# Patient Record
Sex: MALE | Race: WHITE | NOT HISPANIC OR LATINO | Employment: OTHER | URBAN - NONMETROPOLITAN AREA
[De-identification: names, ages, dates, MRNs, and addresses within clinical notes are randomized per-mention and may not be internally consistent; named-entity substitution may affect disease eponyms.]

---

## 2017-12-20 PROBLEM — C18.1 MUCINOUS ADENOCARCINOMA OF APPENDIX: Status: ACTIVE | Noted: 2017-12-20

## 2017-12-22 ENCOUNTER — CONSULT (OUTPATIENT)
Dept: ONCOLOGY | Facility: CLINIC | Age: 62
End: 2017-12-22

## 2017-12-22 VITALS
SYSTOLIC BLOOD PRESSURE: 110 MMHG | RESPIRATION RATE: 16 BRPM | DIASTOLIC BLOOD PRESSURE: 84 MMHG | BODY MASS INDEX: 20.15 KG/M2 | WEIGHT: 152 LBS | HEIGHT: 73 IN | TEMPERATURE: 99.8 F | HEART RATE: 105 BPM

## 2017-12-22 DIAGNOSIS — C18.1 MUCINOUS ADENOCARCINOMA OF APPENDIX (HCC): Primary | ICD-10-CM

## 2017-12-22 PROCEDURE — 99245 OFF/OP CONSLTJ NEW/EST HI 55: CPT | Performed by: INTERNAL MEDICINE

## 2017-12-22 RX ORDER — METOPROLOL SUCCINATE 50 MG/1
50 TABLET, EXTENDED RELEASE ORAL DAILY
Refills: 0 | COMMUNITY
Start: 2017-11-14 | End: 2021-08-31 | Stop reason: SDUPTHER

## 2017-12-22 RX ORDER — LISINOPRIL AND HYDROCHLOROTHIAZIDE 20; 12.5 MG/1; MG/1
1 TABLET ORAL DAILY
Refills: 0 | COMMUNITY
Start: 2017-11-14 | End: 2021-08-31 | Stop reason: SDUPTHER

## 2017-12-22 NOTE — PROGRESS NOTES
CHIEF COMPLAINT: Mucinous adenocarcinoma the appendix    REASON FOR REFERRAL: Appendiceal adenocarcinoma      RECORDS OBTAINED  Records of the patients history including those obtained from  Vishal turner were reviewed and summarized in detail.       Mucinous adenocarcinoma of appendix    12/5/2017 Initial Diagnosis     Mucinous adenocarcinoma of appendix         12/5/2017 Surgery     Surgery       Procedure:  Appendectomy and right hemicolectomy      Completeness of resection:  No evidence of residual tumor     Pathology revealed invasive moderately differentiated mucinous adenocarcinoma.  Right hemicolectomy: Benign colon and small intestine no evidence of carcinoma.  16 benign lymph nodes, 0/16, negative for dysplasia or malignancy.  Tumor size approximately 6 cm.  Grade 2, moderately differentiated.  Tumor invades through the muscularis profile into the base of appendix but does not extend to the serosal surface.  All margins uninvolved, no lymphovascular invasion identified.  Pathological stage pT3 pN0.             12/8/2017 Imaging     CT of the chest abdomen and pelvis negative.  Baseline CBC WBC 7100, hemoglobin 11.3, hematocrit 34.8%, platelet count 195,000.            HISTORY OF PRESENT ILLNESS:  The patient is a 62 y.o.  male, referred for Mucinous adenocarcinoma the appendix found at the time of appendectomy in the face of appendicitis.  Pathologic stage IIa with T3 extension into the base of the appendix through the muscularis propria but not into the serosal surface.  16 nodes negative.  CT chest abdomen and pelvis showed no evidence of metastasis.    REVIEW OF SYSTEMS:  A 14 point review of systems was performed and is negative except as noted above.    Past Medical History:   Diagnosis Date   • Hypertension      History reviewed. No pertinent surgical history.    No current outpatient prescriptions on file prior to visit.     No current facility-administered medications on file prior to visit.   "      No Known Allergies    Social History     Social History   • Marital status: Unknown     Spouse name: N/A   • Number of children: N/A   • Years of education: N/A     Social History Main Topics   • Smoking status: Former Smoker   • Smokeless tobacco: None   • Alcohol use None   • Drug use: None   • Sexual activity: Not Asked     Other Topics Concern   • None     Social History Narrative   • None       Family History   Problem Relation Age of Onset   • Lung cancer Mother    • Heart disease Father        PHYSICAL EXAM:    /84  Pulse 105  Temp 99.8 °F (37.7 °C)  Resp 16  Ht 185.4 cm (73\")  Wt 68.9 kg (152 lb)  BMI 20.05 kg/m2    ECOG: (0) Fully active, able to carry on all predisease performance without restriction  General: well appearing male in no acute distress  HEENT: sclera anicteric, oropharynx clear  Lymphatics: no cervical, supraclavicular, inguinal, or axillary adenopathy  Cardiovascular: regular rate and rhythm, no murmurs  Neck: Supple; No thyromegaly  Lungs: clear to auscultation bilaterally. No respiratory distress.   Abdomen: soft, nontender, nondistended.  No palpable organomegaly  Extremities: no cyanosis, clubbing, edema, or cords  Skin: no rashes, lesions, bruising, or petechiae  Neuro: Alert and oriented x 4; Moving all extremities.  Psych: No anxiety or depression    No results found for any previous visit.    Assessment/Plan     1. 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  We reviewed the case in tumor board at [facility] with Dr. Davidson, Dr. Dover, pathology list Dr. Dawson, and radiologist Dr. King.   CBC is normal .  We will get a baseline CMP and CEA.  He has no high risk pictures.  This is not poorly differentiated.  It was cleanly remove it despite the appendicitis.  He had plenty of nodes " removed.  It was not obstructing.  There was no lymphatic or vascular invasion.    Surveillance per NCCN guidelines   - H&P and CEA every 3-6 months for 2 years and then every 6 months for 5 years  -CT chest abdomen pelvis every 6-12 months for 5 years    As he has not had a baseline colonoscopy because of the weight is presented, he will see Dr. Davidson and February or March for his baseline colonoscopy.  Assuming that is negative, I will have him see my nurse practitioner for survivorship visit in 3 months with CEA and CMP today and CBC, CMP, and CEA prior to return in 3 months.  We'll get his next CTs 6 months from now.  We'll follow with Dr. Davidson for routine endoscopy with colonoscopy in a year.  At that point he has no adenoma, then repeated in 3 years and if that's negative then repeat in 5 years per NCCN guidelines.  If he does have an advanced adenoma and he needs colonoscopy repeated yearly      Cy Delcid MD    12/22/2017

## 2018-03-23 LAB
ALBUMIN SERPL-MCNC: 4.4 G/DL (ref 3.6–4.8)
ALBUMIN/GLOB SERPL: 1.8 {RATIO} (ref 1.2–2.2)
ALP SERPL-CCNC: 99 IU/L (ref 39–117)
ALT SERPL-CCNC: 30 IU/L (ref 0–44)
AST SERPL-CCNC: 26 IU/L (ref 0–40)
BASOPHILS # BLD AUTO: 0 X10E3/UL (ref 0–0.2)
BASOPHILS NFR BLD AUTO: 0 %
BILIRUB SERPL-MCNC: 0.9 MG/DL (ref 0–1.2)
BUN SERPL-MCNC: 10 MG/DL (ref 8–27)
BUN/CREAT SERPL: 12 (ref 10–24)
CALCIUM SERPL-MCNC: 9.2 MG/DL (ref 8.6–10.2)
CEA SERPL-MCNC: 0.9 NG/ML (ref 0–4.7)
CHLORIDE SERPL-SCNC: 103 MMOL/L (ref 96–106)
CO2 SERPL-SCNC: 24 MMOL/L (ref 18–29)
CREAT SERPL-MCNC: 0.85 MG/DL (ref 0.76–1.27)
EOSINOPHIL # BLD AUTO: 0.2 X10E3/UL (ref 0–0.4)
EOSINOPHIL NFR BLD AUTO: 2 %
ERYTHROCYTE [DISTWIDTH] IN BLOOD BY AUTOMATED COUNT: 13.8 % (ref 12.3–15.4)
GFR SERPLBLD CREATININE-BSD FMLA CKD-EPI: 109 ML/MIN/1.73
GFR SERPLBLD CREATININE-BSD FMLA CKD-EPI: 95 ML/MIN/1.73
GLOBULIN SER CALC-MCNC: 2.4 G/DL (ref 1.5–4.5)
GLUCOSE SERPL-MCNC: 92 MG/DL (ref 65–99)
HCT VFR BLD AUTO: 43.9 % (ref 37.5–51)
HGB BLD-MCNC: 14.6 G/DL (ref 13–17.7)
IMM GRANULOCYTES # BLD: 0 X10E3/UL (ref 0–0.1)
IMM GRANULOCYTES NFR BLD: 0 %
LYMPHOCYTES # BLD AUTO: 2.3 X10E3/UL (ref 0.7–3.1)
LYMPHOCYTES NFR BLD AUTO: 34 %
MCH RBC QN AUTO: 28.3 PG (ref 26.6–33)
MCHC RBC AUTO-ENTMCNC: 33.3 G/DL (ref 31.5–35.7)
MCV RBC AUTO: 85 FL (ref 79–97)
MONOCYTES # BLD AUTO: 0.6 X10E3/UL (ref 0.1–0.9)
MONOCYTES NFR BLD AUTO: 8 %
NEUTROPHILS # BLD AUTO: 3.7 X10E3/UL (ref 1.4–7)
NEUTROPHILS NFR BLD AUTO: 56 %
PLATELET # BLD AUTO: 197 X10E3/UL (ref 150–379)
POTASSIUM SERPL-SCNC: 3.9 MMOL/L (ref 3.5–5.2)
PROT SERPL-MCNC: 6.8 G/DL (ref 6–8.5)
RBC # BLD AUTO: 5.15 X10E6/UL (ref 4.14–5.8)
SODIUM SERPL-SCNC: 143 MMOL/L (ref 134–144)
WBC # BLD AUTO: 6.8 X10E3/UL (ref 3.4–10.8)

## 2018-03-29 ENCOUNTER — OFFICE VISIT (OUTPATIENT)
Dept: ONCOLOGY | Facility: CLINIC | Age: 63
End: 2018-03-29

## 2018-03-29 VITALS
TEMPERATURE: 99.2 F | SYSTOLIC BLOOD PRESSURE: 128 MMHG | DIASTOLIC BLOOD PRESSURE: 84 MMHG | HEIGHT: 73 IN | HEART RATE: 78 BPM | WEIGHT: 152 LBS | BODY MASS INDEX: 20.15 KG/M2 | RESPIRATION RATE: 14 BRPM

## 2018-03-29 DIAGNOSIS — C18.1 MUCINOUS ADENOCARCINOMA OF APPENDIX (HCC): Primary | ICD-10-CM

## 2018-03-29 DIAGNOSIS — K59.1 FUNCTIONAL DIARRHEA: ICD-10-CM

## 2018-03-29 PROCEDURE — 99214 OFFICE O/P EST MOD 30 MIN: CPT | Performed by: NURSE PRACTITIONER

## 2018-03-29 NOTE — PROGRESS NOTES
CHIEF COMPLAINT: Follow-up for history of appendiceal carcinoma    Problem List:     Mucinous adenocarcinoma of appendix    12/5/2017 Initial Diagnosis     Mucinous adenocarcinoma of appendix         12/5/2017 Surgery     Surgery       Procedure:  Appendectomy and right hemicolectomy      Completeness of resection:  No evidence of residual tumor     Pathology revealed invasive moderately differentiated mucinous adenocarcinoma.  Right hemicolectomy: Benign colon and small intestine no evidence of carcinoma.  16 benign lymph nodes, 0/16, negative for dysplasia or malignancy.  Tumor size approximately 6 cm.  Grade 2, moderately differentiated.  Tumor invades through the muscularis profile into the base of appendix but does not extend to the serosal surface.  All margins uninvolved, no lymphovascular invasion identified.  Pathological stage pT3 pN0.             12/8/2017 Imaging     CT of the chest abdomen and pelvis negative.  Baseline CBC WBC 7100, hemoglobin 11.3, hematocrit 34.8%, platelet count 195,000.            HISTORY OF PRESENT ILLNESS:  The patient is a 62 y.o. male, here for follow up on management of Mucinous adenocarcinoma of the appendix.  Mr. Carmencita gan has been doing well since we saw him last with no new complaints.  States that he had colonoscopy with Dr. turner last week and that everything was clear.  Reports that he was told his next colonoscopy would be in 3 years.  Used to have diarrhea intermittently since his surgery but does not seem to be getting much better.  He has played around with taking Imodium and dietary changes but is not found a good regimen yet.       Past Medical History:   Diagnosis Date   • Hypertension      No past surgical history on file.    No Known Allergies    Family History and Social History reviewed and changed as necessary      REVIEW OF SYSTEM:   Review of Systems   Constitutional: Negative for appetite change, chills, diaphoresis, fatigue, fever and unexpected  "weight change.   HENT:   Negative for mouth sores, sore throat and trouble swallowing.    Eyes: Negative for icterus.   Respiratory: Negative for cough, hemoptysis and shortness of breath.    Cardiovascular: Negative for chest pain, leg swelling and palpitations.   Gastrointestinal: Negative for abdominal distention, abdominal pain, blood in stool, constipation, diarrhea, nausea and vomiting.   Endocrine: Negative for hot flashes.   Genitourinary: Negative for bladder incontinence, difficulty urinating, dysuria, frequency and hematuria.    Musculoskeletal: Negative for gait problem, neck pain and neck stiffness.   Skin: Negative for rash.   Neurological: Negative for dizziness, gait problem, headaches, light-headedness and numbness.   Hematological: Negative for adenopathy. Does not bruise/bleed easily.   Psychiatric/Behavioral: Negative for depression. The patient is not nervous/anxious.    All other systems reviewed and are negative.       PHYSICAL EXAM    Vitals:    03/29/18 1120   BP: 128/84   Pulse: 78   Resp: 14   Temp: 99.2 °F (37.3 °C)   Weight: 68.9 kg (152 lb)   Height: 185.4 cm (73\")     Constitutional: Thin but well-developed gentleman. No distress.   ECOG: (0) Fully active, able to carry on all predisease performance without restriction  HENT:   Head: Normocephalic.   Mouth/Throat: Oropharynx is clear and moist.   Eyes: Conjunctivae are normal. Pupils are equal, round, and reactive to light. No scleral icterus.   Neck: Neck supple. No JVD present. No thyromegaly present.   Cardiovascular: Normal rate, regular rhythm and normal heart sounds.    Pulmonary/Chest: Breath sounds normal. No respiratory distress.   Abdominal: Soft. Exhibits no distension.   Musculoskeletal:Exhibits no edema, tenderness or deformity.   Neurological: Alert and oriented to person, place, and time. Exhibits normal muscle tone.   Skin: No ecchymosis, no petechiae and no rash noted. Not diaphoretic. No cyanosis. Nails show no " clubbing.   Psychiatric: Normal mood and affect.   Vitals reviewed.  Laboratory data reviewed.    Recent Results (from the past 336 hour(s))   CBC & Differential    Collection Time: 03/22/18  9:09 AM   Result Value Ref Range    WBC 6.8 3.4 - 10.8 x10E3/uL    RBC 5.15 4.14 - 5.80 x10E6/uL    Hemoglobin 14.6 13.0 - 17.7 g/dL    Hematocrit 43.9 37.5 - 51.0 %    MCV 85 79 - 97 fL    MCH 28.3 26.6 - 33.0 pg    MCHC 33.3 31.5 - 35.7 g/dL    RDW 13.8 12.3 - 15.4 %    Platelets 197 150 - 379 x10E3/uL    Neutrophil Rel % 56 Not Estab. %    Lymphocyte Rel % 34 Not Estab. %    Monocyte Rel % 8 Not Estab. %    Eosinophil Rel % 2 Not Estab. %    Basophil Rel % 0 Not Estab. %    Neutrophils Absolute 3.7 1.4 - 7.0 x10E3/uL    Lymphocytes Absolute 2.3 0.7 - 3.1 x10E3/uL    Monocytes Absolute 0.6 0.1 - 0.9 x10E3/uL    Eosinophils Absolute 0.2 0.0 - 0.4 x10E3/uL    Basophils Absolute 0.0 0.0 - 0.2 x10E3/uL    Immature Granulocyte Rel % 0 Not Estab. %    Immature Grans Absolute 0.0 0.0 - 0.1 x10E3/uL   Comprehensive Metabolic Panel    Collection Time: 03/22/18  9:09 AM   Result Value Ref Range    Glucose 92 65 - 99 mg/dL    BUN 10 8 - 27 mg/dL    Creatinine 0.85 0.76 - 1.27 mg/dL    eGFR Non African Am 95 >59 mL/min/1.73    eGFR African Am 109 >59 mL/min/1.73    BUN/Creatinine Ratio 12 10 - 24    Sodium 143 134 - 144 mmol/L    Potassium 3.9 3.5 - 5.2 mmol/L    Chloride 103 96 - 106 mmol/L    Total CO2 24 18 - 29 mmol/L    Calcium 9.2 8.6 - 10.2 mg/dL    Total Protein 6.8 6.0 - 8.5 g/dL    Albumin 4.4 3.6 - 4.8 g/dL    Globulin 2.4 1.5 - 4.5 g/dL    A/G Ratio 1.8 1.2 - 2.2    Total Bilirubin 0.9 0.0 - 1.2 mg/dL    Alkaline Phosphatase 99 39 - 117 IU/L    AST (SGOT) 26 0 - 40 IU/L    ALT (SGPT) 30 0 - 44 IU/L   CEA    Collection Time: 03/22/18  9:09 AM   Result Value Ref Range    CEA 0.9 0.0 - 4.7 ng/mL         ASSESSMENT & PLAN:    1.  Stage IIA T3 N0 appendiceal mucinous adenocarcinoma  2.  Diarrhea    Discussion: The patient's labs  are all normal, he has no anemia, CEA is normal at 0.9, he has no new worrisome symptoms.  He states he had a colonoscopy last week with Dr. turner that was all clear although I do not have those results.  He states his next colonoscopy will be due in 3 years.  He has chronic diarrhea since his surgery in December.  Currently he is only taking Imodium once every other day and I recommended that he try taking a dose at least daily to try to get it under better control.  I will also reach out to our dietitian to see if she can offer him further information.  We will see him back in 3 months for follow-up with repeat serum testing and CT the chest abdomen and pelvis prior to return.  I did review the patient's survivorship care plan with him at today's appointment and provided him with a copy.    Surveillance per NCCN guidelines   - H&P and CEA every 3-6 months for 2 years and then every 6 months for 5 years  -CT chest abdomen pelvis every 6-12 months for 5 years      Tanya Alvarado, APRN    03/29/2018

## 2018-04-05 ENCOUNTER — TELEPHONE (OUTPATIENT)
Dept: NUTRITION | Facility: HOSPITAL | Age: 63
End: 2018-04-05

## 2018-04-05 NOTE — PROGRESS NOTES
[facility] Nutrition    Diagnosis:  Stage [place] appendiceal adenocarcinoma; 0/16 lymph nodes  Treatment:  Appendectomy & right hemicolectomy; surveillance    Nutritional Issues: 4 month history of diarrhea following surgery    Phone consultation with patient regarding nutritional issue of diarrhea.  Patient states that following surgery he started a soft bland diet and then advanced; he soon learned that there were certain foods such as Italian sausages, fried foods and most vegetables that he could not tolerate.  He has found himself eating a diet that he does not consider healthy (pastas, rice, pop tarts, cookies, etc) for the past 3 months.  He states that he started out having 10-12 loose stools per day and that improved with time, but when he did the prep for a colonoscopy, the frequency of the bowel movements increased.    Reviewed tips for management of diarrhea and mailed patient education material to the patient.  Reviewed small, frequent meals and snacks vs large meals; foods to avoid; importance of hydration; soluble vs insoluble fiber and focusing more on an increased intake of soluble fibers; use of metamucil for diarrhea management; use of antidiarrheals.     Patient verbalized excellent comprehension; all questions addressed.  Patient encouraged to contact RD for additional help as needed; patient very appreciative.

## 2018-06-26 ENCOUNTER — OFFICE VISIT (OUTPATIENT)
Dept: ONCOLOGY | Facility: CLINIC | Age: 63
End: 2018-06-26

## 2018-06-26 VITALS
SYSTOLIC BLOOD PRESSURE: 124 MMHG | HEIGHT: 73 IN | BODY MASS INDEX: 20.67 KG/M2 | HEART RATE: 64 BPM | TEMPERATURE: 98.4 F | RESPIRATION RATE: 16 BRPM | WEIGHT: 156 LBS | DIASTOLIC BLOOD PRESSURE: 85 MMHG

## 2018-06-26 DIAGNOSIS — C18.1 MUCINOUS ADENOCARCINOMA OF APPENDIX (HCC): Primary | ICD-10-CM

## 2018-06-26 PROCEDURE — 99213 OFFICE O/P EST LOW 20 MIN: CPT | Performed by: INTERNAL MEDICINE

## 2018-06-26 NOTE — PROGRESS NOTES
CHIEF COMPLAINT: fu colon ca    Problem List:     Mucinous adenocarcinoma of appendix    12/5/2017 Initial Diagnosis     Mucinous adenocarcinoma of appendix found at the time of appendectomy 12/2017 in the face of appendicitis.  Pathologic stage IIa with T3 extension into the base of the appendix through the muscularis propria but not into the serosal surface.  16 nodes negative.  Colonoscopy December 2017 negative postop         12/5/2017 Surgery     Surgery       Procedure:  Appendectomy and right hemicolectomy      Completeness of resection:  No evidence of residual tumor     Pathology revealed invasive moderately differentiated mucinous adenocarcinoma.  Right hemicolectomy: Benign colon and small intestine no evidence of carcinoma.  16 benign lymph nodes, 0/16, negative for dysplasia or malignancy.  Tumor size approximately 6 cm.  Grade 2, moderately differentiated.  Tumor invades through the muscularis profile into the base of appendix but does not extend to the serosal surface.  All margins uninvolved, no lymphovascular invasion identified.  Pathological stage pT3 pN0.             12/8/2017 Imaging     CT of the chest abdomen and pelvis negative.  Baseline CBC WBC 7100, hemoglobin 11.3, hematocrit 34.8%, platelet count 195,000.         6/11/2018 Imaging     CT chest, abdomen and pelvis with no evidence metastatic disease.  CEA 1.1            HISTORY OF PRESENT ILLNESS:  The patient is a 62 y.o. male, here for follow up on management of colon ca. Has had diarrhea since surgery.  Better with carbs.  Has consulted with Kristal Deng.      Past Medical History:   Diagnosis Date   • Hypertension      No past surgical history on file.    No Known Allergies    Family History and Social History reviewed and changed as necessary      REVIEW OF SYSTEM:   Review of Systems   Constitutional: Negative for appetite change, chills, diaphoresis, fatigue, fever and unexpected weight change.   HENT:   Negative for mouth sores,  "sore throat and trouble swallowing.    Eyes: Negative for icterus.   Respiratory: Negative for cough, hemoptysis and shortness of breath.    Cardiovascular: Negative for chest pain, leg swelling and palpitations.   Gastrointestinal: Negative for abdominal distention, abdominal pain, blood in stool, constipation, diarrhea, nausea and vomiting.   Endocrine: Negative for hot flashes.   Genitourinary: Negative for bladder incontinence, difficulty urinating, dysuria, frequency and hematuria.    Musculoskeletal: Negative for gait problem, neck pain and neck stiffness.   Skin: Negative for rash.   Neurological: Negative for dizziness, gait problem, headaches, light-headedness and numbness.   Hematological: Negative for adenopathy. Does not bruise/bleed easily.   Psychiatric/Behavioral: Negative for depression. The patient is not nervous/anxious.    All other systems reviewed and are negative.       PHYSICAL EXAM    Vitals:    06/26/18 0724   BP: 124/85   Pulse: 64   Resp: 16   Temp: 98.4 °F (36.9 °C)   Weight: 70.8 kg (156 lb)   Height: 185.4 cm (73\")     Constitutional: Appears well-developed and well-nourished. No distress.   ECOG: (0) Fully active, able to carry on all predisease performance without restriction  HENT:   Head: Normocephalic.   Mouth/Throat: Oropharynx is clear and moist.   Eyes: Conjunctivae are normal. Pupils are equal, round, and reactive to light. No scleral icterus.   Neck: Neck supple. No JVD present. No thyromegaly present.   Cardiovascular: Normal rate, regular rhythm and normal heart sounds.    Pulmonary/Chest: Breath sounds normal. No respiratory distress.   Abdominal: Soft. Exhibits no distension and no mass. There is no hepatosplenomegaly. There is no tenderness. There is no rebound and no guarding.   Musculoskeletal:Exhibits no edema, tenderness or deformity.   Neurological: Alert and oriented to person, place, and time. Exhibits normal muscle tone.   Skin: No ecchymosis, no petechiae and no " rash noted. Not diaphoretic. No cyanosis. Nails show no clubbing.   Psychiatric: Normal mood and affect.   Vitals reviewed.              ASSESSMENT & PLAN:    1.  Mucinous carcinoma of the appendix  2. Chronic postoperative diarrhea    Discussion: CEA is 1.1 with a bilirubin of 1.6 alkaline phosphatase of 105 with normal CBC and CT chest abdomen and pelvis are negative both on my review of images as well as written report.  Per NCCN guidelines we will continue to check his CEA, CBC, CMP, and CT chest abdomen pelvis every 6 months until December 2022.  His next colonoscopy per Dr. Awad will be December 2020.  He will continue to work with Kristal Deng regarding dietary management in light of his chronic diarrhea postoperatively.      Cy Delcid MD    06/26/2018

## 2018-09-19 ENCOUNTER — OFFICE VISIT (OUTPATIENT)
Dept: ONCOLOGY | Facility: CLINIC | Age: 63
End: 2018-09-19

## 2018-09-19 VITALS
BODY MASS INDEX: 20.28 KG/M2 | HEART RATE: 80 BPM | HEIGHT: 73 IN | DIASTOLIC BLOOD PRESSURE: 85 MMHG | RESPIRATION RATE: 16 BRPM | WEIGHT: 153 LBS | SYSTOLIC BLOOD PRESSURE: 132 MMHG | TEMPERATURE: 98.9 F

## 2018-09-19 DIAGNOSIS — C18.1 MUCINOUS ADENOCARCINOMA OF APPENDIX (HCC): Primary | ICD-10-CM

## 2018-09-19 DIAGNOSIS — R10.11 RIGHT UPPER QUADRANT ABDOMINAL PAIN: ICD-10-CM

## 2018-09-19 PROCEDURE — 99213 OFFICE O/P EST LOW 20 MIN: CPT | Performed by: NURSE PRACTITIONER

## 2018-09-19 NOTE — PROGRESS NOTES
CHIEF COMPLAINT: Right upper quadrant abdominal pain    Problem List:     Mucinous adenocarcinoma of appendix (CMS/HCC)    12/5/2017 Initial Diagnosis     Mucinous adenocarcinoma of appendix found at the time of appendectomy 12/2017 in the face of appendicitis.  Pathologic stage IIa with T3 extension into the base of the appendix through the muscularis propria but not into the serosal surface.  16 nodes negative.  Colonoscopy December 2017 negative postop         12/5/2017 Surgery     Surgery       Procedure:  Appendectomy and right hemicolectomy      Completeness of resection:  No evidence of residual tumor     Pathology revealed invasive moderately differentiated mucinous adenocarcinoma.  Right hemicolectomy: Benign colon and small intestine no evidence of carcinoma.  16 benign lymph nodes, 0/16, negative for dysplasia or malignancy.  Tumor size approximately 6 cm.  Grade 2, moderately differentiated.  Tumor invades through the muscularis profile into the base of appendix but does not extend to the serosal surface.  All margins uninvolved, no lymphovascular invasion identified.  Pathological stage pT3 pN0.             12/8/2017 Imaging     CT of the chest abdomen and pelvis negative.  Baseline CBC WBC 7100, hemoglobin 11.3, hematocrit 34.8%, platelet count 195,000.         6/11/2018 Imaging     CT chest, abdomen and pelvis with no evidence metastatic disease.  CEA 1.1            HISTORY OF PRESENT ILLNESS:  The patient is a 63 y.o. male, here for follow up on management of right upper quadrant abdominal pain.  Patient states that he is had right upper quadrant abdominal discomfort for the past 2 weeks that is been assistant.  He states that is more of a generalized ache, muscle discomfort, is there at all times.  He is not associated with any activity such as eating or bowel movement.  No change in his bowel or bladder habits, bowel movements are loose but this is his baseline since surgery.  No fevers or chills.   "The right upper quadrant discomfort is concerning to him as he states this is how he felt well and they originally found his disease.      Past Medical History:   Diagnosis Date   • Hypertension      No past surgical history on file.    No Known Allergies    Family History and Social History reviewed and changed as necessary      Review of Systems   Constitutional: Negative for activity change, appetite change, fever and unexpected weight change.   HENT: Negative for mouth sores.    Eyes: Negative.    Respiratory: Negative for cough and shortness of breath.    Cardiovascular: Negative for chest pain and leg swelling.   Gastrointestinal: Positive for abdominal pain. Negative for blood in stool, constipation, diarrhea, nausea and vomiting.   Endocrine: Negative.    Genitourinary: Negative for difficulty urinating.   Musculoskeletal: Negative.    Skin: Negative.    Allergic/Immunologic: Negative.    Neurological: Negative.    Hematological: Negative.    Psychiatric/Behavioral: Negative.           PHYSICAL EXAM    Vitals:    09/19/18 1039   BP: 132/85   Pulse: 80   Resp: 16   Temp: 98.9 °F (37.2 °C)   Weight: 69.4 kg (153 lb)   Height: 185.4 cm (73\")     Constitutional: Appears well-developed and well-nourished. No distress.   ECOG: (0) Fully active, able to carry on all predisease performance without restriction  HENT:   Head: Normocephalic.   Mouth/Throat: Oropharynx is clear and moist.   Eyes: Conjunctivae are normal. Pupils are equal, round, and reactive to light. No scleral icterus.   Neck: Neck supple. No JVD present. No thyromegaly present.   Cardiovascular: Normal rate, regular rhythm and normal heart sounds.    Pulmonary/Chest: Breath sounds normal. No respiratory distress.   Abdominal: Soft. Exhibits no distension and no mass. There is no hepatosplenomegaly. There is no tenderness. There is no rebound and no guarding.   Musculoskeletal:Exhibits no edema, tenderness or deformity.   Neurological: Alert and " oriented to person, place, and time. Exhibits normal muscle tone.   Skin: No ecchymosis, no petechiae and no rash noted. Not diaphoretic. No cyanosis. Nails show no clubbing.   Psychiatric: Normal mood and affect.   Vitals reviewed.      No radiology results for the last 7 days          ASSESSMENT & PLAN:    1.  Right upper quadrant abdominal pain  2.  History of mucinous adenocarcinoma of the appendix, stage T3, stage IIa.    Discussion: I will get a CT of the abdomen and pelvis with contrast along with a CBC, CMP and a CEA for further evaluation in light of his right upper quadrant pain.  The pain is persistent and has been ongoing for the last 2 weeks.  We will see him back in 2 weeks for follow-up to go over the results.  He is up-to-date on colonoscopy having had his last colonoscopy in March with Dr. Davidson, he states this was normal.      Tanya Alvarado, KING    09/19/2018

## 2018-09-20 LAB
ALBUMIN SERPL-MCNC: 4.9 G/DL (ref 3.6–4.8)
ALBUMIN/GLOB SERPL: 2 {RATIO} (ref 1.2–2.2)
ALP SERPL-CCNC: 108 IU/L (ref 39–117)
ALT SERPL-CCNC: 24 IU/L (ref 0–44)
AST SERPL-CCNC: 25 IU/L (ref 0–40)
BASOPHILS # BLD AUTO: 0 X10E3/UL (ref 0–0.2)
BASOPHILS NFR BLD AUTO: 0 %
BILIRUB SERPL-MCNC: 1.6 MG/DL (ref 0–1.2)
BUN SERPL-MCNC: 18 MG/DL (ref 8–27)
BUN/CREAT SERPL: 19 (ref 10–24)
CALCIUM SERPL-MCNC: 9.5 MG/DL (ref 8.6–10.2)
CEA SERPL-MCNC: 1 NG/ML (ref 0–4.7)
CHLORIDE SERPL-SCNC: 103 MMOL/L (ref 96–106)
CO2 SERPL-SCNC: 21 MMOL/L (ref 20–29)
CREAT SERPL-MCNC: 0.96 MG/DL (ref 0.76–1.27)
EOSINOPHIL # BLD AUTO: 0.1 X10E3/UL (ref 0–0.4)
EOSINOPHIL NFR BLD AUTO: 1 %
ERYTHROCYTE [DISTWIDTH] IN BLOOD BY AUTOMATED COUNT: 14.2 % (ref 12.3–15.4)
GLOBULIN SER CALC-MCNC: 2.5 G/DL (ref 1.5–4.5)
GLUCOSE SERPL-MCNC: 79 MG/DL (ref 65–99)
HCT VFR BLD AUTO: 46.5 % (ref 37.5–51)
HGB BLD-MCNC: 15.4 G/DL (ref 13–17.7)
IMM GRANULOCYTES # BLD: 0 X10E3/UL (ref 0–0.1)
IMM GRANULOCYTES NFR BLD: 0 %
LYMPHOCYTES # BLD AUTO: 1.9 X10E3/UL (ref 0.7–3.1)
LYMPHOCYTES NFR BLD AUTO: 21 %
MCH RBC QN AUTO: 29.4 PG (ref 26.6–33)
MCHC RBC AUTO-ENTMCNC: 33.1 G/DL (ref 31.5–35.7)
MCV RBC AUTO: 89 FL (ref 79–97)
MONOCYTES # BLD AUTO: 0.4 X10E3/UL (ref 0.1–0.9)
MONOCYTES NFR BLD AUTO: 4 %
NEUTROPHILS # BLD AUTO: 6.6 X10E3/UL (ref 1.4–7)
NEUTROPHILS NFR BLD AUTO: 74 %
PLATELET # BLD AUTO: 241 X10E3/UL (ref 150–379)
POTASSIUM SERPL-SCNC: 4.4 MMOL/L (ref 3.5–5.2)
PROT SERPL-MCNC: 7.4 G/DL (ref 6–8.5)
RBC # BLD AUTO: 5.23 X10E6/UL (ref 4.14–5.8)
SODIUM SERPL-SCNC: 144 MMOL/L (ref 134–144)
WBC # BLD AUTO: 9 X10E3/UL (ref 3.4–10.8)

## 2018-09-24 ENCOUNTER — RESULTS ENCOUNTER (OUTPATIENT)
Dept: ONCOLOGY | Facility: CLINIC | Age: 63
End: 2018-09-24

## 2018-09-24 DIAGNOSIS — C18.1 MUCINOUS ADENOCARCINOMA OF APPENDIX (HCC): ICD-10-CM

## 2018-09-24 DIAGNOSIS — R10.11 RIGHT UPPER QUADRANT ABDOMINAL PAIN: ICD-10-CM

## 2018-10-02 ENCOUNTER — OFFICE VISIT (OUTPATIENT)
Dept: ONCOLOGY | Facility: CLINIC | Age: 63
End: 2018-10-02

## 2018-10-02 ENCOUNTER — RESULTS ENCOUNTER (OUTPATIENT)
Dept: ONCOLOGY | Facility: CLINIC | Age: 63
End: 2018-10-02

## 2018-10-02 VITALS
HEIGHT: 73 IN | SYSTOLIC BLOOD PRESSURE: 148 MMHG | TEMPERATURE: 99.1 F | BODY MASS INDEX: 20.28 KG/M2 | HEART RATE: 100 BPM | DIASTOLIC BLOOD PRESSURE: 90 MMHG | WEIGHT: 153 LBS | RESPIRATION RATE: 16 BRPM

## 2018-10-02 DIAGNOSIS — C18.1 MUCINOUS ADENOCARCINOMA OF APPENDIX (HCC): ICD-10-CM

## 2018-10-02 DIAGNOSIS — C18.1 MUCINOUS ADENOCARCINOMA OF APPENDIX (HCC): Primary | ICD-10-CM

## 2018-10-02 PROCEDURE — 99213 OFFICE O/P EST LOW 20 MIN: CPT | Performed by: INTERNAL MEDICINE

## 2018-10-02 NOTE — PROGRESS NOTES
CHIEF COMPLAINT: Right upper quadrant sensation    Problem List:     Mucinous adenocarcinoma of appendix (CMS/HCC)    12/5/2017 Initial Diagnosis     Mucinous adenocarcinoma of appendix found at the time of appendectomy 12/2017 in the face of appendicitis.  Pathologic stage IIa with T3 extension into the base of the appendix through the muscularis propria but not into the serosal surface.  16 nodes negative.  Colonoscopy December 2017 negative postop         12/5/2017 Surgery     Surgery       Procedure:  Appendectomy and right hemicolectomy      Completeness of resection:  No evidence of residual tumor     Pathology revealed invasive moderately differentiated mucinous adenocarcinoma.  Right hemicolectomy: Benign colon and small intestine no evidence of carcinoma.  16 benign lymph nodes, 0/16, negative for dysplasia or malignancy.  Tumor size approximately 6 cm.  Grade 2, moderately differentiated.  Tumor invades through the muscularis profile into the base of appendix but does not extend to the serosal surface.  All margins uninvolved, no lymphovascular invasion identified.  Pathological stage pT3 pN0.             12/8/2017 Imaging     CT of the chest abdomen and pelvis negative.  Baseline CBC WBC 7100, hemoglobin 11.3, hematocrit 34.8%, platelet count 195,000.         3/20/2018 Procedure     Colonoscopy with Dr. Davidson was normal, recommendation for repeat surveillance colonoscopy in 3 years.         6/11/2018 Imaging     CT chest, abdomen and pelvis with no evidence metastatic disease.  CEA 1.1         9/24/2018 Imaging     CT abdomen pelvis showed no acute changes and nothing to suggest recurrence            HISTORY OF PRESENT ILLNESS:  The patient is a 63 y.o. male, here for follow up on management of stage IIa mucinous appendiceal carcinoma.  Having slight right upper quadrant odds sensation.  He says it's not really pain per se.  I reviewed the CT report from [facility].  No significant  "abnormalities      Past Medical History:   Diagnosis Date   • Hypertension      No past surgical history on file.    No Known Allergies    Family History and Social History reviewed and changed as necessary      REVIEW OF SYSTEM:   Review of Systems   Constitutional: Negative for appetite change, chills, diaphoresis, fatigue, fever and unexpected weight change.   HENT:   Negative for mouth sores, sore throat and trouble swallowing.    Eyes: Negative for icterus.   Respiratory: Negative for cough, hemoptysis and shortness of breath.    Cardiovascular: Negative for chest pain, leg swelling and palpitations.   Gastrointestinal: Negative for abdominal distention, abdominal pain, blood in stool, constipation, diarrhea, nausea and vomiting.   Endocrine: Negative for hot flashes.   Genitourinary: Negative for bladder incontinence, difficulty urinating, dysuria, frequency and hematuria.    Musculoskeletal: Negative for gait problem, neck pain and neck stiffness.   Skin: Negative for rash.   Neurological: Negative for dizziness, gait problem, headaches, light-headedness and numbness.   Hematological: Negative for adenopathy. Does not bruise/bleed easily.   Psychiatric/Behavioral: Negative for depression. The patient is not nervous/anxious.    All other systems reviewed and are negative.       PHYSICAL EXAM    Vitals:    10/02/18 0722   BP: 148/90   Pulse: 100   Resp: 16   Temp: 99.1 °F (37.3 °C)   Weight: 69.4 kg (153 lb)   Height: 185.4 cm (73\")     Constitutional: Appears well-developed and well-nourished. No distress.   ECOG: (0) Fully active, able to carry on all predisease performance without restriction  HENT:   Head: Normocephalic.   Mouth/Throat: Oropharynx is clear and moist.   Eyes: Conjunctivae are normal. Pupils are equal, round, and reactive to light. No scleral icterus.   Neck: Neck supple. No JVD present. No thyromegaly present.   Cardiovascular: Normal rate, regular rhythm and normal heart sounds.  "   Pulmonary/Chest: Breath sounds normal. No respiratory distress.   Abdominal: Soft. Exhibits no distension and no mass. There is no hepatosplenomegaly. There is no tenderness. There is no rebound and no guarding.   Musculoskeletal:Exhibits no edema, tenderness or deformity.   Neurological: Alert and oriented to person, place, and time. Exhibits normal muscle tone.   Skin: No ecchymosis, no petechiae and no rash noted. Not diaphoretic. No cyanosis. Nails show no clubbing.   Psychiatric: Normal mood and affect.   Vitals reviewed.      No radiology results for the last 7 days          ASSESSMENT & PLAN:    1.  Stage IIa appendiceal carcinoma  2. Right upper quadrant odds sensation    Discussion: we'll get a CBC, CMP, and CEA now and call him those results.  We will see him back in 3 months with repeat CBC, CMP, and CEA as well as CT chest along with abdomen and pelvis and we will get back to our every 6 month follow-up per NCCN guidelines assuming he is feeling well and the scans are unremarkable.  If his right upper quadrant pain worsens, then I would get him back to Dr. turner for evaluation which might include laparoscopy as he could have peritoneal involvement that is subclinical given his history as above.      Cy Delcid MD    10/02/2018

## 2018-10-03 LAB
ALBUMIN SERPL-MCNC: 4.5 G/DL (ref 3.6–4.8)
ALBUMIN/GLOB SERPL: 1.7 {RATIO} (ref 1.2–2.2)
ALP SERPL-CCNC: 107 IU/L (ref 39–117)
ALT SERPL-CCNC: 22 IU/L (ref 0–44)
AST SERPL-CCNC: 24 IU/L (ref 0–40)
BASOPHILS # BLD AUTO: 0 X10E3/UL (ref 0–0.2)
BASOPHILS NFR BLD AUTO: 0 %
BILIRUB SERPL-MCNC: 1.5 MG/DL (ref 0–1.2)
BUN SERPL-MCNC: 11 MG/DL (ref 8–27)
BUN/CREAT SERPL: 12 (ref 10–24)
CALCIUM SERPL-MCNC: 9.1 MG/DL (ref 8.6–10.2)
CEA SERPL-MCNC: 0.9 NG/ML (ref 0–4.7)
CHLORIDE SERPL-SCNC: 103 MMOL/L (ref 96–106)
CO2 SERPL-SCNC: 22 MMOL/L (ref 20–29)
CREAT SERPL-MCNC: 0.93 MG/DL (ref 0.76–1.27)
EOSINOPHIL # BLD AUTO: 0.1 X10E3/UL (ref 0–0.4)
EOSINOPHIL NFR BLD AUTO: 2 %
ERYTHROCYTE [DISTWIDTH] IN BLOOD BY AUTOMATED COUNT: 14.1 % (ref 12.3–15.4)
GLOBULIN SER CALC-MCNC: 2.6 G/DL (ref 1.5–4.5)
GLUCOSE SERPL-MCNC: 76 MG/DL (ref 65–99)
HCT VFR BLD AUTO: 44.2 % (ref 37.5–51)
HGB BLD-MCNC: 14.9 G/DL (ref 13–17.7)
IMM GRANULOCYTES # BLD: 0 X10E3/UL (ref 0–0.1)
IMM GRANULOCYTES NFR BLD: 0 %
LYMPHOCYTES # BLD AUTO: 1.5 X10E3/UL (ref 0.7–3.1)
LYMPHOCYTES NFR BLD AUTO: 22 %
MCH RBC QN AUTO: 29.9 PG (ref 26.6–33)
MCHC RBC AUTO-ENTMCNC: 33.7 G/DL (ref 31.5–35.7)
MCV RBC AUTO: 89 FL (ref 79–97)
MONOCYTES # BLD AUTO: 0.5 X10E3/UL (ref 0.1–0.9)
MONOCYTES NFR BLD AUTO: 7 %
NEUTROPHILS # BLD AUTO: 4.7 X10E3/UL (ref 1.4–7)
NEUTROPHILS NFR BLD AUTO: 69 %
PLATELET # BLD AUTO: 218 X10E3/UL (ref 150–379)
POTASSIUM SERPL-SCNC: 3.5 MMOL/L (ref 3.5–5.2)
PROT SERPL-MCNC: 7.1 G/DL (ref 6–8.5)
RBC # BLD AUTO: 4.98 X10E6/UL (ref 4.14–5.8)
SODIUM SERPL-SCNC: 144 MMOL/L (ref 134–144)
WBC # BLD AUTO: 6.9 X10E3/UL (ref 3.4–10.8)

## 2018-10-05 ENCOUNTER — TELEPHONE (OUTPATIENT)
Dept: ONCOLOGY | Facility: CLINIC | Age: 63
End: 2018-10-05

## 2018-10-05 NOTE — TELEPHONE ENCOUNTER
----- Message from KING Botello sent at 10/4/2018  3:40 PM EDT -----  Regarding: labs  Please let Mr. Tse know his CBC, CMP and CEA were all normal.  Thank you.

## 2019-01-09 ENCOUNTER — OFFICE VISIT (OUTPATIENT)
Dept: ONCOLOGY | Facility: CLINIC | Age: 64
End: 2019-01-09

## 2019-01-09 VITALS
BODY MASS INDEX: 20.81 KG/M2 | SYSTOLIC BLOOD PRESSURE: 136 MMHG | HEIGHT: 73 IN | WEIGHT: 157 LBS | HEART RATE: 78 BPM | RESPIRATION RATE: 18 BRPM | TEMPERATURE: 98.4 F | DIASTOLIC BLOOD PRESSURE: 83 MMHG

## 2019-01-09 DIAGNOSIS — C18.1 MUCINOUS ADENOCARCINOMA OF APPENDIX (HCC): Primary | ICD-10-CM

## 2019-01-09 PROCEDURE — 99213 OFFICE O/P EST LOW 20 MIN: CPT | Performed by: NURSE PRACTITIONER

## 2019-01-09 NOTE — PROGRESS NOTES
CHIEF COMPLAINT: Follow up mucinous adenocarcinoma of the appendix    Problem List:     Mucinous adenocarcinoma of appendix (CMS/HCC)    12/5/2017 Initial Diagnosis     Mucinous adenocarcinoma of appendix found at the time of appendectomy 12/2017 in the face of appendicitis.  Pathologic stage IIa with T3 extension into the base of the appendix through the muscularis propria but not into the serosal surface.  16 nodes negative.  Colonoscopy December 2017 negative postop         12/5/2017 Surgery     Surgery       Procedure:  Appendectomy and right hemicolectomy      Completeness of resection:  No evidence of residual tumor     Pathology revealed invasive moderately differentiated mucinous adenocarcinoma.  Right hemicolectomy: Benign colon and small intestine no evidence of carcinoma.  16 benign lymph nodes, 0/16, negative for dysplasia or malignancy.  Tumor size approximately 6 cm.  Grade 2, moderately differentiated.  Tumor invades through the muscularis profile into the base of appendix but does not extend to the serosal surface.  All margins uninvolved, no lymphovascular invasion identified.  Pathological stage pT3 pN0.             12/8/2017 Imaging     CT of the chest abdomen and pelvis negative.  Baseline CBC WBC 7100, hemoglobin 11.3, hematocrit 34.8%, platelet count 195,000.         3/20/2018 Procedure     Colonoscopy with Dr. Davidson was normal, recommendation for repeat surveillance colonoscopy in 3 years.         6/11/2018 Imaging     CT chest, abdomen and pelvis with no evidence metastatic disease.  CEA 1.1         9/24/2018 Imaging     CT abdomen pelvis showed no acute changes and nothing to suggest recurrence         12/28/2018 Imaging     CT chest, abdomen and pelvis negative. Normal CBC, CMP and CEA 0.7.            HISTORY OF PRESENT ILLNESS:  The patient is a 63 y.o. male, here for follow up on management of stage IIa mucinous appendiceal carcinoma.  The patient has been feeling well since we saw him  "last with no new complaints.  He states he still has slight right upper quadrant odd sensation but it is not described as a pain, is unchanged over time, not worsening, just something that he notices.  No change in his appetite, continues to have loose stool postoperatively affected by diet.  His weight is stable.  Takes Imodium occasionally with good results, is wondering if he could take an Imodium daily prophylactically.    Past Medical History:   Diagnosis Date   • Hypertension      History reviewed. No pertinent surgical history.    No Known Allergies    Family History and Social History reviewed and changed as necessary      REVIEW OF SYSTEM:   Review of Systems   Constitutional: Negative for appetite change, chills, diaphoresis, fatigue, fever and unexpected weight change.   HENT:   Negative for mouth sores, sore throat and trouble swallowing.    Eyes: Negative for icterus.   Respiratory: Negative for cough, hemoptysis and shortness of breath.    Cardiovascular: Negative for chest pain, leg swelling and palpitations.   Gastrointestinal: Negative for abdominal distention, abdominal pain, blood in stool, constipation, nausea and vomiting.  Positive for loose stool since surgery.  Endocrine: Negative for hot flashes.   Genitourinary: Negative for bladder incontinence, difficulty urinating, dysuria, frequency and hematuria.    Musculoskeletal: Negative for gait problem, neck pain and neck stiffness.   Skin: Negative for rash.   Neurological: Negative for dizziness, gait problem, headaches, light-headedness and numbness.   Hematological: Negative for adenopathy. Does not bruise/bleed easily.   Psychiatric/Behavioral: Negative for depression. The patient is not nervous/anxious.    All other systems reviewed and are negative.       PHYSICAL EXAM    Vitals:    01/09/19 0852   BP: 136/83   Pulse: 78   Resp: 18   Temp: 98.4 °F (36.9 °C)   Weight: 71.2 kg (157 lb)   Height: 185.4 cm (73\")     Constitutional: Appears " "well-developed and well-nourished. No distress.   ECOG: (0) Fully active, able to carry on all predisease performance without restriction  HENT:   Head: Normocephalic.   Mouth/Throat: Oropharynx is clear and moist.   Eyes: Conjunctivae are normal. Pupils are equal, round, and reactive. No scleral icterus.   Neck: Neck supple. No JVD present.    Cardiovascular: Normal rate, regular rhythm and normal heart sounds.    Pulmonary/Chest: Breath sounds normal. No respiratory distress.   Abdominal: Soft. Exhibits no distension and no mass. There is no hepatosplenomegaly. There is no tenderness.   Musculoskeletal:Exhibits no edema, tenderness or deformity.   Neurological: Alert and oriented to person, place, and time. Exhibits normal muscle tone.   Skin: No ecchymosis, no petechiae and no rash noted. Not diaphoretic. No cyanosis.   Psychiatric: Normal mood and affect.   Vitals reviewed.  Labs reviewed.      ASSESSMENT & PLAN:    1.  Stage IIa appendiceal carcinoma  2. Loose stool since colon surgery  3.   Right upper quadrant \"odd sensation\"    Discussion: The patient is doing well, current CT chest abdomen and pelvis show no evidence of disease, he has a normal CBC, CMP and CEA all outlined above in the oncology history.  He has no new or worrisome symptoms.  Continues to have an odd sensation in the right upper quadrant, this has not changed over time, it is not described as a pain, it is just something that he notices.  We discussed that this could be scar tissue.  In the absence of any radiographic findings and given its stability over time would do nothing further, if his right upper quadrant pain worsens, then I would get him back to Dr. turner for evaluation which might include laparoscopy as he could have peritoneal involvement that is subclinical given his history as above.  He is up-to-date on colonoscopy having last one done in March 2018.  He continues to have loose stool and quick transit time since his " surgery, we discussed that he could take Imodium one tablet daily prophylactically to see if this will help.  We will see him back in 6 months with repeat CBC, CMP, and CEA as well as CT chest along with abdomen and pelvis.  We will continue surveillance every 6 month follow-up per NCCN guidelines until 12/2022.      I spent 15 minutes with the patient. I spent > 50% percent of this time counseling and discussing prognosis, diagnostic testing, evaluation, current status and management.    Tanya Alvarado, APRN    01/09/2019

## 2019-07-11 ENCOUNTER — OFFICE VISIT (OUTPATIENT)
Dept: ONCOLOGY | Facility: CLINIC | Age: 64
End: 2019-07-11

## 2019-07-11 VITALS
DIASTOLIC BLOOD PRESSURE: 78 MMHG | RESPIRATION RATE: 16 BRPM | TEMPERATURE: 98.7 F | BODY MASS INDEX: 21.6 KG/M2 | HEIGHT: 73 IN | HEART RATE: 67 BPM | SYSTOLIC BLOOD PRESSURE: 141 MMHG | WEIGHT: 163 LBS

## 2019-07-11 DIAGNOSIS — C18.1 MALIGNANT NEOPLASM OF APPENDIX VERMIFORMIS (HCC): ICD-10-CM

## 2019-07-11 DIAGNOSIS — C18.1 MUCINOUS ADENOCARCINOMA OF APPENDIX (HCC): Primary | ICD-10-CM

## 2019-07-11 PROCEDURE — 99213 OFFICE O/P EST LOW 20 MIN: CPT | Performed by: NURSE PRACTITIONER

## 2019-07-11 NOTE — PROGRESS NOTES
CHIEF COMPLAINT: Follow up mucinous adenocarcinoma of the appendix    Problem List:     Mucinous adenocarcinoma of appendix (CMS/HCC)    12/5/2017 Initial Diagnosis     Mucinous adenocarcinoma of appendix found at the time of appendectomy 12/2017 in the face of appendicitis.  Pathologic stage IIa with T3 extension into the base of the appendix through the muscularis propria but not into the serosal surface.  16 nodes negative.  Colonoscopy December 2017 negative postop         12/5/2017 Surgery     Surgery       Procedure:  Appendectomy and right hemicolectomy      Completeness of resection:  No evidence of residual tumor     Pathology revealed invasive moderately differentiated mucinous adenocarcinoma.  Right hemicolectomy: Benign colon and small intestine no evidence of carcinoma.  16 benign lymph nodes, 0/16, negative for dysplasia or malignancy.  Tumor size approximately 6 cm.  Grade 2, moderately differentiated.  Tumor invades through the muscularis profile into the base of appendix but does not extend to the serosal surface.  All margins uninvolved, no lymphovascular invasion identified.  Pathological stage pT3 pN0.             12/8/2017 Imaging     CT of the chest abdomen and pelvis negative.  Baseline CBC WBC 7100, hemoglobin 11.3, hematocrit 34.8%, platelet count 195,000.         3/20/2018 Procedure     Colonoscopy with Dr. Davidson was normal, recommendation for repeat surveillance colonoscopy in 3 years.         6/11/2018 Imaging     CT chest, abdomen and pelvis with no evidence metastatic disease.  CEA 1.1         9/24/2018 Imaging     CT abdomen pelvis showed no acute changes and nothing to suggest recurrence         12/28/2018 Imaging     CT chest, abdomen and pelvis negative. Normal CBC, CMP and CEA 0.7.         6/28/2019 Imaging     CT chest, abdomen and pelvis: No interval change from prior studies.  No recurrent or metastatic disease detected in the chest, abdomen or pelvis.  No acute process.  CEA  0.9            HISTORY OF PRESENT ILLNESS:  The patient is a 64 y.o. male, here for follow up on management of stage IIa mucinous appendiceal carcinoma.  The patient has been feeling well since we saw him last with no new complaints.  He states he still has slight right upper quadrant odd sensation but it is not described as a pain, is unchanged over time, not worsening, just something that he notices and states that this actually predates his surgery.  No change in his appetite, continues to have loose stool postoperatively affected by diet.  His weight is stable.  Takes Imodium occasionally.    Past Medical History:   Diagnosis Date   • Hypertension      No past surgical history on file.    No Known Allergies    Family History and Social History reviewed and changed as necessary      REVIEW OF SYSTEM:   Review of Systems   Constitutional: Negative for appetite change, chills, diaphoresis, fatigue, fever and unexpected weight change.   HENT:   Negative for mouth sores, sore throat and trouble swallowing.    Eyes: Negative for icterus.   Respiratory: Negative for cough, hemoptysis and shortness of breath.    Cardiovascular: Negative for chest pain, leg swelling and palpitations.   Gastrointestinal: Negative for abdominal distention, abdominal pain, blood in stool, constipation, nausea and vomiting.  Positive for loose stool since surgery.  Endocrine: Negative for hot flashes.   Genitourinary: Negative for bladder incontinence, difficulty urinating, dysuria, frequency and hematuria.    Musculoskeletal: Negative for gait problem, neck pain and neck stiffness.   Skin: Negative for rash. Small healing abrasion on forehead from recent fall.  Neurological: Negative for dizziness, gait problem, headaches, light-headedness and numbness.   Hematological: Negative for adenopathy. Does not bruise/bleed easily.   Psychiatric/Behavioral: Negative for depression. The patient is not nervous/anxious.    All other systems reviewed  "and are negative.       PHYSICAL EXAM    Vitals:    07/11/19 0819   BP: 141/78   Pulse: 67   Resp: 16   Temp: 98.7 °F (37.1 °C)   Weight: 73.9 kg (163 lb)   Height: 185.4 cm (73\")     Constitutional: Appears well-developed and well-nourished. No distress.   ECOG: (0) Fully active, able to carry on all predisease performance without restriction  HENT:   Head: Normocephalic.   Mouth/Throat: Oropharynx is clear and moist.   Eyes: Conjunctivae are normal. Pupils are equal, round, and reactive. No scleral icterus.   Neck: Neck supple. No JVD present.    Cardiovascular: Normal rate, regular rhythm and normal heart sounds.    Pulmonary/Chest: Breath sounds normal. No respiratory distress.   Abdominal: Soft. Exhibits no distension and no mass. There is no hepatosplenomegaly. There is no tenderness.   Musculoskeletal:Exhibits no edema, tenderness or deformity.   Neurological: Alert and oriented to person, place, and time. Exhibits normal muscle tone.   Skin: No ecchymosis, no petechiae and no rash noted. Not diaphoretic. No cyanosis.   Psychiatric: Normal mood and affect.   Vitals reviewed.  Labs reviewed.      ASSESSMENT & PLAN:    1.  Stage IIa appendiceal carcinoma  2. Loose stool since colon surgery  3.   Right upper quadrant \"odd sensation\"    Discussion: The patient is doing well, current CT chest abdomen and pelvis show no evidence of disease, he has a normal CBC, CMP and CEA of 0.9, all outlined above in the oncology history.  He has no new or worrisome symptoms.  Continues to have an odd sensation in the right upper quadrant, this has not changed over time, it is not described as a pain, it is just something that he notices and actually predates surgery.  He is up-to-date on colonoscopy having last one done in March 2018 with 3-year recommended follow-up.  He continues to have loose stool and quick transit time since his surgery, controlled with Imodium as needed.  We will see him back in 6 months with repeat CBC, " CMP, and CEA as well as CT chest along with abdomen and pelvis.  If CT scans are negative in December I will then go to annual scanning with continuation of serum testing every 6 months per NCCN guidelines out to 5 years.    I spent 15 minutes with the patient. I spent > 50% percent of this time counseling and discussing prognosis, diagnostic testing, evaluation, current status and management.    Tanya Alvarado, APRN    07/11/2019

## 2019-12-27 ENCOUNTER — TELEPHONE (OUTPATIENT)
Dept: ONCOLOGY | Facility: CLINIC | Age: 64
End: 2019-12-27

## 2019-12-27 NOTE — TELEPHONE ENCOUNTER
We received a call from [facility] regarding pt. He is scheduled to have a CT on Monday in which prior authorization is required, yet they do not have prior auth on file.    They can be contacted at

## 2020-01-08 ENCOUNTER — OFFICE VISIT (OUTPATIENT)
Dept: ONCOLOGY | Facility: CLINIC | Age: 65
End: 2020-01-08

## 2020-01-08 VITALS
BODY MASS INDEX: 21.87 KG/M2 | HEART RATE: 126 BPM | HEIGHT: 73 IN | SYSTOLIC BLOOD PRESSURE: 150 MMHG | WEIGHT: 165 LBS | DIASTOLIC BLOOD PRESSURE: 92 MMHG | RESPIRATION RATE: 18 BRPM | TEMPERATURE: 99.6 F

## 2020-01-08 DIAGNOSIS — C18.1 MUCINOUS ADENOCARCINOMA OF APPENDIX (HCC): Primary | ICD-10-CM

## 2020-01-08 DIAGNOSIS — R93.5 ABNORMAL CT OF THE ABDOMEN: ICD-10-CM

## 2020-01-08 PROCEDURE — 99214 OFFICE O/P EST MOD 30 MIN: CPT | Performed by: NURSE PRACTITIONER

## 2020-01-08 RX ORDER — TRAMADOL HYDROCHLORIDE 50 MG/1
TABLET ORAL
COMMUNITY
Start: 2019-10-11 | End: 2022-03-08

## 2020-01-08 NOTE — PROGRESS NOTES
CHIEF COMPLAINT: 1.  Intermittent low pelvic discomfort  2.  Follow up mucinous adenocarcinoma of the appendix    Problem List:     Mucinous adenocarcinoma of appendix (CMS/HCC)    12/5/2017 Initial Diagnosis     Mucinous adenocarcinoma of appendix found at the time of appendectomy 12/2017 in the face of appendicitis.  Pathologic stage IIa with T3 extension into the base of the appendix through the muscularis propria but not into the serosal surface.  16 nodes negative.  Colonoscopy December 2017 negative postop      12/5/2017 Surgery     Surgery       Procedure:  Appendectomy and right hemicolectomy      Completeness of resection:  No evidence of residual tumor     Pathology revealed invasive moderately differentiated mucinous adenocarcinoma.  Right hemicolectomy: Benign colon and small intestine no evidence of carcinoma.  16 benign lymph nodes, 0/16, negative for dysplasia or malignancy.  Tumor size approximately 6 cm.  Grade 2, moderately differentiated.  Tumor invades through the muscularis profile into the base of appendix but does not extend to the serosal surface.  All margins uninvolved, no lymphovascular invasion identified.  Pathological stage pT3 pN0.          12/8/2017 Imaging     CT of the chest abdomen and pelvis negative.  Baseline CBC WBC 7100, hemoglobin 11.3, hematocrit 34.8%, platelet count 195,000.      3/20/2018 Procedure     Colonoscopy with Dr. Davidson was normal, recommendation for repeat surveillance colonoscopy in 3 years.      6/11/2018 Imaging     CT chest, abdomen and pelvis with no evidence metastatic disease.  CEA 1.1      9/24/2018 Imaging     CT abdomen pelvis showed no acute changes and nothing to suggest recurrence      12/28/2018 Imaging     CT chest, abdomen and pelvis negative. Normal CBC, CMP and CEA 0.7.      6/28/2019 Imaging     CT chest, abdomen and pelvis: No interval change from prior studies.  No recurrent or metastatic disease detected in the chest, abdomen or pelvis.   No acute process.  CEA 0.9      12/30/2019 Imaging     CT chest, abdomen and pelvis: No disease in the chest.  There are subtle soft tissue densities (1 surrounding surgical clips in the right side of the pelvis and the other a soft tissue nodule intimately associated with the anterior wall of the bladder) which are considered suspicious for recurrent disease.  CMP with normal creatinine 0.9, total bilirubin 1.9, AST 34, ALT 24, alkaline phosphatase 93.  CBC with WBC 6900, hemoglobin 15.9, platelet count 232,000.  CEA 1.1.         HISTORY OF PRESENT ILLNESS:  The patient is a 64 y.o. male, here for follow up on management of stage IIa mucinous appendiceal carcinoma.  The patient overall has been feeling well since we saw him last.  He has noted over the last month or so some subtle discomfort in his lower pelvis, he states sometimes when he urinates he has a pulling sensation in a slight ache in his low pelvis, he also has intermittent right groin ache.  No fevers or chills.  No change in his appetite, continues to have loose stool postoperatively affected by diet.  His weight is stable.  Takes Imodium occasionally.    Past Medical History:   Diagnosis Date   • Hypertension      History reviewed. No pertinent surgical history.    No Known Allergies    Family History and Social History reviewed and changed as necessary      REVIEW OF SYSTEM:   Review of Systems   Constitutional: Negative for appetite change, chills, diaphoresis, fatigue, fever and unexpected weight change.   HENT:   Negative for mouth sores, sore throat and trouble swallowing.    Eyes: Negative for icterus.   Respiratory: Negative for cough, hemoptysis and shortness of breath.    Cardiovascular: Negative for chest pain, leg swelling and palpitations.   Gastrointestinal: Negative for abdominal distention, abdominal pain, blood in stool, constipation, nausea and vomiting.  Positive for chronic loose stool since surgery.  Endocrine: Negative for hot  "flashes.   Genitourinary: Negative for bladder incontinence, difficulty urinating, dysuria, frequency and hematuria.    Musculoskeletal: Negative for gait problem, neck pain and neck stiffness. Positive for intermittent low pelvic and right groin discomfort.  Skin: Negative for rash. Small healing abrasion on forehead from recent fall.  Neurological: Negative for dizziness, gait problem, headaches, light-headedness and numbness.   Hematological: Negative for adenopathy. Does not bruise/bleed easily.   Psychiatric/Behavioral: Negative for depression. The patient is not nervous/anxious.    All other systems reviewed and are negative.       PHYSICAL EXAM    Vitals:    01/08/20 0823   BP: 150/92   Pulse: (!) 126   Resp: 18   Temp: 99.6 °F (37.6 °C)   Weight: 74.8 kg (165 lb)   Height: 185.4 cm (73\")     Constitutional: Pleasant, healthy-appearing thin gentleman. No distress.   ECOG: (0) Fully active, able to carry on all predisease performance without restriction  HENT:   Head: Normocephalic.   Mouth/Throat: Oropharynx is clear and moist.   Eyes: Conjunctivae are normal. Pupils are equal, round, and reactive. No scleral icterus.   Neck: Neck supple. No JVD present.    Cardiovascular: Normal rate, regular rhythm and normal heart sounds.    Pulmonary/Chest: Breath sounds normal. No respiratory distress.  Nodes: No cervical, supraclavicular, axillary or inguinal nodes palpable on exam.   Abdominal: Soft. Exhibits no distension and no mass. There is no hepatosplenomegaly. There is no tenderness.   Musculoskeletal:Exhibits no edema, tenderness or deformity.   Neurological: Alert and oriented to person, place, and time. Exhibits normal muscle tone.   Skin: No ecchymosis, no petechiae and no rash noted. Not diaphoretic. No cyanosis.   Psychiatric: Normal mood and affect.   Vitals reviewed.  Labs reviewed.  CT chest abdomen and pelvis report from 12/30/2019 discussed last week with Dr. Cy Delcid and also reviewed at time of " visit with the patient.      ASSESSMENT & PLAN:    1. Stage IIa appendiceal carcinoma  2. Current CT abdomen and pelvis showing possible local recurrence  3. Loose stool since colon surgery  3.   Intermittent low pelvis and right groin discomfort    Discussion: Current CT of the abdomen and pelvis shows possible local recurrence with subtle soft tissue densities, one surrounding surgical clips in the right side of the pelvis and the other a soft tissue nodule intimately associated with the anterior wall of the bladder, these are suspicious for recurrent disease.  I discussed these findings earlier with Dr. Cy Delicd and reviewed with the patient today at his visit.  We will get him back to Dr. davidson for further evaluation and biopsy.  CBC, CMP unremarkable and CEA normal at 1.1, all outlined above in the oncology history.  He continues to have loose stool and quick transit time since his surgery, controlled with Imodium as needed.      I will have him return in about 3 weeks for follow-up to see Dr. Delcid to go over results after he sees Dr. Davidson for colonoscopy and/or biopsy.    I spent a total of 25 minutes in direct patient care, greater than 15  minutes (greater than 50%) were spent in coordination of care, and counseling the patient regarding  (diagnosis) . Answered any questions patient had regarding medications and plan of care.     Tanya Alvarado, APRN    01/08/2020

## 2020-01-28 ENCOUNTER — OFFICE VISIT (OUTPATIENT)
Dept: ONCOLOGY | Facility: CLINIC | Age: 65
End: 2020-01-28

## 2020-01-28 VITALS
HEIGHT: 73 IN | HEART RATE: 77 BPM | WEIGHT: 162 LBS | DIASTOLIC BLOOD PRESSURE: 89 MMHG | TEMPERATURE: 98.2 F | SYSTOLIC BLOOD PRESSURE: 146 MMHG | BODY MASS INDEX: 21.47 KG/M2 | RESPIRATION RATE: 18 BRPM

## 2020-01-28 DIAGNOSIS — C18.1 MUCINOUS ADENOCARCINOMA OF APPENDIX (HCC): Primary | ICD-10-CM

## 2020-01-28 PROCEDURE — 99214 OFFICE O/P EST MOD 30 MIN: CPT | Performed by: INTERNAL MEDICINE

## 2020-01-28 NOTE — PROGRESS NOTES
CHIEF COMPLAINT: Mucinous adenocarcinoma of the appendix likely locally recurrent    Problem List:     Mucinous adenocarcinoma of appendix (CMS/HCC)    12/5/2017 Initial Diagnosis     Mucinous adenocarcinoma of appendix found at the time of appendectomy 12/2017 in the face of appendicitis.  Pathologic stage IIa with T3 extension into the base of the appendix through the muscularis propria but not into the serosal surface.  16 nodes negative.  Colonoscopy December 2017 negative postop      12/5/2017 Surgery     Surgery       Procedure:  Appendectomy and right hemicolectomy      Completeness of resection:  No evidence of residual tumor     Pathology revealed invasive moderately differentiated mucinous adenocarcinoma.  Right hemicolectomy: Benign colon and small intestine no evidence of carcinoma.  16 benign lymph nodes, 0/16, negative for dysplasia or malignancy.  Tumor size approximately 6 cm.  Grade 2, moderately differentiated.  Tumor invades through the muscularis profile into the base of appendix but does not extend to the serosal surface.  All margins uninvolved, no lymphovascular invasion identified.  Pathological stage pT3 pN0.          12/8/2017 Imaging     CT of the chest abdomen and pelvis negative.  Baseline CBC WBC 7100, hemoglobin 11.3, hematocrit 34.8%, platelet count 195,000.      3/20/2018 Procedure     Colonoscopy with Dr. Davidson was normal, recommendation for repeat surveillance colonoscopy in 3 years.      6/11/2018 Imaging     CT chest, abdomen and pelvis with no evidence metastatic disease.  CEA 1.1      9/24/2018 Imaging     CT abdomen pelvis showed no acute changes and nothing to suggest recurrence      12/28/2018 Imaging     CT chest, abdomen and pelvis negative. Normal CBC, CMP and CEA 0.7.      6/28/2019 Imaging     CT chest, abdomen and pelvis: No interval change from prior studies.  No recurrent or metastatic disease detected in the chest, abdomen or pelvis.  No acute process.  CEA 0.9       12/30/2019 Imaging     CT chest, abdomen and pelvis: No disease in the chest.  There are subtle soft tissue densities (1 surrounding surgical clips in the right side of the pelvis and the other a soft tissue nodule intimately associated with the anterior wall of the bladder) which are considered suspicious for recurrent disease.  CMP with normal creatinine 0.9, total bilirubin 1.9, AST 34, ALT 24, alkaline phosphatase 93.  CBC with WBC 6900, hemoglobin 15.9, platelet count 232,000.  CEA 1.1.      1/21/2020 Procedure     Normal colonoscopy with Dr. Davidson.  He has made referral to Dr. Portillo Peoples at .         HISTORY OF PRESENT ILLNESS:  The patient is a 64 y.o. male, here for follow up on management of likely local recurrent mucinous adenocarcinoma of the appendix possibly with extension into the bladder.  Fairly asymptomatic for this.      Past Medical History:   Diagnosis Date   • Hypertension      No past surgical history on file.    No Known Allergies    Family History and Social History reviewed and changed as necessary      REVIEW OF SYSTEM:   Review of Systems   Constitutional: Negative for appetite change, chills, diaphoresis, fatigue, fever and unexpected weight change.   HENT:   Negative for mouth sores, sore throat and trouble swallowing.    Eyes: Negative for icterus.   Respiratory: Negative for cough, hemoptysis and shortness of breath.    Cardiovascular: Negative for chest pain, leg swelling and palpitations.   Gastrointestinal: Negative for abdominal distention, abdominal pain, blood in stool, constipation, diarrhea, nausea and vomiting.   Endocrine: Negative for hot flashes.   Genitourinary: Negative for bladder incontinence, difficulty urinating, dysuria, frequency and hematuria.    Musculoskeletal: Negative for gait problem, neck pain and neck stiffness.   Skin: Negative for rash.   Neurological: Negative for dizziness, gait problem, headaches, light-headedness and numbness.   Hematological:  "Negative for adenopathy. Does not bruise/bleed easily.   Psychiatric/Behavioral: Negative for depression. The patient is not nervous/anxious.    All other systems reviewed and are negative.       PHYSICAL EXAM    Vitals:    01/28/20 0723   BP: 146/89   Pulse: 77   Resp: 18   Temp: 98.2 °F (36.8 °C)   Weight: 73.5 kg (162 lb)   Height: 185.4 cm (73\")     Constitutional: Appears well-developed and well-nourished. No distress.   ECOG: (0) Fully Active - Able to Carry On All Pre-disease Performance Without Restriction  HENT:   Head: Normocephalic.   Mouth/Throat: Oropharynx is clear and moist.   Eyes: Conjunctivae are normal. Pupils are equal, round, and reactive to light. No scleral icterus.   Neck: Neck supple. No JVD present. No thyromegaly present.   Cardiovascular: Normal rate, regular rhythm and normal heart sounds.    Pulmonary/Chest: Breath sounds normal. No respiratory distress.   Abdominal: Soft. Exhibits no distension and no mass. There is no hepatosplenomegaly. There is no tenderness. There is no rebound and no guarding.   Musculoskeletal:Exhibits no edema, tenderness or deformity.   Neurological: Alert and oriented to person, place, and time. Exhibits normal muscle tone.   Skin: No ecchymosis, no petechiae and no rash noted. Not diaphoretic. No cyanosis. Nails show no clubbing.   Psychiatric: Normal mood and affect.   Vitals reviewed.      Lab Results   Component Value Date    HGB 14.9 10/02/2018    HCT 44.2 10/02/2018    MCV 89 10/02/2018     10/02/2018    WBC 6.9 10/02/2018    NEUTROABS 4.7 10/02/2018    LYMPHSABS 1.5 10/02/2018    MONOSABS 0.5 10/02/2018    EOSABS 0.1 10/02/2018    BASOSABS 0.0 10/02/2018       Lab Results   Component Value Date    BUN 11 10/02/2018    CREATININE 0.93 10/02/2018     10/02/2018    K 3.5 10/02/2018     10/02/2018    CO2 22 10/02/2018    CALCIUM 9.1 10/02/2018    ALBUMIN 4.5 10/02/2018    BILITOT 1.5 (H) 10/02/2018    ALKPHOS 107 10/02/2018    AST 24 " 10/02/2018    ALT 22 10/02/2018                   ASSESSMENT & PLAN:  1. 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 Davidson was unrevealing.  He saw Dr. Peoples yesterday at .  Biopsy is due Friday of this week.  These low-grade mucinous adenocarcinoma of the appendix are poorly responsive to chemotherapy and there is conflicting data on their benefit.  I have left a message with Dr. Peoples as to my desire for consideration of HIPEC as appropriate.  We will see if a PET scan gives us any additional measurements to follow.  The data on the benefit of Avastin, FOLFOX, FOLFIRI, etc. in this setting is actually debatable and there is data that with these well differentiated mucinous adenocarcinoma was not consistently beneficial in some studies was detrimental while in others had some modest overall survival advantage.  I will see him back after his procedure this coming Friday and see what is found and he is aware of these conundrum's.  Discussed with patient 25 minutes greater than 50% spent counseling regarding this plan.  I suspect we will give him 3 months of Avastin FOLFOX imaging along the way and then go in for debulking and HI PEC and then perhaps follow with further chemotherapy postoperatively but we shall see what the overall consensus is after he sees Dr. Peoples for the biopsy the end of the week.      Cy Delcid MD    01/28/2020

## 2020-02-11 ENCOUNTER — OFFICE VISIT (OUTPATIENT)
Dept: ONCOLOGY | Facility: CLINIC | Age: 65
End: 2020-02-11

## 2020-02-11 VITALS
DIASTOLIC BLOOD PRESSURE: 88 MMHG | HEART RATE: 114 BPM | WEIGHT: 160 LBS | TEMPERATURE: 98.8 F | HEIGHT: 73 IN | RESPIRATION RATE: 18 BRPM | BODY MASS INDEX: 21.2 KG/M2 | SYSTOLIC BLOOD PRESSURE: 153 MMHG

## 2020-02-11 DIAGNOSIS — C18.1 MUCINOUS ADENOCARCINOMA OF APPENDIX (HCC): Primary | ICD-10-CM

## 2020-02-11 DIAGNOSIS — C18.1 MUCINOUS ADENOCARCINOMA OF APPENDIX (HCC): ICD-10-CM

## 2020-02-11 PROCEDURE — 99214 OFFICE O/P EST MOD 30 MIN: CPT | Performed by: INTERNAL MEDICINE

## 2020-02-11 RX ORDER — FLUOROURACIL 50 MG/ML
400 INJECTION, SOLUTION INTRAVENOUS ONCE
Status: CANCELLED | OUTPATIENT
Start: 2020-02-25

## 2020-02-11 RX ORDER — PALONOSETRON 0.05 MG/ML
0.25 INJECTION, SOLUTION INTRAVENOUS ONCE
Status: CANCELLED | OUTPATIENT
Start: 2020-02-25

## 2020-02-11 RX ORDER — ATROPINE SULFATE 1 MG/ML
0.25 INJECTION, SOLUTION INTRAMUSCULAR; INTRAVENOUS; SUBCUTANEOUS
Status: CANCELLED | OUTPATIENT
Start: 2020-02-25

## 2020-02-11 RX ORDER — ONDANSETRON HYDROCHLORIDE 8 MG/1
8 TABLET, FILM COATED ORAL 3 TIMES DAILY PRN
Qty: 30 TABLET | Refills: 5 | Status: SHIPPED | OUTPATIENT
Start: 2020-02-11 | End: 2022-03-08

## 2020-02-11 RX ORDER — SODIUM CHLORIDE 9 MG/ML
250 INJECTION, SOLUTION INTRAVENOUS ONCE
Status: CANCELLED | OUTPATIENT
Start: 2020-02-25

## 2020-02-11 RX ORDER — LIDOCAINE AND PRILOCAINE 25; 25 MG/G; MG/G
CREAM TOPICAL AS NEEDED
Qty: 30 G | Refills: 3 | Status: SHIPPED | OUTPATIENT
Start: 2020-02-11 | End: 2022-03-08

## 2020-02-11 NOTE — PROGRESS NOTES
CHIEF COMPLAINT: Mucinous adenocarcinoma of the appendix locally recurrent per Dr. Peoples's verbal report to me today    Problem List:     Mucinous adenocarcinoma of appendix (CMS/HCC)    12/5/2017 Initial Diagnosis     Mucinous adenocarcinoma of appendix found at the time of appendectomy 12/2017 in the face of appendicitis.  Pathologic stage IIa with T3 extension into the base of the appendix through the muscularis propria but not into the serosal surface.  16 nodes negative.  Colonoscopy December 2017 negative postop      12/5/2017 Surgery     Surgery       Procedure:  Appendectomy and right hemicolectomy      Completeness of resection:  No evidence of residual tumor     Pathology revealed invasive moderately differentiated mucinous adenocarcinoma.  Right hemicolectomy: Benign colon and small intestine no evidence of carcinoma.  16 benign lymph nodes, 0/16, negative for dysplasia or malignancy.  Tumor size approximately 6 cm.  Grade 2, moderately differentiated.  Tumor invades through the muscularis profile into the base of appendix but does not extend to the serosal surface.  All margins uninvolved, no lymphovascular invasion identified.  Pathological stage pT3 pN0.          12/8/2017 Imaging     CT of the chest abdomen and pelvis negative.  Baseline CBC WBC 7100, hemoglobin 11.3, hematocrit 34.8%, platelet count 195,000.      3/20/2018 Procedure     Colonoscopy with Dr. Davidson was normal, recommendation for repeat surveillance colonoscopy in 3 years.      6/11/2018 Imaging     CT chest, abdomen and pelvis with no evidence metastatic disease.  CEA 1.1      9/24/2018 Imaging     CT abdomen pelvis showed no acute changes and nothing to suggest recurrence      12/28/2018 Imaging     CT chest, abdomen and pelvis negative. Normal CBC, CMP and CEA 0.7.      6/28/2019 Imaging     CT chest, abdomen and pelvis: No interval change from prior studies.  No recurrent or metastatic disease detected in the chest, abdomen or  pelvis.  No acute process.  CEA 0.9      12/30/2019 Imaging     CT chest, abdomen and pelvis: No disease in the chest.  There are subtle soft tissue densities (1 surrounding surgical clips in the right side of the pelvis and the other a soft tissue nodule intimately associated with the anterior wall of the bladder) which are considered suspicious for recurrent disease.  CMP with normal creatinine 0.9, total bilirubin 1.9, AST 34, ALT 24, alkaline phosphatase 93.  CBC with WBC 6900, hemoglobin 15.9, platelet count 232,000.  CEA 1.1.      1/21/2020 Procedure     Normal colonoscopy with Dr. Davidson.  He has made referral to Dr. Portillo Peoples at .      2/7/2020 Biopsy     Laparoscopic biopsy with Dr. Peoples reportedly showed the cancer against the bladder.         HISTORY OF PRESENT ILLNESS:  The patient is a 64 y.o. male, here for follow up on management of mucinous adenocarcinoma abutting the bladder per Dr. Peoples biopsy 2/7/2020 verbal report to me from him.  I do not have the pathology at this junction nor the op note but he spoke with me by phone today.      Past Medical History:   Diagnosis Date   • Hypertension      No past surgical history on file.    No Known Allergies    Family History and Social History reviewed and changed as necessary      REVIEW OF SYSTEM:   Review of Systems   Constitutional: Negative for appetite change, chills, diaphoresis, fatigue, fever and unexpected weight change.   HENT:   Negative for mouth sores, sore throat and trouble swallowing.    Eyes: Negative for icterus.   Respiratory: Negative for cough, hemoptysis and shortness of breath.    Cardiovascular: Negative for chest pain, leg swelling and palpitations.   Gastrointestinal: Negative for abdominal distention, abdominal pain, blood in stool, constipation, diarrhea, nausea and vomiting.   Endocrine: Negative for hot flashes.   Genitourinary: Negative for bladder incontinence, difficulty urinating, dysuria, frequency and hematuria.   "  Musculoskeletal: Negative for gait problem, neck pain and neck stiffness.   Skin: Negative for rash.   Neurological: Negative for dizziness, gait problem, headaches, light-headedness and numbness.   Hematological: Negative for adenopathy. Does not bruise/bleed easily.   Psychiatric/Behavioral: Negative for depression. The patient is not nervous/anxious.    All other systems reviewed and are negative.       PHYSICAL EXAM    Vitals:    02/11/20 0727   BP: 153/88   Pulse: 114   Resp: 18   Temp: 98.8 °F (37.1 °C)   Weight: 72.6 kg (160 lb)   Height: 185.4 cm (73\")     Constitutional: Appears well-developed and well-nourished. No distress.   ECOG: (1) Restricted in Physically Strenuous Activity, Ambulatory & Able to Do Work of Light Nature  HENT:   Head: Normocephalic.   Mouth/Throat: Oropharynx is clear and moist.   Eyes: Conjunctivae are normal. Pupils are equal, round, and reactive to light. No scleral icterus.   Neck: Neck supple. No JVD present. No thyromegaly present.   Cardiovascular: Normal rate, regular rhythm and normal heart sounds.    Pulmonary/Chest: Breath sounds normal. No respiratory distress.   Abdominal: Soft. Exhibits no distension and no mass. There is no hepatosplenomegaly. There is no tenderness. There is no rebound and no guarding.   Musculoskeletal:Exhibits no edema, tenderness or deformity.   Neurological: Alert and oriented to person, place, and time. Exhibits normal muscle tone.   Skin: No ecchymosis, no petechiae and no rash noted. Not diaphoretic. No cyanosis. Nails show no clubbing.   Psychiatric: Normal mood and affect.   Vitals reviewed.      Lab Results   Component Value Date    HGB 14.9 10/02/2018    HCT 44.2 10/02/2018    MCV 89 10/02/2018     10/02/2018    WBC 6.9 10/02/2018    NEUTROABS 4.7 10/02/2018    LYMPHSABS 1.5 10/02/2018    MONOSABS 0.5 10/02/2018    EOSABS 0.1 10/02/2018    BASOSABS 0.0 10/02/2018       Lab Results   Component Value Date    BUN 11 10/02/2018    " CREATININE 0.93 10/02/2018     10/02/2018    K 3.5 10/02/2018     10/02/2018    CO2 22 10/02/2018    CALCIUM 9.1 10/02/2018    ALBUMIN 4.5 10/02/2018    BILITOT 1.5 (H) 10/02/2018    ALKPHOS 107 10/02/2018    AST 24 10/02/2018    ALT 22 10/02/2018                   ASSESSMENT & PLAN:    1. 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 Peoples today, had right lower quadrant involvement abutting the bladder.  Pathology not available yet to me but he confirms this is malignancy and wishes us to give 3 months of chemotherapy without Biologics and then repeat scans and consider resection/HI PEC at that junction if appropriate and it has not spread.  He does have loose stools between 3-5 times a day.  Nonetheless I would still prefer to try Folfiri as we may be doing this for quite some time and I think he will tolerate the Irinotecan over the long haul better than the oxaliplatin based regimens as long as he moderates the diarrhea with Imodium which she has not done regularly but it does work when he takes it.  We went over the side effects in detail but he will have a chemo preparation visit and we will shoot for starting treatment in about 2 weeks and that will get plenty of time for healing.  Port has been placed by Dr. Peoples.  PET scan was denied and hence we will follow his serial scans.  Discussed with patient for 30 minutes face-to-face greater than 50% spent counseling regarding this plan and the side effects of treatment as well as with Dr. Peoples.      Cy Delcid MD    02/11/2020

## 2020-02-19 ENCOUNTER — OFFICE VISIT (OUTPATIENT)
Dept: ONCOLOGY | Facility: CLINIC | Age: 65
End: 2020-02-19

## 2020-02-19 ENCOUNTER — INFUSION (OUTPATIENT)
Dept: ONCOLOGY | Facility: HOSPITAL | Age: 65
End: 2020-02-19

## 2020-02-19 VITALS
HEIGHT: 73 IN | RESPIRATION RATE: 18 BRPM | WEIGHT: 163 LBS | TEMPERATURE: 99.5 F | DIASTOLIC BLOOD PRESSURE: 100 MMHG | BODY MASS INDEX: 21.6 KG/M2 | HEART RATE: 86 BPM | SYSTOLIC BLOOD PRESSURE: 135 MMHG

## 2020-02-19 DIAGNOSIS — Z45.2 ENCOUNTER FOR CARE RELATED TO VASCULAR ACCESS PORT: ICD-10-CM

## 2020-02-19 DIAGNOSIS — C18.1 MUCINOUS ADENOCARCINOMA OF APPENDIX (HCC): Primary | ICD-10-CM

## 2020-02-19 PROCEDURE — 36591 DRAW BLOOD OFF VENOUS DEVICE: CPT

## 2020-02-19 PROCEDURE — 25010000003 HEPARIN LOCK FLUCH PER 10 UNITS: Performed by: INTERNAL MEDICINE

## 2020-02-19 PROCEDURE — 99215 OFFICE O/P EST HI 40 MIN: CPT | Performed by: NURSE PRACTITIONER

## 2020-02-19 RX ORDER — HEPARIN SODIUM (PORCINE) LOCK FLUSH IV SOLN 100 UNIT/ML 100 UNIT/ML
500 SOLUTION INTRAVENOUS AS NEEDED
Status: DISCONTINUED | OUTPATIENT
Start: 2020-02-19 | End: 2020-02-19 | Stop reason: HOSPADM

## 2020-02-19 RX ORDER — HEPARIN SODIUM (PORCINE) LOCK FLUSH IV SOLN 100 UNIT/ML 100 UNIT/ML
500 SOLUTION INTRAVENOUS AS NEEDED
Status: CANCELLED | OUTPATIENT
Start: 2020-02-19

## 2020-02-19 RX ADMIN — HEPARIN SODIUM (PORCINE) LOCK FLUSH IV SOLN 100 UNIT/ML 500 UNITS: 100 SOLUTION at 10:50

## 2020-02-19 NOTE — PROGRESS NOTES
CHEMOTHERAPY PREPARATION    Esteban Tse  [id number]  1955    Chief Complaint: Chemotherapy preparation    History of present illness:  Esteban Tse is a 64 y.o. year old male who is here today for chemotherapy preparation and needs assessment. The patient has been diagnosed with recurrent mucinous adenocarcinoma of the appendix and is scheduled to begin treatment with FOLFIRI (5-FU, leucovorin and irinotecan).     Oncology History:    Oncology/Hematology History    1.  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  Pathologic stage IIa with T3 extension into the base of the appendix through the muscularis propria but not into the serosal surface.  16 nodes negative.  Colonoscopy December 2017 negative postop    -12/30/2019 medical oncology office visit: The patient had been on surveillance since surgery December 2017.  CT scans had been negative up until 12/30/2019 CT chest abdomen and pelvis that showed subtle soft tissue density surrounding surgical clips in the right side of the pelvis along with soft tissue nodule at the anterior wall of the bladder concerning for recurrent disease.  Patient sent back to Dr. Davidson for colonoscopy.    -2/7/2020 patient was referred to Dr. Peoples at the [facility], he underwent diagnostic laparoscopic and peritoneal biopsies that confirmed recurrent disease with biopsy of bladder dome nodule showing adenocarcinoma with mucinous features.  Port was placed at this time also.        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  Pathologic stage IIa with T3 extension into the base of the appendix through the muscularis propria but not into the serosal surface.  16 nodes negative.  Colonoscopy December 2017 negative postop      12/5/2017 Surgery     Surgery        Procedure:  Appendectomy and right hemicolectomy      Completeness of resection:  No evidence of residual tumor     Pathology revealed invasive moderately differentiated mucinous adenocarcinoma.  Right hemicolectomy: Benign colon and small intestine no evidence of carcinoma.  16 benign lymph nodes, 0/16, negative for dysplasia or malignancy.  Tumor size approximately 6 cm.  Grade 2, moderately differentiated.  Tumor invades through the muscularis profile into the base of appendix but does not extend to the serosal surface.  All margins uninvolved, no lymphovascular invasion identified.  Pathological stage pT3 pN0.          12/8/2017 Imaging     CT of the chest abdomen and pelvis negative.  Baseline CBC WBC 7100, hemoglobin 11.3, hematocrit 34.8%, platelet count 195,000.      3/20/2018 Procedure     Colonoscopy with Dr. Davidson was normal, recommendation for repeat surveillance colonoscopy in 3 years.      6/11/2018 Imaging     CT chest, abdomen and pelvis with no evidence metastatic disease.  CEA 1.1      9/24/2018 Imaging     CT abdomen pelvis showed no acute changes and nothing to suggest recurrence      12/28/2018 Imaging     CT chest, abdomen and pelvis negative. Normal CBC, CMP and CEA 0.7.      6/28/2019 Imaging     CT chest, abdomen and pelvis: No interval change from prior studies.  No recurrent or metastatic disease detected in the chest, abdomen or pelvis.  No acute process.  CEA 0.9      12/30/2019 Imaging     CT chest, abdomen and pelvis: No disease in the chest.  There was noted a subtle soft tissue density, one surrounding surgical clips in the right side of the pelvis and the other a soft tissue nodule intimately associated with the anterior wall of the bladder, which are considered suspicious for recurrent disease.  CEA 1.1.  CMP with normal creatinine 0.9, total bilirubin 1.9, AST 34, ALT 24, alkaline phosphatase 93.  CBC with WBC 6900, hemoglobin 15.9, platelet count 232,000.        1/21/2020  Procedure     Normal colonoscopy with Dr. Davidson.  He has made referral to Dr. Portillo Peoples at .      2/7/2020 Progression     12/30/2019 CT chest, abdomen and pelvis: No disease in the chest.  There are subtle soft tissue densities (1 surrounding surgical clips in the right side of the pelvis and the other a soft tissue nodule intimately associated with the anterior wall of the bladder) which are considered suspicious for recurrent disease.  CMP with normal creatinine 0.9, total bilirubin 1.9, AST 34, ALT 24, alkaline phosphatase 93.  CBC with WBC 6900, hemoglobin 15.9, platelet count 232,000.  CEA 1.1.  2/7/2020 diagnostic laparoscopy with peritoneal biopsies performed at the [facility] by Dr. Peoples showed no sign of diffuse peritoneal carcinomatosis or liver metastasis.  There was a firm nodule in the superior dome of the bladder, adherent to omentum, as well as right pelvic sidewall nodule adjacent to surgical clips.  Biopsy of bladder dome nodule showed adenocarcinoma with mucinous features.      2/19/2020 -  Chemotherapy     OP CENTRAL VENOUS ACCESS DEVICE ACCESS, CARE, AND MAINTENANCE (CVAD)      2/25/2020 -  Chemotherapy     OP COLORECTAL FOLFIRI Irinotecan / Leucovorin / Fluorouracil         Past Medical History:   Diagnosis Date   • Hypertension        No past surgical history on file.    MEDICATIONS: The current medication list was reviewed and reconciled.     Allergies:  is allergic to levofloxacin; penicillins; and tetracycline.    Family History   Problem Relation Age of Onset   • Lung cancer Mother    • Heart disease Father          Review of Systems   Constitutional: Negative for fatigue, fever and unexpected weight change.   HENT: Negative for congestion, hearing loss, sore throat and trouble swallowing.    Eyes: Negative for visual disturbance.   Respiratory: Negative for cough, shortness of breath and wheezing.    Cardiovascular: Negative for chest pain and leg swelling.  "  Gastrointestinal: Positive for diarrhea. Negative for abdominal distention, abdominal pain, constipation, nausea and vomiting.   Endocrine: Negative.    Genitourinary: Negative.    Musculoskeletal: Negative for arthralgias, back pain and gait problem.   Skin: Negative.    Allergic/Immunologic: Negative.    Neurological: Negative for dizziness, weakness, numbness and headaches.   Hematological: Negative for adenopathy. Does not bruise/bleed easily.   Psychiatric/Behavioral: Negative.    All other systems reviewed and are negative.      Physical Exam  Vital Signs: /100   Pulse 86   Temp 99.5 °F (37.5 °C)   Resp 18   Ht 185.4 cm (73\")   Wt 73.9 kg (163 lb)   BMI 21.51 kg/m²    General Appearance:  alert, cooperative, no apparent distress and appears stated age, thin.   Neurologic/Psychiatric: A&O x 3, gait steady, appropriate affect   HEENT:  Normocephalic, without obvious abnormality, mucous membranes moist   Lungs:   Clear to auscultation bilaterally; respirations regular, even, and unlabored bilaterally   Heart:  Regular rate and rhythm, no murmurs appreciated   Extremities: Normal, atraumatic; no clubbing, cyanosis, or edema    Skin: No rashes, lesions, or abnormal coloration noted     ECOG Performance Status: (0) Fully Active - Able to Carry On All Pre-disease Performance Without Restriction          NEEDS ASSESSMENTS    Genetics  The patient's new diagnosis and family history have been reviewed for genetic counseling needs. A genetic referral is not recommended.     Psychosocial  The patient has completed a PHQ-9 Depression Screening and the Distress Thermometer (DT) today.   PHQ-9 results show 5-9 (Mild Depression). The patient scored their distress today as 4 on a scale of 0-10 with 0 being no distress and 10 being extreme distress.   Problems marked by the patient as being an issue for them within the last week include emotional problems and physical problems.   Results were reviewed along with " "psychosocial resources offered by our cancer center. Our oncology social worker will be flagged for a DT score of 4 or above, and a same day call will be made for a score of 9 or 10. A mental health referral is not recommended at this time. The patient is not accepting of a referral to [facility].   Copies of patient's questionnaires will be scanned into EMR for details and further reference.    Barriers to care  A barriers form was also completed by the patient today. We discussed services offered by our facility to help him have adequate access to care. The patient was given the name and card for our Oncology Social Worker, Lidia Meehan. Based upon barriers assessment today, the patient will not require a follow-up call from the  to further discuss needs.   A copy of the barriers form will also be scanned into EMR for details and further reference.     VAD Assessment  The patient and I discussed planned intervenous chemotherapy as well as other IV treatments that are often needed throughout the course of treatment. These may include, but are not limited to blood transfusions, antibiotics, and IV hydration. The vasculature does not appear to be adequate for multiple peripheral IVs throughout their treatment course. Discussed risks and benefits of VADs. The patient would like to pursue Port-A-Cath insertion prior to initiation of treatment.  Port is now in place in the right chest wall, incision is well-healed.    Advance Care Planning   Advance Care Planning Discussion:  Advanced Care Planning  The patient and I discussed advanced care planning, \"Conversations that Matter\".   This service was offered, free of charge, for development of advance directives with a certified ACP facilitator.  The patient does have an up-to-date advanced directive. This document is not on file with our office. The patient is not interested in an appointment with one of our facilitators to create or update their " advanced directives.              Palliative Care  The patient and I discussed palliative care services. Palliative care is not the same as Hospice care. This is specialized medical care for people living with serious illness with the goal of improving quality of life for the patient and their family. [facility] has partnered with [facility] to offer our patients outpatient palliative care early along with their treatment to assist in coordination of care, symptom management, pain management, and medical decision making.  Oncology criteria for palliative care referral is not met at this time. The patient is not interested in a palliative care consultation.     Additional Referral needs  none      CHEMOTHERAPY EDUCATION    Booklets Given: Chemotherapy and You [x]  Eating Hints [x]    Sexuality/Fertility Books []      Chemotherapy/Biotherapy Education Sheets: (list all that apply)  nausea management, acid reflux management, diarrhea management, Cancer resourse contacts information, skin and mouth care and vaccination information                                                                                                                                                                 Chemotherapy Regimen:   Treatment Plans     Name Type Plan dates Plan Provider         Active    OP COLORECTAL FOLFIRI Irinotecan / Leucovorin / Fluorouracil ONCOLOGY TREATMENT  2/24/2020 - Present Cy Delcid MD                    TOPICS EDUCATION PROVIDED COMMENTS   ANEMIA:  role of RBC, cause, s/s, ways to manage, role of transfusion [x]    THROMBOCYTOPENIA:  role of platelet, cause, s/s, ways to prevent bleeding, things to avoid, when to seek help [x]    NEUTROPENIA:  role of WBC, cause, infection precautions, s/s of infection, when to call MD [x]    NUTRITION & APPETITE CHANGES:  importance of maintaining healthy diet & weight, ways to manage to improve intake, dietary consult, exercise regimen [x]    DIARRHEA:   causes, s/s of dehydration, ways to manage, dietary changes, when to call MD [x]    CONSTIPATION:  causes, ways to manage, dietary changes, when to call MD [x]    NAUSEA & VOMITING:  cause, use of antiemetics, dietary changes, when to call MD [x]    MOUTH SORES:  causes, oral care, ways to manage [x]    ALOPECIA:  cause, ways to manage, resources [x]    INFERTILITY & SEXUALITY:  causes, fertility preservation options, sexuality changes, ways to manage, importance of birth control []    NERVOUS SYSTEM CHANGES:  causes, s/s, neuropathies, cognitive changes, ways to manage [x]    PAIN:  causes, ways to manage [x] ????   SKIN & NAIL CHANGES:  cause, s/s, ways to manage [x]    ORGAN TOXICITIES:  cause, s/s, need for diagnostic tests, labs, when to notify MD [x]    HOME CARE:  use of spill kits, storing of PO chemo, how to manage bodily fluids [x]    MISCELLANEOUS:  drug interactions, administration, vesicant, et [x]        Assessment and Plan:    Diagnoses and all orders for this visit:    Mucinous adenocarcinoma of appendix (CMS/HCC)        This was a 50 minute face-to-face visit with 40 minutes spent in  counseling and coordination of care as documented above.   The patient and I have reviewed their new cancer diagnosis and scheduled treatment plan. Needs assessment was completed including genetics, psychosocial needs, barriers to care, VAD evaluation, advanced care planning, and palliative care services. Referrals have been ordered as appropriate based upon our evaluation and patient desires.     Chemotherapy teaching was also completed today as documented above. Adequate time was given to answer all questions to his satisfaction. Patient and family are aware of their care team members and contact information if they have questions or problems throughout the treatment course. Needs assessments and education has been completed. The patient is adequately prepared to begin treatment as scheduled.       Tanya Alvarado,  APRN  02/19/2020

## 2020-02-20 LAB
ALBUMIN SERPL-MCNC: 4.3 G/DL (ref 3.5–5.2)
ALBUMIN/GLOB SERPL: 1.9 G/DL
ALP SERPL-CCNC: 88 U/L (ref 39–117)
ALT SERPL-CCNC: 20 U/L (ref 1–41)
AST SERPL-CCNC: 19 U/L (ref 1–40)
BASOPHILS # BLD AUTO: 0.04 10*3/MM3 (ref 0–0.2)
BASOPHILS NFR BLD AUTO: 0.6 % (ref 0–1.5)
BILIRUB SERPL-MCNC: 0.9 MG/DL (ref 0.2–1.2)
BUN SERPL-MCNC: 13 MG/DL (ref 8–23)
BUN/CREAT SERPL: 17.1 (ref 7–25)
CALCIUM SERPL-MCNC: 8.9 MG/DL (ref 8.6–10.5)
CHLORIDE SERPL-SCNC: 102 MMOL/L (ref 98–107)
CO2 SERPL-SCNC: 24.7 MMOL/L (ref 22–29)
CREAT SERPL-MCNC: 0.76 MG/DL (ref 0.76–1.27)
EOSINOPHIL # BLD AUTO: 0.08 10*3/MM3 (ref 0–0.4)
EOSINOPHIL NFR BLD AUTO: 1.1 % (ref 0.3–6.2)
ERYTHROCYTE [DISTWIDTH] IN BLOOD BY AUTOMATED COUNT: 12.8 % (ref 12.3–15.4)
GLOBULIN SER CALC-MCNC: 2.3 GM/DL
GLUCOSE SERPL-MCNC: 77 MG/DL (ref 65–99)
HCT VFR BLD AUTO: 42 % (ref 37.5–51)
HGB BLD-MCNC: 14 G/DL (ref 13–17.7)
IMM GRANULOCYTES # BLD AUTO: 0.02 10*3/MM3 (ref 0–0.05)
IMM GRANULOCYTES NFR BLD AUTO: 0.3 % (ref 0–0.5)
LYMPHOCYTES # BLD AUTO: 1.37 10*3/MM3 (ref 0.7–3.1)
LYMPHOCYTES NFR BLD AUTO: 19.2 % (ref 19.6–45.3)
MCH RBC QN AUTO: 29.4 PG (ref 26.6–33)
MCHC RBC AUTO-ENTMCNC: 33.3 G/DL (ref 31.5–35.7)
MCV RBC AUTO: 88.2 FL (ref 79–97)
MONOCYTES # BLD AUTO: 0.55 10*3/MM3 (ref 0.1–0.9)
MONOCYTES NFR BLD AUTO: 7.7 % (ref 5–12)
NEUTROPHILS # BLD AUTO: 5.07 10*3/MM3 (ref 1.7–7)
NEUTROPHILS NFR BLD AUTO: 71.1 % (ref 42.7–76)
NRBC BLD AUTO-RTO: 0 /100 WBC (ref 0–0.2)
PLATELET # BLD AUTO: 238 10*3/MM3 (ref 140–450)
POTASSIUM SERPL-SCNC: 4 MMOL/L (ref 3.5–5.2)
PROT SERPL-MCNC: 6.6 G/DL (ref 6–8.5)
RBC # BLD AUTO: 4.76 10*6/MM3 (ref 4.14–5.8)
SODIUM SERPL-SCNC: 140 MMOL/L (ref 136–145)
WBC # BLD AUTO: 7.13 10*3/MM3 (ref 3.4–10.8)

## 2020-02-25 ENCOUNTER — INFUSION (OUTPATIENT)
Dept: ONCOLOGY | Facility: HOSPITAL | Age: 65
End: 2020-02-25

## 2020-02-25 DIAGNOSIS — C18.1 MUCINOUS ADENOCARCINOMA OF APPENDIX (HCC): Primary | ICD-10-CM

## 2020-02-25 PROCEDURE — 96375 TX/PRO/DX INJ NEW DRUG ADDON: CPT

## 2020-02-25 PROCEDURE — 96413 CHEMO IV INFUSION 1 HR: CPT

## 2020-02-25 PROCEDURE — 25010000002 FLUOROURACIL PER 500 MG: Performed by: INTERNAL MEDICINE

## 2020-02-25 PROCEDURE — 96415 CHEMO IV INFUSION ADDL HR: CPT

## 2020-02-25 PROCEDURE — 25010000002 DEXAMETHASONE PER 1 MG: Performed by: INTERNAL MEDICINE

## 2020-02-25 PROCEDURE — 25010000002 LEUCOVORIN 200 MG RECONSTITUTED SOLUTION 200 MG VIAL: Performed by: INTERNAL MEDICINE

## 2020-02-25 PROCEDURE — 25010000002 ATROPINE PER 0.01 MG: Performed by: INTERNAL MEDICINE

## 2020-02-25 PROCEDURE — 96409 CHEMO IV PUSH SNGL DRUG: CPT

## 2020-02-25 PROCEDURE — 25010000002 IRINOTECAN PER 20 MG: Performed by: INTERNAL MEDICINE

## 2020-02-25 PROCEDURE — 96416 CHEMO PROLONG INFUSE W/PUMP: CPT

## 2020-02-25 PROCEDURE — 96411 CHEMO IV PUSH ADDL DRUG: CPT

## 2020-02-25 PROCEDURE — 25010000002 LEUCOVORIN CALCIUM PER 50 MG: Performed by: INTERNAL MEDICINE

## 2020-02-25 PROCEDURE — 25010000002 PALONOSETRON 0.25 MG/5ML SOLUTION PREFILLED SYRINGE: Performed by: INTERNAL MEDICINE

## 2020-02-25 PROCEDURE — 25010000002 LEUCOVORIN 500 MG RECONSTITUTED SOLUTION 1 EACH VIAL: Performed by: INTERNAL MEDICINE

## 2020-02-25 PROCEDURE — 96368 THER/DIAG CONCURRENT INF: CPT

## 2020-02-25 RX ORDER — SODIUM CHLORIDE 9 MG/ML
250 INJECTION, SOLUTION INTRAVENOUS ONCE
Status: COMPLETED | OUTPATIENT
Start: 2020-02-25 | End: 2020-02-25

## 2020-02-25 RX ORDER — PALONOSETRON 0.05 MG/ML
0.25 INJECTION, SOLUTION INTRAVENOUS ONCE
Status: COMPLETED | OUTPATIENT
Start: 2020-02-25 | End: 2020-02-25

## 2020-02-25 RX ORDER — ATROPINE SULFATE 1 MG/ML
0.25 INJECTION, SOLUTION INTRAMUSCULAR; INTRAVENOUS; SUBCUTANEOUS
Status: DISCONTINUED | OUTPATIENT
Start: 2020-02-25 | End: 2020-02-25 | Stop reason: HOSPADM

## 2020-02-25 RX ORDER — FLUOROURACIL 50 MG/ML
400 INJECTION, SOLUTION INTRAVENOUS ONCE
Status: COMPLETED | OUTPATIENT
Start: 2020-02-25 | End: 2020-02-25

## 2020-02-25 RX ADMIN — ATROPINE SULFATE 0.25 MG: 1 INJECTION, SOLUTION INTRAMUSCULAR; INTRAVENOUS; SUBCUTANEOUS at 10:57

## 2020-02-25 RX ADMIN — DEXAMETHASONE SODIUM PHOSPHATE 12 MG: 4 INJECTION, SOLUTION INTRAMUSCULAR; INTRAVENOUS at 10:00

## 2020-02-25 RX ADMIN — FLUOROURACIL 4500 MG: 50 INJECTION, SOLUTION INTRAVENOUS at 13:05

## 2020-02-25 RX ADMIN — PALONOSETRON 0.25 MG: 0.25 INJECTION, SOLUTION INTRAVENOUS at 09:56

## 2020-02-25 RX ADMIN — SODIUM CHLORIDE 250 ML: 9 INJECTION, SOLUTION INTRAVENOUS at 09:56

## 2020-02-25 RX ADMIN — FLUOROURACIL 780 MG: 50 INJECTION, SOLUTION INTRAVENOUS at 12:59

## 2020-02-25 RX ADMIN — DEXTROSE MONOHYDRATE 340 MG: 50 INJECTION, SOLUTION INTRAVENOUS at 11:00

## 2020-02-25 RX ADMIN — LEUCOVORIN CALCIUM 780 MG: 500 INJECTION, POWDER, LYOPHILIZED, FOR SOLUTION INTRAMUSCULAR; INTRAVENOUS at 10:58

## 2020-02-27 ENCOUNTER — INFUSION (OUTPATIENT)
Dept: ONCOLOGY | Facility: HOSPITAL | Age: 65
End: 2020-02-27

## 2020-02-27 VITALS
HEART RATE: 85 BPM | SYSTOLIC BLOOD PRESSURE: 145 MMHG | RESPIRATION RATE: 16 BRPM | TEMPERATURE: 98.4 F | WEIGHT: 162 LBS | BODY MASS INDEX: 21.37 KG/M2 | DIASTOLIC BLOOD PRESSURE: 95 MMHG

## 2020-02-27 DIAGNOSIS — Z45.2 ENCOUNTER FOR CARE RELATED TO VASCULAR ACCESS PORT: ICD-10-CM

## 2020-02-27 DIAGNOSIS — C18.1 MUCINOUS ADENOCARCINOMA OF APPENDIX (HCC): Primary | ICD-10-CM

## 2020-02-27 PROCEDURE — 25010000003 HEPARIN LOCK FLUCH PER 10 UNITS: Performed by: INTERNAL MEDICINE

## 2020-02-27 RX ORDER — HEPARIN SODIUM (PORCINE) LOCK FLUSH IV SOLN 100 UNIT/ML 100 UNIT/ML
500 SOLUTION INTRAVENOUS AS NEEDED
Status: DISCONTINUED | OUTPATIENT
Start: 2020-02-27 | End: 2020-02-27 | Stop reason: HOSPADM

## 2020-02-27 RX ORDER — HEPARIN SODIUM (PORCINE) LOCK FLUSH IV SOLN 100 UNIT/ML 100 UNIT/ML
500 SOLUTION INTRAVENOUS AS NEEDED
Status: CANCELLED | OUTPATIENT
Start: 2020-02-27

## 2020-02-27 RX ADMIN — HEPARIN SODIUM (PORCINE) LOCK FLUSH IV SOLN 100 UNIT/ML 500 UNITS: 100 SOLUTION at 11:10

## 2020-03-05 ENCOUNTER — LAB (OUTPATIENT)
Dept: ONCOLOGY | Facility: HOSPITAL | Age: 65
End: 2020-03-05

## 2020-03-05 VITALS
WEIGHT: 159 LBS | DIASTOLIC BLOOD PRESSURE: 96 MMHG | TEMPERATURE: 98 F | HEART RATE: 85 BPM | BODY MASS INDEX: 20.98 KG/M2 | RESPIRATION RATE: 20 BRPM | SYSTOLIC BLOOD PRESSURE: 147 MMHG

## 2020-03-05 DIAGNOSIS — Z45.2 ENCOUNTER FOR CARE RELATED TO VASCULAR ACCESS PORT: Primary | ICD-10-CM

## 2020-03-05 DIAGNOSIS — C18.1 MUCINOUS ADENOCARCINOMA OF APPENDIX (HCC): ICD-10-CM

## 2020-03-05 PROCEDURE — 36591 DRAW BLOOD OFF VENOUS DEVICE: CPT

## 2020-03-05 PROCEDURE — 25010000003 HEPARIN LOCK FLUCH PER 10 UNITS: Performed by: INTERNAL MEDICINE

## 2020-03-05 RX ORDER — HEPARIN SODIUM (PORCINE) LOCK FLUSH IV SOLN 100 UNIT/ML 100 UNIT/ML
500 SOLUTION INTRAVENOUS AS NEEDED
Status: CANCELLED | OUTPATIENT
Start: 2020-03-05

## 2020-03-05 RX ORDER — PROCHLORPERAZINE MALEATE 10 MG
10 TABLET ORAL EVERY 6 HOURS PRN
Qty: 60 TABLET | Refills: 2 | Status: SHIPPED | OUTPATIENT
Start: 2020-03-05 | End: 2020-04-03

## 2020-03-05 RX ORDER — HEPARIN SODIUM (PORCINE) LOCK FLUSH IV SOLN 100 UNIT/ML 100 UNIT/ML
500 SOLUTION INTRAVENOUS AS NEEDED
Status: DISCONTINUED | OUTPATIENT
Start: 2020-03-05 | End: 2020-03-05 | Stop reason: HOSPADM

## 2020-03-05 RX ADMIN — HEPARIN 500 UNITS: 100 SYRINGE at 08:12

## 2020-03-06 LAB
ALBUMIN SERPL-MCNC: 4.4 G/DL (ref 3.5–5.2)
ALBUMIN/GLOB SERPL: 2.2 G/DL
ALP SERPL-CCNC: 83 U/L (ref 39–117)
ALT SERPL-CCNC: 17 U/L (ref 1–41)
AST SERPL-CCNC: 17 U/L (ref 1–40)
BASOPHILS # BLD AUTO: 0.02 10*3/MM3 (ref 0–0.2)
BASOPHILS NFR BLD AUTO: 0.5 % (ref 0–1.5)
BILIRUB SERPL-MCNC: 0.8 MG/DL (ref 0.2–1.2)
BUN SERPL-MCNC: 13 MG/DL (ref 8–23)
BUN/CREAT SERPL: 14.8 (ref 7–25)
CALCIUM SERPL-MCNC: 8.6 MG/DL (ref 8.6–10.5)
CHLORIDE SERPL-SCNC: 102 MMOL/L (ref 98–107)
CO2 SERPL-SCNC: 23.1 MMOL/L (ref 22–29)
CREAT SERPL-MCNC: 0.88 MG/DL (ref 0.76–1.27)
EOSINOPHIL # BLD AUTO: 0.08 10*3/MM3 (ref 0–0.4)
EOSINOPHIL NFR BLD AUTO: 2.1 % (ref 0.3–6.2)
ERYTHROCYTE [DISTWIDTH] IN BLOOD BY AUTOMATED COUNT: 12.7 % (ref 12.3–15.4)
GLOBULIN SER CALC-MCNC: 2 GM/DL
GLUCOSE SERPL-MCNC: 102 MG/DL (ref 65–99)
HCT VFR BLD AUTO: 39.1 % (ref 37.5–51)
HGB BLD-MCNC: 13.1 G/DL (ref 13–17.7)
IMM GRANULOCYTES # BLD AUTO: 0.01 10*3/MM3 (ref 0–0.05)
IMM GRANULOCYTES NFR BLD AUTO: 0.3 % (ref 0–0.5)
LYMPHOCYTES # BLD AUTO: 1.34 10*3/MM3 (ref 0.7–3.1)
LYMPHOCYTES NFR BLD AUTO: 35.8 % (ref 19.6–45.3)
MCH RBC QN AUTO: 29.2 PG (ref 26.6–33)
MCHC RBC AUTO-ENTMCNC: 33.5 G/DL (ref 31.5–35.7)
MCV RBC AUTO: 87.1 FL (ref 79–97)
MONOCYTES # BLD AUTO: 0.21 10*3/MM3 (ref 0.1–0.9)
MONOCYTES NFR BLD AUTO: 5.6 % (ref 5–12)
NEUTROPHILS # BLD AUTO: 2.08 10*3/MM3 (ref 1.7–7)
NEUTROPHILS NFR BLD AUTO: 55.7 % (ref 42.7–76)
NRBC BLD AUTO-RTO: 0.3 /100 WBC (ref 0–0.2)
PLATELET # BLD AUTO: 193 10*3/MM3 (ref 140–450)
POTASSIUM SERPL-SCNC: 3.4 MMOL/L (ref 3.5–5.2)
PROT SERPL-MCNC: 6.4 G/DL (ref 6–8.5)
RBC # BLD AUTO: 4.49 10*6/MM3 (ref 4.14–5.8)
SODIUM SERPL-SCNC: 140 MMOL/L (ref 136–145)
WBC # BLD AUTO: 3.74 10*3/MM3 (ref 3.4–10.8)

## 2020-03-10 ENCOUNTER — OFFICE VISIT (OUTPATIENT)
Dept: ONCOLOGY | Facility: CLINIC | Age: 65
End: 2020-03-10

## 2020-03-10 ENCOUNTER — INFUSION (OUTPATIENT)
Dept: ONCOLOGY | Facility: HOSPITAL | Age: 65
End: 2020-03-10

## 2020-03-10 VITALS
WEIGHT: 160 LBS | RESPIRATION RATE: 18 BRPM | SYSTOLIC BLOOD PRESSURE: 158 MMHG | DIASTOLIC BLOOD PRESSURE: 91 MMHG | TEMPERATURE: 98.5 F | BODY MASS INDEX: 21.2 KG/M2 | HEART RATE: 107 BPM | HEIGHT: 73 IN

## 2020-03-10 DIAGNOSIS — C18.1 MUCINOUS ADENOCARCINOMA OF APPENDIX (HCC): Primary | ICD-10-CM

## 2020-03-10 PROCEDURE — 96413 CHEMO IV INFUSION 1 HR: CPT

## 2020-03-10 PROCEDURE — 25010000002 FLUOROURACIL PER 500 MG: Performed by: INTERNAL MEDICINE

## 2020-03-10 PROCEDURE — 96416 CHEMO PROLONG INFUSE W/PUMP: CPT

## 2020-03-10 PROCEDURE — 25010000002 DEXAMETHASONE PER 1 MG: Performed by: INTERNAL MEDICINE

## 2020-03-10 PROCEDURE — 25010000002 PALONOSETRON 0.25 MG/5ML SOLUTION PREFILLED SYRINGE: Performed by: INTERNAL MEDICINE

## 2020-03-10 PROCEDURE — 99215 OFFICE O/P EST HI 40 MIN: CPT | Performed by: INTERNAL MEDICINE

## 2020-03-10 PROCEDURE — 25010000002 LEUCOVORIN CALCIUM PER 50 MG: Performed by: INTERNAL MEDICINE

## 2020-03-10 PROCEDURE — 25010000002 IRINOTECAN PER 20 MG: Performed by: INTERNAL MEDICINE

## 2020-03-10 PROCEDURE — 25010000002 LEUCOVORIN 500 MG RECONSTITUTED SOLUTION 1 EACH VIAL: Performed by: INTERNAL MEDICINE

## 2020-03-10 PROCEDURE — 25010000002 ATROPINE PER 0.01 MG: Performed by: INTERNAL MEDICINE

## 2020-03-10 PROCEDURE — 96375 TX/PRO/DX INJ NEW DRUG ADDON: CPT

## 2020-03-10 PROCEDURE — 96415 CHEMO IV INFUSION ADDL HR: CPT

## 2020-03-10 PROCEDURE — 96411 CHEMO IV PUSH ADDL DRUG: CPT

## 2020-03-10 PROCEDURE — 96368 THER/DIAG CONCURRENT INF: CPT

## 2020-03-10 RX ORDER — ATROPINE SULFATE 1 MG/ML
0.25 INJECTION, SOLUTION INTRAMUSCULAR; INTRAVENOUS; SUBCUTANEOUS
Status: DISCONTINUED | OUTPATIENT
Start: 2020-03-10 | End: 2020-03-10 | Stop reason: HOSPADM

## 2020-03-10 RX ORDER — SODIUM CHLORIDE 9 MG/ML
250 INJECTION, SOLUTION INTRAVENOUS ONCE
Status: COMPLETED | OUTPATIENT
Start: 2020-03-10 | End: 2020-03-10

## 2020-03-10 RX ORDER — PALONOSETRON 0.05 MG/ML
0.25 INJECTION, SOLUTION INTRAVENOUS ONCE
Status: CANCELLED | OUTPATIENT
Start: 2020-03-10

## 2020-03-10 RX ORDER — SODIUM CHLORIDE 9 MG/ML
250 INJECTION, SOLUTION INTRAVENOUS ONCE
Status: CANCELLED | OUTPATIENT
Start: 2020-03-10

## 2020-03-10 RX ORDER — PALONOSETRON 0.05 MG/ML
0.25 INJECTION, SOLUTION INTRAVENOUS ONCE
Status: COMPLETED | OUTPATIENT
Start: 2020-03-10 | End: 2020-03-10

## 2020-03-10 RX ORDER — FLUOROURACIL 50 MG/ML
400 INJECTION, SOLUTION INTRAVENOUS ONCE
Status: CANCELLED | OUTPATIENT
Start: 2020-03-10

## 2020-03-10 RX ORDER — FLUOROURACIL 50 MG/ML
400 INJECTION, SOLUTION INTRAVENOUS ONCE
Status: COMPLETED | OUTPATIENT
Start: 2020-03-10 | End: 2020-03-10

## 2020-03-10 RX ORDER — ATROPINE SULFATE 1 MG/ML
0.25 INJECTION, SOLUTION INTRAMUSCULAR; INTRAVENOUS; SUBCUTANEOUS
Status: CANCELLED | OUTPATIENT
Start: 2020-03-10

## 2020-03-10 RX ADMIN — DEXAMETHASONE SODIUM PHOSPHATE 12 MG: 4 INJECTION, SOLUTION INTRAMUSCULAR; INTRAVENOUS at 08:19

## 2020-03-10 RX ADMIN — SODIUM CHLORIDE 250 ML: 9 INJECTION, SOLUTION INTRAVENOUS at 08:15

## 2020-03-10 RX ADMIN — IRINOTECAN HYDROCHLORIDE 340 MG: 20 INJECTION, SOLUTION INTRAVENOUS at 09:12

## 2020-03-10 RX ADMIN — ATROPINE SULFATE 0.25 MG: 1 INJECTION, SOLUTION INTRAMUSCULAR; INTRAVENOUS; SUBCUTANEOUS at 09:17

## 2020-03-10 RX ADMIN — LEUCOVORIN CALCIUM 780 MG: 500 INJECTION, POWDER, LYOPHILIZED, FOR SOLUTION INTRAMUSCULAR; INTRAVENOUS at 09:12

## 2020-03-10 RX ADMIN — PALONOSETRON 0.25 MG: 0.25 INJECTION, SOLUTION INTRAVENOUS at 08:16

## 2020-03-10 RX ADMIN — FLUOROURACIL 4500 MG: 50 INJECTION, SOLUTION INTRAVENOUS at 11:02

## 2020-03-10 RX ADMIN — FLUOROURACIL 780 MG: 50 INJECTION, SOLUTION INTRAVENOUS at 10:58

## 2020-03-10 NOTE — PROGRESS NOTES
CHIEF COMPLAINT: Nausea without emesis after course #1 Folfiri    Problem List:  Oncology/Hematology History    1.  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  Pathologic stage IIa with T3 extension into the base of the appendix through the muscularis propria but not into the serosal surface.  16 nodes negative.  Colonoscopy December 2017 negative postop    -12/30/2019 medical oncology office visit: The patient had been on surveillance since surgery December 2017.  CT scans had been negative up until 12/30/2019 CT chest abdomen and pelvis that showed subtle soft tissue density surrounding surgical clips in the right side of the pelvis along with soft tissue nodule at the anterior wall of the bladder concerning for recurrent disease.  Patient sent back to Dr. Davidson for colonoscopy.    -2/7/2020 patient was referred to Dr. Peoples at the [facility], he underwent diagnostic laparoscopic and peritoneal biopsies that confirmed recurrent disease with biopsy of bladder dome nodule showing adenocarcinoma with mucinous features.  Port was placed at this time also.        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  Pathologic stage IIa with T3 extension into the base of the appendix through the muscularis propria but not into the serosal surface.  16 nodes negative.  Colonoscopy December 2017 negative postop      12/5/2017 Surgery     Surgery       Procedure:  Appendectomy and right hemicolectomy      Completeness of resection:  No evidence of residual tumor     Pathology revealed invasive moderately differentiated mucinous adenocarcinoma.  Right hemicolectomy: Benign colon and small intestine no evidence of carcinoma.  16 benign lymph nodes, 0/16, negative for dysplasia or malignancy.  Tumor size approximately  6 cm.  Grade 2, moderately differentiated.  Tumor invades through the muscularis profile into the base of appendix but does not extend to the serosal surface.  All margins uninvolved, no lymphovascular invasion identified.  Pathological stage pT3 pN0.          12/8/2017 Imaging     CT of the chest abdomen and pelvis negative.  Baseline CBC WBC 7100, hemoglobin 11.3, hematocrit 34.8%, platelet count 195,000.      3/20/2018 Procedure     Colonoscopy with Dr. Davidson was normal, recommendation for repeat surveillance colonoscopy in 3 years.      6/11/2018 Imaging     CT chest, abdomen and pelvis with no evidence metastatic disease.  CEA 1.1      9/24/2018 Imaging     CT abdomen pelvis showed no acute changes and nothing to suggest recurrence      12/28/2018 Imaging     CT chest, abdomen and pelvis negative. Normal CBC, CMP and CEA 0.7.      6/28/2019 Imaging     CT chest, abdomen and pelvis: No interval change from prior studies.  No recurrent or metastatic disease detected in the chest, abdomen or pelvis.  No acute process.  CEA 0.9      12/30/2019 Imaging     CT chest, abdomen and pelvis: No disease in the chest.  There was noted a subtle soft tissue density, one surrounding surgical clips in the right side of the pelvis and the other a soft tissue nodule intimately associated with the anterior wall of the bladder, which are considered suspicious for recurrent disease.  CEA 1.1.  CMP with normal creatinine 0.9, total bilirubin 1.9, AST 34, ALT 24, alkaline phosphatase 93.  CBC with WBC 6900, hemoglobin 15.9, platelet count 232,000.        1/21/2020 Procedure     Normal colonoscopy with Dr. Davidson.  He has made referral to Dr. Portillo Peoples at .      2/7/2020 Progression     12/30/2019 CT chest, abdomen and pelvis: No disease in the chest.  There are subtle soft tissue densities (1 surrounding surgical clips in the right side of the pelvis and the other a soft tissue nodule intimately associated with the anterior wall  "of the bladder) which are considered suspicious for recurrent disease.  CMP with normal creatinine 0.9, total bilirubin 1.9, AST 34, ALT 24, alkaline phosphatase 93.  CBC with WBC 6900, hemoglobin 15.9, platelet count 232,000.  CEA 1.1.  2/7/2020 diagnostic laparoscopy with peritoneal biopsies performed at the [facility] by Dr. Peoples showed no sign of diffuse peritoneal carcinomatosis or liver metastasis.  There was a firm nodule in the superior dome of the bladder, adherent to omentum, as well as right pelvic sidewall nodule adjacent to surgical clips.  Biopsy of bladder dome nodule showed adenocarcinoma with mucinous features.      2/19/2020 -  Chemotherapy     OP CENTRAL VENOUS ACCESS DEVICE ACCESS, CARE, AND MAINTENANCE (CVAD)      2/25/2020 -  Chemotherapy     OP COLORECTAL FOLFIRI Irinotecan / Leucovorin / Fluorouracil         HISTORY OF PRESENT ILLNESS:  The patient is a 64 y.o. 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  Had nausea without emesis and significant fatigue after course #1 Folfiri.  Able to maintain his strength overall however.  He \"switched\" to Compazine from the Zofran which he felt was not helping and the Compazine caused some sedation.      Past Medical History:   Diagnosis Date   • Hypertension      No past surgical history on file.    Allergies   Allergen Reactions   • Levofloxacin Unknown - Low Severity   • Penicillins Unknown - Low Severity   • Tetracycline Unknown - Low Severity       Family History and Social History reviewed and changed as necessary      REVIEW OF SYSTEM:   Review of Systems   Constitutional: Negative for appetite change, chills, diaphoresis, fatigue, fever and unexpected weight change.   HENT:   Negative for mouth sores, sore throat and trouble swallowing.    Eyes: Negative for icterus.   Respiratory: Negative for cough, hemoptysis and shortness of breath.    Cardiovascular: Negative for chest pain, leg swelling and " "palpitations.   Gastrointestinal: Negative for abdominal distention, abdominal pain, blood in stool, constipation, diarrhea, nausea and vomiting.   Endocrine: Negative for hot flashes.   Genitourinary: Negative for bladder incontinence, difficulty urinating, dysuria, frequency and hematuria.    Musculoskeletal: Negative for gait problem, neck pain and neck stiffness.   Skin: Negative for rash.   Neurological: Negative for dizziness, gait problem, headaches, light-headedness and numbness.   Hematological: Negative for adenopathy. Does not bruise/bleed easily.   Psychiatric/Behavioral: Negative for depression. The patient is not nervous/anxious.    All other systems reviewed and are negative.       PHYSICAL EXAM    Vitals:    03/10/20 0732   BP: 158/91   Pulse: 107   Resp: 18   Temp: 98.5 °F (36.9 °C)   Weight: 72.6 kg (160 lb)   Height: 185.4 cm (73\")     There were no vitals filed for this visit.     Constitutional: Appears well-developed and well-nourished. No distress.   ECOG: (1) Restricted in Physically Strenuous Activity, Ambulatory & Able to Do Work of Light Nature  HENT:   Head: Normocephalic.   Mouth/Throat: Oropharynx is clear and moist.   Eyes: Conjunctivae are normal. Pupils are equal, round, and reactive to light. No scleral icterus.   Neck: Neck supple. No JVD present. No thyromegaly present.   Cardiovascular: Normal rate, regular rhythm and normal heart sounds.    Pulmonary/Chest: Breath sounds normal. No respiratory distress.   Abdominal: Soft. Exhibits no distension and no mass. There is no hepatosplenomegaly. There is no tenderness. There is no rebound and no guarding.   Musculoskeletal:Exhibits no edema, tenderness or deformity.   Neurological: Alert and oriented to person, place, and time. Exhibits normal muscle tone.   Skin: No ecchymosis, no petechiae and no rash noted. Not diaphoretic. No cyanosis. Nails show no clubbing.   Psychiatric: Normal mood and affect.   Vitals reviewed.      Lab " Results   Component Value Date    HGB 13.1 03/05/2020    HCT 39.1 03/05/2020    MCV 87.1 03/05/2020     03/05/2020    WBC 3.74 03/05/2020    NEUTROABS 2.08 03/05/2020    LYMPHSABS 1.34 03/05/2020    MONOSABS 0.21 03/05/2020    EOSABS 0.08 03/05/2020    BASOSABS 0.02 03/05/2020       Lab Results   Component Value Date    BUN 13 03/05/2020    CREATININE 0.88 03/05/2020     03/05/2020    K 3.4 (L) 03/05/2020     03/05/2020    CO2 23.1 03/05/2020    CALCIUM 8.6 03/05/2020    ALBUMIN 4.40 03/05/2020    BILITOT 0.8 03/05/2020    ALKPHOS 83 03/05/2020    AST 17 03/05/2020    ALT 17 03/05/2020                   ASSESSMENT & PLAN:  1. Recurrent mucinous adenocarcinoma of the appendix  2. Chemo induced nausea  3. Chemo induced drowsiness  4. Chemo-induced fatigue      Discussion: 3/5/2020 labs were essentially normal.  Due for course #2 Folfiri.  He will take his Compazine alternating with the Zofran so that he is getting some antiemetic every 4 hours for the first week post chemo to see if we can stay ahead of the nausea.  He knows to avoid Risky settings while on Compazine which made him a little drowsy.Omental pathology 2/4/2020 showed metastatic adenocarcinoma with mucinous features.  We will give Folfiri till the end of May without Biologics and then repeat scans and consider resection/HI PEC at that junction if appropriate if it has not spread.  He does have loose stools between 3-5 times a day but that was predating his chemotherapy and he actually says there is somewhat less frequency of stooling since start of chemo.  Prior PET was denied so we will follow with serial conventional scanning.  Discussed with patient face-to-face 15 minutes greater than 50% spent counseling regarding this plan as outlined above.    Cy Delcid MD    03/10/2020

## 2020-03-12 ENCOUNTER — INFUSION (OUTPATIENT)
Dept: ONCOLOGY | Facility: HOSPITAL | Age: 65
End: 2020-03-12

## 2020-03-12 VITALS
HEART RATE: 90 BPM | BODY MASS INDEX: 20.85 KG/M2 | TEMPERATURE: 98.1 F | WEIGHT: 158 LBS | SYSTOLIC BLOOD PRESSURE: 131 MMHG | DIASTOLIC BLOOD PRESSURE: 89 MMHG | RESPIRATION RATE: 16 BRPM

## 2020-03-12 DIAGNOSIS — Z45.2 ENCOUNTER FOR CARE RELATED TO VASCULAR ACCESS PORT: Primary | ICD-10-CM

## 2020-03-12 DIAGNOSIS — C18.1 MUCINOUS ADENOCARCINOMA OF APPENDIX (HCC): ICD-10-CM

## 2020-03-12 PROCEDURE — 25010000003 HEPARIN LOCK FLUCH PER 10 UNITS: Performed by: INTERNAL MEDICINE

## 2020-03-12 RX ORDER — HEPARIN SODIUM (PORCINE) LOCK FLUSH IV SOLN 100 UNIT/ML 100 UNIT/ML
500 SOLUTION INTRAVENOUS AS NEEDED
Status: CANCELLED | OUTPATIENT
Start: 2020-03-12

## 2020-03-12 RX ORDER — HEPARIN SODIUM (PORCINE) LOCK FLUSH IV SOLN 100 UNIT/ML 100 UNIT/ML
500 SOLUTION INTRAVENOUS AS NEEDED
Status: DISCONTINUED | OUTPATIENT
Start: 2020-03-12 | End: 2020-03-12 | Stop reason: HOSPADM

## 2020-03-12 RX ADMIN — HEPARIN 500 UNITS: 100 SYRINGE at 09:15

## 2020-03-19 ENCOUNTER — INFUSION (OUTPATIENT)
Dept: ONCOLOGY | Facility: HOSPITAL | Age: 65
End: 2020-03-19

## 2020-03-19 VITALS — RESPIRATION RATE: 20 BRPM | WEIGHT: 156.4 LBS | TEMPERATURE: 98.1 F | BODY MASS INDEX: 20.63 KG/M2

## 2020-03-19 DIAGNOSIS — C18.1 MUCINOUS ADENOCARCINOMA OF APPENDIX (HCC): Primary | ICD-10-CM

## 2020-03-19 DIAGNOSIS — Z45.2 ENCOUNTER FOR CARE RELATED TO VASCULAR ACCESS PORT: ICD-10-CM

## 2020-03-19 PROCEDURE — 25010000003 HEPARIN LOCK FLUCH PER 10 UNITS: Performed by: INTERNAL MEDICINE

## 2020-03-19 PROCEDURE — 36591 DRAW BLOOD OFF VENOUS DEVICE: CPT

## 2020-03-19 RX ORDER — HEPARIN SODIUM (PORCINE) LOCK FLUSH IV SOLN 100 UNIT/ML 100 UNIT/ML
500 SOLUTION INTRAVENOUS AS NEEDED
Status: DISCONTINUED | OUTPATIENT
Start: 2020-03-19 | End: 2020-03-19 | Stop reason: HOSPADM

## 2020-03-19 RX ORDER — HEPARIN SODIUM (PORCINE) LOCK FLUSH IV SOLN 100 UNIT/ML 100 UNIT/ML
500 SOLUTION INTRAVENOUS AS NEEDED
Status: CANCELLED | OUTPATIENT
Start: 2020-03-19

## 2020-03-19 RX ADMIN — HEPARIN 500 UNITS: 100 SYRINGE at 08:13

## 2020-03-20 LAB
ALBUMIN SERPL-MCNC: 4.7 G/DL (ref 3.8–4.8)
ALBUMIN/GLOB SERPL: 2.4 {RATIO} (ref 1.2–2.2)
ALP SERPL-CCNC: 92 IU/L (ref 39–117)
ALT SERPL-CCNC: 29 IU/L (ref 0–44)
AST SERPL-CCNC: 20 IU/L (ref 0–40)
BASOPHILS # BLD AUTO: 0 X10E3/UL (ref 0–0.2)
BASOPHILS NFR BLD AUTO: 1 %
BILIRUB SERPL-MCNC: 0.8 MG/DL (ref 0–1.2)
BUN SERPL-MCNC: 14 MG/DL (ref 8–27)
BUN/CREAT SERPL: 14 (ref 10–24)
CALCIUM SERPL-MCNC: 9.1 MG/DL (ref 8.6–10.2)
CHLORIDE SERPL-SCNC: 101 MMOL/L (ref 96–106)
CO2 SERPL-SCNC: 22 MMOL/L (ref 20–29)
CREAT SERPL-MCNC: 0.99 MG/DL (ref 0.76–1.27)
EOSINOPHIL # BLD AUTO: 0.1 X10E3/UL (ref 0–0.4)
EOSINOPHIL NFR BLD AUTO: 2 %
ERYTHROCYTE [DISTWIDTH] IN BLOOD BY AUTOMATED COUNT: 13.1 % (ref 11.6–15.4)
GLOBULIN SER CALC-MCNC: 2 G/DL (ref 1.5–4.5)
GLUCOSE SERPL-MCNC: 97 MG/DL (ref 65–99)
HCT VFR BLD AUTO: 39.8 % (ref 37.5–51)
HGB BLD-MCNC: 13.3 G/DL (ref 13–17.7)
IMM GRANULOCYTES # BLD AUTO: 0 X10E3/UL (ref 0–0.1)
IMM GRANULOCYTES NFR BLD AUTO: 0 %
LYMPHOCYTES # BLD AUTO: 1.7 X10E3/UL (ref 0.7–3.1)
LYMPHOCYTES NFR BLD AUTO: 47 %
MCH RBC QN AUTO: 29.2 PG (ref 26.6–33)
MCHC RBC AUTO-ENTMCNC: 33.4 G/DL (ref 31.5–35.7)
MCV RBC AUTO: 87 FL (ref 79–97)
MONOCYTES # BLD AUTO: 0.4 X10E3/UL (ref 0.1–0.9)
MONOCYTES NFR BLD AUTO: 11 %
NEUTROPHILS # BLD AUTO: 1.4 X10E3/UL (ref 1.4–7)
NEUTROPHILS NFR BLD AUTO: 39 %
PLATELET # BLD AUTO: 268 X10E3/UL (ref 150–450)
POTASSIUM SERPL-SCNC: 3.4 MMOL/L (ref 3.5–5.2)
PROT SERPL-MCNC: 6.7 G/DL (ref 6–8.5)
RBC # BLD AUTO: 4.56 X10E6/UL (ref 4.14–5.8)
SODIUM SERPL-SCNC: 142 MMOL/L (ref 134–144)
WBC # BLD AUTO: 3.6 X10E3/UL (ref 3.4–10.8)

## 2020-03-23 ENCOUNTER — TELEPHONE (OUTPATIENT)
Dept: ONCOLOGY | Facility: CLINIC | Age: 65
End: 2020-03-23

## 2020-03-23 NOTE — TELEPHONE ENCOUNTER
Pre-visit screening: patient voiced no fever, chills or cough and has not been anywhere or around anyone positive for COVID-19.   Also, notified patient of no visitors. Patient voiced verbal understanding.

## 2020-03-24 ENCOUNTER — OFFICE VISIT (OUTPATIENT)
Dept: ONCOLOGY | Facility: CLINIC | Age: 65
End: 2020-03-24

## 2020-03-24 ENCOUNTER — INFUSION (OUTPATIENT)
Dept: ONCOLOGY | Facility: HOSPITAL | Age: 65
End: 2020-03-24

## 2020-03-24 VITALS
RESPIRATION RATE: 18 BRPM | WEIGHT: 161 LBS | SYSTOLIC BLOOD PRESSURE: 149 MMHG | HEIGHT: 73 IN | HEART RATE: 81 BPM | TEMPERATURE: 98.3 F | BODY MASS INDEX: 21.34 KG/M2 | DIASTOLIC BLOOD PRESSURE: 87 MMHG

## 2020-03-24 DIAGNOSIS — C18.1 MUCINOUS ADENOCARCINOMA OF APPENDIX (HCC): Primary | ICD-10-CM

## 2020-03-24 DIAGNOSIS — C18.1 MUCINOUS ADENOCARCINOMA OF APPENDIX (HCC): ICD-10-CM

## 2020-03-24 PROCEDURE — 25010000002 PALONOSETRON PER 25 MCG: Performed by: INTERNAL MEDICINE

## 2020-03-24 PROCEDURE — 25010000002 IRINOTECAN PER 20 MG: Performed by: INTERNAL MEDICINE

## 2020-03-24 PROCEDURE — 25010000002 LEUCOVORIN CALCIUM PER 50 MG: Performed by: INTERNAL MEDICINE

## 2020-03-24 PROCEDURE — 99214 OFFICE O/P EST MOD 30 MIN: CPT | Performed by: INTERNAL MEDICINE

## 2020-03-24 PROCEDURE — 96368 THER/DIAG CONCURRENT INF: CPT

## 2020-03-24 PROCEDURE — 96416 CHEMO PROLONG INFUSE W/PUMP: CPT

## 2020-03-24 PROCEDURE — 25010000002 FLUOROURACIL PER 500 MG: Performed by: INTERNAL MEDICINE

## 2020-03-24 PROCEDURE — 25010000002 ATROPINE PER 0.01 MG: Performed by: INTERNAL MEDICINE

## 2020-03-24 PROCEDURE — 25010000002 DEXAMETHASONE PER 1 MG: Performed by: INTERNAL MEDICINE

## 2020-03-24 PROCEDURE — 96375 TX/PRO/DX INJ NEW DRUG ADDON: CPT

## 2020-03-24 PROCEDURE — 25010000002 LEUCOVORIN 500 MG RECONSTITUTED SOLUTION 1 EACH VIAL: Performed by: INTERNAL MEDICINE

## 2020-03-24 PROCEDURE — 96415 CHEMO IV INFUSION ADDL HR: CPT

## 2020-03-24 PROCEDURE — 96411 CHEMO IV PUSH ADDL DRUG: CPT

## 2020-03-24 PROCEDURE — 96413 CHEMO IV INFUSION 1 HR: CPT

## 2020-03-24 RX ORDER — ATROPINE SULFATE 1 MG/ML
0.25 INJECTION, SOLUTION INTRAMUSCULAR; INTRAVENOUS; SUBCUTANEOUS
Status: CANCELLED | OUTPATIENT
Start: 2020-04-07

## 2020-03-24 RX ORDER — FLUOROURACIL 50 MG/ML
400 INJECTION, SOLUTION INTRAVENOUS ONCE
Status: COMPLETED | OUTPATIENT
Start: 2020-03-24 | End: 2020-03-24

## 2020-03-24 RX ORDER — PALONOSETRON 0.05 MG/ML
0.25 INJECTION, SOLUTION INTRAVENOUS ONCE
Status: CANCELLED | OUTPATIENT
Start: 2020-03-24

## 2020-03-24 RX ORDER — FLUOROURACIL 50 MG/ML
400 INJECTION, SOLUTION INTRAVENOUS ONCE
Status: CANCELLED | OUTPATIENT
Start: 2020-04-07

## 2020-03-24 RX ORDER — ATROPINE SULFATE 1 MG/ML
0.25 INJECTION, SOLUTION INTRAMUSCULAR; INTRAVENOUS; SUBCUTANEOUS
Status: DISCONTINUED | OUTPATIENT
Start: 2020-03-24 | End: 2020-03-24 | Stop reason: HOSPADM

## 2020-03-24 RX ORDER — FLUOROURACIL 50 MG/ML
400 INJECTION, SOLUTION INTRAVENOUS ONCE
Status: CANCELLED | OUTPATIENT
Start: 2020-03-24

## 2020-03-24 RX ORDER — SODIUM CHLORIDE 9 MG/ML
250 INJECTION, SOLUTION INTRAVENOUS ONCE
Status: CANCELLED | OUTPATIENT
Start: 2020-03-24

## 2020-03-24 RX ORDER — ATROPINE SULFATE 1 MG/ML
0.25 INJECTION, SOLUTION INTRAMUSCULAR; INTRAVENOUS; SUBCUTANEOUS
Status: CANCELLED | OUTPATIENT
Start: 2020-03-24

## 2020-03-24 RX ORDER — HEPARIN SODIUM (PORCINE) LOCK FLUSH IV SOLN 100 UNIT/ML 100 UNIT/ML
500 SOLUTION INTRAVENOUS AS NEEDED
Status: CANCELLED | OUTPATIENT
Start: 2020-03-24

## 2020-03-24 RX ORDER — PALONOSETRON 0.05 MG/ML
0.25 INJECTION, SOLUTION INTRAVENOUS ONCE
Status: CANCELLED | OUTPATIENT
Start: 2020-04-07

## 2020-03-24 RX ORDER — PALONOSETRON 0.05 MG/ML
0.25 INJECTION, SOLUTION INTRAVENOUS ONCE
Status: COMPLETED | OUTPATIENT
Start: 2020-03-24 | End: 2020-03-24

## 2020-03-24 RX ORDER — IBUPROFEN 200 MG
400 TABLET ORAL ONCE
Status: COMPLETED | OUTPATIENT
Start: 2020-03-24 | End: 2020-03-24

## 2020-03-24 RX ORDER — SODIUM CHLORIDE 9 MG/ML
250 INJECTION, SOLUTION INTRAVENOUS ONCE
Status: COMPLETED | OUTPATIENT
Start: 2020-03-24 | End: 2020-03-24

## 2020-03-24 RX ORDER — SODIUM CHLORIDE 9 MG/ML
250 INJECTION, SOLUTION INTRAVENOUS ONCE
Status: CANCELLED | OUTPATIENT
Start: 2020-04-07

## 2020-03-24 RX ADMIN — SODIUM CHLORIDE 250 ML: 9 INJECTION, SOLUTION INTRAVENOUS at 08:16

## 2020-03-24 RX ADMIN — FLUOROURACIL 4500 MG: 50 INJECTION, SOLUTION INTRAVENOUS at 10:41

## 2020-03-24 RX ADMIN — FLUOROURACIL 780 MG: 50 INJECTION, SOLUTION INTRAVENOUS at 10:33

## 2020-03-24 RX ADMIN — Medication 400 MG: at 08:17

## 2020-03-24 RX ADMIN — IRINOTECAN HYDROCHLORIDE 340 MG: 20 INJECTION, SOLUTION INTRAVENOUS at 08:54

## 2020-03-24 RX ADMIN — DEXAMETHASONE SODIUM PHOSPHATE 12 MG: 4 INJECTION INTRA-ARTICULAR; INTRALESIONAL; INTRAMUSCULAR; INTRAVENOUS; SOFT TISSUE at 08:21

## 2020-03-24 RX ADMIN — ATROPINE SULFATE 0.25 MG: 1 INJECTION, SOLUTION INTRAMUSCULAR; INTRAVENOUS; SUBCUTANEOUS at 08:50

## 2020-03-24 RX ADMIN — LEUCOVORIN CALCIUM 780 MG: 500 INJECTION, POWDER, LYOPHILIZED, FOR SOLUTION INTRAMUSCULAR; INTRAVENOUS at 08:54

## 2020-03-24 RX ADMIN — PALONOSETRON HYDROCHLORIDE 0.25 MG: 0.25 INJECTION INTRAVENOUS at 08:20

## 2020-03-24 NOTE — PROGRESS NOTES
CHIEF COMPLAINT: Recurrent mucinous adenocarcinoma of the appendix    Problem List:  Oncology/Hematology History    1.  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  Pathologic stage IIa with T3 extension into the base of the appendix through the muscularis propria but not into the serosal surface.  16 nodes negative.  Colonoscopy December 2017 negative postop    -12/30/2019 medical oncology office visit: The patient had been on surveillance since surgery December 2017.  CT scans had been negative up until 12/30/2019 CT chest abdomen and pelvis that showed subtle soft tissue density surrounding surgical clips in the right side of the pelvis along with soft tissue nodule at the anterior wall of the bladder concerning for recurrent disease.  Patient sent back to Dr. Davidson for colonoscopy.    -2/7/2020 patient was referred to Dr. Peoples at the [facility], he underwent diagnostic laparoscopic and peritoneal biopsies that confirmed recurrent disease with biopsy of bladder dome nodule showing adenocarcinoma with mucinous features.  Port was placed at this time also.        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  Pathologic stage IIa with T3 extension into the base of the appendix through the muscularis propria but not into the serosal surface.  16 nodes negative.  Colonoscopy December 2017 negative postop      12/5/2017 Surgery     Surgery       Procedure:  Appendectomy and right hemicolectomy      Completeness of resection:  No evidence of residual tumor     Pathology revealed invasive moderately differentiated mucinous adenocarcinoma.  Right hemicolectomy: Benign colon and small intestine no evidence of carcinoma.  16 benign lymph nodes, 0/16, negative for dysplasia or malignancy.  Tumor size  approximately 6 cm.  Grade 2, moderately differentiated.  Tumor invades through the muscularis profile into the base of appendix but does not extend to the serosal surface.  All margins uninvolved, no lymphovascular invasion identified.  Pathological stage pT3 pN0.          12/8/2017 Imaging     CT of the chest abdomen and pelvis negative.  Baseline CBC WBC 7100, hemoglobin 11.3, hematocrit 34.8%, platelet count 195,000.      3/20/2018 Procedure     Colonoscopy with Dr. Davidson was normal, recommendation for repeat surveillance colonoscopy in 3 years.      6/11/2018 Imaging     CT chest, abdomen and pelvis with no evidence metastatic disease.  CEA 1.1      9/24/2018 Imaging     CT abdomen pelvis showed no acute changes and nothing to suggest recurrence      12/28/2018 Imaging     CT chest, abdomen and pelvis negative. Normal CBC, CMP and CEA 0.7.      6/28/2019 Imaging     CT chest, abdomen and pelvis: No interval change from prior studies.  No recurrent or metastatic disease detected in the chest, abdomen or pelvis.  No acute process.  CEA 0.9      12/30/2019 Imaging     CT chest, abdomen and pelvis: No disease in the chest.  There was noted a subtle soft tissue density, one surrounding surgical clips in the right side of the pelvis and the other a soft tissue nodule intimately associated with the anterior wall of the bladder, which are considered suspicious for recurrent disease.  CEA 1.1.  CMP with normal creatinine 0.9, total bilirubin 1.9, AST 34, ALT 24, alkaline phosphatase 93.  CBC with WBC 6900, hemoglobin 15.9, platelet count 232,000.        1/21/2020 Procedure     Normal colonoscopy with Dr. Davidson.  He has made referral to Dr. Portillo Peoples at .      2/7/2020 Progression     12/30/2019 CT chest, abdomen and pelvis: No disease in the chest.  There are subtle soft tissue densities (1 surrounding surgical clips in the right side of the pelvis and the other a soft tissue nodule intimately associated with the  anterior wall of the bladder) which are considered suspicious for recurrent disease.  CMP with normal creatinine 0.9, total bilirubin 1.9, AST 34, ALT 24, alkaline phosphatase 93.  CBC with WBC 6900, hemoglobin 15.9, platelet count 232,000.  CEA 1.1.  2/7/2020 diagnostic laparoscopy with peritoneal biopsies performed at the [facility] by Dr. Peoples showed no sign of diffuse peritoneal carcinomatosis or liver metastasis.  There was a firm nodule in the superior dome of the bladder, adherent to omentum, as well as right pelvic sidewall nodule adjacent to surgical clips.  Biopsy of bladder dome nodule showed adenocarcinoma with mucinous features.      2/19/2020 -  Chemotherapy     OP CENTRAL VENOUS ACCESS DEVICE ACCESS, CARE, AND MAINTENANCE (CVAD)      2/25/2020 -  Chemotherapy     OP COLORECTAL FOLFIRI Irinotecan / Leucovorin / Fluorouracil         HISTORY OF PRESENT ILLNESS:  The patient is a 64 y.o. male, here for follow up on management of recurrent mucinous adenocarcinoma of the appendix.  Having 3-4 loose stools per day but that is stable and predated his treatment.  Had nausea last time but it only lasted 12 hours and had not taken his antiemetics on a scheduled basis but waited for the nausea.  Is planning on taking it in advance of the nausea this time      Past Medical History:   Diagnosis Date   • Hypertension      No past surgical history on file.    Allergies   Allergen Reactions   • Levofloxacin Unknown - Low Severity   • Penicillins Unknown - Low Severity   • Tetracycline Unknown - Low Severity       Family History and Social History reviewed and changed as necessary      REVIEW OF SYSTEM:   Review of Systems   Constitutional: Negative for appetite change, chills, diaphoresis, fatigue, fever and unexpected weight change.   HENT:   Negative for mouth sores, sore throat and trouble swallowing.    Eyes: Negative for icterus.   Respiratory: Negative for cough, hemoptysis and shortness of breath.   "  Cardiovascular: Negative for chest pain, leg swelling and palpitations.   Gastrointestinal: Negative for abdominal distention, abdominal pain, blood in stool, constipation, diarrhea, nausea and vomiting.   Endocrine: Negative for hot flashes.   Genitourinary: Negative for bladder incontinence, difficulty urinating, dysuria, frequency and hematuria.    Musculoskeletal: Negative for gait problem, neck pain and neck stiffness.   Skin: Negative for rash.   Neurological: Negative for dizziness, gait problem, headaches, light-headedness and numbness.   Hematological: Negative for adenopathy. Does not bruise/bleed easily.   Psychiatric/Behavioral: Negative for depression. The patient is not nervous/anxious.    All other systems reviewed and are negative.       PHYSICAL EXAM    Vitals:    03/24/20 0732   BP: 149/87   Pulse: 81   Resp: 18   Temp: 98.3 °F (36.8 °C)   Weight: 73 kg (161 lb)   Height: 185.4 cm (73\")     Vitals:    03/24/20 0732   PainSc: 0-No pain        Constitutional: Appears well-developed and well-nourished. No distress.   ECOG: (0) Fully Active - Able to Carry On All Pre-disease Performance Without Restriction  HENT:   Head: Normocephalic.   Mouth/Throat: Oropharynx is clear and moist.   Eyes: Conjunctivae are normal. Pupils are equal, round, and reactive to light. No scleral icterus.   Neck: Neck supple. No JVD present. No thyromegaly present.   Cardiovascular: Normal rate, regular rhythm and normal heart sounds.    Pulmonary/Chest: Breath sounds normal. No respiratory distress.   Abdominal: Soft. Exhibits no distension and no mass. There is no hepatosplenomegaly. There is no tenderness. There is no rebound and no guarding.   Musculoskeletal:Exhibits no edema, tenderness or deformity.   Neurological: Alert and oriented to person, place, and time. Exhibits normal muscle tone.   Skin: No ecchymosis, no petechiae and no rash noted. Not diaphoretic. No cyanosis. Nails show no clubbing.   Psychiatric: Normal " mood and affect.   Vitals reviewed.      Lab Results   Component Value Date    HGB 13.3 03/19/2020    HCT 39.8 03/19/2020    MCV 87 03/19/2020     03/19/2020    WBC 3.6 03/19/2020    NEUTROABS 1.4 03/19/2020    LYMPHSABS 1.7 03/19/2020    MONOSABS 0.4 03/19/2020    EOSABS 0.1 03/19/2020    BASOSABS 0.0 03/19/2020       Lab Results   Component Value Date    BUN 14 03/19/2020    CREATININE 0.99 03/19/2020     03/19/2020    K 3.4 (L) 03/19/2020     03/19/2020    CO2 22 03/19/2020    CALCIUM 9.1 03/19/2020    ALBUMIN 4.7 03/19/2020    BILITOT 0.8 03/19/2020    ALKPHOS 92 03/19/2020    AST 20 03/19/2020    ALT 29 03/19/2020                   ASSESSMENT & PLAN:    1. Recurrent mucinous adenocarcinoma of the appendix  2. Diarrhea predating chemotherapy  3. Nausea manageable and brief with his last course of therapy    Discussion: Counts last week were adequate.  Continue Folfiri without Biologics.Some fatigue but manageable.  Nausea under control and was brief with his last course of therapy.  We will get course #3 Folfiri.  Takes Compazine alternating with Zofran with good effect.  Will practice social distancing.  2/4/2020 pathology showed omental metastatic adenocarcinoma with mucinous features.  We will give FOLFIRI till the end of May without Biologics and then repeat scans and consider resection/HI PEC at that junction if this has not spread further distantly.  Diarrhea modest and stable.  Prior PET denied so we will have to do conventional serial imaging.  Thus far no pandemic side effects.  Discussed with patient 25 minutes face-to-face greater than 50% counseling regarding the plan as outlined above.  I have signed his orders for his treatment in 2 weeks and we will see him back in 4 weeks if he is tolerating.      Cy Delcid MD    03/24/2020

## 2020-03-26 ENCOUNTER — TELEPHONE (OUTPATIENT)
Dept: NUTRITION | Facility: HOSPITAL | Age: 65
End: 2020-03-26

## 2020-03-26 ENCOUNTER — INFUSION (OUTPATIENT)
Dept: ONCOLOGY | Facility: HOSPITAL | Age: 65
End: 2020-03-26

## 2020-03-26 DIAGNOSIS — Z45.2 ENCOUNTER FOR CARE RELATED TO VASCULAR ACCESS PORT: Primary | ICD-10-CM

## 2020-03-26 DIAGNOSIS — C18.1 MUCINOUS ADENOCARCINOMA OF APPENDIX (HCC): ICD-10-CM

## 2020-03-26 PROCEDURE — 25010000003 HEPARIN LOCK FLUCH PER 10 UNITS: Performed by: INTERNAL MEDICINE

## 2020-03-26 RX ORDER — HEPARIN SODIUM (PORCINE) LOCK FLUSH IV SOLN 100 UNIT/ML 100 UNIT/ML
500 SOLUTION INTRAVENOUS AS NEEDED
Status: DISCONTINUED | OUTPATIENT
Start: 2020-03-26 | End: 2020-03-26 | Stop reason: HOSPADM

## 2020-03-26 RX ORDER — HEPARIN SODIUM (PORCINE) LOCK FLUSH IV SOLN 100 UNIT/ML 100 UNIT/ML
500 SOLUTION INTRAVENOUS AS NEEDED
Status: CANCELLED | OUTPATIENT
Start: 2020-03-26

## 2020-03-26 RX ADMIN — HEPARIN SODIUM (PORCINE) LOCK FLUSH IV SOLN 100 UNIT/ML 500 UNITS: 100 SOLUTION at 08:50

## 2020-03-26 NOTE — PROGRESS NOTES
Oncology Nutrition Screening    Patient Name:  Esteban Tse  YOB: 1955  MRN: [id number]  Date:  03/26/20  Physician:  Dr. Delcid    Type of Cancer Treatment:   Chemotherapy:  FOLFIRI - every 14 days     Patient Active Problem List   Diagnosis   • Mucinous adenocarcinoma of appendix (CMS/HCC)   • Encounter for care related to vascular access port       Current Outpatient Medications   Medication Sig Dispense Refill   • lidocaine-prilocaine (EMLA) 2.5-2.5 % cream Apply  topically to the appropriate area as directed As Needed (45-60 minutes prior to port access.  Cover with saran/plastic wrap.). 30 g 3   • lisinopril-hydrochlorothiazide (PRINZIDE,ZESTORETIC) 20-12.5 MG per tablet   0   • metoprolol succinate XL (TOPROL-XL) 50 MG 24 hr tablet   0   • ondansetron (ZOFRAN) 8 MG tablet Take 1 tablet by mouth 3 (Three) Times a Day As Needed for Nausea or Vomiting. 30 tablet 5   • prochlorperazine (COMPAZINE) 10 MG tablet Take 1 tablet by mouth Every 6 (Six) Hours As Needed for Nausea or Vomiting. 60 tablet 2   • traMADol (ULTRAM) 50 MG tablet TK 1 T PO TID PRN P       No current facility-administered medications for this visit.      Facility-Administered Medications Ordered in Other Visits   Medication Dose Route Frequency Provider Last Rate Last Dose   • heparin injection 500 Units  500 Units Intravenous PRN Cy Delcid MD   500 Units at 03/26/20 0850       Glycemic Risk:   n/a    Weight:   Height: 73 inches  Weight: 161 lbs.  Usual Body Weight: ~163 lbs.   BMI: 21.2  Normal  Weight has been stable    Oral Food Intake:  Regular Diet - No Restrictions    Hydration Status:   How many 8 ounce glass of water of fluid do you drink per day?  Patient reports drinking a variety of beverages throughout the day.    Enteral Feeding:   n/a    Nutrition Symptoms:   Altered Apetite  Nausea  Diarrhea - chronic    Activity:   Normal with no limitations     reports that he has quit smoking. He does not have any smokeless  tobacco history on file.    Evaluation of Nutritional Risk:   Patient has been identified at mild nutritional risk due to diagnosis, treatment plan, nutrition impact symptoms, and patient education.   Spoke with patient via phone call for nutritional assessment / education.  Patient reports overall tolerating treatment pretty well thus far.  He states his appetite will be decreased for 3-4 days but then returns to normal.  He reports he is managing his nausea and diarrhea well at this time.  Note patient has been previously counseled on diarrhea management by my colleague, Kristal Deng RD.    Discussed the importance of good nutrition during his treatment course focusing on adequate calorie, protein, nutrient and fluid intake.  Advised him to be consuming smaller more frequent meals/snacks throughout the day to aid with nausea and diarrhea management.  Emphasized the importance of protein and its role in the diet; reviewed high protein foods; and recommended he have a protein source at each meal/snack.  Also emphasized the importance of hydration; reviewed good hydrating fluid options; and recommended he drink at least 64 ounces daily.  Discussed making homemade smoothies to incorporate a wider variety of fruits and vegetables in his diet.      Answered his questions and he voiced understanding of information discussed.  Will mail written diet materials, with RD's contact information, to reinforce information discussed.  Encouraged to call with questions.  Will monitor as needed during his treatment course.    Electronically signed by:  Aimee Martínez RD  10:53

## 2020-04-02 ENCOUNTER — INFUSION (OUTPATIENT)
Dept: ONCOLOGY | Facility: HOSPITAL | Age: 65
End: 2020-04-02

## 2020-04-02 VITALS
DIASTOLIC BLOOD PRESSURE: 89 MMHG | SYSTOLIC BLOOD PRESSURE: 133 MMHG | RESPIRATION RATE: 17 BRPM | HEART RATE: 75 BPM | WEIGHT: 160.2 LBS | TEMPERATURE: 98.1 F | BODY MASS INDEX: 21.14 KG/M2

## 2020-04-02 DIAGNOSIS — C18.1 MUCINOUS ADENOCARCINOMA OF APPENDIX (HCC): Primary | ICD-10-CM

## 2020-04-02 DIAGNOSIS — Z45.2 ENCOUNTER FOR CARE RELATED TO VASCULAR ACCESS PORT: ICD-10-CM

## 2020-04-02 PROCEDURE — 36591 DRAW BLOOD OFF VENOUS DEVICE: CPT

## 2020-04-02 PROCEDURE — 25010000003 HEPARIN LOCK FLUCH PER 10 UNITS: Performed by: INTERNAL MEDICINE

## 2020-04-02 RX ORDER — HEPARIN SODIUM (PORCINE) LOCK FLUSH IV SOLN 100 UNIT/ML 100 UNIT/ML
500 SOLUTION INTRAVENOUS AS NEEDED
Status: DISCONTINUED | OUTPATIENT
Start: 2020-04-02 | End: 2020-04-02 | Stop reason: HOSPADM

## 2020-04-02 RX ORDER — HEPARIN SODIUM (PORCINE) LOCK FLUSH IV SOLN 100 UNIT/ML 100 UNIT/ML
500 SOLUTION INTRAVENOUS AS NEEDED
Status: CANCELLED | OUTPATIENT
Start: 2020-04-02

## 2020-04-02 RX ADMIN — HEPARIN 500 UNITS: 100 SYRINGE at 08:32

## 2020-04-03 LAB
ALBUMIN SERPL-MCNC: 4.3 G/DL (ref 3.5–5.2)
ALBUMIN/GLOB SERPL: 2.2 G/DL
ALP SERPL-CCNC: 89 U/L (ref 39–117)
ALT SERPL-CCNC: 17 U/L (ref 1–41)
AST SERPL-CCNC: 13 U/L (ref 1–40)
BASOPHILS # BLD AUTO: 0.02 10*3/MM3 (ref 0–0.2)
BASOPHILS NFR BLD AUTO: 0.5 % (ref 0–1.5)
BILIRUB SERPL-MCNC: 0.7 MG/DL (ref 0.2–1.2)
BUN SERPL-MCNC: 11 MG/DL (ref 8–23)
BUN/CREAT SERPL: 13.3 (ref 7–25)
CALCIUM SERPL-MCNC: 8.8 MG/DL (ref 8.6–10.5)
CHLORIDE SERPL-SCNC: 104 MMOL/L (ref 98–107)
CO2 SERPL-SCNC: 25.5 MMOL/L (ref 22–29)
CREAT SERPL-MCNC: 0.83 MG/DL (ref 0.76–1.27)
EOSINOPHIL # BLD AUTO: 0.09 10*3/MM3 (ref 0–0.4)
EOSINOPHIL NFR BLD AUTO: 2.4 % (ref 0.3–6.2)
ERYTHROCYTE [DISTWIDTH] IN BLOOD BY AUTOMATED COUNT: 13.5 % (ref 12.3–15.4)
GLOBULIN SER CALC-MCNC: 2 GM/DL
GLUCOSE SERPL-MCNC: 83 MG/DL (ref 65–99)
HCT VFR BLD AUTO: 37.7 % (ref 37.5–51)
HGB BLD-MCNC: 12.9 G/DL (ref 13–17.7)
IMM GRANULOCYTES # BLD AUTO: 0.01 10*3/MM3 (ref 0–0.05)
IMM GRANULOCYTES NFR BLD AUTO: 0.3 % (ref 0–0.5)
LYMPHOCYTES # BLD AUTO: 1.65 10*3/MM3 (ref 0.7–3.1)
LYMPHOCYTES NFR BLD AUTO: 44.2 % (ref 19.6–45.3)
MCH RBC QN AUTO: 30.4 PG (ref 26.6–33)
MCHC RBC AUTO-ENTMCNC: 34.2 G/DL (ref 31.5–35.7)
MCV RBC AUTO: 88.7 FL (ref 79–97)
MONOCYTES # BLD AUTO: 0.29 10*3/MM3 (ref 0.1–0.9)
MONOCYTES NFR BLD AUTO: 7.8 % (ref 5–12)
NEUTROPHILS # BLD AUTO: 1.67 10*3/MM3 (ref 1.7–7)
NEUTROPHILS NFR BLD AUTO: 44.8 % (ref 42.7–76)
NRBC BLD AUTO-RTO: 0 /100 WBC (ref 0–0.2)
PLATELET # BLD AUTO: 258 10*3/MM3 (ref 140–450)
POTASSIUM SERPL-SCNC: 3.6 MMOL/L (ref 3.5–5.2)
PROT SERPL-MCNC: 6.3 G/DL (ref 6–8.5)
RBC # BLD AUTO: 4.25 10*6/MM3 (ref 4.14–5.8)
SODIUM SERPL-SCNC: 141 MMOL/L (ref 136–145)
WBC # BLD AUTO: 3.73 10*3/MM3 (ref 3.4–10.8)

## 2020-04-03 RX ORDER — PROCHLORPERAZINE MALEATE 10 MG
TABLET ORAL
Qty: 60 TABLET | Refills: 2 | Status: SHIPPED | OUTPATIENT
Start: 2020-04-03 | End: 2022-03-08

## 2020-04-07 ENCOUNTER — INFUSION (OUTPATIENT)
Dept: ONCOLOGY | Facility: HOSPITAL | Age: 65
End: 2020-04-07

## 2020-04-07 VITALS
SYSTOLIC BLOOD PRESSURE: 114 MMHG | HEART RATE: 83 BPM | WEIGHT: 157.4 LBS | DIASTOLIC BLOOD PRESSURE: 76 MMHG | TEMPERATURE: 98.2 F | BODY MASS INDEX: 20.77 KG/M2 | RESPIRATION RATE: 16 BRPM

## 2020-04-07 DIAGNOSIS — C18.1 MUCINOUS ADENOCARCINOMA OF APPENDIX (HCC): Primary | ICD-10-CM

## 2020-04-07 PROCEDURE — 96416 CHEMO PROLONG INFUSE W/PUMP: CPT

## 2020-04-07 PROCEDURE — 96375 TX/PRO/DX INJ NEW DRUG ADDON: CPT

## 2020-04-07 PROCEDURE — 25010000002 DEXAMETHASONE PER 1 MG: Performed by: INTERNAL MEDICINE

## 2020-04-07 PROCEDURE — 25010000002 IRINOTECAN PER 20 MG: Performed by: INTERNAL MEDICINE

## 2020-04-07 PROCEDURE — 25010000002 ATROPINE PER 0.01 MG: Performed by: INTERNAL MEDICINE

## 2020-04-07 PROCEDURE — 25010000002 LEUCOVORIN CALCIUM PER 50 MG: Performed by: INTERNAL MEDICINE

## 2020-04-07 PROCEDURE — 96411 CHEMO IV PUSH ADDL DRUG: CPT

## 2020-04-07 PROCEDURE — 96413 CHEMO IV INFUSION 1 HR: CPT

## 2020-04-07 PROCEDURE — 25010000002 FLUOROURACIL PER 500 MG: Performed by: INTERNAL MEDICINE

## 2020-04-07 PROCEDURE — 25010000002 PALONOSETRON PER 25 MCG: Performed by: INTERNAL MEDICINE

## 2020-04-07 PROCEDURE — 96368 THER/DIAG CONCURRENT INF: CPT

## 2020-04-07 PROCEDURE — 96415 CHEMO IV INFUSION ADDL HR: CPT

## 2020-04-07 PROCEDURE — 25010000002 LEUCOVORIN 500 MG RECONSTITUTED SOLUTION 1 EACH VIAL: Performed by: INTERNAL MEDICINE

## 2020-04-07 RX ORDER — FLUOROURACIL 50 MG/ML
400 INJECTION, SOLUTION INTRAVENOUS ONCE
Status: COMPLETED | OUTPATIENT
Start: 2020-04-07 | End: 2020-04-07

## 2020-04-07 RX ORDER — ATROPINE SULFATE 1 MG/ML
0.25 INJECTION, SOLUTION INTRAMUSCULAR; INTRAVENOUS; SUBCUTANEOUS
Status: DISCONTINUED | OUTPATIENT
Start: 2020-04-07 | End: 2020-04-07 | Stop reason: HOSPADM

## 2020-04-07 RX ORDER — PALONOSETRON 0.05 MG/ML
0.25 INJECTION, SOLUTION INTRAVENOUS ONCE
Status: COMPLETED | OUTPATIENT
Start: 2020-04-07 | End: 2020-04-07

## 2020-04-07 RX ORDER — SODIUM CHLORIDE 9 MG/ML
250 INJECTION, SOLUTION INTRAVENOUS ONCE
Status: COMPLETED | OUTPATIENT
Start: 2020-04-07 | End: 2020-04-07

## 2020-04-07 RX ADMIN — LEUCOVORIN CALCIUM 780 MG: 500 INJECTION, POWDER, LYOPHILIZED, FOR SOLUTION INTRAMUSCULAR; INTRAVENOUS at 10:18

## 2020-04-07 RX ADMIN — SODIUM CHLORIDE 250 ML: 9 INJECTION, SOLUTION INTRAVENOUS at 09:25

## 2020-04-07 RX ADMIN — ATROPINE SULFATE 0.25 MG: 1 INJECTION, SOLUTION INTRAMUSCULAR; INTRAVENOUS; SUBCUTANEOUS at 10:06

## 2020-04-07 RX ADMIN — FLUOROURACIL 780 MG: 50 INJECTION, SOLUTION INTRAVENOUS at 12:25

## 2020-04-07 RX ADMIN — IRINOTECAN HYDROCHLORIDE 340 MG: 20 INJECTION, SOLUTION INTRAVENOUS at 10:18

## 2020-04-07 RX ADMIN — DEXAMETHASONE SODIUM PHOSPHATE 12 MG: 4 INJECTION, SOLUTION INTRAMUSCULAR; INTRAVENOUS at 09:29

## 2020-04-07 RX ADMIN — FLUOROURACIL 4500 MG: 50 INJECTION, SOLUTION INTRAVENOUS at 12:29

## 2020-04-07 RX ADMIN — PALONOSETRON HYDROCHLORIDE 0.25 MG: 0.25 INJECTION, SOLUTION INTRAVENOUS at 09:26

## 2020-04-09 ENCOUNTER — INFUSION (OUTPATIENT)
Dept: ONCOLOGY | Facility: HOSPITAL | Age: 65
End: 2020-04-09

## 2020-04-09 VITALS
BODY MASS INDEX: 20.77 KG/M2 | HEART RATE: 69 BPM | SYSTOLIC BLOOD PRESSURE: 131 MMHG | TEMPERATURE: 98.5 F | DIASTOLIC BLOOD PRESSURE: 84 MMHG | RESPIRATION RATE: 16 BRPM | WEIGHT: 157.4 LBS

## 2020-04-09 DIAGNOSIS — C18.1 MUCINOUS ADENOCARCINOMA OF APPENDIX (HCC): Primary | ICD-10-CM

## 2020-04-09 DIAGNOSIS — Z45.2 ENCOUNTER FOR CARE RELATED TO VASCULAR ACCESS PORT: ICD-10-CM

## 2020-04-09 PROCEDURE — 25010000003 HEPARIN LOCK FLUCH PER 10 UNITS: Performed by: INTERNAL MEDICINE

## 2020-04-09 PROCEDURE — G0463 HOSPITAL OUTPT CLINIC VISIT: HCPCS

## 2020-04-09 RX ORDER — HEPARIN SODIUM (PORCINE) LOCK FLUSH IV SOLN 100 UNIT/ML 100 UNIT/ML
500 SOLUTION INTRAVENOUS AS NEEDED
Status: DISCONTINUED | OUTPATIENT
Start: 2020-04-09 | End: 2020-04-09 | Stop reason: HOSPADM

## 2020-04-09 RX ORDER — HEPARIN SODIUM (PORCINE) LOCK FLUSH IV SOLN 100 UNIT/ML 100 UNIT/ML
500 SOLUTION INTRAVENOUS AS NEEDED
Status: CANCELLED | OUTPATIENT
Start: 2020-04-09

## 2020-04-09 RX ADMIN — HEPARIN SODIUM (PORCINE) LOCK FLUSH IV SOLN 100 UNIT/ML 500 UNITS: 100 SOLUTION at 10:35

## 2020-04-16 ENCOUNTER — INFUSION (OUTPATIENT)
Dept: ONCOLOGY | Facility: HOSPITAL | Age: 65
End: 2020-04-16

## 2020-04-16 VITALS
HEART RATE: 80 BPM | RESPIRATION RATE: 16 BRPM | DIASTOLIC BLOOD PRESSURE: 96 MMHG | WEIGHT: 156 LBS | BODY MASS INDEX: 20.58 KG/M2 | TEMPERATURE: 98.2 F | SYSTOLIC BLOOD PRESSURE: 146 MMHG

## 2020-04-16 DIAGNOSIS — C18.1 MUCINOUS ADENOCARCINOMA OF APPENDIX (HCC): Primary | ICD-10-CM

## 2020-04-16 DIAGNOSIS — Z45.2 ENCOUNTER FOR CARE RELATED TO VASCULAR ACCESS PORT: ICD-10-CM

## 2020-04-16 PROCEDURE — 36591 DRAW BLOOD OFF VENOUS DEVICE: CPT

## 2020-04-16 PROCEDURE — 25010000003 HEPARIN LOCK FLUCH PER 10 UNITS: Performed by: INTERNAL MEDICINE

## 2020-04-16 RX ORDER — HEPARIN SODIUM (PORCINE) LOCK FLUSH IV SOLN 100 UNIT/ML 100 UNIT/ML
500 SOLUTION INTRAVENOUS AS NEEDED
Status: DISCONTINUED | OUTPATIENT
Start: 2020-04-16 | End: 2020-04-16 | Stop reason: HOSPADM

## 2020-04-16 RX ORDER — HEPARIN SODIUM (PORCINE) LOCK FLUSH IV SOLN 100 UNIT/ML 100 UNIT/ML
500 SOLUTION INTRAVENOUS AS NEEDED
Status: CANCELLED | OUTPATIENT
Start: 2020-04-16

## 2020-04-16 RX ADMIN — HEPARIN SODIUM (PORCINE) LOCK FLUSH IV SOLN 100 UNIT/ML 500 UNITS: 100 SOLUTION at 08:30

## 2020-04-17 LAB
ALBUMIN SERPL-MCNC: 4.2 G/DL (ref 3.5–5.2)
ALBUMIN/GLOB SERPL: 1.9 G/DL
ALP SERPL-CCNC: 85 U/L (ref 39–117)
ALT SERPL-CCNC: 21 U/L (ref 1–41)
AST SERPL-CCNC: 16 U/L (ref 1–40)
BASOPHILS # BLD AUTO: 0.03 10*3/MM3 (ref 0–0.2)
BASOPHILS NFR BLD AUTO: 1.1 % (ref 0–1.5)
BILIRUB SERPL-MCNC: 0.6 MG/DL (ref 0.2–1.2)
BUN SERPL-MCNC: 12 MG/DL (ref 8–23)
BUN/CREAT SERPL: 13.2 (ref 7–25)
CALCIUM SERPL-MCNC: 8.8 MG/DL (ref 8.6–10.5)
CHLORIDE SERPL-SCNC: 101 MMOL/L (ref 98–107)
CO2 SERPL-SCNC: 25.9 MMOL/L (ref 22–29)
CREAT SERPL-MCNC: 0.91 MG/DL (ref 0.76–1.27)
EOSINOPHIL # BLD AUTO: 0.12 10*3/MM3 (ref 0–0.4)
EOSINOPHIL NFR BLD AUTO: 4.3 % (ref 0.3–6.2)
ERYTHROCYTE [DISTWIDTH] IN BLOOD BY AUTOMATED COUNT: 13.5 % (ref 12.3–15.4)
GLOBULIN SER CALC-MCNC: 2.2 GM/DL
GLUCOSE SERPL-MCNC: 85 MG/DL (ref 65–99)
HCT VFR BLD AUTO: 37.2 % (ref 37.5–51)
HGB BLD-MCNC: 12.2 G/DL (ref 13–17.7)
IMM GRANULOCYTES # BLD AUTO: 0.01 10*3/MM3 (ref 0–0.05)
IMM GRANULOCYTES NFR BLD AUTO: 0.4 % (ref 0–0.5)
LYMPHOCYTES # BLD AUTO: 1.41 10*3/MM3 (ref 0.7–3.1)
LYMPHOCYTES NFR BLD AUTO: 50 % (ref 19.6–45.3)
MCH RBC QN AUTO: 28.8 PG (ref 26.6–33)
MCHC RBC AUTO-ENTMCNC: 32.8 G/DL (ref 31.5–35.7)
MCV RBC AUTO: 87.7 FL (ref 79–97)
MONOCYTES # BLD AUTO: 0.21 10*3/MM3 (ref 0.1–0.9)
MONOCYTES NFR BLD AUTO: 7.4 % (ref 5–12)
NEUTROPHILS # BLD AUTO: 1.04 10*3/MM3 (ref 1.7–7)
NEUTROPHILS NFR BLD AUTO: 36.8 % (ref 42.7–76)
NRBC BLD AUTO-RTO: 0 /100 WBC (ref 0–0.2)
PLATELET # BLD AUTO: 216 10*3/MM3 (ref 140–450)
POTASSIUM SERPL-SCNC: 3.5 MMOL/L (ref 3.5–5.2)
PROT SERPL-MCNC: 6.4 G/DL (ref 6–8.5)
RBC # BLD AUTO: 4.24 10*6/MM3 (ref 4.14–5.8)
SODIUM SERPL-SCNC: 138 MMOL/L (ref 136–145)
WBC # BLD AUTO: 2.82 10*3/MM3 (ref 3.4–10.8)

## 2020-04-20 DIAGNOSIS — C18.1 MUCINOUS ADENOCARCINOMA OF APPENDIX (HCC): ICD-10-CM

## 2020-04-20 RX ORDER — SODIUM CHLORIDE 9 MG/ML
250 INJECTION, SOLUTION INTRAVENOUS ONCE
Status: CANCELLED | OUTPATIENT
Start: 2020-04-21

## 2020-04-20 RX ORDER — FLUOROURACIL 50 MG/ML
400 INJECTION, SOLUTION INTRAVENOUS ONCE
Status: CANCELLED | OUTPATIENT
Start: 2020-04-21

## 2020-04-20 RX ORDER — PALONOSETRON 0.05 MG/ML
0.25 INJECTION, SOLUTION INTRAVENOUS ONCE
Status: CANCELLED | OUTPATIENT
Start: 2020-04-21

## 2020-04-20 RX ORDER — FLUOROURACIL 50 MG/ML
400 INJECTION, SOLUTION INTRAVENOUS ONCE
Status: DISCONTINUED | OUTPATIENT
Start: 2020-04-21 | End: 2020-04-21

## 2020-04-20 RX ORDER — PALONOSETRON 0.05 MG/ML
0.25 INJECTION, SOLUTION INTRAVENOUS ONCE
Status: COMPLETED | OUTPATIENT
Start: 2020-04-21 | End: 2020-04-21

## 2020-04-20 RX ORDER — ATROPINE SULFATE 1 MG/ML
0.25 INJECTION, SOLUTION INTRAMUSCULAR; INTRAVENOUS; SUBCUTANEOUS
Status: DISCONTINUED | OUTPATIENT
Start: 2020-04-21 | End: 2020-04-21 | Stop reason: HOSPADM

## 2020-04-20 RX ORDER — SODIUM CHLORIDE 9 MG/ML
250 INJECTION, SOLUTION INTRAVENOUS ONCE
Status: COMPLETED | OUTPATIENT
Start: 2020-04-21 | End: 2020-04-21

## 2020-04-20 RX ORDER — ATROPINE SULFATE 1 MG/ML
0.25 INJECTION, SOLUTION INTRAMUSCULAR; INTRAVENOUS; SUBCUTANEOUS
Status: CANCELLED | OUTPATIENT
Start: 2020-04-21

## 2020-04-21 ENCOUNTER — INFUSION (OUTPATIENT)
Dept: ONCOLOGY | Facility: HOSPITAL | Age: 65
End: 2020-04-21

## 2020-04-21 ENCOUNTER — OFFICE VISIT (OUTPATIENT)
Dept: ONCOLOGY | Facility: CLINIC | Age: 65
End: 2020-04-21

## 2020-04-21 VITALS
WEIGHT: 158.2 LBS | SYSTOLIC BLOOD PRESSURE: 137 MMHG | HEART RATE: 81 BPM | DIASTOLIC BLOOD PRESSURE: 89 MMHG | RESPIRATION RATE: 16 BRPM | BODY MASS INDEX: 20.97 KG/M2 | HEIGHT: 73 IN | TEMPERATURE: 97.4 F

## 2020-04-21 DIAGNOSIS — C18.1 MUCINOUS ADENOCARCINOMA OF APPENDIX (HCC): Primary | ICD-10-CM

## 2020-04-21 DIAGNOSIS — C18.1 MUCINOUS ADENOCARCINOMA OF APPENDIX (HCC): ICD-10-CM

## 2020-04-21 PROCEDURE — 25010000002 LEUCOVORIN CALCIUM PER 50 MG: Performed by: NURSE PRACTITIONER

## 2020-04-21 PROCEDURE — 25010000002 ATROPINE PER 0.01 MG: Performed by: NURSE PRACTITIONER

## 2020-04-21 PROCEDURE — 25010000002 PALONOSETRON 0.25 MG/5ML SOLUTION PREFILLED SYRINGE: Performed by: NURSE PRACTITIONER

## 2020-04-21 PROCEDURE — 99214 OFFICE O/P EST MOD 30 MIN: CPT | Performed by: NURSE PRACTITIONER

## 2020-04-21 PROCEDURE — 25010000002 DEXAMETHASONE SODIUM PHOSPHATE 100 MG/10ML SOLUTION 10 ML VIAL: Performed by: NURSE PRACTITIONER

## 2020-04-21 PROCEDURE — 96375 TX/PRO/DX INJ NEW DRUG ADDON: CPT

## 2020-04-21 PROCEDURE — 25010000002 IRINOTECAN PER 20 MG: Performed by: NURSE PRACTITIONER

## 2020-04-21 PROCEDURE — 96416 CHEMO PROLONG INFUSE W/PUMP: CPT

## 2020-04-21 PROCEDURE — 96411 CHEMO IV PUSH ADDL DRUG: CPT

## 2020-04-21 PROCEDURE — 96413 CHEMO IV INFUSION 1 HR: CPT

## 2020-04-21 PROCEDURE — 96368 THER/DIAG CONCURRENT INF: CPT

## 2020-04-21 PROCEDURE — 25010000002 FLUOROURACIL PER 500 MG: Performed by: NURSE PRACTITIONER

## 2020-04-21 RX ORDER — SODIUM CHLORIDE 9 MG/ML
250 INJECTION, SOLUTION INTRAVENOUS ONCE
Status: CANCELLED | OUTPATIENT
Start: 2020-05-19

## 2020-04-21 RX ORDER — ATROPINE SULFATE 1 MG/ML
0.25 INJECTION, SOLUTION INTRAMUSCULAR; INTRAVENOUS; SUBCUTANEOUS
Status: CANCELLED | OUTPATIENT
Start: 2020-05-19

## 2020-04-21 RX ORDER — ATROPINE SULFATE 1 MG/ML
0.25 INJECTION, SOLUTION INTRAMUSCULAR; INTRAVENOUS; SUBCUTANEOUS
Status: CANCELLED | OUTPATIENT
Start: 2020-05-05

## 2020-04-21 RX ORDER — FLUOROURACIL 50 MG/ML
360 INJECTION, SOLUTION INTRAVENOUS ONCE
Status: COMPLETED | OUTPATIENT
Start: 2020-04-21 | End: 2020-04-21

## 2020-04-21 RX ORDER — FLUOROURACIL 50 MG/ML
400 INJECTION, SOLUTION INTRAVENOUS ONCE
Status: CANCELLED | OUTPATIENT
Start: 2020-05-19

## 2020-04-21 RX ORDER — FLUOROURACIL 50 MG/ML
360 INJECTION, SOLUTION INTRAVENOUS ONCE
Status: CANCELLED | OUTPATIENT
Start: 2020-04-21

## 2020-04-21 RX ORDER — PALONOSETRON 0.05 MG/ML
0.25 INJECTION, SOLUTION INTRAVENOUS ONCE
Status: CANCELLED | OUTPATIENT
Start: 2020-05-19

## 2020-04-21 RX ORDER — FLUOROURACIL 50 MG/ML
360 INJECTION, SOLUTION INTRAVENOUS ONCE
Status: CANCELLED | OUTPATIENT
Start: 2020-05-05

## 2020-04-21 RX ORDER — PALONOSETRON 0.05 MG/ML
0.25 INJECTION, SOLUTION INTRAVENOUS ONCE
Status: CANCELLED | OUTPATIENT
Start: 2020-05-05

## 2020-04-21 RX ORDER — FLUOROURACIL 50 MG/ML
400 INJECTION, SOLUTION INTRAVENOUS ONCE
Status: CANCELLED | OUTPATIENT
Start: 2020-05-05

## 2020-04-21 RX ORDER — SODIUM CHLORIDE 9 MG/ML
250 INJECTION, SOLUTION INTRAVENOUS ONCE
Status: CANCELLED | OUTPATIENT
Start: 2020-05-05

## 2020-04-21 RX ADMIN — LEUCOVORIN CALCIUM 710 MG: 50 INJECTION, POWDER, LYOPHILIZED, FOR SOLUTION INTRAMUSCULAR; INTRAVENOUS at 10:07

## 2020-04-21 RX ADMIN — SODIUM CHLORIDE 250 ML: 9 INJECTION, SOLUTION INTRAVENOUS at 09:40

## 2020-04-21 RX ADMIN — PALONOSETRON 0.25 MG: 0.25 INJECTION, SOLUTION INTRAVENOUS at 09:40

## 2020-04-21 RX ADMIN — IRINOTECAN HYDROCHLORIDE 300 MG: 20 INJECTION INTRAVENOUS at 10:07

## 2020-04-21 RX ADMIN — FLUOROURACIL 710 MG: 50 INJECTION, SOLUTION INTRAVENOUS at 12:09

## 2020-04-21 RX ADMIN — FLUOROURACIL 4000 MG: 50 INJECTION, SOLUTION INTRAVENOUS at 12:15

## 2020-04-21 RX ADMIN — DEXAMETHASONE SODIUM PHOSPHATE 12 MG: 10 INJECTION, SOLUTION INTRAMUSCULAR; INTRAVENOUS at 09:44

## 2020-04-21 RX ADMIN — ATROPINE SULFATE 0.25 MG: 1 INJECTION, SOLUTION INTRAMUSCULAR; INTRAVENOUS; SUBCUTANEOUS at 10:04

## 2020-04-21 NOTE — PROGRESS NOTES
CHIEF COMPLAINT:  1.  Fatigue  2.  Recurrent mucinous adenocarcinoma of the appendix    Problem List:  Oncology/Hematology History    1.  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  Pathologic stage IIa with T3 extension into the base of the appendix through the muscularis propria but not into the serosal surface.  16 nodes negative.  Colonoscopy December 2017 negative postop    -12/30/2019 medical oncology office visit: The patient had been on surveillance since surgery December 2017.  CT scans had been negative up until 12/30/2019 CT chest abdomen and pelvis that showed subtle soft tissue density surrounding surgical clips in the right side of the pelvis along with soft tissue nodule at the anterior wall of the bladder concerning for recurrent disease.  Patient sent back to Dr. Davidson for colonoscopy.    -2/7/2020 patient was referred to Dr. Peoples at the [facility], he underwent diagnostic laparoscopic and peritoneal biopsies that confirmed recurrent disease with biopsy of bladder dome nodule showing adenocarcinoma with mucinous features.  Port was placed at this time also.    -2/25/2020 began FOLFIRI    -4/21/2020 patient having increased fatigue, FOLFIRI dose reduced by 10%.        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  Pathologic stage IIa with T3 extension into the base of the appendix through the muscularis propria but not into the serosal surface.  16 nodes negative.  Colonoscopy December 2017 negative postop      12/5/2017 Surgery     Surgery       Procedure:  Appendectomy and right hemicolectomy      Completeness of resection:  No evidence of residual tumor     Pathology revealed invasive moderately differentiated mucinous adenocarcinoma.  Right hemicolectomy: Benign colon and small  intestine no evidence of carcinoma.  16 benign lymph nodes, 0/16, negative for dysplasia or malignancy.  Tumor size approximately 6 cm.  Grade 2, moderately differentiated.  Tumor invades through the muscularis profile into the base of appendix but does not extend to the serosal surface.  All margins uninvolved, no lymphovascular invasion identified.  Pathological stage pT3 pN0.          12/8/2017 Imaging     CT of the chest abdomen and pelvis negative.  Baseline CBC WBC 7100, hemoglobin 11.3, hematocrit 34.8%, platelet count 195,000.      3/20/2018 Procedure     Colonoscopy with Dr. Davidson was normal, recommendation for repeat surveillance colonoscopy in 3 years.      6/11/2018 Imaging     CT chest, abdomen and pelvis with no evidence metastatic disease.  CEA 1.1      9/24/2018 Imaging     CT abdomen pelvis showed no acute changes and nothing to suggest recurrence      12/28/2018 Imaging     CT chest, abdomen and pelvis negative. Normal CBC, CMP and CEA 0.7.      6/28/2019 Imaging     CT chest, abdomen and pelvis: No interval change from prior studies.  No recurrent or metastatic disease detected in the chest, abdomen or pelvis.  No acute process.  CEA 0.9      12/30/2019 Imaging     CT chest, abdomen and pelvis: No disease in the chest.  There was noted a subtle soft tissue density, one surrounding surgical clips in the right side of the pelvis and the other a soft tissue nodule intimately associated with the anterior wall of the bladder, which are considered suspicious for recurrent disease.  CEA 1.1.  CMP with normal creatinine 0.9, total bilirubin 1.9, AST 34, ALT 24, alkaline phosphatase 93.  CBC with WBC 6900, hemoglobin 15.9, platelet count 232,000.        1/21/2020 Procedure     Normal colonoscopy with Dr. Davidson.  He has made referral to Dr. Portillo Peoples at .      2/7/2020 Progression     12/30/2019 CT chest, abdomen and pelvis: No disease in the chest.  There are subtle soft tissue densities (1  "surrounding surgical clips in the right side of the pelvis and the other a soft tissue nodule intimately associated with the anterior wall of the bladder) which are considered suspicious for recurrent disease.  CMP with normal creatinine 0.9, total bilirubin 1.9, AST 34, ALT 24, alkaline phosphatase 93.  CBC with WBC 6900, hemoglobin 15.9, platelet count 232,000.  CEA 1.1.  2/7/2020 diagnostic laparoscopy with peritoneal biopsies performed at the [facility] by Dr. Peoples showed no sign of diffuse peritoneal carcinomatosis or liver metastasis.  There was a firm nodule in the superior dome of the bladder, adherent to omentum, as well as right pelvic sidewall nodule adjacent to surgical clips.  Biopsy of bladder dome nodule showed adenocarcinoma with mucinous features.      2/25/2020 -  Chemotherapy     OP COLORECTAL FOLFIRI Irinotecan / Leucovorin / Fluorouracil         HISTORY OF PRESENT ILLNESS:  The patient is a 64 y.o. male, here for follow up on management of recurrent mucinous adenocarcinoma of the appendix.  Reports that after this last cycle of FOLFIRI he has not bounced back.  He reports on previous cycles he would feel down for up to a week but then during his off week would feel back more to himself.  This last time however he states he never felt better, he has fatigue, and just reports overall \"I just do not feel good\".  Having maybe a little more diarrhea but not excessive and feels it is manageable.  Denies any fevers or chills.  No mouth sores.  No skin issues.  Eating adequately and maintaining his weight.  Has been staying home other than for medical appointments, sheltering in place practicing good social distancing in light of current COVID 19 pandemic.  He has had no unusual shortness of breath or cough, no respiratory concerns, no loss of taste or smell.    Past Medical History:   Diagnosis Date   • Hypertension      History reviewed. No pertinent surgical history.    Allergies " "  Allergen Reactions   • Levofloxacin Unknown - Low Severity   • Penicillins Unknown - Low Severity   • Tetracycline Unknown - Low Severity       Family History and Social History reviewed and changed as necessary      REVIEW OF SYSTEM:   Review of Systems   Constitutional: Negative for appetite change, chills, diaphoresis, fever and unexpected weight change.  Positive for fatigue.  HENT:   Negative for mouth sores, sore throat and trouble swallowing.    Eyes: Negative for icterus.   Respiratory: Negative for cough, hemoptysis and shortness of breath.    Cardiovascular: Negative for chest pain, leg swelling and palpitations.   Gastrointestinal: Negative for abdominal distention, abdominal pain, blood in stool, constipation, nausea and vomiting.  Positive for diarrhea.  Endocrine: Negative for hot flashes.   Genitourinary: Negative for bladder incontinence, difficulty urinating, dysuria, frequency and hematuria.    Musculoskeletal: Negative for gait problem, neck pain and neck stiffness.   Skin: Negative for rash.   Neurological: Negative for dizziness, gait problem, headaches, light-headedness and numbness.   Hematological: Negative for adenopathy. Does not bruise/bleed easily.   Psychiatric/Behavioral: Negative for depression. The patient is not nervous/anxious.    All other systems reviewed and are negative.       PHYSICAL EXAM    Vitals:    20 0855   BP: 137/89   Pulse: 81   Resp: 16   Temp: 97.4 °F (36.3 °C)   TempSrc: Oral   Weight: 71.8 kg (158 lb 3.2 oz)   Height: 185.4 cm (73\")     Vitals:    20 0855   PainSc: 0-No pain        Constitutional: Tall, thin framed gentleman, appears well-developed and well-nourished. No distress.   ECO  HENT:   Head: Normocephalic.   Mouth/Throat: Oropharynx is clear and moist.   Eyes: Conjunctivae are normal. Pupils are equal, round, and reactive to light. No scleral icterus.   Neck: Neck supple. No JVD present. No thyromegaly present.   Cardiovascular: Normal " rate, regular rhythm and normal heart sounds.    Pulmonary/Chest: Breath sounds normal. No respiratory distress.   Abdominal: Soft. Exhibits no distension. There is no tenderness.   Musculoskeletal:Exhibits no edema, tenderness or deformity.   Neurological: Alert and oriented to person, place, and time. Exhibits normal muscle tone.   Skin: No ecchymosis, no petechiae and no rash noted. Not diaphoretic. No cyanosis. Nails show no clubbing.   Psychiatric: Normal mood and affect.   Vitals reviewed.  Labs reviewed.    Lab Results   Component Value Date    HGB 12.2 (L) 04/16/2020    HCT 37.2 (L) 04/16/2020    MCV 87.7 04/16/2020     04/16/2020    WBC 2.82 (L) 04/16/2020    NEUTROABS 1.04 (L) 04/16/2020    LYMPHSABS 1.41 04/16/2020    MONOSABS 0.21 04/16/2020    EOSABS 0.12 04/16/2020    BASOSABS 0.03 04/16/2020       Lab Results   Component Value Date    BUN 12 04/16/2020    CREATININE 0.91 04/16/2020     04/16/2020    K 3.5 04/16/2020     04/16/2020    CO2 25.9 04/16/2020    CALCIUM 8.8 04/16/2020    ALBUMIN 4.20 04/16/2020    BILITOT 0.6 04/16/2020    ALKPHOS 85 04/16/2020    AST 16 04/16/2020    ALT 21 04/16/2020               ASSESSMENT & PLAN:    1. Recurrent mucinous adenocarcinoma of the appendix  2. Fatigue  3. Diarrhea predating chemotherapy, slightly increased back on chemotherapy    Discussion: Counts last week were adequate.  He is having more fatigue stating he does not feel he bounced back after this last cycle as he did on previous.  No specific complaint, is able to do normal activities of daily living but just feels more whooped.  We discussed today delaying 1 week or continuing with 10% dose reduction, he would like to continue on with dose reduction without delay.  I have adjusted today's treatment dosing, I will see him back prior to his next cycle to see if this was better tolerated.  Nausea still under control.  We will get course #5 FOLFIRI today at 10% reduction, plan to  continue until the end of May without Biologics and then repeat scans and consider resection/HI PEC at that junction if this has not spread further distantly.  Diarrhea mildly increased but manageable.  Prior PET denied so we will have to do conventional serial imaging.  Thus far no pandemic side effects, he is practicing good social distancing sheltering in place at home.      I spent a total of 25 minutes in direct patient care, greater than 16  minutes face to face were spent in coordination of care, and counseling the patient regarding current symptoms, management of fatigue, treatment plan with dose reduction versus delay in future plan of care.  Answered any questions patient had regarding medications and plan of care.     Tanya Alvarado, APRN    04/21/2020

## 2020-04-23 ENCOUNTER — INFUSION (OUTPATIENT)
Dept: ONCOLOGY | Facility: HOSPITAL | Age: 65
End: 2020-04-23

## 2020-04-23 VITALS
SYSTOLIC BLOOD PRESSURE: 125 MMHG | HEART RATE: 72 BPM | TEMPERATURE: 98.3 F | BODY MASS INDEX: 20.63 KG/M2 | DIASTOLIC BLOOD PRESSURE: 90 MMHG | RESPIRATION RATE: 16 BRPM | WEIGHT: 156.4 LBS

## 2020-04-23 DIAGNOSIS — Z45.2 ENCOUNTER FOR CARE RELATED TO VASCULAR ACCESS PORT: Primary | ICD-10-CM

## 2020-04-23 DIAGNOSIS — C18.1 MUCINOUS ADENOCARCINOMA OF APPENDIX (HCC): ICD-10-CM

## 2020-04-23 PROCEDURE — 25010000003 HEPARIN LOCK FLUCH PER 10 UNITS: Performed by: INTERNAL MEDICINE

## 2020-04-23 PROCEDURE — 96523 IRRIG DRUG DELIVERY DEVICE: CPT

## 2020-04-23 RX ORDER — HEPARIN SODIUM (PORCINE) LOCK FLUSH IV SOLN 100 UNIT/ML 100 UNIT/ML
500 SOLUTION INTRAVENOUS AS NEEDED
Status: DISCONTINUED | OUTPATIENT
Start: 2020-04-23 | End: 2020-04-23 | Stop reason: HOSPADM

## 2020-04-23 RX ORDER — HEPARIN SODIUM (PORCINE) LOCK FLUSH IV SOLN 100 UNIT/ML 100 UNIT/ML
500 SOLUTION INTRAVENOUS AS NEEDED
Status: CANCELLED | OUTPATIENT
Start: 2020-04-23

## 2020-04-23 RX ADMIN — HEPARIN SODIUM (PORCINE) LOCK FLUSH IV SOLN 100 UNIT/ML 500 UNITS: 100 SOLUTION at 10:15

## 2020-04-30 ENCOUNTER — INFUSION (OUTPATIENT)
Dept: ONCOLOGY | Facility: HOSPITAL | Age: 65
End: 2020-04-30

## 2020-04-30 ENCOUNTER — OFFICE VISIT (OUTPATIENT)
Dept: ONCOLOGY | Facility: CLINIC | Age: 65
End: 2020-04-30

## 2020-04-30 VITALS
WEIGHT: 153.6 LBS | DIASTOLIC BLOOD PRESSURE: 90 MMHG | SYSTOLIC BLOOD PRESSURE: 139 MMHG | OXYGEN SATURATION: 99 % | HEIGHT: 73 IN | TEMPERATURE: 98.7 F | RESPIRATION RATE: 16 BRPM | HEART RATE: 91 BPM | BODY MASS INDEX: 20.36 KG/M2

## 2020-04-30 DIAGNOSIS — T45.1X5A CHEMOTHERAPY INDUCED DIARRHEA: ICD-10-CM

## 2020-04-30 DIAGNOSIS — C18.1 MUCINOUS ADENOCARCINOMA OF APPENDIX (HCC): Primary | ICD-10-CM

## 2020-04-30 DIAGNOSIS — F51.01 PRIMARY INSOMNIA: ICD-10-CM

## 2020-04-30 DIAGNOSIS — Z45.2 ENCOUNTER FOR CARE RELATED TO VASCULAR ACCESS PORT: ICD-10-CM

## 2020-04-30 DIAGNOSIS — K52.1 CHEMOTHERAPY INDUCED DIARRHEA: ICD-10-CM

## 2020-04-30 PROCEDURE — 36591 DRAW BLOOD OFF VENOUS DEVICE: CPT

## 2020-04-30 PROCEDURE — 99214 OFFICE O/P EST MOD 30 MIN: CPT | Performed by: NURSE PRACTITIONER

## 2020-04-30 PROCEDURE — 25010000003 HEPARIN LOCK FLUSH PER 10 UNITS: Performed by: INTERNAL MEDICINE

## 2020-04-30 RX ORDER — HEPARIN SODIUM (PORCINE) LOCK FLUSH IV SOLN 100 UNIT/ML 100 UNIT/ML
500 SOLUTION INTRAVENOUS AS NEEDED
Status: DISCONTINUED | OUTPATIENT
Start: 2020-04-30 | End: 2020-04-30 | Stop reason: HOSPADM

## 2020-04-30 RX ORDER — HEPARIN SODIUM (PORCINE) LOCK FLUSH IV SOLN 100 UNIT/ML 100 UNIT/ML
500 SOLUTION INTRAVENOUS AS NEEDED
Status: CANCELLED | OUTPATIENT
Start: 2020-04-30

## 2020-04-30 RX ORDER — DIPHENOXYLATE HYDROCHLORIDE AND ATROPINE SULFATE 2.5; .025 MG/1; MG/1
1 TABLET ORAL EVERY 6 HOURS PRN
Qty: 30 TABLET | Refills: 5 | Status: SHIPPED | OUTPATIENT
Start: 2020-04-30 | End: 2022-03-24

## 2020-04-30 RX ORDER — TEMAZEPAM 15 MG/1
15 CAPSULE ORAL NIGHTLY PRN
Qty: 30 CAPSULE | Refills: 0 | OUTPATIENT
Start: 2020-04-30 | End: 2020-04-30 | Stop reason: SDUPTHER

## 2020-04-30 RX ORDER — FLUOROURACIL 50 MG/ML
320 INJECTION, SOLUTION INTRAVENOUS ONCE
Status: CANCELLED | OUTPATIENT
Start: 2020-05-19

## 2020-04-30 RX ORDER — FLUOROURACIL 50 MG/ML
320 INJECTION, SOLUTION INTRAVENOUS ONCE
Status: CANCELLED | OUTPATIENT
Start: 2020-05-05

## 2020-04-30 RX ORDER — TEMAZEPAM 15 MG/1
15 CAPSULE ORAL NIGHTLY PRN
Qty: 30 CAPSULE | Refills: 0 | Status: SHIPPED | OUTPATIENT
Start: 2020-04-30 | End: 2022-03-08

## 2020-04-30 RX ADMIN — HEPARIN SODIUM (PORCINE) LOCK FLUSH IV SOLN 100 UNIT/ML 500 UNITS: 100 SOLUTION at 09:50

## 2020-04-30 NOTE — PROGRESS NOTES
CHIEF COMPLAINT:  1.  Fatigue  2.  Diarrhea  3.  Recurrent mucinous adenocarcinoma of the appendix    Problem List:  Oncology/Hematology History    1.  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  Pathologic stage IIa with T3 extension into the base of the appendix through the muscularis propria but not into the serosal surface.  16 nodes negative.  Colonoscopy December 2017 negative postop    -12/30/2019 medical oncology office visit: The patient had been on surveillance since surgery December 2017.  CT scans had been negative up until 12/30/2019 CT chest abdomen and pelvis that showed subtle soft tissue density surrounding surgical clips in the right side of the pelvis along with soft tissue nodule at the anterior wall of the bladder concerning for recurrent disease.  Patient sent back to Dr. Davidson for colonoscopy.    -2/7/2020 patient was referred to Dr. Peoples at the [facility], he underwent diagnostic laparoscopic and peritoneal biopsies that confirmed recurrent disease with biopsy of bladder dome nodule showing adenocarcinoma with mucinous features.  Port was placed at this time also.    -2/25/2020 began FOLFIRI    -4/21/2020 patient having increased fatigue, FOLFIRI dose reduced by 10%.        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  Pathologic stage IIa with T3 extension into the base of the appendix through the muscularis propria but not into the serosal surface.  16 nodes negative.  Colonoscopy December 2017 negative postop      12/5/2017 Surgery     Surgery       Procedure:  Appendectomy and right hemicolectomy      Completeness of resection:  No evidence of residual tumor     Pathology revealed invasive moderately differentiated mucinous adenocarcinoma.  Right hemicolectomy: Benign  colon and small intestine no evidence of carcinoma.  16 benign lymph nodes, 0/16, negative for dysplasia or malignancy.  Tumor size approximately 6 cm.  Grade 2, moderately differentiated.  Tumor invades through the muscularis profile into the base of appendix but does not extend to the serosal surface.  All margins uninvolved, no lymphovascular invasion identified.  Pathological stage pT3 pN0.          12/8/2017 Imaging     CT of the chest abdomen and pelvis negative.  Baseline CBC WBC 7100, hemoglobin 11.3, hematocrit 34.8%, platelet count 195,000.      3/20/2018 Procedure     Colonoscopy with Dr. Davidson was normal, recommendation for repeat surveillance colonoscopy in 3 years.      6/11/2018 Imaging     CT chest, abdomen and pelvis with no evidence metastatic disease.  CEA 1.1      9/24/2018 Imaging     CT abdomen pelvis showed no acute changes and nothing to suggest recurrence      12/28/2018 Imaging     CT chest, abdomen and pelvis negative. Normal CBC, CMP and CEA 0.7.      6/28/2019 Imaging     CT chest, abdomen and pelvis: No interval change from prior studies.  No recurrent or metastatic disease detected in the chest, abdomen or pelvis.  No acute process.  CEA 0.9      12/30/2019 Imaging     CT chest, abdomen and pelvis: No disease in the chest.  There was noted a subtle soft tissue density, one surrounding surgical clips in the right side of the pelvis and the other a soft tissue nodule intimately associated with the anterior wall of the bladder, which are considered suspicious for recurrent disease.  CEA 1.1.  CMP with normal creatinine 0.9, total bilirubin 1.9, AST 34, ALT 24, alkaline phosphatase 93.  CBC with WBC 6900, hemoglobin 15.9, platelet count 232,000.        1/21/2020 Procedure     Normal colonoscopy with Dr. Davidson.  He has made referral to Dr. Portillo Peoples at .      2/7/2020 Progression     12/30/2019 CT chest, abdomen and pelvis: No disease in the chest.  There are subtle soft tissue  "densities (1 surrounding surgical clips in the right side of the pelvis and the other a soft tissue nodule intimately associated with the anterior wall of the bladder) which are considered suspicious for recurrent disease.  CMP with normal creatinine 0.9, total bilirubin 1.9, AST 34, ALT 24, alkaline phosphatase 93.  CBC with WBC 6900, hemoglobin 15.9, platelet count 232,000.  CEA 1.1.  2/7/2020 diagnostic laparoscopy with peritoneal biopsies performed at the [facility] by Dr. Peoples showed no sign of diffuse peritoneal carcinomatosis or liver metastasis.  There was a firm nodule in the superior dome of the bladder, adherent to omentum, as well as right pelvic sidewall nodule adjacent to surgical clips.  Biopsy of bladder dome nodule showed adenocarcinoma with mucinous features.      2/25/2020 -  Chemotherapy     OP COLORECTAL FOLFIRI Irinotecan / Leucovorin / Fluorouracil         HISTORY OF PRESENT ILLNESS:  The patient is a 64 y.o. male, here for follow up on management of recurrent mucinous adenocarcinoma of the appendix.  Reports that after this last cycle of FOLFIRI he still felt poorly even with dose reduction of 10%.  He states that he felt okay the first week after his treatment up until earlier this week, on Monday and Tuesday he feels like he hit rock bottom.  He states he had no energy whatsoever and the only way he can put it is \"I just felt as bad as I have ever felt\".  Having more diarrhea takes Imodium but not every day as he states he feels if he takes it 1 day he \"pays for it the next\".  Has not tried Lomotil.  Denies any fevers or chills.  No mouth sores.  No skin issues.  Has lost a few pounds, reports his appetite is decreased.  Has been staying home other than for medical appointments, sheltering in place practicing good social distancing in light of current COVID 19 pandemic.  He has had no unusual shortness of breath or cough, no respiratory concerns, no loss of taste or smell.  " "Reports that he has had insomnia.    Past Medical History:   Diagnosis Date   • Hypertension      History reviewed. No pertinent surgical history.    Allergies   Allergen Reactions   • Levofloxacin Unknown - Low Severity   • Penicillins Unknown - Low Severity   • Tetracycline Unknown - Low Severity       Family History and Social History reviewed and changed as necessary      REVIEW OF SYSTEM:   Review of Systems   Constitutional: Negative for appetite change, chills, diaphoresis, fever and unexpected weight change.  Positive for fatigue.  Positive for insomnia.  HENT:   Negative for mouth sores, sore throat and trouble swallowing.    Eyes: Negative for icterus.   Respiratory: Negative for cough, hemoptysis and shortness of breath.    Cardiovascular: Negative for chest pain, leg swelling and palpitations.   Gastrointestinal: Negative for abdominal distention, abdominal pain, blood in stool, constipation, nausea and vomiting.  Positive for diarrhea.  Endocrine: Negative for hot flashes.   Genitourinary: Negative for bladder incontinence, difficulty urinating, dysuria, frequency and hematuria.    Musculoskeletal: Negative for gait problem, neck pain and neck stiffness.   Skin: Negative for rash.   Neurological: Negative for dizziness, gait problem, headaches, light-headedness and numbness.   Hematological: Negative for adenopathy. Does not bruise/bleed easily.   Psychiatric/Behavioral: Negative for depression. The patient is not nervous/anxious.    All other systems reviewed and are negative.       PHYSICAL EXAM    Vitals:    20   BP: 139/90   Pulse: 91   Resp: 16   Temp: 98.7 °F (37.1 °C)   TempSrc: Temporal   SpO2: 99%   Weight: 69.7 kg (153 lb 9.6 oz)   Height: 185.4 cm (73\")     Vitals:    20   PainSc: 0-No pain        Constitutional: Tall, thin framed gentleman, appears well-developed and well-nourished. No distress.   ECO  HENT:   Head: Normocephalic.   Mouth/Throat: Oropharynx is clear " and moist.   Eyes: Conjunctivae are normal. Pupils are equal, round, and reactive to light. No scleral icterus.   Neck: Neck supple. No JVD present. No thyromegaly present.   Cardiovascular: Normal rate, regular rhythm and normal heart sounds.    Pulmonary/Chest: Breath sounds normal. No respiratory distress.   Abdominal: Soft. Exhibits no distension. There is no tenderness.   Musculoskeletal:Exhibits no edema, tenderness or deformity.   Neurological: Alert and oriented to person, place, and time. Exhibits normal muscle tone.   Skin: No ecchymosis, no petechiae and no rash noted. Not diaphoretic. No cyanosis. Nails show no clubbing.   Psychiatric: Normal mood and affect.   Vitals reviewed.  Labs reviewed.    Lab Results   Component Value Date    HGB 12.2 (L) 04/16/2020    HCT 37.2 (L) 04/16/2020    MCV 87.7 04/16/2020     04/16/2020    WBC 2.82 (L) 04/16/2020    NEUTROABS 1.04 (L) 04/16/2020    LYMPHSABS 1.41 04/16/2020    MONOSABS 0.21 04/16/2020    EOSABS 0.12 04/16/2020    BASOSABS 0.03 04/16/2020       Lab Results   Component Value Date    BUN 12 04/16/2020    CREATININE 0.91 04/16/2020     04/16/2020    K 3.5 04/16/2020     04/16/2020    CO2 25.9 04/16/2020    CALCIUM 8.8 04/16/2020    ALBUMIN 4.20 04/16/2020    BILITOT 0.6 04/16/2020    ALKPHOS 85 04/16/2020    AST 16 04/16/2020    ALT 21 04/16/2020               ASSESSMENT & PLAN:    1. Recurrent mucinous adenocarcinoma of the appendix  2. Fatigue  3. Diarrhea predating chemotherapy, increased back on chemotherapy  4. Insomnia    Discussion: He continues to have more fatigue stating in trouble bouncing back after each cycle.  I reduced his treatment last week by 10%, I will further reduce now to 20% reduction.  For his diarrhea we will try Lomotil instead of Imodium.  Prescription was sent electronically, I also sent a prescription for Restoril 15 mg at night for insomnia.  I will see him back prior to his next cycle to see if this was  better tolerated.  If he tolerates then I will give him 1 more treatment in 2 weeks, if he does not tolerate then we will go ahead and get scans.    Plan is to consider resection/HI PEC if this has not spread further distantly.  Prior PET denied so we will have to do conventional serial imaging.  Thus far no pandemic side effects, he is practicing good social distancing sheltering in place at home.  Plan of care today was also discussed with Dr. Cy Delcid via telephone.    I spent a total of 26 minutes in direct patient care, greater than 17  minutes face to face were spent in coordination of care, and counseling the patient regarding current symptoms, management of fatigue, treatment plan with dose reduction versus delay and future plan of care, management of symptoms with diarrhea and insomnia and prescription management for same.  Answered any questions patient had regarding medications and plan of care.     Tanya Alvarado, APRN    04/30/2020

## 2020-05-01 LAB
ALBUMIN SERPL-MCNC: 4.3 G/DL (ref 3.5–5.2)
ALBUMIN/GLOB SERPL: 1.7 G/DL
ALP SERPL-CCNC: 88 U/L (ref 39–117)
ALT SERPL-CCNC: 20 U/L (ref 1–41)
AST SERPL-CCNC: 19 U/L (ref 1–40)
BASOPHILS # BLD AUTO: 0.03 10*3/MM3 (ref 0–0.2)
BASOPHILS NFR BLD AUTO: 0.8 % (ref 0–1.5)
BILIRUB SERPL-MCNC: 0.7 MG/DL (ref 0.2–1.2)
BUN SERPL-MCNC: 11 MG/DL (ref 8–23)
BUN/CREAT SERPL: 13.4 (ref 7–25)
CALCIUM SERPL-MCNC: 9 MG/DL (ref 8.6–10.5)
CHLORIDE SERPL-SCNC: 100 MMOL/L (ref 98–107)
CO2 SERPL-SCNC: 26.3 MMOL/L (ref 22–29)
CREAT SERPL-MCNC: 0.82 MG/DL (ref 0.76–1.27)
EOSINOPHIL # BLD AUTO: 0.06 10*3/MM3 (ref 0–0.4)
EOSINOPHIL NFR BLD AUTO: 1.5 % (ref 0.3–6.2)
ERYTHROCYTE [DISTWIDTH] IN BLOOD BY AUTOMATED COUNT: 14.2 % (ref 12.3–15.4)
GLOBULIN SER CALC-MCNC: 2.5 GM/DL
GLUCOSE SERPL-MCNC: 86 MG/DL (ref 65–99)
HCT VFR BLD AUTO: 36.4 % (ref 37.5–51)
HGB BLD-MCNC: 12.1 G/DL (ref 13–17.7)
IMM GRANULOCYTES # BLD AUTO: 0.01 10*3/MM3 (ref 0–0.05)
IMM GRANULOCYTES NFR BLD AUTO: 0.3 % (ref 0–0.5)
LYMPHOCYTES # BLD AUTO: 1.54 10*3/MM3 (ref 0.7–3.1)
LYMPHOCYTES NFR BLD AUTO: 38.5 % (ref 19.6–45.3)
MCH RBC QN AUTO: 28.9 PG (ref 26.6–33)
MCHC RBC AUTO-ENTMCNC: 33.2 G/DL (ref 31.5–35.7)
MCV RBC AUTO: 87.1 FL (ref 79–97)
MONOCYTES # BLD AUTO: 0.39 10*3/MM3 (ref 0.1–0.9)
MONOCYTES NFR BLD AUTO: 9.8 % (ref 5–12)
NEUTROPHILS # BLD AUTO: 1.97 10*3/MM3 (ref 1.7–7)
NEUTROPHILS NFR BLD AUTO: 49.1 % (ref 42.7–76)
NRBC BLD AUTO-RTO: 0 /100 WBC (ref 0–0.2)
PLATELET # BLD AUTO: 245 10*3/MM3 (ref 140–450)
POTASSIUM SERPL-SCNC: 3.5 MMOL/L (ref 3.5–5.2)
PROT SERPL-MCNC: 6.8 G/DL (ref 6–8.5)
RBC # BLD AUTO: 4.18 10*6/MM3 (ref 4.14–5.8)
SODIUM SERPL-SCNC: 140 MMOL/L (ref 136–145)
WBC # BLD AUTO: 4 10*3/MM3 (ref 3.4–10.8)

## 2020-05-04 RX ORDER — FLUOROURACIL 50 MG/ML
320 INJECTION, SOLUTION INTRAVENOUS ONCE
Status: COMPLETED | OUTPATIENT
Start: 2020-05-05 | End: 2020-05-05

## 2020-05-04 RX ORDER — SODIUM CHLORIDE 9 MG/ML
250 INJECTION, SOLUTION INTRAVENOUS ONCE
Status: COMPLETED | OUTPATIENT
Start: 2020-05-05 | End: 2020-05-05

## 2020-05-04 RX ORDER — ATROPINE SULFATE 1 MG/ML
0.25 INJECTION, SOLUTION INTRAMUSCULAR; INTRAVENOUS; SUBCUTANEOUS
Status: DISCONTINUED | OUTPATIENT
Start: 2020-05-05 | End: 2020-05-05 | Stop reason: HOSPADM

## 2020-05-04 RX ORDER — PALONOSETRON 0.05 MG/ML
0.25 INJECTION, SOLUTION INTRAVENOUS ONCE
Status: COMPLETED | OUTPATIENT
Start: 2020-05-05 | End: 2020-05-05

## 2020-05-05 ENCOUNTER — INFUSION (OUTPATIENT)
Dept: ONCOLOGY | Facility: HOSPITAL | Age: 65
End: 2020-05-05

## 2020-05-05 VITALS
TEMPERATURE: 98.2 F | RESPIRATION RATE: 16 BRPM | BODY MASS INDEX: 20.69 KG/M2 | DIASTOLIC BLOOD PRESSURE: 87 MMHG | HEART RATE: 75 BPM | SYSTOLIC BLOOD PRESSURE: 138 MMHG | WEIGHT: 156.8 LBS

## 2020-05-05 DIAGNOSIS — C18.1 MUCINOUS ADENOCARCINOMA OF APPENDIX (HCC): Primary | ICD-10-CM

## 2020-05-05 PROCEDURE — 25010000002 PALONOSETRON PER 25 MCG: Performed by: NURSE PRACTITIONER

## 2020-05-05 PROCEDURE — 96368 THER/DIAG CONCURRENT INF: CPT

## 2020-05-05 PROCEDURE — 25010000002 LEUCOVORIN CALCIUM PER 50 MG: Performed by: NURSE PRACTITIONER

## 2020-05-05 PROCEDURE — 25010000002 ATROPINE PER 0.01 MG: Performed by: NURSE PRACTITIONER

## 2020-05-05 PROCEDURE — 25010000002 IRINOTECAN PER 20 MG: Performed by: NURSE PRACTITIONER

## 2020-05-05 PROCEDURE — 96413 CHEMO IV INFUSION 1 HR: CPT

## 2020-05-05 PROCEDURE — 96416 CHEMO PROLONG INFUSE W/PUMP: CPT

## 2020-05-05 PROCEDURE — 96411 CHEMO IV PUSH ADDL DRUG: CPT

## 2020-05-05 PROCEDURE — 25010000002 FLUOROURACIL PER 500 MG: Performed by: NURSE PRACTITIONER

## 2020-05-05 PROCEDURE — 25010000002 LEUCOVORIN 200 MG RECONSTITUTED SOLUTION 200 MG VIAL: Performed by: NURSE PRACTITIONER

## 2020-05-05 PROCEDURE — 96415 CHEMO IV INFUSION ADDL HR: CPT

## 2020-05-05 PROCEDURE — 25010000002 DEXAMETHASONE SODIUM PHOSPHATE 100 MG/10ML SOLUTION 10 ML VIAL: Performed by: NURSE PRACTITIONER

## 2020-05-05 PROCEDURE — 96375 TX/PRO/DX INJ NEW DRUG ADDON: CPT

## 2020-05-05 RX ADMIN — FLUOROURACIL 3500 MG: 50 INJECTION, SOLUTION INTRAVENOUS at 11:40

## 2020-05-05 RX ADMIN — SODIUM CHLORIDE 250 ML: 9 INJECTION, SOLUTION INTRAVENOUS at 09:14

## 2020-05-05 RX ADMIN — IRINOTECAN HYDROCHLORIDE 260 MG: 20 INJECTION, SOLUTION INTRAVENOUS at 09:42

## 2020-05-05 RX ADMIN — PALONOSETRON HYDROCHLORIDE 0.25 MG: 0.25 INJECTION INTRAVENOUS at 09:14

## 2020-05-05 RX ADMIN — DEXAMETHASONE SODIUM PHOSPHATE 12 MG: 10 INJECTION, SOLUTION INTRAMUSCULAR; INTRAVENOUS at 09:19

## 2020-05-05 RX ADMIN — ATROPINE SULFATE 0.25 MG: 1 INJECTION, SOLUTION INTRAMUSCULAR; INTRAVENOUS; SUBCUTANEOUS at 09:37

## 2020-05-05 RX ADMIN — LEUCOVORIN CALCIUM 610 MG: 100 INJECTION, POWDER, LYOPHILIZED, FOR SOLUTION INTRAMUSCULAR; INTRAVENOUS at 09:41

## 2020-05-05 RX ADMIN — FLUOROURACIL 610 MG: 50 INJECTION, SOLUTION INTRAVENOUS at 11:33

## 2020-05-07 ENCOUNTER — INFUSION (OUTPATIENT)
Dept: ONCOLOGY | Facility: HOSPITAL | Age: 65
End: 2020-05-07

## 2020-05-07 VITALS
DIASTOLIC BLOOD PRESSURE: 87 MMHG | BODY MASS INDEX: 20.56 KG/M2 | SYSTOLIC BLOOD PRESSURE: 139 MMHG | WEIGHT: 155.8 LBS | TEMPERATURE: 98.5 F | RESPIRATION RATE: 17 BRPM | HEART RATE: 79 BPM

## 2020-05-07 DIAGNOSIS — Z45.2 ENCOUNTER FOR CARE RELATED TO VASCULAR ACCESS PORT: Primary | ICD-10-CM

## 2020-05-07 DIAGNOSIS — C18.1 MUCINOUS ADENOCARCINOMA OF APPENDIX (HCC): ICD-10-CM

## 2020-05-07 PROCEDURE — 25010000003 HEPARIN LOCK FLUSH PER 10 UNITS: Performed by: INTERNAL MEDICINE

## 2020-05-07 PROCEDURE — 96523 IRRIG DRUG DELIVERY DEVICE: CPT

## 2020-05-07 RX ORDER — HEPARIN SODIUM (PORCINE) LOCK FLUSH IV SOLN 100 UNIT/ML 100 UNIT/ML
500 SOLUTION INTRAVENOUS AS NEEDED
Status: DISCONTINUED | OUTPATIENT
Start: 2020-05-07 | End: 2020-05-07 | Stop reason: HOSPADM

## 2020-05-07 RX ORDER — HEPARIN SODIUM (PORCINE) LOCK FLUSH IV SOLN 100 UNIT/ML 100 UNIT/ML
500 SOLUTION INTRAVENOUS AS NEEDED
Status: CANCELLED | OUTPATIENT
Start: 2020-05-07

## 2020-05-07 RX ADMIN — HEPARIN 500 UNITS: 100 SYRINGE at 09:30

## 2020-05-14 ENCOUNTER — INFUSION (OUTPATIENT)
Dept: ONCOLOGY | Facility: HOSPITAL | Age: 65
End: 2020-05-14

## 2020-05-14 ENCOUNTER — OFFICE VISIT (OUTPATIENT)
Dept: ONCOLOGY | Facility: CLINIC | Age: 65
End: 2020-05-14

## 2020-05-14 VITALS
SYSTOLIC BLOOD PRESSURE: 122 MMHG | BODY MASS INDEX: 20.44 KG/M2 | RESPIRATION RATE: 16 BRPM | DIASTOLIC BLOOD PRESSURE: 88 MMHG | TEMPERATURE: 99.2 F | HEART RATE: 79 BPM | HEIGHT: 73 IN | OXYGEN SATURATION: 98 % | WEIGHT: 154.2 LBS

## 2020-05-14 DIAGNOSIS — C18.1 MUCINOUS ADENOCARCINOMA OF APPENDIX (HCC): Primary | ICD-10-CM

## 2020-05-14 DIAGNOSIS — C48.2 PERITONEAL CARCINOMA (HCC): ICD-10-CM

## 2020-05-14 DIAGNOSIS — Z45.2 ENCOUNTER FOR CARE RELATED TO VASCULAR ACCESS PORT: ICD-10-CM

## 2020-05-14 PROCEDURE — 25010000003 HEPARIN LOCK FLUSH PER 10 UNITS: Performed by: INTERNAL MEDICINE

## 2020-05-14 PROCEDURE — 36591 DRAW BLOOD OFF VENOUS DEVICE: CPT

## 2020-05-14 PROCEDURE — 99213 OFFICE O/P EST LOW 20 MIN: CPT | Performed by: NURSE PRACTITIONER

## 2020-05-14 RX ORDER — FLUOROURACIL 50 MG/ML
320 INJECTION, SOLUTION INTRAVENOUS ONCE
OUTPATIENT
Start: 2020-06-09

## 2020-05-14 RX ORDER — PALONOSETRON 0.05 MG/ML
0.25 INJECTION, SOLUTION INTRAVENOUS ONCE
OUTPATIENT
Start: 2020-06-09

## 2020-05-14 RX ORDER — ATROPINE SULFATE 1 MG/ML
0.25 INJECTION, SOLUTION INTRAMUSCULAR; INTRAVENOUS; SUBCUTANEOUS
OUTPATIENT
Start: 2020-06-09

## 2020-05-14 RX ORDER — HEPARIN SODIUM (PORCINE) LOCK FLUSH IV SOLN 100 UNIT/ML 100 UNIT/ML
500 SOLUTION INTRAVENOUS AS NEEDED
Status: DISCONTINUED | OUTPATIENT
Start: 2020-05-14 | End: 2020-05-14 | Stop reason: HOSPADM

## 2020-05-14 RX ORDER — HEPARIN SODIUM (PORCINE) LOCK FLUSH IV SOLN 100 UNIT/ML 100 UNIT/ML
500 SOLUTION INTRAVENOUS AS NEEDED
Status: CANCELLED | OUTPATIENT
Start: 2020-05-14

## 2020-05-14 RX ORDER — SODIUM CHLORIDE 9 MG/ML
250 INJECTION, SOLUTION INTRAVENOUS ONCE
OUTPATIENT
Start: 2020-06-09

## 2020-05-14 RX ADMIN — HEPARIN 500 UNITS: 100 SYRINGE at 11:04

## 2020-05-14 NOTE — PROGRESS NOTES
CHIEF COMPLAINT:  1.  Fatigue  2.  Diarrhea  3.  Recurrent mucinous adenocarcinoma of the appendix    Problem List:  Oncology/Hematology History    1.  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  Pathologic stage IIa with T3 extension into the base of the appendix through the muscularis propria but not into the serosal surface.  16 nodes negative.  Colonoscopy December 2017 negative postop    -12/30/2019 medical oncology office visit: The patient had been on surveillance since surgery December 2017.  CT scans had been negative up until 12/30/2019 CT chest abdomen and pelvis that showed subtle soft tissue density surrounding surgical clips in the right side of the pelvis along with soft tissue nodule at the anterior wall of the bladder concerning for recurrent disease.  Patient sent back to Dr. Davidson for colonoscopy.    -2/7/2020 patient was referred to Dr. Peoples at the [facility], he underwent diagnostic laparoscopic and peritoneal biopsies that confirmed recurrent disease with biopsy of bladder dome nodule showing adenocarcinoma with mucinous features.  Port was placed at this time also.    -2/25/2020 began FOLFIRI    -4/21/2020 patient having increased fatigue, FOLFIRI dose reduced by 10%.        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  Pathologic stage IIa with T3 extension into the base of the appendix through the muscularis propria but not into the serosal surface.  16 nodes negative.  Colonoscopy December 2017 negative postop      12/5/2017 Surgery     Surgery       Procedure:  Appendectomy and right hemicolectomy      Completeness of resection:  No evidence of residual tumor     Pathology revealed invasive moderately differentiated mucinous adenocarcinoma.  Right hemicolectomy: Benign  colon and small intestine no evidence of carcinoma.  16 benign lymph nodes, 0/16, negative for dysplasia or malignancy.  Tumor size approximately 6 cm.  Grade 2, moderately differentiated.  Tumor invades through the muscularis profile into the base of appendix but does not extend to the serosal surface.  All margins uninvolved, no lymphovascular invasion identified.  Pathological stage pT3 pN0.          12/8/2017 Imaging     CT of the chest abdomen and pelvis negative.  Baseline CBC WBC 7100, hemoglobin 11.3, hematocrit 34.8%, platelet count 195,000.      3/20/2018 Procedure     Colonoscopy with Dr. Davidson was normal, recommendation for repeat surveillance colonoscopy in 3 years.      6/11/2018 Imaging     CT chest, abdomen and pelvis with no evidence metastatic disease.  CEA 1.1      9/24/2018 Imaging     CT abdomen pelvis showed no acute changes and nothing to suggest recurrence      12/28/2018 Imaging     CT chest, abdomen and pelvis negative. Normal CBC, CMP and CEA 0.7.      6/28/2019 Imaging     CT chest, abdomen and pelvis: No interval change from prior studies.  No recurrent or metastatic disease detected in the chest, abdomen or pelvis.  No acute process.  CEA 0.9      12/30/2019 Imaging     CT chest, abdomen and pelvis: No disease in the chest.  There was noted a subtle soft tissue density, one surrounding surgical clips in the right side of the pelvis and the other a soft tissue nodule intimately associated with the anterior wall of the bladder, which are considered suspicious for recurrent disease.  CEA 1.1.  CMP with normal creatinine 0.9, total bilirubin 1.9, AST 34, ALT 24, alkaline phosphatase 93.  CBC with WBC 6900, hemoglobin 15.9, platelet count 232,000.        1/21/2020 Procedure     Normal colonoscopy with Dr. Davidson.  He has made referral to Dr. Portillo Peoples at .      2/7/2020 Progression     12/30/2019 CT chest, abdomen and pelvis: No disease in the chest.  There are subtle soft tissue  densities (1 surrounding surgical clips in the right side of the pelvis and the other a soft tissue nodule intimately associated with the anterior wall of the bladder) which are considered suspicious for recurrent disease.  CMP with normal creatinine 0.9, total bilirubin 1.9, AST 34, ALT 24, alkaline phosphatase 93.  CBC with WBC 6900, hemoglobin 15.9, platelet count 232,000.  CEA 1.1.  2/7/2020 diagnostic laparoscopy with peritoneal biopsies performed at the [facility] by Dr. Peoples showed no sign of diffuse peritoneal carcinomatosis or liver metastasis.  There was a firm nodule in the superior dome of the bladder, adherent to omentum, as well as right pelvic sidewall nodule adjacent to surgical clips.  Biopsy of bladder dome nodule showed adenocarcinoma with mucinous features.      2/25/2020 -  Chemotherapy     OP COLORECTAL FOLFIRI Irinotecan / Leucovorin / Fluorouracil         HISTORY OF PRESENT ILLNESS:  The patient is a 64 y.o. male, here for follow up on management of recurrent mucinous adenocarcinoma of the appendix.  With dose reduction of FOLFIRI he still felt poorly for several days but feels overall he tolerated fairly well, certainly no worse.  Took Lomotil this time for diarrhea and states that it does work however he fears taking it every day as he is afraid it will cause him constipation.  Denies any fevers or chills.  No mouth sores.  No skin issues.  Has tried to increase his caloric intake, weight overall stable with just a 2 pound weight loss.  Has been staying home other than for medical appointments, sheltering in place practicing good social distancing in light of current COVID 19 pandemic.  He has had no unusual shortness of breath or cough, no respiratory concerns, no loss of taste or smell.  Previously had reported insomnia, I had sent in a prescription for Restoril, he states he is taking it on a few occasions and it helped to some degree, does not take it every night.  Had no  "adverse reaction.    Past Medical History:   Diagnosis Date   • Hypertension      History reviewed. No pertinent surgical history.    Allergies   Allergen Reactions   • Levofloxacin Unknown - Low Severity   • Penicillins Unknown - Low Severity   • Tetracycline Unknown - Low Severity       Family History and Social History reviewed and changed as necessary      REVIEW OF SYSTEM:   Review of Systems   Constitutional: Negative for appetite change, chills, diaphoresis, fever and unexpected weight change.  Positive for fatigue.    HENT:   Negative for mouth sores, sore throat and trouble swallowing.    Eyes: Negative for icterus.   Respiratory: Negative for cough, hemoptysis and shortness of breath.    Cardiovascular: Negative for chest pain, leg swelling and palpitations.   Gastrointestinal: Negative for abdominal distention, abdominal pain, blood in stool, constipation, nausea and vomiting.  Positive for diarrhea.  Endocrine: Negative for hot flashes.   Genitourinary: Negative for bladder incontinence, difficulty urinating, dysuria, frequency and hematuria.    Musculoskeletal: Negative for gait problem, neck pain and neck stiffness.   Skin: Negative for rash.   Neurological: Negative for dizziness, gait problem, headaches, light-headedness and numbness.   Hematological: Negative for adenopathy. Does not bruise/bleed easily.   Psychiatric/Behavioral: Negative for depression. The patient is not nervous/anxious.    All other systems reviewed and are negative.       PHYSICAL EXAM    Vitals:    20 1029   BP: 122/88   Pulse: 79   Resp: 16   Temp: 99.2 °F (37.3 °C)   TempSrc: Temporal   SpO2: 98%   Weight: 69.9 kg (154 lb 3.2 oz)   Height: 185.4 cm (73\")     Vitals:    20 1029   PainSc: 0-No pain        Constitutional: Tall, thin framed gentleman, appears well-developed and well-nourished. No distress.   ECO  HENT:   Head: Normocephalic.   Mouth/Throat: Oropharynx is clear and moist.   Eyes: Conjunctivae are " normal. Pupils are equal, round, and reactive to light. No scleral icterus.   Neck: Neck supple. No JVD present. No thyromegaly present.   Cardiovascular: Normal rate, regular rhythm and normal heart sounds.    Pulmonary/Chest: Breath sounds normal. No respiratory distress.   Abdominal: Soft. Exhibits no distension. There is no tenderness.   Musculoskeletal:Exhibits no edema, tenderness or deformity.   Neurological: Alert and oriented to person, place, and time. Exhibits normal muscle tone.   Skin: No ecchymosis, no petechiae and no rash noted. Not diaphoretic. No cyanosis. Nails show no clubbing.   Psychiatric: Normal mood and affect.   Vitals reviewed.  Labs reviewed.    Lab Results   Component Value Date    HGB 12.1 (L) 04/30/2020    HCT 36.4 (L) 04/30/2020    MCV 87.1 04/30/2020     04/30/2020    WBC 4.00 04/30/2020    NEUTROABS 1.97 04/30/2020    LYMPHSABS 1.54 04/30/2020    MONOSABS 0.39 04/30/2020    EOSABS 0.06 04/30/2020    BASOSABS 0.03 04/30/2020       Lab Results   Component Value Date    BUN 11 04/30/2020    CREATININE 0.82 04/30/2020     04/30/2020    K 3.5 04/30/2020     04/30/2020    CO2 26.3 04/30/2020    CALCIUM 9.0 04/30/2020    ALBUMIN 4.30 04/30/2020    BILITOT 0.7 04/30/2020    ALKPHOS 88 04/30/2020    AST 19 04/30/2020    ALT 20 04/30/2020         ASSESSMENT & PLAN:    1. Recurrent mucinous adenocarcinoma of the appendix  2. Fatigue  3. Diarrhea predating chemotherapy, increased back on chemotherapy  4. Insomnia    Discussion: With the 20% dose reduction and FOLFIRI he seems to be tolerating fairly well, still has fatigue and diarrhea but not worsening.  He did try Lomotil for diarrhea and states it does help however he is not committed to taking it daily as he is afraid then it will constipate him.  He states his diarrhea is manageable.  We will continue today with treatment unchanged at the 20% dose reduction.  Will repeat CT chest, abdomen and pelvis prior to return for  restaging.  We will see him back in 2 weeks for follow-up to go over the scan results.  I will not schedule him for treatment that day as he wants to discuss the results of the scan and plan of care with Dr. Delcid and in light of all the fatigue he is experiencing I think a week off will do him good if we are to continue with therapy.  Plan is to consider resection/HI PEC if this has not spread further distantly.    The Restoril helped with his insomnia, he had no adverse effects and only takes it occasionally.    I spent a total of 15 minutes in direct patient care, greater than 8  minutes face to face, time was spent in coordination of care, and counseling the patient regarding current symptom management, evaluation of response to therapy for insomnia, chemotherapy treatment effects and plan of care going forward with upcoming scans.  Answered any questions patient had regarding medications and plan of care.     Tanya Alvarado, APRN    05/14/2020

## 2020-05-15 LAB
ALBUMIN SERPL-MCNC: 4.2 G/DL (ref 3.5–5.2)
ALBUMIN/GLOB SERPL: 1.8 G/DL
ALP SERPL-CCNC: 81 U/L (ref 39–117)
ALT SERPL-CCNC: 20 U/L (ref 1–41)
AST SERPL-CCNC: 16 U/L (ref 1–40)
BASOPHILS # BLD AUTO: 0.02 10*3/MM3 (ref 0–0.2)
BASOPHILS NFR BLD AUTO: 0.5 % (ref 0–1.5)
BILIRUB SERPL-MCNC: 0.8 MG/DL (ref 0.2–1.2)
BUN SERPL-MCNC: 13 MG/DL (ref 8–23)
BUN/CREAT SERPL: 15.9 (ref 7–25)
CALCIUM SERPL-MCNC: 9.2 MG/DL (ref 8.6–10.5)
CHLORIDE SERPL-SCNC: 102 MMOL/L (ref 98–107)
CO2 SERPL-SCNC: 25.1 MMOL/L (ref 22–29)
CREAT SERPL-MCNC: 0.82 MG/DL (ref 0.76–1.27)
EOSINOPHIL # BLD AUTO: 0.08 10*3/MM3 (ref 0–0.4)
EOSINOPHIL NFR BLD AUTO: 1.9 % (ref 0.3–6.2)
ERYTHROCYTE [DISTWIDTH] IN BLOOD BY AUTOMATED COUNT: 14.6 % (ref 12.3–15.4)
GLOBULIN SER CALC-MCNC: 2.3 GM/DL
GLUCOSE SERPL-MCNC: 84 MG/DL (ref 65–99)
HCT VFR BLD AUTO: 34.7 % (ref 37.5–51)
HGB BLD-MCNC: 11.5 G/DL (ref 13–17.7)
IMM GRANULOCYTES # BLD AUTO: 0.01 10*3/MM3 (ref 0–0.05)
IMM GRANULOCYTES NFR BLD AUTO: 0.2 % (ref 0–0.5)
LYMPHOCYTES # BLD AUTO: 1.43 10*3/MM3 (ref 0.7–3.1)
LYMPHOCYTES NFR BLD AUTO: 33.3 % (ref 19.6–45.3)
MCH RBC QN AUTO: 29.6 PG (ref 26.6–33)
MCHC RBC AUTO-ENTMCNC: 33.1 G/DL (ref 31.5–35.7)
MCV RBC AUTO: 89.4 FL (ref 79–97)
MONOCYTES # BLD AUTO: 0.4 10*3/MM3 (ref 0.1–0.9)
MONOCYTES NFR BLD AUTO: 9.3 % (ref 5–12)
NEUTROPHILS # BLD AUTO: 2.36 10*3/MM3 (ref 1.7–7)
NEUTROPHILS NFR BLD AUTO: 54.8 % (ref 42.7–76)
NRBC BLD AUTO-RTO: 0 /100 WBC (ref 0–0.2)
PLATELET # BLD AUTO: 249 10*3/MM3 (ref 140–450)
POTASSIUM SERPL-SCNC: 3.7 MMOL/L (ref 3.5–5.2)
PROT SERPL-MCNC: 6.5 G/DL (ref 6–8.5)
RBC # BLD AUTO: 3.88 10*6/MM3 (ref 4.14–5.8)
SODIUM SERPL-SCNC: 140 MMOL/L (ref 136–145)
WBC # BLD AUTO: 4.3 10*3/MM3 (ref 3.4–10.8)

## 2020-05-18 RX ORDER — ATROPINE SULFATE 1 MG/ML
0.25 INJECTION, SOLUTION INTRAMUSCULAR; INTRAVENOUS; SUBCUTANEOUS
Status: DISCONTINUED | OUTPATIENT
Start: 2020-05-19 | End: 2020-05-19 | Stop reason: HOSPADM

## 2020-05-18 RX ORDER — FLUOROURACIL 50 MG/ML
320 INJECTION, SOLUTION INTRAVENOUS ONCE
Status: COMPLETED | OUTPATIENT
Start: 2020-05-19 | End: 2020-05-19

## 2020-05-18 RX ORDER — PALONOSETRON 0.05 MG/ML
0.25 INJECTION, SOLUTION INTRAVENOUS ONCE
Status: COMPLETED | OUTPATIENT
Start: 2020-05-19 | End: 2020-05-19

## 2020-05-18 RX ORDER — SODIUM CHLORIDE 9 MG/ML
250 INJECTION, SOLUTION INTRAVENOUS ONCE
Status: COMPLETED | OUTPATIENT
Start: 2020-05-19 | End: 2020-05-19

## 2020-05-19 ENCOUNTER — INFUSION (OUTPATIENT)
Dept: ONCOLOGY | Facility: HOSPITAL | Age: 65
End: 2020-05-19

## 2020-05-19 VITALS
WEIGHT: 155 LBS | RESPIRATION RATE: 18 BRPM | DIASTOLIC BLOOD PRESSURE: 89 MMHG | BODY MASS INDEX: 20.45 KG/M2 | SYSTOLIC BLOOD PRESSURE: 139 MMHG | HEART RATE: 76 BPM | TEMPERATURE: 98 F

## 2020-05-19 DIAGNOSIS — C18.1 MUCINOUS ADENOCARCINOMA OF APPENDIX (HCC): Primary | ICD-10-CM

## 2020-05-19 PROCEDURE — 25010000002 ATROPINE PER 0.01 MG: Performed by: NURSE PRACTITIONER

## 2020-05-19 PROCEDURE — 96368 THER/DIAG CONCURRENT INF: CPT

## 2020-05-19 PROCEDURE — 25010000002 PALONOSETRON PER 25 MCG: Performed by: NURSE PRACTITIONER

## 2020-05-19 PROCEDURE — 25010000002 IRINOTECAN PER 20 MG: Performed by: NURSE PRACTITIONER

## 2020-05-19 PROCEDURE — 25010000002 FLUOROURACIL PER 500 MG: Performed by: NURSE PRACTITIONER

## 2020-05-19 PROCEDURE — 25010000002 DEXAMETHASONE SODIUM PHOSPHATE 100 MG/10ML SOLUTION 10 ML VIAL: Performed by: NURSE PRACTITIONER

## 2020-05-19 PROCEDURE — 25010000002 LEUCOVORIN CALCIUM PER 50 MG: Performed by: NURSE PRACTITIONER

## 2020-05-19 PROCEDURE — 96413 CHEMO IV INFUSION 1 HR: CPT

## 2020-05-19 PROCEDURE — 96375 TX/PRO/DX INJ NEW DRUG ADDON: CPT

## 2020-05-19 PROCEDURE — 96415 CHEMO IV INFUSION ADDL HR: CPT

## 2020-05-19 PROCEDURE — 96411 CHEMO IV PUSH ADDL DRUG: CPT

## 2020-05-19 PROCEDURE — 96416 CHEMO PROLONG INFUSE W/PUMP: CPT

## 2020-05-19 RX ADMIN — ATROPINE SULFATE 0.25 MG: 1 INJECTION, SOLUTION INTRAMUSCULAR; INTRAVENOUS; SUBCUTANEOUS at 09:41

## 2020-05-19 RX ADMIN — FLUOROURACIL 3500 MG: 50 INJECTION, SOLUTION INTRAVENOUS at 11:43

## 2020-05-19 RX ADMIN — PALONOSETRON HYDROCHLORIDE 0.25 MG: 0.25 INJECTION INTRAVENOUS at 09:17

## 2020-05-19 RX ADMIN — DEXAMETHASONE SODIUM PHOSPHATE 12 MG: 10 INJECTION, SOLUTION INTRAMUSCULAR; INTRAVENOUS at 09:25

## 2020-05-19 RX ADMIN — FLUOROURACIL 610 MG: 50 INJECTION, SOLUTION INTRAVENOUS at 11:39

## 2020-05-19 RX ADMIN — IRINOTECAN HYDROCHLORIDE 260 MG: 20 INJECTION, SOLUTION INTRAVENOUS at 09:47

## 2020-05-19 RX ADMIN — LEUCOVORIN CALCIUM 610 MG: 350 INJECTION, POWDER, LYOPHILIZED, FOR SOLUTION INTRAMUSCULAR; INTRAVENOUS at 09:47

## 2020-05-19 RX ADMIN — SODIUM CHLORIDE 250 ML: 9 INJECTION, SOLUTION INTRAVENOUS at 09:17

## 2020-05-21 ENCOUNTER — INFUSION (OUTPATIENT)
Dept: ONCOLOGY | Facility: HOSPITAL | Age: 65
End: 2020-05-21

## 2020-05-21 VITALS
BODY MASS INDEX: 20.29 KG/M2 | RESPIRATION RATE: 18 BRPM | WEIGHT: 153.8 LBS | DIASTOLIC BLOOD PRESSURE: 88 MMHG | SYSTOLIC BLOOD PRESSURE: 140 MMHG | HEART RATE: 75 BPM | TEMPERATURE: 97.3 F

## 2020-05-21 DIAGNOSIS — Z45.2 ENCOUNTER FOR CARE RELATED TO VASCULAR ACCESS PORT: Primary | ICD-10-CM

## 2020-05-21 DIAGNOSIS — C18.1 MUCINOUS ADENOCARCINOMA OF APPENDIX (HCC): ICD-10-CM

## 2020-05-21 PROCEDURE — 25010000003 HEPARIN LOCK FLUSH PER 10 UNITS: Performed by: INTERNAL MEDICINE

## 2020-05-21 RX ORDER — HEPARIN SODIUM (PORCINE) LOCK FLUSH IV SOLN 100 UNIT/ML 100 UNIT/ML
500 SOLUTION INTRAVENOUS AS NEEDED
Status: CANCELLED | OUTPATIENT
Start: 2020-05-21

## 2020-05-21 RX ORDER — HEPARIN SODIUM (PORCINE) LOCK FLUSH IV SOLN 100 UNIT/ML 100 UNIT/ML
500 SOLUTION INTRAVENOUS AS NEEDED
Status: DISCONTINUED | OUTPATIENT
Start: 2020-05-21 | End: 2020-05-21 | Stop reason: HOSPADM

## 2020-05-21 RX ADMIN — HEPARIN 500 UNITS: 100 SYRINGE at 09:46

## 2020-06-02 ENCOUNTER — OFFICE VISIT (OUTPATIENT)
Dept: ONCOLOGY | Facility: CLINIC | Age: 65
End: 2020-06-02

## 2020-06-02 ENCOUNTER — INFUSION (OUTPATIENT)
Dept: ONCOLOGY | Facility: HOSPITAL | Age: 65
End: 2020-06-02

## 2020-06-02 VITALS
DIASTOLIC BLOOD PRESSURE: 89 MMHG | WEIGHT: 154 LBS | RESPIRATION RATE: 18 BRPM | SYSTOLIC BLOOD PRESSURE: 135 MMHG | BODY MASS INDEX: 20.41 KG/M2 | TEMPERATURE: 98.9 F | HEIGHT: 73 IN | HEART RATE: 80 BPM

## 2020-06-02 DIAGNOSIS — C18.1 MUCINOUS ADENOCARCINOMA OF APPENDIX (HCC): ICD-10-CM

## 2020-06-02 DIAGNOSIS — Z45.2 ENCOUNTER FOR CARE RELATED TO VASCULAR ACCESS PORT: ICD-10-CM

## 2020-06-02 DIAGNOSIS — C48.2 PERITONEAL CARCINOMA (HCC): Primary | ICD-10-CM

## 2020-06-02 DIAGNOSIS — C18.1 MUCINOUS ADENOCARCINOMA OF APPENDIX (HCC): Primary | ICD-10-CM

## 2020-06-02 PROCEDURE — 25010000003 HEPARIN LOCK FLUSH PER 10 UNITS: Performed by: INTERNAL MEDICINE

## 2020-06-02 PROCEDURE — 36591 DRAW BLOOD OFF VENOUS DEVICE: CPT

## 2020-06-02 PROCEDURE — 99214 OFFICE O/P EST MOD 30 MIN: CPT | Performed by: INTERNAL MEDICINE

## 2020-06-02 RX ORDER — HEPARIN SODIUM (PORCINE) LOCK FLUSH IV SOLN 100 UNIT/ML 100 UNIT/ML
500 SOLUTION INTRAVENOUS AS NEEDED
Status: DISCONTINUED | OUTPATIENT
Start: 2020-06-02 | End: 2020-06-02 | Stop reason: HOSPADM

## 2020-06-02 RX ORDER — HEPARIN SODIUM (PORCINE) LOCK FLUSH IV SOLN 100 UNIT/ML 100 UNIT/ML
500 SOLUTION INTRAVENOUS AS NEEDED
Status: CANCELLED | OUTPATIENT
Start: 2020-06-02

## 2020-06-02 RX ADMIN — HEPARIN SODIUM (PORCINE) LOCK FLUSH IV SOLN 100 UNIT/ML 500 UNITS: 100 SOLUTION at 11:55

## 2020-06-02 NOTE — PROGRESS NOTES
CHIEF COMPLAINT: Recurrent mucinous adenocarcinoma of the appendix    Problem List:  Oncology/Hematology History    1.  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  Pathologic stage IIa with T3 extension into the base of the appendix through the muscularis propria but not into the serosal surface.  16 nodes negative.  Colonoscopy December 2017 negative postop    -12/30/2019 medical oncology office visit: The patient had been on surveillance since surgery December 2017.  CT scans had been negative up until 12/30/2019 CT chest abdomen and pelvis that showed subtle soft tissue density surrounding surgical clips in the right side of the pelvis along with soft tissue nodule at the anterior wall of the bladder concerning for recurrent disease.  Patient sent back to Dr. Davidson for colonoscopy.    -2/7/2020 patient was referred to Dr. Peoples at the [facility], he underwent diagnostic laparoscopic and peritoneal biopsies that confirmed recurrent disease with biopsy of bladder dome nodule showing adenocarcinoma with mucinous features.  Port was placed at this time also.    -2/25/2020 began FOLFIRI    -4/21/2020 patient having increased fatigue, FOLFIRI dose reduced by 10%.        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  Pathologic stage IIa with T3 extension into the base of the appendix through the muscularis propria but not into the serosal surface.  16 nodes negative.  Colonoscopy December 2017 negative postop      12/5/2017 Surgery     Surgery       Procedure:  Appendectomy and right hemicolectomy      Completeness of resection:  No evidence of residual tumor     Pathology revealed invasive moderately differentiated mucinous adenocarcinoma.  Right hemicolectomy: Benign colon and small intestine no  evidence of carcinoma.  16 benign lymph nodes, 0/16, negative for dysplasia or malignancy.  Tumor size approximately 6 cm.  Grade 2, moderately differentiated.  Tumor invades through the muscularis profile into the base of appendix but does not extend to the serosal surface.  All margins uninvolved, no lymphovascular invasion identified.  Pathological stage pT3 pN0.          12/8/2017 Imaging     CT of the chest abdomen and pelvis negative.  Baseline CBC WBC 7100, hemoglobin 11.3, hematocrit 34.8%, platelet count 195,000.      3/20/2018 Procedure     Colonoscopy with Dr. Davidson was normal, recommendation for repeat surveillance colonoscopy in 3 years.      6/11/2018 Imaging     CT chest, abdomen and pelvis with no evidence metastatic disease.  CEA 1.1      9/24/2018 Imaging     CT abdomen pelvis showed no acute changes and nothing to suggest recurrence      12/28/2018 Imaging     CT chest, abdomen and pelvis negative. Normal CBC, CMP and CEA 0.7.      6/28/2019 Imaging     CT chest, abdomen and pelvis: No interval change from prior studies.  No recurrent or metastatic disease detected in the chest, abdomen or pelvis.  No acute process.  CEA 0.9      12/30/2019 Imaging     CT chest, abdomen and pelvis: No disease in the chest.  There was noted a subtle soft tissue density, one surrounding surgical clips in the right side of the pelvis and the other a soft tissue nodule intimately associated with the anterior wall of the bladder, which are considered suspicious for recurrent disease.  CEA 1.1.  CMP with normal creatinine 0.9, total bilirubin 1.9, AST 34, ALT 24, alkaline phosphatase 93.  CBC with WBC 6900, hemoglobin 15.9, platelet count 232,000.        1/21/2020 Procedure     Normal colonoscopy with Dr. Davidson.  He has made referral to Dr. Portillo Peoples at .      2/7/2020 Progression     12/30/2019 CT chest, abdomen and pelvis: No disease in the chest.  There are subtle soft tissue densities (1 surrounding surgical  clips in the right side of the pelvis and the other a soft tissue nodule intimately associated with the anterior wall of the bladder) which are considered suspicious for recurrent disease.  CMP with normal creatinine 0.9, total bilirubin 1.9, AST 34, ALT 24, alkaline phosphatase 93.  CBC with WBC 6900, hemoglobin 15.9, platelet count 232,000.  CEA 1.1.  2/7/2020 diagnostic laparoscopy with peritoneal biopsies performed at the [facility] by Dr. Peoples showed no sign of diffuse peritoneal carcinomatosis or liver metastasis.  There was a firm nodule in the superior dome of the bladder, adherent to omentum, as well as right pelvic sidewall nodule adjacent to surgical clips.  Biopsy of bladder dome nodule showed adenocarcinoma with mucinous features.      2/25/2020 -  Chemotherapy     OP COLORECTAL FOLFIRI Irinotecan / Leucovorin / Fluorouracil      5/29/2020 Imaging     CT chest abdomen pelvis shows slight improvement with decreased nodule anterolateral margin of urinary bladder with stable nodularity of the right lower quadrant adjacent to surgical clips and no progressive disease.  Slight hyperemia of the colonic mucosa         HISTORY OF PRESENT ILLNESS:  The patient is a 64 y.o. male, here for follow up on management of recurrent mucinous adenocarcinoma of appendix.  With 3 months of therapy with FOLFIRI he has had a modest response and no distant progression on my review of imaging of CT chest abdomen pelvis as well as reports thereof      Past Medical History:   Diagnosis Date   • Hypertension      History reviewed. No pertinent surgical history.    Allergies   Allergen Reactions   • Levofloxacin Unknown - Low Severity   • Penicillins Unknown - Low Severity   • Tetracycline Unknown - Low Severity       Family History and Social History reviewed and changed as necessary      REVIEW OF SYSTEM:   Review of Systems   Constitutional: Negative for appetite change, chills, diaphoresis, fatigue, fever and  "unexpected weight change.   HENT:   Negative for mouth sores, sore throat and trouble swallowing.    Eyes: Negative for icterus.   Respiratory: Negative for cough, hemoptysis and shortness of breath.    Cardiovascular: Negative for chest pain, leg swelling and palpitations.   Gastrointestinal: Negative for abdominal distention, abdominal pain, blood in stool, constipation, diarrhea, nausea and vomiting.   Endocrine: Negative for hot flashes.   Genitourinary: Negative for bladder incontinence, difficulty urinating, dysuria, frequency and hematuria.    Musculoskeletal: Negative for gait problem, neck pain and neck stiffness.   Skin: Negative for rash.   Neurological: Negative for dizziness, gait problem, headaches, light-headedness and numbness.   Hematological: Negative for adenopathy. Does not bruise/bleed easily.   Psychiatric/Behavioral: Negative for depression. The patient is not nervous/anxious.    All other systems reviewed and are negative.       PHYSICAL EXAM    Vitals:    06/02/20 1051   BP: 135/89   Pulse: 80   Resp: 18   Temp: 98.9 °F (37.2 °C)   Weight: 69.9 kg (154 lb)   Height: 185.4 cm (73\")     Vitals:    06/02/20 1051   PainSc: 0-No pain        Constitutional: Appears well-developed and well-nourished. No distress.   ECOG: (1) Restricted in Physically Strenuous Activity, Ambulatory & Able to Do Work of Light Nature  HENT:   Head: Normocephalic.   Mouth/Throat: Oropharynx is clear and moist.   Eyes: Conjunctivae are normal. Pupils are equal, round, and reactive to light. No scleral icterus.   Neck: Neck supple. No JVD present. No thyromegaly present.   Cardiovascular: Normal rate, regular rhythm and normal heart sounds.    Pulmonary/Chest: Breath sounds normal. No respiratory distress.   Abdominal: Soft. Exhibits no distension and no mass. There is no hepatosplenomegaly. There is no tenderness. There is no rebound and no guarding.   Musculoskeletal:Exhibits no edema, tenderness or deformity. "   Neurological: Alert and oriented to person, place, and time. Exhibits normal muscle tone.   Skin: No ecchymosis, no petechiae and no rash noted. Not diaphoretic. No cyanosis. Nails show no clubbing.   Psychiatric: Normal mood and affect.   Vitals reviewed.      Lab Results   Component Value Date    HGB 11.5 (L) 05/14/2020    HCT 34.7 (L) 05/14/2020    MCV 89.4 05/14/2020     05/14/2020    WBC 4.30 05/14/2020    NEUTROABS 2.36 05/14/2020    LYMPHSABS 1.43 05/14/2020    MONOSABS 0.40 05/14/2020    EOSABS 0.08 05/14/2020    BASOSABS 0.02 05/14/2020       Lab Results   Component Value Date    BUN 13 05/14/2020    CREATININE 0.82 05/14/2020     05/14/2020    K 3.7 05/14/2020     05/14/2020    CO2 25.1 05/14/2020    CALCIUM 9.2 05/14/2020    ALBUMIN 4.20 05/14/2020    BILITOT 0.8 05/14/2020    ALKPHOS 81 05/14/2020    AST 16 05/14/2020    ALT 20 05/14/2020                   ASSESSMENT & PLAN:    1. Recurrent mucinous adenocarcinoma of the appendix  2. Diarrhea predating chemotherapy  3. Nausea manageable and brief with his last course of therapy     Discussion: Chemo is wearing on him.  The imaging I have seen and reviewed shows some modest response and no distant progression.  At this junction we will press on with surgery/HIPEC with Dr. Peoples with whom I have spoken today.  Discussed with patient face-to-face regarding this plan 25 minutes today face-to-face greater than 50% spent counseling regarding this plan as outlined above      Cy Delcid MD    06/02/2020

## 2020-06-03 DIAGNOSIS — C48.2 PERITONEAL CARCINOMA (HCC): ICD-10-CM

## 2020-06-03 DIAGNOSIS — C18.1 MUCINOUS ADENOCARCINOMA OF APPENDIX (HCC): ICD-10-CM

## 2020-06-03 LAB
ALBUMIN SERPL-MCNC: 4.4 G/DL (ref 3.5–5.2)
ALBUMIN/GLOB SERPL: 2.2 G/DL
ALP SERPL-CCNC: 77 U/L (ref 39–117)
ALT SERPL-CCNC: 14 U/L (ref 1–41)
AST SERPL-CCNC: 15 U/L (ref 1–40)
BASOPHILS # BLD AUTO: 0.02 10*3/MM3 (ref 0–0.2)
BASOPHILS NFR BLD AUTO: 0.6 % (ref 0–1.5)
BILIRUB SERPL-MCNC: 1 MG/DL (ref 0.2–1.2)
BUN SERPL-MCNC: 10 MG/DL (ref 8–23)
BUN/CREAT SERPL: 13.7 (ref 7–25)
CALCIUM SERPL-MCNC: 8.9 MG/DL (ref 8.6–10.5)
CHLORIDE SERPL-SCNC: 106 MMOL/L (ref 98–107)
CO2 SERPL-SCNC: 23.6 MMOL/L (ref 22–29)
CREAT SERPL-MCNC: 0.73 MG/DL (ref 0.76–1.27)
EOSINOPHIL # BLD AUTO: 0.04 10*3/MM3 (ref 0–0.4)
EOSINOPHIL NFR BLD AUTO: 1.3 % (ref 0.3–6.2)
ERYTHROCYTE [DISTWIDTH] IN BLOOD BY AUTOMATED COUNT: 14.6 % (ref 12.3–15.4)
GLOBULIN SER CALC-MCNC: 2 GM/DL
GLUCOSE SERPL-MCNC: 99 MG/DL (ref 65–99)
HCT VFR BLD AUTO: 36.2 % (ref 37.5–51)
HGB BLD-MCNC: 12 G/DL (ref 13–17.7)
IMM GRANULOCYTES # BLD AUTO: 0.01 10*3/MM3 (ref 0–0.05)
IMM GRANULOCYTES NFR BLD AUTO: 0.3 % (ref 0–0.5)
LYMPHOCYTES # BLD AUTO: 0.83 10*3/MM3 (ref 0.7–3.1)
LYMPHOCYTES NFR BLD AUTO: 26.4 % (ref 19.6–45.3)
MCH RBC QN AUTO: 29.4 PG (ref 26.6–33)
MCHC RBC AUTO-ENTMCNC: 33.1 G/DL (ref 31.5–35.7)
MCV RBC AUTO: 88.7 FL (ref 79–97)
MONOCYTES # BLD AUTO: 0.27 10*3/MM3 (ref 0.1–0.9)
MONOCYTES NFR BLD AUTO: 8.6 % (ref 5–12)
NEUTROPHILS # BLD AUTO: 1.97 10*3/MM3 (ref 1.7–7)
NEUTROPHILS NFR BLD AUTO: 62.8 % (ref 42.7–76)
NRBC BLD AUTO-RTO: 0.3 /100 WBC (ref 0–0.2)
PLATELET # BLD AUTO: 185 10*3/MM3 (ref 140–450)
POTASSIUM SERPL-SCNC: 3.7 MMOL/L (ref 3.5–5.2)
PROT SERPL-MCNC: 6.4 G/DL (ref 6–8.5)
RBC # BLD AUTO: 4.08 10*6/MM3 (ref 4.14–5.8)
SODIUM SERPL-SCNC: 139 MMOL/L (ref 136–145)
WBC # BLD AUTO: 3.14 10*3/MM3 (ref 3.4–10.8)

## 2020-07-10 ENCOUNTER — TELEPHONE (OUTPATIENT)
Dept: ONCOLOGY | Facility: CLINIC | Age: 65
End: 2020-07-10

## 2020-07-10 NOTE — TELEPHONE ENCOUNTER
Patient needs to reschedule 7/14 appointment.    Any day the following week would be fine.  He does need the [place] location.    [phone]

## 2020-07-13 ENCOUNTER — TELEPHONE (OUTPATIENT)
Dept: ONCOLOGY | Facility: CLINIC | Age: 65
End: 2020-07-13

## 2020-07-13 NOTE — TELEPHONE ENCOUNTER
PT WOULD LIKE TO RESCHEDULE HIS 7/14 PORT FLUSH AND DR LOMELI APPT HE CANCELLED.  DO YOU HAVE ANYTHING AVAILABLE THE WEEK OF 7/20. PREFERS MORNINGS BUT AVAILABLE ANYTIME.    PLEASE GIVE PT A CALL -[phone]

## 2020-07-28 ENCOUNTER — INFUSION (OUTPATIENT)
Dept: ONCOLOGY | Facility: HOSPITAL | Age: 65
End: 2020-07-28

## 2020-07-28 ENCOUNTER — OFFICE VISIT (OUTPATIENT)
Dept: ONCOLOGY | Facility: CLINIC | Age: 65
End: 2020-07-28

## 2020-07-28 VITALS
HEART RATE: 104 BPM | RESPIRATION RATE: 18 BRPM | WEIGHT: 141 LBS | BODY MASS INDEX: 18.69 KG/M2 | DIASTOLIC BLOOD PRESSURE: 77 MMHG | SYSTOLIC BLOOD PRESSURE: 107 MMHG | TEMPERATURE: 99.3 F | HEIGHT: 73 IN

## 2020-07-28 DIAGNOSIS — C18.1 MUCINOUS ADENOCARCINOMA OF APPENDIX (HCC): Primary | ICD-10-CM

## 2020-07-28 DIAGNOSIS — Z45.2 ENCOUNTER FOR CARE RELATED TO VASCULAR ACCESS PORT: ICD-10-CM

## 2020-07-28 PROCEDURE — 25010000003 HEPARIN LOCK FLUSH PER 10 UNITS: Performed by: INTERNAL MEDICINE

## 2020-07-28 PROCEDURE — 36591 DRAW BLOOD OFF VENOUS DEVICE: CPT

## 2020-07-28 PROCEDURE — 99214 OFFICE O/P EST MOD 30 MIN: CPT | Performed by: INTERNAL MEDICINE

## 2020-07-28 RX ORDER — HEPARIN SODIUM (PORCINE) LOCK FLUSH IV SOLN 100 UNIT/ML 100 UNIT/ML
500 SOLUTION INTRAVENOUS AS NEEDED
Status: DISCONTINUED | OUTPATIENT
Start: 2020-07-28 | End: 2020-07-28 | Stop reason: HOSPADM

## 2020-07-28 RX ORDER — HEPARIN SODIUM (PORCINE) LOCK FLUSH IV SOLN 100 UNIT/ML 100 UNIT/ML
500 SOLUTION INTRAVENOUS AS NEEDED
Status: CANCELLED | OUTPATIENT
Start: 2020-07-28

## 2020-07-28 RX ADMIN — HEPARIN 500 UNITS: 100 SYRINGE at 08:10

## 2020-07-28 NOTE — PROGRESS NOTES
CHIEF COMPLAINT: Follow-up metastatic mucinous adenocarcinoma of the appendix status post HI PEC following debulking surgery as outlined in oncology history 6/18/2020    Problem List:  Oncology/Hematology History    1.  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  Pathologic stage IIa with T3 extension into the base of the appendix through the muscularis propria but not into the serosal surface.  16 nodes negative.  Colonoscopy December 2017 negative postop    -12/30/2019 medical oncology office visit: The patient had been on surveillance since surgery December 2017.  CT scans had been negative up until 12/30/2019 CT chest abdomen and pelvis that showed subtle soft tissue density surrounding surgical clips in the right side of the pelvis along with soft tissue nodule at the anterior wall of the bladder concerning for recurrent disease.  Patient sent back to Dr. Davidson for colonoscopy.    -2/7/2020 patient was referred to Dr. Peoples at the [facility], he underwent diagnostic laparoscopic and peritoneal biopsies that confirmed recurrent disease with biopsy of bladder dome nodule showing adenocarcinoma with mucinous features.  Port was placed at this time also.    -2/25/2020 began FOLFIRI    -4/21/2020 patient having increased fatigue, FOLFIRI dose reduced by 10%.    -5/19/2020 cycle 7 FOLFIRI    -6/18/2020 Dr. Peoples performed exploratory laparotomy with excision of right lobe liver lesion and left lobe liver lesion, omentectomy, resection of pelvic peritoneal nodules, ileocolectomy with creation of ileal colostomy, hyperthermic intraperitoneal chemotherapy with mitomycin-C at 42 °C for deep tissue penetration for 90 minutes.  Dr. Perdue performed partial cystectomy with right ureteral stent placement        Mucinous adenocarcinoma of appendix (CMS/HCC)    12/5/2017 Initial Diagnosis     Mucinous adenocarcinoma of appendix found at the time of  appendectomy 12/2017 in the face of appendicitis.  Pathologic stage IIa with T3 extension into the base of the appendix through the muscularis propria but not into the serosal surface.  16 nodes negative.  Colonoscopy December 2017 negative postop      12/5/2017 Surgery     Surgery       Procedure:  Appendectomy and right hemicolectomy      Completeness of resection:  No evidence of residual tumor     Pathology revealed invasive moderately differentiated mucinous adenocarcinoma.  Right hemicolectomy: Benign colon and small intestine no evidence of carcinoma.  16 benign lymph nodes, 0/16, negative for dysplasia or malignancy.  Tumor size approximately 6 cm.  Grade 2, moderately differentiated.  Tumor invades through the muscularis profile into the base of appendix but does not extend to the serosal surface.  All margins uninvolved, no lymphovascular invasion identified.  Pathological stage pT3 pN0.          12/8/2017 Imaging     CT of the chest abdomen and pelvis negative.  Baseline CBC WBC 7100, hemoglobin 11.3, hematocrit 34.8%, platelet count 195,000.      3/20/2018 Procedure     Colonoscopy with Dr. Davidson was normal, recommendation for repeat surveillance colonoscopy in 3 years.      6/11/2018 Imaging     CT chest, abdomen and pelvis with no evidence metastatic disease.  CEA 1.1      9/24/2018 Imaging     CT abdomen pelvis showed no acute changes and nothing to suggest recurrence      12/28/2018 Imaging     CT chest, abdomen and pelvis negative. Normal CBC, CMP and CEA 0.7.      6/28/2019 Imaging     CT chest, abdomen and pelvis: No interval change from prior studies.  No recurrent or metastatic disease detected in the chest, abdomen or pelvis.  No acute process.  CEA 0.9      12/30/2019 Imaging     CT chest, abdomen and pelvis: No disease in the chest.  There was noted a subtle soft tissue density, one surrounding surgical clips in the right side of the pelvis and the other a soft tissue nodule intimately  associated with the anterior wall of the bladder, which are considered suspicious for recurrent disease.  CEA 1.1.  CMP with normal creatinine 0.9, total bilirubin 1.9, AST 34, ALT 24, alkaline phosphatase 93.  CBC with WBC 6900, hemoglobin 15.9, platelet count 232,000.        1/21/2020 Procedure     Normal colonoscopy with Dr. Davidson.  He has made referral to Dr. Portillo Peoples at .      2/7/2020 Progression     12/30/2019 CT chest, abdomen and pelvis: No disease in the chest.  There are subtle soft tissue densities (1 surrounding surgical clips in the right side of the pelvis and the other a soft tissue nodule intimately associated with the anterior wall of the bladder) which are considered suspicious for recurrent disease.  CMP with normal creatinine 0.9, total bilirubin 1.9, AST 34, ALT 24, alkaline phosphatase 93.  CBC with WBC 6900, hemoglobin 15.9, platelet count 232,000.  CEA 1.1.  2/7/2020 diagnostic laparoscopy with peritoneal biopsies performed at the [facility] by Dr. Peoples showed no sign of diffuse peritoneal carcinomatosis or liver metastasis.  There was a firm nodule in the superior dome of the bladder, adherent to omentum, as well as right pelvic sidewall nodule adjacent to surgical clips.  Biopsy of bladder dome nodule showed adenocarcinoma with mucinous features.      2/25/2020 -  Chemotherapy     OP COLORECTAL FOLFIRI Irinotecan / Leucovorin / Fluorouracil      5/29/2020 Imaging     CT chest abdomen pelvis shows slight improvement with decreased nodule anterolateral margin of urinary bladder with stable nodularity of the right lower quadrant adjacent to surgical clips and no progressive disease.  Slight hyperemia of the colonic mucosa         HISTORY OF PRESENT ILLNESS:  The patient is a 65 y.o. male, here for follow up on management of metastatic mucinous adenocarcinoma of the appendix status post HI PEC following debulking surgery as outlined in oncology history 6/18/2020.  Overall  "recovering reasonably well from his surgery 6 weeks ago.      Past Medical History:   Diagnosis Date   • Hypertension      No past surgical history on file.    Allergies   Allergen Reactions   • Levofloxacin Unknown - Low Severity   • Penicillins Unknown - Low Severity   • Tetracycline Unknown - Low Severity       Family History and Social History reviewed and changed as necessary      REVIEW OF SYSTEM:   Review of Systems   Constitutional: Negative for appetite change, chills, diaphoresis, fatigue, fever and unexpected weight change.   HENT:   Negative for mouth sores, sore throat and trouble swallowing.    Eyes: Negative for icterus.   Respiratory: Negative for cough, hemoptysis and shortness of breath.    Cardiovascular: Negative for chest pain, leg swelling and palpitations.   Gastrointestinal: Negative for abdominal distention, abdominal pain, blood in stool, constipation, diarrhea, nausea and vomiting.   Endocrine: Negative for hot flashes.   Genitourinary: Negative for bladder incontinence, difficulty urinating, dysuria, frequency and hematuria.    Musculoskeletal: Negative for gait problem, neck pain and neck stiffness.   Skin: Negative for rash.   Neurological: Negative for dizziness, gait problem, headaches, light-headedness and numbness.   Hematological: Negative for adenopathy. Does not bruise/bleed easily.   Psychiatric/Behavioral: Negative for depression. The patient is not nervous/anxious.    All other systems reviewed and are negative.       PHYSICAL EXAM    Vitals:    07/28/20 0733   BP: 107/77   Pulse: 104   Resp: 18   Temp: 99.3 °F (37.4 °C)   Weight: 64 kg (141 lb)   Height: 185.4 cm (73\")     Vitals:    07/28/20 0733   PainSc: 0-No pain        Constitutional: Appears well-developed and well-nourished. No distress.   ECOG: (1) Restricted in Physically Strenuous Activity, Ambulatory & Able to Do Work of Light Nature  HENT:   Head: Normocephalic.   Mouth/Throat: Oropharynx is clear and moist. "   Eyes: Conjunctivae are normal. Pupils are equal, round, and reactive to light. No scleral icterus.   Neck: Neck supple. No JVD present. No thyromegaly present.   Cardiovascular: Normal rate, regular rhythm and normal heart sounds.    Pulmonary/Chest: Breath sounds normal. No respiratory distress.   Abdominal: Soft. Exhibits no distension and no mass. There is no hepatosplenomegaly. There is no tenderness. There is no rebound and no guarding.   Musculoskeletal:Exhibits no edema, tenderness or deformity.   Neurological: Alert and oriented to person, place, and time. Exhibits normal muscle tone.   Skin: No ecchymosis, no petechiae and no rash noted. Not diaphoretic. No cyanosis. Nails show no clubbing.   Psychiatric: Normal mood and affect.   Vitals reviewed.      Lab Results   Component Value Date    HGB 12.0 (L) 06/02/2020    HCT 36.2 (L) 06/02/2020    MCV 88.7 06/02/2020     06/02/2020    WBC 3.14 (L) 06/02/2020    NEUTROABS 1.97 06/02/2020    LYMPHSABS 0.83 06/02/2020    MONOSABS 0.27 06/02/2020    EOSABS 0.04 06/02/2020    BASOSABS 0.02 06/02/2020       Lab Results   Component Value Date    BUN 10 06/02/2020    CREATININE 0.73 (L) 06/02/2020     06/02/2020    K 3.7 06/02/2020     06/02/2020    CO2 23.6 06/02/2020    CALCIUM 8.9 06/02/2020    ALBUMIN 4.40 06/02/2020    BILITOT 1.0 06/02/2020    ALKPHOS 77 06/02/2020    AST 15 06/02/2020    ALT 14 06/02/2020                   ASSESSMENT & PLAN:    1. 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  Liver nodules were not involved but bladder, nodes, and peritoneal nodule was involved.  Received HI PEC with mitomycin-C 6/18/2020  2. Diarrhea predating chemotherapy or surgery    Discussion: There are no firm guidelines on how to follow-up at the [employer] of colorectal surgeons practice guidelines suggests baseline imaging 2 months  post cytoreductive surgery and then at least annually for 5 or more years for low-grade peritoneal disease.  He has lost a lot of weight and his capacity for food has diminished so he has to eat frequent small meals though he is improving in that regard.  Does have a lot of acid reflux and saw Dr. Peoples yesterday who started him on a proton pump inhibitor.  We will do [facility] visit in a few weeks to go over the results of CTs.  Discussed with patient face-to-face 25 minutes greater than 50% spent counseling regarding this plan as outlined above.      Cy Delcid MD    07/28/2020

## 2020-07-29 LAB
ALBUMIN SERPL-MCNC: 3.7 G/DL (ref 3.5–5.2)
ALBUMIN/GLOB SERPL: 1.5 G/DL
ALP SERPL-CCNC: 83 U/L (ref 39–117)
ALT SERPL-CCNC: 8 U/L (ref 1–41)
AST SERPL-CCNC: 15 U/L (ref 1–40)
BASOPHILS # BLD AUTO: 0.02 10*3/MM3 (ref 0–0.2)
BASOPHILS NFR BLD AUTO: 0.4 % (ref 0–1.5)
BILIRUB SERPL-MCNC: 0.5 MG/DL (ref 0–1.2)
BUN SERPL-MCNC: 9 MG/DL (ref 8–23)
BUN/CREAT SERPL: 12 (ref 7–25)
CALCIUM SERPL-MCNC: 8.7 MG/DL (ref 8.6–10.5)
CHLORIDE SERPL-SCNC: 101 MMOL/L (ref 98–107)
CO2 SERPL-SCNC: 25.8 MMOL/L (ref 22–29)
CREAT SERPL-MCNC: 0.75 MG/DL (ref 0.76–1.27)
EOSINOPHIL # BLD AUTO: 0.11 10*3/MM3 (ref 0–0.4)
EOSINOPHIL NFR BLD AUTO: 2.1 % (ref 0.3–6.2)
ERYTHROCYTE [DISTWIDTH] IN BLOOD BY AUTOMATED COUNT: 13.4 % (ref 12.3–15.4)
GLOBULIN SER CALC-MCNC: 2.4 GM/DL
GLUCOSE SERPL-MCNC: 85 MG/DL (ref 65–99)
HCT VFR BLD AUTO: 35.9 % (ref 37.5–51)
HGB BLD-MCNC: 11.1 G/DL (ref 13–17.7)
IMM GRANULOCYTES # BLD AUTO: 0.01 10*3/MM3 (ref 0–0.05)
IMM GRANULOCYTES NFR BLD AUTO: 0.2 % (ref 0–0.5)
LYMPHOCYTES # BLD AUTO: 1.22 10*3/MM3 (ref 0.7–3.1)
LYMPHOCYTES NFR BLD AUTO: 23.6 % (ref 19.6–45.3)
MCH RBC QN AUTO: 27.8 PG (ref 26.6–33)
MCHC RBC AUTO-ENTMCNC: 30.9 G/DL (ref 31.5–35.7)
MCV RBC AUTO: 90 FL (ref 79–97)
MONOCYTES # BLD AUTO: 0.57 10*3/MM3 (ref 0.1–0.9)
MONOCYTES NFR BLD AUTO: 11 % (ref 5–12)
NEUTROPHILS # BLD AUTO: 3.24 10*3/MM3 (ref 1.7–7)
NEUTROPHILS NFR BLD AUTO: 62.7 % (ref 42.7–76)
NRBC BLD AUTO-RTO: 0 /100 WBC (ref 0–0.2)
PLATELET # BLD AUTO: 269 10*3/MM3 (ref 140–450)
POTASSIUM SERPL-SCNC: 3.5 MMOL/L (ref 3.5–5.2)
PROT SERPL-MCNC: 6.1 G/DL (ref 6–8.5)
RBC # BLD AUTO: 3.99 10*6/MM3 (ref 4.14–5.8)
SODIUM SERPL-SCNC: 138 MMOL/L (ref 136–145)
WBC # BLD AUTO: 5.17 10*3/MM3 (ref 3.4–10.8)

## 2020-08-04 ENCOUNTER — TELEPHONE (OUTPATIENT)
Dept: ONCOLOGY | Facility: CLINIC | Age: 65
End: 2020-08-04

## 2020-08-04 NOTE — TELEPHONE ENCOUNTER
Caller: Nasreen with [facility]     Relationship to patient: Provider    Best call back number: [phone]    Concerns or Questions if Applicable:  The pt has a CT scan scheduled at this facility tomorrow but they haven't received an authorization.    Nasreen asked if we can call to set this up, the number above is her direct line so we can leave her a msg.

## 2020-08-18 ENCOUNTER — OFFICE VISIT (OUTPATIENT)
Dept: ONCOLOGY | Facility: CLINIC | Age: 65
End: 2020-08-18

## 2020-08-18 VITALS
BODY MASS INDEX: 18.82 KG/M2 | WEIGHT: 142 LBS | HEIGHT: 73 IN | DIASTOLIC BLOOD PRESSURE: 75 MMHG | SYSTOLIC BLOOD PRESSURE: 115 MMHG

## 2020-08-18 DIAGNOSIS — C18.1 MUCINOUS ADENOCARCINOMA OF APPENDIX (HCC): Primary | ICD-10-CM

## 2020-08-18 PROCEDURE — 99214 OFFICE O/P EST MOD 30 MIN: CPT | Performed by: INTERNAL MEDICINE

## 2020-08-18 NOTE — PROGRESS NOTES
Telehealth follow-up visit: This was an audio and video enabled telemedicine encounter. Verbal consent given. Done Covid avoidance per CDC guidelines. D/W pt 15 min face to face by Doximity.    CHIEF COMPLAINT: Follow-up mucinous adenocarcinoma of the appendix    Problem List:  Oncology/Hematology History    1.  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  Pathologic stage IIa with T3 extension into the base of the appendix through the muscularis propria but not into the serosal surface.  16 nodes negative.  Colonoscopy December 2017 negative postop    -12/30/2019 medical oncology office visit: The patient had been on surveillance since surgery December 2017.  CT scans had been negative up until 12/30/2019 CT chest abdomen and pelvis that showed subtle soft tissue density surrounding surgical clips in the right side of the pelvis along with soft tissue nodule at the anterior wall of the bladder concerning for recurrent disease.  Patient sent back to Dr. Davidson for colonoscopy.    -2/7/2020 patient was referred to Dr. Peoples at the [facility], he underwent diagnostic laparoscopic and peritoneal biopsies that confirmed recurrent disease with biopsy of bladder dome nodule showing adenocarcinoma with mucinous features.  Port was placed at this time also.    -2/25/2020 began FOLFIRI    -4/21/2020 patient having increased fatigue, FOLFIRI dose reduced by 10%.    -5/19/2020 cycle 7 FOLFIRI    -6/18/2020 Dr. Peoples performed exploratory laparotomy with excision of right lobe liver lesion and left lobe liver lesion, omentectomy, resection of pelvic peritoneal nodules, ileocolectomy with creation of ileal colostomy, hyperthermic intraperitoneal chemotherapy with mitomycin-C at 42 °C for deep tissue penetration for 90 minutes.  Dr. Perdue performed partial cystectomy with right ureteral stent placement    -8/5/2020 CT abdomen pelvis with contrast shows small  soft tissue nodule anterior aspect of bladder stable.  New small amount of ascites as well as a new small left pleural effusion.  Creatinine 0.75 with hemoglobin 11.1 otherwise unremarkable CBC and CMP.        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  Pathologic stage IIa with T3 extension into the base of the appendix through the muscularis propria but not into the serosal surface.  16 nodes negative.  Colonoscopy December 2017 negative postop      12/5/2017 Surgery     Surgery       Procedure:  Appendectomy and right hemicolectomy      Completeness of resection:  No evidence of residual tumor     Pathology revealed invasive moderately differentiated mucinous adenocarcinoma.  Right hemicolectomy: Benign colon and small intestine no evidence of carcinoma.  16 benign lymph nodes, 0/16, negative for dysplasia or malignancy.  Tumor size approximately 6 cm.  Grade 2, moderately differentiated.  Tumor invades through the muscularis profile into the base of appendix but does not extend to the serosal surface.  All margins uninvolved, no lymphovascular invasion identified.  Pathological stage pT3 pN0.          12/8/2017 Imaging     CT of the chest abdomen and pelvis negative.  Baseline CBC WBC 7100, hemoglobin 11.3, hematocrit 34.8%, platelet count 195,000.      3/20/2018 Procedure     Colonoscopy with Dr. Davidson was normal, recommendation for repeat surveillance colonoscopy in 3 years.      6/11/2018 Imaging     CT chest, abdomen and pelvis with no evidence metastatic disease.  CEA 1.1      9/24/2018 Imaging     CT abdomen pelvis showed no acute changes and nothing to suggest recurrence      12/28/2018 Imaging     CT chest, abdomen and pelvis negative. Normal CBC, CMP and CEA 0.7.      6/28/2019 Imaging     CT chest, abdomen and pelvis: No interval change from prior studies.   No recurrent or metastatic disease detected in the chest, abdomen or pelvis.  No acute process.  CEA 0.9      12/30/2019 Imaging     CT chest, abdomen and pelvis: No disease in the chest.  There was noted a subtle soft tissue density, one surrounding surgical clips in the right side of the pelvis and the other a soft tissue nodule intimately associated with the anterior wall of the bladder, which are considered suspicious for recurrent disease.  CEA 1.1.  CMP with normal creatinine 0.9, total bilirubin 1.9, AST 34, ALT 24, alkaline phosphatase 93.  CBC with WBC 6900, hemoglobin 15.9, platelet count 232,000.        1/21/2020 Procedure     Normal colonoscopy with Dr. Davidson.  He has made referral to Dr. Portillo Peoples at .      2/7/2020 Progression     12/30/2019 CT chest, abdomen and pelvis: No disease in the chest.  There are subtle soft tissue densities (1 surrounding surgical clips in the right side of the pelvis and the other a soft tissue nodule intimately associated with the anterior wall of the bladder) which are considered suspicious for recurrent disease.  CMP with normal creatinine 0.9, total bilirubin 1.9, AST 34, ALT 24, alkaline phosphatase 93.  CBC with WBC 6900, hemoglobin 15.9, platelet count 232,000.  CEA 1.1.  2/7/2020 diagnostic laparoscopy with peritoneal biopsies performed at the [facility] by Dr. Peoples showed no sign of diffuse peritoneal carcinomatosis or liver metastasis.  There was a firm nodule in the superior dome of the bladder, adherent to omentum, as well as right pelvic sidewall nodule adjacent to surgical clips.  Biopsy of bladder dome nodule showed adenocarcinoma with mucinous features.      2/25/2020 -  Chemotherapy     OP COLORECTAL FOLFIRI Irinotecan / Leucovorin / Fluorouracil      5/29/2020 Imaging     CT chest abdomen pelvis shows slight improvement with decreased nodule anterolateral margin of urinary bladder with stable nodularity of the right lower quadrant adjacent  to surgical clips and no progressive disease.  Slight hyperemia of the colonic mucosa      6/18/2020 Surgery     Surgery       -6/18/2020 Dr. Peoples performed exploratory laparotomy with excision of right lobe liver lesion and left lobe liver lesion, omentectomy, resection of pelvic peritoneal nodules, ileocolectomy with creation of ileal colostomy, hyperthermic intraperitoneal chemotherapy with mitomycin-C at 42 °C for deep tissue penetration for 90 minutes.  Dr. Perdue performed partial cystectomy with right ureteral stent placement      8/5/2020 Imaging      CT abdomen pelvis with contrast shows small soft tissue nodule anterior aspect of bladder stable.  New small amount of ascites as well as a new small left pleural effusion.  Creatinine 0.75 with hemoglobin 11.1 otherwise unremarkable CBC and CMP         HISTORY OF PRESENT ILLNESS:  The patient is a 65 y.o. male, here for follow up on management of mucinous adenocarcinoma of the appendix status post debulking and HI PEC following neoadjuvant Folfiri as outlined above.  I reviewed the images and reports thereof of the above CT.  Does have early satiety present since his surgery with decreased capacity but no nausea or vomiting but is slowly gaining weight.      Past Medical History:   Diagnosis Date   • Hypertension      No past surgical history on file.    Allergies   Allergen Reactions   • Levofloxacin Unknown - Low Severity   • Penicillins Unknown - Low Severity   • Tetracycline Unknown - Low Severity       Family History and Social History reviewed and changed as necessary      REVIEW OF SYSTEM:   Review of Systems   Constitutional: Negative for appetite change, chills, diaphoresis, fatigue, fever and unexpected weight change.   HENT:   Negative for mouth sores, sore throat and trouble swallowing.    Eyes: Negative for icterus.   Respiratory: Negative for cough, hemoptysis and shortness of breath.    Cardiovascular: Negative for chest pain, leg swelling and  "palpitations.   Gastrointestinal: Negative for abdominal distention, abdominal pain, blood in stool, constipation, diarrhea, nausea and vomiting.   Endocrine: Negative for hot flashes.   Genitourinary: Negative for bladder incontinence, difficulty urinating, dysuria, frequency and hematuria.    Musculoskeletal: Negative for gait problem, neck pain and neck stiffness.   Skin: Negative for rash.   Neurological: Negative for dizziness, gait problem, headaches, light-headedness and numbness.   Hematological: Negative for adenopathy. Does not bruise/bleed easily.   Psychiatric/Behavioral: Negative for depression. The patient is not nervous/anxious.    All other systems reviewed and are negative.       PHYSICAL EXAM    Vitals:    08/18/20 0740   BP: 115/75   Weight: 64.4 kg (142 lb)   Height: 185.4 cm (73\")     Vitals:    08/18/20 0740   PainSc: 0-No pain        Constitutional: Appears well-developed and still thin. No distress.   ECOG: (1) Restricted in Physically Strenuous Activity, Ambulatory & Able to Do Work of Light Nature  HENT:   Head: Normocephalic.   Mouth/Throat: Oropharynx is clear and moist.   Eyes: Conjunctivae are normal.  No scleral icterus.   Neck:  . No visible thyromegaly present.   Cardiovascular: No JVD visible  Pulmonary/Chest: No visible respiratory distress.   Abdominal: Soft. Exhibits no visible distension.    Musculoskeletal:Exhibits no edema, tenderness or deformity.   Neurological: Alert and oriented to person, place, and time.  Moving all extremities  Skin: No ecchymosis, no petechiae and no rash noted. Not diaphoretic. No cyanosis. Nails show no clubbing.   Psychiatric: Normal mood and affect.   Vitals reviewed.      Lab Results   Component Value Date    HGB 11.1 (L) 07/28/2020    HCT 35.9 (L) 07/28/2020    MCV 90.0 07/28/2020     07/28/2020    WBC 5.17 07/28/2020    NEUTROABS 3.24 07/28/2020    LYMPHSABS 1.22 07/28/2020    MONOSABS 0.57 07/28/2020    EOSABS 0.11 07/28/2020    BASOSABS " 0.02 07/28/2020       Lab Results   Component Value Date    BUN 9 07/28/2020    CREATININE 0.75 (L) 07/28/2020     07/28/2020    K 3.5 07/28/2020     07/28/2020    CO2 25.8 07/28/2020    CALCIUM 8.7 07/28/2020    ALBUMIN 3.70 07/28/2020    BILITOT 0.5 07/28/2020    ALKPHOS 83 07/28/2020    AST 15 07/28/2020    ALT 8 07/28/2020                   ASSESSMENT & PLAN:    1. 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  Liver nodules were not involved but bladder, nodes, and peritoneal nodule was involved.  Received HI PEC with mitomycin-C 6/18/2020  2. Diarrhea predating chemotherapy or surgery  3. Early satiety post surgery/HI PEC.    Discussion: I reviewed his CT abdomen pelvis as above.  No significant findings.  Small degree of ascites and small pleural effusion not surprising following HI PEC with mitomycin 6/18/2020.  Small bladder wall thickening not surprising after partial cystectomy.  Slowly gaining strength and slowly eating better albeit his capacity has been diminished since surgery.  Weight starting to rise.There are no firm guidelines on how to follow-up at the [employer] of colorectal surgeons practice guidelines suggests baseline imaging 2 months post cytoreductive surgery and then at least annually for 5 or more years for low-grade peritoneal disease.  We will check a CT abdomen pelvis in 6 months and if that is stable then go to annual.  He sees Dr. Peoples in 6 days and can have his port out at that junction I will do a Doximity visit with him at that junction.      Cy Delcid MD

## 2021-02-23 ENCOUNTER — OFFICE VISIT (OUTPATIENT)
Dept: ONCOLOGY | Facility: CLINIC | Age: 66
End: 2021-02-23

## 2021-02-23 VITALS — WEIGHT: 142 LBS | HEIGHT: 73 IN | BODY MASS INDEX: 18.82 KG/M2

## 2021-02-23 DIAGNOSIS — C18.1 MUCINOUS ADENOCARCINOMA OF APPENDIX (HCC): Primary | ICD-10-CM

## 2021-02-23 PROCEDURE — 99443 PR PHYS/QHP TELEPHONE EVALUATION 21-30 MIN: CPT | Performed by: INTERNAL MEDICINE

## 2021-02-23 NOTE — PROGRESS NOTES
CHIEF COMPLAINT: ***    Problem List:  Oncology/Hematology History Overview Note   1.  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  Pathologic stage IIa with T3 extension into the base of the appendix through the muscularis propria but not into the serosal surface.  16 nodes negative.  Colonoscopy December 2017 negative postop    -12/30/2019 medical oncology office visit: The patient had been on surveillance since surgery December 2017.  CT scans had been negative up until 12/30/2019 CT chest abdomen and pelvis that showed subtle soft tissue density surrounding surgical clips in the right side of the pelvis along with soft tissue nodule at the anterior wall of the bladder concerning for recurrent disease.  Patient sent back to Dr. Davidson for colonoscopy.    -2/7/2020 patient was referred to Dr. Peoples at the [facility], he underwent diagnostic laparoscopic and peritoneal biopsies that confirmed recurrent disease with biopsy of bladder dome nodule showing adenocarcinoma with mucinous features.  Port was placed at this time also.    -2/25/2020 began FOLFIRI    -4/21/2020 patient having increased fatigue, FOLFIRI dose reduced by 10%.    -5/19/2020 cycle 7 FOLFIRI    -6/18/2020 Dr. Peoples performed exploratory laparotomy with excision of right lobe liver lesion and left lobe liver lesion, omentectomy, resection of pelvic peritoneal nodules, ileocolectomy with creation of ileal colostomy, hyperthermic intraperitoneal chemotherapy with mitomycin-C at 42 °C for deep tissue penetration for 90 minutes.  Dr. Perdue performed partial cystectomy with right ureteral stent placement    -8/5/2020 CT abdomen pelvis with contrast shows small soft tissue nodule anterior aspect of bladder stable.  New small amount of ascites as well as a new small left pleural effusion.  Creatinine 0.75 with hemoglobin 11.1 otherwise unremarkable CBC and CMP.     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  Pathologic stage IIa with T3 extension into the base of the appendix through the muscularis propria but not into the serosal surface.  16 nodes negative.  Colonoscopy December 2017 negative postop     12/5/2017 Surgery    Surgery       Procedure:  Appendectomy and right hemicolectomy      Completeness of resection:  No evidence of residual tumor     Pathology revealed invasive moderately differentiated mucinous adenocarcinoma.  Right hemicolectomy: Benign colon and small intestine no evidence of carcinoma.  16 benign lymph nodes, 0/16, negative for dysplasia or malignancy.  Tumor size approximately 6 cm.  Grade 2, moderately differentiated.  Tumor invades through the muscularis profile into the base of appendix but does not extend to the serosal surface.  All margins uninvolved, no lymphovascular invasion identified.  Pathological stage pT3 pN0.         12/8/2017 Imaging    CT of the chest abdomen and pelvis negative.  Baseline CBC WBC 7100, hemoglobin 11.3, hematocrit 34.8%, platelet count 195,000.     3/20/2018 Procedure    Colonoscopy with Dr. Davidson was normal, recommendation for repeat surveillance colonoscopy in 3 years.     6/11/2018 Imaging    CT chest, abdomen and pelvis with no evidence metastatic disease.  CEA 1.1     9/24/2018 Imaging    CT abdomen pelvis showed no acute changes and nothing to suggest recurrence     12/28/2018 Imaging    CT chest, abdomen and pelvis negative. Normal CBC, CMP and CEA 0.7.     6/28/2019 Imaging    CT chest, abdomen and pelvis: No interval change from prior studies.  No recurrent or metastatic disease detected in the chest, abdomen or pelvis.  No acute process.  CEA 0.9     12/30/2019 Imaging    CT chest, abdomen and pelvis: No disease in the chest.  There was noted a subtle soft tissue density,  one surrounding surgical clips in the right side of the pelvis and the other a soft tissue nodule intimately associated with the anterior wall of the bladder, which are considered suspicious for recurrent disease.  CEA 1.1.  CMP with normal creatinine 0.9, total bilirubin 1.9, AST 34, ALT 24, alkaline phosphatase 93.  CBC with WBC 6900, hemoglobin 15.9, platelet count 232,000.       1/21/2020 Procedure    Normal colonoscopy with Dr. Davidson.  He has made referral to Dr. Portillo Peoples at .     2/7/2020 Progression    12/30/2019 CT chest, abdomen and pelvis: No disease in the chest.  There are subtle soft tissue densities (1 surrounding surgical clips in the right side of the pelvis and the other a soft tissue nodule intimately associated with the anterior wall of the bladder) which are considered suspicious for recurrent disease.  CMP with normal creatinine 0.9, total bilirubin 1.9, AST 34, ALT 24, alkaline phosphatase 93.  CBC with WBC 6900, hemoglobin 15.9, platelet count 232,000.  CEA 1.1.  2/7/2020 diagnostic laparoscopy with peritoneal biopsies performed at the [facility] by Dr. Peoples showed no sign of diffuse peritoneal carcinomatosis or liver metastasis.  There was a firm nodule in the superior dome of the bladder, adherent to omentum, as well as right pelvic sidewall nodule adjacent to surgical clips.  Biopsy of bladder dome nodule showed adenocarcinoma with mucinous features.     2/25/2020 -  Chemotherapy    OP COLORECTAL FOLFIRI Irinotecan / Leucovorin / Fluorouracil     5/29/2020 Imaging    CT chest abdomen pelvis shows slight improvement with decreased nodule anterolateral margin of urinary bladder with stable nodularity of the right lower quadrant adjacent to surgical clips and no progressive disease.  Slight hyperemia of the colonic mucosa     6/18/2020 Surgery    Surgery       -6/18/2020 Dr. Peoples performed exploratory laparotomy with excision of right lobe liver lesion and left lobe liver  "lesion, omentectomy, resection of pelvic peritoneal nodules, ileocolectomy with creation of ileal colostomy, hyperthermic intraperitoneal chemotherapy with mitomycin-C at 42 °C for deep tissue penetration for 90 minutes.  Dr. Perdue performed partial cystectomy with right ureteral stent placement     8/5/2020 Imaging     CT abdomen pelvis with contrast shows small soft tissue nodule anterior aspect of bladder stable.  New small amount of ascites as well as a new small left pleural effusion.  Creatinine 0.75 with hemoglobin 11.1 otherwise unremarkable CBC and CMP         HISTORY OF PRESENT ILLNESS:  The patient is a 65 y.o. male, here for follow up on management of ***    Past Medical History:   Diagnosis Date   • Hypertension      No past surgical history on file.    Allergies   Allergen Reactions   • Levofloxacin Unknown - Low Severity   • Penicillins Unknown - Low Severity   • Tetracycline Unknown - Low Severity       Family History and Social History reviewed and changed as necessary    REVIEW OF SYSTEM:   ***    PHYSICAL EXAM:  ***    Vitals:    02/23/21 0716   Weight: 64.4 kg (142 lb)   Height: 185.4 cm (73\")     Vitals:    02/23/21 0716   PainSc: 0-No pain          ECOG score: 1     Karnofsky score: 90     Vitals reviewed.    ECOG: {findings; ecog performance status:11988}    Lab Results   Component Value Date    HGB 11.1 (L) 07/28/2020    HCT 35.9 (L) 07/28/2020    MCV 90.0 07/28/2020     07/28/2020    WBC 5.17 07/28/2020    NEUTROABS 3.24 07/28/2020    LYMPHSABS 1.22 07/28/2020    MONOSABS 0.57 07/28/2020    EOSABS 0.11 07/28/2020    BASOSABS 0.02 07/28/2020       Lab Results   Component Value Date    BUN 9 07/28/2020    CREATININE 0.75 (L) 07/28/2020     07/28/2020    K 3.5 07/28/2020     07/28/2020    CO2 25.8 07/28/2020    CALCIUM 8.7 07/28/2020    ALBUMIN 3.70 07/28/2020    BILITOT 0.5 07/28/2020    ALKPHOS 83 07/28/2020    AST 15 07/28/2020    ALT 8 07/28/2020             ASSESSMENT & " PLAN:    Cy Delcid MD    02/23/2021

## 2021-02-23 NOTE — PROGRESS NOTES
Telehealth follow-up visit  This was an audio and video enabled telemedicine encounter.  Verbal consent given.  Could not get through Zoom or Doximity so we converted to audio.  Done for COVID-19 risk reduction.  CHIEF COMPLAINT: Dysuria late in urination    Problem List:  Oncology/Hematology History Overview Note   1.  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  Pathologic stage IIa with T3 extension into the base of the appendix through the muscularis propria but not into the serosal surface.  16 nodes negative.  Colonoscopy December 2017 negative postop    -12/30/2019 medical oncology office visit: The patient had been on surveillance since surgery December 2017.  CT scans had been negative up until 12/30/2019 CT chest abdomen and pelvis that showed subtle soft tissue density surrounding surgical clips in the right side of the pelvis along with soft tissue nodule at the anterior wall of the bladder concerning for recurrent disease.  Patient sent back to Dr. Davidson for colonoscopy.    -2/7/2020 patient was referred to Dr. Peoples at the [facility], he underwent diagnostic laparoscopic and peritoneal biopsies that confirmed recurrent disease with biopsy of bladder dome nodule showing adenocarcinoma with mucinous features.  Port was placed at this time also.    -2/25/2020 began FOLFIRI    -4/21/2020 patient having increased fatigue, FOLFIRI dose reduced by 10%.    -5/19/2020 cycle 7 FOLFIRI    -6/18/2020 Dr. Peoples performed exploratory laparotomy with excision of right lobe liver lesion and left lobe liver lesion, omentectomy, resection of pelvic peritoneal nodules, ileocolectomy with creation of ileal colostomy, hyperthermic intraperitoneal chemotherapy with mitomycin-C at 42 °C for deep tissue penetration for 90 minutes.  Dr. Perdue performed partial cystectomy with right ureteral stent placement    -8/5/2020 CT abdomen pelvis with contrast  shows small soft tissue nodule anterior aspect of bladder stable.  New small amount of ascites as well as a new small left pleural effusion.  Creatinine 0.75 with hemoglobin 11.1 otherwise unremarkable CBC and CMP.    -2/15/2020 CT abdomen pelvis with contrast compared to 8/5/2020 shows enlarging soft tissue mass 4.2 cm over the bladder fundus and there is a calcification along the base of the urinary bladder.  Small stable multiple hypoattenuating indeterminate liver lesions.  Similar degree of ascites.       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  Pathologic stage IIa with T3 extension into the base of the appendix through the muscularis propria but not into the serosal surface.  16 nodes negative.  Colonoscopy December 2017 negative postop     12/5/2017 Surgery    Surgery       Procedure:  Appendectomy and right hemicolectomy      Completeness of resection:  No evidence of residual tumor     Pathology revealed invasive moderately differentiated mucinous adenocarcinoma.  Right hemicolectomy: Benign colon and small intestine no evidence of carcinoma.  16 benign lymph nodes, 0/16, negative for dysplasia or malignancy.  Tumor size approximately 6 cm.  Grade 2, moderately differentiated.  Tumor invades through the muscularis profile into the base of appendix but does not extend to the serosal surface.  All margins uninvolved, no lymphovascular invasion identified.  Pathological stage pT3 pN0.         12/8/2017 Imaging    CT of the chest abdomen and pelvis negative.  Baseline CBC WBC 7100, hemoglobin 11.3, hematocrit 34.8%, platelet count 195,000.     3/20/2018 Procedure    Colonoscopy with Dr. Davidson was normal, recommendation for repeat surveillance colonoscopy in 3 years.     6/11/2018 Imaging    CT chest, abdomen and pelvis with no evidence metastatic disease.  CEA  1.1     9/24/2018 Imaging    CT abdomen pelvis showed no acute changes and nothing to suggest recurrence     12/28/2018 Imaging    CT chest, abdomen and pelvis negative. Normal CBC, CMP and CEA 0.7.     6/28/2019 Imaging    CT chest, abdomen and pelvis: No interval change from prior studies.  No recurrent or metastatic disease detected in the chest, abdomen or pelvis.  No acute process.  CEA 0.9     12/30/2019 Imaging    CT chest, abdomen and pelvis: No disease in the chest.  There was noted a subtle soft tissue density, one surrounding surgical clips in the right side of the pelvis and the other a soft tissue nodule intimately associated with the anterior wall of the bladder, which are considered suspicious for recurrent disease.  CEA 1.1.  CMP with normal creatinine 0.9, total bilirubin 1.9, AST 34, ALT 24, alkaline phosphatase 93.  CBC with WBC 6900, hemoglobin 15.9, platelet count 232,000.       1/21/2020 Procedure    Normal colonoscopy with Dr. Davidson.  He has made referral to Dr. Portillo Peoples at .     2/7/2020 Progression    12/30/2019 CT chest, abdomen and pelvis: No disease in the chest.  There are subtle soft tissue densities (1 surrounding surgical clips in the right side of the pelvis and the other a soft tissue nodule intimately associated with the anterior wall of the bladder) which are considered suspicious for recurrent disease.  CMP with normal creatinine 0.9, total bilirubin 1.9, AST 34, ALT 24, alkaline phosphatase 93.  CBC with WBC 6900, hemoglobin 15.9, platelet count 232,000.  CEA 1.1.  2/7/2020 diagnostic laparoscopy with peritoneal biopsies performed at the [facility] by Dr. Peoples showed no sign of diffuse peritoneal carcinomatosis or liver metastasis.  There was a firm nodule in the superior dome of the bladder, adherent to omentum, as well as right pelvic sidewall nodule adjacent to surgical clips.  Biopsy of bladder dome nodule showed adenocarcinoma with mucinous features.      2/25/2020 -  Chemotherapy    OP COLORECTAL FOLFIRI Irinotecan / Leucovorin / Fluorouracil     5/29/2020 Imaging    CT chest abdomen pelvis shows slight improvement with decreased nodule anterolateral margin of urinary bladder with stable nodularity of the right lower quadrant adjacent to surgical clips and no progressive disease.  Slight hyperemia of the colonic mucosa     6/18/2020 Surgery    Surgery       -6/18/2020 Dr. Peoples performed exploratory laparotomy with excision of right lobe liver lesion and left lobe liver lesion, omentectomy, resection of pelvic peritoneal nodules, ileocolectomy with creation of ileal colostomy, hyperthermic intraperitoneal chemotherapy with mitomycin-C at 42 °C for deep tissue penetration for 90 minutes.  Dr. Perdue performed partial cystectomy with right ureteral stent placement     8/5/2020 Imaging     CT abdomen pelvis with contrast shows small soft tissue nodule anterior aspect of bladder stable.  New small amount of ascites as well as a new small left pleural effusion.  Creatinine 0.75 with hemoglobin 11.1 otherwise unremarkable CBC and CMP     2/15/2021 Imaging    CT abdomen pelvis with contrast compared to 8/5/2020 shows enlarging soft tissue mass 4.2 cm over the bladder fundus and there is a calcification along the base of the urinary bladder.  Small stable multiple hypoattenuating indeterminate liver lesions.  Similar degree of ascites.         HISTORY OF PRESENT ILLNESS:  The patient is a 65 y.o. 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  Now with new bladder dome abnormalities as outlined above 2/15/2021 CT abdomen pelvis.  CBC and CMP unremarkable.  We will also get a CT of his chest for completion staging.  Other than for late latent phase urination dysuria, no new complaints.  No  "nia hematuria.    Past Medical History:   Diagnosis Date   • Hypertension      No past surgical history on file.    Allergies   Allergen Reactions   • Levofloxacin Unknown - Low Severity   • Penicillins Unknown - Low Severity   • Tetracycline Unknown - Low Severity       Family History and Social History reviewed and changed as necessary    REVIEW OF SYSTEM:   No hematuria.  No fevers chills weight loss    PHYSICAL EXAM:  Phone visit    Vitals:    02/23/21 0716   Weight: 64.4 kg (142 lb)   Height: 185.4 cm (73\")     Vitals:    02/23/21 0716   PainSc: 0-No pain            Lab Results   Component Value Date    HGB 11.1 (L) 07/28/2020    HCT 35.9 (L) 07/28/2020    MCV 90.0 07/28/2020     07/28/2020    WBC 5.17 07/28/2020    NEUTROABS 3.24 07/28/2020    LYMPHSABS 1.22 07/28/2020    MONOSABS 0.57 07/28/2020    EOSABS 0.11 07/28/2020    BASOSABS 0.02 07/28/2020       Lab Results   Component Value Date    BUN 9 07/28/2020    CREATININE 0.75 (L) 07/28/2020     07/28/2020    K 3.5 07/28/2020     07/28/2020    CO2 25.8 07/28/2020    CALCIUM 8.7 07/28/2020    ALBUMIN 3.70 07/28/2020    BILITOT 0.5 07/28/2020    ALKPHOS 83 07/28/2020    AST 15 07/28/2020    ALT 8 07/28/2020             ASSESSMENT & PLAN:  1. 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  Now with new bladder dome abnormalities as outlined above 2/15/2021 CT abdomen pelvis.  CBC and CMP unremarkable.  We will also get a CT of his chest for completion staging.  Other than for late latent phase urination dysuria, no new complaints.  No nia hematuria.  I am sending him back to Dr. Portillo Peoples and Dr. Ike Perdue with whom I have communicated today.  Total time of care today reviewing his images, reviewing his records, reviewing the plan as outlined with outside physicians above took 45 minutes of " patient care time today.  I will see him back in a few weeks to see what Jorge Peoples and Iron find.    Cy Delcid MD    02/23/2021

## 2021-03-04 ENCOUNTER — TELEPHONE (OUTPATIENT)
Dept: ONCOLOGY | Facility: CLINIC | Age: 66
End: 2021-03-04

## 2021-03-23 ENCOUNTER — OFFICE VISIT (OUTPATIENT)
Dept: ONCOLOGY | Facility: CLINIC | Age: 66
End: 2021-03-23

## 2021-03-23 VITALS
RESPIRATION RATE: 18 BRPM | HEIGHT: 73 IN | WEIGHT: 145 LBS | DIASTOLIC BLOOD PRESSURE: 89 MMHG | BODY MASS INDEX: 19.22 KG/M2 | SYSTOLIC BLOOD PRESSURE: 146 MMHG | TEMPERATURE: 99.2 F | HEART RATE: 82 BPM

## 2021-03-23 DIAGNOSIS — C48.2 PERITONEAL CARCINOMA (HCC): Primary | ICD-10-CM

## 2021-03-23 DIAGNOSIS — C18.1 MUCINOUS ADENOCARCINOMA OF APPENDIX (HCC): ICD-10-CM

## 2021-03-23 PROCEDURE — 99214 OFFICE O/P EST MOD 30 MIN: CPT | Performed by: INTERNAL MEDICINE

## 2021-03-23 NOTE — PROGRESS NOTES
CHIEF COMPLAINT: Recurrent mucinous adenocarcinoma of the appendix    Problem List:  Oncology/Hematology History Overview Note   1.  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  Pathologic stage IIa with T3 extension into the base of the appendix through the muscularis propria but not into the serosal surface.  16 nodes negative.  Colonoscopy December 2017 negative postop    -12/30/2019 medical oncology office visit: The patient had been on surveillance since surgery December 2017.  CT scans had been negative up until 12/30/2019 CT chest abdomen and pelvis that showed subtle soft tissue density surrounding surgical clips in the right side of the pelvis along with soft tissue nodule at the anterior wall of the bladder concerning for recurrent disease.  Patient sent back to Dr. Davidson for colonoscopy.    -2/7/2020 patient was referred to Dr. Peoples at the [facility], he underwent diagnostic laparoscopic and peritoneal biopsies that confirmed recurrent disease with biopsy of bladder dome nodule showing adenocarcinoma with mucinous features.  Port was placed at this time also.    -2/25/2020 began FOLFIRI    -4/21/2020 patient having increased fatigue, FOLFIRI dose reduced by 10%.    -5/19/2020 cycle 7 FOLFIRI    -6/18/2020 Dr. Peoples performed exploratory laparotomy with excision of right lobe liver lesion and left lobe liver lesion, omentectomy, resection of pelvic peritoneal nodules, ileocolectomy with creation of ileal colostomy, hyperthermic intraperitoneal chemotherapy with mitomycin-C at 42 °C for deep tissue penetration for 90 minutes.  Dr. Perdue performed partial cystectomy with right ureteral stent placement    -8/5/2020 CT abdomen pelvis with contrast shows small soft tissue nodule anterior aspect of bladder stable.  New small amount of ascites as well as a new small left pleural effusion.  Creatinine 0.75 with hemoglobin 11.1 otherwise  unremarkable CBC and CMP.    -2/15/2020 CT abdomen pelvis with contrast compared to 8/5/2020 shows enlarging soft tissue mass 4.2 cm over the bladder fundus and there is a calcification along the base of the urinary bladder.  Small stable multiple hypoattenuating indeterminate liver lesions.  Similar degree of ascites.    -3/8/2021 follow-up with Dr. Portillo Peoples we reviewed his CTs suggested team effort resection with Dr. Perdue    -3/16/2021 follow-up with Dr. Perdue.  He plans for open partial cystectomy, possible ureteral implants, cystoscopy and stent placement in concert with Dr. Peoples.       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  Pathologic stage IIa with T3 extension into the base of the appendix through the muscularis propria but not into the serosal surface.  16 nodes negative.  Colonoscopy December 2017 negative postop     12/5/2017 Surgery    Surgery       Procedure:  Appendectomy and right hemicolectomy      Completeness of resection:  No evidence of residual tumor     Pathology revealed invasive moderately differentiated mucinous adenocarcinoma.  Right hemicolectomy: Benign colon and small intestine no evidence of carcinoma.  16 benign lymph nodes, 0/16, negative for dysplasia or malignancy.  Tumor size approximately 6 cm.  Grade 2, moderately differentiated.  Tumor invades through the muscularis profile into the base of appendix but does not extend to the serosal surface.  All margins uninvolved, no lymphovascular invasion identified.  Pathological stage pT3 pN0.         12/8/2017 Imaging    CT of the chest abdomen and pelvis negative.  Baseline CBC WBC 7100, hemoglobin 11.3, hematocrit 34.8%, platelet count 195,000.     3/20/2018 Procedure    Colonoscopy with Dr. Davidson was normal, recommendation for repeat surveillance colonoscopy in 3 years.     6/11/2018  Imaging    CT chest, abdomen and pelvis with no evidence metastatic disease.  CEA 1.1     9/24/2018 Imaging    CT abdomen pelvis showed no acute changes and nothing to suggest recurrence     12/28/2018 Imaging    CT chest, abdomen and pelvis negative. Normal CBC, CMP and CEA 0.7.     6/28/2019 Imaging    CT chest, abdomen and pelvis: No interval change from prior studies.  No recurrent or metastatic disease detected in the chest, abdomen or pelvis.  No acute process.  CEA 0.9     12/30/2019 Imaging    CT chest, abdomen and pelvis: No disease in the chest.  There was noted a subtle soft tissue density, one surrounding surgical clips in the right side of the pelvis and the other a soft tissue nodule intimately associated with the anterior wall of the bladder, which are considered suspicious for recurrent disease.  CEA 1.1.  CMP with normal creatinine 0.9, total bilirubin 1.9, AST 34, ALT 24, alkaline phosphatase 93.  CBC with WBC 6900, hemoglobin 15.9, platelet count 232,000.       1/21/2020 Procedure    Normal colonoscopy with Dr. Davidson.  He has made referral to Dr. Portillo Peoples at .     2/7/2020 Progression    12/30/2019 CT chest, abdomen and pelvis: No disease in the chest.  There are subtle soft tissue densities (1 surrounding surgical clips in the right side of the pelvis and the other a soft tissue nodule intimately associated with the anterior wall of the bladder) which are considered suspicious for recurrent disease.  CMP with normal creatinine 0.9, total bilirubin 1.9, AST 34, ALT 24, alkaline phosphatase 93.  CBC with WBC 6900, hemoglobin 15.9, platelet count 232,000.  CEA 1.1.  2/7/2020 diagnostic laparoscopy with peritoneal biopsies performed at the [facility] by Dr. Peoples showed no sign of diffuse peritoneal carcinomatosis or liver metastasis.  There was a firm nodule in the superior dome of the bladder, adherent to omentum, as well as right pelvic sidewall nodule adjacent to surgical clips.   Biopsy of bladder dome nodule showed adenocarcinoma with mucinous features.     2/25/2020 -  Chemotherapy    OP COLORECTAL FOLFIRI Irinotecan / Leucovorin / Fluorouracil     5/29/2020 Imaging    CT chest abdomen pelvis shows slight improvement with decreased nodule anterolateral margin of urinary bladder with stable nodularity of the right lower quadrant adjacent to surgical clips and no progressive disease.  Slight hyperemia of the colonic mucosa     6/18/2020 Surgery    Surgery       -6/18/2020 Dr. Peoples performed exploratory laparotomy with excision of right lobe liver lesion and left lobe liver lesion, omentectomy, resection of pelvic peritoneal nodules, ileocolectomy with creation of ileal colostomy, hyperthermic intraperitoneal chemotherapy with mitomycin-C at 42 °C for deep tissue penetration for 90 minutes.  Dr. Perdue performed partial cystectomy with right ureteral stent placement     8/5/2020 Imaging     CT abdomen pelvis with contrast shows small soft tissue nodule anterior aspect of bladder stable.  New small amount of ascites as well as a new small left pleural effusion.  Creatinine 0.75 with hemoglobin 11.1 otherwise unremarkable CBC and CMP     2/15/2021 Imaging    CT abdomen pelvis with contrast compared to 8/5/2020 shows enlarging soft tissue mass 4.2 cm over the bladder fundus and there is a calcification along the base of the urinary bladder.  Small stable multiple hypoattenuating indeterminate liver lesions.  Similar degree of ascites.         HISTORY OF PRESENT ILLNESS:  The patient is a 65 y.o. male, here for follow up on management of recurrent mucinous adenocarcinoma of the appendix    Past Medical History:   Diagnosis Date   • Hypertension      No past surgical history on file.    Allergies   Allergen Reactions   • Levofloxacin Unknown - Low Severity   • Penicillins Unknown - Low Severity   • Tetracycline Unknown - Low Severity       Family History and Social History reviewed and changed as  "necessary    REVIEW OF SYSTEM:   Feeling reasonably fit    PHYSICAL EXAM:  No jaundice or icterus  No abdominal protuberance    Vitals:    03/23/21 1304   BP: 146/89   Pulse: 82   Resp: 18   Temp: 99.2 °F (37.3 °C)   Weight: 65.8 kg (145 lb)   Height: 185.4 cm (73\")     Vitals:    03/23/21 1304   PainSc: 0-No pain                      Vitals reviewed.    ECOG: (0) Fully Active - Able to Carry On All Pre-disease Performance Without Restriction    Lab Results   Component Value Date    HGB 11.1 (L) 07/28/2020    HCT 35.9 (L) 07/28/2020    MCV 90.0 07/28/2020     07/28/2020    WBC 5.17 07/28/2020    NEUTROABS 3.24 07/28/2020    LYMPHSABS 1.22 07/28/2020    MONOSABS 0.57 07/28/2020    EOSABS 0.11 07/28/2020    BASOSABS 0.02 07/28/2020       Lab Results   Component Value Date    BUN 9 07/28/2020    CREATININE 0.75 (L) 07/28/2020     07/28/2020    K 3.5 07/28/2020     07/28/2020    CO2 25.8 07/28/2020    CALCIUM 8.7 07/28/2020    ALBUMIN 3.70 07/28/2020    BILITOT 0.5 07/28/2020    ALKPHOS 83 07/28/2020    AST 15 07/28/2020    ALT 8 07/28/2020             ASSESSMENT & PLAN:  1.Recurrent low-grade mucinous adenocarcinoma of appendix post appendectomy followed by FOLFIRI followed by resection of residual disease along with partial lateral resection mitomycin Hypaque. Now with new bladder  Involvement. Has seen Dr. Peoples and Dr. Perdue who plan resection April 22. I will see him back in May once he has recovered from. I also reviewed his report and images from his 2/23/2021 CT chest that showed no malignancy.  His ascending aorta aneurysm. Total time of care today including reviewing outside records from his urologist and surgical oncologist and CAT scans and images thereof and plan as outlined above was 30 minutes total patient care time  Cy Delcid MD    03/23/2021      "

## 2021-05-18 ENCOUNTER — OFFICE VISIT (OUTPATIENT)
Dept: ONCOLOGY | Facility: CLINIC | Age: 66
End: 2021-05-18

## 2021-05-18 VITALS
HEIGHT: 73 IN | RESPIRATION RATE: 18 BRPM | SYSTOLIC BLOOD PRESSURE: 138 MMHG | DIASTOLIC BLOOD PRESSURE: 91 MMHG | BODY MASS INDEX: 18.16 KG/M2 | WEIGHT: 137 LBS | HEART RATE: 91 BPM | TEMPERATURE: 98.6 F

## 2021-05-18 DIAGNOSIS — C18.1 MUCINOUS ADENOCARCINOMA OF APPENDIX (HCC): Primary | ICD-10-CM

## 2021-05-18 PROCEDURE — 99215 OFFICE O/P EST HI 40 MIN: CPT | Performed by: INTERNAL MEDICINE

## 2021-05-18 NOTE — PROGRESS NOTES
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  Pathologic stage IIa with T3 extension into the base of the appendix through the muscularis propria but not into the serosal surface.  16 nodes negative.  Colonoscopy December 2017 negative postop    -12/30/2019 medical oncology office visit: The patient had been on surveillance since surgery December 2017.  CT scans had been negative up until 12/30/2019 CT chest abdomen and pelvis that showed subtle soft tissue density surrounding surgical clips in the right side of the pelvis along with soft tissue nodule at the anterior wall of the bladder concerning for recurrent disease.  Patient sent back to Dr. Davidson for colonoscopy.    -2/7/2020 patient was referred to Dr. Peoples at the [facility], he underwent diagnostic laparoscopic and peritoneal biopsies that confirmed recurrent disease with biopsy of bladder dome nodule showing adenocarcinoma with mucinous features.  Port was placed at this time also.    -2/25/2020 began FOLFIRI    -4/21/2020 patient having increased fatigue, FOLFIRI dose reduced by 10%.    -5/19/2020 cycle 7 FOLFIRI    -6/18/2020 Dr. Peoples performed exploratory laparotomy with excision of right lobe liver lesion and left lobe liver lesion, omentectomy, resection of pelvic peritoneal nodules, ileocolectomy with creation of ileal colostomy, hyperthermic intraperitoneal chemotherapy with mitomycin-C at 42 °C for deep tissue penetration for 90 minutes.  Dr. Perdue performed partial cystectomy with right ureteral stent placement    -8/5/2020 CT abdomen pelvis with contrast shows small soft tissue nodule anterior aspect of bladder stable.  New small amount of ascites as well as a new small left pleural effusion.  Creatinine  "0.75 with hemoglobin 11.1 otherwise unremarkable CBC and CMP.    -2/15/2020 CT abdomen pelvis with contrast compared to 8/5/2020 shows enlarging soft tissue mass 4.2 cm over the bladder fundus and there is a calcification along the base of the urinary bladder.  Small stable multiple hypoattenuating indeterminate liver lesions.  Similar degree of ascites.    -2/23/2021 CT chest shows no evidence of metastasis with ascending aorta 42 mm.    -3/8/2021 follow-up with Dr. Portillo Peoples we reviewed his CTs suggested team effort resection with Dr. Perdue    -3/16/2021 follow-up with Dr. Perdue.  He plans for open partial cystectomy, possible ureteral implants, cystoscopy and stent placement in concert with Dr. Peoples.    -4/22/2021 cystoscopy (after prior office cystoscopy showed large fungating mass) with bilateral ureteral catheters, exploratory laparotomy, lysis of adhesions, right pelvic sidewall excisional biopsy, open partial cystectomy Dr. Ike Perdue. Peritoneal fluid showed predominant inflammatory reactive mesothelial cells with no malignancy. 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 Carcinoma was less than 1 mm from the lateral resection margins.    -5/18/2021 [facility] medical oncology follow-up visit: I reviewed his hospital notes, operative notes, and pathology report as above and went over this with the patient.  The general consensus from retrospective as well as a few prospective data over the last couple of decades with this low-grade malignancy does not show any profound benefit from \"adjuvant\" chemotherapy in this current setting.  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  There is no standard follow-up but general guidelines suggest following up as typical colon cancer.  I will repeat his CT chest abdomen pelvis now to reestablish his baseline postoperative imaging and have him see my nurse practitioner back in a couple of weeks to " make sure there is no nia evidence of measurable residual disease.  Assuming this just shows postoperative changes, I would simply repeat scans again in 3 months or sooner as symptoms dictate.  If he has no evidence of persistent or metastatic disease on current imaging, then when he sees my nurse practitioner back she will also review what they say about his ascending aorta which was 42 mm in February and sent him to Dr. Evangelist Anthony for an opinion as to whether any intervention is needed relative to the aorta.  Obviously if his cancer is out of control on the upcoming scans then the aorta is a moot point.     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  Pathologic stage IIa with T3 extension into the base of the appendix through the muscularis propria but not into the serosal surface.  16 nodes negative.  Colonoscopy December 2017 negative postop     12/5/2017 Surgery    Surgery       Procedure:  Appendectomy and right hemicolectomy      Completeness of resection:  No evidence of residual tumor     Pathology revealed invasive moderately differentiated mucinous adenocarcinoma.  Right hemicolectomy: Benign colon and small intestine no evidence of carcinoma.  16 benign lymph nodes, 0/16, negative for dysplasia or malignancy.  Tumor size approximately 6 cm.  Grade 2, moderately differentiated.  Tumor invades through the muscularis profile into the base of appendix but does not extend to the serosal surface.  All margins uninvolved, no lymphovascular invasion identified.  Pathological stage pT3 pN0.         12/8/2017 Imaging    CT of the chest abdomen and pelvis negative.  Baseline CBC WBC 7100, hemoglobin 11.3, hematocrit 34.8%, platelet count 195,000.     3/20/2018 Procedure    Colonoscopy with Dr. Davidson was normal, recommendation for repeat surveillance colonoscopy in 3  years.     6/11/2018 Imaging    CT chest, abdomen and pelvis with no evidence metastatic disease.  CEA 1.1     9/24/2018 Imaging    CT abdomen pelvis showed no acute changes and nothing to suggest recurrence     12/28/2018 Imaging    CT chest, abdomen and pelvis negative. Normal CBC, CMP and CEA 0.7.     6/28/2019 Imaging    CT chest, abdomen and pelvis: No interval change from prior studies.  No recurrent or metastatic disease detected in the chest, abdomen or pelvis.  No acute process.  CEA 0.9     12/30/2019 Imaging    CT chest, abdomen and pelvis: No disease in the chest.  There was noted a subtle soft tissue density, one surrounding surgical clips in the right side of the pelvis and the other a soft tissue nodule intimately associated with the anterior wall of the bladder, which are considered suspicious for recurrent disease.  CEA 1.1.  CMP with normal creatinine 0.9, total bilirubin 1.9, AST 34, ALT 24, alkaline phosphatase 93.  CBC with WBC 6900, hemoglobin 15.9, platelet count 232,000.       1/21/2020 Procedure    Normal colonoscopy with Dr. Davidson.  He has made referral to Dr. Portillo Peoples at .     2/7/2020 Progression    12/30/2019 CT chest, abdomen and pelvis: No disease in the chest.  There are subtle soft tissue densities (1 surrounding surgical clips in the right side of the pelvis and the other a soft tissue nodule intimately associated with the anterior wall of the bladder) which are considered suspicious for recurrent disease.  CMP with normal creatinine 0.9, total bilirubin 1.9, AST 34, ALT 24, alkaline phosphatase 93.  CBC with WBC 6900, hemoglobin 15.9, platelet count 232,000.  CEA 1.1.  2/7/2020 diagnostic laparoscopy with peritoneal biopsies performed at the [facility] by Dr. Peoples showed no sign of diffuse peritoneal carcinomatosis or liver metastasis.  There was a firm nodule in the superior dome of the bladder, adherent to omentum, as well as right pelvic sidewall nodule adjacent  to surgical clips.  Biopsy of bladder dome nodule showed adenocarcinoma with mucinous features.     2/25/2020 -  Chemotherapy    OP COLORECTAL FOLFIRI Irinotecan / Leucovorin / Fluorouracil     5/29/2020 Imaging    CT chest abdomen pelvis shows slight improvement with decreased nodule anterolateral margin of urinary bladder with stable nodularity of the right lower quadrant adjacent to surgical clips and no progressive disease.  Slight hyperemia of the colonic mucosa     6/18/2020 Surgery    Surgery       -6/18/2020 Dr. Peoples performed exploratory laparotomy with excision of right lobe liver lesion and left lobe liver lesion, omentectomy, resection of pelvic peritoneal nodules, ileocolectomy with creation of ileal colostomy, hyperthermic intraperitoneal chemotherapy with mitomycin-C at 42 °C for deep tissue penetration for 90 minutes.  Dr. Perdue performed partial cystectomy with right ureteral stent placement     8/5/2020 Imaging     CT abdomen pelvis with contrast shows small soft tissue nodule anterior aspect of bladder stable.  New small amount of ascites as well as a new small left pleural effusion.  Creatinine 0.75 with hemoglobin 11.1 otherwise unremarkable CBC and CMP     2/15/2021 Imaging    CT abdomen pelvis with contrast compared to 8/5/2020 shows enlarging soft tissue mass 4.2 cm over the bladder fundus and there is a calcification along the base of the urinary bladder.  Small stable multiple hypoattenuating indeterminate liver lesions.  Similar degree of ascites.     2/23/2021 Imaging    -2/23/2021 CT chest shows no evidence of metastasis with ascending aorta 42 mm.     4/22/2021 Surgery    -4/22/2021 cystoscopy (after prior office cystoscopy showed large fungating mass) with bilateral ureteral catheters, exploratory laparotomy, lysis of adhesions, right pelvic sidewall excisional biopsy, open partial cystectomy Dr. Ike Perdue. Peritoneal fluid showed predominant inflammatory reactive mesothelial cells  "with no malignancy. 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 Carcinoma was less than 1 mm from the lateral resection margins.         HISTORY OF PRESENT ILLNESS:  The patient is a 65 y.o. male, here for follow up on management of recurrent mucinous adenocarcinoma of the appendix status post serial partial cystectomy as outlined above    Past Medical History:   Diagnosis Date   • Hypertension      No past surgical history on file.    Allergies   Allergen Reactions   • Levofloxacin Unknown - Low Severity   • Penicillins Unknown - Low Severity   • Tetracycline Unknown - Low Severity       Family History and Social History reviewed and changed as necessary    REVIEW OF SYSTEM:   Postoperative wound healing coming along nicely    PHYSICAL EXAM:  Wound is well-healing    Vitals:    05/18/21 1324   BP: 138/91   Pulse: 91   Resp: 18   Temp: 98.6 °F (37 °C)   Weight: 62.1 kg (137 lb)   Height: 185.4 cm (73\")     Vitals:    05/18/21 1324   PainSc:   4   PainLoc: Abdomen  Comment: incisional pain          ECOG score: 1     Karnofsky score: 90     Vitals reviewed.    ECOG: (1) Restricted in Physically Strenuous Activity, Ambulatory & Able to Do Work of Light Nature    Lab Results   Component Value Date    HGB 11.1 (L) 07/28/2020    HCT 35.9 (L) 07/28/2020    MCV 90.0 07/28/2020     07/28/2020    WBC 5.17 07/28/2020    NEUTROABS 3.24 07/28/2020    LYMPHSABS 1.22 07/28/2020    MONOSABS 0.57 07/28/2020    EOSABS 0.11 07/28/2020    BASOSABS 0.02 07/28/2020       Lab Results   Component Value Date    BUN 9 07/28/2020    CREATININE 0.75 (L) 07/28/2020     07/28/2020    K 3.5 07/28/2020     07/28/2020    CO2 25.8 07/28/2020    CALCIUM 8.7 07/28/2020    ALBUMIN 3.70 07/28/2020    BILITOT 0.5 07/28/2020    ALKPHOS 83 07/28/2020    AST 15 07/28/2020    ALT 8 07/28/2020             ASSESSMENT & PLAN:  1.  Mucinous adenocarcinoma " of appendix found at the time of appendectomy 12/2017 in the face of appendicitis.  Pathologic stage IIa with T3 extension into the base of the appendix through the muscularis propria but not into the serosal surface.  16 nodes negative.  Colonoscopy December 2017 negative postop    -12/30/2019 medical oncology office visit: The patient had been on surveillance since surgery December 2017.  CT scans had been negative up until 12/30/2019 CT chest abdomen and pelvis that showed subtle soft tissue density surrounding surgical clips in the right side of the pelvis along with soft tissue nodule at the anterior wall of the bladder concerning for recurrent disease.  Patient sent back to Dr. Davidson for colonoscopy.    -2/7/2020 patient was referred to Dr. Peoples at the [facility], he underwent diagnostic laparoscopic and peritoneal biopsies that confirmed recurrent disease with biopsy of bladder dome nodule showing adenocarcinoma with mucinous features.  Port was placed at this time also.    -2/25/2020 began FOLFIRI    -4/21/2020 patient having increased fatigue, FOLFIRI dose reduced by 10%.    -5/19/2020 cycle 7 FOLFIRI    -6/18/2020 Dr. Peoples performed exploratory laparotomy with excision of right lobe liver lesion and left lobe liver lesion, omentectomy, resection of pelvic peritoneal nodules, ileocolectomy with creation of ileal colostomy, hyperthermic intraperitoneal chemotherapy with mitomycin-C at 42 °C for deep tissue penetration for 90 minutes.  Dr. Perdue performed partial cystectomy with right ureteral stent placement    -8/5/2020 CT abdomen pelvis with contrast shows small soft tissue nodule anterior aspect of bladder stable.  New small amount of ascites as well as a new small left pleural effusion.  Creatinine 0.75 with hemoglobin 11.1 otherwise unremarkable CBC and CMP.    -2/15/2020 CT abdomen pelvis with contrast compared to 8/5/2020 shows enlarging soft tissue mass 4.2 cm over the bladder fundus and  "there is a calcification along the base of the urinary bladder.  Small stable multiple hypoattenuating indeterminate liver lesions.  Similar degree of ascites.    -2/23/2021 CT chest shows no evidence of metastasis with ascending aorta 42 mm.    -3/8/2021 follow-up with Dr. Portillo Peoples we reviewed his CTs suggested team effort resection with Dr. Perdue    -3/16/2021 follow-up with Dr. Perdue.  He plans for open partial cystectomy, possible ureteral implants, cystoscopy and stent placement in concert with Dr. Peoples.    -4/22/2021 cystoscopy (after prior office cystoscopy showed large fungating mass) with bilateral ureteral catheters, exploratory laparotomy, lysis of adhesions, right pelvic sidewall excisional biopsy, open partial cystectomy Dr. Ike Perdue. Peritoneal fluid showed predominant inflammatory reactive mesothelial cells with no malignancy. 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 Carcinoma was less than 1 mm from the lateral resection margins.    -5/18/2021 [facility] medical oncology follow-up visit: I reviewed his hospital notes, operative notes, and pathology report as above and went over this with the patient.  The general consensus from retrospective as well as a few prospective data over the last couple of decades with this low-grade malignancy does not show any profound benefit from \"adjuvant\" chemotherapy in this current setting.  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  There is no standard follow-up but general guidelines suggest following up as typical colon cancer.  I will repeat his CT chest abdomen pelvis now to reestablish his baseline postoperative imaging and have him see my nurse practitioner back in a couple of weeks to make sure there is no nia evidence of measurable residual disease.  Assuming this just shows postoperative changes, I would simply repeat scans again in 3 months or sooner as symptoms dictate.  If " he has no evidence of persistent or metastatic disease on current imaging, then when he sees my nurse practitioner back she will also review what they say about his ascending aorta which was 42 mm in February and sent him to Dr. Evangelist Anthony for an opinion as to whether any intervention is needed relative to the aorta.  Obviously if his cancer is out of control on the upcoming scans then the aorta is a moot point.      Total time of care today inclusive of time spent prior to his visit reviewing interval data since last I saw him including his operative notes and follow-up notes from Dr. Lawler and during his visit translating this information to the patient and after the visit ordering the follow-up as outlined above took a total of 45 minutes of patient care time throughout the day today.  Cy Delcid MD    05/18/2021

## 2021-06-10 ENCOUNTER — OFFICE VISIT (OUTPATIENT)
Dept: ONCOLOGY | Facility: CLINIC | Age: 66
End: 2021-06-10

## 2021-06-10 VITALS
WEIGHT: 136 LBS | RESPIRATION RATE: 18 BRPM | DIASTOLIC BLOOD PRESSURE: 88 MMHG | HEIGHT: 73 IN | TEMPERATURE: 99.7 F | OXYGEN SATURATION: 99 % | HEART RATE: 102 BPM | BODY MASS INDEX: 18.02 KG/M2 | SYSTOLIC BLOOD PRESSURE: 178 MMHG

## 2021-06-10 DIAGNOSIS — C48.2 PERITONEAL CARCINOMA (HCC): Primary | ICD-10-CM

## 2021-06-10 DIAGNOSIS — C18.1 MUCINOUS ADENOCARCINOMA OF APPENDIX (HCC): ICD-10-CM

## 2021-06-10 DIAGNOSIS — I71.21 ASCENDING AORTIC ANEURYSM (HCC): ICD-10-CM

## 2021-06-10 PROCEDURE — 99213 OFFICE O/P EST LOW 20 MIN: CPT | Performed by: NURSE PRACTITIONER

## 2021-06-10 NOTE — PROGRESS NOTES
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  Pathologic stage IIa with T3 extension into the base of the appendix through the muscularis propria but not into the serosal surface.  16 nodes negative.  Colonoscopy December 2017 negative postop    -12/30/2019 medical oncology office visit: The patient had been on surveillance since surgery December 2017.  CT scans had been negative up until 12/30/2019 CT chest abdomen and pelvis that showed subtle soft tissue density surrounding surgical clips in the right side of the pelvis along with soft tissue nodule at the anterior wall of the bladder concerning for recurrent disease.  Patient sent back to Dr. Davidson for colonoscopy.    -2/7/2020 patient was referred to Dr. Peoples at the [facility], he underwent diagnostic laparoscopic and peritoneal biopsies that confirmed recurrent disease with biopsy of bladder dome nodule showing adenocarcinoma with mucinous features.  Port was placed at this time also.    -2/25/2020 began FOLFIRI    -4/21/2020 patient having increased fatigue, FOLFIRI dose reduced by 10%.    -5/19/2020 cycle 7 FOLFIRI    -6/18/2020 Dr. Peoples performed exploratory laparotomy with excision of right lobe liver lesion and left lobe liver lesion, omentectomy, resection of pelvic peritoneal nodules, ileocolectomy with creation of ileal colostomy, hyperthermic intraperitoneal chemotherapy with mitomycin-C at 42 °C for deep tissue penetration for 90 minutes.  Dr. Perdue performed partial cystectomy with right ureteral stent placement    -8/5/2020 CT abdomen pelvis with contrast shows small soft tissue nodule anterior aspect of bladder stable.  New small amount of ascites as well as a new small left pleural effusion.  Creatinine  "0.75 with hemoglobin 11.1 otherwise unremarkable CBC and CMP.    -2/15/2020 CT abdomen pelvis with contrast compared to 8/5/2020 shows enlarging soft tissue mass 4.2 cm over the bladder fundus and there is a calcification along the base of the urinary bladder.  Small stable multiple hypoattenuating indeterminate liver lesions.  Similar degree of ascites.    -2/23/2021 CT chest shows no evidence of metastasis with ascending aorta 42 mm.    -3/8/2021 follow-up with Dr. Portillo Peoples we reviewed his CTs suggested team effort resection with Dr. Perdue    -3/16/2021 follow-up with Dr. Perdue.  He plans for open partial cystectomy, possible ureteral implants, cystoscopy and stent placement in concert with Dr. Peoples.    -4/22/2021 cystoscopy (after prior office cystoscopy showed large fungating mass) with bilateral ureteral catheters, exploratory laparotomy, lysis of adhesions, right pelvic sidewall excisional biopsy, open partial cystectomy Dr. Ike Perdue. Peritoneal fluid showed predominant inflammatory reactive mesothelial cells with no malignancy. 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 Carcinoma was less than 1 mm from the lateral resection margins.    -5/18/2021 [facility] medical oncology follow-up visit: I reviewed his hospital notes, operative notes, and pathology report as above and went over this with the patient.  The general consensus from retrospective as well as a few prospective data over the last couple of decades with this low-grade malignancy does not show any profound benefit from \"adjuvant\" chemotherapy in this current setting.  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  There is no standard follow-up but general guidelines suggest following up as typical colon cancer.  I will repeat his CT chest abdomen pelvis now to reestablish his baseline postoperative imaging and have him see my nurse practitioner back in a couple of weeks to " make sure there is no nia evidence of measurable residual disease.  Assuming this just shows postoperative changes, I would simply repeat scans again in 3 months or sooner as symptoms dictate.  If he has no evidence of persistent or metastatic disease on current imaging, then when he sees my nurse practitioner back she will also review what they say about his ascending aorta which was 42 mm in February and sent him to Dr. Evangelist Anthony for an opinion as to whether any intervention is needed relative to the aorta.  Obviously if his cancer is out of control on the upcoming scans then the aorta is a moot point.    -6/10/2021 [facility] oncology clinic follow-up: CT scans show no evidence of disease recurrence in the chest, abdomen or pelvis.  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  Referral made to Dr. Anthony, cardiothoracic surgeon for management and evaluation of a sending aortic aneurysm.  Plan to repeat CT scans in 3 months.     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  Pathologic stage IIa with T3 extension into the base of the appendix through the muscularis propria but not into the serosal surface.  16 nodes negative.  Colonoscopy December 2017 negative postop     12/5/2017 Surgery    Surgery       Procedure:  Appendectomy and right hemicolectomy      Completeness of resection:  No evidence of residual tumor     Pathology revealed invasive moderately differentiated mucinous adenocarcinoma.  Right hemicolectomy: Benign colon and small intestine no evidence of carcinoma.  16 benign lymph nodes, 0/16, negative for dysplasia or malignancy.  Tumor size approximately 6 cm.  Grade 2, moderately differentiated.  Tumor invades through the muscularis profile into the base of appendix but does not extend to the serosal surface.  All margins uninvolved, no lymphovascular  invasion identified.  Pathological stage pT3 pN0.         12/8/2017 Imaging    CT of the chest abdomen and pelvis negative.  Baseline CBC WBC 7100, hemoglobin 11.3, hematocrit 34.8%, platelet count 195,000.     3/20/2018 Procedure    Colonoscopy with Dr. Davidson was normal, recommendation for repeat surveillance colonoscopy in 3 years.     6/11/2018 Imaging    CT chest, abdomen and pelvis with no evidence metastatic disease.  CEA 1.1     9/24/2018 Imaging    CT abdomen pelvis showed no acute changes and nothing to suggest recurrence     12/28/2018 Imaging    CT chest, abdomen and pelvis negative. Normal CBC, CMP and CEA 0.7.     6/28/2019 Imaging    CT chest, abdomen and pelvis: No interval change from prior studies.  No recurrent or metastatic disease detected in the chest, abdomen or pelvis.  No acute process.  CEA 0.9     12/30/2019 Imaging    CT chest, abdomen and pelvis: No disease in the chest.  There was noted a subtle soft tissue density, one surrounding surgical clips in the right side of the pelvis and the other a soft tissue nodule intimately associated with the anterior wall of the bladder, which are considered suspicious for recurrent disease.  CEA 1.1.  CMP with normal creatinine 0.9, total bilirubin 1.9, AST 34, ALT 24, alkaline phosphatase 93.  CBC with WBC 6900, hemoglobin 15.9, platelet count 232,000.       1/21/2020 Procedure    Normal colonoscopy with Dr. Davidson.  He has made referral to Dr. Portillo Peoples at .     2/7/2020 Progression    12/30/2019 CT chest, abdomen and pelvis: No disease in the chest.  There are subtle soft tissue densities (1 surrounding surgical clips in the right side of the pelvis and the other a soft tissue nodule intimately associated with the anterior wall of the bladder) which are considered suspicious for recurrent disease.  CMP with normal creatinine 0.9, total bilirubin 1.9, AST 34, ALT 24, alkaline phosphatase 93.  CBC with WBC 6900, hemoglobin 15.9, platelet count  232,000.  CEA 1.1.  2/7/2020 diagnostic laparoscopy with peritoneal biopsies performed at the [facility] by Dr. Peoples showed no sign of diffuse peritoneal carcinomatosis or liver metastasis.  There was a firm nodule in the superior dome of the bladder, adherent to omentum, as well as right pelvic sidewall nodule adjacent to surgical clips.  Biopsy of bladder dome nodule showed adenocarcinoma with mucinous features.     2/25/2020 -  Chemotherapy    OP COLORECTAL FOLFIRI Irinotecan / Leucovorin / Fluorouracil     5/29/2020 Imaging    CT chest abdomen pelvis shows slight improvement with decreased nodule anterolateral margin of urinary bladder with stable nodularity of the right lower quadrant adjacent to surgical clips and no progressive disease.  Slight hyperemia of the colonic mucosa     6/18/2020 Surgery    Surgery       -6/18/2020 Dr. Peoples performed exploratory laparotomy with excision of right lobe liver lesion and left lobe liver lesion, omentectomy, resection of pelvic peritoneal nodules, ileocolectomy with creation of ileal colostomy, hyperthermic intraperitoneal chemotherapy with mitomycin-C at 42 °C for deep tissue penetration for 90 minutes.  Dr. Perdue performed partial cystectomy with right ureteral stent placement     8/5/2020 Imaging     CT abdomen pelvis with contrast shows small soft tissue nodule anterior aspect of bladder stable.  New small amount of ascites as well as a new small left pleural effusion.  Creatinine 0.75 with hemoglobin 11.1 otherwise unremarkable CBC and CMP     2/15/2021 Imaging    CT abdomen pelvis with contrast compared to 8/5/2020 shows enlarging soft tissue mass 4.2 cm over the bladder fundus and there is a calcification along the base of the urinary bladder.  Small stable multiple hypoattenuating indeterminate liver lesions.  Similar degree of ascites.     2/23/2021 Imaging    -2/23/2021 CT chest shows no evidence of metastasis with ascending aorta 42 mm.     4/22/2021  "Surgery    -4/22/2021 cystoscopy (after prior office cystoscopy showed large fungating mass) with bilateral ureteral catheters, exploratory laparotomy, lysis of adhesions, right pelvic sidewall excisional biopsy, open partial cystectomy Dr. Ike Perdue. Peritoneal fluid showed predominant inflammatory reactive mesothelial cells with no malignancy. 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 Carcinoma was less than 1 mm from the lateral resection margins.     6/7/2021 Imaging    CT chest, abdomen and pelvis: No evidence of metastatic disease within the chest abdomen or pelvis.  Interval resection of enhancing bladder wall mass a small amount of residual enhancing soft tissue, possibly granulation tissue.  Interval resolution of abdominal and pelvic ascites.         HISTORY OF PRESENT ILLNESS:  The patient is a 65 y.o. male, here for follow up on management of recurrent mucinous adenocarcinoma of the appendix status post serial partial cystectomy as outlined above.  Overall feeling well.  Continues to regain strength since his surgeries but admits it is a slow process.  States that he is able to do the things he wants to do however.  No new concerns.  Here today to go over the results of his CT scans.    Past Medical History:   Diagnosis Date   • Hypertension      History reviewed. No pertinent surgical history.    Allergies   Allergen Reactions   • Levofloxacin Unknown - Low Severity   • Penicillins Unknown - Low Severity   • Tetracycline Unknown - Low Severity       Family History and Social History reviewed and changed as necessary    REVIEW OF SYSTEM:   Negative for new concerns    PHYSICAL EXAM:  General: Pleasant, thin gentleman in no distress.    Vitals:    06/10/21 1259   BP: 178/88   Pulse: 102   Resp: 18   Temp: 99.7 °F (37.6 °C)   SpO2: 99%   Weight: 61.7 kg (136 lb)   Height: 185.4 cm (73\")     Vitals:    06/10/21 " 1259   PainSc: 0-No pain          ECOG score: 1     Karnofsky score: 90     Vitals reviewed.  CT scan reports from 6/7/2021 reviewed at time of visit along with previous note from Dr. Delcid.    ECOG: (1) Restricted in Physically Strenuous Activity, Ambulatory & Able to Do Work of Light Nature        ASSESSMENT & PLAN:  1.  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  Pathologic stage IIa with T3 extension into the base of the appendix through the muscularis propria but not into the serosal surface.  16 nodes negative.  Colonoscopy December 2017 negative postop    -12/30/2019 medical oncology office visit: The patient had been on surveillance since surgery December 2017.  CT scans had been negative up until 12/30/2019 CT chest abdomen and pelvis that showed subtle soft tissue density surrounding surgical clips in the right side of the pelvis along with soft tissue nodule at the anterior wall of the bladder concerning for recurrent disease.  Patient sent back to Dr. Davidson for colonoscopy.    -2/7/2020 patient was referred to Dr. Peoples at the [facility], he underwent diagnostic laparoscopic and peritoneal biopsies that confirmed recurrent disease with biopsy of bladder dome nodule showing adenocarcinoma with mucinous features.  Port was placed at this time also.    -2/25/2020 began FOLFIRI    -4/21/2020 patient having increased fatigue, FOLFIRI dose reduced by 10%.    -5/19/2020 cycle 7 FOLFIRI    -6/18/2020 Dr. Peoples performed exploratory laparotomy with excision of right lobe liver lesion and left lobe liver lesion, omentectomy, resection of pelvic peritoneal nodules, ileocolectomy with creation of ileal colostomy, hyperthermic intraperitoneal chemotherapy with mitomycin-C at 42 °C for deep tissue penetration for 90 minutes.  Dr. Perdue performed partial cystectomy with right ureteral stent placement    -8/5/2020 CT abdomen pelvis with contrast  "shows small soft tissue nodule anterior aspect of bladder stable.  New small amount of ascites as well as a new small left pleural effusion.  Creatinine 0.75 with hemoglobin 11.1 otherwise unremarkable CBC and CMP.    -2/15/2020 CT abdomen pelvis with contrast compared to 8/5/2020 shows enlarging soft tissue mass 4.2 cm over the bladder fundus and there is a calcification along the base of the urinary bladder.  Small stable multiple hypoattenuating indeterminate liver lesions.  Similar degree of ascites.    -2/23/2021 CT chest shows no evidence of metastasis with ascending aorta 42 mm.    -3/8/2021 follow-up with Dr. Portillo Peoples we reviewed his CTs suggested team effort resection with Dr. Perdue    -3/16/2021 follow-up with Dr. Perdue.  He plans for open partial cystectomy, possible ureteral implants, cystoscopy and stent placement in concert with Dr. Peoples.    -4/22/2021 cystoscopy (after prior office cystoscopy showed large fungating mass) with bilateral ureteral catheters, exploratory laparotomy, lysis of adhesions, right pelvic sidewall excisional biopsy, open partial cystectomy Dr. Ike Perdue. Peritoneal fluid showed predominant inflammatory reactive mesothelial cells with no malignancy. 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 Carcinoma was less than 1 mm from the lateral resection margins.    -5/18/2021 [facility] medical oncology follow-up visit: I reviewed his hospital notes, operative notes, and pathology report as above and went over this with the patient.  The general consensus from retrospective as well as a few prospective data over the last couple of decades with this low-grade malignancy does not show any profound benefit from \"adjuvant\" chemotherapy in this current setting.  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  There is no standard follow-up but general guidelines suggest following up as typical colon cancer.  I will repeat " his CT chest abdomen pelvis now to reestablish his baseline postoperative imaging and have him see my nurse practitioner back in a couple of weeks to make sure there is no nia evidence of measurable residual disease.  Assuming this just shows postoperative changes, I would simply repeat scans again in 3 months or sooner as symptoms dictate.  If he has no evidence of persistent or metastatic disease on current imaging, then when he sees my nurse practitioner back she will also review what they say about his ascending aorta which was 42 mm in February and sent him to Dr. Evangelist Anthony for an opinion as to whether any intervention is needed relative to the aorta.  Obviously if his cancer is out of control on the upcoming scans then the aorta is a moot point.    -6/10/2021 [facility] oncology clinic follow-up: CT scans show no evidence of disease recurrence in the chest, abdomen or pelvis.  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  Referral made to Dr. Anthony, cardiothoracic surgeon for management and evaluation of a sending aortic aneurysm.  Plan to repeat CT scans in 3 months and I have ordered those today.  They did not do his CBC or CMP is Dr. Delcid ordered with the scans, we will get those today and again prior to return.    I spent 20 minutes caring for Esteban on this date of service. This time includes time spent by me in the following activities: preparing for the visit, reviewing tests, obtaining and/or reviewing a separately obtained history, counseling and educating the patient/family/caregiver, ordering medications, tests, or procedures, referring and communicating with other health care professionals and documenting information in the medical record.     Tanya Alvarado, KING    06/10/2021

## 2021-06-11 LAB
ALBUMIN SERPL-MCNC: 4.6 G/DL (ref 3.5–5.2)
ALBUMIN/GLOB SERPL: 2.2 G/DL
ALP SERPL-CCNC: 107 U/L (ref 39–117)
ALT SERPL-CCNC: 14 U/L (ref 1–41)
AST SERPL-CCNC: 18 U/L (ref 1–40)
BASOPHILS # BLD AUTO: 0.04 10*3/MM3 (ref 0–0.2)
BASOPHILS NFR BLD AUTO: 0.5 % (ref 0–1.5)
BILIRUB SERPL-MCNC: 0.5 MG/DL (ref 0–1.2)
BUN SERPL-MCNC: 13 MG/DL (ref 8–23)
BUN/CREAT SERPL: 17.1 (ref 7–25)
CALCIUM SERPL-MCNC: 9.3 MG/DL (ref 8.6–10.5)
CHLORIDE SERPL-SCNC: 105 MMOL/L (ref 98–107)
CO2 SERPL-SCNC: 27.1 MMOL/L (ref 22–29)
CREAT SERPL-MCNC: 0.76 MG/DL (ref 0.76–1.27)
EOSINOPHIL # BLD AUTO: 0.12 10*3/MM3 (ref 0–0.4)
EOSINOPHIL NFR BLD AUTO: 1.6 % (ref 0.3–6.2)
ERYTHROCYTE [DISTWIDTH] IN BLOOD BY AUTOMATED COUNT: 13.4 % (ref 12.3–15.4)
GLOBULIN SER CALC-MCNC: 2.1 GM/DL
GLUCOSE SERPL-MCNC: 101 MG/DL (ref 65–99)
HCT VFR BLD AUTO: 39.9 % (ref 37.5–51)
HGB BLD-MCNC: 13.1 G/DL (ref 13–17.7)
IMM GRANULOCYTES # BLD AUTO: 0.03 10*3/MM3 (ref 0–0.05)
IMM GRANULOCYTES NFR BLD AUTO: 0.4 % (ref 0–0.5)
LYMPHOCYTES # BLD AUTO: 1.22 10*3/MM3 (ref 0.7–3.1)
LYMPHOCYTES NFR BLD AUTO: 16.2 % (ref 19.6–45.3)
MCH RBC QN AUTO: 28.7 PG (ref 26.6–33)
MCHC RBC AUTO-ENTMCNC: 32.8 G/DL (ref 31.5–35.7)
MCV RBC AUTO: 87.3 FL (ref 79–97)
MONOCYTES # BLD AUTO: 0.38 10*3/MM3 (ref 0.1–0.9)
MONOCYTES NFR BLD AUTO: 5.1 % (ref 5–12)
NEUTROPHILS # BLD AUTO: 5.73 10*3/MM3 (ref 1.7–7)
NEUTROPHILS NFR BLD AUTO: 76.2 % (ref 42.7–76)
NRBC BLD AUTO-RTO: 0 /100 WBC (ref 0–0.2)
PLATELET # BLD AUTO: 230 10*3/MM3 (ref 140–450)
POTASSIUM SERPL-SCNC: 4 MMOL/L (ref 3.5–5.2)
PROT SERPL-MCNC: 6.7 G/DL (ref 6–8.5)
RBC # BLD AUTO: 4.57 10*6/MM3 (ref 4.14–5.8)
SODIUM SERPL-SCNC: 145 MMOL/L (ref 136–145)
WBC # BLD AUTO: 7.52 10*3/MM3 (ref 3.4–10.8)

## 2021-06-21 DIAGNOSIS — Z00.6 EXAMINATION FOR NORMAL COMPARISON FOR CLINICAL RESEARCH: Primary | ICD-10-CM

## 2021-08-02 ENCOUNTER — OFFICE VISIT (OUTPATIENT)
Dept: CARDIAC SURGERY | Facility: CLINIC | Age: 66
End: 2021-08-02

## 2021-08-02 VITALS
OXYGEN SATURATION: 100 % | HEART RATE: 103 BPM | WEIGHT: 136 LBS | TEMPERATURE: 97.8 F | BODY MASS INDEX: 18.02 KG/M2 | HEIGHT: 73 IN | SYSTOLIC BLOOD PRESSURE: 150 MMHG | DIASTOLIC BLOOD PRESSURE: 100 MMHG

## 2021-08-02 DIAGNOSIS — I71.20 THORACIC AORTIC ANEURYSM WITHOUT RUPTURE (HCC): Primary | ICD-10-CM

## 2021-08-02 PROCEDURE — 99204 OFFICE O/P NEW MOD 45 MIN: CPT | Performed by: THORACIC SURGERY (CARDIOTHORACIC VASCULAR SURGERY)

## 2021-08-02 RX ORDER — GABAPENTIN 300 MG/1
300 CAPSULE ORAL 2 TIMES DAILY PRN
COMMUNITY
Start: 2021-07-16 | End: 2022-03-25 | Stop reason: DRUGHIGH

## 2021-08-25 RX ORDER — METOPROLOL SUCCINATE 50 MG/1
TABLET, EXTENDED RELEASE ORAL
Qty: 90 TABLET | OUTPATIENT
Start: 2021-08-25

## 2021-08-31 ENCOUNTER — OFFICE VISIT (OUTPATIENT)
Dept: CARDIOLOGY | Facility: CLINIC | Age: 66
End: 2021-08-31

## 2021-08-31 VITALS
OXYGEN SATURATION: 99 % | TEMPERATURE: 97.1 F | DIASTOLIC BLOOD PRESSURE: 96 MMHG | BODY MASS INDEX: 18.16 KG/M2 | WEIGHT: 137 LBS | HEIGHT: 73 IN | RESPIRATION RATE: 15 BRPM | HEART RATE: 83 BPM | SYSTOLIC BLOOD PRESSURE: 150 MMHG

## 2021-08-31 DIAGNOSIS — I10 ESSENTIAL HYPERTENSION: ICD-10-CM

## 2021-08-31 DIAGNOSIS — I71.20 THORACIC AORTIC ANEURYSM WITHOUT RUPTURE (HCC): Primary | ICD-10-CM

## 2021-08-31 PROCEDURE — 93000 ELECTROCARDIOGRAM COMPLETE: CPT | Performed by: INTERNAL MEDICINE

## 2021-08-31 PROCEDURE — 99213 OFFICE O/P EST LOW 20 MIN: CPT | Performed by: INTERNAL MEDICINE

## 2021-08-31 RX ORDER — LISINOPRIL AND HYDROCHLOROTHIAZIDE 20; 12.5 MG/1; MG/1
1 TABLET ORAL DAILY
Qty: 90 TABLET | Refills: 3 | Status: SHIPPED | OUTPATIENT
Start: 2021-08-31 | End: 2022-09-12 | Stop reason: SDUPTHER

## 2021-08-31 RX ORDER — METOPROLOL SUCCINATE 50 MG/1
50 TABLET, EXTENDED RELEASE ORAL DAILY
Qty: 90 TABLET | Refills: 3 | Status: SHIPPED | OUTPATIENT
Start: 2021-08-31 | End: 2022-08-08

## 2021-08-31 NOTE — PROGRESS NOTES
[facility]  [facility] C[facility]IOLOGY  [address] [place] [address]  Dept: [phone]  Dept Fax: [phone]    Esteban Tse  1955    Follow Up Office Visit Note    History of Present Illness:  Esteban Tse is a 66 y.o. male who presents to the clinic for Follow-up. Hypertension- He unfortunately has been dx with appendix cancer, and has had multiples surgeries. , has lost 40 pounds, his Bp however here is 140.80 on the same meds Toprol xl 50 mg and also Lisinopril 20.12.5 daily  The following portions of the patient's history were reviewed and updated as appropriate: allergies, current medications, past family history, past medical history, past social history, past surgical history and problem list.    Medications:  diphenoxylate-atropine  gabapentin  lidocaine-prilocaine  lisinopril-hydrochlorothiazide  metoprolol succinate XL  ondansetron  prochlorperazine  temazepam  traMADol    Subjective  Allergies   Allergen Reactions   • Levofloxacin Unknown - Low Severity   • Penicillins Unknown - Low Severity   • Tetracycline Unknown - Low Severity        Past Medical History:   Diagnosis Date   • Ascending aortic aneurysm (CMS/HCC)    • Benign hypertension    • Colon cancer (CMS/HCC)    • Hypertension    • Intermittent palpitations    • Mucinous adenocarcinoma (CMS/HCC)    • Pounding heartbeat    • PVC's (premature ventricular contractions)        Past Surgical History:   Procedure Laterality Date   • APPENDECTOMY  2017    With Partial Colon    • BLADDER SURGERY      with partial colon   • GALLBLADDER SURGERY         Family History   Problem Relation Age of Onset   • Lung cancer Mother    • Stroke Mother    • Heart disease Father    • Hypertension Father         Social History     Socioeconomic History   • Marital status: Single     Spouse name: Not on file   • Number of children: 0   • Years of education: Not on file   • Highest education level: Not on file   Tobacco Use   •  "Smoking status: Never Smoker   • Smokeless tobacco: Never Used   Substance and Sexual Activity   • Alcohol use: Yes     Comment: 1 dring weekly   • Drug use: Never   • Sexual activity: Defer       Review of Systems   Constitutional: Negative.    HENT: Negative.    Respiratory: Negative.    Cardiovascular: Negative.    Endocrine: Negative.    Genitourinary: Negative.    Musculoskeletal: Negative.    Skin: Negative.    Allergic/Immunologic: Negative.    Neurological: Negative.    Hematological: Negative.    Psychiatric/Behavioral: Negative.    All other systems reviewed and are negative.      Cardiovascular Procedures    ECHO/MUGA:   STRESS TESTS:   CARDIAC CATH:   DEVICES:   HOLTER:   CT/MRI:   VASCULAR:   CARDIOTHORACIC:     Objective  Vitals:    08/31/21 1013   BP: 150/96   BP Location: Left arm   Patient Position: Sitting   Cuff Size: Adult   Pulse: 83   Resp: 15   Temp: 97.1 °F (36.2 °C)   TempSrc: Infrared   SpO2: 99%   Weight: 62.1 kg (137 lb)   Height: 185.4 cm (73\")   PainSc: 0-No pain     Body mass index is 18.07 kg/m².     Physical Exam  Constitutional:       Appearance: Healthy appearance. Not in distress.   Neck:      Vascular: No JVR. JVD normal.   Pulmonary:      Effort: Pulmonary effort is normal.      Breath sounds: Normal breath sounds. No wheezing. No rhonchi. No rales.   Chest:      Chest wall: Not tender to palpatation.   Cardiovascular:      PMI at left midclavicular line. Normal rate. Regular rhythm. Normal S1. Normal S2.      Murmurs: There is no murmur.      No gallop. No click. No rub.   Pulses:     Intact distal pulses.   Edema:     Peripheral edema absent.   Abdominal:      General: Bowel sounds are normal.      Palpations: Abdomen is soft.      Tenderness: There is no abdominal tenderness.   Musculoskeletal: Normal range of motion.         General: No tenderness. Skin:     General: Skin is warm and dry.   Neurological:      General: No focal deficit present.      Mental Status: Alert and " oriented to person, place and time.          Diagnostic Data    ECG 12 Lead    Date/Time: 8/31/2021 12:56 PM  Performed by: Alli Dial MD  Authorized by: Alli Dial MD   Comparison: compared with previous ECG   Rate: normal  BPM: 83  QRS axis: normal  Other findings: left atrial abnormality    Clinical impression: normal ECG            Assessment and Plan  Diagnoses and all orders for this visit:    Thoracic aortic aneurysm without rupture (CMS/HCC)- during the evaluation of his cancer a CTA of the chest a 4,0 ascending aneurysm.,     Essential hypertension-Bp is 140.90, will keep same meds toprol xl 50 mg and also Lisinopril 20.12.5     Other orders  -     metoprolol succinate XL (TOPROL-XL) 50 MG 24 hr tablet; Take 1 tablet by mouth Daily.  -     lisinopril-hydrochlorothiazide (PRINZIDE,ZESTORETIC) 20-12.5 MG per tablet; Take 1 tablet by mouth Daily.         No follow-ups on file.    Alli Dial MD  08/31/2021

## 2021-09-14 ENCOUNTER — OFFICE VISIT (OUTPATIENT)
Dept: ONCOLOGY | Facility: CLINIC | Age: 66
End: 2021-09-14

## 2021-09-14 VITALS
BODY MASS INDEX: 18.02 KG/M2 | RESPIRATION RATE: 18 BRPM | HEIGHT: 73 IN | TEMPERATURE: 98.6 F | SYSTOLIC BLOOD PRESSURE: 142 MMHG | HEART RATE: 100 BPM | WEIGHT: 136 LBS | DIASTOLIC BLOOD PRESSURE: 85 MMHG

## 2021-09-14 DIAGNOSIS — C18.1 MUCINOUS ADENOCARCINOMA OF APPENDIX (HCC): ICD-10-CM

## 2021-09-14 DIAGNOSIS — C18.1 MUCINOUS ADENOCARCINOMA OF APPENDIX (HCC): Primary | ICD-10-CM

## 2021-09-14 PROCEDURE — 99214 OFFICE O/P EST MOD 30 MIN: CPT | Performed by: INTERNAL MEDICINE

## 2021-09-14 NOTE — PROGRESS NOTES
CHIEF COMPLAINT: Follow-up mucinous appendiceal carcinoma    Problem List:  Oncology/Hematology History Overview Note   1.  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  Pathologic stage IIa with T3 extension into the base of the appendix through the muscularis propria but not into the serosal surface.  16 nodes negative.  Colonoscopy December 2017 negative postop    -12/30/2019 medical oncology office visit: The patient had been on surveillance since surgery December 2017.  CT scans had been negative up until 12/30/2019 CT chest abdomen and pelvis that showed subtle soft tissue density surrounding surgical clips in the right side of the pelvis along with soft tissue nodule at the anterior wall of the bladder concerning for recurrent disease.  Patient sent back to Dr. Davidson for colonoscopy.    -2/7/2020 patient was referred to Dr. Peoples at the [facility], he underwent diagnostic laparoscopic and peritoneal biopsies that confirmed recurrent disease with biopsy of bladder dome nodule showing adenocarcinoma with mucinous features.  Port was placed at this time also.    -2/25/2020 began FOLFIRI    -4/21/2020 patient having increased fatigue, FOLFIRI dose reduced by 10%.    -5/19/2020 cycle 7 FOLFIRI    -6/18/2020 Dr. Peoples performed exploratory laparotomy with excision of right lobe liver lesion and left lobe liver lesion, omentectomy, resection of pelvic peritoneal nodules, ileocolectomy with creation of ileal colostomy, hyperthermic intraperitoneal chemotherapy with mitomycin-C at 42 °C for deep tissue penetration for 90 minutes.  Dr. Perdue performed partial cystectomy with right ureteral stent placement    -8/5/2020 CT abdomen pelvis with contrast shows small soft tissue nodule anterior aspect of bladder stable.  New small amount of ascites as well as a new small left pleural effusion.  Creatinine 0.75 with hemoglobin 11.1 otherwise unremarkable  "CBC and CMP.    -2/15/2020 CT abdomen pelvis with contrast compared to 8/5/2020 shows enlarging soft tissue mass 4.2 cm over the bladder fundus and there is a calcification along the base of the urinary bladder.  Small stable multiple hypoattenuating indeterminate liver lesions.  Similar degree of ascites.    -2/23/2021 CT chest shows no evidence of metastasis with ascending aorta 42 mm.    -3/8/2021 follow-up with Dr. Portillo Peoples we reviewed his CTs suggested team effort resection with Dr. Perdue    -3/16/2021 follow-up with Dr. Perdue.  He plans for open partial cystectomy, possible ureteral implants, cystoscopy and stent placement in concert with Dr. Peoples.    -4/22/2021 cystoscopy (after prior office cystoscopy showed large fungating mass) with bilateral ureteral catheters, exploratory laparotomy, lysis of adhesions, right pelvic sidewall excisional biopsy, open partial cystectomy Dr. Ike Perdue. Peritoneal fluid showed predominant inflammatory reactive mesothelial cells with no malignancy. 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 Carcinoma was less than 1 mm from the lateral resection margins.    -5/18/2021 [facility] medical oncology follow-up visit: I reviewed his hospital notes, operative notes, and pathology report as above and went over this with the patient.  The general consensus from retrospective as well as a few prospective data over the last couple of decades with this low-grade malignancy does not show any profound benefit from \"adjuvant\" chemotherapy in this current setting.  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  There is no standard follow-up but general guidelines suggest following up as typical colon cancer.  I will repeat his CT chest abdomen pelvis now to reestablish his baseline postoperative imaging and have him see my nurse practitioner back in a couple of weeks to make sure there is no nia evidence of measurable " residual disease.  Assuming this just shows postoperative changes, I would simply repeat scans again in 3 months or sooner as symptoms dictate.  If he has no evidence of persistent or metastatic disease on current imaging, then when he sees my nurse practitioner back she will also review what they say about his ascending aorta which was 42 mm in February and sent him to Dr. Evangelist Anthony for an opinion as to whether any intervention is needed relative to the aorta.  Obviously if his cancer is out of control on the upcoming scans then the aorta is a moot point.    -6/10/2021 [facility], [place] oncology clinic follow-up: CT scans show no evidence of disease recurrence in the chest, abdomen or pelvis.  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  Referral made to Dr. Anthony, cardiothoracic surgeon for management and evaluation of a sending aortic aneurysm.  Plan to repeat CT scans in 3 months.    -9/14/2021 [facility] [place] medical oncology follow-up visit: I reviewed images and reports of 9/10/2021 CT chest abdomen pelvis with contrast which shows under distended urinary bladder with mild asymmetric wall thickening dome and left lateral wall with a small 9 mm polypoid lesion in the bladder.  There is stable 2.3 cm right external iliac and a few other subcentimeter scattered nodes in the root of the small bowel mesentery and retroperitoneum.  Postsurgical right hemicolectomy changes.  Stable low-attenuation lesions in the liver unchanged.  I will have him collect his discs from [facility] to take Dr. Perude to decide whether to proceed with cystoscopy or just continue watchful waiting.  There is no major changes and I suspect we are looking at postoperative changes and we shall see him for video visit in a few weeks to see what urology at  decides.     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  Pathologic stage IIa with T3 extension into the base of the appendix through the muscularis propria but not into the serosal surface.  16 nodes negative.  Colonoscopy December 2017 negative postop     12/5/2017 Surgery    Surgery       Procedure:  Appendectomy and right hemicolectomy      Completeness of resection:  No evidence of residual tumor     Pathology revealed invasive moderately differentiated mucinous adenocarcinoma.  Right hemicolectomy: Benign colon and small intestine no evidence of carcinoma.  16 benign lymph nodes, 0/16, negative for dysplasia or malignancy.  Tumor size approximately 6 cm.  Grade 2, moderately differentiated.  Tumor invades through the muscularis profile into the base of appendix but does not extend to the serosal surface.  All margins uninvolved, no lymphovascular invasion identified.  Pathological stage pT3 pN0.         12/8/2017 Imaging    CT of the chest abdomen and pelvis negative.  Baseline CBC WBC 7100, hemoglobin 11.3, hematocrit 34.8%, platelet count 195,000.     3/20/2018 Procedure    Colonoscopy with Dr. Davidson was normal, recommendation for repeat surveillance colonoscopy in 3 years.     6/11/2018 Imaging    CT chest, abdomen and pelvis with no evidence metastatic disease.  CEA 1.1     9/24/2018 Imaging    CT abdomen pelvis showed no acute changes and nothing to suggest recurrence     12/28/2018 Imaging    CT chest, abdomen and pelvis negative. Normal CBC, CMP and CEA 0.7.     6/28/2019 Imaging    CT chest, abdomen and pelvis: No interval change from prior studies.  No recurrent or metastatic disease detected in the chest, abdomen or pelvis.  No acute process.  CEA 0.9     12/30/2019 Imaging    CT chest, abdomen and pelvis: No disease in the chest.  There was noted a subtle soft tissue density, one surrounding surgical clips in the right side of the pelvis and the other a soft tissue nodule intimately associated with the anterior wall of the bladder, which  are considered suspicious for recurrent disease.  CEA 1.1.  CMP with normal creatinine 0.9, total bilirubin 1.9, AST 34, ALT 24, alkaline phosphatase 93.  CBC with WBC 6900, hemoglobin 15.9, platelet count 232,000.       1/21/2020 Procedure    Normal colonoscopy with Dr. Davidson.  He has made referral to Dr. Portillo Peoples at .     2/7/2020 Progression    12/30/2019 CT chest, abdomen and pelvis: No disease in the chest.  There are subtle soft tissue densities (1 surrounding surgical clips in the right side of the pelvis and the other a soft tissue nodule intimately associated with the anterior wall of the bladder) which are considered suspicious for recurrent disease.  CMP with normal creatinine 0.9, total bilirubin 1.9, AST 34, ALT 24, alkaline phosphatase 93.  CBC with WBC 6900, hemoglobin 15.9, platelet count 232,000.  CEA 1.1.  2/7/2020 diagnostic laparoscopy with peritoneal biopsies performed at the [facility] by Dr. Peoples showed no sign of diffuse peritoneal carcinomatosis or liver metastasis.  There was a firm nodule in the superior dome of the bladder, adherent to omentum, as well as right pelvic sidewall nodule adjacent to surgical clips.  Biopsy of bladder dome nodule showed adenocarcinoma with mucinous features.     2/25/2020 -  Chemotherapy    OP COLORECTAL FOLFIRI Irinotecan / Leucovorin / Fluorouracil     5/29/2020 Imaging    CT chest abdomen pelvis shows slight improvement with decreased nodule anterolateral margin of urinary bladder with stable nodularity of the right lower quadrant adjacent to surgical clips and no progressive disease.  Slight hyperemia of the colonic mucosa     6/18/2020 Surgery    Surgery       -6/18/2020 Dr. Peoples performed exploratory laparotomy with excision of right lobe liver lesion and left lobe liver lesion, omentectomy, resection of pelvic peritoneal nodules, ileocolectomy with creation of ileal colostomy, hyperthermic intraperitoneal chemotherapy with mitomycin-C at  42 °C for deep tissue penetration for 90 minutes.  Dr. Perdue performed partial cystectomy with right ureteral stent placement     8/5/2020 Imaging     CT abdomen pelvis with contrast shows small soft tissue nodule anterior aspect of bladder stable.  New small amount of ascites as well as a new small left pleural effusion.  Creatinine 0.75 with hemoglobin 11.1 otherwise unremarkable CBC and CMP     2/15/2021 Imaging    CT abdomen pelvis with contrast compared to 8/5/2020 shows enlarging soft tissue mass 4.2 cm over the bladder fundus and there is a calcification along the base of the urinary bladder.  Small stable multiple hypoattenuating indeterminate liver lesions.  Similar degree of ascites.     2/23/2021 Imaging    -2/23/2021 CT chest shows no evidence of metastasis with ascending aorta 42 mm.     4/22/2021 Surgery    -4/22/2021 cystoscopy (after prior office cystoscopy showed large fungating mass) with bilateral ureteral catheters, exploratory laparotomy, lysis of adhesions, right pelvic sidewall excisional biopsy, open partial cystectomy Dr. Ike Perdue. Peritoneal fluid showed predominant inflammatory reactive mesothelial cells with no malignancy. 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 Carcinoma was less than 1 mm from the lateral resection margins.     6/7/2021 Imaging    CT chest, abdomen and pelvis: No evidence of metastatic disease within the chest abdomen or pelvis.  Interval resection of enhancing bladder wall mass a small amount of residual enhancing soft tissue, possibly granulation tissue.  Interval resolution of abdominal and pelvic ascites.  Stable dilation of the ascending aorta mid segment measuring up to 41 mm.     9/10/2021 Imaging    CT chest abdomen pelvis with contrast shows under distended urinary bladder with mild asymmetric wall thickening dome and left lateral wall with a small 9 mm polypoid  "lesion in the bladder.  There is stable 2.3 cm right external iliac and a few other subcentimeter scattered nodes in the root of the small bowel mesentery and retroperitoneum.  Postsurgical right hemicolectomy changes.  Stable low-attenuation lesions in the liver unchanged.         HISTORY OF PRESENT ILLNESS:  The patient is a 66 y.o. male, here for follow up on management of mucinous appendiceal carcinoma    Past Medical History:   Diagnosis Date   • Ascending aortic aneurysm (CMS/HCC)    • Benign hypertension    • Colon cancer (CMS/HCC)    • Hypertension    • Intermittent palpitations    • Mucinous adenocarcinoma (CMS/HCC)    • Pounding heartbeat    • PVC's (premature ventricular contractions)      Past Surgical History:   Procedure Laterality Date   • APPENDECTOMY  2017    With Partial Colon    • BLADDER SURGERY      with partial colon   • GALLBLADDER SURGERY         Allergies   Allergen Reactions   • Levofloxacin Unknown - Low Severity   • Penicillins Unknown - Low Severity   • Tetracycline Unknown - Low Severity       Family History and Social History reviewed and changed as necessary    REVIEW OF SYSTEM:   No new somatic complaints    PHYSICAL EXAM:  No palpable adenopathy.  Abdomen scaphoid and benign without any masses    Vitals:    09/14/21 1404   BP: 142/85   Pulse: 100   Resp: 18   Temp: 98.6 °F (37 °C)   Weight: 61.7 kg (136 lb)   Height: 185.4 cm (73\")     Vitals:    09/14/21 1404   PainSc: 0-No pain          ECOG score: 1     Karnofsky score: 90     Vitals reviewed.        Lab Results   Component Value Date    HGB 13.1 06/10/2021    HCT 39.9 06/10/2021    MCV 87.3 06/10/2021     06/10/2021    WBC 7.52 06/10/2021    NEUTROABS 5.73 06/10/2021    LYMPHSABS 1.22 06/10/2021    MONOSABS 0.38 06/10/2021    EOSABS 0.12 06/10/2021    BASOSABS 0.04 06/10/2021       Lab Results   Component Value Date    BUN 13 06/10/2021    CREATININE 0.76 06/10/2021     06/10/2021    K 4.0 06/10/2021     " 06/10/2021    CO2 27.1 06/10/2021    CALCIUM 9.3 06/10/2021    ALBUMIN 4.60 06/10/2021    BILITOT 0.5 06/10/2021    ALKPHOS 107 06/10/2021    AST 18 06/10/2021    ALT 14 06/10/2021             ASSESSMENT & PLAN:  1.  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  Pathologic stage IIa with T3 extension into the base of the appendix through the muscularis propria but not into the serosal surface.  16 nodes negative.  Colonoscopy December 2017 negative postop    -12/30/2019 medical oncology office visit: The patient had been on surveillance since surgery December 2017.  CT scans had been negative up until 12/30/2019 CT chest abdomen and pelvis that showed subtle soft tissue density surrounding surgical clips in the right side of the pelvis along with soft tissue nodule at the anterior wall of the bladder concerning for recurrent disease.  Patient sent back to Dr. Davidson for colonoscopy.    -2/7/2020 patient was referred to Dr. Peoples at the [facility], he underwent diagnostic laparoscopic and peritoneal biopsies that confirmed recurrent disease with biopsy of bladder dome nodule showing adenocarcinoma with mucinous features.  Port was placed at this time also.    -2/25/2020 began FOLFIRI    -4/21/2020 patient having increased fatigue, FOLFIRI dose reduced by 10%.    -5/19/2020 cycle 7 FOLFIRI    -6/18/2020 Dr. Peoples performed exploratory laparotomy with excision of right lobe liver lesion and left lobe liver lesion, omentectomy, resection of pelvic peritoneal nodules, ileocolectomy with creation of ileal colostomy, hyperthermic intraperitoneal chemotherapy with mitomycin-C at 42 °C for deep tissue penetration for 90 minutes.  Dr. Perdue performed partial cystectomy with right ureteral stent placement    -8/5/2020 CT abdomen pelvis with contrast shows small soft tissue nodule anterior aspect of bladder stable.  New small amount of ascites as well as a  "new small left pleural effusion.  Creatinine 0.75 with hemoglobin 11.1 otherwise unremarkable CBC and CMP.    -2/15/2020 CT abdomen pelvis with contrast compared to 8/5/2020 shows enlarging soft tissue mass 4.2 cm over the bladder fundus and there is a calcification along the base of the urinary bladder.  Small stable multiple hypoattenuating indeterminate liver lesions.  Similar degree of ascites.    -2/23/2021 CT chest shows no evidence of metastasis with ascending aorta 42 mm.    -3/8/2021 follow-up with Dr. Portillo Peoples we reviewed his CTs suggested team effort resection with Dr. Perdue    -3/16/2021 follow-up with Dr. Perdue.  He plans for open partial cystectomy, possible ureteral implants, cystoscopy and stent placement in concert with Dr. Peoples.    -4/22/2021 cystoscopy (after prior office cystoscopy showed large fungating mass) with bilateral ureteral catheters, exploratory laparotomy, lysis of adhesions, right pelvic sidewall excisional biopsy, open partial cystectomy Dr. Ike Perdue. Peritoneal fluid showed predominant inflammatory reactive mesothelial cells with no malignancy. 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 Carcinoma was less than 1 mm from the lateral resection margins.    -5/18/2021 [facility] medical oncology follow-up visit: I reviewed his hospital notes, operative notes, and pathology report as above and went over this with the patient.  The general consensus from retrospective as well as a few prospective data over the last couple of decades with this low-grade malignancy does not show any profound benefit from \"adjuvant\" chemotherapy in this current setting.  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  There is no standard follow-up but general guidelines suggest following up as typical colon cancer.  I will repeat his CT chest abdomen pelvis now to reestablish his baseline postoperative imaging and have him see my nurse " practitioner back in a couple of weeks to make sure there is no nia evidence of measurable residual disease.  Assuming this just shows postoperative changes, I would simply repeat scans again in 3 months or sooner as symptoms dictate.  If he has no evidence of persistent or metastatic disease on current imaging, then when he sees my nurse practitioner back she will also review what they say about his ascending aorta which was 42 mm in February and sent him to Dr. Evangelist Anthony for an opinion as to whether any intervention is needed relative to the aorta.  Obviously if his cancer is out of control on the upcoming scans then the aorta is a moot point.    -6/10/2021 [facility] oncology clinic follow-up: CT scans show no evidence of disease recurrence in the chest, abdomen or pelvis.  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  Referral made to Dr. Anthony, cardiothoracic surgeon for management and evaluation of a sending aortic aneurysm.  Plan to repeat CT scans in 3 months.    -9/14/2021 [facility] medical oncology follow-up visit: I reviewed images and reports of 9/10/2021 CT chest abdomen pelvis with contrast which shows under distended urinary bladder with mild asymmetric wall thickening dome and left lateral wall with a small 9 mm polypoid lesion in the bladder.  There is stable 2.3 cm right external iliac and a few other subcentimeter scattered nodes in the root of the small bowel mesentery and retroperitoneum.  Postsurgical right hemicolectomy changes.  Stable low-attenuation lesions in the liver unchanged.  I will have him collect his discs from [facility] to take Dr. Perdue to decide whether to proceed with cystoscopy or just continue watchful waiting.  There is no major changes and I suspect we are looking at postoperative changes and we shall see him for video visit in a few weeks to see what urology at  decides.  We will get a CBC and CMP today as well.    Total time of care today inclusive of time  spent today prior to his arrival reviewing notes from Dr. Anthony with reference to the aneurysm which he is going to long-term with reference to Dr. Dial who is managing his hypertension and with regards to the PET images and report that I have reviewed myself and during visit translating all this to the patient and after visit putting forth this plan as outlined above took 30 minutes of total patient care time throughout the day today.  Cy Delcid MD    09/14/2021

## 2021-09-15 LAB
ALBUMIN SERPL-MCNC: 4.8 G/DL (ref 3.5–5.2)
ALBUMIN/GLOB SERPL: 2.1 G/DL
ALP SERPL-CCNC: 107 U/L (ref 39–117)
ALT SERPL-CCNC: 15 U/L (ref 1–41)
AST SERPL-CCNC: 18 U/L (ref 1–40)
BASOPHILS # BLD AUTO: 0.04 10*3/MM3 (ref 0–0.2)
BASOPHILS NFR BLD AUTO: 0.5 % (ref 0–1.5)
BILIRUB SERPL-MCNC: 0.8 MG/DL (ref 0–1.2)
BUN SERPL-MCNC: 18 MG/DL (ref 8–23)
BUN/CREAT SERPL: 18.8 (ref 7–25)
CALCIUM SERPL-MCNC: 9.8 MG/DL (ref 8.6–10.5)
CHLORIDE SERPL-SCNC: 101 MMOL/L (ref 98–107)
CO2 SERPL-SCNC: 24 MMOL/L (ref 22–29)
CREAT SERPL-MCNC: 0.96 MG/DL (ref 0.76–1.27)
EOSINOPHIL # BLD AUTO: 0.11 10*3/MM3 (ref 0–0.4)
EOSINOPHIL NFR BLD AUTO: 1.3 % (ref 0.3–6.2)
ERYTHROCYTE [DISTWIDTH] IN BLOOD BY AUTOMATED COUNT: 13.7 % (ref 12.3–15.4)
GLOBULIN SER CALC-MCNC: 2.3 GM/DL
GLUCOSE SERPL-MCNC: 110 MG/DL (ref 65–99)
HCT VFR BLD AUTO: 42 % (ref 37.5–51)
HGB BLD-MCNC: 14.3 G/DL (ref 13–17.7)
IMM GRANULOCYTES # BLD AUTO: 0.03 10*3/MM3 (ref 0–0.05)
IMM GRANULOCYTES NFR BLD AUTO: 0.4 % (ref 0–0.5)
LYMPHOCYTES # BLD AUTO: 1.27 10*3/MM3 (ref 0.7–3.1)
LYMPHOCYTES NFR BLD AUTO: 15.2 % (ref 19.6–45.3)
MCH RBC QN AUTO: 29.1 PG (ref 26.6–33)
MCHC RBC AUTO-ENTMCNC: 34 G/DL (ref 31.5–35.7)
MCV RBC AUTO: 85.5 FL (ref 79–97)
MONOCYTES # BLD AUTO: 0.55 10*3/MM3 (ref 0.1–0.9)
MONOCYTES NFR BLD AUTO: 6.6 % (ref 5–12)
NEUTROPHILS # BLD AUTO: 6.37 10*3/MM3 (ref 1.7–7)
NEUTROPHILS NFR BLD AUTO: 76 % (ref 42.7–76)
NRBC BLD AUTO-RTO: 0 /100 WBC (ref 0–0.2)
PLATELET # BLD AUTO: 274 10*3/MM3 (ref 140–450)
POTASSIUM SERPL-SCNC: 4.4 MMOL/L (ref 3.5–5.2)
PROT SERPL-MCNC: 7.1 G/DL (ref 6–8.5)
RBC # BLD AUTO: 4.91 10*6/MM3 (ref 4.14–5.8)
SODIUM SERPL-SCNC: 139 MMOL/L (ref 136–145)
WBC # BLD AUTO: 8.37 10*3/MM3 (ref 3.4–10.8)

## 2021-10-14 ENCOUNTER — TELEMEDICINE (OUTPATIENT)
Dept: ONCOLOGY | Facility: CLINIC | Age: 66
End: 2021-10-14

## 2021-10-14 DIAGNOSIS — C18.1 MUCINOUS ADENOCARCINOMA OF APPENDIX (HCC): Primary | ICD-10-CM

## 2021-10-14 PROCEDURE — 99214 OFFICE O/P EST MOD 30 MIN: CPT | Performed by: INTERNAL MEDICINE

## 2021-10-14 NOTE — PROGRESS NOTES
Telehealth follow-up visit  This was an audio and video enabled telemedicine encounter.  Done for COVID-19 risk reduction.  Verbal consent given.  CHIEF COMPLAINT: No new somatic complaints    Problem List:  Oncology/Hematology History Overview Note   1.  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  Pathologic stage IIa with T3 extension into the base of the appendix through the muscularis propria but not into the serosal surface.  16 nodes negative.  Colonoscopy December 2017 negative postop    -12/30/2019 medical oncology office visit: The patient had been on surveillance since surgery December 2017.  CT scans had been negative up until 12/30/2019 CT chest abdomen and pelvis that showed subtle soft tissue density surrounding surgical clips in the right side of the pelvis along with soft tissue nodule at the anterior wall of the bladder concerning for recurrent disease.  Patient sent back to Dr. Davidson for colonoscopy.    -2/7/2020 patient was referred to Dr. Peoples at the [facility], he underwent diagnostic laparoscopic and peritoneal biopsies that confirmed recurrent disease with biopsy of bladder dome nodule showing adenocarcinoma with mucinous features.  Port was placed at this time also.    -2/25/2020 began FOLFIRI    -4/21/2020 patient having increased fatigue, FOLFIRI dose reduced by 10%.    -5/19/2020 cycle 7 FOLFIRI    -6/18/2020 Dr. Peoples performed exploratory laparotomy with excision of right lobe liver lesion and left lobe liver lesion, omentectomy, resection of pelvic peritoneal nodules, ileocolectomy with creation of ileal colostomy, hyperthermic intraperitoneal chemotherapy with mitomycin-C at 42 °C for deep tissue penetration for 90 minutes.  Dr. Perdue performed partial cystectomy with right ureteral stent placement    -8/5/2020 CT abdomen pelvis with contrast shows small soft tissue nodule anterior aspect of bladder stable.  New  "small amount of ascites as well as a new small left pleural effusion.  Creatinine 0.75 with hemoglobin 11.1 otherwise unremarkable CBC and CMP.    -2/15/2020 CT abdomen pelvis with contrast compared to 8/5/2020 shows enlarging soft tissue mass 4.2 cm over the bladder fundus and there is a calcification along the base of the urinary bladder.  Small stable multiple hypoattenuating indeterminate liver lesions.  Similar degree of ascites.    -2/23/2021 CT chest shows no evidence of metastasis with ascending aorta 42 mm.    -3/8/2021 follow-up with Dr. Portillo Peoples we reviewed his CTs suggested team effort resection with Dr. Perdue    -3/16/2021 follow-up with Dr. Perdue.  He plans for open partial cystectomy, possible ureteral implants, cystoscopy and stent placement in concert with Dr. Peoples.    -4/22/2021 cystoscopy (after prior office cystoscopy showed large fungating mass) with bilateral ureteral catheters, exploratory laparotomy, lysis of adhesions, right pelvic sidewall excisional biopsy, open partial cystectomy Dr. Ike Perdue. Peritoneal fluid showed predominant inflammatory reactive mesothelial cells with no malignancy. 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 Carcinoma was less than 1 mm from the lateral resection margins.    -5/18/2021 [facility] medical oncology follow-up visit: I reviewed his hospital notes, operative notes, and pathology report as above and went over this with the patient.  The general consensus from retrospective as well as a few prospective data over the last couple of decades with this low-grade malignancy does not show any profound benefit from \"adjuvant\" chemotherapy in this current setting.  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  There is no standard follow-up but general guidelines suggest following up as typical colon cancer.  I will repeat his CT chest abdomen pelvis now to reestablish his baseline " postoperative imaging and have him see my nurse practitioner back in a couple of weeks to make sure there is no nia evidence of measurable residual disease.  Assuming this just shows postoperative changes, I would simply repeat scans again in 3 months or sooner as symptoms dictate.  If he has no evidence of persistent or metastatic disease on current imaging, then when he sees my nurse practitioner back she will also review what they say about his ascending aorta which was 42 mm in February and sent him to Dr. Evangelist Anthony for an opinion as to whether any intervention is needed relative to the aorta.  Obviously if his cancer is out of control on the upcoming scans then the aorta is a moot point.    -6/10/2021 [facility], [place], operated by [facility] oncology clinic follow-up: CT scans show no evidence of disease recurrence in the chest, abdomen or pelvis.  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  Referral made to Dr. Anthony, cardiothoracic surgeon for management and evaluation of a sending aortic aneurysm.  Plan to repeat CT scans in 3 months.    -9/14/2021 [facility], [place], operated by [facility] medical oncology follow-up visit: I reviewed images and reports of 9/10/2021 CT chest abdomen pelvis with contrast which shows under distended urinary bladder with mild asymmetric wall thickening dome and left lateral wall with a small 9 mm polypoid lesion in the bladder.  There is stable 2.3 cm right external iliac and a few other subcentimeter scattered nodes in the root of the small bowel mesentery and retroperitoneum.  Postsurgical right hemicolectomy changes.  Stable low-attenuation lesions in the liver unchanged.  I will have him collect his discs from [facility] to take Dr. Perdue to decide whether to proceed with cystoscopy or just continue watchful waiting.  There is no major changes and I suspect we are looking at postoperative changes and we shall see him for video visit in a few weeks to see what urology at  decides.    -10/11/2021 cystoscopy Dr. Perdue showed  marked acute and chronic inflammation but no evidence of malignancy.  Muscularis propria present.    -10/14/2021 [facility], [place] medical oncology virtual visit: I reviewed reports of pathology from cystoscopy 10/11/2021 and went over this with patient.  He is feeling fairly fit.  I will repeat his CT chest abdomen pelvis prior to return in 3 months and if in the interim, when he follows up with Dr. Perdue in the next couple of weeks post cystoscopy, plans are made for further cystoscopy before I see him back and any malignancy is found, I would ask the patient and Dr. Perdue to touch base as I would not know of such without that contact as the information comes into our chart without notification now that epic is being used by [employer].  Assuming no further biopsies in the interim, I will simply repeat his scans in 3 months to follow what I suspected and is now confirmed to be inflammatory changes.  From the aneurysm standpoint, he has follow-up scanning March 2022 arranged by Dr. Anthony and he will follow him up after that.         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  Pathologic stage IIa with T3 extension into the base of the appendix through the muscularis propria but not into the serosal surface.  16 nodes negative.  Colonoscopy December 2017 negative postop     12/5/2017 Surgery    Surgery       Procedure:  Appendectomy and right hemicolectomy      Completeness of resection:  No evidence of residual tumor     Pathology revealed invasive moderately differentiated mucinous adenocarcinoma.  Right hemicolectomy: Benign colon and small intestine no evidence of carcinoma.  16 benign lymph nodes, 0/16, negative for dysplasia or malignancy.  Tumor size approximately 6 cm.  Grade 2, moderately differentiated.  Tumor invades through the muscularis profile into the base of appendix but does  not extend to the serosal surface.  All margins uninvolved, no lymphovascular invasion identified.  Pathological stage pT3 pN0.         12/8/2017 Imaging    CT of the chest abdomen and pelvis negative.  Baseline CBC WBC 7100, hemoglobin 11.3, hematocrit 34.8%, platelet count 195,000.     3/20/2018 Procedure    Colonoscopy with Dr. Davidson was normal, recommendation for repeat surveillance colonoscopy in 3 years.     6/11/2018 Imaging    CT chest, abdomen and pelvis with no evidence metastatic disease.  CEA 1.1     9/24/2018 Imaging    CT abdomen pelvis showed no acute changes and nothing to suggest recurrence     12/28/2018 Imaging    CT chest, abdomen and pelvis negative. Normal CBC, CMP and CEA 0.7.     6/28/2019 Imaging    CT chest, abdomen and pelvis: No interval change from prior studies.  No recurrent or metastatic disease detected in the chest, abdomen or pelvis.  No acute process.  CEA 0.9     12/30/2019 Imaging    CT chest, abdomen and pelvis: No disease in the chest.  There was noted a subtle soft tissue density, one surrounding surgical clips in the right side of the pelvis and the other a soft tissue nodule intimately associated with the anterior wall of the bladder, which are considered suspicious for recurrent disease.  CEA 1.1.  CMP with normal creatinine 0.9, total bilirubin 1.9, AST 34, ALT 24, alkaline phosphatase 93.  CBC with WBC 6900, hemoglobin 15.9, platelet count 232,000.       1/21/2020 Procedure    Normal colonoscopy with Dr. Davidson.  He has made referral to Dr. Portillo Peoples at .     2/7/2020 Progression    12/30/2019 CT chest, abdomen and pelvis: No disease in the chest.  There are subtle soft tissue densities (1 surrounding surgical clips in the right side of the pelvis and the other a soft tissue nodule intimately associated with the anterior wall of the bladder) which are considered suspicious for recurrent disease.  CMP with normal creatinine 0.9, total bilirubin 1.9, AST 34, ALT 24,  alkaline phosphatase 93.  CBC with WBC 6900, hemoglobin 15.9, platelet count 232,000.  CEA 1.1.  2/7/2020 diagnostic laparoscopy with peritoneal biopsies performed at the [facility] by Dr. Peoples showed no sign of diffuse peritoneal carcinomatosis or liver metastasis.  There was a firm nodule in the superior dome of the bladder, adherent to omentum, as well as right pelvic sidewall nodule adjacent to surgical clips.  Biopsy of bladder dome nodule showed adenocarcinoma with mucinous features.     2/25/2020 -  Chemotherapy    OP COLORECTAL FOLFIRI Irinotecan / Leucovorin / Fluorouracil     5/29/2020 Imaging    CT chest abdomen pelvis shows slight improvement with decreased nodule anterolateral margin of urinary bladder with stable nodularity of the right lower quadrant adjacent to surgical clips and no progressive disease.  Slight hyperemia of the colonic mucosa     6/18/2020 Surgery    Surgery       -6/18/2020 Dr. Peoples performed exploratory laparotomy with excision of right lobe liver lesion and left lobe liver lesion, omentectomy, resection of pelvic peritoneal nodules, ileocolectomy with creation of ileal colostomy, hyperthermic intraperitoneal chemotherapy with mitomycin-C at 42 °C for deep tissue penetration for 90 minutes.  Dr. Perdue performed partial cystectomy with right ureteral stent placement     8/5/2020 Imaging     CT abdomen pelvis with contrast shows small soft tissue nodule anterior aspect of bladder stable.  New small amount of ascites as well as a new small left pleural effusion.  Creatinine 0.75 with hemoglobin 11.1 otherwise unremarkable CBC and CMP     2/15/2021 Imaging    CT abdomen pelvis with contrast compared to 8/5/2020 shows enlarging soft tissue mass 4.2 cm over the bladder fundus and there is a calcification along the base of the urinary bladder.  Small stable multiple hypoattenuating indeterminate liver lesions.  Similar degree of ascites.     2/23/2021 Imaging    -2/23/2021 CT  chest shows no evidence of metastasis with ascending aorta 42 mm.     4/22/2021 Surgery    -4/22/2021 cystoscopy (after prior office cystoscopy showed large fungating mass) with bilateral ureteral catheters, exploratory laparotomy, lysis of adhesions, right pelvic sidewall excisional biopsy, open partial cystectomy Dr. Ike Perdue. Peritoneal fluid showed predominant inflammatory reactive mesothelial cells with no malignancy. 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 Carcinoma was less than 1 mm from the lateral resection margins.     6/7/2021 Imaging    CT chest, abdomen and pelvis: No evidence of metastatic disease within the chest abdomen or pelvis.  Interval resection of enhancing bladder wall mass a small amount of residual enhancing soft tissue, possibly granulation tissue.  Interval resolution of abdominal and pelvic ascites.  Stable dilation of the ascending aorta mid segment measuring up to 41 mm.     9/10/2021 Imaging    CT chest abdomen pelvis with contrast shows under distended urinary bladder with mild asymmetric wall thickening dome and left lateral wall with a small 9 mm polypoid lesion in the bladder.  There is stable 2.3 cm right external iliac and a few other subcentimeter scattered nodes in the root of the small bowel mesentery and retroperitoneum.  Postsurgical right hemicolectomy changes.  Stable low-attenuation lesions in the liver unchanged.         HISTORY OF PRESENT ILLNESS:  The patient is a 66 y.o. male, here for follow up on management of mucinous adenocarcinoma multiply recurrent status post cystoscopy    Past Medical History:   Diagnosis Date   • Ascending aortic aneurysm (CMS/HCC)    • Benign hypertension    • Colon cancer (CMS/HCC)    • Hypertension    • Intermittent palpitations    • Mucinous adenocarcinoma (CMS/HCC)    • Pounding heartbeat    • PVC's (premature ventricular contractions)       Past Surgical History:   Procedure Laterality Date   • APPENDECTOMY  2017    With Partial Colon    • BLADDER SURGERY      with partial colon   • GALLBLADDER SURGERY         Allergies   Allergen Reactions   • Levofloxacin Unknown - Low Severity   • Penicillins Unknown - Low Severity   • Tetracycline Unknown - Low Severity       Family History and Social History reviewed and changed as necessary    REVIEW OF SYSTEM:   No new somatic complaints    PHYSICAL EXAM:  No visible respiratory distress or wheezes or abdominal distention    There were no vitals filed for this visit.  There were no vitals filed for this visit.           ECOG: (0) Fully Active - Able to Carry On All Pre-disease Performance Without Restriction    Lab Results   Component Value Date    HGB 14.3 09/14/2021    HCT 42.0 09/14/2021    MCV 85.5 09/14/2021     09/14/2021    WBC 8.37 09/14/2021    NEUTROABS 6.37 09/14/2021    LYMPHSABS 1.27 09/14/2021    MONOSABS 0.55 09/14/2021    EOSABS 0.11 09/14/2021    BASOSABS 0.04 09/14/2021       Lab Results   Component Value Date    BUN 18 09/14/2021    CREATININE 0.96 09/14/2021     09/14/2021    K 4.4 09/14/2021     09/14/2021    CO2 24.0 09/14/2021    CALCIUM 9.8 09/14/2021    ALBUMIN 4.80 09/14/2021    BILITOT 0.8 09/14/2021    ALKPHOS 107 09/14/2021    AST 18 09/14/2021    ALT 15 09/14/2021             ASSESSMENT & PLAN:  1.  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  Pathologic stage IIa with T3 extension into the base of the appendix through the muscularis propria but not into the serosal surface.  16 nodes negative.  Colonoscopy December 2017 negative postop    -12/30/2019 medical oncology office visit: The patient had been on surveillance since surgery December 2017.  CT scans had been negative up until 12/30/2019 CT chest abdomen and pelvis that showed subtle soft tissue density surrounding surgical clips in the right  side of the pelvis along with soft tissue nodule at the anterior wall of the bladder concerning for recurrent disease.  Patient sent back to Dr. Davidson for colonoscopy.    -2/7/2020 patient was referred to Dr. Peoples at the [facility], he underwent diagnostic laparoscopic and peritoneal biopsies that confirmed recurrent disease with biopsy of bladder dome nodule showing adenocarcinoma with mucinous features.  Port was placed at this time also.    -2/25/2020 began FOLFIRI    -4/21/2020 patient having increased fatigue, FOLFIRI dose reduced by 10%.    -5/19/2020 cycle 7 FOLFIRI    -6/18/2020 Dr. Peoples performed exploratory laparotomy with excision of right lobe liver lesion and left lobe liver lesion, omentectomy, resection of pelvic peritoneal nodules, ileocolectomy with creation of ileal colostomy, hyperthermic intraperitoneal chemotherapy with mitomycin-C at 42 °C for deep tissue penetration for 90 minutes.  Dr. Perdue performed partial cystectomy with right ureteral stent placement    -8/5/2020 CT abdomen pelvis with contrast shows small soft tissue nodule anterior aspect of bladder stable.  New small amount of ascites as well as a new small left pleural effusion.  Creatinine 0.75 with hemoglobin 11.1 otherwise unremarkable CBC and CMP.    -2/15/2020 CT abdomen pelvis with contrast compared to 8/5/2020 shows enlarging soft tissue mass 4.2 cm over the bladder fundus and there is a calcification along the base of the urinary bladder.  Small stable multiple hypoattenuating indeterminate liver lesions.  Similar degree of ascites.    -2/23/2021 CT chest shows no evidence of metastasis with ascending aorta 42 mm.    -3/8/2021 follow-up with Dr. Portillo Peoples we reviewed his CTs suggested team effort resection with Dr. Perdue    -3/16/2021 follow-up with Dr. Perdue.  He plans for open partial cystectomy, possible ureteral implants, cystoscopy and stent placement in concert with Dr. Peoples.    -4/22/2021 cystoscopy (after  "prior office cystoscopy showed large fungating mass) with bilateral ureteral catheters, exploratory laparotomy, lysis of adhesions, right pelvic sidewall excisional biopsy, open partial cystectomy Dr. Ike Perdue. Peritoneal fluid showed predominant inflammatory reactive mesothelial cells with no malignancy. 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 Carcinoma was less than 1 mm from the lateral resection margins.    -5/18/2021 [facility] medical oncology follow-up visit: I reviewed his hospital notes, operative notes, and pathology report as above and went over this with the patient.  The general consensus from retrospective as well as a few prospective data over the last couple of decades with this low-grade malignancy does not show any profound benefit from \"adjuvant\" chemotherapy in this current setting.  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  There is no standard follow-up but general guidelines suggest following up as typical colon cancer.  I will repeat his CT chest abdomen pelvis now to reestablish his baseline postoperative imaging and have him see my nurse practitioner back in a couple of weeks to make sure there is no nia evidence of measurable residual disease.  Assuming this just shows postoperative changes, I would simply repeat scans again in 3 months or sooner as symptoms dictate.  If he has no evidence of persistent or metastatic disease on current imaging, then when he sees my nurse practitioner back she will also review what they say about his ascending aorta which was 42 mm in February and sent him to Dr. Evangelist Anthony for an opinion as to whether any intervention is needed relative to the aorta.  Obviously if his cancer is out of control on the upcoming scans then the aorta is a moot point.    -6/10/2021 [facility] oncology clinic follow-up: CT scans show no evidence of disease recurrence in the chest, abdomen or pelvis.  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  Referral made to Dr. Anthony, cardiothoracic surgeon for management and evaluation of a sending aortic aneurysm.  Plan to repeat CT scans in 3 months.    -9/14/2021 [facility] medical oncology follow-up visit: I reviewed images and reports of 9/10/2021 CT chest abdomen pelvis with contrast which shows under distended urinary bladder with mild asymmetric wall thickening dome and left lateral wall with a small 9 mm polypoid lesion in the bladder.  There is stable 2.3 cm right external iliac and a few other subcentimeter scattered nodes in the root of the small bowel mesentery and retroperitoneum.  Postsurgical right hemicolectomy changes.  Stable low-attenuation lesions in the liver unchanged.  I will have him collect his discs from [facility] to take Dr. Perdue to decide whether to proceed with cystoscopy or just continue watchful waiting.  There is no major changes and I suspect we are looking at postoperative changes and we shall see him for video visit in a few weeks to see what urology at  decides.    -10/11/2021 cystoscopy Dr. Perdue showed marked acute and chronic inflammation but no evidence of malignancy.  Muscularis propria present.    -10/14/2021 [facility] medical oncology virtual visit: I reviewed reports of pathology from cystoscopy 10/11/2021 and went over this with patient.  He is feeling fairly fit.  I will repeat his CT chest abdomen pelvis prior to return in 3 months and if in the interim, when he follows up with Dr. Perdue in the next couple of weeks post cystoscopy, plans are made for further cystoscopy before I see him back and any malignancy is found, I would ask the patient and Dr. Perdue to touch base as I would not know of such without that contact as the information comes into our chart without notification now that epic is being used by .  Assuming no further biopsies in the interim, I will simply repeat his scans in 3 months to follow what I  suspected and is now confirmed to be inflammatory changes.  From the aneurysm standpoint, he has follow-up scanning March 2022 arranged by Dr. Anthony and he will follow him up after that.    Total time of care today inclusive of time spent today prior to his visit reviewing interval notes and patholog from procedure by Dr. Perdue and during visit translating this information to him and after visit arranging for ongoing scanning and follow-up as outlined took 30 minutes of total patient care time throughout the day today.  Cy Delcid MD    10/14/2021

## 2021-11-24 ENCOUNTER — HOSPITAL ENCOUNTER (OUTPATIENT)
Dept: CT IMAGING | Facility: HOSPITAL | Age: 66
Discharge: HOME | End: 2021-11-24
Admitting: INTERNAL MEDICINE

## 2021-11-24 DIAGNOSIS — C18.1 MUCINOUS ADENOCARCINOMA OF APPENDIX (HCC): ICD-10-CM

## 2021-11-24 PROCEDURE — 25010000002 IOPAMIDOL 61 % SOLUTION: Performed by: INTERNAL MEDICINE

## 2021-11-24 PROCEDURE — 71260 CT THORAX DX C+: CPT

## 2021-11-24 PROCEDURE — 74177 CT ABD & PELVIS W/CONTRAST: CPT

## 2021-11-24 PROCEDURE — 82565 ASSAY OF CREATININE: CPT

## 2021-11-24 RX ADMIN — IOPAMIDOL 85 ML: 612 INJECTION, SOLUTION INTRAVENOUS at 10:45

## 2021-12-05 LAB — CREAT BLDA-MCNC: 0.8 MG/DL (ref 0.6–1.3)

## 2022-02-01 DIAGNOSIS — I71.20 THORACIC AORTIC ANEURYSM WITHOUT RUPTURE: Primary | ICD-10-CM

## 2022-02-04 ENCOUNTER — TELEMEDICINE (OUTPATIENT)
Dept: ONCOLOGY | Facility: CLINIC | Age: 67
End: 2022-02-04

## 2022-02-04 DIAGNOSIS — I71.20 THORACIC AORTIC ANEURYSM WITHOUT RUPTURE: ICD-10-CM

## 2022-02-04 DIAGNOSIS — C48.2 PERITONEAL CARCINOMA: ICD-10-CM

## 2022-02-04 DIAGNOSIS — C18.1 MUCINOUS ADENOCARCINOMA OF APPENDIX: Primary | ICD-10-CM

## 2022-02-04 PROCEDURE — 99215 OFFICE O/P EST HI 40 MIN: CPT | Performed by: INTERNAL MEDICINE

## 2022-02-04 NOTE — PROGRESS NOTES
Telehealth follow-up visit  This was an audio and video enabled telemedicine encounter.  Done for COVID-19 risk reduction.  Done for ice storm as well.  Verbal consent given.  CHIEF COMPLAINT: No new somatic complaints but with chronic back pain unrelieved by recent spinal intervention    Problem List:  Oncology/Hematology History Overview Note   1.  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  Pathologic stage IIa with T3 extension into the base of the appendix through the muscularis propria but not into the serosal surface.  16 nodes negative.  Colonoscopy December 2017 negative postop    -12/30/2019 medical oncology office visit: The patient had been on surveillance since surgery December 2017.  CT scans had been negative up until 12/30/2019 CT chest abdomen and pelvis that showed subtle soft tissue density surrounding surgical clips in the right side of the pelvis along with soft tissue nodule at the anterior wall of the bladder concerning for recurrent disease.  Patient sent back to Dr. Davidson for colonoscopy.    -2/7/2020 patient was referred to Dr. Peoples at the [facility], he underwent diagnostic laparoscopic and peritoneal biopsies that confirmed recurrent disease with biopsy of bladder dome nodule showing adenocarcinoma with mucinous features.  Port was placed at this time also.    -2/25/2020 began FOLFIRI    -4/21/2020 patient having increased fatigue, FOLFIRI dose reduced by 10%.    -5/19/2020 cycle 7 FOLFIRI    -6/18/2020 Dr. Peoples performed exploratory laparotomy with excision of right lobe liver lesion and left lobe liver lesion, omentectomy, resection of pelvic peritoneal nodules, ileocolectomy with creation of ileal colostomy, hyperthermic intraperitoneal chemotherapy with mitomycin-C at 42 °C for deep tissue penetration for 90 minutes.  Dr. Perdue performed partial cystectomy with right ureteral stent placement    -8/5/2020 CT  "abdomen pelvis with contrast shows small soft tissue nodule anterior aspect of bladder stable.  New small amount of ascites as well as a new small left pleural effusion.  Creatinine 0.75 with hemoglobin 11.1 otherwise unremarkable CBC and CMP.    -2/15/2020 CT abdomen pelvis with contrast compared to 8/5/2020 shows enlarging soft tissue mass 4.2 cm over the bladder fundus and there is a calcification along the base of the urinary bladder.  Small stable multiple hypoattenuating indeterminate liver lesions.  Similar degree of ascites.    -2/23/2021 CT chest shows no evidence of metastasis with ascending aorta 42 mm.    -3/8/2021 follow-up with Dr. Portillo Peoples we reviewed his CTs suggested team effort resection with Dr. Perdue    -3/16/2021 follow-up with Dr. Perdue.  He plans for open partial cystectomy, possible ureteral implants, cystoscopy and stent placement in concert with Dr. Peoples.    -4/22/2021 cystoscopy (after prior office cystoscopy showed large fungating mass) with bilateral ureteral catheters, exploratory laparotomy, lysis of adhesions, right pelvic sidewall excisional biopsy, open partial cystectomy Dr. Ike Perdue. Peritoneal fluid showed predominant inflammatory reactive mesothelial cells with no malignancy. 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 Carcinoma was less than 1 mm from the lateral resection margins.    -5/18/2021 [facility], [place], operated by [facility] medical oncology follow-up visit: I reviewed his hospital notes, operative notes, and pathology report as above and went over this with the patient.  The general consensus from retrospective as well as a few prospective data over the last couple of decades with this low-grade malignancy does not show any profound benefit from \"adjuvant\" chemotherapy in this current setting.  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  There is no standard follow-up but general guidelines suggest following up as typical " colon cancer.  I will repeat his CT chest abdomen pelvis now to reestablish his baseline postoperative imaging and have him see my nurse practitioner back in a couple of weeks to make sure there is no nia evidence of measurable residual disease.  Assuming this just shows postoperative changes, I would simply repeat scans again in 3 months or sooner as symptoms dictate.  If he has no evidence of persistent or metastatic disease on current imaging, then when he sees my nurse practitioner back she will also review what they say about his ascending aorta which was 42 mm in February and sent him to Dr. Evangelist Anthony for an opinion as to whether any intervention is needed relative to the aorta.  Obviously if his cancer is out of control on the upcoming scans then the aorta is a moot point.    -6/10/2021 [facility] oncology clinic follow-up: CT scans show no evidence of disease recurrence in the chest, abdomen or pelvis.  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  Referral made to Dr. Anthony, cardiothoracic surgeon for management and evaluation of a sending aortic aneurysm.  Plan to repeat CT scans in 3 months.    -9/14/2021 [facility] medical oncology follow-up visit: I reviewed images and reports of 9/10/2021 CT chest abdomen pelvis with contrast which shows under distended urinary bladder with mild asymmetric wall thickening dome and left lateral wall with a small 9 mm polypoid lesion in the bladder.  There is stable 2.3 cm right external iliac and a few other subcentimeter scattered nodes in the root of the small bowel mesentery and retroperitoneum.  Postsurgical right hemicolectomy changes.  Stable low-attenuation lesions in the liver unchanged.  I will have him collect his discs from [facility] to take Dr. Perdue to decide whether to proceed with cystoscopy or just continue watchful waiting.  There is no major changes and I suspect we are looking at postoperative changes and we shall see him for video visit in a  few weeks to see what urology at  decides.    -10/11/2021 cystoscopy Dr. Perdue showed marked acute and chronic inflammation but no evidence of malignancy.  Muscularis propria present.    -10/14/2021 [facility] medical oncology virtual visit: I reviewed reports of pathology from cystoscopy 10/11/2021 and went over this with patient.  He is feeling fairly fit.  I will repeat his CT chest abdomen pelvis prior to return in 3 months and if in the interim, when he follows up with Dr. Perdue in the next couple of weeks post cystoscopy, plans are made for further cystoscopy before I see him back and any malignancy is found, I would ask the patient and Dr. Perdue to touch base as I would not know of such without that contact as the information comes into our chart without notification now that epic is being used by [employer].  Assuming no further biopsies in the interim, I will simply repeat his scans in 3 months to follow what I suspected and is now confirmed to be inflammatory changes.  From the aneurysm standpoint, he has follow-up scanning March 2022 arranged by Dr. Anthony and he will follow him up after that.    -11/24/2021 CT chest abdomen pelvis compared to 6/7/2021 outside imaging shows stable 4.1 cm ascending thoracic aorta.  No lung nodules.  Liver homogenous with bilateral cysts.  No adenopathy.  Thickened sigmoid colon and rectum suggestive of mild colitis or postradiation change.  No pelvic adenopathy.  Bony structures degenerative in the spine.  Some soft tissue anterior to the acetabulum on the right and extending along the medial aspect right pelvic sidewall 2.7 x 5.6 may represent intramuscular process versus adenopathy which could not be ruled out.  No prior study views available.    -1/4/2022 follow-up Dr. Ike Perdue [facility].  Per his note, he had TURBT 10/11/2021.  He had been placed on antibiotics for UTI for preop preparation for back surgery planned in 2 weeks.  Denies any mucus in his urine.  Some  microhematuria on urinalysis this day.  No gross hematuria or dysuria.  The October 2021 TURBT did not reveal anything malignant on pathology.  There may be irritation or inflammation secondary to sutures in the bladder following partial cystectomy.  Patient asymptomatic from a urinary standpoint.  Recommended following up with [facility] with no plans for further urology follow-up.         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  Pathologic stage IIa with T3 extension into the base of the appendix through the muscularis propria but not into the serosal surface.  16 nodes negative.  Colonoscopy December 2017 negative postop     12/5/2017 Surgery    Surgery       Procedure:  Appendectomy and right hemicolectomy      Completeness of resection:  No evidence of residual tumor     Pathology revealed invasive moderately differentiated mucinous adenocarcinoma.  Right hemicolectomy: Benign colon and small intestine no evidence of carcinoma.  16 benign lymph nodes, 0/16, negative for dysplasia or malignancy.  Tumor size approximately 6 cm.  Grade 2, moderately differentiated.  Tumor invades through the muscularis profile into the base of appendix but does not extend to the serosal surface.  All margins uninvolved, no lymphovascular invasion identified.  Pathological stage pT3 pN0.         12/8/2017 Imaging    CT of the chest abdomen and pelvis negative.  Baseline CBC WBC 7100, hemoglobin 11.3, hematocrit 34.8%, platelet count 195,000.     3/20/2018 Procedure    Colonoscopy with Dr. Davidson was normal, recommendation for repeat surveillance colonoscopy in 3 years.     6/11/2018 Imaging    CT chest, abdomen and pelvis with no evidence metastatic disease.  CEA 1.1     9/24/2018 Imaging    CT abdomen pelvis showed no acute changes and nothing to suggest recurrence     12/28/2018  Imaging    CT chest, abdomen and pelvis negative. Normal CBC, CMP and CEA 0.7.     6/28/2019 Imaging    CT chest, abdomen and pelvis: No interval change from prior studies.  No recurrent or metastatic disease detected in the chest, abdomen or pelvis.  No acute process.  CEA 0.9     12/30/2019 Imaging    CT chest, abdomen and pelvis: No disease in the chest.  There was noted a subtle soft tissue density, one surrounding surgical clips in the right side of the pelvis and the other a soft tissue nodule intimately associated with the anterior wall of the bladder, which are considered suspicious for recurrent disease.  CEA 1.1.  CMP with normal creatinine 0.9, total bilirubin 1.9, AST 34, ALT 24, alkaline phosphatase 93.  CBC with WBC 6900, hemoglobin 15.9, platelet count 232,000.       1/21/2020 Procedure    Normal colonoscopy with Dr. Davidson.  He has made referral to Dr. Portillo Peoples at .     2/7/2020 Progression    12/30/2019 CT chest, abdomen and pelvis: No disease in the chest.  There are subtle soft tissue densities (1 surrounding surgical clips in the right side of the pelvis and the other a soft tissue nodule intimately associated with the anterior wall of the bladder) which are considered suspicious for recurrent disease.  CMP with normal creatinine 0.9, total bilirubin 1.9, AST 34, ALT 24, alkaline phosphatase 93.  CBC with WBC 6900, hemoglobin 15.9, platelet count 232,000.  CEA 1.1.  2/7/2020 diagnostic laparoscopy with peritoneal biopsies performed at the [facility] by Dr. Peoples showed no sign of diffuse peritoneal carcinomatosis or liver metastasis.  There was a firm nodule in the superior dome of the bladder, adherent to omentum, as well as right pelvic sidewall nodule adjacent to surgical clips.  Biopsy of bladder dome nodule showed adenocarcinoma with mucinous features.     2/25/2020 -  Chemotherapy    OP COLORECTAL FOLFIRI Irinotecan / Leucovorin / Fluorouracil     5/29/2020 Imaging    CT  chest abdomen pelvis shows slight improvement with decreased nodule anterolateral margin of urinary bladder with stable nodularity of the right lower quadrant adjacent to surgical clips and no progressive disease.  Slight hyperemia of the colonic mucosa     6/18/2020 Surgery    Surgery       -6/18/2020 Dr. Peoples performed exploratory laparotomy with excision of right lobe liver lesion and left lobe liver lesion, omentectomy, resection of pelvic peritoneal nodules, ileocolectomy with creation of ileal colostomy, hyperthermic intraperitoneal chemotherapy with mitomycin-C at 42 °C for deep tissue penetration for 90 minutes.  Dr. Perdue performed partial cystectomy with right ureteral stent placement     8/5/2020 Imaging     CT abdomen pelvis with contrast shows small soft tissue nodule anterior aspect of bladder stable.  New small amount of ascites as well as a new small left pleural effusion.  Creatinine 0.75 with hemoglobin 11.1 otherwise unremarkable CBC and CMP     2/15/2021 Imaging    CT abdomen pelvis with contrast compared to 8/5/2020 shows enlarging soft tissue mass 4.2 cm over the bladder fundus and there is a calcification along the base of the urinary bladder.  Small stable multiple hypoattenuating indeterminate liver lesions.  Similar degree of ascites.     2/23/2021 Imaging    -2/23/2021 CT chest shows no evidence of metastasis with ascending aorta 42 mm.     4/22/2021 Surgery    -4/22/2021 cystoscopy (after prior office cystoscopy showed large fungating mass) with bilateral ureteral catheters, exploratory laparotomy, lysis of adhesions, right pelvic sidewall excisional biopsy, open partial cystectomy Dr. Ike Perdue. Peritoneal fluid showed predominant inflammatory reactive mesothelial cells with no malignancy. 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 Carcinoma was less than 1 mm from the lateral resection margins.      6/7/2021 Imaging    CT chest, abdomen and pelvis: No evidence of metastatic disease within the chest abdomen or pelvis.  Interval resection of enhancing bladder wall mass a small amount of residual enhancing soft tissue, possibly granulation tissue.  Interval resolution of abdominal and pelvic ascites.  Stable dilation of the ascending aorta mid segment measuring up to 41 mm.     9/10/2021 Imaging    CT chest abdomen pelvis with contrast shows under distended urinary bladder with mild asymmetric wall thickening dome and left lateral wall with a small 9 mm polypoid lesion in the bladder.  There is stable 2.3 cm right external iliac and a few other subcentimeter scattered nodes in the root of the small bowel mesentery and retroperitoneum.  Postsurgical right hemicolectomy changes.  Stable low-attenuation lesions in the liver unchanged.     11/24/2021 Imaging    CT chest abdomen pelvis compared to 6/7/2021 outside imaging shows stable 4.1 cm ascending thoracic aorta.  No lung nodules.  Liver homogenous with bilateral cysts.  No adenopathy.  Thickened sigmoid colon and rectum suggestive of mild colitis or postradiation change.  No pelvic adenopathy.  Bony structures degenerative in the spine.  Some soft tissue anterior to the acetabulum on the right and extending along the medial aspect right pelvic sidewall 2.7 x 5.6 may represent intramuscular process versus adenopathy which could not be ruled out.  No prior study views available.         HISTORY OF PRESENT ILLNESS:  The patient is a 66 y.o. male, here for follow up on management of appendiceal low-grade mucinous adenocarcinoma with back pain unrelieved by recent spinal surgery    Past Medical History:   Diagnosis Date   • Ascending aortic aneurysm (HCC)    • Benign hypertension    • Colon cancer (HCC)    • Hypertension    • Intermittent palpitations    • Mucinous adenocarcinoma (HCC)    • Pounding heartbeat    • PVC's (premature ventricular contractions)      Past  Surgical History:   Procedure Laterality Date   • APPENDECTOMY  2017    With Partial Colon    • BLADDER SURGERY      with partial colon   • GALLBLADDER SURGERY         Allergies   Allergen Reactions   • Levofloxacin Unknown - Low Severity   • Penicillins Unknown - Low Severity   • Tetracycline Unknown - Low Severity       Family History and Social History reviewed and changed as necessary    REVIEW OF SYSTEM:   Other than for his back pain, no new complaints    PHYSICAL EXAM:  No visible respiratory distress.  No visible jaundice or icterus.    There were no vitals filed for this visit.  There were no vitals filed for this visit.           ECOG: (0) Fully Active - Able to Carry On All Pre-disease Performance Without Restriction    Lab Results   Component Value Date    HGB 14.3 09/14/2021    HCT 42.0 09/14/2021    MCV 85.5 09/14/2021     09/14/2021    WBC 8.37 09/14/2021    NEUTROABS 6.37 09/14/2021    LYMPHSABS 1.27 09/14/2021    MONOSABS 0.55 09/14/2021    EOSABS 0.11 09/14/2021    BASOSABS 0.04 09/14/2021       Lab Results   Component Value Date    GLUCOSE 110 (H) 09/14/2021    BUN 18 09/14/2021    CREATININE 0.80 11/24/2021     09/14/2021    K 4.4 09/14/2021     09/14/2021    CO2 24.0 09/14/2021    CALCIUM 9.8 09/14/2021    ALBUMIN 4.80 09/14/2021    BILITOT 0.8 09/14/2021    ALKPHOS 107 09/14/2021    AST 18 09/14/2021    ALT 15 09/14/2021             ASSESSMENT & PLAN:  1.  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  Pathologic stage IIa with T3 extension into the base of the appendix through the muscularis propria but not into the serosal surface.  16 nodes negative.  Colonoscopy December 2017 negative postop    -12/30/2019 medical oncology office visit: The patient had been on surveillance since surgery December 2017.  CT scans had been negative up until 12/30/2019 CT chest abdomen and pelvis that showed subtle soft tissue  density surrounding surgical clips in the right side of the pelvis along with soft tissue nodule at the anterior wall of the bladder concerning for recurrent disease.  Patient sent back to Dr. Davidson for colonoscopy.    -2/7/2020 patient was referred to Dr. Peoples at the [facility], he underwent diagnostic laparoscopic and peritoneal biopsies that confirmed recurrent disease with biopsy of bladder dome nodule showing adenocarcinoma with mucinous features.  Port was placed at this time also.    -2/25/2020 began FOLFIRI    -4/21/2020 patient having increased fatigue, FOLFIRI dose reduced by 10%.    -5/19/2020 cycle 7 FOLFIRI    -6/18/2020 Dr. Peoples performed exploratory laparotomy with excision of right lobe liver lesion and left lobe liver lesion, omentectomy, resection of pelvic peritoneal nodules, ileocolectomy with creation of ileal colostomy, hyperthermic intraperitoneal chemotherapy with mitomycin-C at 42 °C for deep tissue penetration for 90 minutes.  Dr. Perdue performed partial cystectomy with right ureteral stent placement    -8/5/2020 CT abdomen pelvis with contrast shows small soft tissue nodule anterior aspect of bladder stable.  New small amount of ascites as well as a new small left pleural effusion.  Creatinine 0.75 with hemoglobin 11.1 otherwise unremarkable CBC and CMP.    -2/15/2020 CT abdomen pelvis with contrast compared to 8/5/2020 shows enlarging soft tissue mass 4.2 cm over the bladder fundus and there is a calcification along the base of the urinary bladder.  Small stable multiple hypoattenuating indeterminate liver lesions.  Similar degree of ascites.    -2/23/2021 CT chest shows no evidence of metastasis with ascending aorta 42 mm.    -3/8/2021 follow-up with Dr. Portillo Peoples we reviewed his CTs suggested team effort resection with Dr. Perdue    -3/16/2021 follow-up with Dr. Perdue.  He plans for open partial cystectomy, possible ureteral implants, cystoscopy and stent placement in  "concert with Dr. Peoples.    -4/22/2021 cystoscopy (after prior office cystoscopy showed large fungating mass) with bilateral ureteral catheters, exploratory laparotomy, lysis of adhesions, right pelvic sidewall excisional biopsy, open partial cystectomy Dr. Ike Perdue. Peritoneal fluid showed predominant inflammatory reactive mesothelial cells with no malignancy. 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 Carcinoma was less than 1 mm from the lateral resection margins.    -5/18/2021 [facility], [place], operated by [facility] medical oncology follow-up visit: I reviewed his hospital notes, operative notes, and pathology report as above and went over this with the patient.  The general consensus from retrospective as well as a few prospective data over the last couple of decades with this low-grade malignancy does not show any profound benefit from \"adjuvant\" chemotherapy in this current setting.  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  There is no standard follow-up but general guidelines suggest following up as typical colon cancer.  I will repeat his CT chest abdomen pelvis now to reestablish his baseline postoperative imaging and have him see my nurse practitioner back in a couple of weeks to make sure there is no nia evidence of measurable residual disease.  Assuming this just shows postoperative changes, I would simply repeat scans again in 3 months or sooner as symptoms dictate.  If he has no evidence of persistent or metastatic disease on current imaging, then when he sees my nurse practitioner back she will also review what they say about his ascending aorta which was 42 mm in February and sent him to Dr. Evangelist Anthony for an opinion as to whether any intervention is needed relative to the aorta.  Obviously if his cancer is out of control on the upcoming scans then the aorta is a moot point.    -6/10/2021 [facility], [place], operated by [facility] oncology clinic follow-up: CT scans show no evidence " of disease recurrence in the chest, abdomen or pelvis.  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  Referral made to Dr. Anthony, cardiothoracic surgeon for management and evaluation of a sending aortic aneurysm.  Plan to repeat CT scans in 3 months.    -9/14/2021 [facility] medical oncology follow-up visit: I reviewed images and reports of 9/10/2021 CT chest abdomen pelvis with contrast which shows under distended urinary bladder with mild asymmetric wall thickening dome and left lateral wall with a small 9 mm polypoid lesion in the bladder.  There is stable 2.3 cm right external iliac and a few other subcentimeter scattered nodes in the root of the small bowel mesentery and retroperitoneum.  Postsurgical right hemicolectomy changes.  Stable low-attenuation lesions in the liver unchanged.  I will have him collect his discs from [facility] to take Dr. Perdue to decide whether to proceed with cystoscopy or just continue watchful waiting.  There is no major changes and I suspect we are looking at postoperative changes and we shall see him for video visit in a few weeks to see what urology at  decides.    -10/11/2021 cystoscopy Dr. Perdue showed marked acute and chronic inflammation but no evidence of malignancy.  Muscularis propria present.    -10/14/2021 [facility] medical oncology virtual visit: I reviewed reports of pathology from cystoscopy 10/11/2021 and went over this with patient.  He is feeling fairly fit.  I will repeat his CT chest abdomen pelvis prior to return in 3 months and if in the interim, when he follows up with Dr. Perdue in the next couple of weeks post cystoscopy, plans are made for further cystoscopy before I see him back and any malignancy is found, I would ask the patient and Dr. Perdue to touch base as I would not know of such without that contact as the information comes into our chart without notification now that epic is being used by .  Assuming no further biopsies in the interim, I  will simply repeat his scans in 3 months to follow what I suspected and is now confirmed to be inflammatory changes.  From the aneurysm standpoint, he has follow-up scanning March 2022 arranged by Dr. Anthony and he will follow him up after that.    -11/24/2021 CT chest abdomen pelvis compared to 6/7/2021 outside imaging shows stable 4.1 cm ascending thoracic aorta.  No lung nodules.  Liver homogenous with bilateral cysts.  No adenopathy.  Thickened sigmoid colon and rectum suggestive of mild colitis or postradiation change.  No pelvic adenopathy.  Bony structures degenerative in the spine.  Some soft tissue anterior to the acetabulum on the right and extending along the medial aspect right pelvic sidewall 2.7 x 5.6 may represent intramuscular process versus adenopathy which could not be ruled out.  No prior study views available.    -1/4/2022 follow-up Dr. Ike Perdue [facility].  Per his note, he had TURBT 10/11/2021.  He had been placed on antibiotics for UTI for preop preparation for back surgery planned in 2 weeks.  Denies any mucus in his urine.  Some microhematuria on urinalysis this day.  No gross hematuria or dysuria.  The October 2021 TURBT did not reveal anything malignant on pathology.  There may be irritation or inflammation secondary to sutures in the bladder following partial cystectomy.  Patient asymptomatic from a urinary standpoint.  Recommended following up with [facility] with no plans for further urology follow-up.    -2/4/2022 [facility] telehealth follow-up visit: I reviewed his November images and reports with the patient as well as with Dr. Gm Moody and the note from Dr. Perdue from 1 month ago.  I do suspect this pelvic sidewall abnormality in November represents persistent disease but as I have stated in prior dictations, the data on palliative or overall survival benefit of systemic 5-FU based combination chemotherapy and/or Avastin does not show a clear-cut  survival advantage to chemotherapy for asymptomatic disease.  He is having some back pain and had spine decompression a couple of weeks ago without much benefit.  This is an area that may potentially be radiated but I will check his CT chest (angiogram of chest) abdomen and pelvis and get his blood counts and chemistries and urinalysis and see him back for virtual visit in a few weeks.  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  I will also converse with Dr. Peoples at that junction whether he thinks he has anything to offer if this is progressing though I doubt it given that the last intervention was a urologic procedure and the last cystoscopy/TURBT in October had no malignancy and the liver lesions are currently being called liver cysts albeit I am skeptical as to whether those are truly benign but they are certainly not growing.    Total time of care inclusive of time spent today prior to his visit reviewing images and reports of scans and notes from urology and discussions with Dr. Moody and during visit translating this information and the lack of wonderful options for systemic therapy that may not significantly prolong life nor improve quality and after visit putting forth the plan as outlined above took 45 minutes of patient care time throughout the day today.  Cy Delcid MD     02/04/2022

## 2022-02-28 ENCOUNTER — TELEPHONE (OUTPATIENT)
Dept: ONCOLOGY | Facility: CLINIC | Age: 67
End: 2022-02-28

## 2022-03-08 ENCOUNTER — TELEMEDICINE (OUTPATIENT)
Dept: ONCOLOGY | Facility: CLINIC | Age: 67
End: 2022-03-08

## 2022-03-08 DIAGNOSIS — C18.1 MUCINOUS ADENOCARCINOMA OF APPENDIX: Primary | ICD-10-CM

## 2022-03-08 PROCEDURE — 99215 OFFICE O/P EST HI 40 MIN: CPT | Performed by: INTERNAL MEDICINE

## 2022-03-08 NOTE — PROGRESS NOTES
This was an audio and video enabled telemedicine encounter.  Done for COVID-19 risk reduction.  Verbal consent given.  Time stamp below.    CHIEF COMPLAINT: Right hip pain    Problem List:  Oncology/Hematology History Overview Note   1.  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  Pathologic stage IIa with T3 extension into the base of the appendix through the muscularis propria but not into the serosal surface.  16 nodes negative.  Colonoscopy December 2017 negative postop    -12/30/2019 medical oncology office visit: The patient had been on surveillance since surgery December 2017.  CT scans had been negative up until 12/30/2019 CT chest abdomen and pelvis that showed subtle soft tissue density surrounding surgical clips in the right side of the pelvis along with soft tissue nodule at the anterior wall of the bladder concerning for recurrent disease.  Patient sent back to Dr. Davidson for colonoscopy.    -2/7/2020 patient was referred to Dr. Peoples at the [facility], he underwent diagnostic laparoscopic and peritoneal biopsies that confirmed recurrent disease with biopsy of bladder dome nodule showing adenocarcinoma with mucinous features.  Port was placed at this time also.    -2/25/2020 began FOLFIRI    -4/21/2020 patient having increased fatigue, FOLFIRI dose reduced by 10%.    -5/19/2020 cycle 7 FOLFIRI    -6/18/2020 Dr. Peoples performed exploratory laparotomy with excision of right lobe liver lesion and left lobe liver lesion, omentectomy, resection of pelvic peritoneal nodules, ileocolectomy with creation of ileal colostomy, hyperthermic intraperitoneal chemotherapy with mitomycin-C at 42 °C for deep tissue penetration for 90 minutes.  Dr. Perdue performed partial cystectomy with right ureteral stent placement    -8/5/2020 CT abdomen pelvis with contrast shows small soft tissue nodule anterior aspect of bladder stable.  New small amount of  "ascites as well as a new small left pleural effusion.  Creatinine 0.75 with hemoglobin 11.1 otherwise unremarkable CBC and CMP.    -2/15/2021 CT abdomen pelvis with contrast compared to 8/5/2020 shows enlarging soft tissue mass 4.2 cm over the bladder fundus and there is a calcification along the base of the urinary bladder.  Small stable multiple hypoattenuating indeterminate liver lesions.  Similar degree of ascites.    -2/23/2021 CT chest shows no evidence of metastasis with ascending aorta 42 mm.    -3/8/2021 follow-up with Dr. Portillo Peoples we reviewed his CTs suggested team effort resection with Dr. Perdue    -3/16/2021 follow-up with Dr. Perdue.  He plans for open partial cystectomy, possible ureteral implants, cystoscopy and stent placement in concert with Dr. Peoples.    -4/22/2021 cystoscopy (after prior office cystoscopy showed large fungating mass) with bilateral ureteral catheters, exploratory laparotomy, lysis of adhesions, right pelvic sidewall excisional biopsy, open partial cystectomy Dr. Ike Perdue. Peritoneal fluid showed predominant inflammatory reactive mesothelial cells with no malignancy. 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 Carcinoma was less than 1 mm from the lateral resection margins.    -5/18/2021 [facility] medical oncology follow-up visit: I reviewed his hospital notes, operative notes, and pathology report as above and went over this with the patient.  The general consensus from retrospective as well as a few prospective data over the last couple of decades with this low-grade malignancy does not show any profound benefit from \"adjuvant\" chemotherapy in this current setting.  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  There is no standard follow-up but general guidelines suggest following up as typical colon cancer.  I will repeat his CT chest abdomen pelvis now to reestablish his baseline postoperative imaging and " have him see my nurse practitioner back in a couple of weeks to make sure there is no nia evidence of measurable residual disease.  Assuming this just shows postoperative changes, I would simply repeat scans again in 3 months or sooner as symptoms dictate.  If he has no evidence of persistent or metastatic disease on current imaging, then when he sees my nurse practitioner back she will also review what they say about his ascending aorta which was 42 mm in February and sent him to Dr. Evangelist Anthony for an opinion as to whether any intervention is needed relative to the aorta.  Obviously if his cancer is out of control on the upcoming scans then the aorta is a moot point.    -6/10/2021 [facility], [place], operated by [facility] oncology clinic follow-up: CT scans show no evidence of disease recurrence in the chest, abdomen or pelvis.  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  Referral made to Dr. Anthony, cardiothoracic surgeon for management and evaluation of a sending aortic aneurysm.  Plan to repeat CT scans in 3 months.    -9/14/2021 [facility], [place], operated by [facility] medical oncology follow-up visit: I reviewed images and reports of 9/10/2021 CT chest abdomen pelvis with contrast which shows under distended urinary bladder with mild asymmetric wall thickening dome and left lateral wall with a small 9 mm polypoid lesion in the bladder.  There is stable 2.3 cm right external iliac and a few other subcentimeter scattered nodes in the root of the small bowel mesentery and retroperitoneum.  Postsurgical right hemicolectomy changes.  Stable low-attenuation lesions in the liver unchanged.  I will have him collect his discs from [facility] to take Dr. Perdue to decide whether to proceed with cystoscopy or just continue watchful waiting.  There is no major changes and I suspect we are looking at postoperative changes and we shall see him for video visit in a few weeks to see what urology at  decides.    -10/11/2021 cystoscopy Dr. Perdue showed marked acute and chronic  inflammation but no evidence of malignancy.  Muscularis propria present.    -10/14/2021 [facility], [place], operated by [facility] medical oncology virtual visit: I reviewed reports of pathology from cystoscopy 10/11/2021 and went over this with patient.  He is feeling fairly fit.  I will repeat his CT chest abdomen pelvis prior to return in 3 months and if in the interim, when he follows up with Dr. Perdue in the next couple of weeks post cystoscopy, plans are made for further cystoscopy before I see him back and any malignancy is found, I would ask the patient and Dr. Perdue to touch base as I would not know of such without that contact as the information comes into our chart without notification now that epic is being used by [employer].  Assuming no further biopsies in the interim, I will simply repeat his scans in 3 months to follow what I suspected and is now confirmed to be inflammatory changes.  From the aneurysm standpoint, he has follow-up scanning March 2022 arranged by Dr. Anthony and he will follow him up after that.    -11/24/2021 CT chest abdomen pelvis compared to 6/7/2021 outside imaging shows stable 4.1 cm ascending thoracic aorta.  No lung nodules.  Liver homogenous with bilateral cysts.  No adenopathy.  Thickened sigmoid colon and rectum suggestive of mild colitis or postradiation change.  No pelvic adenopathy.  Bony structures degenerative in the spine.  Some soft tissue anterior to the acetabulum on the right and extending along the medial aspect right pelvic sidewall 2.7 x 5.6 may represent intramuscular process versus adenopathy which could not be ruled out.  No prior study views available.    -1/4/2022 follow-up Dr. Ike Perdue [facility].  Per his note, he had TURBT 10/11/2021.  He had been placed on antibiotics for UTI for preop preparation for back surgery planned in 2 weeks.  Denies any mucus in his urine.  Some microhematuria on urinalysis this day.  No gross hematuria or dysuria.  The October 2021 TURBT did not reveal anything  malignant on pathology.  There may be irritation or inflammation secondary to sutures in the bladder following partial cystectomy.  Patient asymptomatic from a urinary standpoint.  Recommended following up with [facility] oncology with no plans for further urology follow-up.    -2/4/2022 [facility] medical oncology telehealth follow-up visit: I reviewed his November images and reports with the patient as well as with Dr. Gm Moody and the note from Dr. Perdue from 1 month ago.  I do suspect this pelvic sidewall abnormality in November represents persistent disease but as I have stated in prior dictations, the data on palliative or overall survival benefit of systemic 5-FU based combination chemotherapy and/or Avastin does not show a clear-cut survival advantage to chemotherapy for asymptomatic disease.  He is having some back pain and had spine decompression a couple of weeks ago without much benefit.  This is an area that may potentially be radiated but I will check his CT chest (angiogram of chest) abdomen and pelvis and get his blood counts and chemistries and urinalysis and see him back for virtual visit in a few weeks.  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  I will also converse with Dr. Peoples at that junction whether he thinks he has anything to offer if this is progressing though I doubt it given that the last intervention was a urologic procedure and the last cystoscopy/TURBT in October had no malignancy and the liver lesions are currently being called liver cysts albeit I am skeptical as to whether those are truly benign but they are certainly not growing.    -3/4/2022 CT  angiogram of chest/CT abdomen pelvis for evaluation of dizziness and surveillance of thoracic aortic aneurysm and appendiceal carcinoma.  Thoracic ascending aortic ectasia 4 x 4 cm stable.  Small nodularity left upper lobe stable from September.  Stable small hepatic hypodensities status post cholecystectomy.  Right external iliac soft tissue fullness now 3 x 3.9 cm previously 2 x 2.3 cm concerning for enlarging adenopathy with symmetric nonenlarged inguinal nodes stable.  CBC unremarkable.  CMP unremarkable save for potassium 5.4.  CEA 18.2.  1-3 red cells and white cells per milliliter of urine.     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  Pathologic stage IIa with T3 extension into the base of the appendix through the muscularis propria but not into the serosal surface.  16 nodes negative.  Colonoscopy December 2017 negative postop     12/5/2017 Surgery    Surgery       Procedure:  Appendectomy and right hemicolectomy      Completeness of resection:  No evidence of residual tumor     Pathology revealed invasive moderately differentiated mucinous adenocarcinoma.  Right hemicolectomy: Benign colon and small intestine no evidence of carcinoma.  16 benign lymph nodes, 0/16, negative for dysplasia or malignancy.  Tumor size approximately 6 cm.  Grade 2, moderately differentiated.  Tumor invades through the muscularis profile into the base of appendix but does not extend to the serosal surface.  All margins uninvolved, no lymphovascular invasion identified.  Pathological stage pT3 pN0.         12/8/2017 Imaging    CT of the chest abdomen and pelvis negative.  Baseline CBC WBC 7100, hemoglobin 11.3, hematocrit 34.8%, platelet count 195,000.     3/20/2018 Procedure    Colonoscopy with Dr. Davidson was normal, recommendation for repeat surveillance colonoscopy in 3 years.      6/11/2018 Imaging    CT chest, abdomen and pelvis with no evidence metastatic disease.  CEA 1.1     9/24/2018 Imaging    CT abdomen pelvis showed no acute changes and nothing to suggest recurrence     12/28/2018 Imaging    CT chest, abdomen and pelvis negative. Normal CBC, CMP and CEA 0.7.     6/28/2019 Imaging    CT chest, abdomen and pelvis: No interval change from prior studies.  No recurrent or metastatic disease detected in the chest, abdomen or pelvis.  No acute process.  CEA 0.9     12/30/2019 Imaging    CT chest, abdomen and pelvis: No disease in the chest.  There was noted a subtle soft tissue density, one surrounding surgical clips in the right side of the pelvis and the other a soft tissue nodule intimately associated with the anterior wall of the bladder, which are considered suspicious for recurrent disease.  CEA 1.1.  CMP with normal creatinine 0.9, total bilirubin 1.9, AST 34, ALT 24, alkaline phosphatase 93.  CBC with WBC 6900, hemoglobin 15.9, platelet count 232,000.       1/21/2020 Procedure    Normal colonoscopy with Dr. Davidson.  He has made referral to Dr. Portillo Peoples at .     2/7/2020 Progression    12/30/2019 CT chest, abdomen and pelvis: No disease in the chest.  There are subtle soft tissue densities (1 surrounding surgical clips in the right side of the pelvis and the other a soft tissue nodule intimately associated with the anterior wall of the bladder) which are considered suspicious for recurrent disease.  CMP with normal creatinine 0.9, total bilirubin 1.9, AST 34, ALT 24, alkaline phosphatase 93.  CBC with WBC 6900, hemoglobin 15.9, platelet count 232,000.  CEA 1.1.  2/7/2020 diagnostic laparoscopy with peritoneal biopsies performed at the [facility] by Dr. Peoples showed no sign of diffuse peritoneal carcinomatosis or liver metastasis.  There was a firm nodule in the superior dome of the bladder, adherent to omentum, as well as right pelvic sidewall nodule adjacent to  surgical clips.  Biopsy of bladder dome nodule showed adenocarcinoma with mucinous features.     2/25/2020 -  Chemotherapy    OP COLORECTAL FOLFIRI Irinotecan / Leucovorin / Fluorouracil     5/29/2020 Imaging    CT chest abdomen pelvis shows slight improvement with decreased nodule anterolateral margin of urinary bladder with stable nodularity of the right lower quadrant adjacent to surgical clips and no progressive disease.  Slight hyperemia of the colonic mucosa     6/18/2020 Surgery    Surgery       -6/18/2020 Dr. Peoples performed exploratory laparotomy with excision of right lobe liver lesion and left lobe liver lesion, omentectomy, resection of pelvic peritoneal nodules, ileocolectomy with creation of ileal colostomy, hyperthermic intraperitoneal chemotherapy with mitomycin-C at 42 °C for deep tissue penetration for 90 minutes.  Dr. Perdue performed partial cystectomy with right ureteral stent placement     8/5/2020 Imaging     CT abdomen pelvis with contrast shows small soft tissue nodule anterior aspect of bladder stable.  New small amount of ascites as well as a new small left pleural effusion.  Creatinine 0.75 with hemoglobin 11.1 otherwise unremarkable CBC and CMP     2/15/2021 Imaging    CT abdomen pelvis with contrast compared to 8/5/2020 shows enlarging soft tissue mass 4.2 cm over the bladder fundus and there is a calcification along the base of the urinary bladder.  Small stable multiple hypoattenuating indeterminate liver lesions.  Similar degree of ascites.     2/23/2021 Imaging    -2/23/2021 CT chest shows no evidence of metastasis with ascending aorta 42 mm.     4/22/2021 Surgery    -4/22/2021 cystoscopy (after prior office cystoscopy showed large fungating mass) with bilateral ureteral catheters, exploratory laparotomy, lysis of adhesions, right pelvic sidewall excisional biopsy, open partial cystectomy Dr. Ike Perdue. Peritoneal fluid showed predominant inflammatory reactive mesothelial cells  with no malignancy. 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 Carcinoma was less than 1 mm from the lateral resection margins.     6/7/2021 Imaging    CT chest, abdomen and pelvis: No evidence of metastatic disease within the chest abdomen or pelvis.  Interval resection of enhancing bladder wall mass a small amount of residual enhancing soft tissue, possibly granulation tissue.  Interval resolution of abdominal and pelvic ascites.  Stable dilation of the ascending aorta mid segment measuring up to 41 mm.     9/10/2021 Imaging    CT chest abdomen pelvis with contrast shows under distended urinary bladder with mild asymmetric wall thickening dome and left lateral wall with a small 9 mm polypoid lesion in the bladder.  There is stable 2.3 cm right external iliac and a few other subcentimeter scattered nodes in the root of the small bowel mesentery and retroperitoneum.  Postsurgical right hemicolectomy changes.  Stable low-attenuation lesions in the liver unchanged.     11/24/2021 Imaging    CT chest abdomen pelvis compared to 6/7/2021 outside imaging shows stable 4.1 cm ascending thoracic aorta.  No lung nodules.  Liver homogenous with bilateral cysts.  No adenopathy.  Thickened sigmoid colon and rectum suggestive of mild colitis or postradiation change.  No pelvic adenopathy.  Bony structures degenerative in the spine.  Some soft tissue anterior to the acetabulum on the right and extending along the medial aspect right pelvic sidewall 2.7 x 5.6 may represent intramuscular process versus adenopathy which could not be ruled out.  No prior study views available.     Peritoneal carcinoma (HCC)   5/14/2020 Initial Diagnosis    Peritoneal carcinoma (HCC)         HISTORY OF PRESENT ILLNESS:  The patient is a 66 y.o. male, here for follow up on management of mucinous adenocarcinoma of the appendix with growing right external iliac  mass/node and right hip pain.  Despite recent spine interventions surgically, no relief of pain.    Past Medical History:   Diagnosis Date   • Ascending aortic aneurysm (HCC)    • Benign hypertension    • Colon cancer (HCC)    • Hypertension    • Intermittent palpitations    • Mucinous adenocarcinoma (HCC)    • Pounding heartbeat    • PVC's (premature ventricular contractions)      Past Surgical History:   Procedure Laterality Date   • APPENDECTOMY  2017    With Partial Colon    • BLADDER SURGERY      with partial colon   • GALLBLADDER SURGERY         Allergies   Allergen Reactions   • Levofloxacin Unknown - Low Severity   • Penicillins Unknown - Low Severity   • Tetracycline Unknown - Low Severity       Family History and Social History reviewed and changed as necessary    REVIEW OF SYSTEM:   Pain in the right hip region is not new and not worsening but is still significant    PHYSICAL EXAM:  No visible respiratory distress.  No jaundice or icterus.    Vitals:     There were no vitals filed for this visit.       ECOG score: 1               Lab Results   Component Value Date    HGB 14.3 09/14/2021    HCT 42.0 09/14/2021    MCV 85.5 09/14/2021     09/14/2021    WBC 8.37 09/14/2021    NEUTROABS 6.37 09/14/2021    LYMPHSABS 1.27 09/14/2021    MONOSABS 0.55 09/14/2021    EOSABS 0.11 09/14/2021    BASOSABS 0.04 09/14/2021       Lab Results   Component Value Date    GLUCOSE 110 (H) 09/14/2021    BUN 18 09/14/2021    CREATININE 0.80 11/24/2021     09/14/2021    K 4.4 09/14/2021     09/14/2021    CO2 24.0 09/14/2021    CALCIUM 9.8 09/14/2021    ALBUMIN 4.80 09/14/2021    BILITOT 0.8 09/14/2021    ALKPHOS 107 09/14/2021    AST 18 09/14/2021    ALT 15 09/14/2021             ASSESSMENT & PLAN:  1.  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  Pathologic stage IIa with T3 extension into the base of the appendix through the muscularis propria but  not into the serosal surface.  16 nodes negative.  Colonoscopy December 2017 negative postop    -12/30/2019 medical oncology office visit: The patient had been on surveillance since surgery December 2017.  CT scans had been negative up until 12/30/2019 CT chest abdomen and pelvis that showed subtle soft tissue density surrounding surgical clips in the right side of the pelvis along with soft tissue nodule at the anterior wall of the bladder concerning for recurrent disease.  Patient sent back to Dr. Davidson for colonoscopy.    -2/7/2020 patient was referred to Dr. Peoples at the [facility], he underwent diagnostic laparoscopic and peritoneal biopsies that confirmed recurrent disease with biopsy of bladder dome nodule showing adenocarcinoma with mucinous features.  Port was placed at this time also.    -2/25/2020 began FOLFIRI    -4/21/2020 patient having increased fatigue, FOLFIRI dose reduced by 10%.    -5/19/2020 cycle 7 FOLFIRI    -6/18/2020 Dr. Peoples performed exploratory laparotomy with excision of right lobe liver lesion and left lobe liver lesion, omentectomy, resection of pelvic peritoneal nodules, ileocolectomy with creation of ileal colostomy, hyperthermic intraperitoneal chemotherapy with mitomycin-C at 42 °C for deep tissue penetration for 90 minutes.  Dr. Perdue performed partial cystectomy with right ureteral stent placement    -8/5/2020 CT abdomen pelvis with contrast shows small soft tissue nodule anterior aspect of bladder stable.  New small amount of ascites as well as a new small left pleural effusion.  Creatinine 0.75 with hemoglobin 11.1 otherwise unremarkable CBC and CMP.    -2/15/2021 CT abdomen pelvis with contrast compared to 8/5/2020 shows enlarging soft tissue mass 4.2 cm over the bladder fundus and there is a calcification along the base of the urinary bladder.  Small stable multiple hypoattenuating indeterminate liver lesions.  Similar degree of ascites.    -2/23/2021 CT chest shows  "no evidence of metastasis with ascending aorta 42 mm.    -3/8/2021 follow-up with Dr. Portillo Peoples we reviewed his CTs suggested team effort resection with Dr. Perdue    -3/16/2021 follow-up with Dr. Perdue.  He plans for open partial cystectomy, possible ureteral implants, cystoscopy and stent placement in concert with Dr. Peoples.    -4/22/2021 cystoscopy (after prior office cystoscopy showed large fungating mass) with bilateral ureteral catheters, exploratory laparotomy, lysis of adhesions, right pelvic sidewall excisional biopsy, open partial cystectomy Dr. Ike Perdue. Peritoneal fluid showed predominant inflammatory reactive mesothelial cells with no malignancy. 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 Carcinoma was less than 1 mm from the lateral resection margins.    -5/18/2021 [facility] medical oncology follow-up visit: I reviewed his hospital notes, operative notes, and pathology report as above and went over this with the patient.  The general consensus from retrospective as well as a few prospective data over the last couple of decades with this low-grade malignancy does not show any profound benefit from \"adjuvant\" chemotherapy in this current setting.  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  There is no standard follow-up but general guidelines suggest following up as typical colon cancer.  I will repeat his CT chest abdomen pelvis now to reestablish his baseline postoperative imaging and have him see my nurse practitioner back in a couple of weeks to make sure there is no nia evidence of measurable residual disease.  Assuming this just shows postoperative changes, I would simply repeat scans again in 3 months or sooner as symptoms dictate.  If he has no evidence of persistent or metastatic disease on current imaging, then when he sees my nurse practitioner back she will also review what they say about his ascending aorta which " was 42 mm in February and sent him to Dr. Evangelist Anthony for an opinion as to whether any intervention is needed relative to the aorta.  Obviously if his cancer is out of control on the upcoming scans then the aorta is a moot point.    -6/10/2021 [facility] oncology clinic follow-up: CT scans show no evidence of disease recurrence in the chest, abdomen or pelvis.  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  Referral made to Dr. Anthony, cardiothoracic surgeon for management and evaluation of a sending aortic aneurysm.  Plan to repeat CT scans in 3 months.    -9/14/2021 [facility] medical oncology follow-up visit: I reviewed images and reports of 9/10/2021 CT chest abdomen pelvis with contrast which shows under distended urinary bladder with mild asymmetric wall thickening dome and left lateral wall with a small 9 mm polypoid lesion in the bladder.  There is stable 2.3 cm right external iliac and a few other subcentimeter scattered nodes in the root of the small bowel mesentery and retroperitoneum.  Postsurgical right hemicolectomy changes.  Stable low-attenuation lesions in the liver unchanged.  I will have him collect his discs from [facility] to take Dr. Perdue to decide whether to proceed with cystoscopy or just continue watchful waiting.  There is no major changes and I suspect we are looking at postoperative changes and we shall see him for video visit in a few weeks to see what urology at  decides.    -10/11/2021 cystoscopy Dr. Perdue showed marked acute and chronic inflammation but no evidence of malignancy.  Muscularis propria present.    -10/14/2021 [facility] medical oncology virtual visit: I reviewed reports of pathology from cystoscopy 10/11/2021 and went over this with patient.  He is feeling fairly fit.  I will repeat his CT chest abdomen pelvis prior to return in 3 months and if in the interim, when he follows up with Dr. Perdue in the next couple of weeks post cystoscopy, plans are made for further  cystoscopy before I see him back and any malignancy is found, I would ask the patient and Dr. Perdue to touch base as I would not know of such without that contact as the information comes into our chart without notification now that epic is being used by [employer].  Assuming no further biopsies in the interim, I will simply repeat his scans in 3 months to follow what I suspected and is now confirmed to be inflammatory changes.  From the aneurysm standpoint, he has follow-up scanning March 2022 arranged by Dr. Anthony and he will follow him up after that.    -11/24/2021 CT chest abdomen pelvis compared to 6/7/2021 outside imaging shows stable 4.1 cm ascending thoracic aorta.  No lung nodules.  Liver homogenous with bilateral cysts.  No adenopathy.  Thickened sigmoid colon and rectum suggestive of mild colitis or postradiation change.  No pelvic adenopathy.  Bony structures degenerative in the spine.  Some soft tissue anterior to the acetabulum on the right and extending along the medial aspect right pelvic sidewall 2.7 x 5.6 may represent intramuscular process versus adenopathy which could not be ruled out.  No prior study views available.    -1/4/2022 follow-up Dr. Ike Perdue [facility].  Per his note, he had TURBT 10/11/2021.  He had been placed on antibiotics for UTI for preop preparation for back surgery planned in 2 weeks.  Denies any mucus in his urine.  Some microhematuria on urinalysis this day.  No gross hematuria or dysuria.  The October 2021 TURBT did not reveal anything malignant on pathology.  There may be irritation or inflammation secondary to sutures in the bladder following partial cystectomy.  Patient asymptomatic from a urinary standpoint.  Recommended following up with [facility] with no plans for further urology follow-up.    -2/4/2022 [facility] telehealth follow-up visit: I reviewed his November images and reports with the patient as well as with Dr. Gm Moody and the  note from Dr. Perdue from 1 month ago.  I do suspect this pelvic sidewall abnormality in November represents persistent disease but as I have stated in prior dictations, the data on palliative or overall survival benefit of systemic 5-FU based combination chemotherapy and/or Avastin does not show a clear-cut survival advantage to chemotherapy for asymptomatic disease.  He is having some back pain and had spine decompression a couple of weeks ago without much benefit.  This is an area that may potentially be radiated but I will check his CT chest (angiogram of chest) abdomen and pelvis and get his blood counts and chemistries and urinalysis and see him back for virtual visit in a few weeks.  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  I will also converse with Dr. Peoples at that junction whether he thinks he has anything to offer if this is progressing though I doubt it given that the last intervention was a urologic procedure and the last cystoscopy/TURBT in October had no malignancy and the liver lesions are currently being called liver cysts albeit I am skeptical as to whether those are truly benign but they are certainly not growing.    -3/4/2022 CT angiogram of chest/CT abdomen pelvis for evaluation of dizziness and surveillance of thoracic aortic aneurysm and appendiceal carcinoma.  Thoracic ascending aortic ectasia 4 x 4 cm stable.  Small nodularity left upper lobe stable from September.  Stable small hepatic hypodensities status post cholecystectomy.  Right external iliac soft tissue fullness now 3 x 3.9 cm previously 2 x 2.3 cm concerning for enlarging adenopathy with symmetric  nonenlarged inguinal nodes stable.  CBC unremarkable.  CMP unremarkable save for potassium 5.4.  CEA 18.2.  1-3 red cells and white cells per milliliter of urine.    -03/08/22 [facility] medical oncology telehealth follow-up visit: I reviewed his images and reports of 03/04/22 CT.  CEA up to 18.2.  High likelihood of recurrence in that area.  If so, this is paucimetastatic and might consider radiation as I do suspect the radicular pain he is having may well be from this mass.  However, before doing so, I am having him get his discs and take them to Dr. Peoples for review to see if he would excise this area both for diagnosis and therapeutic reasons or whether he would want his invasive radiologist at  to biopsy this in the event that were they to get into small bowel and have complications he would be where his surgical oncologist would be available to manage bowel perforation.  Radiation would not necessarily be easy to this area given the proximity of small bowel but Dr. Moody with whom I have spoken before could conceivably reach this area but before proceeding with that idea or considering systemic therapies or molecular testing I would first like to get tissue as well as getting the opinion of Dr. Peoples to whom I have communicated today but was unable to go into detail as he was unavailable for discussion during the day before the patient arrived.    Total time of care today inclusive of time spent today prior to his arrival reviewing interval images and reports of CTs and labs as outlined and cataloging these and reaching out to Dr. Peoples for communication and during visit translating this plan and after visit communicating this plan with the patient and to Dr. Moody and Dr. Peoples took 45 minutes of total patient care time throughout the day today.  Cy Delcid MD    03/08/2022

## 2022-03-24 ENCOUNTER — TELEMEDICINE (OUTPATIENT)
Dept: ONCOLOGY | Facility: CLINIC | Age: 67
End: 2022-03-24

## 2022-03-24 VITALS — HEIGHT: 73 IN | BODY MASS INDEX: 17.94 KG/M2

## 2022-03-24 DIAGNOSIS — C18.1 MUCINOUS ADENOCARCINOMA OF APPENDIX: Primary | ICD-10-CM

## 2022-03-24 PROCEDURE — 99215 OFFICE O/P EST HI 40 MIN: CPT | Performed by: INTERNAL MEDICINE

## 2022-03-24 RX ORDER — GABAPENTIN 600 MG/1
600 TABLET ORAL EVERY 8 HOURS
COMMUNITY
Start: 2022-03-14 | End: 2022-03-25 | Stop reason: SDUPTHER

## 2022-03-24 RX ORDER — HYDROCODONE BITARTRATE AND ACETAMINOPHEN 10; 325 MG/1; MG/1
1 TABLET ORAL EVERY 8 HOURS
COMMUNITY
Start: 2022-03-17 | End: 2022-03-25 | Stop reason: SDUPTHER

## 2022-03-24 NOTE — PATIENT INSTRUCTIONS
Continue Norco 10 mg q4h PRN for break through pain. Start Morphine ER 15 mg twice daily.   Scheduled to follow up in 5 weeks.  Always bring your medications prescribed by the clinic to every appointment. If telemedicine appointment, be prepared to give medication counts. This helps with managing your refill needs.   Call the Palliative Clinic for any questions or concerns at [phone] or reach out to us on [place].  Please give us 2-3 business days in advance for refill requests. Be aware of additional insurance related delays for some medications.

## 2022-03-24 NOTE — PROGRESS NOTES
Palliative Clinic Note      Name: Esteban Tse  Age: 66 y.o.  Sex: male  : 1955  MRN: [id number]  Date of Service: 22  Referring Physician: Cy Delcid MD    Subjective:    Chief Complaint: Right lower extremity pain     History of Present Illness: Esteban Tse is a 66 y.o. male with past medical history significant for HTN, AAA, metastatic adenocarcinoma of the appendix who presents to the palliative clinic today to establish care.     Treatment summary: The patient was diagnosed with cancer of the appendix at the time of appendectomy in 2017. Patient under surveillance until imaging in 2019 demonstrated several lesions suspicious for recurrent disease. He underwent a diagnostic laparoscopy with peritoneal biopsies on 2020 by Dr. Peoples that proved recurrent adenocarcinoma.  On 2020 he underwent an exploratory laparotomy with excision of right and left lobe liver lesions, omentectomy, resection of pelvic peritoneal nodules, ileocolectomy with creation of ileal colostomy, hyperthermic intraperitoneal chemotherapy and a partial cystectomy with right ureteral stent placement. Than on 21 he underwent a cystoscopy with bilateral ureteral catheters, exploratory laparotomy, lysis of adhesions, right pelvic sidewall excisional biopsy, and an open partial cystectomy. TURBT performed in 10/2021. Imaging from 3/4/2022 showed enlarging external illiac adenopathy. There are no surgical options per Dr. Peoples. Will attempt to get biopsy and send for testing to see if there are any molecular targets. In the meantime, patient referred to radiation oncology for palliation.     Symptoms: The patient's only complaint today is pain in his right hip that radiates down the lower extremity. He describes the pain a dull ache with occasional sharp and burning sensations. The intensity of the pain fluctuates throughout the day but is often a 10/10. The pain is constant. The patient takes gabapentin 600  mg and Norco 10 mg tablets three times a day. He states the medication does not touch the pain. [facility] care with [facility] in [place], [place] > 1 year ago. The right hip pain was thought to be referred from his spine and he underwent several epidurals and a lumbar laminectomy with no improvement in his pain. More recently, the pain is thought to be related to the cancer of the appendix. He denies nausea, vomiting or abdominal pain. Regular bowel movements. Good appetite. No issues with sleep when he is not in pain.     Pyschosocial: The patient lives alone. He is retired from working for the state. He enjoys playing golf and spending time outside. No use of tobacco, alcohol or drugs. No personal history of a mental illness. The patient admits to anxiety/depression related to his cancer diagnosis but states overall his mood is stable and he is able to manage it on his own.      Spiritual: Patient describes himself as curious rather than practicing.     Goals: Improve quality of life with symptom management.     The following portions of the patient's history were reviewed and updated as appropriate: allergies, current medications, past family history, past medical history, past social history, past surgical history and problem list.    Decisional capacity:Full  ORT-R:Low risk   PHQ-9: 1-4 (Minimal Depression)  REYMUNDO: 6  ECOG: (2) Ambulatory and capable of self care, unable to carry out work activity, up and about > 50% or waking hours   Palliative Performance Scale Score: 70%     Objective:    /97   Pulse 87     Constitutional: Awake, alert, ambulates independently   Eyes: PERRLA, EOMS intact  HENT: NCAT, face symmetric  Neck: Supple, trachea midline  Respiratory: Nonlabored respirations  Cardiovascular: RRR  Gastrointestinal: Positive bowel sounds, soft, nontender, no guarding  Musculoskeletal: No bilateral ankle edema, moves all extremities   Psychiatric: Appropriate affect,  cooperative  Neurologic: Oriented x 3, Cranial Nerves grossly intact to confrontation, speech clear  Skin: Cool dry, no rashes or wounds appreciated     Medication Counts: Instructed to bring controlled medications to all appointments.   YAIMA: #[id number]. Two year report reviewed.   UDS: Collected today. Results pending.    Assessment & Plan:    1. Mucinous adenocarcinoma of appendix (HCC)  --Patient follows closely with Dr. Delcid. Imaging from 3/4/2022 showed enlarging external illiac adenopathy. There are no surgical options per Dr. Peoples. Will attempt to get biopsy and send for testing to see if there are any molecular targets. In the meantime, patient referred to radiation oncology for palliation.     2. Cancer associated pain  --Significant RLE pain related to cancer limiting patient's daily functioning. Will start him on Morphine ER 15 mg twice daily and increase the frequency of Norco 10 mg to q4h PRN for break through pain. Will continue Gabapentin 600 mg TID for the neuropathic component of his pain.     --We discussed the goals for pain relief (mild, tolerable pain that allows the patient to function), risks (physiologic dependence, addiction, overdose, side effects including nausea, dizziness, drowsiness, constipation), the importance of additional or alternative treatment to reduce the required dose, and specific instructions on taking, stopping, storing, and disposing of medication. Opioids should be stored  in secure locations and excess pills should be disposed of properly, to minimize the risk of diversion and inappropriate use. Patient should be supervised for first few days while on new medication or dose and should always start new medication or dose in the morning. The controlled medication agreement was reviewed, signed, and scanned into the chart. The patient was given the controlled substance education handout.     --Educated on opioid reversal and importance of access to naloxone dose at all  times. Instructed patient on administration of the rescue medication and to call 911. Explained that he can use on anyone who becomes unresponsive.  Prescription sent to pharmacy.    3. Therapeutic drug monitoring  -- Urine Drug Screen collected today. Results appropriate. ORT-R low risk. Monitor annually.     Code status: Full code  Medical interventions: Full  Advanced directives: Plan to discuss    Return in about 5 weeks (around 4/29/2022) for Video visit.    I spent 60 minutes caring for Esteban Tse on this date of service. This time includes time spent by me in the following activities: preparing for the visit, reviewing tests, obtaining and/or reviewing a separately obtained history, performing a medically appropriate examination and/or evaluation , counseling and educating the patient/family/caregiver, ordering medications, tests, or procedures, documenting information in the medical record, independently interpreting results and communicating that information with the patient/family/caregiver, and care coordination    Qian Fleming PA-C  03/25/2022    Medication Date Filled # Filled Count Used # Days  SOY   *Gabapentin 600 3/14/22 90   11 3   *Norco 10 3/17/22 90   8 3   *Dr. Dillon @ [facility]

## 2022-03-24 NOTE — PROGRESS NOTES
CHIEF COMPLAINT: Right lower quadrant pain worsening    Problem List:  Oncology/Hematology History Overview Note   1.  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  Pathologic stage IIa with T3 extension into the base of the appendix through the muscularis propria but not into the serosal surface.  16 nodes negative.  Colonoscopy December 2017 negative postop    -12/30/2019 medical oncology office visit: The patient had been on surveillance since surgery December 2017.  CT scans had been negative up until 12/30/2019 CT chest abdomen and pelvis that showed subtle soft tissue density surrounding surgical clips in the right side of the pelvis along with soft tissue nodule at the anterior wall of the bladder concerning for recurrent disease.  Patient sent back to Dr. Davidson for colonoscopy.    -2/7/2020 patient was referred to Dr. Peoples at the [facility], he underwent diagnostic laparoscopic and peritoneal biopsies that confirmed recurrent disease with biopsy of bladder dome nodule showing adenocarcinoma with mucinous features.  Port was placed at this time also.    -2/25/2020 began FOLFIRI    -4/21/2020 patient having increased fatigue, FOLFIRI dose reduced by 10%.    -5/19/2020 cycle 7 FOLFIRI    -6/18/2020 Dr. Peoples performed exploratory laparotomy with excision of right lobe liver lesion and left lobe liver lesion, omentectomy, resection of pelvic peritoneal nodules, ileocolectomy with creation of ileal colostomy, hyperthermic intraperitoneal chemotherapy with mitomycin-C at 42 °C for deep tissue penetration for 90 minutes.  Dr. Perdue performed partial cystectomy with right ureteral stent placement    -8/5/2020 CT abdomen pelvis with contrast shows small soft tissue nodule anterior aspect of bladder stable.  New small amount of ascites as well as a new small left pleural effusion.  Creatinine 0.75 with hemoglobin 11.1 otherwise unremarkable CBC  "and CMP.    -2/15/2021 CT abdomen pelvis with contrast compared to 8/5/2020 shows enlarging soft tissue mass 4.2 cm over the bladder fundus and there is a calcification along the base of the urinary bladder.  Small stable multiple hypoattenuating indeterminate liver lesions.  Similar degree of ascites.    -2/23/2021 CT chest shows no evidence of metastasis with ascending aorta 42 mm.    -3/8/2021 follow-up with Dr. Portillo Peoples we reviewed his CTs suggested team effort resection with Dr. Perdue    -3/16/2021 follow-up with Dr. Perdue.  He plans for open partial cystectomy, possible ureteral implants, cystoscopy and stent placement in concert with Dr. Peoples.    -4/22/2021 cystoscopy (after prior office cystoscopy showed large fungating mass) with bilateral ureteral catheters, exploratory laparotomy, lysis of adhesions, right pelvic sidewall excisional biopsy, open partial cystectomy Dr. Ike Perdue. Peritoneal fluid showed predominant inflammatory reactive mesothelial cells with no malignancy. 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 Carcinoma was less than 1 mm from the lateral resection margins.    -5/18/2021 [facility] [place] medical oncology follow-up visit: I reviewed his hospital notes, operative notes, and pathology report as above and went over this with the patient.  The general consensus from retrospective as well as a few prospective data over the last couple of decades with this low-grade malignancy does not show any profound benefit from \"adjuvant\" chemotherapy in this current setting.  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  There is no standard follow-up but general guidelines suggest following up as typical colon cancer.  I will repeat his CT chest abdomen pelvis now to reestablish his baseline postoperative imaging and have him see my nurse practitioner back in a couple of weeks to make sure there is no nia evidence of measurable " residual disease.  Assuming this just shows postoperative changes, I would simply repeat scans again in 3 months or sooner as symptoms dictate.  If he has no evidence of persistent or metastatic disease on current imaging, then when he sees my nurse practitioner back she will also review what they say about his ascending aorta which was 42 mm in February and sent him to Dr. Evangelist Anthony for an opinion as to whether any intervention is needed relative to the aorta.  Obviously if his cancer is out of control on the upcoming scans then the aorta is a moot point.    -6/10/2021 [facility] oncology clinic follow-up: CT scans show no evidence of disease recurrence in the chest, abdomen or pelvis.  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  Referral made to Dr. Anthony, cardiothoracic surgeon for management and evaluation of a sending aortic aneurysm.  Plan to repeat CT scans in 3 months.    -9/14/2021 [facility] medical oncology follow-up visit: I reviewed images and reports of 9/10/2021 CT chest abdomen pelvis with contrast which shows under distended urinary bladder with mild asymmetric wall thickening dome and left lateral wall with a small 9 mm polypoid lesion in the bladder.  There is stable 2.3 cm right external iliac and a few other subcentimeter scattered nodes in the root of the small bowel mesentery and retroperitoneum.  Postsurgical right hemicolectomy changes.  Stable low-attenuation lesions in the liver unchanged.  I will have him collect his discs from [facility] to take Dr. Perdue to decide whether to proceed with cystoscopy or just continue watchful waiting.  There is no major changes and I suspect we are looking at postoperative changes and we shall see him for video visit in a few weeks to see what urology at  decides.    -10/11/2021 cystoscopy Dr. Perdue showed marked acute and chronic inflammation but no evidence of malignancy.  Muscularis propria present.    -10/14/2021 [facility] medical oncology  virtual visit: I reviewed reports of pathology from cystoscopy 10/11/2021 and went over this with patient.  He is feeling fairly fit.  I will repeat his CT chest abdomen pelvis prior to return in 3 months and if in the interim, when he follows up with Dr. Perdue in the next couple of weeks post cystoscopy, plans are made for further cystoscopy before I see him back and any malignancy is found, I would ask the patient and Dr. Perdue to touch base as I would not know of such without that contact as the information comes into our chart without notification now that epic is being used by [employer].  Assuming no further biopsies in the interim, I will simply repeat his scans in 3 months to follow what I suspected and is now confirmed to be inflammatory changes.  From the aneurysm standpoint, he has follow-up scanning March 2022 arranged by Dr. Anthony and he will follow him up after that.    -11/24/2021 CT chest abdomen pelvis compared to 6/7/2021 outside imaging shows stable 4.1 cm ascending thoracic aorta.  No lung nodules.  Liver homogenous with bilateral cysts.  No adenopathy.  Thickened sigmoid colon and rectum suggestive of mild colitis or postradiation change.  No pelvic adenopathy.  Bony structures degenerative in the spine.  Some soft tissue anterior to the acetabulum on the right and extending along the medial aspect right pelvic sidewall 2.7 x 5.6 may represent intramuscular process versus adenopathy which could not be ruled out.  No prior study views available.    -1/4/2022 follow-up Dr. Ike Perdue [facility].  Per his note, he had TURBT 10/11/2021.  He had been placed on antibiotics for UTI for preop preparation for back surgery planned in 2 weeks.  Denies any mucus in his urine.  Some microhematuria on urinalysis this day.  No gross hematuria or dysuria.  The October 2021 TURBT did not reveal anything malignant on pathology.  There may be irritation or inflammation secondary to sutures in the bladder following  partial cystectomy.  Patient asymptomatic from a urinary standpoint.  Recommended following up with [facility] oncology with no plans for further urology follow-up.    -2/4/2022 [facility] medical oncology telehealth follow-up visit: I reviewed his November images and reports with the patient as well as with Dr. Gm Moody and the note from Dr. Perdue from 1 month ago.  I do suspect this pelvic sidewall abnormality in November represents persistent disease but as I have stated in prior dictations, the data on palliative or overall survival benefit of systemic 5-FU based combination chemotherapy and/or Avastin does not show a clear-cut survival advantage to chemotherapy for asymptomatic disease.  He is having some back pain and had spine decompression a couple of weeks ago without much benefit.  This is an area that may potentially be radiated but I will check his CT chest (angiogram of chest) abdomen and pelvis and get his blood counts and chemistries and urinalysis and see him back for virtual visit in a few weeks.  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  I will also converse with Dr. Peoples at that junction whether he thinks he has anything to offer if this is progressing though I doubt it given that the last intervention was a urologic procedure and the last cystoscopy/TURBT in October had no malignancy and the liver lesions are currently being called liver cysts albeit I am skeptical as to whether those are truly benign but they are certainly not growing.    -3/4/2022 CT angiogram of chest/CT abdomen pelvis for evaluation of dizziness and surveillance of thoracic aortic aneurysm and  "appendiceal carcinoma.  Thoracic ascending aortic ectasia 4 x 4 cm stable.  Small nodularity left upper lobe stable from September.  Stable small hepatic hypodensities status post cholecystectomy.  Right external iliac soft tissue fullness now 3 x 3.9 cm previously 2 x 2.3 cm concerning for enlarging adenopathy with symmetric nonenlarged inguinal nodes stable.  CBC unremarkable.  CMP unremarkable save for potassium 5.4.  CEA 18.2.  1-3 red cells and white cells per milliliter of urine.    -3/16/2022 office visit with Dr. Portillo Peoples.  He reviewed the CT from 3/4/2022.  He notes that the laminectomy from 6 weeks prior has not helped his \"hip pain\".  Given the location of this recurrence previously 2 x 2.3 cm now 3 x 3.9 cm in the external iliac area concerning for enlarging adenopathy with symmetric nonenlarged inguinal nodes stable, the mass appears unresectable with involvement of muscle and vasculature.    -3/24/2022 [facility] medical oncology follow-up virtual visit: Pain is still significant.  We will get him in hopefully in the next day to [facility] for palliative care.  Have spoken with Dr. Peoples and no surgery.  I have spoken with Dr. Moody who thinks palliative radiation while not likely to shrink tumor dramatically may help pain considerably and we will get him there.  We will get him to bring the discs to our [place] office and asked them to bring that back to [place] to get scanned in for our invasive radiologist to look at for us to get CT-guided biopsy of this node in the right lower quadrant and send that for Gab miguel once the pathology is obtained to hopefully find high tumor mutational burden or other molecular targets that might give us some options.  Apart from that, as stated multiple times, this is not a highly chemotherapy sensitive tumor and I am not sure I would palliate him well but, when I see him back in early June with CAT scans prior to return and post radiation, if he is " not getting relief and wants to try a 5-FU based regimen plus or minus Avastin I would be okay with that but he knows it does not alter survival 1 way or the other and we are simply trying to palliate this inexorable process.  No other surgical options.     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  Pathologic stage IIa with T3 extension into the base of the appendix through the muscularis propria but not into the serosal surface.  16 nodes negative.  Colonoscopy December 2017 negative postop     12/5/2017 Surgery    Surgery       Procedure:  Appendectomy and right hemicolectomy      Completeness of resection:  No evidence of residual tumor     Pathology revealed invasive moderately differentiated mucinous adenocarcinoma.  Right hemicolectomy: Benign colon and small intestine no evidence of carcinoma.  16 benign lymph nodes, 0/16, negative for dysplasia or malignancy.  Tumor size approximately 6 cm.  Grade 2, moderately differentiated.  Tumor invades through the muscularis profile into the base of appendix but does not extend to the serosal surface.  All margins uninvolved, no lymphovascular invasion identified.  Pathological stage pT3 pN0.         12/8/2017 Imaging    CT of the chest abdomen and pelvis negative.  Baseline CBC WBC 7100, hemoglobin 11.3, hematocrit 34.8%, platelet count 195,000.     3/20/2018 Procedure    Colonoscopy with Dr. Davidson was normal, recommendation for repeat surveillance colonoscopy in 3 years.     6/11/2018 Imaging    CT chest, abdomen and pelvis with no evidence metastatic disease.  CEA 1.1     9/24/2018 Imaging    CT abdomen pelvis showed no acute changes and nothing to suggest recurrence     12/28/2018 Imaging    CT chest, abdomen and pelvis negative. Normal CBC, CMP and CEA 0.7.     6/28/2019 Imaging    CT chest, abdomen and pelvis: No interval  change from prior studies.  No recurrent or metastatic disease detected in the chest, abdomen or pelvis.  No acute process.  CEA 0.9     12/30/2019 Imaging    CT chest, abdomen and pelvis: No disease in the chest.  There was noted a subtle soft tissue density, one surrounding surgical clips in the right side of the pelvis and the other a soft tissue nodule intimately associated with the anterior wall of the bladder, which are considered suspicious for recurrent disease.  CEA 1.1.  CMP with normal creatinine 0.9, total bilirubin 1.9, AST 34, ALT 24, alkaline phosphatase 93.  CBC with WBC 6900, hemoglobin 15.9, platelet count 232,000.       1/21/2020 Procedure    Normal colonoscopy with Dr. Davidson.  He has made referral to Dr. Portillo Peoples at .     2/7/2020 Progression    12/30/2019 CT chest, abdomen and pelvis: No disease in the chest.  There are subtle soft tissue densities (1 surrounding surgical clips in the right side of the pelvis and the other a soft tissue nodule intimately associated with the anterior wall of the bladder) which are considered suspicious for recurrent disease.  CMP with normal creatinine 0.9, total bilirubin 1.9, AST 34, ALT 24, alkaline phosphatase 93.  CBC with WBC 6900, hemoglobin 15.9, platelet count 232,000.  CEA 1.1.  2/7/2020 diagnostic laparoscopy with peritoneal biopsies performed at the [facility] by Dr. Peoples showed no sign of diffuse peritoneal carcinomatosis or liver metastasis.  There was a firm nodule in the superior dome of the bladder, adherent to omentum, as well as right pelvic sidewall nodule adjacent to surgical clips.  Biopsy of bladder dome nodule showed adenocarcinoma with mucinous features.     2/25/2020 -  Chemotherapy    OP COLORECTAL FOLFIRI Irinotecan / Leucovorin / Fluorouracil     5/29/2020 Imaging    CT chest abdomen pelvis shows slight improvement with decreased nodule anterolateral margin of urinary bladder with stable nodularity of the right lower  quadrant adjacent to surgical clips and no progressive disease.  Slight hyperemia of the colonic mucosa     6/18/2020 Surgery    Surgery       -6/18/2020 Dr. Peoples performed exploratory laparotomy with excision of right lobe liver lesion and left lobe liver lesion, omentectomy, resection of pelvic peritoneal nodules, ileocolectomy with creation of ileal colostomy, hyperthermic intraperitoneal chemotherapy with mitomycin-C at 42 °C for deep tissue penetration for 90 minutes.  Dr. Perdue performed partial cystectomy with right ureteral stent placement     8/5/2020 Imaging     CT abdomen pelvis with contrast shows small soft tissue nodule anterior aspect of bladder stable.  New small amount of ascites as well as a new small left pleural effusion.  Creatinine 0.75 with hemoglobin 11.1 otherwise unremarkable CBC and CMP     2/15/2021 Imaging    CT abdomen pelvis with contrast compared to 8/5/2020 shows enlarging soft tissue mass 4.2 cm over the bladder fundus and there is a calcification along the base of the urinary bladder.  Small stable multiple hypoattenuating indeterminate liver lesions.  Similar degree of ascites.     2/23/2021 Imaging    -2/23/2021 CT chest shows no evidence of metastasis with ascending aorta 42 mm.     4/22/2021 Surgery    -4/22/2021 cystoscopy (after prior office cystoscopy showed large fungating mass) with bilateral ureteral catheters, exploratory laparotomy, lysis of adhesions, right pelvic sidewall excisional biopsy, open partial cystectomy Dr. Ike Perdue. Peritoneal fluid showed predominant inflammatory reactive mesothelial cells with no malignancy. 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 Carcinoma was less than 1 mm from the lateral resection margins.     6/7/2021 Imaging    CT chest, abdomen and pelvis: No evidence of metastatic disease within the chest abdomen or pelvis.  Interval resection of  enhancing bladder wall mass a small amount of residual enhancing soft tissue, possibly granulation tissue.  Interval resolution of abdominal and pelvic ascites.  Stable dilation of the ascending aorta mid segment measuring up to 41 mm.     9/10/2021 Imaging    CT chest abdomen pelvis with contrast shows under distended urinary bladder with mild asymmetric wall thickening dome and left lateral wall with a small 9 mm polypoid lesion in the bladder.  There is stable 2.3 cm right external iliac and a few other subcentimeter scattered nodes in the root of the small bowel mesentery and retroperitoneum.  Postsurgical right hemicolectomy changes.  Stable low-attenuation lesions in the liver unchanged.     11/24/2021 Imaging    CT chest abdomen pelvis compared to 6/7/2021 outside imaging shows stable 4.1 cm ascending thoracic aorta.  No lung nodules.  Liver homogenous with bilateral cysts.  No adenopathy.  Thickened sigmoid colon and rectum suggestive of mild colitis or postradiation change.  No pelvic adenopathy.  Bony structures degenerative in the spine.  Some soft tissue anterior to the acetabulum on the right and extending along the medial aspect right pelvic sidewall 2.7 x 5.6 may represent intramuscular process versus adenopathy which could not be ruled out.  No prior study views available.     Peritoneal carcinoma (HCC)   5/14/2020 Initial Diagnosis    Peritoneal carcinoma (HCC)         HISTORY OF PRESENT ILLNESS:  The patient is a 66 y.o. male, here for follow up on management of recurrent low-grade mucinous neoplasm right lower quadrant with worsening pain    Past Medical History:   Diagnosis Date   • Ascending aortic aneurysm (HCC)    • Benign hypertension    • Colon cancer (HCC)    • Hypertension    • Intermittent palpitations    • Mucinous adenocarcinoma (HCC)    • Pounding heartbeat    • PVC's (premature ventricular contractions)      Past Surgical History:   Procedure Laterality Date   • APPENDECTOMY  2017     "With Partial Colon    • BLADDER SURGERY      with partial colon   • GALLBLADDER SURGERY         Allergies   Allergen Reactions   • Levofloxacin Unknown - Low Severity   • Penicillins Unknown - Low Severity   • Tetracycline Unknown - Low Severity       Family History and Social History reviewed and changed as necessary    REVIEW OF SYSTEM:   Other than for the worsening pain no other somatic complaints    PHYSICAL EXAM:  No respiratory distress    Vitals:    03/24/22 1444   Height: 185.4 cm (73\")     There were no vitals filed for this visit.       ECOG score: 1       Lab Results   Component Value Date    HGB 14.3 09/14/2021    HCT 42.0 09/14/2021    MCV 85.5 09/14/2021     09/14/2021    WBC 8.37 09/14/2021    NEUTROABS 6.37 09/14/2021    LYMPHSABS 1.27 09/14/2021    MONOSABS 0.55 09/14/2021    EOSABS 0.11 09/14/2021    BASOSABS 0.04 09/14/2021       Lab Results   Component Value Date    GLUCOSE 110 (H) 09/14/2021    BUN 18 09/14/2021    CREATININE 0.80 11/24/2021     09/14/2021    K 4.4 09/14/2021     09/14/2021    CO2 24.0 09/14/2021    CALCIUM 9.8 09/14/2021    ALBUMIN 4.80 09/14/2021    BILITOT 0.8 09/14/2021    ALKPHOS 107 09/14/2021    AST 18 09/14/2021    ALT 15 09/14/2021             ASSESSMENT & PLAN:  1.  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  Pathologic stage IIa with T3 extension into the base of the appendix through the muscularis propria but not into the serosal surface.  16 nodes negative.  Colonoscopy December 2017 negative postop    -12/30/2019 medical oncology office visit: The patient had been on surveillance since surgery December 2017.  CT scans had been negative up until 12/30/2019 CT chest abdomen and pelvis that showed subtle soft tissue density surrounding surgical clips in the right side of the pelvis along with soft tissue nodule at the anterior wall of the bladder concerning for recurrent disease.  " Patient sent back to Dr. Davidson for colonoscopy.    -2/7/2020 patient was referred to Dr. Peoples at the [facility], he underwent diagnostic laparoscopic and peritoneal biopsies that confirmed recurrent disease with biopsy of bladder dome nodule showing adenocarcinoma with mucinous features.  Port was placed at this time also.    -2/25/2020 began FOLFIRI    -4/21/2020 patient having increased fatigue, FOLFIRI dose reduced by 10%.    -5/19/2020 cycle 7 FOLFIRI    -6/18/2020 Dr. Peoples performed exploratory laparotomy with excision of right lobe liver lesion and left lobe liver lesion, omentectomy, resection of pelvic peritoneal nodules, ileocolectomy with creation of ileal colostomy, hyperthermic intraperitoneal chemotherapy with mitomycin-C at 42 °C for deep tissue penetration for 90 minutes.  Dr. Perdue performed partial cystectomy with right ureteral stent placement    -8/5/2020 CT abdomen pelvis with contrast shows small soft tissue nodule anterior aspect of bladder stable.  New small amount of ascites as well as a new small left pleural effusion.  Creatinine 0.75 with hemoglobin 11.1 otherwise unremarkable CBC and CMP.    -2/15/2021 CT abdomen pelvis with contrast compared to 8/5/2020 shows enlarging soft tissue mass 4.2 cm over the bladder fundus and there is a calcification along the base of the urinary bladder.  Small stable multiple hypoattenuating indeterminate liver lesions.  Similar degree of ascites.    -2/23/2021 CT chest shows no evidence of metastasis with ascending aorta 42 mm.    -3/8/2021 follow-up with Dr. Portillo Peoples we reviewed his CTs suggested team effort resection with Dr. Perdue    -3/16/2021 follow-up with Dr. Perdue.  He plans for open partial cystectomy, possible ureteral implants, cystoscopy and stent placement in concert with Dr. Peoples.    -4/22/2021 cystoscopy (after prior office cystoscopy showed large fungating mass) with bilateral ureteral catheters, exploratory laparotomy, lysis of  "adhesions, right pelvic sidewall excisional biopsy, open partial cystectomy Dr. Ike Perdue. Peritoneal fluid showed predominant inflammatory reactive mesothelial cells with no malignancy. 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 Carcinoma was less than 1 mm from the lateral resection margins.    -5/18/2021 [facility] medical oncology follow-up visit: I reviewed his hospital notes, operative notes, and pathology report as above and went over this with the patient.  The general consensus from retrospective as well as a few prospective data over the last couple of decades with this low-grade malignancy does not show any profound benefit from \"adjuvant\" chemotherapy in this current setting.  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  There is no standard follow-up but general guidelines suggest following up as typical colon cancer.  I will repeat his CT chest abdomen pelvis now to reestablish his baseline postoperative imaging and have him see my nurse practitioner back in a couple of weeks to make sure there is no nia evidence of measurable residual disease.  Assuming this just shows postoperative changes, I would simply repeat scans again in 3 months or sooner as symptoms dictate.  If he has no evidence of persistent or metastatic disease on current imaging, then when he sees my nurse practitioner back she will also review what they say about his ascending aorta which was 42 mm in February and sent him to Dr. Evangelist Anthony for an opinion as to whether any intervention is needed relative to the aorta.  Obviously if his cancer is out of control on the upcoming scans then the aorta is a moot point.    -6/10/2021 [facility] oncology clinic follow-up: CT scans show no evidence of disease recurrence in the chest, abdomen or pelvis.  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  Referral made to Dr. Anthony, cardiothoracic surgeon for management and " evaluation of a sending aortic aneurysm.  Plan to repeat CT scans in 3 months.    -9/14/2021 [facility] medical oncology follow-up visit: I reviewed images and reports of 9/10/2021 CT chest abdomen pelvis with contrast which shows under distended urinary bladder with mild asymmetric wall thickening dome and left lateral wall with a small 9 mm polypoid lesion in the bladder.  There is stable 2.3 cm right external iliac and a few other subcentimeter scattered nodes in the root of the small bowel mesentery and retroperitoneum.  Postsurgical right hemicolectomy changes.  Stable low-attenuation lesions in the liver unchanged.  I will have him collect his discs from [facility] to take Dr. Perdue to decide whether to proceed with cystoscopy or just continue watchful waiting.  There is no major changes and I suspect we are looking at postoperative changes and we shall see him for video visit in a few weeks to see what urology at  decides.    -10/11/2021 cystoscopy Dr. Perdue showed marked acute and chronic inflammation but no evidence of malignancy.  Muscularis propria present.    -10/14/2021 [facility] medical oncology virtual visit: I reviewed reports of pathology from cystoscopy 10/11/2021 and went over this with patient.  He is feeling fairly fit.  I will repeat his CT chest abdomen pelvis prior to return in 3 months and if in the interim, when he follows up with Dr. Perdue in the next couple of weeks post cystoscopy, plans are made for further cystoscopy before I see him back and any malignancy is found, I would ask the patient and Dr. Predue to touch base as I would not know of such without that contact as the information comes into our chart without notification now that epic is being used by .  Assuming no further biopsies in the interim, I will simply repeat his scans in 3 months to follow what I suspected and is now confirmed to be inflammatory changes.  From the aneurysm standpoint, he has follow-up scanning  March 2022 arranged by Dr. Anthony and he will follow him up after that.    -11/24/2021 CT chest abdomen pelvis compared to 6/7/2021 outside imaging shows stable 4.1 cm ascending thoracic aorta.  No lung nodules.  Liver homogenous with bilateral cysts.  No adenopathy.  Thickened sigmoid colon and rectum suggestive of mild colitis or postradiation change.  No pelvic adenopathy.  Bony structures degenerative in the spine.  Some soft tissue anterior to the acetabulum on the right and extending along the medial aspect right pelvic sidewall 2.7 x 5.6 may represent intramuscular process versus adenopathy which could not be ruled out.  No prior study views available.    -1/4/2022 follow-up Dr. Ike Perdue [facility].  Per his note, he had TURBT 10/11/2021.  He had been placed on antibiotics for UTI for preop preparation for back surgery planned in 2 weeks.  Denies any mucus in his urine.  Some microhematuria on urinalysis this day.  No gross hematuria or dysuria.  The October 2021 TURBT did not reveal anything malignant on pathology.  There may be irritation or inflammation secondary to sutures in the bladder following partial cystectomy.  Patient asymptomatic from a urinary standpoint.  Recommended following up with [facility] with no plans for further urology follow-up.    -2/4/2022 [facility] medical oncology telehealth follow-up visit: I reviewed his November images and reports with the patient as well as with Dr. Gm Moody and the note from Dr. Perdue from 1 month ago.  I do suspect this pelvic sidewall abnormality in November represents persistent disease but as I have stated in prior dictations, the data on palliative or overall survival benefit of systemic 5-FU based combination chemotherapy and/or Avastin does not show a clear-cut survival advantage to chemotherapy for asymptomatic disease.  He is having some back pain and had spine decompression a couple of weeks ago without much benefit.  This is an  "area that may potentially be radiated but I will check his CT chest (angiogram of chest) abdomen and pelvis and get his blood counts and chemistries and urinalysis and see him back for virtual visit in a few weeks.  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  I will also converse with Dr. Peoples at that junction whether he thinks he has anything to offer if this is progressing though I doubt it given that the last intervention was a urologic procedure and the last cystoscopy/TURBT in October had no malignancy and the liver lesions are currently being called liver cysts albeit I am skeptical as to whether those are truly benign but they are certainly not growing.    -3/4/2022 CT angiogram of chest/CT abdomen pelvis for evaluation of dizziness and surveillance of thoracic aortic aneurysm and appendiceal carcinoma.  Thoracic ascending aortic ectasia 4 x 4 cm stable.  Small nodularity left upper lobe stable from September.  Stable small hepatic hypodensities status post cholecystectomy.  Right external iliac soft tissue fullness now 3 x 3.9 cm previously 2 x 2.3 cm concerning for enlarging adenopathy with symmetric nonenlarged inguinal nodes stable.  CBC unremarkable.  CMP unremarkable save for potassium 5.4.  CEA 18.2.  1-3 red cells and white cells per milliliter of urine.    -3/16/2022 office visit with Dr. Portillo Peoples.  He reviewed the CT from 3/4/2022.  He notes that the laminectomy from 6 weeks prior has not helped his \"hip pain\".  Given the location of this recurrence previously 2 x 2.3 cm now 3 x 3.9 cm in the external iliac area concerning for enlarging adenopathy with symmetric " nonenlarged inguinal nodes stable, the mass appears unresectable with involvement of muscle and vasculature.    -3/24/2022 [facility] medical oncology follow-up virtual visit: Pain is still significant.  We will get him in hopefully in the next day to [facility] for palliative care.  Have spoken with Dr. Peoples and no surgery.  I have spoken with Dr. Moody who thinks palliative radiation while not likely to shrink tumor dramatically may help pain considerably and we will get him there.  We will get him to bring the discs to our [place] office and asked them to bring that back to [place] to get scanned in for our invasive radiologist to look at for us to get CT-guided biopsy of this node in the right lower quadrant and send that for Gab miguel once the pathology is obtained to hopefully find high tumor mutational burden or other molecular targets that might give us some options.  Apart from that, as stated multiple times, this is not a highly chemotherapy sensitive tumor and I am not sure I would palliate him well but, when I see him back in early June with CAT scans prior to return and post radiation, if he is not getting relief and wants to try a 5-FU based regimen plus or minus Avastin I would be okay with that but he knows it does not alter survival 1 way or the other and we are simply trying to palliate this inexorable process.  No other surgical options.    Total time of care today inclusive of time spent today prior to his arrival conferring with Dr. Moody and Dr. Peoples and during visit translating that information and the scan reports and the ultimate decisions as outlined and after visit instituting this plan as outlined took 40 minutes of patient care time throughout the day today.  Cy Delcid MD    03/24/2022

## 2022-03-25 ENCOUNTER — OFFICE VISIT (OUTPATIENT)
Dept: PALLIATIVE CARE | Facility: CLINIC | Age: 67
End: 2022-03-25

## 2022-03-25 ENCOUNTER — LAB (OUTPATIENT)
Dept: LAB | Facility: HOSPITAL | Age: 67
End: 2022-03-25

## 2022-03-25 VITALS — DIASTOLIC BLOOD PRESSURE: 97 MMHG | SYSTOLIC BLOOD PRESSURE: 155 MMHG | HEART RATE: 87 BPM

## 2022-03-25 DIAGNOSIS — G89.3 CANCER ASSOCIATED PAIN: Primary | ICD-10-CM

## 2022-03-25 DIAGNOSIS — F11.90 CHRONIC, CONTINUOUS USE OF OPIOIDS: ICD-10-CM

## 2022-03-25 DIAGNOSIS — Z51.81 THERAPEUTIC DRUG MONITORING: Primary | ICD-10-CM

## 2022-03-25 DIAGNOSIS — C18.1 MUCINOUS ADENOCARCINOMA OF APPENDIX: ICD-10-CM

## 2022-03-25 DIAGNOSIS — G89.3 CANCER ASSOCIATED PAIN: ICD-10-CM

## 2022-03-25 LAB
AMPHET+METHAMPHET UR QL: NEGATIVE
AMPHETAMINES UR QL: NEGATIVE
BARBITURATES UR QL SCN: NEGATIVE
BENZODIAZ UR QL SCN: NEGATIVE
BUPRENORPHINE SERPL-MCNC: NEGATIVE NG/ML
CANNABINOIDS SERPL QL: NEGATIVE
COCAINE UR QL: NEGATIVE
METHADONE UR QL SCN: NEGATIVE
OPIATES UR QL: POSITIVE
OXYCODONE UR QL SCN: NEGATIVE
PCP UR QL SCN: NEGATIVE
PROPOXYPH UR QL: NEGATIVE
TRICYCLICS UR QL SCN: NEGATIVE

## 2022-03-25 PROCEDURE — 80306 DRUG TEST PRSMV INSTRMNT: CPT | Performed by: PHYSICIAN ASSISTANT

## 2022-03-25 PROCEDURE — 99205 OFFICE O/P NEW HI 60 MIN: CPT | Performed by: PHYSICIAN ASSISTANT

## 2022-03-25 RX ORDER — MORPHINE SULFATE 15 MG/1
15 TABLET, FILM COATED, EXTENDED RELEASE ORAL 2 TIMES DAILY
Qty: 60 TABLET | Refills: 0 | Status: SHIPPED | OUTPATIENT
Start: 2022-03-25 | End: 2022-04-24

## 2022-03-25 RX ORDER — GABAPENTIN 600 MG/1
600 TABLET ORAL EVERY 8 HOURS
Qty: 90 TABLET | Refills: 0 | Status: SHIPPED | OUTPATIENT
Start: 2022-04-13 | End: 2022-04-29 | Stop reason: SDUPTHER

## 2022-03-25 RX ORDER — HYDROCODONE BITARTRATE AND ACETAMINOPHEN 10; 325 MG/1; MG/1
1 TABLET ORAL EVERY 4 HOURS PRN
Qty: 180 TABLET | Refills: 0 | Status: SHIPPED | OUTPATIENT
Start: 2022-03-25 | End: 2022-04-24

## 2022-03-25 RX ORDER — NALOXONE HYDROCHLORIDE 4 MG/.1ML
1 SPRAY NASAL AS NEEDED
Qty: 1 EACH | Refills: 0 | Status: SHIPPED | OUTPATIENT
Start: 2022-03-25

## 2022-03-25 NOTE — TELEPHONE ENCOUNTER
[facility] # [phone] appropriate. Refill placed for morphine 15 mg BID and Norco 10 mg q4h PRN. The patient will follow up in 5 weeks.

## 2022-04-04 ENCOUNTER — OFFICE VISIT (OUTPATIENT)
Dept: RADIATION ONCOLOGY | Facility: HOSPITAL | Age: 67
End: 2022-04-04

## 2022-04-04 ENCOUNTER — HOSPITAL ENCOUNTER (OUTPATIENT)
Dept: RADIATION ONCOLOGY | Facility: HOSPITAL | Age: 67
Setting detail: RADIATION/ONCOLOGY SERIES
Discharge: HOME | End: 2022-04-04

## 2022-04-04 VITALS
DIASTOLIC BLOOD PRESSURE: 100 MMHG | HEART RATE: 86 BPM | SYSTOLIC BLOOD PRESSURE: 160 MMHG | HEIGHT: 73 IN | BODY MASS INDEX: 20.33 KG/M2 | OXYGEN SATURATION: 98 % | WEIGHT: 153.4 LBS | TEMPERATURE: 99.1 F | RESPIRATION RATE: 16 BRPM

## 2022-04-04 DIAGNOSIS — C18.1 MUCINOUS ADENOCARCINOMA OF APPENDIX: Primary | ICD-10-CM

## 2022-04-04 DIAGNOSIS — C48.2 PERITONEAL CARCINOMA: ICD-10-CM

## 2022-04-04 PROCEDURE — G0463 HOSPITAL OUTPT CLINIC VISIT: HCPCS | Performed by: RADIOLOGY

## 2022-04-04 NOTE — PROGRESS NOTES
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     BRIEF HISTORY:  The patient is a very pleasant 66 y.o. male  with a longstanding history of a metastatic appendiceal cancer.  He was originally diagnosed in  when he presented with an abnormal colonoscopy, and subsequently underwent surgical resection for a T3 tumor with extension into the base of the appendix through the muscularis propria, with no involvement of 16 lymph nodes.  He did not require any further adjuvant treatment at that time, and he continued under surveillance until , when he was unfortunately found to have evidence of a recurrence along the anterior wall of the bladder.  He underwent a diagnostic laparoscopy in 2020 with multiple peritoneal biopsies to confirm recurrent disease including the dome of the bladder.  He underwent a diagnostic laparoscopy confirming more extensive peritoneal disease and was treated with FOLFIRI chemotherapy.  In 2020, he then again underwent an exploratory laparotomy and excision of a bilateral liver lesions, multiple peritoneal nodules, ileocolectomy with creation of an ileal colostomy, and a partial cystectomy as well as Hypaque chemotherapy.  He tolerated all of that well, and appeared to have cleared disease.  He remained under surveillance but unfortunately was again found to have evidence of a recurrence at the anterior portion of the bladder in  and he again underwent surgical resection in 2021.  Over the  past 6 months he has had a slowly enlarging mass in the right pelvic sidewall which seems to accompany symptoms including right-sided groin and hip pain.  He has again been evaluated for the potential for surgery, but given the location and very close proximity to the femoral vessels and nerve, Dr. Peoples has decided against surgery and he is therefore being referred for consideration of palliative radiation therapy.  He reports chronic pain that seems to be present 100% of the time, is worse when standing and laying flat, and involves the right hip, anterior thigh, and groin.  He denies weakness, but describes everything as a constant burn.  He is on hydrocodone and morphine without relief.  He states his pain is constantly a 6-7 out of 10, and at its worst, a 10/10.  He denies any changes in bowel or bladder.    Allergy:   Allergies   Allergen Reactions   • Levofloxacin Swelling     Lips swellijng   • Penicillins Other (See Comments)     childhood   • Tetracycline Swelling     Lips swelling       Social History:   Social History     Socioeconomic History   • Marital status: Single   • Number of children: 0   Tobacco Use   • Smoking status: Never Smoker   • Smokeless tobacco: Never Used   Substance and Sexual Activity   • Alcohol use: Yes     Comment: 1 dring weekly   • Drug use: Never   • Sexual activity: Defer       Past Medical History:   Past Medical History:   Diagnosis Date   • Ascending aortic aneurysm (HCC)    • Benign hypertension    • Bladder cancer (HCC)    • Colon cancer (HCC)    • Hypertension    • Intermittent palpitations    • Mucinous adenocarcinoma (HCC)    • Pounding heartbeat    • PVC's (premature ventricular contractions)        Family History: family history includes Heart disease in his father; Hypertension in his father; Lung cancer in his mother; Stroke in his mother.     Surgical History:   Past Surgical History:   Procedure Laterality Date   • APPENDECTOMY  2017    With Partial Colon    •  "BLADDER SURGERY      with partial colon   • COLON SURGERY     • GALLBLADDER SURGERY          Review of Systems:   Review of Systems   Musculoskeletal: Positive for arthralgias (right hip/thigh) and back pain (lower).   All other systems reviewed and are negative.          Objective   VITAL SIGNS:   Vitals:    04/04/22 0845   BP: 160/100   Pulse: 86   Resp: 16   Temp: 99.1 °F (37.3 °C)   TempSrc: Temporal   SpO2: 98%  Comment: RA   Weight: 69.6 kg (153 lb 6.4 oz)   Height: 185.4 cm (73\")   PainSc:   7   PainLoc: Hip  Comment: right side        Karnofsky score: 80       Physical Exam:   Physical Exam  Vitals and nursing note reviewed.   Constitutional:       General: He is not in acute distress.     Appearance: He is well-developed.   HENT:      Head: Normocephalic and atraumatic.   Eyes:      Conjunctiva/sclera: Conjunctivae normal.      Pupils: Pupils are equal, round, and reactive to light.   Cardiovascular:      Rate and Rhythm: Normal rate and regular rhythm.      Heart sounds: No murmur heard.    No friction rub.   Pulmonary:      Effort: Pulmonary effort is normal.      Breath sounds: Normal breath sounds. No wheezing.   Abdominal:      General: Bowel sounds are normal. There is no distension.      Palpations: Abdomen is soft. There is no mass.      Tenderness: There is no abdominal tenderness.   Genitourinary:     Comments: 4x5cm area of fullness is palpable in the right groin.  Fixed.  Mildly tender to palpation.  No evidence of cutaneous skin changes or discoloration.  Musculoskeletal:         General: Normal range of motion.      Cervical back: Normal range of motion and neck supple.   Lymphadenopathy:      Cervical: No cervical adenopathy.   Skin:     General: Skin is warm and dry.   Neurological:      Mental Status: He is alert and oriented to person, place, and time.      Comments: Appropriate strength and sensation to light touch, pinprick, and location in the bilateral lower extremities.   "   Psychiatric:         Behavior: Behavior normal.         Thought Content: Thought content normal.         Judgment: Judgment normal.     IMAGING  I have personally reviewed the relevant imaging studies, as follows:  CT Abdomen/Pelvis 11/14/2021:  IMPRESSION:  There is abnormal wall thickening identified within the  sigmoid colon and rectum. Findings suggesting possibly a mild colitis or  postradiation change. There is some stranding identified along the  anterior aspect of the subcutaneous tissues along the pelvis which may  represent postradiation change as well. There is abnormal soft tissue  identified along the right pelvic sidewall anteriorly adjacent to the  iliac vessels measuring 2.7 x 5.6 cm. There is some extension seen along  the anterior rim of the acetabulum. Findings may suggest adenopathy or  intramuscular process. Clinical correlation is needed. Follow-up is  recommended. No other acute intrathoracic, intra-abdominal or pelvic  abnormality identified.     CT angiogram/Abdomen/Pelvis 3/4/2022 ():  -Thoracic ascending aortic ectasia 4 x 4 cm stable. Small nodularity left upper lobe stable from September. Stable small hepatic hypodensities status post cholecystectomy. Right external iliac soft tissue fullness now 3 x 3.9 cm previously 2 x 2.3 cm concerning for enlarging adenopathy with symmetric nonenlarged inguinal nodes stable.    PATHOLOGY  Most Recent Resection and Partial Cystectomy 4/26/2021:  A.          BLADDER (PARTIAL CYSTECTOMY)        -MUCINOUS ADENOCARCINOMA, CONSISTENT WITH INVOLVEMENT BY THE PATIENT'S KNOWN   MUCINOUS ADENOCARCINOMA OF THE APPENDIX        -CARCINOMA IS <1 MM FROM THE BLADDER RESECTION MARGINS   B.          SOFT TISSUE, RIGHT PELVIC SIDEWALL (BIOPSY):        -FIBROADIPOSE TISSUE WITH FAT NECROSIS, INFLAMMATION, AND HEMOSIDERIN-LADEN   MACROPHAGES        -NEGATIVE FOR CARCINOMA          The following portions of the patient's history were reviewed and updated as  appropriate: allergies, current medications, past family history, past medical history, past social history, past surgical history and problem list.    Assessment:   Assessment  Mr. Tse is a 66 year old gentleman with a history of a locally recurrent and metastatic appendiceal carcinoma.  He has an enlarging right pelvis side wall mass extending into the right groin causing pain that is refractory to pain medications.  I do think he would benefit from palliative radiation to the right groin and lower pelvis.  I recommend that we treat him to a dose of 37.5 Gy in 15 fractions, and after a full explanation of the risks and benefits, he was agreeable and signed informed consent.  He is currently scheduled to undergo a repeat biopsy tomorrow for molecular testing, which I encouraged him to still complete.  I can begin palliative treatments after that is performed in an effort to help improve his pain, and certainly if it appears he has a molecular target, then we will coordinate completion of radiation with initiation of targeted therapy with Dr. Delcid once that decision has been made.    RECOMMENDATIONS:    Plan to treat the right groin/pelvic sidewall to 37.5 Gy in 15 fractions    I spent a total of 50 minutes on todays visit, with more than 40 minutes in direct face to face communication, and the remainder of the time spent in reviewing the relevant history, records, available imaging, and for documentation.    Follow Up:   Return in about 2 days (around 4/6/2022) for Radiation Simulation.  Diagnoses and all orders for this visit:    1. Mucinous adenocarcinoma of appendix (HCC) (Primary)    2. Peritoneal carcinoma (HCC)  -     Ambulatory Referral to [facility] Social Work    Thank you for allowing me to participate in the care of this individual.    Sincerely,       Gm Moody MD

## 2022-04-05 ENCOUNTER — HOSPITAL ENCOUNTER (OUTPATIENT)
Dept: CT IMAGING | Facility: HOSPITAL | Age: 67
Discharge: HOME | End: 2022-04-05
Admitting: RADIOLOGY

## 2022-04-05 VITALS
TEMPERATURE: 98 F | SYSTOLIC BLOOD PRESSURE: 144 MMHG | BODY MASS INDEX: 20.28 KG/M2 | DIASTOLIC BLOOD PRESSURE: 73 MMHG | OXYGEN SATURATION: 100 % | HEIGHT: 73 IN | WEIGHT: 153 LBS | HEART RATE: 74 BPM | RESPIRATION RATE: 18 BRPM

## 2022-04-05 DIAGNOSIS — C18.1 MUCINOUS ADENOCARCINOMA OF APPENDIX: ICD-10-CM

## 2022-04-05 LAB
ANION GAP SERPL CALCULATED.3IONS-SCNC: 11 MMOL/L (ref 5–15)
BASOPHILS # BLD AUTO: 0.02 10*3/MM3 (ref 0–0.2)
BASOPHILS NFR BLD AUTO: 0.3 % (ref 0–1.5)
BUN SERPL-MCNC: 15 MG/DL (ref 8–23)
BUN/CREAT SERPL: 16 (ref 7–25)
CALCIUM SPEC-SCNC: 9.3 MG/DL (ref 8.6–10.5)
CHLORIDE SERPL-SCNC: 99 MMOL/L (ref 98–107)
CO2 SERPL-SCNC: 26 MMOL/L (ref 22–29)
CREAT SERPL-MCNC: 0.94 MG/DL (ref 0.76–1.27)
DEPRECATED RDW RBC AUTO: 40.6 FL (ref 37–54)
EGFRCR SERPLBLD CKD-EPI 2021: 90.5 ML/MIN/1.73
EOSINOPHIL # BLD AUTO: 0.09 10*3/MM3 (ref 0–0.4)
EOSINOPHIL NFR BLD AUTO: 1.1 % (ref 0.3–6.2)
ERYTHROCYTE [DISTWIDTH] IN BLOOD BY AUTOMATED COUNT: 13.2 % (ref 12.3–15.4)
GLUCOSE SERPL-MCNC: 101 MG/DL (ref 65–99)
HCT VFR BLD AUTO: 40 % (ref 37.5–51)
HGB BLD-MCNC: 13.4 G/DL (ref 13–17.7)
IMM GRANULOCYTES # BLD AUTO: 0.03 10*3/MM3 (ref 0–0.05)
IMM GRANULOCYTES NFR BLD AUTO: 0.4 % (ref 0–0.5)
INR PPP: 0.97 (ref 0.84–1.13)
LYMPHOCYTES # BLD AUTO: 1.44 10*3/MM3 (ref 0.7–3.1)
LYMPHOCYTES NFR BLD AUTO: 18.3 % (ref 19.6–45.3)
MCH RBC QN AUTO: 28.8 PG (ref 26.6–33)
MCHC RBC AUTO-ENTMCNC: 33.5 G/DL (ref 31.5–35.7)
MCV RBC AUTO: 85.8 FL (ref 79–97)
MONOCYTES # BLD AUTO: 0.51 10*3/MM3 (ref 0.1–0.9)
MONOCYTES NFR BLD AUTO: 6.5 % (ref 5–12)
NEUTROPHILS NFR BLD AUTO: 5.77 10*3/MM3 (ref 1.7–7)
NEUTROPHILS NFR BLD AUTO: 73.4 % (ref 42.7–76)
NRBC BLD AUTO-RTO: 0 /100 WBC (ref 0–0.2)
PLATELET # BLD AUTO: 246 10*3/MM3 (ref 140–450)
PMV BLD AUTO: 10.4 FL (ref 6–12)
POTASSIUM SERPL-SCNC: 4.2 MMOL/L (ref 3.5–5.2)
PROTHROMBIN TIME: 12.8 SECONDS (ref 11.4–14.4)
RBC # BLD AUTO: 4.66 10*6/MM3 (ref 4.14–5.8)
SODIUM SERPL-SCNC: 136 MMOL/L (ref 136–145)
WBC NRBC COR # BLD: 7.86 10*3/MM3 (ref 3.4–10.8)

## 2022-04-05 PROCEDURE — 99152 MOD SED SAME PHYS/QHP 5/>YRS: CPT

## 2022-04-05 PROCEDURE — 25010000002 FENTANYL CITRATE (PF) 50 MCG/ML SOLUTION: Performed by: RADIOLOGY

## 2022-04-05 PROCEDURE — 88307 TISSUE EXAM BY PATHOLOGIST: CPT | Performed by: INTERNAL MEDICINE

## 2022-04-05 PROCEDURE — 85025 COMPLETE CBC W/AUTO DIFF WBC: CPT | Performed by: RADIOLOGY

## 2022-04-05 PROCEDURE — 80048 BASIC METABOLIC PNL TOTAL CA: CPT | Performed by: RADIOLOGY

## 2022-04-05 PROCEDURE — 0 LIDOCAINE 1 % SOLUTION: Performed by: RADIOLOGY

## 2022-04-05 PROCEDURE — 25010000002 MIDAZOLAM PER 1 MG: Performed by: RADIOLOGY

## 2022-04-05 PROCEDURE — 77012 CT SCAN FOR NEEDLE BIOPSY: CPT

## 2022-04-05 PROCEDURE — 85610 PROTHROMBIN TIME: CPT | Performed by: RADIOLOGY

## 2022-04-05 RX ORDER — SODIUM CHLORIDE 0.9 % (FLUSH) 0.9 %
10 SYRINGE (ML) INJECTION AS NEEDED
Status: DISCONTINUED | OUTPATIENT
Start: 2022-04-05 | End: 2022-04-06 | Stop reason: HOSPADM

## 2022-04-05 RX ORDER — FENTANYL CITRATE 50 UG/ML
INJECTION, SOLUTION INTRAMUSCULAR; INTRAVENOUS
Status: DISPENSED
Start: 2022-04-05 | End: 2022-04-05

## 2022-04-05 RX ORDER — LIDOCAINE HYDROCHLORIDE 10 MG/ML
10 INJECTION, SOLUTION INFILTRATION; PERINEURAL ONCE
Status: COMPLETED | OUTPATIENT
Start: 2022-04-05 | End: 2022-04-05

## 2022-04-05 RX ORDER — FENTANYL CITRATE 50 UG/ML
INJECTION, SOLUTION INTRAMUSCULAR; INTRAVENOUS
Status: COMPLETED | OUTPATIENT
Start: 2022-04-05 | End: 2022-04-05

## 2022-04-05 RX ORDER — MIDAZOLAM HYDROCHLORIDE 1 MG/ML
INJECTION INTRAMUSCULAR; INTRAVENOUS
Status: DISPENSED
Start: 2022-04-05 | End: 2022-04-05

## 2022-04-05 RX ORDER — SODIUM CHLORIDE 0.9 % (FLUSH) 0.9 %
3 SYRINGE (ML) INJECTION EVERY 12 HOURS SCHEDULED
Status: DISCONTINUED | OUTPATIENT
Start: 2022-04-05 | End: 2022-04-06 | Stop reason: HOSPADM

## 2022-04-05 RX ORDER — HYDROCODONE BITARTRATE AND ACETAMINOPHEN 5; 325 MG/1; MG/1
1 TABLET ORAL EVERY 4 HOURS PRN
Status: DISCONTINUED | OUTPATIENT
Start: 2022-04-05 | End: 2022-04-06 | Stop reason: HOSPADM

## 2022-04-05 RX ORDER — MIDAZOLAM HYDROCHLORIDE 1 MG/ML
INJECTION INTRAMUSCULAR; INTRAVENOUS
Status: COMPLETED | OUTPATIENT
Start: 2022-04-05 | End: 2022-04-05

## 2022-04-05 RX ADMIN — MIDAZOLAM HYDROCHLORIDE 1 MG: 1 INJECTION, SOLUTION INTRAMUSCULAR; INTRAVENOUS at 11:06

## 2022-04-05 RX ADMIN — LIDOCAINE HYDROCHLORIDE 10 ML: 10 INJECTION, SOLUTION INFILTRATION; PERINEURAL at 11:24

## 2022-04-05 RX ADMIN — FENTANYL CITRATE 50 MCG: 50 INJECTION, SOLUTION INTRAMUSCULAR; INTRAVENOUS at 11:05

## 2022-04-05 NOTE — DISCHARGE INSTRUCTIONS
May remove dressing in 24 hours  and shower.  Do not submerge in bath or hot tub.   Regular diet.  Light activity x 24 hours  No changes have been made to your medications

## 2022-04-05 NOTE — PRE-PROCEDURE NOTE
"[facility]   Vascular Interventional Radiology  History & Physicial    Patient Name:Esteban Tse    : 1955    MRN: [id number]    Primary Care Physician: Rocio Maya MD    Referring Physician: Cy Delcid MD     Date of admission: 2022    Subjective     Reason for Consult: R iliac mass biopsy    History of Present Illness     Esteban Tse is a 66 y.o. male referred to IR as noted above.      Active Hospital Problems:  There are no active hospital problems to display for this patient.      Personal History     Past Medical History:   Diagnosis Date   • Ascending aortic aneurysm (HCC)    • Benign hypertension    • Bladder cancer (HCC)    • Colon cancer (HCC)    • Hypertension    • Intermittent palpitations    • Mucinous adenocarcinoma (HCC)    • Pounding heartbeat    • PVC's (premature ventricular contractions)        Past Surgical History:   Procedure Laterality Date   • APPENDECTOMY      With Partial Colon    • BLADDER SURGERY      with partial colon   • CHOLECYSTECTOMY     • COLON SURGERY     • GALLBLADDER SURGERY         Family History: His family history includes Heart disease in his father; Hypertension in his father; Lung cancer in his mother; Stroke in his mother.     Social History: He  reports that he has never smoked. He has never used smokeless tobacco. He reports current alcohol use. He reports that he does not use drugs.    Home Medications:  HYDROcodone-acetaminophen, Morphine, gabapentin, lisinopril-hydrochlorothiazide, metoprolol succinate XL, and naloxone    Current Medications:  •  fentaNYL citrate (PF)  •  midazolam  •  sodium chloride  •  sodium chloride     Allergies:  He is allergic to levofloxacin, tetracycline, and penicillins.    Review of Systems    Objective     Visit Vitals  Ht 185.4 cm (73\")   Wt 69.4 kg (153 lb)   BMI 20.19 kg/m²        Physical Exam    A&Ox3. Able to communicate  No Apparent Distress  Average physique      Result Review      I have personally " reviewed the results from the time of this admission to 4/5/2022 10:43 EDT and agree with these findings.  [x]  Laboratory  []  Microbiology  [x]  Radiology  []  EKG/Telemetry   []  Cardiology/Vascular   []  Pathology  []  Old records  []  Other:    Most notable findings include: As noted:    Results from last 7 days   Lab Units 04/05/22  0923   INR  0.97   HEMOGLOBIN g/dL 13.4   HEMATOCRIT % 40.0   PLATELETS 10*3/mm3 246       Estimated Creatinine Clearance: 77.9 mL/min (by C-G formula based on SCr of 0.94 mg/dL).   Creatinine   Date Value Ref Range Status   04/05/2022 0.94 0.76 - 1.27 mg/dL Final       No results found for: COVID19     No results found for: PREGTESTUR, PREGSERUM, HCG, HCGQUANT       Assessment / Plan     Esteban Tse is a 66 y.o. male referred to the IR service with above problem.    Plan:   As above.    Melquiades Mac MD   Vascular Interventional Radiology  04/05/22   10:43 AM EDT

## 2022-04-06 ENCOUNTER — TELEPHONE (OUTPATIENT)
Dept: INFUSION THERAPY | Facility: HOSPITAL | Age: 67
End: 2022-04-06

## 2022-04-06 ENCOUNTER — HOSPITAL ENCOUNTER (OUTPATIENT)
Dept: RADIATION ONCOLOGY | Facility: HOSPITAL | Age: 67
Discharge: HOME | End: 2022-04-06

## 2022-04-06 PROCEDURE — 77290 THER RAD SIMULAJ FIELD CPLX: CPT | Performed by: RADIOLOGY

## 2022-04-06 PROCEDURE — 77301 RADIOTHERAPY DOSE PLAN IMRT: CPT | Performed by: RADIOLOGY

## 2022-04-06 PROCEDURE — 77334 RADIATION TREATMENT AID(S): CPT | Performed by: RADIOLOGY

## 2022-04-07 PROCEDURE — 77300 RADIATION THERAPY DOSE PLAN: CPT | Performed by: RADIOLOGY

## 2022-04-07 PROCEDURE — 77338 DESIGN MLC DEVICE FOR IMRT: CPT | Performed by: RADIOLOGY

## 2022-04-08 ENCOUNTER — HOSPITAL ENCOUNTER (OUTPATIENT)
Dept: RADIATION ONCOLOGY | Facility: HOSPITAL | Age: 67
Discharge: HOME | End: 2022-04-08

## 2022-04-11 ENCOUNTER — HOSPITAL ENCOUNTER (OUTPATIENT)
Dept: RADIATION ONCOLOGY | Facility: HOSPITAL | Age: 67
Discharge: HOME | End: 2022-04-11

## 2022-04-11 PROCEDURE — 77386: CPT | Performed by: RADIOLOGY

## 2022-04-12 ENCOUNTER — HOSPITAL ENCOUNTER (OUTPATIENT)
Dept: RADIATION ONCOLOGY | Facility: HOSPITAL | Age: 67
Discharge: HOME | End: 2022-04-12

## 2022-04-12 ENCOUNTER — DOCUMENTATION (OUTPATIENT)
Dept: NUTRITION | Facility: HOSPITAL | Age: 67
End: 2022-04-12

## 2022-04-12 VITALS — BODY MASS INDEX: 19.94 KG/M2 | WEIGHT: 151.1 LBS

## 2022-04-12 DIAGNOSIS — C48.2 PERITONEAL CARCINOMA: Primary | ICD-10-CM

## 2022-04-12 DIAGNOSIS — C18.1 MUCINOUS ADENOCARCINOMA OF APPENDIX: ICD-10-CM

## 2022-04-12 PROCEDURE — 77386: CPT | Performed by: RADIOLOGY

## 2022-04-12 NOTE — PROGRESS NOTES
[facility] Nutrition    Diagnosis:  Mucinous adenocarcinoma of appendix diagnosed 2017 / metastatic to bladder, peritoneal disease, bilateral liver lesions with multiple surgical resections / current issue is a slowly enlarging mass in the right pelvic sidewall which seems to accompany symptoms including right-sided groin and hip pain  Palliative Radiation:  Palliative radiation to the right groin and lower pelvis /  37.5 Gy in 15 fractions    Weight 151.1 / approximate 15 lbs weight gain    Patient has an excellent appetite and is eating well; he is very pleased with the weight gain of 15 lbs that he has experienced over the past year.    Recent issues with constipation, which he managed successfully with stool softeners.  Discussed possible nutritionally related side effects of treatment including bowel changes; discussed that soluble fiber may be helpful if constipation issues continue or if patient develops issues with frequent, loose stools. Will continue to follow.

## 2022-04-13 ENCOUNTER — HOSPITAL ENCOUNTER (OUTPATIENT)
Dept: RADIATION ONCOLOGY | Facility: HOSPITAL | Age: 67
Discharge: HOME | End: 2022-04-13

## 2022-04-13 PROCEDURE — 77386: CPT | Performed by: RADIOLOGY

## 2022-04-14 ENCOUNTER — HOSPITAL ENCOUNTER (OUTPATIENT)
Dept: RADIATION ONCOLOGY | Facility: HOSPITAL | Age: 67
Discharge: HOME | End: 2022-04-14

## 2022-04-14 PROCEDURE — 77336 RADIATION PHYSICS CONSULT: CPT | Performed by: RADIOLOGY

## 2022-04-14 PROCEDURE — 77386: CPT | Performed by: RADIOLOGY

## 2022-04-15 ENCOUNTER — HOSPITAL ENCOUNTER (OUTPATIENT)
Dept: RADIATION ONCOLOGY | Facility: HOSPITAL | Age: 67
Discharge: HOME | End: 2022-04-15

## 2022-04-15 PROCEDURE — 77386: CPT | Performed by: RADIOLOGY

## 2022-04-18 ENCOUNTER — HOSPITAL ENCOUNTER (OUTPATIENT)
Dept: RADIATION ONCOLOGY | Facility: HOSPITAL | Age: 67
Discharge: HOME | End: 2022-04-18

## 2022-04-18 PROCEDURE — 77386: CPT | Performed by: RADIOLOGY

## 2022-04-19 ENCOUNTER — HOSPITAL ENCOUNTER (OUTPATIENT)
Dept: RADIATION ONCOLOGY | Facility: HOSPITAL | Age: 67
Discharge: HOME | End: 2022-04-19

## 2022-04-19 VITALS — BODY MASS INDEX: 20.37 KG/M2 | WEIGHT: 154.4 LBS

## 2022-04-19 DIAGNOSIS — C18.1 MUCINOUS ADENOCARCINOMA OF APPENDIX: Primary | ICD-10-CM

## 2022-04-19 PROCEDURE — 77386: CPT | Performed by: RADIOLOGY

## 2022-04-20 ENCOUNTER — HOSPITAL ENCOUNTER (OUTPATIENT)
Dept: RADIATION ONCOLOGY | Facility: HOSPITAL | Age: 67
Discharge: HOME | End: 2022-04-20

## 2022-04-20 PROCEDURE — 77386: CPT | Performed by: RADIOLOGY

## 2022-04-21 ENCOUNTER — HOSPITAL ENCOUNTER (OUTPATIENT)
Dept: RADIATION ONCOLOGY | Facility: HOSPITAL | Age: 67
Discharge: HOME | End: 2022-04-21

## 2022-04-21 PROCEDURE — 77336 RADIATION PHYSICS CONSULT: CPT | Performed by: RADIOLOGY

## 2022-04-21 PROCEDURE — 77386: CPT | Performed by: RADIOLOGY

## 2022-04-22 ENCOUNTER — HOSPITAL ENCOUNTER (OUTPATIENT)
Dept: RADIATION ONCOLOGY | Facility: HOSPITAL | Age: 67
Discharge: HOME | End: 2022-04-22

## 2022-04-22 PROCEDURE — 77386: CPT | Performed by: RADIOLOGY

## 2022-04-25 ENCOUNTER — HOSPITAL ENCOUNTER (OUTPATIENT)
Dept: RADIATION ONCOLOGY | Facility: HOSPITAL | Age: 67
Discharge: HOME | End: 2022-04-25

## 2022-04-25 PROCEDURE — 77386: CPT | Performed by: RADIOLOGY

## 2022-04-26 ENCOUNTER — HOSPITAL ENCOUNTER (OUTPATIENT)
Dept: RADIATION ONCOLOGY | Facility: HOSPITAL | Age: 67
Discharge: HOME | End: 2022-04-26

## 2022-04-26 VITALS — BODY MASS INDEX: 20.33 KG/M2 | WEIGHT: 154.1 LBS

## 2022-04-26 PROCEDURE — 77386: CPT | Performed by: RADIOLOGY

## 2022-04-27 ENCOUNTER — HOSPITAL ENCOUNTER (OUTPATIENT)
Dept: RADIATION ONCOLOGY | Facility: HOSPITAL | Age: 67
Discharge: HOME | End: 2022-04-27

## 2022-04-27 DIAGNOSIS — G89.3 CANCER ASSOCIATED PAIN: Primary | ICD-10-CM

## 2022-04-27 PROCEDURE — 77386: CPT | Performed by: RADIOLOGY

## 2022-04-27 RX ORDER — MORPHINE SULFATE 15 MG/1
15 TABLET, FILM COATED, EXTENDED RELEASE ORAL 2 TIMES DAILY
Qty: 60 TABLET | Refills: 0 | Status: SHIPPED | OUTPATIENT
Start: 2022-04-27 | End: 2022-06-03 | Stop reason: SDUPTHER

## 2022-04-27 NOTE — TELEPHONE ENCOUNTER
[facility] # [id number] reviewed and appropriate. Refill for morphine ER 15 mg tablets BID sent to pharmacy. Patient is scheduled to follow up Friday 4/29/22.

## 2022-04-28 ENCOUNTER — HOSPITAL ENCOUNTER (OUTPATIENT)
Dept: RADIATION ONCOLOGY | Facility: HOSPITAL | Age: 67
Discharge: HOME | End: 2022-04-28

## 2022-04-28 PROCEDURE — 77386: CPT | Performed by: RADIOLOGY

## 2022-04-28 PROCEDURE — 77336 RADIATION PHYSICS CONSULT: CPT | Performed by: RADIOLOGY

## 2022-04-28 NOTE — PROGRESS NOTES
Palliative Clinic Note      Name: Esteban Tse  Age: 66 y.o.  Sex: male  : 1955  MRN: [id number]  Date of Service: 22  Medical Oncologist: Dr. Delcid    Subjective:    Chief Complaint: Right groin pain    History of Present Illness: Esteban Tse is a 66 y.o. male with past medical history significant for HTN, AAA, metastatic adenocarcinoma of the appendix who presents to the palliative clinic today as a follow up for pain and symptom management.     Treatment summary: The patient was diagnosed with cancer of the appendix at the time of appendectomy in 2017. Patient under surveillance until imaging in 2019 demonstrated several lesions suspicious for recurrent disease. He underwent a diagnostic laparoscopy with peritoneal biopsies on 2020 by Dr. Peoples that proved recurrent adenocarcinoma.  On 2020 he underwent an exploratory laparotomy with excision of right and left lobe liver lesions, omentectomy, resection of pelvic peritoneal nodules, ileocolectomy with creation of ileal colostomy, hyperthermic intraperitoneal chemotherapy and a partial cystectomy with right ureteral stent placement. Than on 21 he underwent a cystoscopy with bilateral ureteral catheters, exploratory laparotomy, lysis of adhesions, right pelvic sidewall excisional biopsy, and an open partial cystectomy. TURBT performed in 10/2021. Imaging from 3/4/2022 showed enlarging external illiac adenopathy. There are no surgical options per Dr. Peoples. Underwent CT-guided biopsy of the right iliac mass consistent with mucinous adenocarcinoma. Patient currently recieving palliative radiation. Five more days added on.      Pain History: Patient established care with [facility] in [place], [place] > 1 year ago. The right hip pain was thought to be referred from his spine and he underwent several epidurals and a lumbar laminectomy with no improvement in his pain. More recently, the pain thought to be related to  the cancer of the appendix.     Symptoms: The patient continues to have 8/10 right hip/groin pain. The pain radiates into his right lower extremity. He describes the pain a dull ache with occasional sharp and burning sensations. The patient does not report significant improvement in pain or functional status since starting the morphine 15 mg BID and increasing the Norco 5 mg to q4h. He continues Gabapentin 600 mg TID but is unsure if this helps. He has tolerated the new medication with minimal side effects.  He denies nausea, vomiting or abdominal pain. Regular bowel movements. Good appetite. No issues with sleep when he is not in pain.      Pyschosocial: The patient lives alone. He is retired from working for the state. He enjoys playing golf and spending time outside. No personal history of a mental illness. The patient admits to anxiety/depression related to his cancer diagnosis but states overall his mood is stable and he is able to manage it on his own.       Spiritual: Patient describes himself as curious rather than practicing.      Goals: Improve quality of life with symptom management.     The following portions of the patient's history were reviewed and updated as appropriate: allergies, current medications, past family history, past medical history, past social history, past surgical history and problem list.    ORT-R: Low risk  Decisional capacity: Full  ECOG: (2) Ambulatory and capable of self care, unable to carry out work activity, up and about > 50% or waking hours   Palliative Performance Scale Score: 70%     Objective:    /90   Pulse 87   Wt 70.3 kg (155 lb)   SpO2 99%   BMI 20.45 kg/m²     Constitutional: Awake, alert, sitting up in the exam chair, normal gait  Eyes: PERRLA, EOMS intact  HENT: NCAT, face symmetric  Neck: Supple, trachea midline  Respiratory: Nonlabored respirations  Cardiovascular: RRR  Gastrointestinal: Positive bowel sounds, soft, nontender, no  guarding  Musculoskeletal: No bilateral ankle edema, moves all extremities   Psychiatric: Appropriate affect, cooperative  Neurologic: Oriented x 3, Cranial Nerves grossly intact to confrontation, speech clear  Skin: Cool dry, no rashes or wounds appreciated     Medication Counts: Reviewed. See bottom of note for details. Did not bring medication to appointment Asked patient to bring to his next appointment.  YAIMA:  #[id number]. Reviewed. All providers are part of the care team.  UDS(Y): Last 3/25/22. Reviewed. Appropriate.     Assessment & Plan:    1. Mucinous adenocarcinoma of appendix (HCC)  --Additional palliative radiation treatments added on for next week. Patient scheduled to follow up in Dr. Delcid 6/6/22.    2. Cancer associated pain  --Minimal improvement after starting long-acting morphine. We will rotate his short-acting opioid today. Discontinue hydrocodone/APAP 10 mg tablets and start oxycodone IR 5 mg tablets q4h PRN. Plan to increase the morphine to 30 mg BID next week if patient does not get a good response from the oxycodone. All refills sent to pharmacy today.     --Will also start cyclobenzaprine (FLEXERIL) 5 MG tablet; Take 1-2 tablets by mouth 3 (Three) Times a Day As Needed for Muscle Spasms. Specifically before for radiation treatments.     Code status: Full code  Medical interventions: Full  Advanced directives: Plan to discuss    Return in about 5 weeks (around 6/6/2022), or 1:30.    I spent 40 minutes caring for Esteban Tse on this date of service. This time includes time spent by me in the following activities: preparing for the visit, reviewing tests, obtaining and/or reviewing a separately obtained history, performing a medically appropriate examination and/or evaluation , counseling and educating the patient/family/caregiver, ordering medications, tests, or procedures, documenting information in the medical record, independently interpreting results and communicating that  information with the patient/family/caregiver, and care coordination    Qian Fleming PA-C  04/29/2022    Medication Date Filled # Filled Count Used # Days  SOY   Morphine 15  3/25/22 60    2   Gabapentin 600 4/14/22 90    3   Norco 10  4/16/22 180    6

## 2022-04-28 NOTE — PATIENT INSTRUCTIONS
Discontinue Norco 10 mg tablets.   Start Oxycodone 5 mg tablets every 4 hours as needed.   Continue morphine 15 mg BID.  Continue Gabapentin 600 mg TID.   Start Flexeril 5-10 mg up to three times a day as needed for muscle spasms.  Scheduled to follow up in 5 weeks  Always bring your medications prescribed by the clinic to every appointment. If telemedicine appointment, be prepared to give medication counts. This helps with managing your refill needs.   Call the Palliative Clinic for any questions or concerns at [phone] or reach out to us on [employer].  Please give us 2-3 business days in advance for refill requests. Be aware of additional insurance related delays for some medications.

## 2022-04-29 ENCOUNTER — OFFICE VISIT (OUTPATIENT)
Dept: PALLIATIVE CARE | Facility: CLINIC | Age: 67
End: 2022-04-29

## 2022-04-29 ENCOUNTER — HOSPITAL ENCOUNTER (OUTPATIENT)
Dept: RADIATION ONCOLOGY | Facility: HOSPITAL | Age: 67
Discharge: HOME | End: 2022-04-29

## 2022-04-29 VITALS
BODY MASS INDEX: 20.45 KG/M2 | SYSTOLIC BLOOD PRESSURE: 145 MMHG | HEART RATE: 87 BPM | WEIGHT: 155 LBS | DIASTOLIC BLOOD PRESSURE: 90 MMHG | OXYGEN SATURATION: 99 %

## 2022-04-29 DIAGNOSIS — C18.1 MUCINOUS ADENOCARCINOMA OF APPENDIX: Primary | ICD-10-CM

## 2022-04-29 DIAGNOSIS — G89.3 CANCER ASSOCIATED PAIN: Primary | ICD-10-CM

## 2022-04-29 DIAGNOSIS — G89.3 CANCER ASSOCIATED PAIN: ICD-10-CM

## 2022-04-29 PROCEDURE — 77386: CPT | Performed by: RADIOLOGY

## 2022-04-29 PROCEDURE — 99215 OFFICE O/P EST HI 40 MIN: CPT | Performed by: PHYSICIAN ASSISTANT

## 2022-04-29 RX ORDER — CYCLOBENZAPRINE HCL 5 MG
5 TABLET ORAL 3 TIMES DAILY PRN
Qty: 30 TABLET | Refills: 1 | Status: SHIPPED | OUTPATIENT
Start: 2022-04-29 | End: 2022-07-12

## 2022-04-29 RX ORDER — GABAPENTIN 600 MG/1
600 TABLET ORAL 3 TIMES DAILY
Qty: 90 TABLET | Refills: 0 | Status: SHIPPED | OUTPATIENT
Start: 2022-05-15 | End: 2022-06-03 | Stop reason: SDUPTHER

## 2022-04-29 RX ORDER — OXYCODONE HYDROCHLORIDE 5 MG/1
5 TABLET ORAL EVERY 4 HOURS PRN
Qty: 180 TABLET | Refills: 0 | Status: SHIPPED | OUTPATIENT
Start: 2022-04-29 | End: 2022-05-02

## 2022-04-29 NOTE — TELEPHONE ENCOUNTER
[facility] # [id number] reviewed. New prescription for oxycodone IR 5 mg tablets q4h PRN for 30 days sent to pharmacy. Patient scheduled to follow up on 6/6/22.

## 2022-05-02 ENCOUNTER — HOSPITAL ENCOUNTER (OUTPATIENT)
Dept: RADIATION ONCOLOGY | Facility: HOSPITAL | Age: 67
Discharge: HOME | End: 2022-05-02

## 2022-05-02 ENCOUNTER — HOSPITAL ENCOUNTER (OUTPATIENT)
Dept: RADIATION ONCOLOGY | Facility: HOSPITAL | Age: 67
Setting detail: RADIATION/ONCOLOGY SERIES
Discharge: HOME | End: 2022-05-02

## 2022-05-02 ENCOUNTER — TELEPHONE (OUTPATIENT)
Dept: SOCIAL WORK | Facility: HOSPITAL | Age: 67
End: 2022-05-02

## 2022-05-02 DIAGNOSIS — G89.3 CANCER ASSOCIATED PAIN: ICD-10-CM

## 2022-05-02 PROCEDURE — 77386: CPT | Performed by: RADIOLOGY

## 2022-05-02 RX ORDER — OXYCODONE HYDROCHLORIDE 5 MG/1
5 TABLET ORAL EVERY 4 HOURS PRN
Qty: 180 TABLET | Refills: 0 | Status: SHIPPED | OUTPATIENT
Start: 2022-05-02 | End: 2022-06-01

## 2022-05-02 NOTE — TELEPHONE ENCOUNTER
Spoke with pharmacist at [facility] who requests a new oxycodone prescription. There was an incorrect address listed for the prescribing physician, Dr. Quigley.

## 2022-05-03 ENCOUNTER — HOSPITAL ENCOUNTER (OUTPATIENT)
Dept: RADIATION ONCOLOGY | Facility: HOSPITAL | Age: 67
Discharge: HOME | End: 2022-05-03

## 2022-05-03 VITALS — BODY MASS INDEX: 20.33 KG/M2 | WEIGHT: 154.1 LBS

## 2022-05-03 PROCEDURE — 77386: CPT | Performed by: RADIOLOGY

## 2022-05-04 ENCOUNTER — HOSPITAL ENCOUNTER (OUTPATIENT)
Dept: RADIATION ONCOLOGY | Facility: HOSPITAL | Age: 67
Discharge: HOME | End: 2022-05-04

## 2022-05-04 PROCEDURE — 77386: CPT | Performed by: RADIOLOGY

## 2022-05-05 ENCOUNTER — HOSPITAL ENCOUNTER (OUTPATIENT)
Dept: RADIATION ONCOLOGY | Facility: HOSPITAL | Age: 67
Discharge: HOME | End: 2022-05-05

## 2022-05-05 PROCEDURE — 77386: CPT | Performed by: RADIOLOGY

## 2022-05-06 ENCOUNTER — HOSPITAL ENCOUNTER (OUTPATIENT)
Dept: RADIATION ONCOLOGY | Facility: HOSPITAL | Age: 67
Discharge: HOME | End: 2022-05-06

## 2022-05-06 DIAGNOSIS — C18.1 MUCINOUS ADENOCARCINOMA OF APPENDIX: Primary | ICD-10-CM

## 2022-05-06 PROCEDURE — 77386: CPT | Performed by: RADIOLOGY

## 2022-05-06 PROCEDURE — 77336 RADIATION PHYSICS CONSULT: CPT | Performed by: RADIOLOGY

## 2022-05-09 NOTE — RADIATION COMPLETION NOTES
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      BACKGROUND: Esteban Tse is a 66 year old gentleman with a longstanding history of a recurrent and metastatic appendiceal carcinoma.  He has a mass in the right groin and brandi-pelvis causing him pain and neuropathic pain.  He completed a course of palliative radiation as detailed below:    Treatment Summary     Dates of Therapy: 4/11/202 - 5/6/2022  Treatment Site: Right groin and brandi-pelvis  Dose: 50 Gy in 20 fractions of 2.5 Gy each  Technique: IMRT using 6MV photons was utilized to target the gross disease alone in the right groin.    Treatment Course and Tolerance: He tolerated radiation well.  He did not develop any significant radiation related toxicities.  His daily scans were monitored and although it did not appear that the tumor was reducing in size, he did seem to have some improvements in his ability to lie still, sleep at night, and subjectively, the tumor appeared to be changing quality and becoming soft and less circumscribed.  Given the low grade nature, it is our hope and anticipation that his pain will continue to improve in the upcoming weeks.    The initial follow up visit will be in 1 month.    Esteban Tse knows to call if any problems or concerns develop in the meantime.     Electronically signed by: Gm Moody MD                    Cc: Rocio Maya MD

## 2022-06-03 DIAGNOSIS — G89.3 CANCER ASSOCIATED PAIN: ICD-10-CM

## 2022-06-03 RX ORDER — MORPHINE SULFATE 15 MG/1
15 TABLET, FILM COATED, EXTENDED RELEASE ORAL 2 TIMES DAILY
Qty: 60 TABLET | Refills: 0 | Status: SHIPPED | OUTPATIENT
Start: 2022-06-03 | End: 2022-07-12 | Stop reason: SDUPTHER

## 2022-06-03 RX ORDER — OXYCODONE HYDROCHLORIDE 5 MG/1
5 TABLET ORAL EVERY 4 HOURS PRN
Qty: 180 TABLET | Refills: 0 | Status: SHIPPED | OUTPATIENT
Start: 2022-06-03 | End: 2022-06-06 | Stop reason: DRUGHIGH

## 2022-06-03 RX ORDER — GABAPENTIN 600 MG/1
600 TABLET ORAL 3 TIMES DAILY
Qty: 90 TABLET | Refills: 0 | Status: SHIPPED | OUTPATIENT
Start: 2022-06-13 | End: 2022-08-15 | Stop reason: SDUPTHER

## 2022-06-03 NOTE — TELEPHONE ENCOUNTER
[facility] # [id number] [facility][1]ed. Refills for morphine 15 mg tab BID and oxycodone 5 mg tab q4h PRN sent to pharmacy. Patient is scheduled to follow up on 6/6/22.

## 2022-06-06 ENCOUNTER — LAB (OUTPATIENT)
Dept: LAB | Facility: HOSPITAL | Age: 67
End: 2022-06-06

## 2022-06-06 ENCOUNTER — OFFICE VISIT (OUTPATIENT)
Dept: ONCOLOGY | Facility: CLINIC | Age: 67
End: 2022-06-06

## 2022-06-06 ENCOUNTER — OFFICE VISIT (OUTPATIENT)
Dept: PALLIATIVE CARE | Facility: CLINIC | Age: 67
End: 2022-06-06

## 2022-06-06 VITALS
RESPIRATION RATE: 16 BRPM | HEIGHT: 72 IN | WEIGHT: 154 LBS | DIASTOLIC BLOOD PRESSURE: 91 MMHG | SYSTOLIC BLOOD PRESSURE: 149 MMHG | OXYGEN SATURATION: 99 % | BODY MASS INDEX: 20.86 KG/M2 | TEMPERATURE: 99.2 F | HEART RATE: 82 BPM

## 2022-06-06 VITALS
OXYGEN SATURATION: 99 % | DIASTOLIC BLOOD PRESSURE: 69 MMHG | HEART RATE: 84 BPM | SYSTOLIC BLOOD PRESSURE: 107 MMHG | WEIGHT: 153 LBS | BODY MASS INDEX: 20.75 KG/M2

## 2022-06-06 DIAGNOSIS — G89.3 CANCER ASSOCIATED PAIN: ICD-10-CM

## 2022-06-06 DIAGNOSIS — C18.1 MUCINOUS ADENOCARCINOMA OF APPENDIX: Primary | ICD-10-CM

## 2022-06-06 DIAGNOSIS — C18.1 MUCINOUS ADENOCARCINOMA OF APPENDIX: ICD-10-CM

## 2022-06-06 DIAGNOSIS — G89.3 CANCER ASSOCIATED PAIN: Primary | ICD-10-CM

## 2022-06-06 LAB
ALBUMIN SERPL-MCNC: 4 G/DL (ref 3.5–5.2)
ALBUMIN/GLOB SERPL: 1.7 G/DL
ALP SERPL-CCNC: 120 U/L (ref 39–117)
ALT SERPL W P-5'-P-CCNC: 18 U/L (ref 1–41)
ANION GAP SERPL CALCULATED.3IONS-SCNC: 9 MMOL/L (ref 5–15)
AST SERPL-CCNC: 21 U/L (ref 1–40)
BILIRUB SERPL-MCNC: 0.5 MG/DL (ref 0–1.2)
BUN SERPL-MCNC: 10 MG/DL (ref 8–23)
BUN/CREAT SERPL: 10 (ref 7–25)
CALCIUM SPEC-SCNC: 9.1 MG/DL (ref 8.6–10.5)
CHLORIDE SERPL-SCNC: 105 MMOL/L (ref 98–107)
CO2 SERPL-SCNC: 27 MMOL/L (ref 22–29)
CREAT SERPL-MCNC: 1 MG/DL (ref 0.76–1.27)
EGFRCR SERPLBLD CKD-EPI 2021: 84 ML/MIN/1.73
ERYTHROCYTE [DISTWIDTH] IN BLOOD BY AUTOMATED COUNT: 14.3 % (ref 12.3–15.4)
GLOBULIN UR ELPH-MCNC: 2.4 GM/DL
GLUCOSE SERPL-MCNC: 113 MG/DL (ref 65–99)
HCT VFR BLD AUTO: 42.5 % (ref 37.5–51)
HGB BLD-MCNC: 13.2 G/DL (ref 13–17.7)
LYMPHOCYTES # BLD AUTO: 1.3 10*3/MM3 (ref 0.7–3.1)
LYMPHOCYTES NFR BLD AUTO: 16.8 % (ref 19.6–45.3)
MCH RBC QN AUTO: 27.2 PG (ref 26.6–33)
MCHC RBC AUTO-ENTMCNC: 31.1 G/DL (ref 31.5–35.7)
MCV RBC AUTO: 87.5 FL (ref 79–97)
MONOCYTES # BLD AUTO: 0.5 10*3/MM3 (ref 0.1–0.9)
MONOCYTES NFR BLD AUTO: 6.2 % (ref 5–12)
NEUTROPHILS NFR BLD AUTO: 5.8 10*3/MM3 (ref 1.7–7)
NEUTROPHILS NFR BLD AUTO: 77 % (ref 42.7–76)
PLATELET # BLD AUTO: 261 10*3/MM3 (ref 140–450)
PMV BLD AUTO: 7.4 FL (ref 6–12)
POTASSIUM SERPL-SCNC: 4.6 MMOL/L (ref 3.5–5.2)
PROT SERPL-MCNC: 6.4 G/DL (ref 6–8.5)
RBC # BLD AUTO: 4.85 10*6/MM3 (ref 4.14–5.8)
SODIUM SERPL-SCNC: 141 MMOL/L (ref 136–145)
WBC NRBC COR # BLD: 7.5 10*3/MM3 (ref 3.4–10.8)

## 2022-06-06 PROCEDURE — 36415 COLL VENOUS BLD VENIPUNCTURE: CPT

## 2022-06-06 PROCEDURE — 85025 COMPLETE CBC W/AUTO DIFF WBC: CPT

## 2022-06-06 PROCEDURE — 80053 COMPREHEN METABOLIC PANEL: CPT

## 2022-06-06 PROCEDURE — 99214 OFFICE O/P EST MOD 30 MIN: CPT | Performed by: INTERNAL MEDICINE

## 2022-06-06 PROCEDURE — 99215 OFFICE O/P EST HI 40 MIN: CPT | Performed by: PHYSICIAN ASSISTANT

## 2022-06-06 RX ORDER — OXYCODONE HYDROCHLORIDE 10 MG/1
10 TABLET ORAL EVERY 4 HOURS PRN
Qty: 180 TABLET | Refills: 0 | Status: SHIPPED | OUTPATIENT
Start: 2022-06-06 | End: 2022-07-06

## 2022-06-06 NOTE — PROGRESS NOTES
Palliative Clinic Note      Name: Esteban Tse  Age: 66 y.o.  Sex: male  : 1955  MRN: [id number]  Date of Service: 22  Medical Oncologist: Dr. Delcid    Subjective:    Chief Complaint: Right hip/groin pain    History of Present Illness: Esteban Tse is a 66 y.o. male with past medical history significant for HTN, AAA, metastatic adenocarcinoma of the appendix who presents to the palliative clinic today as a follow up for pain and symptom management.     Treatment summary: The patient was diagnosed with cancer of the appendix at the time of appendectomy in 2017. Patient under surveillance until imaging in 2019 demonstrated several lesions suspicious for recurrent disease. He underwent a diagnostic laparoscopy with peritoneal biopsies on 2020 by Dr. Peoples that proved recurrent adenocarcinoma.  On 2020 he underwent an exploratory laparotomy with excision of right and left lobe liver lesions, omentectomy, resection of pelvic peritoneal nodules, ileocolectomy with creation of ileal colostomy, hyperthermic intraperitoneal chemotherapy and a partial cystectomy with right ureteral stent placement. Than on 21 he underwent a cystoscopy with bilateral ureteral catheters, exploratory laparotomy, lysis of adhesions, right pelvic sidewall excisional biopsy, and an open partial cystectomy. TURBT performed in 10/2021. Imaging from 3/4/2022 showed enlarging external illiac adenopathy. There are no surgical options per Dr. Peoples. Underwent CT-guided biopsy of the right iliac mass consistent with mucinous adenocarcinoma. Patient completed palliative radiation and the plan is to rescan in 4-6 weeks.     Pain History: Patient established care with [facility] in [place], [place] > 1 year ago. The right hip pain was thought to be referred from his spine and he underwent several epidurals and a lumbar laminectomy with no improvement in his pain. More recently, the pain  "thought to be related to the cancer of the appendix.     Symptoms: The patient reports improved pain with two tablets of oxycodone 5 mg. He complained of feeling \"groggy\" with the morphine and was only taking it at night before he ran out ~ 1 week ago. The patient currently takes about 4-5 tablets of oxycodone per day. He denies noticeable side effects with the oxycodone. He also takes Gabapentin 600 mg TID and says this seems to help as well. He describes the pain a dull ache with occasional sharp and burning sensations. The pain is worse with activity and improves with rest. The patient states he was able to play 18 holes of golf last week but was hurting afterwards. The patient denies nausea or vomiting. No issues with appetite or sleep. His energy comes and goes. Bowel movements have been regular.     Pyschosocial: The patient lives alone. He is retired from working for the state. He enjoys playing golf and spending time outside. No personal history of a mental illness. The patient admits to anxiety/depression related to his cancer diagnosis but states overall his mood is stable and he is able to manage it on his own.       Spiritual: Patient describes himself as curious rather than practicing.      Goals: Improve quality of life with symptom management.     The following portions of the patient's history were reviewed and updated as appropriate: allergies, current medications, past family history, past medical history, past social history, past surgical history and problem list.    ORT-R: Low risk  Decisional capacity: Full  ECOG: (1) Restricted in physically strenuous activity, ambulatory and able to do work of light nature   Palliative Performance Scale Score: 70%     Objective:    /69   Pulse 84   Wt 69.4 kg (153 lb)   SpO2 99%   BMI 20.75 kg/m²     Constitutional: Awake, alert, sitting up in the exam chair, normal gait   Eyes: PERRLA, EOMS intact  HENT: NCAT, face symmetric  Neck: Supple, trachea " midline  Respiratory: Nonlabored respirations  Cardiovascular: RRR  Gastrointestinal: Soft, nontender, no guarding  Musculoskeletal: No bilateral ankle edema, moves all extremities   Psychiatric: Appropriate affect, cooperative  Neurologic: Oriented x 3, Cranial Nerves grossly intact to confrontation, speech clear  Skin: Cool dry, no rashes or wounds appreciated     Medication Counts: Reviewed. See bottom of note for details. Brought medication.  No overuse or misuse evident.  YAIMA:  #[id number]. Reviewed. All providers are part of the care team.  UDS (Y): Last 3/25/22. Reviewed. Appropriate.     Assessment & Plan:    1. Mucinous adenocarcinoma of appendix (HCC)  --Patient completed palliative radiation but images unfortunately showed increase in size of lesions. Hoping this is postradiation change. Plan to rescan in 4-6 weeks.    2. Cancer associated pain  --Will increase the oxycodone to 10 mg tab every 4 hours as needed. Patient will continue the morphine ER 15 mg tab at bedtime due to drowsiness. We will continue to try and get his pain down to improve his function and quality of life, specifically to allow him to play golf.      --Continue Gabapentin 600 mg TID for nerve pain component.     Code status: Full code  Medical interventions: Full  Advanced directives: Plan to discuss    Return in about 5 weeks (around 7/11/2022), or telephone.    I spent 40 minutes caring for Esteban Tse on this date of service. This time includes time spent by me in the following activities: preparing for the visit, reviewing tests, obtaining and/or reviewing a separately obtained history, performing a medically appropriate examination and/or evaluation , counseling and educating the patient/family/caregiver, ordering medications, tests, or procedures, documenting information in the medical record, independently interpreting results and communicating that information with the patient/family/caregiver, and care  coordination    Qian Fleming PA-C  06/06/2022    Medication Date Filled # Filled Count Used # Days  SOY   Morphine 15 4/30/22 60 0 60 37 0-1   Oxycodone 5 5/3/22 180 31 149 34 4-5   Gabapentin 600 5/14/22 90 24 66 23 2-3

## 2022-06-06 NOTE — PATIENT INSTRUCTIONS
Check-out instructions:  Increase oxycodone to 10 mg every 4 hours as needed. Continue morphine 15 mg at night. Continue Gabapentin 600 mg three times a day.   Scheduled to follow up on 7/11/22    Medication Policy: We ask that you bring all of the medications prescribed by this clinic to every appointment. For telemedicine appointments, be prepared to give medication counts. This will assist us with managing your refill needs.      Refill Policy: You must notify us at least 3-5 business days in advance for routine refill requests. Call [phone] or send [employer] message to the Palliative Pool. Some prescriptions will need to be signed by the physician and will take longer to be sent to the pharmacy. Please also be aware of additional insurance prior authorization processing time required for many medications. Try to communicate with your pharmacy first to look for scripts signed in advance.     Communication: The [facility] days are Monday-Friday 8:30-4:30 PM. Call [phone] or send [employer] message to the Palliative Pool. You will not be routed to speak directly to the palliative provider during clinic hours, so that we may provide the best care and attention to our patients in the office. If you require immediate communication, please also consider contacting your primary care office or appropriate specialist office.

## 2022-06-06 NOTE — PROGRESS NOTES
CHIEF COMPLAINT: Follow-up mucinous adenocarcinoma of the appendix    Problem List:  Oncology/Hematology History Overview Note   1.  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  Pathologic stage IIa with T3 extension into the base of the appendix through the muscularis propria but not into the serosal surface.  16 nodes negative.  Colonoscopy December 2017 negative postop    -12/30/2019 medical oncology office visit: The patient had been on surveillance since surgery December 2017.  CT scans had been negative up until 12/30/2019 CT chest abdomen and pelvis that showed subtle soft tissue density surrounding surgical clips in the right side of the pelvis along with soft tissue nodule at the anterior wall of the bladder concerning for recurrent disease.  Patient sent back to Dr. Davidson for colonoscopy.    -2/7/2020 patient was referred to Dr. Peoples at the [facility], he underwent diagnostic laparoscopic and peritoneal biopsies that confirmed recurrent disease with biopsy of bladder dome nodule showing adenocarcinoma with mucinous features.  Port was placed at this time also.    -2/25/2020 began FOLFIRI    -4/21/2020 patient having increased fatigue, FOLFIRI dose reduced by 10%.    -5/19/2020 cycle 7 FOLFIRI    -6/18/2020 Dr. Peoples performed exploratory laparotomy with excision of right lobe liver lesion and left lobe liver lesion, omentectomy, resection of pelvic peritoneal nodules, ileocolectomy with creation of ileal colostomy, hyperthermic intraperitoneal chemotherapy with mitomycin-C at 42 °C for deep tissue penetration for 90 minutes.  Dr. Perdue performed partial cystectomy with right ureteral stent placement    -8/5/2020 CT abdomen pelvis with contrast shows small soft tissue nodule anterior aspect of bladder stable.  New small amount of ascites as well as a new small left pleural effusion.  Creatinine 0.75 with hemoglobin 11.1 otherwise  "unremarkable CBC and CMP.    -2/15/2021 CT abdomen pelvis with contrast compared to 8/5/2020 shows enlarging soft tissue mass 4.2 cm over the bladder fundus and there is a calcification along the base of the urinary bladder.  Small stable multiple hypoattenuating indeterminate liver lesions.  Similar degree of ascites.    -2/23/2021 CT chest shows no evidence of metastasis with ascending aorta 42 mm.    -3/8/2021 follow-up with Dr. Portillo Peoples we reviewed his CTs suggested team effort resection with Dr. Perdue    -3/16/2021 follow-up with Dr. Perdue.  He plans for open partial cystectomy, possible ureteral implants, cystoscopy and stent placement in concert with Dr. Peoples.    -4/22/2021 cystoscopy (after prior office cystoscopy showed large fungating mass) with bilateral ureteral catheters, exploratory laparotomy, lysis of adhesions, right pelvic sidewall excisional biopsy, open partial cystectomy Dr. Ike Perdue. Peritoneal fluid showed predominant inflammatory reactive mesothelial cells with no malignancy. 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 Carcinoma was less than 1 mm from the lateral resection margins.    -5/18/2021 [facility] medical oncology follow-up visit: I reviewed his hospital notes, operative notes, and pathology report as above and went over this with the patient.  The general consensus from retrospective as well as a few prospective data over the last couple of decades with this low-grade malignancy does not show any profound benefit from \"adjuvant\" chemotherapy in this current setting.  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  There is no standard follow-up but general guidelines suggest following up as typical colon cancer.  I will repeat his CT chest abdomen pelvis now to reestablish his baseline postoperative imaging and have him see my nurse practitioner back in a couple of weeks to make sure there is no nia evidence " of measurable residual disease.  Assuming this just shows postoperative changes, I would simply repeat scans again in 3 months or sooner as symptoms dictate.  If he has no evidence of persistent or metastatic disease on current imaging, then when he sees my nurse practitioner back she will also review what they say about his ascending aorta which was 42 mm in February and sent him to Dr. Evangelist Anthony for an opinion as to whether any intervention is needed relative to the aorta.  Obviously if his cancer is out of control on the upcoming scans then the aorta is a moot point.    -6/10/2021 [facility], [place], operated by [facility] oncology clinic follow-up: CT scans show no evidence of disease recurrence in the chest, abdomen or pelvis.  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  Referral made to Dr. Anthony, cardiothoracic surgeon for management and evaluation of a sending aortic aneurysm.  Plan to repeat CT scans in 3 months.    -9/14/2021 [facility], [place], operated by [facility] medical oncology follow-up visit: I reviewed images and reports of 9/10/2021 CT chest abdomen pelvis with contrast which shows under distended urinary bladder with mild asymmetric wall thickening dome and left lateral wall with a small 9 mm polypoid lesion in the bladder.  There is stable 2.3 cm right external iliac and a few other subcentimeter scattered nodes in the root of the small bowel mesentery and retroperitoneum.  Postsurgical right hemicolectomy changes.  Stable low-attenuation lesions in the liver unchanged.  I will have him collect his discs from [facility] to take Dr. Perdue to decide whether to proceed with cystoscopy or just continue watchful waiting.  There is no major changes and I suspect we are looking at postoperative changes and we shall see him for video visit in a few weeks to see what urology at  decides.    -10/11/2021 cystoscopy Dr. Perdue showed marked acute and chronic inflammation but no evidence of malignancy.  Muscularis propria present.    -10/14/2021 [facility], [place], operated by [facility]  medical oncology virtual visit: I reviewed reports of pathology from cystoscopy 10/11/2021 and went over this with patient.  He is feeling fairly fit.  I will repeat his CT chest abdomen pelvis prior to return in 3 months and if in the interim, when he follows up with Dr. Perdue in the next couple of weeks post cystoscopy, plans are made for further cystoscopy before I see him back and any malignancy is found, I would ask the patient and Dr. Perdue to touch base as I would not know of such without that contact as the information comes into our chart without notification now that epic is being used by [employer].  Assuming no further biopsies in the interim, I will simply repeat his scans in 3 months to follow what I suspected and is now confirmed to be inflammatory changes.  From the aneurysm standpoint, he has follow-up scanning March 2022 arranged by Dr. Anthony and he will follow him up after that.    -11/24/2021 CT chest abdomen pelvis compared to 6/7/2021 outside imaging shows stable 4.1 cm ascending thoracic aorta.  No lung nodules.  Liver homogenous with bilateral cysts.  No adenopathy.  Thickened sigmoid colon and rectum suggestive of mild colitis or postradiation change.  No pelvic adenopathy.  Bony structures degenerative in the spine.  Some soft tissue anterior to the acetabulum on the right and extending along the medial aspect right pelvic sidewall 2.7 x 5.6 may represent intramuscular process versus adenopathy which could not be ruled out.  No prior study views available.    -1/4/2022 follow-up Dr. Ike Perdue urology .  Per his note, he had TURBT 10/11/2021.  He had been placed on antibiotics for UTI for preop preparation for back surgery planned in 2 weeks.  Denies any mucus in his urine.  Some microhematuria on urinalysis this day.  No gross hematuria or dysuria.  The October 2021 TURBT did not reveal anything malignant on pathology.  There may be irritation or inflammation secondary to sutures in the  bladder following partial cystectomy.  Patient asymptomatic from a urinary standpoint.  Recommended following up with [facility] oncology with no plans for further urology follow-up.    -2/4/2022 [facility] medical oncology telehealth follow-up visit: I reviewed his November images and reports with the patient as well as with Dr. Gm Moody and the note from Dr. Perdue from 1 month ago.  I do suspect this pelvic sidewall abnormality in November represents persistent disease but as I have stated in prior dictations, the data on palliative or overall survival benefit of systemic 5-FU based combination chemotherapy and/or Avastin does not show a clear-cut survival advantage to chemotherapy for asymptomatic disease.  He is having some back pain and had spine decompression a couple of weeks ago without much benefit.  This is an area that may potentially be radiated but I will check his CT chest (angiogram of chest) abdomen and pelvis and get his blood counts and chemistries and urinalysis and see him back for virtual visit in a few weeks.  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  I will also converse with Dr. Peoples at that junction whether he thinks he has anything to offer if this is progressing though I doubt it given that the last intervention was a urologic procedure and the last cystoscopy/TURBT in October had no malignancy and the liver lesions are currently being called liver cysts albeit I am skeptical as to whether those are truly benign but they are certainly not growing.    -3/4/2022 CT angiogram of chest/CT abdomen pelvis for evaluation of dizziness and surveillance of thoracic  "aortic aneurysm and appendiceal carcinoma.  Thoracic ascending aortic ectasia 4 x 4 cm stable.  Small nodularity left upper lobe stable from September.  Stable small hepatic hypodensities status post cholecystectomy.  Right external iliac soft tissue fullness now 3 x 3.9 cm previously 2 x 2.3 cm concerning for enlarging adenopathy with symmetric nonenlarged inguinal nodes stable.  CBC unremarkable.  CMP unremarkable save for potassium 5.4.  CEA 18.2.  1-3 red cells and white cells per milliliter of urine.    -3/16/2022 office visit with Dr. Portillo Peoples.  He reviewed the CT from 3/4/2022.  He notes that the laminectomy from 6 weeks prior has not helped his \"hip pain\".  Given the location of this recurrence previously 2 x 2.3 cm now 3 x 3.9 cm in the external iliac area concerning for enlarging adenopathy with symmetric nonenlarged inguinal nodes stable, the mass appears unresectable with involvement of muscle and vasculature.    -3/24/2022 [facility] medical oncology follow-up virtual visit: Pain is still significant.  We will get him in hopefully in the next day to [facility] for palliative care.  Have spoken with Dr. Peoples and no surgery.  I have spoken with Dr. Moody who thinks palliative radiation while not likely to shrink tumor dramatically may help pain considerably and we will get him there.  We will get him to bring the discs to our [place] office and asked them to bring that back to [place] to get scanned in for our invasive radiologist to look at for us to get CT-guided biopsy of this node in the right lower quadrant and send that for Gab miguel once the pathology is obtained to hopefully find high tumor mutational burden or other molecular targets that might give us some options.  Apart from that, as stated multiple times, this is not a highly chemotherapy sensitive tumor and I am not sure I would palliate him well but, when I see him back in early June with CAT scans prior to return and post " radiation, if he is not getting relief and wants to try a 5-FU based regimen plus or minus Avastin I would be okay with that but he knows it does not alter survival 1 way or the other and we are simply trying to palliate this inexorable process.  No other surgical options.    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  Mismatch repair proficient,NTRK1/2/3 fusion not detected.  BRAF V600E negative.  PD-L1 Negative, 1+, 5%.  PTEN Positive, 1+, 100%. MLH1 positive/3+, 100%. MSH2 positive/3+, 100%. MSH6 positive/3+, 100%. PMS2 positive/3+,100%.    -5/6/2022 last radiation    - 5/25/2022 CT chest abdomen pelvis with contrast at [facility] shows nothing remarkable on the chest.  Stable liver cysts.  Focal soft tissue right iliac region appears to be increasing in size now 7.2 cm previously 3.9 cm with some mass-effect on surrounding structures with several stable inguinal borderline nodes.     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  Pathologic stage IIa with T3 extension into the base of the appendix through the muscularis propria but not into the serosal surface.  16 nodes negative.  Colonoscopy December 2017 negative postop     12/5/2017 Surgery    Surgery       Procedure:  Appendectomy and right hemicolectomy      Completeness of resection:  No evidence of residual tumor     Pathology revealed invasive moderately differentiated mucinous adenocarcinoma.  Right hemicolectomy: Benign colon and small intestine no evidence of carcinoma.  16 benign lymph nodes, 0/16, negative for dysplasia or malignancy.  Tumor size approximately 6 cm.  Grade 2, moderately differentiated.  Tumor invades through the muscularis profile into the base of appendix but does not extend to the serosal surface.  All  margins uninvolved, no lymphovascular invasion identified.  Pathological stage pT3 pN0.         12/8/2017 Imaging    CT of the chest abdomen and pelvis negative.  Baseline CBC WBC 7100, hemoglobin 11.3, hematocrit 34.8%, platelet count 195,000.     3/20/2018 Procedure    Colonoscopy with Dr. Davidson was normal, recommendation for repeat surveillance colonoscopy in 3 years.     6/11/2018 Imaging    CT chest, abdomen and pelvis with no evidence metastatic disease.  CEA 1.1     9/24/2018 Imaging    CT abdomen pelvis showed no acute changes and nothing to suggest recurrence     12/28/2018 Imaging    CT chest, abdomen and pelvis negative. Normal CBC, CMP and CEA 0.7.     6/28/2019 Imaging    CT chest, abdomen and pelvis: No interval change from prior studies.  No recurrent or metastatic disease detected in the chest, abdomen or pelvis.  No acute process.  CEA 0.9     12/30/2019 Imaging    CT chest, abdomen and pelvis: No disease in the chest.  There was noted a subtle soft tissue density, one surrounding surgical clips in the right side of the pelvis and the other a soft tissue nodule intimately associated with the anterior wall of the bladder, which are considered suspicious for recurrent disease.  CEA 1.1.  CMP with normal creatinine 0.9, total bilirubin 1.9, AST 34, ALT 24, alkaline phosphatase 93.  CBC with WBC 6900, hemoglobin 15.9, platelet count 232,000.       1/21/2020 Procedure    Normal colonoscopy with Dr. Davidson.  He has made referral to Dr. Portillo Peoples at .     2/7/2020 Progression    12/30/2019 CT chest, abdomen and pelvis: No disease in the chest.  There are subtle soft tissue densities (1 surrounding surgical clips in the right side of the pelvis and the other a soft tissue nodule intimately associated with the anterior wall of the bladder) which are considered suspicious for recurrent disease.  CMP with normal creatinine 0.9, total bilirubin 1.9, AST 34, ALT 24, alkaline phosphatase 93.  CBC with WBC  6900, hemoglobin 15.9, platelet count 232,000.  CEA 1.1.  2/7/2020 diagnostic laparoscopy with peritoneal biopsies performed at the [facility] by Dr. Peoples showed no sign of diffuse peritoneal carcinomatosis or liver metastasis.  There was a firm nodule in the superior dome of the bladder, adherent to omentum, as well as right pelvic sidewall nodule adjacent to surgical clips.  Biopsy of bladder dome nodule showed adenocarcinoma with mucinous features.     2/25/2020 -  Chemotherapy    OP COLORECTAL FOLFIRI Irinotecan / Leucovorin / Fluorouracil     5/29/2020 Imaging    CT chest abdomen pelvis shows slight improvement with decreased nodule anterolateral margin of urinary bladder with stable nodularity of the right lower quadrant adjacent to surgical clips and no progressive disease.  Slight hyperemia of the colonic mucosa     6/18/2020 Surgery    Surgery       -6/18/2020 Dr. Peoples performed exploratory laparotomy with excision of right lobe liver lesion and left lobe liver lesion, omentectomy, resection of pelvic peritoneal nodules, ileocolectomy with creation of ileal colostomy, hyperthermic intraperitoneal chemotherapy with mitomycin-C at 42 °C for deep tissue penetration for 90 minutes.  Dr. Perdue performed partial cystectomy with right ureteral stent placement     8/5/2020 Imaging     CT abdomen pelvis with contrast shows small soft tissue nodule anterior aspect of bladder stable.  New small amount of ascites as well as a new small left pleural effusion.  Creatinine 0.75 with hemoglobin 11.1 otherwise unremarkable CBC and CMP     2/15/2021 Imaging    CT abdomen pelvis with contrast compared to 8/5/2020 shows enlarging soft tissue mass 4.2 cm over the bladder fundus and there is a calcification along the base of the urinary bladder.  Small stable multiple hypoattenuating indeterminate liver lesions.  Similar degree of ascites.     2/23/2021 Imaging    -2/23/2021 CT chest shows no evidence of metastasis  with ascending aorta 42 mm.     4/22/2021 Surgery    -4/22/2021 cystoscopy (after prior office cystoscopy showed large fungating mass) with bilateral ureteral catheters, exploratory laparotomy, lysis of adhesions, right pelvic sidewall excisional biopsy, open partial cystectomy Dr. Ike Perdue. Peritoneal fluid showed predominant inflammatory reactive mesothelial cells with no malignancy. 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 Carcinoma was less than 1 mm from the lateral resection margins.     6/7/2021 Imaging    CT chest, abdomen and pelvis: No evidence of metastatic disease within the chest abdomen or pelvis.  Interval resection of enhancing bladder wall mass a small amount of residual enhancing soft tissue, possibly granulation tissue.  Interval resolution of abdominal and pelvic ascites.  Stable dilation of the ascending aorta mid segment measuring up to 41 mm.     9/10/2021 Imaging    CT chest abdomen pelvis with contrast shows under distended urinary bladder with mild asymmetric wall thickening dome and left lateral wall with a small 9 mm polypoid lesion in the bladder.  There is stable 2.3 cm right external iliac and a few other subcentimeter scattered nodes in the root of the small bowel mesentery and retroperitoneum.  Postsurgical right hemicolectomy changes.  Stable low-attenuation lesions in the liver unchanged.     11/24/2021 Imaging    CT chest abdomen pelvis compared to 6/7/2021 outside imaging shows stable 4.1 cm ascending thoracic aorta.  No lung nodules.  Liver homogenous with bilateral cysts.  No adenopathy.  Thickened sigmoid colon and rectum suggestive of mild colitis or postradiation change.  No pelvic adenopathy.  Bony structures degenerative in the spine.  Some soft tissue anterior to the acetabulum on the right and extending along the medial aspect right pelvic sidewall 2.7 x 5.6 may represent  intramuscular process versus adenopathy which could not be ruled out.  No prior study views available.     4/11/2022 - 5/6/2022 Radiation    Radiation OncologyTreatment Course:  Esteban Tse received 5000 cGy in 20 fractions to right pelvis/groin via External Beam Radiation - EBRT.     5/25/2022 Imaging    CT chest abdomen pelvis with contrast at [facility] shows nothing remarkable on the chest.  Stable liver cysts.  Focal soft tissue right iliac region appears to be increasing in size now 7.2 cm previously 3.9 cm with some mass-effect on surrounding structures with several stable inguinal borderline nodes.     Peritoneal carcinoma (HCC)   5/14/2020 Initial Diagnosis    Peritoneal carcinoma (HCC)     5/25/2022 Imaging    CT chest abdomen pelvis with contrast at [facility] shows nothing remarkable on the chest.  Stable liver cysts.  Focal soft tissue right iliac region appears to be increasing in size now 7.2 cm previously 3.9 cm with some mass-effect on surrounding structures with several stable inguinal borderline nodes.         HISTORY OF PRESENT ILLNESS:  The patient is a 66 y.o. 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    Past Medical History:   Diagnosis Date   • Ascending aortic aneurysm (HCC)    • Benign hypertension    • Bladder cancer (HCC)    • Colon cancer (HCC)    • Hypertension    • Intermittent palpitations    • Mucinous adenocarcinoma (HCC)    • Pounding heartbeat    • PVC's (premature ventricular contractions)      Past Surgical History:   Procedure Laterality Date   • APPENDECTOMY  2017    With Partial Colon    • BLADDER SURGERY      with partial colon   • CHOLECYSTECTOMY     • COLON SURGERY     • GALLBLADDER SURGERY         Allergies   Allergen Reactions   • Levofloxacin Swelling     Lips swellijng   • Tetracycline Swelling     Lips swelling   • Penicillins Other (See Comments)     childhood       Family History and Social History reviewed  "and changed as necessary    REVIEW OF SYSTEM:   No new somatic complaints but the same periodic right lower quadrant pain but better after radiation    PHYSICAL EXAM:  Lungs clear.  No respiratory distress.  No abdominal tenderness    Vitals:    06/06/22 1306   BP: 149/91   Pulse: 82   Resp: 16   Temp: 99.2 °F (37.3 °C)   SpO2: 99%   Weight: 69.9 kg (154 lb)   Height: 182.9 cm (72\")     Vitals:    06/06/22 1306   PainSc: 0-No pain  Comment: No new pain          ECOG score: 1        Vitals reviewed.      Lab Results   Component Value Date    HGB 13.2 06/06/2022    HCT 42.5 06/06/2022    MCV 87.5 06/06/2022     06/06/2022    WBC 7.50 06/06/2022    NEUTROABS 5.80 06/06/2022    LYMPHSABS 1.30 06/06/2022    MONOSABS 0.50 06/06/2022    EOSABS 0.09 04/05/2022    BASOSABS 0.02 04/05/2022       Lab Results   Component Value Date    GLUCOSE 101 (H) 04/05/2022    BUN 15 04/05/2022    CREATININE 0.94 04/05/2022     04/05/2022    K 4.2 04/05/2022    CL 99 04/05/2022    CO2 26.0 04/05/2022    CALCIUM 9.3 04/05/2022    ALBUMIN 4.80 09/14/2021    BILITOT 0.8 09/14/2021    ALKPHOS 107 09/14/2021    AST 18 09/14/2021    ALT 15 09/14/2021             ASSESSMENT & PLAN:  1.  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  Pathologic stage IIa with T3 extension into the base of the appendix through the muscularis propria but not into the serosal surface.  16 nodes negative.  Colonoscopy December 2017 negative postop    -12/30/2019 medical oncology office visit: The patient had been on surveillance since surgery December 2017.  CT scans had been negative up until 12/30/2019 CT chest abdomen and pelvis that showed subtle soft tissue density surrounding surgical clips in the right side of the pelvis along with soft tissue nodule at the anterior wall of the bladder concerning for recurrent disease.  Patient sent back to Dr. Davidson for colonoscopy.    -2/7/2020 patient " was referred to Dr. Peoples at the [facility], he underwent diagnostic laparoscopic and peritoneal biopsies that confirmed recurrent disease with biopsy of bladder dome nodule showing adenocarcinoma with mucinous features.  Port was placed at this time also.    -2/25/2020 began FOLFIRI    -4/21/2020 patient having increased fatigue, FOLFIRI dose reduced by 10%.    -5/19/2020 cycle 7 FOLFIRI    -6/18/2020 Dr. Peoples performed exploratory laparotomy with excision of right lobe liver lesion and left lobe liver lesion, omentectomy, resection of pelvic peritoneal nodules, ileocolectomy with creation of ileal colostomy, hyperthermic intraperitoneal chemotherapy with mitomycin-C at 42 °C for deep tissue penetration for 90 minutes.  Dr. Perdue performed partial cystectomy with right ureteral stent placement    -8/5/2020 CT abdomen pelvis with contrast shows small soft tissue nodule anterior aspect of bladder stable.  New small amount of ascites as well as a new small left pleural effusion.  Creatinine 0.75 with hemoglobin 11.1 otherwise unremarkable CBC and CMP.    -2/15/2021 CT abdomen pelvis with contrast compared to 8/5/2020 shows enlarging soft tissue mass 4.2 cm over the bladder fundus and there is a calcification along the base of the urinary bladder.  Small stable multiple hypoattenuating indeterminate liver lesions.  Similar degree of ascites.    -2/23/2021 CT chest shows no evidence of metastasis with ascending aorta 42 mm.    -3/8/2021 follow-up with Dr. Portillo Peoples we reviewed his CTs suggested team effort resection with Dr. Perdue    -3/16/2021 follow-up with Dr. Perdue.  He plans for open partial cystectomy, possible ureteral implants, cystoscopy and stent placement in concert with Dr. Peoples.    -4/22/2021 cystoscopy (after prior office cystoscopy showed large fungating mass) with bilateral ureteral catheters, exploratory laparotomy, lysis of adhesions, right pelvic sidewall excisional biopsy, open partial  "cystectomy Dr. Ike Perdue. Peritoneal fluid showed predominant inflammatory reactive mesothelial cells with no malignancy. 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 Carcinoma was less than 1 mm from the lateral resection margins.    -5/18/2021 [facility] medical oncology follow-up visit: I reviewed his hospital notes, operative notes, and pathology report as above and went over this with the patient.  The general consensus from retrospective as well as a few prospective data over the last couple of decades with this low-grade malignancy does not show any profound benefit from \"adjuvant\" chemotherapy in this current setting.  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  There is no standard follow-up but general guidelines suggest following up as typical colon cancer.  I will repeat his CT chest abdomen pelvis now to reestablish his baseline postoperative imaging and have him see my nurse practitioner back in a couple of weeks to make sure there is no nia evidence of measurable residual disease.  Assuming this just shows postoperative changes, I would simply repeat scans again in 3 months or sooner as symptoms dictate.  If he has no evidence of persistent or metastatic disease on current imaging, then when he sees my nurse practitioner back she will also review what they say about his ascending aorta which was 42 mm in February and sent him to Dr. Evangelist Anthony for an opinion as to whether any intervention is needed relative to the aorta.  Obviously if his cancer is out of control on the upcoming scans then the aorta is a moot point.    -6/10/2021 [facility] oncology clinic follow-up: CT scans show no evidence of disease recurrence in the chest, abdomen or pelvis.  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  Referral made to Dr. Anthony, cardiothoracic surgeon for management and evaluation of a sending aortic aneurysm.  Plan to repeat CT scans in " 3 months.    -9/14/2021 [facility] [place] medical oncology follow-up visit: I reviewed images and reports of 9/10/2021 CT chest abdomen pelvis with contrast which shows under distended urinary bladder with mild asymmetric wall thickening dome and left lateral wall with a small 9 mm polypoid lesion in the bladder.  There is stable 2.3 cm right external iliac and a few other subcentimeter scattered nodes in the root of the small bowel mesentery and retroperitoneum.  Postsurgical right hemicolectomy changes.  Stable low-attenuation lesions in the liver unchanged.  I will have him collect his discs from [facility] to take Dr. Perdue to decide whether to proceed with cystoscopy or just continue watchful waiting.  There is no major changes and I suspect we are looking at postoperative changes and we shall see him for video visit in a few weeks to see what urology at  decides.    -10/11/2021 cystoscopy Dr. Perdue showed marked acute and chronic inflammation but no evidence of malignancy.  Muscularis propria present.    -10/14/2021 [facility] oncology virtual visit: I reviewed reports of pathology from cystoscopy 10/11/2021 and went over this with patient.  He is feeling fairly fit.  I will repeat his CT chest abdomen pelvis prior to return in 3 months and if in the interim, when he follows up with Dr. Perdue in the next couple of weeks post cystoscopy, plans are made for further cystoscopy before I see him back and any malignancy is found, I would ask the patient and Dr. Perdue to touch base as I would not know of such without that contact as the information comes into our chart without notification now that epic is being used by .  Assuming no further biopsies in the interim, I will simply repeat his scans in 3 months to follow what I suspected and is now confirmed to be inflammatory changes.  From the aneurysm standpoint, he has follow-up scanning March 2022 arranged by Dr. Anthony and he will follow him up after  that.    -11/24/2021 CT chest abdomen pelvis compared to 6/7/2021 outside imaging shows stable 4.1 cm ascending thoracic aorta.  No lung nodules.  Liver homogenous with bilateral cysts.  No adenopathy.  Thickened sigmoid colon and rectum suggestive of mild colitis or postradiation change.  No pelvic adenopathy.  Bony structures degenerative in the spine.  Some soft tissue anterior to the acetabulum on the right and extending along the medial aspect right pelvic sidewall 2.7 x 5.6 may represent intramuscular process versus adenopathy which could not be ruled out.  No prior study views available.    -1/4/2022 follow-up Dr. Ike Perdue [facility].  Per his note, he had TURBT 10/11/2021.  He had been placed on antibiotics for UTI for preop preparation for back surgery planned in 2 weeks.  Denies any mucus in his urine.  Some microhematuria on urinalysis this day.  No gross hematuria or dysuria.  The October 2021 TURBT did not reveal anything malignant on pathology.  There may be irritation or inflammation secondary to sutures in the bladder following partial cystectomy.  Patient asymptomatic from a urinary standpoint.  Recommended following up with [facility] with no plans for further urology follow-up.    -2/4/2022 [facility] medical oncology telehealth follow-up visit: I reviewed his November images and reports with the patient as well as with Dr. Gm Moody and the note from Dr. Perdue from 1 month ago.  I do suspect this pelvic sidewall abnormality in November represents persistent disease but as I have stated in prior dictations, the data on palliative or overall survival benefit of systemic 5-FU based combination chemotherapy and/or Avastin does not show a clear-cut survival advantage to chemotherapy for asymptomatic disease.  He is having some back pain and had spine decompression a couple of weeks ago without much benefit.  This is an area that may potentially be radiated but I will check his CT chest  "(angiogram of chest) abdomen and pelvis and get his blood counts and chemistries and urinalysis and see him back for virtual visit in a few weeks.  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  I will also converse with Dr. Peoples at that junction whether he thinks he has anything to offer if this is progressing though I doubt it given that the last intervention was a urologic procedure and the last cystoscopy/TURBT in October had no malignancy and the liver lesions are currently being called liver cysts albeit I am skeptical as to whether those are truly benign but they are certainly not growing.    -3/4/2022 CT angiogram of chest/CT abdomen pelvis for evaluation of dizziness and surveillance of thoracic aortic aneurysm and appendiceal carcinoma.  Thoracic ascending aortic ectasia 4 x 4 cm stable.  Small nodularity left upper lobe stable from September.  Stable small hepatic hypodensities status post cholecystectomy.  Right external iliac soft tissue fullness now 3 x 3.9 cm previously 2 x 2.3 cm concerning for enlarging adenopathy with symmetric nonenlarged inguinal nodes stable.  CBC unremarkable.  CMP unremarkable save for potassium 5.4.  CEA 18.2.  1-3 red cells and white cells per milliliter of urine.    -3/16/2022 office visit with Dr. Portillo Peoples.  He reviewed the CT from 3/4/2022.  He notes that the laminectomy from 6 weeks prior has not helped his \"hip pain\".  Given the location of this recurrence previously 2 x 2.3 cm now 3 x 3.9 cm in the external iliac area concerning for enlarging adenopathy with symmetric nonenlarged inguinal nodes stable, the mass appears unresectable with " involvement of muscle and vasculature.    -3/24/2022 [facility] medical oncology follow-up virtual visit: Pain is still significant.  We will get him in hopefully in the next day to [facility] for palliative care.  Have spoken with Dr. Peoples and no surgery.  I have spoken with Dr. Moody who thinks palliative radiation while not likely to shrink tumor dramatically may help pain considerably and we will get him there.  We will get him to bring the discs to our [facility] office and asked them to bring that back to [place] to get scanned in for our invasive radiologist to look at for us to get CT-guided biopsy of this node in the right lower quadrant and send that for Caris MI profile once the pathology is obtained to hopefully find high tumor mutational burden or other molecular targets that might give us some options.  Apart from that, as stated multiple times, this is not a highly chemotherapy sensitive tumor and I am not sure I would palliate him well but, when I see him back in early June with CAT scans prior to return and post radiation, if he is not getting relief and wants to try a 5-FU based regimen plus or minus Avastin I would be okay with that but he knows it does not alter survival 1 way or the other and we are simply trying to palliate this inexorable process.  No other surgical options.    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  Mismatch repair proficient,NTRK1/2/3 fusion not detected.  BRAF V600E negative.  PD-L1 Negative, 1+, 5%.  PTEN Positive, 1+, 100%. MLH1 positive/3+, 100%. MSH2 positive/3+, 100%. MSH6 positive/3+, 100%. PMS2 positive/3+,100%.    -5/6/2022 last radiation    - 5/25/2022 CT chest abdomen pelvis with contrast at [facility] shows nothing remarkable on the chest.  Stable liver cysts.  Focal soft tissue right iliac region appears to be increasing in size now 7.2 cm previously 3.9 cm  with some mass-effect on surrounding structures with several stable inguinal borderline nodes.    -6/6/2022 [facility] medical oncology follow-up visit: Now 1 month out from radiation just starting to get a little bit of improvement in his abdominal discomfort.  Slight growth on CT but some of that may be postradiation change this close to radiation and, as I stated in my note in March, it is not clear that 5-FU based therapy plus or minus Avastin is going to palliate much given its relatively insensitivity to chemotherapy and it certainly does not improve survival.  To that end, we will plan on just repeating his CAT scans again in about 6 weeks and if the pain is worsening and/or this continues to grow then I will probably give him Avastin FOLFOX.  If everything is stable and pain is controlled we will go continue watchful waiting with imaging about every 3 months from that point forward.    Total time of care today inclusive of time spent today prior to patient's arrival reviewing interval images and reports thereof of scans and radiation oncology notes and during visit translating that and putting forth a plan for management as outlined above and after visit instituting this plan took 30 minutes of patient care time throughout the day today.  Cy Delcid MD    06/06/2022

## 2022-06-13 ENCOUNTER — HOSPITAL ENCOUNTER (OUTPATIENT)
Dept: RADIATION ONCOLOGY | Facility: HOSPITAL | Age: 67
Setting detail: RADIATION/ONCOLOGY SERIES
Discharge: HOME | End: 2022-06-13

## 2022-06-13 ENCOUNTER — OFFICE VISIT (OUTPATIENT)
Dept: RADIATION ONCOLOGY | Facility: HOSPITAL | Age: 67
End: 2022-06-13

## 2022-06-13 VITALS
TEMPERATURE: 98.2 F | DIASTOLIC BLOOD PRESSURE: 97 MMHG | RESPIRATION RATE: 16 BRPM | WEIGHT: 155.1 LBS | HEART RATE: 80 BPM | SYSTOLIC BLOOD PRESSURE: 152 MMHG | BODY MASS INDEX: 21.01 KG/M2 | OXYGEN SATURATION: 98 % | HEIGHT: 72 IN

## 2022-06-13 DIAGNOSIS — C18.1 MUCINOUS ADENOCARCINOMA OF APPENDIX: Primary | ICD-10-CM

## 2022-06-13 PROCEDURE — G0463 HOSPITAL OUTPT CLINIC VISIT: HCPCS

## 2022-06-13 NOTE — PROGRESS NOTES
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 gentleman returning for initial follow-up visit.  He has a longstanding history of a recurrent and metastatic appendiceal carcinoma, who was found more recently on surveillance to have an enlarging mass in the right pelvic sidewall causing him symptoms of right-sided groin and hip pain.  CT-guided biopsy of the mass showed mucinous adenocarcinoma.  He was not considered a candidate for surgery per Dr. Peoples at .  He underwent a palliative course of radiation therapy to the right groin and hemipelvis consisting of 50 Gy in 20 fractions.  He tolerated treatment well.  Daily imaging was utilized, and tumor did not appear to be reduced in size during treatment, though tumor quality did change and became more soft.  Additionally, patient reports mild improvement in pain, approximately \"20-25%\" over the past few weeks.  The patient is able to more comfortably lie flat and ambulate.  He reports burning, neuropathic pain sensation in his thigh and inner leg seems improved.  He also reports pain alleviated since recent adjustments in pain medications.  He continues to take oxycodone 4-5 times per day, morphine twice daily, and gabapentin 3 times daily.  He notes chronic diarrhea, and otherwise no GI complaints or change in bowel function.  He reports skin within the treated groin became mildly erythematous.  He notes hair loss in this region.  Mild treatment related fatigue continues to subside.  He otherwise denies fever, chills, weight loss, or additional acute concerns today.      MEDICATIONS: Medication reconciliation for the " patient was reviewed and confirmed in the electronic medical record.    Review of Systems   Constitutional: Positive for fatigue.   Gastrointestinal: Positive for diarrhea.   Musculoskeletal: Positive for arthralgias (Right hip/groin) and back pain (Lower).   All other systems reviewed and are negative.          KPS 80%      Physical Exam  Vitals and nursing note reviewed.   Constitutional:       General: He is not in acute distress.     Appearance: Normal appearance. He is well-developed.      Comments: Thin male in no apparent distress   HENT:      Head: Normocephalic and atraumatic.   Eyes:      Conjunctiva/sclera: Conjunctivae normal.      Pupils: Pupils are equal, round, and reactive to light.   Cardiovascular:      Rate and Rhythm: Normal rate and regular rhythm.      Heart sounds: No murmur heard.    No friction rub.   Pulmonary:      Effort: Pulmonary effort is normal.      Breath sounds: Normal breath sounds. No wheezing.   Abdominal:      General: Bowel sounds are normal. There is no distension.      Palpations: Abdomen is soft. There is no mass.      Tenderness: There is no abdominal tenderness.      Comments: Approximate 6 cm area of generalized fullness and induration of the right groin, without discrete, circumscribed borders of tumor that are palpable.  Skin appears healthy, pink, and intact without ulceration or abnormal skin findings.  No tenderness to palpation.   Musculoskeletal:         General: Normal range of motion.      Cervical back: Normal range of motion and neck supple.   Lymphadenopathy:      Cervical: No cervical adenopathy.   Skin:     General: Skin is warm and dry.   Neurological:      Mental Status: He is alert and oriented to person, place, and time.   Psychiatric:         Behavior: Behavior normal.         Thought Content: Thought content normal.         Judgment: Judgment normal.         VITAL SIGNS:   Vitals:    06/13/22 0920   BP: 152/97   Pulse: 80   Resp: 16   Temp: 98.2 °F  "(36.8 °C)   SpO2: 98%  Comment: RA   Weight: 70.4 kg (155 lb 1.6 oz)   Height: 182.9 cm (72\")   PainSc:   4   PainLoc: Hip  Comment: right side               The following portions of the patient's history were reviewed and updated as appropriate: allergies, current medications, past family history, past medical history, past social history, past surgical history and problem list.         Diagnoses and all orders for this visit:    1. Mucinous adenocarcinoma of appendix (HCC) (Primary)         IMPRESSION:  Mr. Tse is a 66 year old gentleman with known history of low-grade mucinous adenocarcinoma of the appendix, now with painful metastasis to the right groin.  He is now 1 month status post palliative radiation therapy to this area.  He tolerated treatment well.  He developed the anticipated grade 1 fatigue and grade 1 skin reaction, and otherwise no acute radiation related toxicities.  He has had partial palliation of pain and additionally clinical exam reveals treated tumor to be less fixed and less circumscribed.  He reportedly underwent repeat CT scans of the chest, abdomen and pelvis on 5/25/2022 at [facility], which are not uploaded into the PACS system or available for review today.  Review of recent medical oncology note dated 6/6/2022 describes findings of scan to show several stable inguinal borderline nodes and increase in size of the focal soft tissue in the right iliac region with some mass-effect on surrounding structures, thought to potentially relate to postradiation change.  The patient is scheduled to undergo short interval repeat CT scans prior to follow-up with Dr. Delcid on 7/12/2022.  The patient and I discussed that if upcoming scans demonstrate stable or improved findings, I anticipate watchful waiting with serial scans about every 3 months.  On the other hand, if scans show new/progressive disease or pain is worsening, he may be a candidate for palliative chemotherapy with Dr." Bhavin.  We reviewed follow-up intervals, plan for surveillance imaging one way or the other, and continued expectations for response to treatment.      RECOMMENDATIONS:   Mr. Tse continues oncologic surveillance and repeat imaging studies under the care of Dr. Delcid, with follow-up scheduled 7/12/2022.  I will schedule the patient to return 3 months thereafter, in October 2022, or certainly sooner should symptomology/imaging warrant.    I spent a total of 35 minutes on today's visit, with more than 15 minutes in direct face to face communication, and the remainder of the time spent in reviewing the relevant history, records, available imaging, and for documentation.    Return in about 4 months (around 10/15/2022) for Office Visit.    Itzel Arora, APRN

## 2022-07-07 ENCOUNTER — TELEPHONE (OUTPATIENT)
Dept: PALLIATIVE CARE | Facility: CLINIC | Age: 67
End: 2022-07-07

## 2022-07-07 NOTE — TELEPHONE ENCOUNTER
Caller: Esteban Tse    Relationship to patient: Self    Best call back number: [phone]    Type of visit: TELEHALTH    Requested date: 07/12/22    If rescheduling, when is the original appointment: 07/11/22    Additional notes: PT NEEDS TO RESCHEDULE APPT.

## 2022-07-11 NOTE — PROGRESS NOTES
Palliative Clinic Note      Name: Esteban Tse  Age: 67 y.o.  Sex: male  : 1955  MRN: [id number]  Date of Service: 22  Medical Oncologist: Bhavin  Mode of visit: Telephone  Location of patient: Home    The patient has chosen to receive care through a telehealth visit.   Does the patient consent to using video/audio connection for their medical care today? Yes   No technical issues occurred during this visit.     Subjective:    Chief Complaint: Improved function    History of Present Illness: Esteban Tse is a 67 y.o. male with past medical history significant for HTN, AAA, metastatic adenocarcinoma of the appendix who presents via videochat today as a follow up for pain and symptom management.     Treatment summary: The patient was diagnosed with cancer of the appendix at the time of appendectomy in 2017. Patient under surveillance until imaging in 2019 demonstrated several lesions suspicious for recurrent disease. He underwent a diagnostic laparoscopy with peritoneal biopsies on 2020 by Dr. Peoples that proved recurrent adenocarcinoma.  On 2020 he underwent an exploratory laparotomy with excision of right and left lobe liver lesions, omentectomy, resection of pelvic peritoneal nodules, ileocolectomy with creation of ileal colostomy, hyperthermic intraperitoneal chemotherapy and a partial cystectomy with right ureteral stent placement. Than on 21 he underwent a cystoscopy with bilateral ureteral catheters, exploratory laparotomy, lysis of adhesions, right pelvic sidewall excisional biopsy, and an open partial cystectomy. TURBT performed in 10/2021. Imaging from 3/4/2022 showed enlarging external illiac adenopathy. There are no surgical options per Dr. Peoples. Underwent CT-guided biopsy of the right iliac mass consistent with mucinous adenocarcinoma. Patient completed palliative radiation and the plan is to rescan soon. Patient scheduled to see Dr. Delcid today in  [place].      Pain: Patient established care with [facility] in [place], [place] > 1 year ago. The right hip pain was thought to be referred from his spine and he underwent several epidurals and a lumbar laminectomy with no improvement in his pain. More recently, his pain is related to the cancer of the appendix. Improved function with morphine ER 15 mg BID, oxycodone 10 mg  q4h PRN and gabapentin 600 mg TID. Patient takes 2 oxycodone 10 mg tablets for severe pain occasionally. Mild sedation with opioid therapy.      Other symptoms: The patient states he is getting around better since his last appointment. He reports less pain with ambulation. He was able to play 18 holes of golf yesterday and says he feels pretty good today. The patient reports a good appetite. He denies issues with nausea, vomiting or bowels. No concerns with sleep.      Pyschosocial: The patient lives alone. He is retired from working for the state. He enjoys playing golf and spending time outside. No personal history of a mental illness. The patient admits to anxiety/depression related to his cancer diagnosis but states overall his mood is stable and he is able to manage it on his own.       Spiritual: Patient describes himself as curious rather than practicing.      Goals: Improve quality of life with symptom management.      The following portions of the patient's history were reviewed and updated as appropriate: allergies, current medications, past family history, past medical history, past social history, past surgical history and problem list.    ORT-R: Low  Decisional capacity: Full  ECOG: (1) Restricted in physically strenuous activity, ambulatory and able to do work of light nature   Palliative Performance Scale Score: 70%     Objective:    Psychiatric: Appropriate affect, cooperative  Neurologic: Oriented x 3, Cranial Nerves grossly intact to confrontation, speech clear    Medication Counts: Collected over the phone.  See bottom of note for details. No misuse or overuse evident.   I have reviewed the patient's [place] PDMP. YAIMA Req #[id number].   UDS (Y): Last 3/25/22. Reviewed. Appropriate.     Assessment & Plan:  1. Mucinous adenocarcinoma of appendix (HCC)  - Status post palliative radiation. Plan to repeat imaging. Patient scheduled to see Dr. Delcid today in [place].     2. Cancer associated pain  - Side effects of the medication discussed at every visit. Patient is appropriate for opioid therapy due to cancer related pain. Improved function and quality of life with current regimen. Continue morphine ER 15 mg BID, oxycodone 10 mg  q4h PRN and gabapentin 600 mg TID. Future refills sent to pharmacy.     Code status: Full code  Medical interventions: Full  Advanced directives: Plan to discuss    Return in about 6 weeks (around 8/23/2022) for telephone .    I spent 20 minutes caring for Esteban Tse on this date of service. This time includes time spent by me in the following activities: preparing for the visit, reviewing tests, obtaining and/or reviewing a separately obtained history, performing a medically appropriate examination and/or evaluation , counseling and educating the patient/family/caregiver, ordering medications, tests, or procedures, documenting information in the medical record, independently interpreting results and communicating that information with the patient/family/caregiver, and care coordination    Qian Fleming PA-C  07/12/2022    Medication Date Filled # Filled Count Used # Days  SOY   Morphine 15 6/3/22 60 30 30 39 1-2   Oxycodone 10 6/10/22 180 25 155 32 4-5   Gabapentin 600 6/12/22 90 99 -- -- 3

## 2022-07-12 ENCOUNTER — OFFICE VISIT (OUTPATIENT)
Dept: ONCOLOGY | Facility: CLINIC | Age: 67
End: 2022-07-12

## 2022-07-12 ENCOUNTER — OFFICE VISIT (OUTPATIENT)
Dept: PALLIATIVE CARE | Facility: CLINIC | Age: 67
End: 2022-07-12

## 2022-07-12 VITALS
OXYGEN SATURATION: 99 % | BODY MASS INDEX: 21.67 KG/M2 | WEIGHT: 160 LBS | SYSTOLIC BLOOD PRESSURE: 163 MMHG | RESPIRATION RATE: 18 BRPM | HEART RATE: 100 BPM | TEMPERATURE: 99.8 F | HEIGHT: 72 IN | DIASTOLIC BLOOD PRESSURE: 92 MMHG

## 2022-07-12 VITALS
BODY MASS INDEX: 20.99 KG/M2 | WEIGHT: 155 LBS | HEIGHT: 72 IN | DIASTOLIC BLOOD PRESSURE: 85 MMHG | SYSTOLIC BLOOD PRESSURE: 130 MMHG

## 2022-07-12 DIAGNOSIS — C18.1 MUCINOUS ADENOCARCINOMA OF APPENDIX: Primary | ICD-10-CM

## 2022-07-12 DIAGNOSIS — G89.3 CANCER ASSOCIATED PAIN: ICD-10-CM

## 2022-07-12 PROCEDURE — 99214 OFFICE O/P EST MOD 30 MIN: CPT | Performed by: INTERNAL MEDICINE

## 2022-07-12 PROCEDURE — 99213 OFFICE O/P EST LOW 20 MIN: CPT | Performed by: PHYSICIAN ASSISTANT

## 2022-07-12 RX ORDER — OXYCODONE HYDROCHLORIDE 10 MG/1
10 TABLET ORAL EVERY 4 HOURS PRN
Qty: 180 TABLET | Refills: 0 | Status: SHIPPED | OUTPATIENT
Start: 2022-07-12 | End: 2022-08-11

## 2022-07-12 RX ORDER — MORPHINE SULFATE 15 MG/1
15 TABLET, FILM COATED, EXTENDED RELEASE ORAL 2 TIMES DAILY
Qty: 60 TABLET | Refills: 0 | Status: SHIPPED | OUTPATIENT
Start: 2022-07-12 | End: 2022-08-11

## 2022-07-12 NOTE — PROGRESS NOTES
CHIEF COMPLAINT: Follow-up mucinous adenocarcinoma of the appendix    Problem List:  Oncology/Hematology History Overview Note   1.  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  Pathologic stage IIa with T3 extension into the base of the appendix through the muscularis propria but not into the serosal surface.  16 nodes negative.  Colonoscopy December 2017 negative postop    -12/30/2019 medical oncology office visit: The patient had been on surveillance since surgery December 2017.  CT scans had been negative up until 12/30/2019 CT chest abdomen and pelvis that showed subtle soft tissue density surrounding surgical clips in the right side of the pelvis along with soft tissue nodule at the anterior wall of the bladder concerning for recurrent disease.  Patient sent back to Dr. Davidson for colonoscopy.    -2/7/2020 patient was referred to Dr. Peoples at the [facility], he underwent diagnostic laparoscopic and peritoneal biopsies that confirmed recurrent disease with biopsy of bladder dome nodule showing adenocarcinoma with mucinous features.  Port was placed at this time also.    -2/25/2020 began FOLFIRI    -4/21/2020 patient having increased fatigue, FOLFIRI dose reduced by 10%.    -5/19/2020 cycle 7 FOLFIRI    -6/18/2020 Dr. Peoples performed exploratory laparotomy with excision of right lobe liver lesion and left lobe liver lesion, omentectomy, resection of pelvic peritoneal nodules, ileocolectomy with creation of ileal colostomy, hyperthermic intraperitoneal chemotherapy with mitomycin-C at 42 °C for deep tissue penetration for 90 minutes.  Dr. Perdue performed partial cystectomy with right ureteral stent placement    -8/5/2020 CT abdomen pelvis with contrast shows small soft tissue nodule anterior aspect of bladder stable.  New small amount of ascites as well as a new small left pleural effusion.  Creatinine 0.75 with hemoglobin 11.1 otherwise  "unremarkable CBC and CMP.    -2/15/2021 CT abdomen pelvis with contrast compared to 8/5/2020 shows enlarging soft tissue mass 4.2 cm over the bladder fundus and there is a calcification along the base of the urinary bladder.  Small stable multiple hypoattenuating indeterminate liver lesions.  Similar degree of ascites.    -2/23/2021 CT chest shows no evidence of metastasis with ascending aorta 42 mm.    -3/8/2021 follow-up with Dr. Portillo Peoples we reviewed his CTs suggested team effort resection with Dr. Perdue    -3/16/2021 follow-up with Dr. Perdue.  He plans for open partial cystectomy, possible ureteral implants, cystoscopy and stent placement in concert with Dr. Peoples.    -4/22/2021 cystoscopy (after prior office cystoscopy showed large fungating mass) with bilateral ureteral catheters, exploratory laparotomy, lysis of adhesions, right pelvic sidewall excisional biopsy, open partial cystectomy Dr. Ike Perdue. Peritoneal fluid showed predominant inflammatory reactive mesothelial cells with no malignancy. 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 Carcinoma was less than 1 mm from the lateral resection margins.    -5/18/2021 [facility] medical oncology follow-up visit: I reviewed his hospital notes, operative notes, and pathology report as above and went over this with the patient.  The general consensus from retrospective as well as a few prospective data over the last couple of decades with this low-grade malignancy does not show any profound benefit from \"adjuvant\" chemotherapy in this current setting.  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  There is no standard follow-up but general guidelines suggest following up as typical colon cancer.  I will repeat his CT chest abdomen pelvis now to reestablish his baseline postoperative imaging and have him see my nurse practitioner back in a couple of weeks to make sure there is no nia evidence " of measurable residual disease.  Assuming this just shows postoperative changes, I would simply repeat scans again in 3 months or sooner as symptoms dictate.  If he has no evidence of persistent or metastatic disease on current imaging, then when he sees my nurse practitioner back she will also review what they say about his ascending aorta which was 42 mm in February and sent him to Dr. Evangelist Anthony for an opinion as to whether any intervention is needed relative to the aorta.  Obviously if his cancer is out of control on the upcoming scans then the aorta is a moot point.    -6/10/2021 [facility] oncology clinic follow-up: CT scans show no evidence of disease recurrence in the chest, abdomen or pelvis.  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  Referral made to Dr. Anthony, cardiothoracic surgeon for management and evaluation of a sending aortic aneurysm.  Plan to repeat CT scans in 3 months.    -9/14/2021 [facility] medical oncology follow-up visit: I reviewed images and reports of 9/10/2021 CT chest abdomen pelvis with contrast which shows under distended urinary bladder with mild asymmetric wall thickening dome and left lateral wall with a small 9 mm polypoid lesion in the bladder.  There is stable 2.3 cm right external iliac and a few other subcentimeter scattered nodes in the root of the small bowel mesentery and retroperitoneum.  Postsurgical right hemicolectomy changes.  Stable low-attenuation lesions in the liver unchanged.  I will have him collect his discs from [facility] to take Dr. Perdue to decide whether to proceed with cystoscopy or just continue watchful waiting.  There is no major changes and I suspect we are looking at postoperative changes and we shall see him for video visit in a few weeks to see what urology at  decides.    -10/11/2021 cystoscopy Dr. Perdue showed marked acute and chronic inflammation but no evidence of malignancy.  Muscularis propria present.    -10/14/2021 [facility]  medical oncology virtual visit: I reviewed reports of pathology from cystoscopy 10/11/2021 and went over this with patient.  He is feeling fairly fit.  I will repeat his CT chest abdomen pelvis prior to return in 3 months and if in the interim, when he follows up with Dr. Perdue in the next couple of weeks post cystoscopy, plans are made for further cystoscopy before I see him back and any malignancy is found, I would ask the patient and Dr. Perdue to touch base as I would not know of such without that contact as the information comes into our chart without notification now that epic is being used by [facility].  Assuming no further biopsies in the interim, I will simply repeat his scans in 3 months to follow what I suspected and is now confirmed to be inflammatory changes.  From the aneurysm standpoint, he has follow-up scanning March 2022 arranged by Dr. Anthony and he will follow him up after that.    -11/24/2021 CT chest abdomen pelvis compared to 6/7/2021 outside imaging shows stable 4.1 cm ascending thoracic aorta.  No lung nodules.  Liver homogenous with bilateral cysts.  No adenopathy.  Thickened sigmoid colon and rectum suggestive of mild colitis or postradiation change.  No pelvic adenopathy.  Bony structures degenerative in the spine.  Some soft tissue anterior to the acetabulum on the right and extending along the medial aspect right pelvic sidewall 2.7 x 5.6 may represent intramuscular process versus adenopathy which could not be ruled out.  No prior study views available.    -1/4/2022 follow-up Dr. Ike Perdue urology .  Per his note, he had TURBT 10/11/2021.  He had been placed on antibiotics for UTI for preop preparation for back surgery planned in 2 weeks.  Denies any mucus in his urine.  Some microhematuria on urinalysis this day.  No gross hematuria or dysuria.  The October 2021 TURBT did not reveal anything malignant on pathology.  There may be irritation or inflammation secondary to sutures in the  bladder following partial cystectomy.  Patient asymptomatic from a urinary standpoint.  Recommended following up with [facility] oncology with no plans for further urology follow-up.    -2/4/2022 [facility] medical oncology telehealth follow-up visit: I reviewed his November images and reports with the patient as well as with Dr. Gm Moody and the note from Dr. Perdue from 1 month ago.  I do suspect this pelvic sidewall abnormality in November represents persistent disease but as I have stated in prior dictations, the data on palliative or overall survival benefit of systemic 5-FU based combination chemotherapy and/or Avastin does not show a clear-cut survival advantage to chemotherapy for asymptomatic disease.  He is having some back pain and had spine decompression a couple of weeks ago without much benefit.  This is an area that may potentially be radiated but I will check his CT chest (angiogram of chest) abdomen and pelvis and get his blood counts and chemistries and urinalysis and see him back for virtual visit in a few weeks.  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  I will also converse with Dr. Peoples at that junction whether he thinks he has anything to offer if this is progressing though I doubt it given that the last intervention was a urologic procedure and the last cystoscopy/TURBT in October had no malignancy and the liver lesions are currently being called liver cysts albeit I am skeptical as to whether those are truly benign but they are certainly not growing.    -3/4/2022 CT angiogram of chest/CT abdomen pelvis for evaluation of dizziness and surveillance of thoracic  "aortic aneurysm and appendiceal carcinoma.  Thoracic ascending aortic ectasia 4 x 4 cm stable.  Small nodularity left upper lobe stable from September.  Stable small hepatic hypodensities status post cholecystectomy.  Right external iliac soft tissue fullness now 3 x 3.9 cm previously 2 x 2.3 cm concerning for enlarging adenopathy with symmetric nonenlarged inguinal nodes stable.  CBC unremarkable.  CMP unremarkable save for potassium 5.4.  CEA 18.2.  1-3 red cells and white cells per milliliter of urine.    -3/16/2022 office visit with Dr. Portillo Peoples.  He reviewed the CT from 3/4/2022.  He notes that the laminectomy from 6 weeks prior has not helped his \"hip pain\".  Given the location of this recurrence previously 2 x 2.3 cm now 3 x 3.9 cm in the external iliac area concerning for enlarging adenopathy with symmetric nonenlarged inguinal nodes stable, the mass appears unresectable with involvement of muscle and vasculature.    -3/24/2022 [facility] medical oncology follow-up virtual visit: Pain is still significant.  We will get him in hopefully in the next day to [facility] for palliative care.  Have spoken with Dr. Peoples and no surgery.  I have spoken with Dr. Moody who thinks palliative radiation while not likely to shrink tumor dramatically may help pain considerably and we will get him there.  We will get him to bring the discs to our [place] office and asked them to bring that back to [place] to get scanned in for our invasive radiologist to look at for us to get CT-guided biopsy of this node in the right lower quadrant and send that for Gab miguel once the pathology is obtained to hopefully find high tumor mutational burden or other molecular targets that might give us some options.  Apart from that, as stated multiple times, this is not a highly chemotherapy sensitive tumor and I am not sure I would palliate him well but, when I see him back in early June with CAT scans prior to return and post " radiation, if he is not getting relief and wants to try a 5-FU based regimen plus or minus Avastin I would be okay with that but he knows it does not alter survival 1 way or the other and we are simply trying to palliate this inexorable process.  No other surgical options.    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  Mismatch repair proficient,NTRK1/2/3 fusion not detected.  BRAF V600E negative.  PD-L1 Negative, 1+, 5%.  PTEN Positive, 1+, 100%. MLH1 positive/3+, 100%. MSH2 positive/3+, 100%. MSH6 positive/3+, 100%. PMS2 positive/3+,100%.    -5/6/2022 last radiation    - 5/25/2022 CT chest abdomen pelvis with contrast at [facility] shows nothing remarkable on the chest.  Stable liver cysts.  Focal soft tissue right iliac region appears to be increasing in size now 7.2 cm previously 3.9 cm with some mass-effect on surrounding structures with several stable inguinal borderline nodes.    -6/6/2022 [facility] [place] medical oncology follow-up visit: Now 1 month out from radiation just starting to get a little bit of improvement in his abdominal discomfort.  Slight growth on CT but some of that may be postradiation change this close to radiation and, as I stated in my note in March, it is not clear that 5-FU based therapy plus or minus Avastin is going to palliate much given its relatively insensitivity to chemotherapy and it certainly does not improve survival.  To that end, we will plan on just repeating his CAT scans again in about 6 weeks and if the pain is worsening and/or this continues to grow then I will probably give him Avastin FOLFOX.  If everything is stable and pain is controlled we will go continue watchful waiting with imaging about every 3 months from that point forward.     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  Pathologic stage IIa with T3 extension into the base of the appendix through the muscularis propria but not into the serosal surface.  16 nodes negative.  Colonoscopy December 2017 negative postop     12/5/2017 Surgery    Surgery       Procedure:  Appendectomy and right hemicolectomy      Completeness of resection:  No evidence of residual tumor     Pathology revealed invasive moderately differentiated mucinous adenocarcinoma.  Right hemicolectomy: Benign colon and small intestine no evidence of carcinoma.  16 benign lymph nodes, 0/16, negative for dysplasia or malignancy.  Tumor size approximately 6 cm.  Grade 2, moderately differentiated.  Tumor invades through the muscularis profile into the base of appendix but does not extend to the serosal surface.  All margins uninvolved, no lymphovascular invasion identified.  Pathological stage pT3 pN0.         12/8/2017 Imaging    CT of the chest abdomen and pelvis negative.  Baseline CBC WBC 7100, hemoglobin 11.3, hematocrit 34.8%, platelet count 195,000.     3/20/2018 Procedure    Colonoscopy with Dr. Davidson was normal, recommendation for repeat surveillance colonoscopy in 3 years.     6/11/2018 Imaging    CT chest, abdomen and pelvis with no evidence metastatic disease.  CEA 1.1     9/24/2018 Imaging    CT abdomen pelvis showed no acute changes and nothing to suggest recurrence     12/28/2018 Imaging    CT chest, abdomen and pelvis negative. Normal CBC, CMP and CEA 0.7.     6/28/2019 Imaging    CT chest, abdomen and pelvis: No interval change from prior studies.  No recurrent or metastatic disease detected in the chest, abdomen or pelvis.  No acute process.  CEA 0.9     12/30/2019 Imaging    CT chest, abdomen and pelvis: No disease in the chest.  There was noted a subtle soft tissue density, one surrounding surgical clips in the right side of the pelvis and the other a soft tissue nodule intimately associated with the anterior wall  of the bladder, which are considered suspicious for recurrent disease.  CEA 1.1.  CMP with normal creatinine 0.9, total bilirubin 1.9, AST 34, ALT 24, alkaline phosphatase 93.  CBC with WBC 6900, hemoglobin 15.9, platelet count 232,000.       1/21/2020 Procedure    Normal colonoscopy with Dr. Davidson.  He has made referral to Dr. Portillo Peoples at .     2/7/2020 Progression    12/30/2019 CT chest, abdomen and pelvis: No disease in the chest.  There are subtle soft tissue densities (1 surrounding surgical clips in the right side of the pelvis and the other a soft tissue nodule intimately associated with the anterior wall of the bladder) which are considered suspicious for recurrent disease.  CMP with normal creatinine 0.9, total bilirubin 1.9, AST 34, ALT 24, alkaline phosphatase 93.  CBC with WBC 6900, hemoglobin 15.9, platelet count 232,000.  CEA 1.1.  2/7/2020 diagnostic laparoscopy with peritoneal biopsies performed at the [facility] by Dr. Peoples showed no sign of diffuse peritoneal carcinomatosis or liver metastasis.  There was a firm nodule in the superior dome of the bladder, adherent to omentum, as well as right pelvic sidewall nodule adjacent to surgical clips.  Biopsy of bladder dome nodule showed adenocarcinoma with mucinous features.     2/25/2020 -  Chemotherapy    OP COLORECTAL FOLFIRI Irinotecan / Leucovorin / Fluorouracil     5/29/2020 Imaging    CT chest abdomen pelvis shows slight improvement with decreased nodule anterolateral margin of urinary bladder with stable nodularity of the right lower quadrant adjacent to surgical clips and no progressive disease.  Slight hyperemia of the colonic mucosa     6/18/2020 Surgery    Surgery       -6/18/2020 Dr. Peoples performed exploratory laparotomy with excision of right lobe liver lesion and left lobe liver lesion, omentectomy, resection of pelvic peritoneal nodules, ileocolectomy with creation of ileal colostomy, hyperthermic intraperitoneal  chemotherapy with mitomycin-C at 42 °C for deep tissue penetration for 90 minutes.  Dr. Perdue performed partial cystectomy with right ureteral stent placement     8/5/2020 Imaging     CT abdomen pelvis with contrast shows small soft tissue nodule anterior aspect of bladder stable.  New small amount of ascites as well as a new small left pleural effusion.  Creatinine 0.75 with hemoglobin 11.1 otherwise unremarkable CBC and CMP     2/15/2021 Imaging    CT abdomen pelvis with contrast compared to 8/5/2020 shows enlarging soft tissue mass 4.2 cm over the bladder fundus and there is a calcification along the base of the urinary bladder.  Small stable multiple hypoattenuating indeterminate liver lesions.  Similar degree of ascites.     2/23/2021 Imaging    -2/23/2021 CT chest shows no evidence of metastasis with ascending aorta 42 mm.     4/22/2021 Surgery    -4/22/2021 cystoscopy (after prior office cystoscopy showed large fungating mass) with bilateral ureteral catheters, exploratory laparotomy, lysis of adhesions, right pelvic sidewall excisional biopsy, open partial cystectomy Dr. Ike Perdue. Peritoneal fluid showed predominant inflammatory reactive mesothelial cells with no malignancy. 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 Carcinoma was less than 1 mm from the lateral resection margins.     6/7/2021 Imaging    CT chest, abdomen and pelvis: No evidence of metastatic disease within the chest abdomen or pelvis.  Interval resection of enhancing bladder wall mass a small amount of residual enhancing soft tissue, possibly granulation tissue.  Interval resolution of abdominal and pelvic ascites.  Stable dilation of the ascending aorta mid segment measuring up to 41 mm.     9/10/2021 Imaging    CT chest abdomen pelvis with contrast shows under distended urinary bladder with mild asymmetric wall thickening dome and left lateral  wall with a small 9 mm polypoid lesion in the bladder.  There is stable 2.3 cm right external iliac and a few other subcentimeter scattered nodes in the root of the small bowel mesentery and retroperitoneum.  Postsurgical right hemicolectomy changes.  Stable low-attenuation lesions in the liver unchanged.     11/24/2021 Imaging    CT chest abdomen pelvis compared to 6/7/2021 outside imaging shows stable 4.1 cm ascending thoracic aorta.  No lung nodules.  Liver homogenous with bilateral cysts.  No adenopathy.  Thickened sigmoid colon and rectum suggestive of mild colitis or postradiation change.  No pelvic adenopathy.  Bony structures degenerative in the spine.  Some soft tissue anterior to the acetabulum on the right and extending along the medial aspect right pelvic sidewall 2.7 x 5.6 may represent intramuscular process versus adenopathy which could not be ruled out.  No prior study views available.     4/11/2022 - 5/6/2022 Radiation    Radiation OncologyTreatment Course:  Esteban Tse received 5000 cGy in 20 fractions to right pelvis/groin via External Beam Radiation - EBRT.     5/25/2022 Imaging    CT chest abdomen pelvis with contrast at [facility] shows nothing remarkable on the chest.  Stable liver cysts.  Focal soft tissue right iliac region appears to be increasing in size now 7.2 cm previously 3.9 cm with some mass-effect on surrounding structures with several stable inguinal borderline nodes.     Peritoneal carcinoma (HCC)   5/14/2020 Initial Diagnosis    Peritoneal carcinoma (HCC)     5/25/2022 Imaging    CT chest abdomen pelvis with contrast at [facility] shows nothing remarkable on the chest.  Stable liver cysts.  Focal soft tissue right iliac region appears to be increasing in size now 7.2 cm previously 3.9 cm with some mass-effect on surrounding structures with several stable inguinal borderline nodes.         HISTORY OF PRESENT ILLNESS:  The patient is a 67 y.o. male, here for  "follow up on management of mucinous adenocarcinoma of the appendix.  Pain improving    Past Medical History:   Diagnosis Date   • Ascending aortic aneurysm (HCC)    • Benign hypertension    • Bladder cancer (HCC)    • Colon cancer (HCC)    • History of radiation therapy 05/06/2022    right groin/hemipelvis   • Hypertension    • Intermittent palpitations    • Mucinous adenocarcinoma (HCC)    • Pounding heartbeat    • PVC's (premature ventricular contractions)      Past Surgical History:   Procedure Laterality Date   • APPENDECTOMY  2017    With Partial Colon    • BLADDER SURGERY      with partial colon   • CHOLECYSTECTOMY     • COLON SURGERY     • GALLBLADDER SURGERY         Allergies   Allergen Reactions   • Levofloxacin Swelling     Lips swellijng   • Tetracycline Swelling     Lips swelling   • Penicillins Other (See Comments)     childhood       Family History and Social History reviewed and changed as necessary    REVIEW OF SYSTEM:   No new somatic complaints    PHYSICAL EXAM:  No inguinal tenderness.  Some skin irritation from prior radiation but minimal.    Vitals:    07/12/22 1434   BP: 163/92   Pulse: 100   Resp: 18   Temp: 99.8 °F (37.7 °C)   SpO2: 99%   Weight: 72.6 kg (160 lb)   Height: 182.9 cm (72\")     Vitals:    07/12/22 1434   PainSc: 0-No pain                      Vitals reviewed.    ECOG: (0) Fully Active - Able to Carry On All Pre-disease Performance Without Restriction    Lab Results   Component Value Date    HGB 13.2 06/06/2022    HCT 42.5 06/06/2022    MCV 87.5 06/06/2022     06/06/2022    WBC 7.50 06/06/2022    NEUTROABS 5.80 06/06/2022    LYMPHSABS 1.30 06/06/2022    MONOSABS 0.50 06/06/2022    EOSABS 0.09 04/05/2022    BASOSABS 0.02 04/05/2022       Lab Results   Component Value Date    GLUCOSE 113 (H) 06/06/2022    BUN 10 06/06/2022    CREATININE 1.00 06/06/2022     06/06/2022    K 4.6 06/06/2022     06/06/2022    CO2 27.0 06/06/2022    CALCIUM 9.1 06/06/2022    PROTEINTOT " 6.4 06/06/2022    ALBUMIN 4.00 06/06/2022    BILITOT 0.5 06/06/2022    ALKPHOS 120 (H) 06/06/2022    AST 21 06/06/2022    ALT 18 06/06/2022             ASSESSMENT & PLAN:  1.  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  Pathologic stage IIa with T3 extension into the base of the appendix through the muscularis propria but not into the serosal surface.  16 nodes negative.  Colonoscopy December 2017 negative postop.Laparoscopic diagnostic peritoneal biopsy confirming disease in the bladder dome February 2020.  Began Folfiri.  June 2020 Dr. Peoples excised right lobe of liver and left lobe liver lesion, omentectomy and resection of pelvic peritoneal nodules, ileocolectomy with ileal colostomy and intraperitoneal HIPEC with mitomycin.  Dr. Lawler performed right ureteral stent placement and partial cystectomy.  With large fungating mass, 4/22/2021 had exploratory laparotomy with lysis of adhesions and right pelvic sidewall excisional biopsy with open partial cystectomy.  No malignancy and peritoneal fluid.  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  Margins less than 1 mm.  October 2021 cystoscopy without malignancy.  TURBT 10/11/2021 did not show malignancy.  March 2022 Dr. Peoples for 3.9 cm external iliac recurrent adenopathy 6 weeks out from laminectomy which did not help pain was determined to be an unresectable recurrence with muscle and vascular involvement.  Radiation with Dr. Moody ended 5/6/2022.  Disease stabilized and pain improved.    -12/30/2019 medical oncology office visit: The patient had been on surveillance since surgery December 2017.  CT scans had been negative up until 12/30/2019 CT chest abdomen and pelvis that showed subtle soft tissue density surrounding surgical clips in the right side of the pelvis along with soft tissue nodule at the anterior wall of the bladder concerning for recurrent  disease.  Patient sent back to Dr. Davidson for colonoscopy.    -2/7/2020 patient was referred to Dr. Peoples at the [facility], he underwent diagnostic laparoscopic and peritoneal biopsies that confirmed recurrent disease with biopsy of bladder dome nodule showing adenocarcinoma with mucinous features.  Port was placed at this time also.    -2/25/2020 began FOLFIRI    -4/21/2020 patient having increased fatigue, FOLFIRI dose reduced by 10%.    -5/19/2020 cycle 7 FOLFIRI    -6/18/2020 Dr. Peoples performed exploratory laparotomy with excision of right lobe liver lesion and left lobe liver lesion, omentectomy, resection of pelvic peritoneal nodules, ileocolectomy with creation of ileal colostomy, hyperthermic intraperitoneal chemotherapy with mitomycin-C at 42 °C for deep tissue penetration for 90 minutes.  Dr. Perdue performed partial cystectomy with right ureteral stent placement    -8/5/2020 CT abdomen pelvis with contrast shows small soft tissue nodule anterior aspect of bladder stable.  New small amount of ascites as well as a new small left pleural effusion.  Creatinine 0.75 with hemoglobin 11.1 otherwise unremarkable CBC and CMP.    -2/15/2021 CT abdomen pelvis with contrast compared to 8/5/2020 shows enlarging soft tissue mass 4.2 cm over the bladder fundus and there is a calcification along the base of the urinary bladder.  Small stable multiple hypoattenuating indeterminate liver lesions.  Similar degree of ascites.    -2/23/2021 CT chest shows no evidence of metastasis with ascending aorta 42 mm.    -3/8/2021 follow-up with Dr. Portillo Peoples we reviewed his CTs suggested team effort resection with Dr. Perdue    -3/16/2021 follow-up with Dr. Perdue.  He plans for open partial cystectomy, possible ureteral implants, cystoscopy and stent placement in concert with Dr. Peoples.    -4/22/2021 cystoscopy (after prior office cystoscopy showed large fungating mass) with bilateral ureteral catheters, exploratory  "laparotomy, lysis of adhesions, right pelvic sidewall excisional biopsy, open partial cystectomy Dr. Ike Perdue. Peritoneal fluid showed predominant inflammatory reactive mesothelial cells with no malignancy. 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 Carcinoma was less than 1 mm from the lateral resection margins.    -5/18/2021 [facility] medical oncology follow-up visit: I reviewed his hospital notes, operative notes, and pathology report as above and went over this with the patient.  The general consensus from retrospective as well as a few prospective data over the last couple of decades with this low-grade malignancy does not show any profound benefit from \"adjuvant\" chemotherapy in this current setting.  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  There is no standard follow-up but general guidelines suggest following up as typical colon cancer.  I will repeat his CT chest abdomen pelvis now to reestablish his baseline postoperative imaging and have him see my nurse practitioner back in a couple of weeks to make sure there is no nia evidence of measurable residual disease.  Assuming this just shows postoperative changes, I would simply repeat scans again in 3 months or sooner as symptoms dictate.  If he has no evidence of persistent or metastatic disease on current imaging, then when he sees my nurse practitioner back she will also review what they say about his ascending aorta which was 42 mm in February and sent him to Dr. Evangelist Anthony for an opinion as to whether any intervention is needed relative to the aorta.  Obviously if his cancer is out of control on the upcoming scans then the aorta is a moot point.    -6/10/2021 [facility] oncology clinic follow-up: CT scans show no evidence of disease recurrence in the chest, abdomen or pelvis.  Ascending aortic aneurysm stable at 41 mm.  Referral made to Dr. Anthony, cardiothoracic surgeon " for management and evaluation of a sending aortic aneurysm.  Plan to repeat CT scans in 3 months.    -9/14/2021 [facility] medical oncology follow-up visit: I reviewed images and reports of 9/10/2021 CT chest abdomen pelvis with contrast which shows under distended urinary bladder with mild asymmetric wall thickening dome and left lateral wall with a small 9 mm polypoid lesion in the bladder.  There is stable 2.3 cm right external iliac and a few other subcentimeter scattered nodes in the root of the small bowel mesentery and retroperitoneum.  Postsurgical right hemicolectomy changes.  Stable low-attenuation lesions in the liver unchanged.  I will have him collect his discs from [facility] to take Dr. Perdue to decide whether to proceed with cystoscopy or just continue watchful waiting.  There is no major changes and I suspect we are looking at postoperative changes and we shall see him for video visit in a few weeks to see what urology at  decides.    -10/11/2021 cystoscopy Dr. Perdue showed marked acute and chronic inflammation but no evidence of malignancy.  Muscularis propria present.    -10/14/2021 [facility] medical oncology virtual visit: I reviewed reports of pathology from cystoscopy 10/11/2021 and went over this with patient.  He is feeling fairly fit.  I will repeat his CT chest abdomen pelvis prior to return in 3 months and if in the interim, when he follows up with Dr. Perdue in the next couple of weeks post cystoscopy, plans are made for further cystoscopy before I see him back and any malignancy is found, I would ask the patient and Dr. Perdue to touch base as I would not know of such without that contact as the information comes into our chart without notification now that epic is being used by .  Assuming no further biopsies in the interim, I will simply repeat his scans in 3 months to follow what I suspected and is now confirmed to be inflammatory changes.  From the aneurysm standpoint, he has  follow-up scanning March 2022 arranged by Dr. Anthony and he will follow him up after that.    -11/24/2021 CT chest abdomen pelvis compared to 6/7/2021 outside imaging shows stable 4.1 cm ascending thoracic aorta.  No lung nodules.  Liver homogenous with bilateral cysts.  No adenopathy.  Thickened sigmoid colon and rectum suggestive of mild colitis or postradiation change.  No pelvic adenopathy.  Bony structures degenerative in the spine.  Some soft tissue anterior to the acetabulum on the right and extending along the medial aspect right pelvic sidewall 2.7 x 5.6 may represent intramuscular process versus adenopathy which could not be ruled out.  No prior study views available.    -1/4/2022 follow-up Dr. Ike Perdue [facility].  Per his note, he had TURBT 10/11/2021.  He had been placed on antibiotics for UTI for preop preparation for back surgery planned in 2 weeks.  Denies any mucus in his urine.  Some microhematuria on urinalysis this day.  No gross hematuria or dysuria.  The October 2021 TURBT did not reveal anything malignant on pathology.  There may be irritation or inflammation secondary to sutures in the bladder following partial cystectomy.  Patient asymptomatic from a urinary standpoint.  Recommended following up with [facility] with no plans for further urology follow-up.    -2/4/2022 [facility] medical oncology telehealth follow-up visit: I reviewed his November images and reports with the patient as well as with Dr. Gm Moody and the note from Dr. Perdue from 1 month ago.  I do suspect this pelvic sidewall abnormality in November represents persistent disease but as I have stated in prior dictations, the data on palliative or overall survival benefit of systemic 5-FU based combination chemotherapy and/or Avastin does not show a clear-cut survival advantage to chemotherapy for asymptomatic disease.  He is having some back pain and had spine decompression a couple of weeks ago without much  "benefit.  This is an area that may potentially be radiated but I will check his CT chest (angiogram of chest) abdomen and pelvis and get his blood counts and chemistries and urinalysis and see him back for virtual visit in a few weeks.  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  I will also converse with Dr. Peoples at that junction whether he thinks he has anything to offer if this is progressing though I doubt it given that the last intervention was a urologic procedure and the last cystoscopy/TURBT in October had no malignancy and the liver lesions are currently being called liver cysts albeit I am skeptical as to whether those are truly benign but they are certainly not growing.    -3/4/2022 CT angiogram of chest/CT abdomen pelvis for evaluation of dizziness and surveillance of thoracic aortic aneurysm and appendiceal carcinoma.  Thoracic ascending aortic ectasia 4 x 4 cm stable.  Small nodularity left upper lobe stable from September.  Stable small hepatic hypodensities status post cholecystectomy.  Right external iliac soft tissue fullness now 3 x 3.9 cm previously 2 x 2.3 cm concerning for enlarging adenopathy with symmetric nonenlarged inguinal nodes stable.  CBC unremarkable.  CMP unremarkable save for potassium 5.4.  CEA 18.2.  1-3 red cells and white cells per milliliter of urine.    -3/16/2022 office visit with Dr. Portillo Peoples.  He reviewed the CT from 3/4/2022.  He notes that the laminectomy from 6 weeks prior has not helped his \"hip pain\".  Given the location of this recurrence previously 2 x 2.3 cm now 3 x 3.9 cm in the external iliac area concerning for enlarging adenopathy " with symmetric nonenlarged inguinal nodes stable, the mass appears unresectable with involvement of muscle and vasculature.    -3/24/2022 [facility] medical oncology follow-up virtual visit: Pain is still significant.  We will get him in hopefully in the next day to [facility] for palliative care.  Have spoken with Dr. Peoples and no surgery.  I have spoken with Dr. Moody who thinks palliative radiation while not likely to shrink tumor dramatically may help pain considerably and we will get him there.  We will get him to bring the discs to our [place] office and asked them to bring that back to [place] to get scanned in for our invasive radiologist to look at for us to get CT-guided biopsy of this node in the right lower quadrant and send that for [facility] MI profile once the pathology is obtained to hopefully find high tumor mutational burden or other molecular targets that might give us some options.  Apart from that, as stated multiple times, this is not a highly chemotherapy sensitive tumor and I am not sure I would palliate him well but, when I see him back in early June with CAT scans prior to return and post radiation, if he is not getting relief and wants to try a 5-FU based regimen plus or minus Avastin I would be okay with that but he knows it does not alter survival 1 way or the other and we are simply trying to palliate this inexorable process.  No other surgical options.    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  Mismatch repair proficient,NTRK1/2/3 fusion not detected.  BRAF V600E negative.  PD-L1 Negative, 1+, 5%.  PTEN Positive, 1+, 100%. MLH1 positive/3+, 100%. MSH2 positive/3+, 100%. MSH6 positive/3+, 100%. PMS2 positive/3+,100%.    -5/6/2022 last radiation    - 5/25/2022 CT chest abdomen pelvis with contrast at [facility] shows nothing remarkable on the chest.  Stable liver cysts.  Focal soft tissue  right iliac region appears to be increasing in size now 7.2 cm previously 3.9 cm with some mass-effect on surrounding structures with several stable inguinal borderline nodes.    -6/6/2022 [facility] [place], operated by [facility] medical oncology follow-up visit: Now 1 month out from radiation just starting to get a little bit of improvement in his abdominal discomfort.  Slight growth on CT but some of that may be postradiation change this close to radiation and, as I stated in my note in March, it is not clear that 5-FU based therapy plus or minus Avastin is going to palliate much given its relatively insensitivity to chemotherapy and it certainly does not improve survival.  To that end, we will plan on just repeating his CAT scans again in about 6 weeks and if the pain is worsening and/or this continues to grow then I will probably give him Avastin FOLFOX.  If everything is stable and pain is controlled we will go continue watchful waiting with imaging about every 3 months from that point forward.    -7/11/2022 [facility] [place] operated by [facility] medical oncology follow-up: His 7/7/2022 CT chest abdomen pelvis showed stable right external iliac low-attenuation which had been previously growing and thickening of the distal descending and sigmoid colon with perhaps a small right iliac borderline 1 cm node.  Symptomatically improved post radiation.  For now we will hold on Avastin FOLFOX as the benefit of such is questionable with this histologic subtype and there are no actionable molecular targets.  Repeat CTs prior to return in 3 months.    Total time of care today inclusive of time spent today prior to patient's arrival reviewing interval notes from radiation oncology as well as CT and images thereof of recent CT at [facility] and during visit translating that information to the patient and after visit instituting follow-up plans as outlined took 30 minutes of patient care time throughout the day today.  Cy Delcid MD    07/12/2022

## 2022-07-12 NOTE — TELEPHONE ENCOUNTER
I have reviewed patient's YAIMA report prior to prescribing Schedule II, III, and IV medications. Request # [id number]. Refills for morphine ER 15 mg tab BID and oxycodone 10 mg tab q4h sent to pharmacy. Patient is scheduled to follow up in 6 weeks.

## 2022-08-08 RX ORDER — METOPROLOL SUCCINATE 50 MG/1
50 TABLET, EXTENDED RELEASE ORAL DAILY
Qty: 90 TABLET | Refills: 0 | Status: SHIPPED | OUTPATIENT
Start: 2022-08-08 | End: 2022-09-12 | Stop reason: SDUPTHER

## 2022-08-08 RX ORDER — METOPROLOL SUCCINATE 50 MG/1
50 TABLET, EXTENDED RELEASE ORAL DAILY
Qty: 30 TABLET | Refills: 0 | Status: SHIPPED | OUTPATIENT
Start: 2022-08-08 | End: 2022-08-08

## 2022-08-15 DIAGNOSIS — G89.3 CANCER ASSOCIATED PAIN: ICD-10-CM

## 2022-08-15 RX ORDER — GABAPENTIN 600 MG/1
600 TABLET ORAL 3 TIMES DAILY
Qty: 90 TABLET | Refills: 0 | Status: SHIPPED | OUTPATIENT
Start: 2022-08-15 | End: 2022-08-23 | Stop reason: SDUPTHER

## 2022-08-15 NOTE — TELEPHONE ENCOUNTER
YAIMA #: [id number]    Medication requested: Gabapentin 600mg    Last fill date: 06/12/2022    Last appointment: 07/2/2022    Next appointment: 08/23/2022

## 2022-08-22 NOTE — PROGRESS NOTES
"     Palliative Clinic Note      Name: Esteban Tse  Age: 67 y.o.  Sex: male  : 1955  MRN: [id number]  Date of Service: 22  Medical Oncologist: Bhavin   Mode of visit: Telephone  Location of patient: Home    The patient has chosen to receive care through a telehealth visit.   Does the patient consent to using video/audio connection for their medical care today? Yes   No technical issues occurred during this visit.     Subjective:    Chief Complaint: \"feeling bad\" in the mornings     History of Present Illness: Esteban Tse is a 67 y.o. male with past medical history significant for HTN, AAA, metastatic adenocarcinoma of the appendix who presents via videochat today as a follow up for pain and symptom management.     Treatment summary: The patient was diagnosed with cancer of the appendix at the time of appendectomy in 2017. Patient under surveillance until imaging in 2019 demonstrated several lesions suspicious for recurrent disease. He underwent a diagnostic laparoscopy with peritoneal biopsies on 2020 by Dr. Peoples that proved recurrent adenocarcinoma.  On 2020 he underwent an exploratory laparotomy with excision of right and left lobe liver lesions, omentectomy, resection of pelvic peritoneal nodules, ileocolectomy with creation of ileal colostomy, hyperthermic intraperitoneal chemotherapy and a partial cystectomy with right ureteral stent placement. Than on 21 he underwent a cystoscopy with bilateral ureteral catheters, exploratory laparotomy, lysis of adhesions, right pelvic sidewall excisional biopsy, and an open partial cystectomy. TURBT performed in 10/2021. Imaging from 3/4/2022 showed enlarging external illiac adenopathy. There are no surgical options per Dr. Peoples. Underwent CT-guided biopsy of the right iliac mass consistent with mucinous adenocarcinoma. Patient completed palliative radiation with improvement in symptoms. CT chest abdomen and pelvis on " "7/7/22 showed stable disease.      Pain: Patient established care with [facility] in [place], [place] > 1 year ago. The right hip pain was thought to be referred from his spine and he underwent several epidurals and a lumbar laminectomy with no improvement in his pain. More recently, his pain is related to the cancer of the appendix. Improved function with morphine ER 15 mg BID, oxycodone 10 mg  q4-6h PRN and gabapentin 600 mg TID. Patient takes 2 oxycodone 10 mg tablets for severe pain occasionally. Mild sedation with opioid therapy.  He occasionally takes Ibuprofen before playing golf. No increased pain with activity. Pain continues to fluctuate however patient is able to maintain his daily functioning on the bad days.     Other symptoms: Patient reports feeling \"bad\" in the mornings. He has a hard time articulating. He denies dizziness, dyspnea, pain, anxiety, depression. He endorses mild nausea and decreased appetite in the morning but no vomiting. His symptoms resolve after he takes his morning pain medications. The patient reports a good appetite later in the day. Occasional constipation managed with OTC stool softener. No issues with sleep.     Pyschosocial: The patient lives alone. He is retired from working for the state. He enjoys playing golf and spending time outside. No personal history of a mental illness. The patient admits to anxiety/depression related to his cancer diagnosis but states overall his mood is stable and he is able to manage it on his own.       Spiritual: Patient describes himself as curious rather than practicing.      Goals: Improve quality of life with symptom management.     The following portions of the patient's history were reviewed and updated as appropriate: allergies, current medications, past family history, past medical history, past social history, past surgical history and problem list.    ORT-R: Low risk   Decisional capacity: Full  ECOG: (1) Restricted in " physically strenuous activity, ambulatory and able to do work of light nature   Palliative Performance Scale Score: 70%     Objective:    Psychiatric: Appropriate affect, cooperative  Neurologic: Oriented x 3, Cranial Nerves grossly intact to confrontation, speech clear    Medication Counts: Collected over the phone. See bottom of note for details. No misuse or overuse evident.   I have reviewed the patient's [place] PDMP. YAIMA Req #[id number].   UDS (Y): Last 3/25/22. Reviewed. Appropriate.     Assessment & Plan:    1. Mucinous adenocarcinoma of appendix (HCC)  - Status post palliative radiation. CT chest abdomen and pelvis on 7/7/22 showed stable disease. Repeat scans in October.     2. Cancer associated pain  - Side effects of the medication discussed at every visit. Patient is appropriate for opioid therapy due to cancer related pain. Daily function and quality of life maintained with morphine ER 15 mg BID, oxycodone 10 mg q4-6h PRN and gabapentin 600 mg TID. Some sedation with opioid therapy. Recommend adding Ibuprofen to his regimen if needed. Refill for oxycodone sent to [facility]'s pharmacy.     - I suspect what he is feeling in the morning may be visceral pain as he is having a hard time defining it. The discomfort currently resolves with his morning pain medication. Reassured the patient that this is not withdrawal from opioids as he is compliant with his long-acting opioid and there have been no changes made to his regimen.     3. Neuropathy  - Gabapentin (NEURONTIN) 600 MG tablet; Take 1 tablet by mouth 3 (Three) Times a Day for 30 days.  Dispense: 90 tablet; Refill: 0    Code status: Full code  Medical interventions: Full  Advanced directives: Plan to discuss    I spent 40 minutes caring for Esteban Tse on this date of service. This time includes time spent by me in the following activities: preparing for the visit, reviewing tests, obtaining and/or reviewing a separately obtained history,  performing a medically appropriate examination and/or evaluation , counseling and educating the patient/family/caregiver, ordering medications, tests, or procedures, documenting information in the medical record, independently interpreting results and communicating that information with the patient/family/caregiver, and care coordination    Qian Fleming PA-C  08/23/2022    Medication Date Filled # Filled Count Used # Days  SOY   Gabapentin 600 8/15/22 90 70 20 8 3   Morphine ER 15  7/16/22 60 75 -- 38 2   Oxycodone 10 7/12/22 180 40 140 38 4-5

## 2022-08-23 ENCOUNTER — OFFICE VISIT (OUTPATIENT)
Dept: PALLIATIVE CARE | Facility: CLINIC | Age: 67
End: 2022-08-23

## 2022-08-23 VITALS — WEIGHT: 160 LBS | BODY MASS INDEX: 21.2 KG/M2 | HEIGHT: 73 IN

## 2022-08-23 DIAGNOSIS — G89.3 CANCER ASSOCIATED PAIN: ICD-10-CM

## 2022-08-23 DIAGNOSIS — G62.9 NEUROPATHY: ICD-10-CM

## 2022-08-23 DIAGNOSIS — G89.3 CANCER ASSOCIATED PAIN: Primary | ICD-10-CM

## 2022-08-23 DIAGNOSIS — C18.1 MUCINOUS ADENOCARCINOMA OF APPENDIX: Primary | ICD-10-CM

## 2022-08-23 PROCEDURE — 99215 OFFICE O/P EST HI 40 MIN: CPT | Performed by: PHYSICIAN ASSISTANT

## 2022-08-23 RX ORDER — OXYCODONE HYDROCHLORIDE 10 MG/1
10 TABLET ORAL EVERY 4 HOURS PRN
Qty: 180 TABLET | Refills: 0 | Status: SHIPPED | OUTPATIENT
Start: 2022-08-23 | End: 2022-10-14 | Stop reason: SDUPTHER

## 2022-08-23 RX ORDER — GABAPENTIN 600 MG/1
600 TABLET ORAL 3 TIMES DAILY
Qty: 90 TABLET | Refills: 0 | Status: SHIPPED | OUTPATIENT
Start: 2022-09-14 | End: 2022-10-14 | Stop reason: SDUPTHER

## 2022-08-23 NOTE — TELEPHONE ENCOUNTER
I have reviewed patient's YAIMA report prior to prescribing Schedule II, III, and IV medications. Request # [id number]. Refill for oxycodone 10 mg q4h PRN #180 sent to [facility]'s pharmacy. The patient is scheduled to follow up in 10/14/22.

## 2022-09-12 ENCOUNTER — OFFICE VISIT (OUTPATIENT)
Dept: CARDIOLOGY | Facility: CLINIC | Age: 67
End: 2022-09-12

## 2022-09-12 VITALS
WEIGHT: 160.4 LBS | HEART RATE: 82 BPM | DIASTOLIC BLOOD PRESSURE: 84 MMHG | TEMPERATURE: 97.2 F | SYSTOLIC BLOOD PRESSURE: 160 MMHG | BODY MASS INDEX: 21.16 KG/M2 | OXYGEN SATURATION: 99 %

## 2022-09-12 DIAGNOSIS — I10 ESSENTIAL HYPERTENSION: Primary | ICD-10-CM

## 2022-09-12 DIAGNOSIS — I71.20 THORACIC AORTIC ANEURYSM WITHOUT RUPTURE: ICD-10-CM

## 2022-09-12 PROCEDURE — 93000 ELECTROCARDIOGRAM COMPLETE: CPT | Performed by: INTERNAL MEDICINE

## 2022-09-12 PROCEDURE — 99213 OFFICE O/P EST LOW 20 MIN: CPT | Performed by: INTERNAL MEDICINE

## 2022-09-12 RX ORDER — METOPROLOL SUCCINATE 100 MG/1
100 TABLET, EXTENDED RELEASE ORAL DAILY
Qty: 90 TABLET | Refills: 3 | Status: SHIPPED | OUTPATIENT
Start: 2022-09-12

## 2022-09-12 RX ORDER — LISINOPRIL AND HYDROCHLOROTHIAZIDE 20; 12.5 MG/1; MG/1
1 TABLET ORAL DAILY
Qty: 90 TABLET | Refills: 3 | Status: SHIPPED | OUTPATIENT
Start: 2022-09-12

## 2022-09-12 NOTE — PROGRESS NOTES
[facility] [place]  [facility] CARDIOLOGY  [address] [place] [address]  Dept: [phone]  Dept Fax: [phone]    Esteban Tse  1955    Follow Up Office Visit Note    History of Present Illness:  Esteban Tse is a 67 y.o. male who presents to the clinic for hypertension- The BP is 150.60, He denies any cardiac complaint, on Toprol xl 50 mg and also Lisinopril 20.12.5. he is dealing with some intestinal cancer    The following portions of the patient's history were reviewed and updated as appropriate: allergies, current medications, past family history, past medical history, past social history, past surgical history and problem list.    Medications:  gabapentin  lisinopril-hydrochlorothiazide  metoprolol succinate XL  naloxone  oxyCODONE tablet    Subjective  Allergies   Allergen Reactions   • Levofloxacin Swelling     Lips swellijng   • Tetracycline Swelling     Lips swelling   • Penicillins Other (See Comments)     childhood        Past Medical History:   Diagnosis Date   • Ascending aortic aneurysm (HCC)    • Benign hypertension    • Bladder cancer (HCC)    • Colon cancer (HCC)    • History of radiation therapy 05/06/2022    right groin/hemipelvis   • Hypertension    • Intermittent palpitations    • Mucinous adenocarcinoma (HCC)    • Pounding heartbeat    • PVC's (premature ventricular contractions)        Past Surgical History:   Procedure Laterality Date   • APPENDECTOMY  2017    With Partial Colon    • BLADDER SURGERY      with partial colon   • CHOLECYSTECTOMY     • COLON SURGERY     • GALLBLADDER SURGERY         Family History   Problem Relation Age of Onset   • Lung cancer Mother    • Stroke Mother    • Heart disease Father    • Hypertension Father         Social History     Socioeconomic History   • Marital status: Single   • Number of children: 0   Tobacco Use   • Smoking status: Never Smoker   • Smokeless tobacco: Never Used   Substance and  Sexual Activity   • Alcohol use: Yes     Comment: 1 dring weekly   • Drug use: Never   • Sexual activity: Defer       Review of Systems   Constitutional: Negative.    HENT: Negative.    Respiratory: Negative.    Cardiovascular: Negative.    Endocrine: Negative.    Genitourinary: Negative.    Musculoskeletal: Negative.    Skin: Negative.    Allergic/Immunologic: Negative.    Neurological: Negative.    Hematological: Negative.    Psychiatric/Behavioral: Negative.        Cardiovascular Procedures    ECHO/MUGA:   STRESS TESTS:   CARDIAC CATH:   DEVICES:   HOLTER:   CT/MRI:   VASCULAR:   CARDIOTHORACIC:     Objective  Vitals:    09/12/22 1501   BP: 160/84   BP Location: Right arm   Patient Position: Sitting   Cuff Size: Adult   Pulse: 82   Temp: 97.2 °F (36.2 °C)   TempSrc: Infrared   SpO2: 99%   Weight: 72.8 kg (160 lb 6.4 oz)     Body mass index is 21.16 kg/m².     Physical Exam  Constitutional:       Appearance: Healthy appearance. Not in distress.   Neck:      Vascular: No JVR. JVD normal.   Pulmonary:      Effort: Pulmonary effort is normal.      Breath sounds: Normal breath sounds. No wheezing. No rhonchi. No rales.   Chest:      Chest wall: Not tender to palpatation.   Cardiovascular:      PMI at left midclavicular line. Normal rate. Regular rhythm. Normal S1. Normal S2.      Murmurs: There is no murmur.      No gallop. No click. No rub.   Pulses:     Intact distal pulses.   Edema:     Peripheral edema absent.   Abdominal:      General: Bowel sounds are normal.      Palpations: Abdomen is soft.      Tenderness: There is no abdominal tenderness.   Musculoskeletal: Normal range of motion.         General: No tenderness. Skin:     General: Skin is warm and dry.   Neurological:      General: No focal deficit present.      Mental Status: Alert and oriented to person, place and time.          Diagnostic Data    ECG 12 Lead    Date/Time: 9/12/2022 7:52 PM  Performed by: Alli Dial MD  Authorized by: Jayro  Alli Hallman MD   Comparison: compared with previous ECG from 8/31/2021  Similar to previous ECG  Rhythm: sinus rhythm  Rate: normal  BPM: 93  QRS axis: normal    Clinical impression: normal ECG            Assessment and Plan  Diagnoses and all orders for this visit:    Essential hypertension- BP is 150.60 he usually gets stress out and BP goes high, his EKG sinus Hr 93, will  Increase the Toprol xl to 100 mg and keep Lisinopril 20.12.5    Thoracic aortic aneurysm without rupture (HCC)- recent Ct chest aneurysm seems steady 4,1 cm    Other orders  -     metoprolol succinate XL (TOPROL-XL) 100 MG 24 hr tablet; Take 1 tablet by mouth Daily.  -     lisinopril-hydrochlorothiazide (PRINZIDE,ZESTORETIC) 20-12.5 MG per tablet; Take 1 tablet by mouth Daily.         Return in about 1 year (around 9/12/2023) for Recheck.    Alli Dial MD  09/12/2022

## 2022-09-23 ENCOUNTER — LAB (OUTPATIENT)
Dept: CARDIOLOGY | Facility: CLINIC | Age: 67
End: 2022-09-23

## 2022-09-23 DIAGNOSIS — E78.2 MIXED HYPERLIPIDEMIA: ICD-10-CM

## 2022-09-23 DIAGNOSIS — E78.5 HYPERLIPIDEMIA LDL GOAL <100: Primary | ICD-10-CM

## 2022-09-24 LAB
CHOLEST SERPL-MCNC: 155 MG/DL (ref 100–199)
HDLC SERPL-MCNC: 55 MG/DL
LDLC SERPL CALC-MCNC: 72 MG/DL (ref 0–99)
Lab: NORMAL
TRIGL SERPL-MCNC: 165 MG/DL (ref 0–149)
VLDLC SERPL CALC-MCNC: 28 MG/DL (ref 5–40)

## 2022-09-27 ENCOUNTER — TELEPHONE (OUTPATIENT)
Dept: CARDIOLOGY | Facility: CLINIC | Age: 67
End: 2022-09-27

## 2022-09-27 RX ORDER — ROSUVASTATIN CALCIUM 5 MG/1
5 TABLET, COATED ORAL DAILY
COMMUNITY
End: 2022-09-27 | Stop reason: SDUPTHER

## 2022-09-27 RX ORDER — ROSUVASTATIN CALCIUM 5 MG/1
5 TABLET, COATED ORAL DAILY
Qty: 90 TABLET | Refills: 1 | Status: SHIPPED | OUTPATIENT
Start: 2022-09-27 | End: 2023-03-24

## 2022-09-27 NOTE — TELEPHONE ENCOUNTER
----- Message from Alli Dial MD sent at 9/24/2022 10:24 PM EDT -----  Ldl is 72, as you have an aneurysm I think we should use very low dose statin, crestor 5 mg. In addition the older you get the higer the riak for stroke and also CAD

## 2022-09-27 NOTE — TELEPHONE ENCOUNTER
Spoke with  Carmencita and went over recent lab work results.  With you having an aneurysm Dr. Hitchcock would like you LDL cholesterol to be 70 and it currently is 72.  He is going to start you on a low dose of Crestor 5mg and you will take at night. Pt verbalized understanding.

## 2022-10-14 ENCOUNTER — HOSPITAL ENCOUNTER (OUTPATIENT)
Dept: RADIATION ONCOLOGY | Facility: HOSPITAL | Age: 67
Setting detail: RADIATION/ONCOLOGY SERIES
Discharge: HOME | End: 2022-10-14

## 2022-10-14 ENCOUNTER — OFFICE VISIT (OUTPATIENT)
Dept: PALLIATIVE CARE | Facility: CLINIC | Age: 67
End: 2022-10-14

## 2022-10-14 VITALS — WEIGHT: 160 LBS | HEIGHT: 73 IN | BODY MASS INDEX: 21.2 KG/M2

## 2022-10-14 DIAGNOSIS — C18.1 MUCINOUS ADENOCARCINOMA OF APPENDIX: Primary | ICD-10-CM

## 2022-10-14 DIAGNOSIS — G89.3 CANCER ASSOCIATED PAIN: ICD-10-CM

## 2022-10-14 DIAGNOSIS — G62.9 NEUROPATHY: ICD-10-CM

## 2022-10-14 PROCEDURE — 99214 OFFICE O/P EST MOD 30 MIN: CPT | Performed by: PHYSICIAN ASSISTANT

## 2022-10-14 RX ORDER — GABAPENTIN 600 MG/1
600 TABLET ORAL 3 TIMES DAILY
Qty: 90 TABLET | Refills: 0 | Status: SHIPPED | OUTPATIENT
Start: 2022-10-15 | End: 2022-11-14 | Stop reason: SDUPTHER

## 2022-10-14 RX ORDER — OXYCODONE HYDROCHLORIDE 10 MG/1
10 TABLET ORAL EVERY 4 HOURS PRN
Qty: 180 TABLET | Refills: 0 | Status: SHIPPED | OUTPATIENT
Start: 2022-10-14 | End: 2022-12-05 | Stop reason: SDUPTHER

## 2022-10-14 NOTE — TELEPHONE ENCOUNTER
I have reviewed patient's YAIMA report prior to prescribing Schedule II, III, and IV medications. Request # [id number]. Refill for oxycodone 10 q4h PRN #180 sent to the pharmacy. Patient will f/u in 3 months.

## 2022-10-14 NOTE — PROGRESS NOTES
Palliative Clinic Note      Name: Esteban Tse  Age: 67 y.o.  Sex: male  : 1955  MRN: [id number]  Date of Service: 10/14/22  Medical Oncologist: Bhavin   Mode of visit: Telephone  Location of patient: Home    The patient has chosen to receive care through a telehealth visit.   Does the patient consent to using video/audio connection for their medical care today? Yes   No technical issues occurred during this visit.     Subjective:    Chief Complaint: Follow-up for symptom management    History of Present Illness: Esteban Tse is a 67 y.o. male with past medical history significant for HTN, AAA, metastatic adenocarcinoma of the appendix  who presents via videochat today as a follow up for pain and symptom management.     Treatment summary: The patient was diagnosed with cancer of the appendix at the time of appendectomy in 2017. Patient under surveillance until imaging in 2019 demonstrated several lesions suspicious for recurrent disease. He underwent a diagnostic laparoscopy with peritoneal biopsies on 2020 by Dr. Peoples that proved recurrent adenocarcinoma.  On 2020 he underwent an exploratory laparotomy with excision of right and left lobe liver lesions, omentectomy, resection of pelvic peritoneal nodules, ileocolectomy with creation of ileal colostomy, hyperthermic intraperitoneal chemotherapy and a partial cystectomy with right ureteral stent placement. Than on 21 he underwent a cystoscopy with bilateral ureteral catheters, exploratory laparotomy, lysis of adhesions, right pelvic sidewall excisional biopsy, and an open partial cystectomy. TURBT performed in 10/2021. Imaging from 3/4/2022 showed enlarging external illiac adenopathy. There are no surgical options per Dr. Peoples. Underwent CT-guided biopsy of the right iliac mass consistent with mucinous adenocarcinoma. Patient completed palliative radiation with improvement in symptoms. CT chest abdomen and pelvis on  7/7/22 showed stable disease.      Pain: Patient established care with [facility] in [place], [place] > 1 year ago. The right hip pain was thought to be referred from his spine and he underwent several epidurals and a lumbar laminectomy with no improvement in his pain. More recently, his pain is related to the cancer of the appendix. Improved function with  oxycodone 10 mg  q5-6h PRN and gabapentin 600 mg TID. Patient occasionally takes the morphine ER 15 mg if his pain is severe. However, due to sedation and constipation the patient tries not to take the morphine. He occasionally takes Ibuprofen as well.  His pain fluctuates however patient he is able to maintain his daily functioning even on the bad days.     Other symptoms: The patient denies nausea or vomiting. Patient's appetite improves as the day goes on. Occasional constipation managed with OTC stool softener. No issues with sleep.     Pyschosocial: The patient lives alone. He is retired from working for the state. He enjoys playing golf and spending time outside. No personal history of a mental illness. The patient admits to anxiety/depression related to his cancer diagnosis but states overall his mood is stable and he is able to manage it on his own. Today, the patient reports a family emergency involving his dad.      Spiritual: Patient describes himself as curious rather than practicing.      Goals: Improve quality of life with symptom management.     The following portions of the patient's history were reviewed and updated as appropriate: allergies, current medications, past family history, past medical history, past social history, past surgical history and problem list.    ORT-R: Low risk   Decisional capacity: Full  ECOG: (1) Restricted in physically strenuous activity, ambulatory and able to do work of light nature   Palliative Performance Scale Score: 70%     Objective:    Constitutional: Awake, alert  Psychiatric: Appropriate  affect, cooperative  Neurologic: Oriented x 3, Cranial Nerves grossly intact to confrontation, speech clear    Medication Counts: Collected over the phone. See bottom of note for details. No misuse or overuse evident.   I have reviewed the patient's [place] PDMP. YAIMA Req #[id number].   UDS (Y): Last 3/25/22. Reviewed. Appropriate.     Assessment & Plan:    1. Mucinous adenocarcinoma of appendix (HCC)  - No active treatment. Upcoming scans.     2. Cancer associated pain  - Side effects of the medication discussed at every visit. Patient is appropriate for opioid therapy due to cancer related pain. Continue oxycodone 10 mg every 4 hours as needed. Patient rarely takes the MS contin. Refill for the oxycodone sent to the pharmacy.     3. Neuropathy  - gabapentin (NEURONTIN) 600 MG tablet; Take 1 tablet by mouth 3 (Three) Times a Day for 30 days.  Dispense: 90 tablet; Refill: 0    Code status: Full code  Medical interventions: Full  Advanced directives: Plan to discuss    Return in about 3 months (around 1/14/2023) for Office Visit.    I spent 30 minutes caring for Esteban Tse on this date of service. This time includes time spent by me in the following activities: preparing for the visit, reviewing tests, obtaining and/or reviewing a separately obtained history, performing a medically appropriate examination and/or evaluation , counseling and educating the patient/family/caregiver, ordering medications, tests, or procedures, documenting information in the medical record, independently interpreting results and communicating that information with the patient/family/caregiver, and care coordination    Qian Fleming PA-C  10/14/2022    Medication Date Filled # Filled Count Used # Days  SOY   Gabapentin 600 9/15/22 90 10 80 29 2-3   Oxycodone 10 8/23/22 180 30 150 45 3-4   Morphine ER 15  7/16/22 60 50 10 -- 0-1

## 2022-10-18 ENCOUNTER — TELEPHONE (OUTPATIENT)
Dept: ONCOLOGY | Facility: CLINIC | Age: 67
End: 2022-10-18

## 2022-10-18 NOTE — TELEPHONE ENCOUNTER
Caller: JAYLA    Relationship: [facility]     Best call back number: [phone]      What was the call regarding: FACILITY IS CALLING TO CHECK ON AUTH FOR CT SCAN THAT IS SCHEDULED FOR TOMORROW     Do you require a callback: YES

## 2022-10-28 NOTE — PROGRESS NOTES
08/02/2021  Patient Information  Esteban Tse                                                                                          [address] [place] [address]   1955  'PCP/Referring Physician'  Rocio Maya MD  [phone]  Tanya Alvarado, APRN  [phone]  Chief Complaint   Patient presents with   • Consult      NP per Tanya Alvarado APRN for 4.1 cm Ascending Aneurys-Found on Routine Scan for Hx of Cancer       History of Present Illness:  The patient is a 66-year-old male who is being referred to me today to evaluate a 4.2 cm  ascending aortic aneurysm.  This patient, recently, had undergone an appendectomy and was found to have mucinous adenocarcinoma of the appendix.  A subsequent cancer work-up with CT scan of the chest and abdomen demonstrated the incidental finding of a 4.2 cm ascending aorta.  The patient has no unusual chest or intrascapular pain.  And he has a cardiologist that has followed him for over 10 years. The patient states that he has no known aortic valvular abnormalities, but his last echocardiogram has been more than 2 years ago.      Patient Active Problem List   Diagnosis   • Mucinous adenocarcinoma of appendix (CMS/HCC)   • Encounter for care related to vascular access port   • Peritoneal carcinoma (CMS/HCC)   • Thoracic aortic aneurysm without rupture (CMS/HCC)     Past Medical History:   Diagnosis Date   • Ascending aortic aneurysm (CMS/HCC)    • Hypertension    • Mucinous adenocarcinoma (CMS/HCC)      Past Surgical History:   Procedure Laterality Date   • APPENDECTOMY  2017    With Partial Colon    • BLADDER SURGERY      with partial colon   • GALLBLADDER SURGERY         Current Outpatient Medications:   •  gabapentin (NEURONTIN) 300 MG capsule, Take 300 mg by mouth 2 (Two) Times a Day As Needed., Disp: , Rfl:   •  lisinopril-hydrochlorothiazide (PRINZIDE,ZESTORETIC) 20-12.5 MG per tablet, , Disp: , Rfl: 0  •  metoprolol succinate XL (TOPROL-XL) 50 MG 24 hr tablet, ,  Disp: , Rfl: 0  •  diphenoxylate-atropine (Lomotil) 2.5-0.025 MG per tablet, Take 1 tablet by mouth Every 6 (Six) Hours As Needed for Diarrhea. 1 tablet, Disp: 30 tablet, Rfl: 5  •  lidocaine-prilocaine (EMLA) 2.5-2.5 % cream, Apply  topically to the appropriate area as directed As Needed (45-60 minutes prior to port access.  Cover with saran/plastic wrap.)., Disp: 30 g, Rfl: 3  •  ondansetron (ZOFRAN) 8 MG tablet, Take 1 tablet by mouth 3 (Three) Times a Day As Needed for Nausea or Vomiting., Disp: 30 tablet, Rfl: 5  •  prochlorperazine (COMPAZINE) 10 MG tablet, TAKE 1 TABLET BY MOUTH EVERY 6 HOURS AS NEEDED FOR NAUSEA AND VOMITING, Disp: 60 tablet, Rfl: 2  •  temazepam (RESTORIL) 15 MG capsule, Take 1 capsule by mouth At Night As Needed for Sleep., Disp: 30 capsule, Rfl: 0  •  traMADol (ULTRAM) 50 MG tablet, TK 1 T PO TID PRN P, Disp: , Rfl:   Allergies   Allergen Reactions   • Levofloxacin Unknown - Low Severity   • Penicillins Unknown - Low Severity   • Tetracycline Unknown - Low Severity     Social History     Socioeconomic History   • Marital status: Single     Spouse name: Not on file   • Number of children: 0   • Years of education: Not on file   • Highest education level: Not on file   Tobacco Use   • Smoking status: Never Smoker   • Smokeless tobacco: Never Used   Substance and Sexual Activity   • Drug use: Never     Family History   Problem Relation Age of Onset   • Lung cancer Mother    • Heart disease Father      Review of Systems   Constitutional: Negative for chills, fever, malaise/fatigue, night sweats and weight loss.   HENT: Negative for hearing loss, odynophagia and sore throat.    Cardiovascular: Negative for chest pain, dyspnea on exertion, leg swelling, orthopnea and palpitations.   Respiratory: Negative for cough and hemoptysis.    Endocrine: Negative for cold intolerance, heat intolerance, polydipsia, polyphagia and polyuria.   Hematologic/Lymphatic: Does not bruise/bleed easily.   Skin:  "Negative for itching and rash.   Musculoskeletal: Positive for back pain. Negative for joint pain, joint swelling and myalgias.   Gastrointestinal: Negative for abdominal pain, constipation, diarrhea, hematemesis, hematochezia, melena, nausea and vomiting.   Genitourinary: Negative for dysuria, frequency and hematuria.   Neurological: Negative for focal weakness, headaches, numbness and seizures.   Psychiatric/Behavioral: Negative for suicidal ideas.   All other systems reviewed and are negative.    Vitals:    08/02/21 0712   BP: 150/100   BP Location: Left arm   Patient Position: Sitting   Pulse: 103   Temp: 97.8 °F (36.6 °C)   SpO2: 100%   Weight: 61.7 kg (136 lb)   Height: 185.4 cm (73\")      Physical Exam    CONSTITUTIONAL: Alert and conversant, Well dressed, Well nourished, No acute distress  EYES: Sclera clean, Anicteric, Pupils equal  ENT: No nasal deviation, Trachea midline  NECK: No neck masses, Supple  LUNGS: No wheezing, Cough, non-congested  HEART: No rubs, No murmurs  GASTROINTESTINAL: Soft, non-distended, No masses, Non tender  to palpation, normal bowel sounds  NEURO: No motor deficits, No sensory deficits, Cranial Nerves 2 through 12 grossly intact  PSYCHIATRIC: Oriented to person, place and time, No memory deficits, Mood appropriate  VASCULAR: No carotid bruits, Femoral pulses palpable and symmetric  MUSKULOSKELETAL: No extremity trauma or extremity asymmetry    The ROS, past medical history, surgical history, family history, social history and vitals were reviewed by myself and corrected as needed.      Labs/Imaging:  I reviewed the CT scan images and I agree with the radiologist that the ascending aorta is approximately 4.2 cm.    Assessment/Plan:   The patient is a 66-year-old male who has been referred for a 4.2 cm ascending aortic aneurysm found incidentally in his cancer work-up for mucinous adenocarcinoma of the appendix.  He has no known aortic valvular abnormalities and he is being followed " routinely by his cardiologist Dr. Dial in [place].  I dictated that normally we would repair this at 5.5 cm.  I have noted that his most recent CT scan which I am evaluating was obtained in March, of 2021.  I have therefore made arrangements for a repeat scan to be done at the [facility] in March, 2022 and we will see him back in follow-up.  He says he is also routinely followed with Dr. Delcid and I indicated that we do not want to double up on the CT scans and obtain more that we need and that if Dr. Delcid has a scan scheduled around that time that he only needs to have one scan. The patient understands and agrees.  He is also scheduled to see his cardiologist soon and I have recommended a repeat echocardiogram as it has been 2 years or more since his last one.    Patient Active Problem List   Diagnosis   • Mucinous adenocarcinoma of appendix (CMS/HCC)   • Encounter for care related to vascular access port   • Peritoneal carcinoma (CMS/HCC)   • Thoracic aortic aneurysm without rupture (CMS/HCC)       CC: MD Tanya Strong APRN Regina Fugate editing for Alex Anthony MD      I, Alex Anthony MD, have read and agree with the editing done by Frida Hallman, .   negative... 97.8

## 2022-11-01 ENCOUNTER — OFFICE VISIT (OUTPATIENT)
Dept: ONCOLOGY | Facility: CLINIC | Age: 67
End: 2022-11-01

## 2022-11-01 VITALS
SYSTOLIC BLOOD PRESSURE: 162 MMHG | DIASTOLIC BLOOD PRESSURE: 96 MMHG | TEMPERATURE: 98.9 F | BODY MASS INDEX: 21.6 KG/M2 | HEART RATE: 80 BPM | HEIGHT: 73 IN | WEIGHT: 163 LBS | OXYGEN SATURATION: 99 % | RESPIRATION RATE: 18 BRPM

## 2022-11-01 DIAGNOSIS — C18.1 MUCINOUS ADENOCARCINOMA OF APPENDIX: Primary | ICD-10-CM

## 2022-11-01 DIAGNOSIS — C48.2 PERITONEAL CARCINOMA: ICD-10-CM

## 2022-11-01 PROCEDURE — 99213 OFFICE O/P EST LOW 20 MIN: CPT | Performed by: INTERNAL MEDICINE

## 2022-11-01 NOTE — PROGRESS NOTES
CHIEF COMPLAINT: No new somatic complaints    Problem List:  Oncology/Hematology History Overview Note   1.  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  Pathologic stage IIa with T3 extension into the base of the appendix through the muscularis propria but not into the serosal surface.  16 nodes negative.  Colonoscopy December 2017 negative postop.Laparoscopic diagnostic peritoneal biopsy confirming disease in the bladder dome February 2020.  Began Folfiri.  June 2020 Dr. Peoples excised right lobe of liver and left lobe liver lesion, omentectomy and resection of pelvic peritoneal nodules, ileocolectomy with ileal colostomy and intraperitoneal HIPEC with mitomycin.  Dr. Lawler performed right ureteral stent placement and partial cystectomy.  With large fungating mass, 4/22/2021 had exploratory laparotomy with lysis of adhesions and right pelvic sidewall excisional biopsy with open partial cystectomy.  No malignancy and peritoneal fluid.  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  Margins less than 1 mm.  October 2021 cystoscopy without malignancy.  TURBT 10/11/2021 did not show malignancy.  March 2022 Dr. Peoples for 3.9 cm external iliac recurrent adenopathy 6 weeks out from laminectomy which did not help pain was determined to be an unresectable recurrence with muscle and vascular involvement.  Radiation with Dr. Moody ended 5/6/2022.  Disease stabilized and pain improved.    -12/30/2019 medical oncology office visit: The patient had been on surveillance since surgery December 2017.  CT scans had been negative up until 12/30/2019 CT chest abdomen and pelvis that showed subtle soft tissue density surrounding surgical clips in the right side of the pelvis along with soft tissue nodule at the anterior wall of the bladder concerning for recurrent disease.  Patient sent back to Dr. Davidson for colonoscopy.    -2/7/2020  patient was referred to Dr. Peoples at the [facility], he underwent diagnostic laparoscopic and peritoneal biopsies that confirmed recurrent disease with biopsy of bladder dome nodule showing adenocarcinoma with mucinous features.  Port was placed at this time also.    -2/25/2020 began FOLFIRI    -4/21/2020 patient having increased fatigue, FOLFIRI dose reduced by 10%.    -5/19/2020 cycle 7 FOLFIRI    -6/18/2020 Dr. Peoples performed exploratory laparotomy with excision of right lobe liver lesion and left lobe liver lesion, omentectomy, resection of pelvic peritoneal nodules, ileocolectomy with creation of ileal colostomy, hyperthermic intraperitoneal chemotherapy with mitomycin-C at 42 °C for deep tissue penetration for 90 minutes.  Dr. Perdue performed partial cystectomy with right ureteral stent placement    -8/5/2020 CT abdomen pelvis with contrast shows small soft tissue nodule anterior aspect of bladder stable.  New small amount of ascites as well as a new small left pleural effusion.  Creatinine 0.75 with hemoglobin 11.1 otherwise unremarkable CBC and CMP.    -2/15/2021 CT abdomen pelvis with contrast compared to 8/5/2020 shows enlarging soft tissue mass 4.2 cm over the bladder fundus and there is a calcification along the base of the urinary bladder.  Small stable multiple hypoattenuating indeterminate liver lesions.  Similar degree of ascites.    -2/23/2021 CT chest shows no evidence of metastasis with ascending aorta 42 mm.    -3/8/2021 follow-up with Dr. Portillo Peoples we reviewed his CTs suggested team effort resection with Dr. Perdue    -3/16/2021 follow-up with Dr. Perdue.  He plans for open partial cystectomy, possible ureteral implants, cystoscopy and stent placement in concert with Dr. Peoples.    -4/22/2021 cystoscopy (after prior office cystoscopy showed large fungating mass) with bilateral ureteral catheters, exploratory laparotomy, lysis of adhesions, right pelvic sidewall excisional biopsy, open  "partial cystectomy Dr. Ike Perude. Peritoneal fluid showed predominant inflammatory reactive mesothelial cells with no malignancy. 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 Carcinoma was less than 1 mm from the lateral resection margins.    -5/18/2021 [facility] medical oncology follow-up visit: I reviewed his hospital notes, operative notes, and pathology report as above and went over this with the patient.  The general consensus from retrospective as well as a few prospective data over the last couple of decades with this low-grade malignancy does not show any profound benefit from \"adjuvant\" chemotherapy in this current setting.  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  There is no standard follow-up but general guidelines suggest following up as typical colon cancer.  I will repeat his CT chest abdomen pelvis now to reestablish his baseline postoperative imaging and have him see my nurse practitioner back in a couple of weeks to make sure there is no nia evidence of measurable residual disease.  Assuming this just shows postoperative changes, I would simply repeat scans again in 3 months or sooner as symptoms dictate.  If he has no evidence of persistent or metastatic disease on current imaging, then when he sees my nurse practitioner back she will also review what they say about his ascending aorta which was 42 mm in February and sent him to Dr. Evangelist Anthony for an opinion as to whether any intervention is needed relative to the aorta.  Obviously if his cancer is out of control on the upcoming scans then the aorta is a moot point.    -6/10/2021 [facility] oncology clinic follow-up: CT scans show no evidence of disease recurrence in the chest, abdomen or pelvis.  Ascending aortic aneurysm stable at 41 mm.  Referral made to Dr. Anthony, cardiothoracic surgeon for management and evaluation of a sending aortic aneurysm.  Plan to repeat CT " scans in 3 months.    -9/14/2021 [facility] medical oncology follow-up visit: I reviewed images and reports of 9/10/2021 CT chest abdomen pelvis with contrast which shows under distended urinary bladder with mild asymmetric wall thickening dome and left lateral wall with a small 9 mm polypoid lesion in the bladder.  There is stable 2.3 cm right external iliac and a few other subcentimeter scattered nodes in the root of the small bowel mesentery and retroperitoneum.  Postsurgical right hemicolectomy changes.  Stable low-attenuation lesions in the liver unchanged.  I will have him collect his discs from [facility] to take Dr. Perdue to decide whether to proceed with cystoscopy or just continue watchful waiting.  There is no major changes and I suspect we are looking at postoperative changes and we shall see him for video visit in a few weeks to see what urology at  decides.    -10/11/2021 cystoscopy Dr. Perdue showed marked acute and chronic inflammation but no evidence of malignancy.  Muscularis propria present.    -10/14/2021 [facility] medical oncology virtual visit: I reviewed reports of pathology from cystoscopy 10/11/2021 and went over this with patient.  He is feeling fairly fit.  I will repeat his CT chest abdomen pelvis prior to return in 3 months and if in the interim, when he follows up with Dr. Perdue in the next couple of weeks post cystoscopy, plans are made for further cystoscopy before I see him back and any malignancy is found, I would ask the patient and Dr. Perdue to touch base as I would not know of such without that contact as the information comes into our chart without notification now that epic is being used by .  Assuming no further biopsies in the interim, I will simply repeat his scans in 3 months to follow what I suspected and is now confirmed to be inflammatory changes.  From the aneurysm standpoint, he has follow-up scanning March 2022 arranged by Dr. Anthony and he will follow him up  after that.    -11/24/2021 CT chest abdomen pelvis compared to 6/7/2021 outside imaging shows stable 4.1 cm ascending thoracic aorta.  No lung nodules.  Liver homogenous with bilateral cysts.  No adenopathy.  Thickened sigmoid colon and rectum suggestive of mild colitis or postradiation change.  No pelvic adenopathy.  Bony structures degenerative in the spine.  Some soft tissue anterior to the acetabulum on the right and extending along the medial aspect right pelvic sidewall 2.7 x 5.6 may represent intramuscular process versus adenopathy which could not be ruled out.  No prior study views available.    -1/4/2022 follow-up Dr. Ike Perdue [facility].  Per his note, he had TURBT 10/11/2021.  He had been placed on antibiotics for UTI for preop preparation for back surgery planned in 2 weeks.  Denies any mucus in his urine.  Some microhematuria on urinalysis this day.  No gross hematuria or dysuria.  The October 2021 TURBT did not reveal anything malignant on pathology.  There may be irritation or inflammation secondary to sutures in the bladder following partial cystectomy.  Patient asymptomatic from a urinary standpoint.  Recommended following up with [facility] with no plans for further urology follow-up.    -2/4/2022 [facility] medical oncology telehealth follow-up visit: I reviewed his November images and reports with the patient as well as with Dr. Gm Moody and the note from Dr. Perdue from 1 month ago.  I do suspect this pelvic sidewall abnormality in November represents persistent disease but as I have stated in prior dictations, the data on palliative or overall survival benefit of systemic 5-FU based combination chemotherapy and/or Avastin does not show a clear-cut survival advantage to chemotherapy for asymptomatic disease.  He is having some back pain and had spine decompression a couple of weeks ago without much benefit.  This is an area that may potentially be radiated but I will check his CT  "chest (angiogram of chest) abdomen and pelvis and get his blood counts and chemistries and urinalysis and see him back for virtual visit in a few weeks.  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  I will also converse with Dr. Peoples at that junction whether he thinks he has anything to offer if this is progressing though I doubt it given that the last intervention was a urologic procedure and the last cystoscopy/TURBT in October had no malignancy and the liver lesions are currently being called liver cysts albeit I am skeptical as to whether those are truly benign but they are certainly not growing.    -3/4/2022 CT angiogram of chest/CT abdomen pelvis for evaluation of dizziness and surveillance of thoracic aortic aneurysm and appendiceal carcinoma.  Thoracic ascending aortic ectasia 4 x 4 cm stable.  Small nodularity left upper lobe stable from September.  Stable small hepatic hypodensities status post cholecystectomy.  Right external iliac soft tissue fullness now 3 x 3.9 cm previously 2 x 2.3 cm concerning for enlarging adenopathy with symmetric nonenlarged inguinal nodes stable.  CBC unremarkable.  CMP unremarkable save for potassium 5.4.  CEA 18.2.  1-3 red cells and white cells per milliliter of urine.    -3/16/2022 office visit with Dr. Portillo Peoples.  He reviewed the CT from 3/4/2022.  He notes that the laminectomy from 6 weeks prior has not helped his \"hip pain\".  Given the location of this recurrence previously 2 x 2.3 cm now 3 x 3.9 cm in the external iliac area concerning for enlarging adenopathy with symmetric nonenlarged inguinal nodes stable, the mass appears unresectable with " involvement of muscle and vasculature.    -3/24/2022 [facility] medical oncology follow-up virtual visit: Pain is still significant.  We will get him in hopefully in the next day to [facility] for palliative care.  Have spoken with Dr. Peoples and no surgery.  I have spoken with Dr. Moody who thinks palliative radiation while not likely to shrink tumor dramatically may help pain considerably and we will get him there.  We will get him to bring the discs to our [place] office and asked them to bring that back to [place] to get scanned in for our invasive radiologist to look at for us to get CT-guided biopsy of this node in the right lower quadrant and send that for Caris MI profile once the pathology is obtained to hopefully find high tumor mutational burden or other molecular targets that might give us some options.  Apart from that, as stated multiple times, this is not a highly chemotherapy sensitive tumor and I am not sure I would palliate him well but, when I see him back in early June with CAT scans prior to return and post radiation, if he is not getting relief and wants to try a 5-FU based regimen plus or minus Avastin I would be okay with that but he knows it does not alter survival 1 way or the other and we are simply trying to palliate this inexorable process.  No other surgical options.    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  Mismatch repair proficient,NTRK1/2/3 fusion not detected.  BRAF V600E negative.  PD-L1 Negative, 1+, 5%.  PTEN Positive, 1+, 100%. MLH1 positive/3+, 100%. MSH2 positive/3+, 100%. MSH6 positive/3+, 100%. PMS2 positive/3+,100%.    -5/6/2022 last radiation    - 5/25/2022 CT chest abdomen pelvis with contrast at [facility] shows nothing remarkable on the chest.  Stable liver cysts.  Focal soft tissue right iliac region appears to be increasing in size now 7.2 cm previously 3.9 cm  with some mass-effect on surrounding structures with several stable inguinal borderline nodes.    -6/6/2022 [facility] medical oncology follow-up visit: Now 1 month out from radiation just starting to get a little bit of improvement in his abdominal discomfort.  Slight growth on CT but some of that may be postradiation change this close to radiation and, as I stated in my note in March, it is not clear that 5-FU based therapy plus or minus Avastin is going to palliate much given its relatively insensitivity to chemotherapy and it certainly does not improve survival.  To that end, we will plan on just repeating his CAT scans again in about 6 weeks and if the pain is worsening and/or this continues to grow then I will probably give him Avastin FOLFOX.  If everything is stable and pain is controlled we will go continue watchful waiting with imaging about every 3 months from that point forward.    -7/11/2022 [facility] medical oncology follow-up: His 7/7/2022 CT chest abdomen pelvis showed stable right external iliac low-attenuation which had been previously growing and thickening of the distal descending and sigmoid colon with perhaps a small right iliac borderline 1 cm node.  Symptomatically improved post radiation.  For now we will hold on Avastin FOLFOX as the benefit of such is questionable with this histologic subtype and there are no actionable molecular targets.  Repeat CTs prior to return in 3 months.     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  Pathologic stage IIa with T3 extension into the base of the appendix through the muscularis propria but not into the serosal surface.  16 nodes negative.  Colonoscopy December 2017 negative postop     12/5/2017 Surgery    Surgery       Procedure:  Appendectomy and right hemicolectomy      Completeness of resection:  No evidence of  residual tumor     Pathology revealed invasive moderately differentiated mucinous adenocarcinoma.  Right hemicolectomy: Benign colon and small intestine no evidence of carcinoma.  16 benign lymph nodes, 0/16, negative for dysplasia or malignancy.  Tumor size approximately 6 cm.  Grade 2, moderately differentiated.  Tumor invades through the muscularis profile into the base of appendix but does not extend to the serosal surface.  All margins uninvolved, no lymphovascular invasion identified.  Pathological stage pT3 pN0.         12/8/2017 Imaging    CT of the chest abdomen and pelvis negative.  Baseline CBC WBC 7100, hemoglobin 11.3, hematocrit 34.8%, platelet count 195,000.     3/20/2018 Procedure    Colonoscopy with Dr. Davidson was normal, recommendation for repeat surveillance colonoscopy in 3 years.     6/11/2018 Imaging    CT chest, abdomen and pelvis with no evidence metastatic disease.  CEA 1.1     9/24/2018 Imaging    CT abdomen pelvis showed no acute changes and nothing to suggest recurrence     12/28/2018 Imaging    CT chest, abdomen and pelvis negative. Normal CBC, CMP and CEA 0.7.     6/28/2019 Imaging    CT chest, abdomen and pelvis: No interval change from prior studies.  No recurrent or metastatic disease detected in the chest, abdomen or pelvis.  No acute process.  CEA 0.9     12/30/2019 Imaging    CT chest, abdomen and pelvis: No disease in the chest.  There was noted a subtle soft tissue density, one surrounding surgical clips in the right side of the pelvis and the other a soft tissue nodule intimately associated with the anterior wall of the bladder, which are considered suspicious for recurrent disease.  CEA 1.1.  CMP with normal creatinine 0.9, total bilirubin 1.9, AST 34, ALT 24, alkaline phosphatase 93.  CBC with WBC 6900, hemoglobin 15.9, platelet count 232,000.       1/21/2020 Procedure    Normal colonoscopy with Dr. Davidson.  He has made referral to Dr. Portillo Peoples at .     2/7/2020  Progression    12/30/2019 CT chest, abdomen and pelvis: No disease in the chest.  There are subtle soft tissue densities (1 surrounding surgical clips in the right side of the pelvis and the other a soft tissue nodule intimately associated with the anterior wall of the bladder) which are considered suspicious for recurrent disease.  CMP with normal creatinine 0.9, total bilirubin 1.9, AST 34, ALT 24, alkaline phosphatase 93.  CBC with WBC 6900, hemoglobin 15.9, platelet count 232,000.  CEA 1.1.  2/7/2020 diagnostic laparoscopy with peritoneal biopsies performed at the [facility] by Dr. Peoples showed no sign of diffuse peritoneal carcinomatosis or liver metastasis.  There was a firm nodule in the superior dome of the bladder, adherent to omentum, as well as right pelvic sidewall nodule adjacent to surgical clips.  Biopsy of bladder dome nodule showed adenocarcinoma with mucinous features.     2/25/2020 -  Chemotherapy    OP COLORECTAL FOLFIRI Irinotecan / Leucovorin / Fluorouracil     5/29/2020 Imaging    CT chest abdomen pelvis shows slight improvement with decreased nodule anterolateral margin of urinary bladder with stable nodularity of the right lower quadrant adjacent to surgical clips and no progressive disease.  Slight hyperemia of the colonic mucosa     6/18/2020 Surgery    Surgery       -6/18/2020 Dr. Peoples performed exploratory laparotomy with excision of right lobe liver lesion and left lobe liver lesion, omentectomy, resection of pelvic peritoneal nodules, ileocolectomy with creation of ileal colostomy, hyperthermic intraperitoneal chemotherapy with mitomycin-C at 42 °C for deep tissue penetration for 90 minutes.  Dr. Perdue performed partial cystectomy with right ureteral stent placement     8/5/2020 Imaging     CT abdomen pelvis with contrast shows small soft tissue nodule anterior aspect of bladder stable.  New small amount of ascites as well as a new small left pleural effusion.  Creatinine 0.75  with hemoglobin 11.1 otherwise unremarkable CBC and CMP     2/15/2021 Imaging    CT abdomen pelvis with contrast compared to 8/5/2020 shows enlarging soft tissue mass 4.2 cm over the bladder fundus and there is a calcification along the base of the urinary bladder.  Small stable multiple hypoattenuating indeterminate liver lesions.  Similar degree of ascites.     2/23/2021 Imaging    -2/23/2021 CT chest shows no evidence of metastasis with ascending aorta 42 mm.     4/22/2021 Surgery    -4/22/2021 cystoscopy (after prior office cystoscopy showed large fungating mass) with bilateral ureteral catheters, exploratory laparotomy, lysis of adhesions, right pelvic sidewall excisional biopsy, open partial cystectomy Dr. Ike Perdue. Peritoneal fluid showed predominant inflammatory reactive mesothelial cells with no malignancy. 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 Carcinoma was less than 1 mm from the lateral resection margins.     6/7/2021 Imaging    CT chest, abdomen and pelvis: No evidence of metastatic disease within the chest abdomen or pelvis.  Interval resection of enhancing bladder wall mass a small amount of residual enhancing soft tissue, possibly granulation tissue.  Interval resolution of abdominal and pelvic ascites.  Stable dilation of the ascending aorta mid segment measuring up to 41 mm.     9/10/2021 Imaging    CT chest abdomen pelvis with contrast shows under distended urinary bladder with mild asymmetric wall thickening dome and left lateral wall with a small 9 mm polypoid lesion in the bladder.  There is stable 2.3 cm right external iliac and a few other subcentimeter scattered nodes in the root of the small bowel mesentery and retroperitoneum.  Postsurgical right hemicolectomy changes.  Stable low-attenuation lesions in the liver unchanged.     11/24/2021 Imaging    CT chest abdomen pelvis compared to  6/7/2021 outside imaging shows stable 4.1 cm ascending thoracic aorta.  No lung nodules.  Liver homogenous with bilateral cysts.  No adenopathy.  Thickened sigmoid colon and rectum suggestive of mild colitis or postradiation change.  No pelvic adenopathy.  Bony structures degenerative in the spine.  Some soft tissue anterior to the acetabulum on the right and extending along the medial aspect right pelvic sidewall 2.7 x 5.6 may represent intramuscular process versus adenopathy which could not be ruled out.  No prior study views available.     4/11/2022 - 5/6/2022 Radiation    Radiation OncologyTreatment Course:  Esteban Tse received 5000 cGy in 20 fractions to right pelvis/groin via External Beam Radiation - EBRT.     5/25/2022 Imaging    CT chest abdomen pelvis with contrast at [place] [facility] shows nothing remarkable on the chest.  Stable liver cysts.  Focal soft tissue right iliac region appears to be increasing in size now 7.2 cm previously 3.9 cm with some mass-effect on surrounding structures with several stable inguinal borderline nodes.     Peritoneal carcinoma (HCC)   5/14/2020 Initial Diagnosis    Peritoneal carcinoma (HCC)     5/25/2022 Imaging    CT chest abdomen pelvis with contrast at [place] [facility] shows nothing remarkable on the chest.  Stable liver cysts.  Focal soft tissue right iliac region appears to be increasing in size now 7.2 cm previously 3.9 cm with some mass-effect on surrounding structures with several stable inguinal borderline nodes.         HISTORY OF PRESENT ILLNESS:  The patient is a 67 y.o. 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  No new somatic complaints    Past Medical History:   Diagnosis Date   • Ascending aortic aneurysm    • Benign hypertension    • Bladder cancer (HCC)    • Colon cancer (HCC)    • History of radiation therapy 05/06/2022    right groin/hemipelvis   • Hypertension    • Intermittent palpitations    • Mucinous  "adenocarcinoma (HCC)    • Pounding heartbeat    • PVC's (premature ventricular contractions)      Past Surgical History:   Procedure Laterality Date   • APPENDECTOMY  2017    With Partial Colon    • BLADDER SURGERY      with partial colon   • CHOLECYSTECTOMY     • COLON SURGERY     • GALLBLADDER SURGERY         Allergies   Allergen Reactions   • Levofloxacin Swelling     Lips swellijng   • Tetracycline Swelling     Lips swelling   • Penicillins Other (See Comments)     childhood       Family History and Social History reviewed and changed as necessary    REVIEW OF SYSTEM:   No new somatic complaints.    PHYSICAL EXAM:  Fairly fit appearing.  No jaundice or icterus.  No abdominal tenderness or distention.    Vitals:    11/01/22 1035   BP: 162/96   Pulse: 80   Resp: 18   Temp: 98.9 °F (37.2 °C)   SpO2: 99%   Weight: 73.9 kg (163 lb)   Height: 185.4 cm (73\")     Vitals:    11/01/22 1035   PainSc: 0-No pain          ECOG score: 0           Vitals reviewed.    ECOG: (0) Fully Active - Able to Carry On All Pre-disease Performance Without Restriction    Lab Results   Component Value Date    HGB 13.2 06/06/2022    HCT 42.5 06/06/2022    MCV 87.5 06/06/2022     06/06/2022    WBC 7.50 06/06/2022    NEUTROABS 5.80 06/06/2022    LYMPHSABS 1.30 06/06/2022    MONOSABS 0.50 06/06/2022    EOSABS 0.09 04/05/2022    BASOSABS 0.02 04/05/2022       Lab Results   Component Value Date    GLUCOSE 113 (H) 06/06/2022    BUN 10 06/06/2022    CREATININE 1.00 06/06/2022     06/06/2022    K 4.6 06/06/2022     06/06/2022    CO2 27.0 06/06/2022    CALCIUM 9.1 06/06/2022    PROTEINTOT 6.4 06/06/2022    ALBUMIN 4.00 06/06/2022    BILITOT 0.5 06/06/2022    ALKPHOS 120 (H) 06/06/2022    AST 21 06/06/2022    ALT 18 06/06/2022             ASSESSMENT & PLAN:  1.  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  Pathologic stage IIa with T3 extension into the base of the " appendix through the muscularis propria but not into the serosal surface.  16 nodes negative.  Colonoscopy December 2017 negative postop.Laparoscopic diagnostic peritoneal biopsy confirming disease in the bladder dome February 2020.  Began Folfiri.  June 2020 Dr. Peoples excised right lobe of liver and left lobe liver lesion, omentectomy and resection of pelvic peritoneal nodules, ileocolectomy with ileal colostomy and intraperitoneal HIPEC with mitomycin.  Dr. Lawler performed right ureteral stent placement and partial cystectomy.  With large fungating mass, 4/22/2021 had exploratory laparotomy with lysis of adhesions and right pelvic sidewall excisional biopsy with open partial cystectomy.  No malignancy and peritoneal fluid.  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  Margins less than 1 mm.  October 2021 cystoscopy without malignancy.  TURBT 10/11/2021 did not show malignancy.  March 2022 Dr. Peoples for 3.9 cm external iliac recurrent adenopathy 6 weeks out from laminectomy which did not help pain was determined to be an unresectable recurrence with muscle and vascular involvement.  Radiation with Dr. Moody ended 5/6/2022.  Disease stabilized and pain improved.    -12/30/2019 medical oncology office visit: The patient had been on surveillance since surgery December 2017.  CT scans had been negative up until 12/30/2019 CT chest abdomen and pelvis that showed subtle soft tissue density surrounding surgical clips in the right side of the pelvis along with soft tissue nodule at the anterior wall of the bladder concerning for recurrent disease.  Patient sent back to Dr. Davidson for colonoscopy.    -2/7/2020 patient was referred to Dr. Peoples at the [facility], he underwent diagnostic laparoscopic and peritoneal biopsies that confirmed recurrent disease with biopsy of bladder dome nodule showing adenocarcinoma with mucinous features.  Port was placed at this time  also.    -2/25/2020 began FOLFIRI    -4/21/2020 patient having increased fatigue, FOLFIRI dose reduced by 10%.    -5/19/2020 cycle 7 FOLFIRI    -6/18/2020 Dr. Peoples performed exploratory laparotomy with excision of right lobe liver lesion and left lobe liver lesion, omentectomy, resection of pelvic peritoneal nodules, ileocolectomy with creation of ileal colostomy, hyperthermic intraperitoneal chemotherapy with mitomycin-C at 42 °C for deep tissue penetration for 90 minutes.  Dr. Perdue performed partial cystectomy with right ureteral stent placement    -8/5/2020 CT abdomen pelvis with contrast shows small soft tissue nodule anterior aspect of bladder stable.  New small amount of ascites as well as a new small left pleural effusion.  Creatinine 0.75 with hemoglobin 11.1 otherwise unremarkable CBC and CMP.    -2/15/2021 CT abdomen pelvis with contrast compared to 8/5/2020 shows enlarging soft tissue mass 4.2 cm over the bladder fundus and there is a calcification along the base of the urinary bladder.  Small stable multiple hypoattenuating indeterminate liver lesions.  Similar degree of ascites.    -2/23/2021 CT chest shows no evidence of metastasis with ascending aorta 42 mm.    -3/8/2021 follow-up with Dr. Portillo Peoples we reviewed his CTs suggested team effort resection with Dr. Perdue    -3/16/2021 follow-up with Dr. Perdue.  He plans for open partial cystectomy, possible ureteral implants, cystoscopy and stent placement in concert with Dr. Peoples.    -4/22/2021 cystoscopy (after prior office cystoscopy showed large fungating mass) with bilateral ureteral catheters, exploratory laparotomy, lysis of adhesions, right pelvic sidewall excisional biopsy, open partial cystectomy Dr. Ike Perdue. Peritoneal fluid showed predominant inflammatory reactive mesothelial cells with no malignancy. 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  "Carcinoma was less than 1 mm from the lateral resection margins.    -5/18/2021 [facility] medical oncology follow-up visit: I reviewed his hospital notes, operative notes, and pathology report as above and went over this with the patient.  The general consensus from retrospective as well as a few prospective data over the last couple of decades with this low-grade malignancy does not show any profound benefit from \"adjuvant\" chemotherapy in this current setting.  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  There is no standard follow-up but general guidelines suggest following up as typical colon cancer.  I will repeat his CT chest abdomen pelvis now to reestablish his baseline postoperative imaging and have him see my nurse practitioner back in a couple of weeks to make sure there is no nia evidence of measurable residual disease.  Assuming this just shows postoperative changes, I would simply repeat scans again in 3 months or sooner as symptoms dictate.  If he has no evidence of persistent or metastatic disease on current imaging, then when he sees my nurse practitioner back she will also review what they say about his ascending aorta which was 42 mm in February and sent him to Dr. Evangelist Anthony for an opinion as to whether any intervention is needed relative to the aorta.  Obviously if his cancer is out of control on the upcoming scans then the aorta is a moot point.    -6/10/2021 [facility] oncology clinic follow-up: CT scans show no evidence of disease recurrence in the chest, abdomen or pelvis.  Ascending aortic aneurysm stable at 41 mm.  Referral made to Dr. Anthony, cardiothoracic surgeon for management and evaluation of a sending aortic aneurysm.  Plan to repeat CT scans in 3 months.    -9/14/2021 [facility] medical oncology follow-up visit: I reviewed images and reports of 9/10/2021 CT chest abdomen pelvis with contrast which shows under distended urinary bladder with mild asymmetric wall thickening dome and left lateral wall " with a small 9 mm polypoid lesion in the bladder.  There is stable 2.3 cm right external iliac and a few other subcentimeter scattered nodes in the root of the small bowel mesentery and retroperitoneum.  Postsurgical right hemicolectomy changes.  Stable low-attenuation lesions in the liver unchanged.  I will have him collect his discs from [facility] to take Dr. Perdue to decide whether to proceed with cystoscopy or just continue watchful waiting.  There is no major changes and I suspect we are looking at postoperative changes and we shall see him for video visit in a few weeks to see what urology at  decides.    -10/11/2021 cystoscopy Dr. Perdue showed marked acute and chronic inflammation but no evidence of malignancy.  Muscularis propria present.    -10/14/2021 [facility] medical oncology virtual visit: I reviewed reports of pathology from cystoscopy 10/11/2021 and went over this with patient.  He is feeling fairly fit.  I will repeat his CT chest abdomen pelvis prior to return in 3 months and if in the interim, when he follows up with Dr. Perdue in the next couple of weeks post cystoscopy, plans are made for further cystoscopy before I see him back and any malignancy is found, I would ask the patient and Dr. Perdue to touch base as I would not know of such without that contact as the information comes into our chart without notification now that epic is being used by .  Assuming no further biopsies in the interim, I will simply repeat his scans in 3 months to follow what I suspected and is now confirmed to be inflammatory changes.  From the aneurysm standpoint, he has follow-up scanning March 2022 arranged by Dr. Anthony and he will follow him up after that.    -11/24/2021 CT chest abdomen pelvis compared to 6/7/2021 outside imaging shows stable 4.1 cm ascending thoracic aorta.  No lung nodules.  Liver homogenous with bilateral cysts.  No adenopathy.  Thickened sigmoid colon and rectum suggestive of mild  colitis or postradiation change.  No pelvic adenopathy.  Bony structures degenerative in the spine.  Some soft tissue anterior to the acetabulum on the right and extending along the medial aspect right pelvic sidewall 2.7 x 5.6 may represent intramuscular process versus adenopathy which could not be ruled out.  No prior study views available.    -1/4/2022 follow-up Dr. Ike Perdue [facility].  Per his note, he had TURBT 10/11/2021.  He had been placed on antibiotics for UTI for preop preparation for back surgery planned in 2 weeks.  Denies any mucus in his urine.  Some microhematuria on urinalysis this day.  No gross hematuria or dysuria.  The October 2021 TURBT did not reveal anything malignant on pathology.  There may be irritation or inflammation secondary to sutures in the bladder following partial cystectomy.  Patient asymptomatic from a urinary standpoint.  Recommended following up with [facility] oncology with no plans for further urology follow-up.    -2/4/2022 [facility] medical oncology telehealth follow-up visit: I reviewed his November images and reports with the patient as well as with Dr. Gm oMody and the note from Dr. Perdue from 1 month ago.  I do suspect this pelvic sidewall abnormality in November represents persistent disease but as I have stated in prior dictations, the data on palliative or overall survival benefit of systemic 5-FU based combination chemotherapy and/or Avastin does not show a clear-cut survival advantage to chemotherapy for asymptomatic disease.  He is having some back pain and had spine decompression a couple of weeks ago without much benefit.  This is an area that may potentially be radiated but I will check his CT chest (angiogram of chest) abdomen and pelvis and get his blood counts and chemistries and urinalysis and see him back for virtual visit in a few weeks.  If we have growth, I will get biopsy to not only verify the virtual certainty of the recurrence in this area  "given that he had known disease likely residual at the time of his surgery as outlined from my note in May and the natural history of this low-grade mucinous adenocarcinoma of the appendix having multiple recurrences even after debulking and he has already had HIPEC.  I will also converse with Dr. Peoples at that junction whether he thinks he has anything to offer if this is progressing though I doubt it given that the last intervention was a urologic procedure and the last cystoscopy/TURBT in October had no malignancy and the liver lesions are currently being called liver cysts albeit I am skeptical as to whether those are truly benign but they are certainly not growing.    -3/4/2022 CT angiogram of chest/CT abdomen pelvis for evaluation of dizziness and surveillance of thoracic aortic aneurysm and appendiceal carcinoma.  Thoracic ascending aortic ectasia 4 x 4 cm stable.  Small nodularity left upper lobe stable from September.  Stable small hepatic hypodensities status post cholecystectomy.  Right external iliac soft tissue fullness now 3 x 3.9 cm previously 2 x 2.3 cm concerning for enlarging adenopathy with symmetric nonenlarged inguinal nodes stable.  CBC unremarkable.  CMP unremarkable save for potassium 5.4.  CEA 18.2.  1-3 red cells and white cells per milliliter of urine.    -3/16/2022 office visit with Dr. Portillo Peoples.  He reviewed the CT from 3/4/2022.  He notes that the laminectomy from 6 weeks prior has not helped his \"hip pain\".  Given the location of this recurrence previously 2 x 2.3 cm now 3 x 3.9 cm in the external iliac area concerning for enlarging adenopathy with symmetric nonenlarged inguinal nodes stable, the mass appears unresectable with involvement of muscle and vasculature.    -3/24/2022 [facility] medical oncology follow-up virtual visit: Pain is still significant.  We will get him in hopefully in the next day to [facility] for palliative care.  Have spoken with Dr. Peoples and no surgery.  I " have spoken with Dr. Moody who thinks palliative radiation while not likely to shrink tumor dramatically may help pain considerably and we will get him there.  We will get him to bring the discs to our [place] office and asked them to bring that back to [place] to get scanned in for our invasive radiologist to look at for us to get CT-guided biopsy of this node in the right lower quadrant and send that for Gab MI profile once the pathology is obtained to hopefully find high tumor mutational burden or other molecular targets that might give us some options.  Apart from that, as stated multiple times, this is not a highly chemotherapy sensitive tumor and I am not sure I would palliate him well but, when I see him back in early June with CAT scans prior to return and post radiation, if he is not getting relief and wants to try a 5-FU based regimen plus or minus Avastin I would be okay with that but he knows it does not alter survival 1 way or the other and we are simply trying to palliate this inexorable process.  No other surgical options.    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  Mismatch repair proficient,NTRK1/2/3 fusion not detected.  BRAF V600E negative.  PD-L1 Negative, 1+, 5%.  PTEN Positive, 1+, 100%. MLH1 positive/3+, 100%. MSH2 positive/3+, 100%. MSH6 positive/3+, 100%. PMS2 positive/3+,100%.    -5/6/2022 last radiation    - 5/25/2022 CT chest abdomen pelvis with contrast at [facility] shows nothing remarkable on the chest.  Stable liver cysts.  Focal soft tissue right iliac region appears to be increasing in size now 7.2 cm previously 3.9 cm with some mass-effect on surrounding structures with several stable inguinal borderline nodes.    -6/6/2022 [facility] medical oncology follow-up visit: Now 1 month out from radiation just starting to get a little bit of improvement in his abdominal discomfort.   Slight growth on CT but some of that may be postradiation change this close to radiation and, as I stated in my note in March, it is not clear that 5-FU based therapy plus or minus Avastin is going to palliate much given its relatively insensitivity to chemotherapy and it certainly does not improve survival.  To that end, we will plan on just repeating his CAT scans again in about 6 weeks and if the pain is worsening and/or this continues to grow then I will probably give him Avastin FOLFOX.  If everything is stable and pain is controlled we will go continue watchful waiting with imaging about every 3 months from that point forward.    -7/11/2022 [facility] oncology follow-up: His 7/7/2022 CT chest abdomen pelvis showed stable right external iliac low-attenuation which had been previously growing and thickening of the distal descending and sigmoid colon with perhaps a small right iliac borderline 1 cm node.  Symptomatically improved post radiation.  For now we will hold on Avastin FOLFOX as the benefit of such is questionable with this histologic subtype and there are no actionable molecular targets.  Repeat CTs prior to return in 3 months.    -11/1/2022 [facility] medical oncology follow-up: 10/19/2022 CT chest abdomen pelvis with contrast [place] [facility] compared to 7/7/2022 shows no significant change or evidence of progression.  Clinically quiescent.  We will repeat CTs in 6 months, sooner as symptoms dictate.    Total time of care today inclusive of time spent today prior to his arrival reviewing CAT scan reports and during visit translating that to him and after visit putting forth a plan as outlined above took 15 minutes of patient care time throughout the day today.  Cy Delcid MD    11/01/2022

## 2022-11-02 LAB
CYTO UR: NORMAL
LAB AP CARIS, ADDENDUM: NORMAL
LAB AP CASE REPORT: NORMAL
LAB AP CLINICAL INFORMATION: NORMAL
LAB AP DIAGNOSIS COMMENT: NORMAL
PATH REPORT.FINAL DX SPEC: NORMAL
PATH REPORT.GROSS SPEC: NORMAL

## 2022-11-14 DIAGNOSIS — G89.3 CANCER ASSOCIATED PAIN: ICD-10-CM

## 2022-11-14 RX ORDER — GABAPENTIN 600 MG/1
600 TABLET ORAL 3 TIMES DAILY
Qty: 90 TABLET | Refills: 0 | Status: SHIPPED | OUTPATIENT
Start: 2022-11-14 | End: 2022-12-14 | Stop reason: SDUPTHER

## 2022-11-14 NOTE — TELEPHONE ENCOUNTER
YAIMA #: [id number]    Medication requested: Gabapentin 600mg    Last fill date: 10/15/2022    Last appointment: 10/14/2022    Next appointment: 01/12/2023

## 2022-12-05 DIAGNOSIS — G89.3 CANCER ASSOCIATED PAIN: ICD-10-CM

## 2022-12-05 RX ORDER — OXYCODONE HYDROCHLORIDE 10 MG/1
10 TABLET ORAL EVERY 4 HOURS PRN
Qty: 180 TABLET | Refills: 0 | Status: SHIPPED | OUTPATIENT
Start: 2022-12-05 | End: 2023-01-12 | Stop reason: SDUPTHER

## 2022-12-05 NOTE — TELEPHONE ENCOUNTER
YAIMA #: [id number]    Medication requested: Oxycodone 10mg     Last fill date: 10/14/22    Last appointment: 10/14/2022    Next appointment: 01/12/2022

## 2022-12-14 DIAGNOSIS — G89.3 CANCER ASSOCIATED PAIN: ICD-10-CM

## 2022-12-14 RX ORDER — GABAPENTIN 600 MG/1
600 TABLET ORAL 3 TIMES DAILY
Qty: 90 TABLET | Refills: 0 | Status: SHIPPED | OUTPATIENT
Start: 2022-12-14 | End: 2023-01-12 | Stop reason: SDUPTHER

## 2022-12-14 NOTE — TELEPHONE ENCOUNTER
I have reviewed patient's YAIMA report prior to prescribing Schedule II, III, and IV medications. Request # [id number]. Next refills for gabapentin 600 mg TID were sent to the pharmacy. The patient is scheduled to follow-up in January.

## 2023-01-12 ENCOUNTER — OFFICE VISIT (OUTPATIENT)
Dept: PALLIATIVE CARE | Facility: CLINIC | Age: 68
End: 2023-01-12
Payer: MEDICARE

## 2023-01-12 VITALS
BODY MASS INDEX: 22.38 KG/M2 | RESPIRATION RATE: 18 BRPM | HEIGHT: 73 IN | HEART RATE: 77 BPM | TEMPERATURE: 98.8 F | DIASTOLIC BLOOD PRESSURE: 109 MMHG | OXYGEN SATURATION: 100 % | WEIGHT: 168.9 LBS | SYSTOLIC BLOOD PRESSURE: 163 MMHG

## 2023-01-12 DIAGNOSIS — R11.0 NAUSEA: ICD-10-CM

## 2023-01-12 DIAGNOSIS — G89.3 CANCER ASSOCIATED PAIN: ICD-10-CM

## 2023-01-12 DIAGNOSIS — K59.03 THERAPEUTIC OPIOID INDUCED CONSTIPATION: ICD-10-CM

## 2023-01-12 DIAGNOSIS — G89.3 CANCER RELATED PAIN: ICD-10-CM

## 2023-01-12 DIAGNOSIS — C18.1 MUCINOUS ADENOCARCINOMA OF APPENDIX: Primary | ICD-10-CM

## 2023-01-12 DIAGNOSIS — T40.2X5A THERAPEUTIC OPIOID INDUCED CONSTIPATION: ICD-10-CM

## 2023-01-12 PROCEDURE — 99214 OFFICE O/P EST MOD 30 MIN: CPT | Performed by: PHYSICIAN ASSISTANT

## 2023-01-12 RX ORDER — OXYCODONE HYDROCHLORIDE 10 MG/1
10 TABLET ORAL EVERY 4 HOURS PRN
Qty: 180 TABLET | Refills: 0 | Status: SHIPPED | OUTPATIENT
Start: 2023-01-12 | End: 2023-03-02 | Stop reason: SDUPTHER

## 2023-01-12 RX ORDER — ONDANSETRON 4 MG/1
4 TABLET, FILM COATED ORAL EVERY 8 HOURS PRN
Qty: 30 TABLET | Refills: 1 | Status: SHIPPED | OUTPATIENT
Start: 2023-01-12

## 2023-01-12 RX ORDER — GABAPENTIN 600 MG/1
600 TABLET ORAL 3 TIMES DAILY
Qty: 90 TABLET | Refills: 0 | Status: SHIPPED | OUTPATIENT
Start: 2023-01-13 | End: 2023-02-16 | Stop reason: SDUPTHER

## 2023-01-12 RX ORDER — MORPHINE SULFATE 15 MG/1
15 TABLET, FILM COATED, EXTENDED RELEASE ORAL 2 TIMES DAILY
Qty: 60 TABLET | Refills: 0 | Status: SHIPPED | OUTPATIENT
Start: 2023-01-12

## 2023-01-12 NOTE — TELEPHONE ENCOUNTER
I have reviewed patient's YAIMA report prior to prescribing Schedule II, III, and IV medications. Request # [id number]. Next refills for morphine ER 15 BID #60 and oxycodone 10 q4h PRN #180 were sent to the pharmacy. The patient is scheduled to follow-up in 3 months.

## 2023-01-12 NOTE — PATIENT INSTRUCTIONS
Check-out instructions:  Continue morphine ER 15 mg twice daily. Continue oxycodone 10 mg every 4 hours as needed. Continue gabapentin 600 mg three times daily.   Scheduled to follow up 3 months.     Medication Policy: We ask that you bring all of the medications prescribed by this clinic to every appointment. For telemedicine appointments, be prepared to give medication counts. This will assist us with managing your refill needs.      Refill Policy: You must notify us at least 3-5 business days in advance for routine refill requests. Call [phone] or send Doctor kinetic message to the Palliative Pool. Some prescriptions will need to be signed by the physician and will take longer to be sent to the pharmacy. Please also be aware of additional insurance prior authorization processing time required for many medications. Try to communicate with your pharmacy first to look for scripts signed in advance.     Communication: The [facility] days are Monday-Friday 8:30-4:30 PM. Call [phone] or send Doctor kinetic message to the Palliative Pool. You will not be routed to speak directly to the palliative provider during clinic hours, so that we may provide the best care and attention to our patients in the office. If you require immediate communication, please also consider contacting your primary care office or appropriate specialist office.

## 2023-01-12 NOTE — PROGRESS NOTES
Palliative Clinic Note      Name: Esteban Tse  Age: 67 y.o.  Sex: male  : 1955  MRN: [id number]  Date of Service: 2023   Medical Oncologist: Bhavin     Subjective:    Chief Complaint: Follow-up for symptom management    History of Present Illness: Esteban Tse is a 67 y.o. male with past medical history significant for HTN, AAA, metastatic adenocarcinoma of the appendix who presents to the palliative clinic today as a follow up for pain and symptom management.     Treatment summary: The patient was diagnosed with cancer of the appendix at the time of appendectomy in 2017. Patient under surveillance until imaging in 2019 demonstrated several lesions suspicious for recurrent disease. He underwent a diagnostic laparoscopy with peritoneal biopsies on 2020 by Dr. Peoples that proved recurrent adenocarcinoma. He underwent an exploratory laparotomy with excision of right and left lobe liver lesions, omentectomy, resection of pelvic peritoneal nodules, ileocolectomy with creation of ileal colostomy, hyperthermic intraperitoneal chemotherapy and a partial cystectomy with right ureteral stent placement in 2020. He underwent a cystoscopy with bilateral ureteral catheters, exploratory laparotomy, lysis of adhesions, right pelvic sidewall excisional biopsy, and an open partial cystectomy in 2021. TURBT performed in 10/2021. Imaging from 3/4/2022 showed enlarging external illiac adenopathy. There are no surgical options per Dr. Peoples. Underwent CT-guided biopsy of the right iliac mass consistent with mucinous adenocarcinoma. Patient completed palliative radiation with improvement in symptoms. CT chest abdomen and pelvis on 10/19/22 showed stable disease. Plan to continue watchful waiting.      Pain: Patient established care with [facility] in [place], [place] > 1 year ago. Patient's right hip pain was thought to be referred from his spine. Patient underwent several  "epidurals and a lumbar laminectomy with no improvement in his pain. More recently, he complains of pain in her left groin / LLE related to the cancer of the appendix. He has had improved function with Morphine ER 15 mg 1-2x daily, oxycodone 10 mg q5-6h PRN and gabapentin 600 mg TID. He occasionally takes Ibuprofen as well. The patient admits to some sedation and constipation with the morphine.  His pain fluctuates however patient he is able to maintain his daily functioning even on the bad days.     Other symptoms: The patient admits to occasional nausea, he describes as feeling \"queasy\". He does not take any medication for this. Patient's appetite improves as the day goes on. He reports gaining several pounds since his last visit. Occasional constipation managed with OTC stool softener. No issues with sleep. Patient takes two tablets of oxycodone before bed to prevent waking up in pain.      Pyschosocial: The patient lives alone. He is retired from working for the state. He enjoys playing golf and spending time outside. No personal history of a mental illness. The patient admits to anxiety/depression related to his cancer diagnosis but states overall his mood is stable and he is able to manage it on his own. The patient admits to increased stress related to the care of his 89 y/o mother. Patient's father passed away in 10/2022.     Spiritual: Patient describes himself as curious rather than practicing.      Goals: Improve quality of life with symptom management.     The following portions of the patient's history were reviewed and updated as appropriate: allergies, current medications, past family history, past medical history, past social history, past surgical history and problem list.    ORT-R: Low risk   Decisional capacity: Full  ECOG: (1) Restricted in physically strenuous activity, ambulatory and able to do work of light nature   Palliative Performance Scale Score: 70%     Objective:    BP (!) 163/109   " "Pulse 77   Temp 98.8 °F (37.1 °C) (Temporal)   Resp 18   Ht 185.4 cm (72.99\")   Wt 76.6 kg (168 lb 14.4 oz)   SpO2 100%   BMI 22.29 kg/m²     Constitutional: Awake, alert, normal gait, sitting up in exam chair, in no acute distress  Eyes: PERRLA, EOMS intact  HENT: NCAT, face symmetric  Neck: Supple, trachea midline  Respiratory: Nonlabored respirations  Cardiovascular: RRR, no edema observed  Gastrointestinal: Soft, no guarding  Musculoskeletal: Moves all extremities   Psychiatric: Appropriate affect, cooperative  Neurologic: Oriented x 3, Cranial Nerves grossly intact to confrontation, speech clear  Skin: Cool dry, no rashes or wounds appreciated     Medication Counts: Reviewed. See bottom of note for details. Did not bring medication to appointment  I have reviewed the patient's [place] PDMP. [facility] Req #[id number].   UDS (Y): Last 3/25/22. Reviewed. Appropriate.     Assessment & Plan:    1. Mucinous adenocarcinoma of appendix (HCC)  - CT chest abdomen and pelvis on 10/19/22 showed stable disease. Plan to continue watchful waiting.     2. Cancer related pain  - Patient is appropriate for opioid therapy due to cancer related pain. Daily function and quality of life improved with current regimen. Continue morphine ER 15 mg BID, oxycodone 10 mg q4h PRN, and gabapentin 600 mg TID. Refills sent to the pharmacy. Side effects of the medication discussed at every visit. Patient was encouraged to continue bowel regimen of daily stool softeners, prn laxatives, and diet modifications.    3. Nausea  - Ondansetron (ZOFRAN) 4 MG tablet; Take 1 tablet by mouth Every 8 (Eight) Hours As Needed for Nausea or Vomiting.  Dispense: 30 tablet; Refill: 1    4. Therapeutic opioid induced constipation  - Recommend patient escalate bowel regimen and begin taking stool softener daily rather than as needed.     Code status: Full code  Medical interventions: Full  Advanced directives: Plan to discuss    Return in about 3 months (around " 4/12/2023) for Video visit.    I spent 30 minutes caring for Esteban Tse on this date of service. This time includes time spent by me in the following activities: preparing for the visit, reviewing tests, obtaining and/or reviewing a separately obtained history, performing a medically appropriate examination and/or evaluation , counseling and educating the patient/family/caregiver, ordering medications, tests, or procedures, documenting information in the medical record, independently interpreting results and communicating that information with the patient/family/caregiver, and care coordination    Qian Fleming PA-C  01/12/2023    Medication Date Filled # Filled Count Used # Days  SOY   Gabapentin 600 12/14/22 90 -- -- 29 3   Oxycodone 10  12/5/22 180 -- -- 38 4-5   Morphine ER 15 7/16/22 60 -- -- -- 1-2

## 2023-02-16 DIAGNOSIS — G89.3 CANCER ASSOCIATED PAIN: ICD-10-CM

## 2023-02-16 RX ORDER — GABAPENTIN 600 MG/1
600 TABLET ORAL 3 TIMES DAILY
Qty: 90 TABLET | Refills: 0 | Status: SHIPPED | OUTPATIENT
Start: 2023-02-16 | End: 2023-03-17 | Stop reason: SDUPTHER

## 2023-02-16 NOTE — TELEPHONE ENCOUNTER
[facility]A #: [id number]    Medication requested: Gabapentin 600mg    Last fill date: 01/14/2023    Last appointment: 01/12/2023     Next appointment: 04/23/2023

## 2023-03-02 DIAGNOSIS — G89.3 CANCER ASSOCIATED PAIN: ICD-10-CM

## 2023-03-02 RX ORDER — OXYCODONE HYDROCHLORIDE 10 MG/1
10 TABLET ORAL EVERY 4 HOURS PRN
Qty: 180 TABLET | Refills: 0 | Status: SHIPPED | OUTPATIENT
Start: 2023-03-02

## 2023-03-02 NOTE — TELEPHONE ENCOUNTER
YA[facility]A #: unable to run, system is down    Medication requested: Oxycodone 10 mg    Last fill date: Last RX wrote on 01/12/2023    Last appointment: 01/12/2023    Next appointment: 04/12/2023

## 2023-03-17 DIAGNOSIS — G89.3 CANCER ASSOCIATED PAIN: Primary | ICD-10-CM

## 2023-03-17 RX ORDER — GABAPENTIN 600 MG/1
600 TABLET ORAL 3 TIMES DAILY
Qty: 90 TABLET | Refills: 0 | Status: SHIPPED | OUTPATIENT
Start: 2023-03-18

## 2023-03-17 NOTE — TELEPHONE ENCOUNTER
Pt is requesting refill of  gabapentin (NEURONTIN) 600 MG tablet     [facility] #: [id number]    Medication requested: gabapentin (NEURONTIN) 600 MG tablet       Last fill date: 2/16/2023    Last appointment: 1/12/2023    Next appointment: 4/12/2023

## 2023-03-24 RX ORDER — ROSUVASTATIN CALCIUM 5 MG/1
5 TABLET, COATED ORAL DAILY
Qty: 90 TABLET | Refills: 1 | Status: SHIPPED | OUTPATIENT
Start: 2023-03-24

## 2023-04-11 NOTE — PROGRESS NOTES
Palliative Clinic Note      Name: Esteban Tse  Age: 67 y.o.  Sex: male  : 1955  MRN: [id number]  Date of Service: 2023   Medical Oncologist: Bhavin  Mode of visit: video-conference  Location of patient: Home  [place] [facility] [place]    Subjective:    Chief Complaint: Increased cancer pain    History of Present Illness: Esteban Tse is a 67 y.o. male with past medical history significant for HTN, AAA, metastatic adenocarcinoma of the appendix who presents via video-conference today as a follow up for pain and symptom management.     Treatment summary: The patient was diagnosed with cancer of the appendix at the time of appendectomy in 2017. Patient under surveillance until imaging in 2019 demonstrated several lesions suspicious for recurrent disease. He underwent a diagnostic laparoscopy with peritoneal biopsies on 2020 by Dr. Peoples that proved recurrent adenocarcinoma. He underwent an exploratory laparotomy with excision of right and left lobe liver lesions, omentectomy, resection of pelvic peritoneal nodules, ileocolectomy with creation of ileal colostomy, hyperthermic intraperitoneal chemotherapy and a partial cystectomy with right ureteral stent placement in 2020. He underwent a cystoscopy with bilateral ureteral catheters, exploratory laparotomy, lysis of adhesions, right pelvic sidewall excisional biopsy, and an open partial cystectomy in 2021. TURBT performed in 10/2021. Imaging from 3/4/2022 showed enlarging external illiac adenopathy. There are no surgical options per Dr. Peoples. Underwent CT-guided biopsy of the right iliac mass consistent with mucinous adenocarcinoma. Patient completed palliative radiation with improvement in symptoms. CT chest abdomen and pelvis on 10/19/22 showed stable disease. Plan to continue watchful waiting. Upcoming scan in 1 month.      Pain: Patient established care with [facility] in [place], [place] > 1  "year ago. Patient's right hip pain was thought to be referred from his spine. Patient underwent several epidurals and a lumbar laminectomy with no improvement in his pain. More recently, he complains of pain in his left groin / LLE related to the cancer of the appendix. Today, the patient reports progressively worsening pain over the past month. He is currently taking oxycodone 10 mg 5-6x daily and gabapentin 600 mg TID. He occasionally takes Ibuprofen as well. He has not been taking the morphine ER due to daytime drowsiness and constipation. The patient states the oxycodone is lasting about 1 hour.      Other symptoms: The patient admits to nausea in the mornings. He describes it as feeling \"queasy\". He states the Zofran 4 mg tablets has not been effective.  Patient's appetite improves as the day goes on. Constipation from pain medication managed with OTC stool softener. Since stopping the morphine ER, his bowels have become more regular. No issues with sleep. Patient takes two tablets of oxycodone before bed to prevent waking up in pain.      Pyschosocial: The patient lives alone. He is retired from working for the state. He enjoys playing golf and spending time outside. No personal history of a mental illness. The patient admits to anxiety/depression related to his cancer diagnosis but states overall his mood is stable and he is able to manage it on his own. The patient admits to increased stress related to the care of his 89 y/o mother. Patient's father passed away in 10/2022.     Spiritual: Patient describes himself as curious rather than practicing.      Goals: Improve quality of life with symptom management.     The following portions of the patient's history were reviewed and updated as appropriate: allergies, current medications, past family history, past medical history, past social history, past surgical history and problem list.    ORT-R: Low risk   Decisional capacity: Full  ECOG: (1) Restricted in " physically strenuous activity, ambulatory and able to do work of light nature   Palliative Performance Scale Score: 70%     Objective:    Constitutional: Awake, alert, sitting up   Eyes: PERRLA, EOMS intact  HENT: NCAT, face symmetric  Neck: Supple, trachea midline  Respiratory: Nonlabored respirations  Musculoskeletal: Moves all extremities   Psychiatric: Appropriate affect, cooperative  Neurologic: Oriented x 3, Cranial Nerves grossly intact to confrontation, speech clear    Medication Counts: Collected over the phone. See bottom of note for details. No misuse or overuse evident.   I have reviewed the patient's [place] PDMP. [facility] Req #[id number].   UDS (Y): Last 3/25/22. Reviewed. Appropriate.      Assessment & Plan:    1. Mucinous adenocarcinoma of appendix  --Plan to continue watchful waiting. Upcoming scan in 1 month.     2. Cancer associated pain  --Patient is appropriate for opioid therapy due to cancer related pain. Daily function and quality of life improved with pain medication. Patient unable to tolerate morphine ER due to sedation and constipation. Pain not adequately controlled with oxycodone 10 q4h prn. Discussed rotating long-acting opioid to fentanyl or oxycodone ER. Patient would like to hold off for now. Will increase oxycodone to 15 mg q4h prn. Refill sent to the pharmacy and PA submitted to the insurance company. Side effects of the medication discussed at every visit. Patient was encouraged to continue bowel regimen of daily stool softeners, prn laxatives, and diet modifications.    --Refilled gabapentin (NEURONTIN) 600 MG tablet; Take 1 tablet by mouth 3 (Three) Times a Day.    3. Nausea  --Patient complains of persistent nausea in the mornings. Failed trial of Zofran 4 mg. Will start patient on promethazine (PHENERGAN) 12.5 MG tablet with instructions to take at night due to sedation. Recommend increasing Zofran to 8 mg during the day.     Code status: Full code  Advanced directives: Not on  file  Health care surrogate: Shaylee Tse, mother     Return in about 3 months (around 7/12/2023) for Office Visit.    The patient has chosen to receive care through a telehealth visit.   Does the patient consent to using a video visit for their medical care today? Yes   The visit included audio and video interaction. No technical issues occurred during this visit.  I spent 30 minutes caring for Esteban Tse on this date of service. This time includes time spent by me in the following activities: preparing for the visit, reviewing tests, obtaining and/or reviewing a separately obtained history, performing a medically appropriate examination and/or evaluation, counseling and educating the patient/family/caregiver, ordering medications, tests, or procedures, documenting information in the medical record, independently interpreting results and communicating that information with the patient/family/caregiver, and care coordination.    Qian Fleming PA-C  04/12/2023    Medication Date Filled # Filled Count Used # Days  SOY   Gabapentin 600 3/20/23 90 24 66 23 3   Oxycodone 10 3/2/23 180 30 150 41 4

## 2023-04-12 ENCOUNTER — TELEMEDICINE (OUTPATIENT)
Dept: PALLIATIVE CARE | Facility: CLINIC | Age: 68
End: 2023-04-12
Payer: MEDICARE

## 2023-04-12 VITALS — WEIGHT: 165 LBS | BODY MASS INDEX: 21.77 KG/M2

## 2023-04-12 DIAGNOSIS — G89.3 CANCER ASSOCIATED PAIN: Primary | ICD-10-CM

## 2023-04-12 DIAGNOSIS — G89.3 CANCER ASSOCIATED PAIN: ICD-10-CM

## 2023-04-12 DIAGNOSIS — R11.0 NAUSEA: ICD-10-CM

## 2023-04-12 DIAGNOSIS — C18.1 MUCINOUS ADENOCARCINOMA OF APPENDIX: Primary | ICD-10-CM

## 2023-04-12 PROCEDURE — 99214 OFFICE O/P EST MOD 30 MIN: CPT | Performed by: PHYSICIAN ASSISTANT

## 2023-04-12 PROCEDURE — 1159F MED LIST DOCD IN RCRD: CPT | Performed by: PHYSICIAN ASSISTANT

## 2023-04-12 PROCEDURE — 1160F RVW MEDS BY RX/DR IN RCRD: CPT | Performed by: PHYSICIAN ASSISTANT

## 2023-04-12 PROCEDURE — 1125F AMNT PAIN NOTED PAIN PRSNT: CPT | Performed by: PHYSICIAN ASSISTANT

## 2023-04-12 RX ORDER — GABAPENTIN 600 MG/1
600 TABLET ORAL 3 TIMES DAILY
Qty: 90 TABLET | Refills: 0 | Status: SHIPPED | OUTPATIENT
Start: 2023-04-19

## 2023-04-12 RX ORDER — OXYCODONE HYDROCHLORIDE 15 MG/1
15 TABLET ORAL EVERY 4 HOURS PRN
Qty: 180 TABLET | Refills: 0 | Status: SHIPPED | OUTPATIENT
Start: 2023-04-12 | End: 2023-04-13 | Stop reason: DRUGHIGH

## 2023-04-12 RX ORDER — PROMETHAZINE HYDROCHLORIDE 12.5 MG/1
12.5 TABLET ORAL EVERY 8 HOURS PRN
Qty: 30 TABLET | Refills: 3 | Status: SHIPPED | OUTPATIENT
Start: 2023-04-12

## 2023-04-12 NOTE — TELEPHONE ENCOUNTER
I have reviewed patient's [facility] report prior to prescribing Schedule II, III, and IV medications. Request # [id number]. Next refill for oxycodone 15 mg tablets q4h prn #180 was sent to the pharmacy. The patient is scheduled to follow-up in 3 months.

## 2023-04-13 DIAGNOSIS — G89.3 CANCER ASSOCIATED PAIN: Primary | ICD-10-CM

## 2023-04-13 RX ORDER — OXYCODONE HYDROCHLORIDE 10 MG/1
10 TABLET ORAL EVERY 4 HOURS PRN
Qty: 180 TABLET | Refills: 0 | Status: SHIPPED | OUTPATIENT
Start: 2023-04-13

## 2023-04-13 NOTE — TELEPHONE ENCOUNTER
Prior authorization and subsequent appeal for oxycodone 15 mg tab q4h prn was denied by insurance company. Relayed this information to the patient over the telephone. He verbalized understanding. Discussed several options for pain management including restarting the morphine ER 15 mg BID, trying the Xtampza ER BID or sticking with current regimen. The patient would like to continue his current regimen of oxycodone 10 mg q4h prn. We will cancel the script for oxycodone 15 mg tablets and send a new refill for the oxycodone 10 mg tablets. Patient will reach out to the clinic for any new or worsening pain.

## 2023-05-22 DIAGNOSIS — G89.3 CANCER ASSOCIATED PAIN: ICD-10-CM

## 2023-05-22 RX ORDER — GABAPENTIN 600 MG/1
600 TABLET ORAL 3 TIMES DAILY
Qty: 90 TABLET | Refills: 0 | Status: SHIPPED | OUTPATIENT
Start: 2023-05-22

## 2023-05-23 ENCOUNTER — OFFICE VISIT (OUTPATIENT)
Dept: ONCOLOGY | Facility: CLINIC | Age: 68
End: 2023-05-23
Payer: MEDICARE

## 2023-05-23 VITALS
OXYGEN SATURATION: 99 % | TEMPERATURE: 98.9 F | BODY MASS INDEX: 23.33 KG/M2 | RESPIRATION RATE: 20 BRPM | SYSTOLIC BLOOD PRESSURE: 160 MMHG | HEART RATE: 86 BPM | DIASTOLIC BLOOD PRESSURE: 86 MMHG | HEIGHT: 73 IN | WEIGHT: 176 LBS

## 2023-05-23 DIAGNOSIS — C18.1 MUCINOUS ADENOCARCINOMA OF APPENDIX: Primary | ICD-10-CM

## 2023-05-23 PROCEDURE — 1159F MED LIST DOCD IN RCRD: CPT | Performed by: INTERNAL MEDICINE

## 2023-05-23 PROCEDURE — 3079F DIAST BP 80-89 MM HG: CPT | Performed by: INTERNAL MEDICINE

## 2023-05-23 PROCEDURE — 99214 OFFICE O/P EST MOD 30 MIN: CPT | Performed by: INTERNAL MEDICINE

## 2023-05-23 PROCEDURE — 3077F SYST BP >= 140 MM HG: CPT | Performed by: INTERNAL MEDICINE

## 2023-05-23 PROCEDURE — 1160F RVW MEDS BY RX/DR IN RCRD: CPT | Performed by: INTERNAL MEDICINE

## 2023-05-23 PROCEDURE — 1125F AMNT PAIN NOTED PAIN PRSNT: CPT | Performed by: INTERNAL MEDICINE

## 2023-05-23 NOTE — LETTER
May 23, 2023       No Recipients    Patient: Esteban Tse   YOB: 1955   Date of Visit: 5/23/2023       Dear Dr. Sutton Recipients:    Thank you for referring Esteban Tse to me for evaluation. Below are the relevant portions of my assessment and plan of care.    If you have questions, please do not hesitate to call me. I look forward to following Esteban along with you.         Sincerely,        Cy Delcid MD        CC:   No Recipients    Cy Delcid MD  05/23/23 1056  Signed  CHIEF COMPLAINT: Mild right leg swelling new.  Chronic right lower pelvic pain    Problem List:  Oncology/Hematology History Overview Note   1.  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  Pathologic stage IIa with T3 extension into the base of the appendix through the muscularis propria but not into the serosal surface.  16 nodes negative.  Colonoscopy December 2017 negative postop.Laparoscopic diagnostic peritoneal biopsy confirming disease in the bladder dome February 2020.  Began Folfiri.  June 2020 Dr. Peoples excised right lobe of liver and left lobe liver lesion, omentectomy and resection of pelvic peritoneal nodules, ileocolectomy with ileal colostomy and intraperitoneal HIPEC with mitomycin.  Dr. Lawler performed right ureteral stent placement and partial cystectomy.  With large fungating mass, 4/22/2021 had exploratory laparotomy with lysis of adhesions and right pelvic sidewall excisional biopsy with open partial cystectomy.  No malignancy and peritoneal fluid.  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  Margins less than 1 mm.  October 2021 cystoscopy without malignancy.  TURBT 10/11/2021 did not show malignancy.  March 2022 Dr. Peoples for 3.9 cm external iliac recurrent adenopathy 6 weeks out from laminectomy which did not help pain was determined to be an unresectable recurrence with muscle and  vascular involvement.  Radiation with Dr. Moody ended 5/6/2022.  Disease stabilized and pain improved.    -12/30/2019 medical oncology office visit: The patient had been on surveillance since surgery December 2017.  CT scans had been negative up until 12/30/2019 CT chest abdomen and pelvis that showed subtle soft tissue density surrounding surgical clips in the right side of the pelvis along with soft tissue nodule at the anterior wall of the bladder concerning for recurrent disease.  Patient sent back to Dr. Davidson for colonoscopy.    -2/7/2020 patient was referred to Dr. Peoples at the [facility], he underwent diagnostic laparoscopic and peritoneal biopsies that confirmed recurrent disease with biopsy of bladder dome nodule showing adenocarcinoma with mucinous features.  Port was placed at this time also.    -2/25/2020 began FOLFIRI    -4/21/2020 patient having increased fatigue, FOLFIRI dose reduced by 10%.    -5/19/2020 cycle 7 FOLFIRI    -6/18/2020 Dr. Peoples performed exploratory laparotomy with excision of right lobe liver lesion and left lobe liver lesion, omentectomy, resection of pelvic peritoneal nodules, ileocolectomy with creation of ileal colostomy, hyperthermic intraperitoneal chemotherapy with mitomycin-C at 42 °C for deep tissue penetration for 90 minutes.  Dr. Perdue performed partial cystectomy with right ureteral stent placement    -8/5/2020 CT abdomen pelvis with contrast shows small soft tissue nodule anterior aspect of bladder stable.  New small amount of ascites as well as a new small left pleural effusion.  Creatinine 0.75 with hemoglobin 11.1 otherwise unremarkable CBC and CMP.    -2/15/2021 CT abdomen pelvis with contrast compared to 8/5/2020 shows enlarging soft tissue mass 4.2 cm over the bladder fundus and there is a calcification along the base of the urinary bladder.  Small stable multiple hypoattenuating indeterminate liver lesions.  Similar degree of ascites.    -2/23/2021 CT  "chest shows no evidence of metastasis with ascending aorta 42 mm.    -3/8/2021 follow-up with Dr. Portillo Peoples we reviewed his CTs suggested team effort resection with Dr. Perdue    -3/16/2021 follow-up with Dr. Perdue.  He plans for open partial cystectomy, possible ureteral implants, cystoscopy and stent placement in concert with Dr. Peoples.    -4/22/2021 cystoscopy (after prior office cystoscopy showed large fungating mass) with bilateral ureteral catheters, exploratory laparotomy, lysis of adhesions, right pelvic sidewall excisional biopsy, open partial cystectomy Dr. Ike Perdue. Peritoneal fluid showed predominant inflammatory reactive mesothelial cells with no malignancy. 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 Carcinoma was less than 1 mm from the lateral resection margins.    -5/18/2021 [facility] medical oncology follow-up visit: I reviewed his hospital notes, operative notes, and pathology report as above and went over this with the patient.  The general consensus from retrospective as well as a few prospective data over the last couple of decades with this low-grade malignancy does not show any profound benefit from \"adjuvant\" chemotherapy in this current setting.  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  There is no standard follow-up but general guidelines suggest following up as typical colon cancer.  I will repeat his CT chest abdomen pelvis now to reestablish his baseline postoperative imaging and have him see my nurse practitioner back in a couple of weeks to make sure there is no nia evidence of measurable residual disease.  Assuming this just shows postoperative changes, I would simply repeat scans again in 3 months or sooner as symptoms dictate.  If he has no evidence of persistent or metastatic disease on current imaging, then when he sees my nurse practitioner back she will also review what they say about his ascending " aorta which was 42 mm in February and sent him to Dr. Evangelist Anthony for an opinion as to whether any intervention is needed relative to the aorta.  Obviously if his cancer is out of control on the upcoming scans then the aorta is a moot point.    -6/10/2021 [facility] oncology clinic follow-up: CT scans show no evidence of disease recurrence in the chest, abdomen or pelvis.  Ascending aortic aneurysm stable at 41 mm.  Referral made to Dr. Anthony, cardiothoracic surgeon for management and evaluation of a sending aortic aneurysm.  Plan to repeat CT scans in 3 months.    -9/14/2021 [facility] oncology follow-up visit: I reviewed images and reports of 9/10/2021 CT chest abdomen pelvis with contrast which shows under distended urinary bladder with mild asymmetric wall thickening dome and left lateral wall with a small 9 mm polypoid lesion in the bladder.  There is stable 2.3 cm right external iliac and a few other subcentimeter scattered nodes in the root of the small bowel mesentery and retroperitoneum.  Postsurgical right hemicolectomy changes.  Stable low-attenuation lesions in the liver unchanged.  I will have him collect his discs from [facility] to take Dr. Perdue to decide whether to proceed with cystoscopy or just continue watchful waiting.  There is no major changes and I suspect we are looking at postoperative changes and we shall see him for video visit in a few weeks to see what urology at  decides.    -10/11/2021 cystoscopy Dr. Perdue showed marked acute and chronic inflammation but no evidence of malignancy.  Muscularis propria present.    -10/14/2021 [facility] medical oncology virtual visit: I reviewed reports of pathology from cystoscopy 10/11/2021 and went over this with patient.  He is feeling fairly fit.  I will repeat his CT chest abdomen pelvis prior to return in 3 months and if in the interim, when he follows up with Dr. Perdue in the next couple of weeks post cystoscopy, plans are made for  further cystoscopy before I see him back and any malignancy is found, I would ask the patient and Dr. Perdue to touch base as I would not know of such without that contact as the information comes into our chart without notification now that epic is being used by [employer].  Assuming no further biopsies in the interim, I will simply repeat his scans in 3 months to follow what I suspected and is now confirmed to be inflammatory changes.  From the aneurysm standpoint, he has follow-up scanning March 2022 arranged by Dr. Anthony and he will follow him up after that.    -11/24/2021 CT chest abdomen pelvis compared to 6/7/2021 outside imaging shows stable 4.1 cm ascending thoracic aorta.  No lung nodules.  Liver homogenous with bilateral cysts.  No adenopathy.  Thickened sigmoid colon and rectum suggestive of mild colitis or postradiation change.  No pelvic adenopathy.  Bony structures degenerative in the spine.  Some soft tissue anterior to the acetabulum on the right and extending along the medial aspect right pelvic sidewall 2.7 x 5.6 may represent intramuscular process versus adenopathy which could not be ruled out.  No prior study views available.    -1/4/2022 follow-up Dr. Ike Perdue [facility].  Per his note, he had TURBT 10/11/2021.  He had been placed on antibiotics for UTI for preop preparation for back surgery planned in 2 weeks.  Denies any mucus in his urine.  Some microhematuria on urinalysis this day.  No gross hematuria or dysuria.  The October 2021 TURBT did not reveal anything malignant on pathology.  There may be irritation or inflammation secondary to sutures in the bladder following partial cystectomy.  Patient asymptomatic from a urinary standpoint.  Recommended following up with [facility] with no plans for further urology follow-up.    -2/4/2022 [facility] medical oncology telehealth follow-up visit: I reviewed his November images and reports with the patient as well as with Dr. Gm Moody  and the note from Dr. Perdue from 1 month ago.  I do suspect this pelvic sidewall abnormality in November represents persistent disease but as I have stated in prior dictations, the data on palliative or overall survival benefit of systemic 5-FU based combination chemotherapy and/or Avastin does not show a clear-cut survival advantage to chemotherapy for asymptomatic disease.  He is having some back pain and had spine decompression a couple of weeks ago without much benefit.  This is an area that may potentially be radiated but I will check his CT chest (angiogram of chest) abdomen and pelvis and get his blood counts and chemistries and urinalysis and see him back for virtual visit in a few weeks.  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  I will also converse with Dr. Peoples at that junction whether he thinks he has anything to offer if this is progressing though I doubt it given that the last intervention was a urologic procedure and the last cystoscopy/TURBT in October had no malignancy and the liver lesions are currently being called liver cysts albeit I am skeptical as to whether those are truly benign but they are certainly not growing.    -3/4/2022 CT angiogram of chest/CT abdomen pelvis for evaluation of dizziness and surveillance of thoracic aortic aneurysm and appendiceal carcinoma.  Thoracic ascending aortic ectasia 4 x 4 cm stable.  Small nodularity left upper lobe stable from September.  Stable small hepatic hypodensities status post cholecystectomy.  Right external iliac soft tissue fullness now 3 x 3.9 cm previously 2 x 2.3 cm concerning for enlarging adenopathy with  "symmetric nonenlarged inguinal nodes stable.  CBC unremarkable.  CMP unremarkable save for potassium 5.4.  CEA 18.2.  1-3 red cells and white cells per milliliter of urine.    -3/16/2022 office visit with Dr. Portillo Peoples.  He reviewed the CT from 3/4/2022.  He notes that the laminectomy from 6 weeks prior has not helped his \"hip pain\".  Given the location of this recurrence previously 2 x 2.3 cm now 3 x 3.9 cm in the external iliac area concerning for enlarging adenopathy with symmetric nonenlarged inguinal nodes stable, the mass appears unresectable with involvement of muscle and vasculature.    -3/24/2022 [facility] medical oncology follow-up virtual visit: Pain is still significant.  We will get him in hopefully in the next day to [facility] for palliative care.  Have spoken with Dr. Peoples and no surgery.  I have spoken with Dr. Moody who thinks palliative radiation while not likely to shrink tumor dramatically may help pain considerably and we will get him there.  We will get him to bring the discs to our [place] office and asked them to bring that back to [place] to get scanned in for our invasive radiologist to look at for us to get CT-guided biopsy of this node in the right lower quadrant and send that for [facility] once the pathology is obtained to hopefully find high tumor mutational burden or other molecular targets that might give us some options.  Apart from that, as stated multiple times, this is not a highly chemotherapy sensitive tumor and I am not sure I would palliate him well but, when I see him back in early June with CAT scans prior to return and post radiation, if he is not getting relief and wants to try a 5-FU based regimen plus or minus Avastin I would be okay with that but he knows it does not alter survival 1 way or the other and we are simply trying to palliate this inexorable process.  No other surgical options.    - 4/5/2022  Right lower quadrant mass CT biopsy positive " mucinous adenocarcinoma    -5/2/2022 [facility] profile: HER2/harris negative.  Mismatch repair proficient,NTRK1/2/3 fusion not detected.  BRAF V600E negative.  PD-L1 Negative, 1+, 5%.  PTEN Positive, 1+, 100%. MLH1 positive/3+, 100%. MSH2 positive/3+, 100%. MSH6 positive/3+, 100%. PMS2 positive/3+,100%.    -5/6/2022 last radiation    - 5/25/2022 CT chest abdomen pelvis with contrast at [facility] shows nothing remarkable on the chest.  Stable liver cysts.  Focal soft tissue right iliac region appears to be increasing in size now 7.2 cm previously 3.9 cm with some mass-effect on surrounding structures with several stable inguinal borderline nodes.    -6/6/2022 [facility] medical oncology follow-up visit: Now 1 month out from radiation just starting to get a little bit of improvement in his abdominal discomfort.  Slight growth on CT but some of that may be postradiation change this close to radiation and, as I stated in my note in March, it is not clear that 5-FU based therapy plus or minus Avastin is going to palliate much given its relatively insensitivity to chemotherapy and it certainly does not improve survival.  To that end, we will plan on just repeating his CAT scans again in about 6 weeks and if the pain is worsening and/or this continues to grow then I will probably give him Avastin FOLFOX.  If everything is stable and pain is controlled we will go continue watchful waiting with imaging about every 3 months from that point forward.    -7/11/2022 [facility] medical oncology follow-up: His 7/7/2022 CT chest abdomen pelvis showed stable right external iliac low-attenuation which had been previously growing and thickening of the distal descending and sigmoid colon with perhaps a small right iliac borderline 1 cm node.  Symptomatically improved post radiation.  For now we will hold on Avastin FOLFOX as the benefit of such is questionable with this histologic subtype and there are no actionable molecular targets.   Repeat CTs prior to return in 3 months.    -11/1/2022 [facility] medical oncology follow-up: 10/19/2022 CT chest abdomen pelvis with contrast [facility] compared to 7/7/2022 shows no significant change or evidence of progression.  Clinically quiescent.  We will repeat CTs in 6 months, sooner as symptoms dictate.    -5/19/2023 CT chest abdomen pelvis [facility] compared to 10/19/2022 showed no evidence of disease progression in the chest.  Nodular density adjacent to the descending colon 6 mm nonspecific.  Otherwise unchanged appearance of hepatic metastatic nodules, right iliac adenopathy, and right pelvic mass.    -5/23/2023 [facility] medical oncology follow-up: I reviewed his report of CT chest abdomen pelvis from [place] 4 days ago showing no evidence of progression.  So area near the descending colon is nonspecific and would not change anything at this junction.  His October CT had not changed from July and the current one has not changed significantly since October.  We will continue to see palliative care for his right pelvic pain.  If leg swelling significantly worsens we could check Doppler and assuming no clot consider vascular intervention but as the thigh has not significantly changed over time nor is there significant tenderness in the calf I am doubtful that this is clot and is likely just due to the pelvic mass.  Vascular surgical intervention should swelling worsen is also an option but I would not want a stent placed through this area in the face malignancy particularly a mucinous variety that would make him prone towards clotting.  If the leg worsens I would get his Doppler and consider vascular intervention or anticoagulation as dictated but clinically stable so will not investigate further at this junction.  I will repeat his imaging again in 6 months.     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  Pathologic stage IIa with T3 extension into the base of the appendix through the muscularis propria but not into the serosal surface.  16 nodes negative.  Colonoscopy December 2017 negative postop     12/5/2017 Surgery    Surgery       Procedure:  Appendectomy and right hemicolectomy      Completeness of resection:  No evidence of residual tumor     Pathology revealed invasive moderately differentiated mucinous adenocarcinoma.  Right hemicolectomy: Benign colon and small intestine no evidence of carcinoma.  16 benign lymph nodes, 0/16, negative for dysplasia or malignancy.  Tumor size approximately 6 cm.  Grade 2, moderately differentiated.  Tumor invades through the muscularis profile into the base of appendix but does not extend to the serosal surface.  All margins uninvolved, no lymphovascular invasion identified.  Pathological stage pT3 pN0.         12/8/2017 Imaging    CT of the chest abdomen and pelvis negative.  Baseline CBC WBC 7100, hemoglobin 11.3, hematocrit 34.8%, platelet count 195,000.     3/20/2018 Procedure    Colonoscopy with Dr. Davidson was normal, recommendation for repeat surveillance colonoscopy in 3 years.     6/11/2018 Imaging    CT chest, abdomen and pelvis with no evidence metastatic disease.  CEA 1.1     9/24/2018 Imaging    CT abdomen pelvis showed no acute changes and nothing to suggest recurrence     12/28/2018 Imaging    CT chest, abdomen and pelvis negative. Normal CBC, CMP and CEA 0.7.     6/28/2019 Imaging    CT chest, abdomen and pelvis: No interval change from prior studies.  No recurrent or metastatic disease detected in the chest, abdomen or pelvis.  No acute process.  CEA 0.9     12/30/2019 Imaging    CT chest, abdomen and pelvis: No disease in the chest.  There was noted a subtle soft tissue density, one surrounding surgical clips in the right side of the pelvis and the other a soft tissue nodule intimately associated with the  anterior wall of the bladder, which are considered suspicious for recurrent disease.  CEA 1.1.  CMP with normal creatinine 0.9, total bilirubin 1.9, AST 34, ALT 24, alkaline phosphatase 93.  CBC with WBC 6900, hemoglobin 15.9, platelet count 232,000.       1/21/2020 Procedure    Normal colonoscopy with Dr. Davidson.  He has made referral to Dr. Portillo Peoples at .     2/7/2020 Progression    12/30/2019 CT chest, abdomen and pelvis: No disease in the chest.  There are subtle soft tissue densities (1 surrounding surgical clips in the right side of the pelvis and the other a soft tissue nodule intimately associated with the anterior wall of the bladder) which are considered suspicious for recurrent disease.  CMP with normal creatinine 0.9, total bilirubin 1.9, AST 34, ALT 24, alkaline phosphatase 93.  CBC with WBC 6900, hemoglobin 15.9, platelet count 232,000.  CEA 1.1.  2/7/2020 diagnostic laparoscopy with peritoneal biopsies performed at the [facility] by Dr. Peoples showed no sign of diffuse peritoneal carcinomatosis or liver metastasis.  There was a firm nodule in the superior dome of the bladder, adherent to omentum, as well as right pelvic sidewall nodule adjacent to surgical clips.  Biopsy of bladder dome nodule showed adenocarcinoma with mucinous features.     2/25/2020 -  Chemotherapy    OP COLORECTAL FOLFIRI Irinotecan / Leucovorin / Fluorouracil     5/29/2020 Imaging    CT chest abdomen pelvis shows slight improvement with decreased nodule anterolateral margin of urinary bladder with stable nodularity of the right lower quadrant adjacent to surgical clips and no progressive disease.  Slight hyperemia of the colonic mucosa     6/18/2020 Surgery    Surgery       -6/18/2020 Dr. Peoples performed exploratory laparotomy with excision of right lobe liver lesion and left lobe liver lesion, omentectomy, resection of pelvic peritoneal nodules, ileocolectomy with creation of ileal colostomy, hyperthermic  intraperitoneal chemotherapy with mitomycin-C at 42 °C for deep tissue penetration for 90 minutes.  Dr. Perdue performed partial cystectomy with right ureteral stent placement     8/5/2020 Imaging     CT abdomen pelvis with contrast shows small soft tissue nodule anterior aspect of bladder stable.  New small amount of ascites as well as a new small left pleural effusion.  Creatinine 0.75 with hemoglobin 11.1 otherwise unremarkable CBC and CMP     2/15/2021 Imaging    CT abdomen pelvis with contrast compared to 8/5/2020 shows enlarging soft tissue mass 4.2 cm over the bladder fundus and there is a calcification along the base of the urinary bladder.  Small stable multiple hypoattenuating indeterminate liver lesions.  Similar degree of ascites.     2/23/2021 Imaging    -2/23/2021 CT chest shows no evidence of metastasis with ascending aorta 42 mm.     4/22/2021 Surgery    -4/22/2021 cystoscopy (after prior office cystoscopy showed large fungating mass) with bilateral ureteral catheters, exploratory laparotomy, lysis of adhesions, right pelvic sidewall excisional biopsy, open partial cystectomy Dr. Ike Perdue. Peritoneal fluid showed predominant inflammatory reactive mesothelial cells with no malignancy. 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 Carcinoma was less than 1 mm from the lateral resection margins.     6/7/2021 Imaging    CT chest, abdomen and pelvis: No evidence of metastatic disease within the chest abdomen or pelvis.  Interval resection of enhancing bladder wall mass a small amount of residual enhancing soft tissue, possibly granulation tissue.  Interval resolution of abdominal and pelvic ascites.  Stable dilation of the ascending aorta mid segment measuring up to 41 mm.     9/10/2021 Imaging    CT chest abdomen pelvis with contrast shows under distended urinary bladder with mild asymmetric wall thickening dome and  left lateral wall with a small 9 mm polypoid lesion in the bladder.  There is stable 2.3 cm right external iliac and a few other subcentimeter scattered nodes in the root of the small bowel mesentery and retroperitoneum.  Postsurgical right hemicolectomy changes.  Stable low-attenuation lesions in the liver unchanged.     11/24/2021 Imaging    CT chest abdomen pelvis compared to 6/7/2021 outside imaging shows stable 4.1 cm ascending thoracic aorta.  No lung nodules.  Liver homogenous with bilateral cysts.  No adenopathy.  Thickened sigmoid colon and rectum suggestive of mild colitis or postradiation change.  No pelvic adenopathy.  Bony structures degenerative in the spine.  Some soft tissue anterior to the acetabulum on the right and extending along the medial aspect right pelvic sidewall 2.7 x 5.6 may represent intramuscular process versus adenopathy which could not be ruled out.  No prior study views available.     4/11/2022 - 5/6/2022 Radiation    Radiation OncologyTreatment Course:  Esteban Tse received 5000 cGy in 20 fractions to right pelvis/groin via External Beam Radiation - EBRT.     5/25/2022 Imaging    CT chest abdomen pelvis with contrast at [facility] shows nothing remarkable on the chest.  Stable liver cysts.  Focal soft tissue right iliac region appears to be increasing in size now 7.2 cm previously 3.9 cm with some mass-effect on surrounding structures with several stable inguinal borderline nodes.     10/19/2022 Imaging    - 10/19/2022 CT chest abdomen pelvis with contrast [facility] compared to 7/7/2022 shows no significant change or evidence of progression.     Peritoneal carcinoma   5/14/2020 Initial Diagnosis    Peritoneal carcinoma (HCC)     5/25/2022 Imaging    CT chest abdomen pelvis with contrast at [facility] shows nothing remarkable on the chest.  Stable liver cysts.  Focal soft tissue right iliac region appears to be increasing in size now 7.2 cm previously 3.9  "cm with some mass-effect on surrounding structures with several stable inguinal borderline nodes.         HISTORY OF PRESENT ILLNESS:  The patient is a 67 y.o. 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  On watchful waiting off of any therapy.  Chronic mild right lower extremity swelling in the thigh not worsening.  Chronic right pelvic pain on oxycodone and gabapentin.  Modest relief    Past Medical History:   Diagnosis Date   • Ascending aortic aneurysm    • Benign hypertension    • Bladder cancer    • Colon cancer    • History of radiation therapy 05/06/2022    right groin/hemipelvis   • Hypertension    • Intermittent palpitations    • Mucinous adenocarcinoma    • Pounding heartbeat    • PVC's (premature ventricular contractions)      Past Surgical History:   Procedure Laterality Date   • APPENDECTOMY  2017    With Partial Colon    • BLADDER SURGERY      with partial colon   • CHOLECYSTECTOMY     • COLON SURGERY     • GALLBLADDER SURGERY         Allergies   Allergen Reactions   • Levofloxacin Swelling     Lips swellijng   • Tetracycline Swelling     Lips swelling   • Penicillins Other (See Comments)     childhood       Family History and Social History reviewed and changed as necessary    REVIEW OF SYSTEM:   No new somatic complaints    PHYSICAL EXAM:  Right thigh a few centimeters larger than the left thigh but no Homans' sign and no venous valvular insufficiency changes.  No visible respiratory distress    Vitals:    05/23/23 1023   BP: 160/86   Pulse: 86   Resp: 20   Temp: 98.9 °F (37.2 °C)   SpO2: 99%   Weight: 79.8 kg (176 lb)   Height: 185.4 cm (73\")     Vitals:    05/23/23 1023   PainSc:   4   PainLoc: Hip  Comment: right hip          ECOG score: 0           Vitals reviewed.    ECOG: (0) Fully Active - Able to Carry On All Pre-disease Performance Without Restriction    Lab Results   Component Value Date    HGB 13.2 06/06/2022    HCT 42.5 06/06/2022    MCV 87.5 " 06/06/2022     06/06/2022    WBC 7.50 06/06/2022    NEUTROABS 5.80 06/06/2022    LYMPHSABS 1.30 06/06/2022    MONOSABS 0.50 06/06/2022    EOSABS 0.09 04/05/2022    BASOSABS 0.02 04/05/2022       Lab Results   Component Value Date    GLUCOSE 113 (H) 06/06/2022    BUN 10 06/06/2022    CREATININE 1.00 06/06/2022     06/06/2022    K 4.6 06/06/2022     06/06/2022    CO2 27.0 06/06/2022    CALCIUM 9.1 06/06/2022    PROTEINTOT 6.4 06/06/2022    ALBUMIN 4.00 06/06/2022    BILITOT 0.5 06/06/2022    ALKPHOS 120 (H) 06/06/2022    AST 21 06/06/2022    ALT 18 06/06/2022            ASSESSMENT & PLAN:  1.  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  Pathologic stage IIa with T3 extension into the base of the appendix through the muscularis propria but not into the serosal surface.  16 nodes negative.  Colonoscopy December 2017 negative postop.Laparoscopic diagnostic peritoneal biopsy confirming disease in the bladder dome February 2020.  Began Folfiri.  June 2020 Dr. Peoples excised right lobe of liver and left lobe liver lesion, omentectomy and resection of pelvic peritoneal nodules, ileocolectomy with ileal colostomy and intraperitoneal HIPEC with mitomycin.  Dr. Lawler performed right ureteral stent placement and partial cystectomy.  With large fungating mass, 4/22/2021 had exploratory laparotomy with lysis of adhesions and right pelvic sidewall excisional biopsy with open partial cystectomy.  No malignancy and peritoneal fluid.  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  Margins less than 1 mm.  October 2021 cystoscopy without malignancy.  TURBT 10/11/2021 did not show malignancy.  March 2022 Dr. Peoples for 3.9 cm external iliac recurrent adenopathy 6 weeks out from laminectomy which did not help pain was determined to be an unresectable recurrence with muscle and vascular involvement.  Radiation with   [facility] ended 5/6/2022.  Disease stabilized and pain improved.    -12/30/2019 medical oncology office visit: The patient had been on surveillance since surgery December 2017.  CT scans had been negative up until 12/30/2019 CT chest abdomen and pelvis that showed subtle soft tissue density surrounding surgical clips in the right side of the pelvis along with soft tissue nodule at the anterior wall of the bladder concerning for recurrent disease.  Patient sent back to Dr. Davidson for colonoscopy.    -2/7/2020 patient was referred to Dr. Peoples at the [facility], he underwent diagnostic laparoscopic and peritoneal biopsies that confirmed recurrent disease with biopsy of bladder dome nodule showing adenocarcinoma with mucinous features.  Port was placed at this time also.    -2/25/2020 began FOLFIRI    -4/21/2020 patient having increased fatigue, FOLFIRI dose reduced by 10%.    -5/19/2020 cycle 7 FOLFIRI    -6/18/2020 Dr. Peoples performed exploratory laparotomy with excision of right lobe liver lesion and left lobe liver lesion, omentectomy, resection of pelvic peritoneal nodules, ileocolectomy with creation of ileal colostomy, hyperthermic intraperitoneal chemotherapy with mitomycin-C at 42 °C for deep tissue penetration for 90 minutes.  Dr. Perdue performed partial cystectomy with right ureteral stent placement    -8/5/2020 CT abdomen pelvis with contrast shows small soft tissue nodule anterior aspect of bladder stable.  New small amount of ascites as well as a new small left pleural effusion.  Creatinine 0.75 with hemoglobin 11.1 otherwise unremarkable CBC and CMP.    -2/15/2021 CT abdomen pelvis with contrast compared to 8/5/2020 shows enlarging soft tissue mass 4.2 cm over the bladder fundus and there is a calcification along the base of the urinary bladder.  Small stable multiple hypoattenuating indeterminate liver lesions.  Similar degree of ascites.    -2/23/2021 CT chest shows no evidence of metastasis  "with ascending aorta 42 mm.    -3/8/2021 follow-up with Dr. Portillo Peoples we reviewed his CTs suggested team effort resection with Dr. Perdue    -3/16/2021 follow-up with Dr. Perdue.  He plans for open partial cystectomy, possible ureteral implants, cystoscopy and stent placement in concert with Dr. Peoples.    -4/22/2021 cystoscopy (after prior office cystoscopy showed large fungating mass) with bilateral ureteral catheters, exploratory laparotomy, lysis of adhesions, right pelvic sidewall excisional biopsy, open partial cystectomy Dr. Ike Perdue. Peritoneal fluid showed predominant inflammatory reactive mesothelial cells with no malignancy. 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 Carcinoma was less than 1 mm from the lateral resection margins.    -5/18/2021 [facility] medical oncology follow-up visit: I reviewed his hospital notes, operative notes, and pathology report as above and went over this with the patient.  The general consensus from retrospective as well as a few prospective data over the last couple of decades with this low-grade malignancy does not show any profound benefit from \"adjuvant\" chemotherapy in this current setting.  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  There is no standard follow-up but general guidelines suggest following up as typical colon cancer.  I will repeat his CT chest abdomen pelvis now to reestablish his baseline postoperative imaging and have him see my nurse practitioner back in a couple of weeks to make sure there is no nia evidence of measurable residual disease.  Assuming this just shows postoperative changes, I would simply repeat scans again in 3 months or sooner as symptoms dictate.  If he has no evidence of persistent or metastatic disease on current imaging, then when he sees my nurse practitioner back she will also review what they say about his ascending aorta which was 42 mm in February and " sent him to Dr. Evangelist Anthony for an opinion as to whether any intervention is needed relative to the aorta.  Obviously if his cancer is out of control on the upcoming scans then the aorta is a moot point.    -6/10/2021 [facility] oncology clinic follow-up: CT scans show no evidence of disease recurrence in the chest, abdomen or pelvis.  Ascending aortic aneurysm stable at 41 mm.  Referral made to Dr. Anthony, cardiothoracic surgeon for management and evaluation of a sending aortic aneurysm.  Plan to repeat CT scans in 3 months.    -9/14/2021 [facility] oncology follow-up visit: I reviewed images and reports of 9/10/2021 CT chest abdomen pelvis with contrast which shows under distended urinary bladder with mild asymmetric wall thickening dome and left lateral wall with a small 9 mm polypoid lesion in the bladder.  There is stable 2.3 cm right external iliac and a few other subcentimeter scattered nodes in the root of the small bowel mesentery and retroperitoneum.  Postsurgical right hemicolectomy changes.  Stable low-attenuation lesions in the liver unchanged.  I will have him collect his discs from [facility] to take Dr. Perdue to decide whether to proceed with cystoscopy or just continue watchful waiting.  There is no major changes and I suspect we are looking at postoperative changes and we shall see him for video visit in a few weeks to see what urology at  decides.    -10/11/2021 cystoscopy Dr. Perdue showed marked acute and chronic inflammation but no evidence of malignancy.  Muscularis propria present.    -10/14/2021 [facility] medical oncology virtual visit: I reviewed reports of pathology from cystoscopy 10/11/2021 and went over this with patient.  He is feeling fairly fit.  I will repeat his CT chest abdomen pelvis prior to return in 3 months and if in the interim, when he follows up with Dr. Perdue in the next couple of weeks post cystoscopy, plans are made for further cystoscopy before I see him  back and any malignancy is found, I would ask the patient and Dr. Perdue to touch base as I would not know of such without that contact as the information comes into our chart without notification now that epic is being used by [employer].  Assuming no further biopsies in the interim, I will simply repeat his scans in 3 months to follow what I suspected and is now confirmed to be inflammatory changes.  From the aneurysm standpoint, he has follow-up scanning March 2022 arranged by Dr. Anthony and he will follow him up after that.    -11/24/2021 CT chest abdomen pelvis compared to 6/7/2021 outside imaging shows stable 4.1 cm ascending thoracic aorta.  No lung nodules.  Liver homogenous with bilateral cysts.  No adenopathy.  Thickened sigmoid colon and rectum suggestive of mild colitis or postradiation change.  No pelvic adenopathy.  Bony structures degenerative in the spine.  Some soft tissue anterior to the acetabulum on the right and extending along the medial aspect right pelvic sidewall 2.7 x 5.6 may represent intramuscular process versus adenopathy which could not be ruled out.  No prior study views available.    -1/4/2022 follow-up Dr. Ike Perdue urology .  Per his note, he had TURBT 10/11/2021.  He had been placed on antibiotics for UTI for preop preparation for back surgery planned in 2 weeks.  Denies any mucus in his urine.  Some microhematuria on urinalysis this day.  No gross hematuria or dysuria.  The October 2021 TURBT did not reveal anything malignant on pathology.  There may be irritation or inflammation secondary to sutures in the bladder following partial cystectomy.  Patient asymptomatic from a urinary standpoint.  Recommended following up with [facility] with no plans for further urology follow-up.    -2/4/2022 [facility] medical oncology telehealth follow-up visit: I reviewed his November images and reports with the patient as well as with Dr. Gm Moody and the note from Dr. Perdue from 1  month ago.  I do suspect this pelvic sidewall abnormality in November represents persistent disease but as I have stated in prior dictations, the data on palliative or overall survival benefit of systemic 5-FU based combination chemotherapy and/or Avastin does not show a clear-cut survival advantage to chemotherapy for asymptomatic disease.  He is having some back pain and had spine decompression a couple of weeks ago without much benefit.  This is an area that may potentially be radiated but I will check his CT chest (angiogram of chest) abdomen and pelvis and get his blood counts and chemistries and urinalysis and see him back for virtual visit in a few weeks.  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  I will also converse with Dr. Peoples at that junction whether he thinks he has anything to offer if this is progressing though I doubt it given that the last intervention was a urologic procedure and the last cystoscopy/TURBT in October had no malignancy and the liver lesions are currently being called liver cysts albeit I am skeptical as to whether those are truly benign but they are certainly not growing.    -3/4/2022 CT angiogram of chest/CT abdomen pelvis for evaluation of dizziness and surveillance of thoracic aortic aneurysm and appendiceal carcinoma.  Thoracic ascending aortic ectasia 4 x 4 cm stable.  Small nodularity left upper lobe stable from September.  Stable small hepatic hypodensities status post cholecystectomy.  Right external iliac soft tissue fullness now 3 x 3.9 cm previously 2 x 2.3 cm concerning for enlarging adenopathy with symmetric nonenlarged inguinal nodes  "stable.  CBC unremarkable.  CMP unremarkable save for potassium 5.4.  CEA 18.2.  1-3 red cells and white cells per milliliter of urine.    -3/16/2022 office visit with Dr. Portillo Peoples.  He reviewed the CT from 3/4/2022.  He notes that the laminectomy from 6 weeks prior has not helped his \"hip pain\".  Given the location of this recurrence previously 2 x 2.3 cm now 3 x 3.9 cm in the external iliac area concerning for enlarging adenopathy with symmetric nonenlarged inguinal nodes stable, the mass appears unresectable with involvement of muscle and vasculature.    -3/24/2022 [facility] medical oncology follow-up virtual visit: Pain is still significant.  We will get him in hopefully in the next day to [facility] for palliative care.  Have spoken with Dr. Peoples and no surgery.  I have spoken with Dr. Moody who thinks palliative radiation while not likely to shrink tumor dramatically may help pain considerably and we will get him there.  We will get him to bring the discs to our [place] office and asked them to bring that back to [place] to get scanned in for our invasive radiologist to look at for us to get CT-guided biopsy of this node in the right lower quadrant and send that for [facility] miguel once the pathology is obtained to hopefully find high tumor mutational burden or other molecular targets that might give us some options.  Apart from that, as stated multiple times, this is not a highly chemotherapy sensitive tumor and I am not sure I would palliate him well but, when I see him back in early June with CAT scans prior to return and post radiation, if he is not getting relief and wants to try a 5-FU based regimen plus or minus Avastin I would be okay with that but he knows it does not alter survival 1 way or the other and we are simply trying to palliate this inexorable process.  No other surgical options.    - 4/5/2022  Right lower quadrant mass CT biopsy positive mucinous adenocarcinoma    -5/2/2022 [facility]" MI profile: HER2/harris negative.  Mismatch repair proficient,NTRK1/2/3 fusion not detected.  BRAF V600E negative.  PD-L1 Negative, 1+, 5%.  PTEN Positive, 1+, 100%. MLH1 positive/3+, 100%. MSH2 positive/3+, 100%. MSH6 positive/3+, 100%. PMS2 positive/3+,100%.    -5/6/2022 last radiation    - 5/25/2022 CT chest abdomen pelvis with contrast at [facility] shows nothing remarkable on the chest.  Stable liver cysts.  Focal soft tissue right iliac region appears to be increasing in size now 7.2 cm previously 3.9 cm with some mass-effect on surrounding structures with several stable inguinal borderline nodes.    -6/6/2022 [facility] medical oncology follow-up visit: Now 1 month out from radiation just starting to get a little bit of improvement in his abdominal discomfort.  Slight growth on CT but some of that may be postradiation change this close to radiation and, as I stated in my note in March, it is not clear that 5-FU based therapy plus or minus Avastin is going to palliate much given its relatively insensitivity to chemotherapy and it certainly does not improve survival.  To that end, we will plan on just repeating his CAT scans again in about 6 weeks and if the pain is worsening and/or this continues to grow then I will probably give him Avastin FOLFOX.  If everything is stable and pain is controlled we will go continue watchful waiting with imaging about every 3 months from that point forward.    -7/11/2022 [facility] medical oncology follow-up: His 7/7/2022 CT chest abdomen pelvis showed stable right external iliac low-attenuation which had been previously growing and thickening of the distal descending and sigmoid colon with perhaps a small right iliac borderline 1 cm node.  Symptomatically improved post radiation.  For now we will hold on Avastin FOLFOX as the benefit of such is questionable with this histologic subtype and there are no actionable molecular targets.  Repeat CTs prior to return in 3  months.    -11/1/2022 [facility] medical oncology follow-up: 10/19/2022 CT chest abdomen pelvis with contrast [facility] compared to 7/7/2022 shows no significant change or evidence of progression.  Clinically quiescent.  We will repeat CTs in 6 months, sooner as symptoms dictate.    -5/19/2023 CT chest abdomen pelvis [facility] compared to 10/19/2022 showed no evidence of disease progression in the chest.  Nodular density adjacent to the descending colon 6 mm nonspecific.  Otherwise unchanged appearance of hepatic metastatic nodules, right iliac adenopathy, and right pelvic mass.    -5/23/2023 [facility] medical oncology follow-up: I reviewed his report of CT chest abdomen pelvis from [place] 4 days ago showing no evidence of progression.  So area near the descending colon is nonspecific and would not change anything at this junction.  His October CT had not changed from July and the current one has not changed significantly since October.  We will continue to see palliative care for his right pelvic pain.  If leg swelling significantly worsens we could check Doppler and assuming no clot consider vascular intervention but as the thigh has not significantly changed over time nor is there significant tenderness in the calf I am doubtful that this is clot and is likely just due to the pelvic mass.  Vascular surgical intervention should swelling worsen is also an option but I would not want a stent placed through this area in the face malignancy particularly a mucinous variety that would make him prone towards clotting.  If the leg worsens I would get his Doppler and consider vascular intervention or anticoagulation as dictated but clinically stable so will not investigate further at this junction.  I will repeat his imaging again in 6 months.    Total time of care today inclusive of time spent today prior to patient's arrival reviewing interval CAT scans and during visit interviewing him as to signs or  symptoms of his disease and management of symptoms as outlined in after visit instituting the above plan took 30 minutes of patient care time throughout the day today.  Cy Delcid MD    05/23/2023

## 2023-05-23 NOTE — PROGRESS NOTES
CHIEF COMPLAINT: Mild right leg swelling new.  Chronic right lower pelvic pain    Problem List:  Oncology/Hematology History Overview Note   1.  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  Pathologic stage IIa with T3 extension into the base of the appendix through the muscularis propria but not into the serosal surface.  16 nodes negative.  Colonoscopy December 2017 negative postop.Laparoscopic diagnostic peritoneal biopsy confirming disease in the bladder dome February 2020.  Began Folfiri.  June 2020 Dr. Peoples excised right lobe of liver and left lobe liver lesion, omentectomy and resection of pelvic peritoneal nodules, ileocolectomy with ileal colostomy and intraperitoneal HIPEC with mitomycin.  Dr. Lawler performed right ureteral stent placement and partial cystectomy.  With large fungating mass, 4/22/2021 had exploratory laparotomy with lysis of adhesions and right pelvic sidewall excisional biopsy with open partial cystectomy.  No malignancy and peritoneal fluid.  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  Margins less than 1 mm.  October 2021 cystoscopy without malignancy.  TURBT 10/11/2021 did not show malignancy.  March 2022 Dr. Peoples for 3.9 cm external iliac recurrent adenopathy 6 weeks out from laminectomy which did not help pain was determined to be an unresectable recurrence with muscle and vascular involvement.  Radiation with Dr. Moody ended 5/6/2022.  Disease stabilized and pain improved.    -12/30/2019 medical oncology office visit: The patient had been on surveillance since surgery December 2017.  CT scans had been negative up until 12/30/2019 CT chest abdomen and pelvis that showed subtle soft tissue density surrounding surgical clips in the right side of the pelvis along with soft tissue nodule at the anterior wall of the bladder concerning for recurrent disease.  Patient sent back to   [facility] for colonoscopy.    -2/7/2020 patient was referred to Dr. Peoples at the [facility], he underwent diagnostic laparoscopic and peritoneal biopsies that confirmed recurrent disease with biopsy of bladder dome nodule showing adenocarcinoma with mucinous features.  Port was placed at this time also.    -2/25/2020 began FOLFIRI    -4/21/2020 patient having increased fatigue, FOLFIRI dose reduced by 10%.    -5/19/2020 cycle 7 FOLFIRI    -6/18/2020 Dr. Peoples performed exploratory laparotomy with excision of right lobe liver lesion and left lobe liver lesion, omentectomy, resection of pelvic peritoneal nodules, ileocolectomy with creation of ileal colostomy, hyperthermic intraperitoneal chemotherapy with mitomycin-C at 42 °C for deep tissue penetration for 90 minutes.  Dr. Perdue performed partial cystectomy with right ureteral stent placement    -8/5/2020 CT abdomen pelvis with contrast shows small soft tissue nodule anterior aspect of bladder stable.  New small amount of ascites as well as a new small left pleural effusion.  Creatinine 0.75 with hemoglobin 11.1 otherwise unremarkable CBC and CMP.    -2/15/2021 CT abdomen pelvis with contrast compared to 8/5/2020 shows enlarging soft tissue mass 4.2 cm over the bladder fundus and there is a calcification along the base of the urinary bladder.  Small stable multiple hypoattenuating indeterminate liver lesions.  Similar degree of ascites.    -2/23/2021 CT chest shows no evidence of metastasis with ascending aorta 42 mm.    -3/8/2021 follow-up with Dr. Portillo Peoples we reviewed his CTs suggested team effort resection with Dr. Perdue    -3/16/2021 follow-up with Dr. Perdue.  He plans for open partial cystectomy, possible ureteral implants, cystoscopy and stent placement in concert with Dr. Peoples.    -4/22/2021 cystoscopy (after prior office cystoscopy showed large fungating mass) with bilateral ureteral catheters, exploratory laparotomy, lysis of adhesions, right pelvic  "sidewall excisional biopsy, open partial cystectomy Dr. Ike Perdue. Peritoneal fluid showed predominant inflammatory reactive mesothelial cells with no malignancy. 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 Carcinoma was less than 1 mm from the lateral resection margins.    -5/18/2021 [facility] [place] medical oncology follow-up visit: I reviewed his hospital notes, operative notes, and pathology report as above and went over this with the patient.  The general consensus from retrospective as well as a few prospective data over the last couple of decades with this low-grade malignancy does not show any profound benefit from \"adjuvant\" chemotherapy in this current setting.  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  There is no standard follow-up but general guidelines suggest following up as typical colon cancer.  I will repeat his CT chest abdomen pelvis now to reestablish his baseline postoperative imaging and have him see my nurse practitioner back in a couple of weeks to make sure there is no nia evidence of measurable residual disease.  Assuming this just shows postoperative changes, I would simply repeat scans again in 3 months or sooner as symptoms dictate.  If he has no evidence of persistent or metastatic disease on current imaging, then when he sees my nurse practitioner back she will also review what they say about his ascending aorta which was 42 mm in February and sent him to Dr. Evangelist Anthony for an opinion as to whether any intervention is needed relative to the aorta.  Obviously if his cancer is out of control on the upcoming scans then the aorta is a moot point.    -6/10/2021 [facility] [place] oncology clinic follow-up: CT scans show no evidence of disease recurrence in the chest, abdomen or pelvis.  Ascending aortic aneurysm stable at 41 mm.  Referral made to Dr. Anthony, cardiothoracic surgeon for management and evaluation of a sending " aortic aneurysm.  Plan to repeat CT scans in 3 months.    -9/14/2021 [facility] oncology follow-up visit: I reviewed images and reports of 9/10/2021 CT chest abdomen pelvis with contrast which shows under distended urinary bladder with mild asymmetric wall thickening dome and left lateral wall with a small 9 mm polypoid lesion in the bladder.  There is stable 2.3 cm right external iliac and a few other subcentimeter scattered nodes in the root of the small bowel mesentery and retroperitoneum.  Postsurgical right hemicolectomy changes.  Stable low-attenuation lesions in the liver unchanged.  I will have him collect his discs from [facility] to take Dr. Perdue to decide whether to proceed with cystoscopy or just continue watchful waiting.  There is no major changes and I suspect we are looking at postoperative changes and we shall see him for video visit in a few weeks to see what urology at  decides.    -10/11/2021 cystoscopy Dr. Perdue showed marked acute and chronic inflammation but no evidence of malignancy.  Muscularis propria present.    -10/14/2021 [facility] medical oncology virtual visit: I reviewed reports of pathology from cystoscopy 10/11/2021 and went over this with patient.  He is feeling fairly fit.  I will repeat his CT chest abdomen pelvis prior to return in 3 months and if in the interim, when he follows up with Dr. Perdue in the next couple of weeks post cystoscopy, plans are made for further cystoscopy before I see him back and any malignancy is found, I would ask the patient and Dr. Perdue to touch base as I would not know of such without that contact as the information comes into our chart without notification now that epic is being used by .  Assuming no further biopsies in the interim, I will simply repeat his scans in 3 months to follow what I suspected and is now confirmed to be inflammatory changes.  From the aneurysm standpoint, he has follow-up scanning March 2022 arranged by   R[facility] and he will follow him up after that.    -11/24/2021 CT chest abdomen pelvis compared to 6/7/2021 outside imaging shows stable 4.1 cm ascending thoracic aorta.  No lung nodules.  Liver homogenous with bilateral cysts.  No adenopathy.  Thickened sigmoid colon and rectum suggestive of mild colitis or postradiation change.  No pelvic adenopathy.  Bony structures degenerative in the spine.  Some soft tissue anterior to the acetabulum on the right and extending along the medial aspect right pelvic sidewall 2.7 x 5.6 may represent intramuscular process versus adenopathy which could not be ruled out.  No prior study views available.    -1/4/2022 follow-up Dr. Ike Perdue [facility].  Per his note, he had TURBT 10/11/2021.  He had been placed on antibiotics for UTI for preop preparation for back surgery planned in 2 weeks.  Denies any mucus in his urine.  Some microhematuria on urinalysis this day.  No gross hematuria or dysuria.  The October 2021 TURBT did not reveal anything malignant on pathology.  There may be irritation or inflammation secondary to sutures in the bladder following partial cystectomy.  Patient asymptomatic from a urinary standpoint.  Recommended following up with [facility] with no plans for further urology follow-up.    -2/4/2022 [facility] medical oncology telehealth follow-up visit: I reviewed his November images and reports with the patient as well as with Dr. Gm Moody and the note from Dr. Perdue from 1 month ago.  I do suspect this pelvic sidewall abnormality in November represents persistent disease but as I have stated in prior dictations, the data on palliative or overall survival benefit of systemic 5-FU based combination chemotherapy and/or Avastin does not show a clear-cut survival advantage to chemotherapy for asymptomatic disease.  He is having some back pain and had spine decompression a couple of weeks ago without much benefit.  This is an area that may potentially  "be radiated but I will check his CT chest (angiogram of chest) abdomen and pelvis and get his blood counts and chemistries and urinalysis and see him back for virtual visit in a few weeks.  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  I will also converse with Dr. Peoples at that junction whether he thinks he has anything to offer if this is progressing though I doubt it given that the last intervention was a urologic procedure and the last cystoscopy/TURBT in October had no malignancy and the liver lesions are currently being called liver cysts albeit I am skeptical as to whether those are truly benign but they are certainly not growing.    -3/4/2022 CT angiogram of chest/CT abdomen pelvis for evaluation of dizziness and surveillance of thoracic aortic aneurysm and appendiceal carcinoma.  Thoracic ascending aortic ectasia 4 x 4 cm stable.  Small nodularity left upper lobe stable from September.  Stable small hepatic hypodensities status post cholecystectomy.  Right external iliac soft tissue fullness now 3 x 3.9 cm previously 2 x 2.3 cm concerning for enlarging adenopathy with symmetric nonenlarged inguinal nodes stable.  CBC unremarkable.  CMP unremarkable save for potassium 5.4.  CEA 18.2.  1-3 red cells and white cells per milliliter of urine.    -3/16/2022 office visit with Dr. Portillo Peoples.  He reviewed the CT from 3/4/2022.  He notes that the laminectomy from 6 weeks prior has not helped his \"hip pain\".  Given the location of this recurrence previously 2 x 2.3 cm now 3 x 3.9 cm in the external iliac area concerning for enlarging adenopathy with symmetric nonenlarged inguinal nodes stable, " the mass appears unresectable with involvement of muscle and vasculature.    -3/24/2022 [facility], [place], operated by [facility] medical oncology follow-up virtual visit: Pain is still significant.  We will get him in hopefully in the next day to [facility] for palliative care.  Have spoken with Dr. Peoples and no surgery.  I have spoken with Dr. Moody who thinks palliative radiation while not likely to shrink tumor dramatically may help pain considerably and we will get him there.  We will get him to bring the discs to our [place] office and asked them to bring that back to [place] to get scanned in for our invasive radiologist to look at for us to get CT-guided biopsy of this node in the right lower quadrant and send that for Caris MI profile once the pathology is obtained to hopefully find high tumor mutational burden or other molecular targets that might give us some options.  Apart from that, as stated multiple times, this is not a highly chemotherapy sensitive tumor and I am not sure I would palliate him well but, when I see him back in early June with CAT scans prior to return and post radiation, if he is not getting relief and wants to try a 5-FU based regimen plus or minus Avastin I would be okay with that but he knows it does not alter survival 1 way or the other and we are simply trying to palliate this inexorable process.  No other surgical options.    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  Mismatch repair proficient,NTRK1/2/3 fusion not detected.  BRAF V600E negative.  PD-L1 Negative, 1+, 5%.  PTEN Positive, 1+, 100%. MLH1 positive/3+, 100%. MSH2 positive/3+, 100%. MSH6 positive/3+, 100%. PMS2 positive/3+,100%.    -5/6/2022 last radiation    - 5/25/2022 CT chest abdomen pelvis with contrast at [facility] shows nothing remarkable on the chest.  Stable liver cysts.  Focal soft tissue right iliac region appears to be increasing in  size now 7.2 cm previously 3.9 cm with some mass-effect on surrounding structures with several stable inguinal borderline nodes.    -6/6/2022 [facility] medical oncology follow-up visit: Now 1 month out from radiation just starting to get a little bit of improvement in his abdominal discomfort.  Slight growth on CT but some of that may be postradiation change this close to radiation and, as I stated in my note in March, it is not clear that 5-FU based therapy plus or minus Avastin is going to palliate much given its relatively insensitivity to chemotherapy and it certainly does not improve survival.  To that end, we will plan on just repeating his CAT scans again in about 6 weeks and if the pain is worsening and/or this continues to grow then I will probably give him Avastin FOLFOX.  If everything is stable and pain is controlled we will go continue watchful waiting with imaging about every 3 months from that point forward.    -7/11/2022 [facility] medical oncology follow-up: His 7/7/2022 CT chest abdomen pelvis showed stable right external iliac low-attenuation which had been previously growing and thickening of the distal descending and sigmoid colon with perhaps a small right iliac borderline 1 cm node.  Symptomatically improved post radiation.  For now we will hold on Avastin FOLFOX as the benefit of such is questionable with this histologic subtype and there are no actionable molecular targets.  Repeat CTs prior to return in 3 months.    -11/1/2022 [facility] medical oncology follow-up: 10/19/2022 CT chest abdomen pelvis with contrast [facility] compared to 7/7/2022 shows no significant change or evidence of progression.  Clinically quiescent.  We will repeat CTs in 6 months, sooner as symptoms dictate.    -5/19/2023 CT chest abdomen pelvis [facility] compared to 10/19/2022 showed no evidence of disease progression in the chest.  Nodular density adjacent to the descending colon 6 mm nonspecific.   Otherwise unchanged appearance of hepatic metastatic nodules, right iliac adenopathy, and right pelvic mass.    -5/23/2023 [facility] medical oncology follow-up: I reviewed his report of CT chest abdomen pelvis from [place] 4 days ago showing no evidence of progression.  So area near the descending colon is nonspecific and would not change anything at this junction.  His October CT had not changed from July and the current one has not changed significantly since October.  We will continue to see palliative care for his right pelvic pain.  If leg swelling significantly worsens we could check Doppler and assuming no clot consider vascular intervention but as the thigh has not significantly changed over time nor is there significant tenderness in the calf I am doubtful that this is clot and is likely just due to the pelvic mass.  Vascular surgical intervention should swelling worsen is also an option but I would not want a stent placed through this area in the face malignancy particularly a mucinous variety that would make him prone towards clotting.  If the leg worsens I would get his Doppler and consider vascular intervention or anticoagulation as dictated but clinically stable so will not investigate further at this junction.  I will repeat his imaging again in 6 months.     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  Pathologic stage IIa with T3 extension into the base of the appendix through the muscularis propria but not into the serosal surface.  16 nodes negative.  Colonoscopy December 2017 negative postop     12/5/2017 Surgery    Surgery       Procedure:  Appendectomy and right hemicolectomy      Completeness of resection:  No evidence of residual tumor     Pathology revealed invasive moderately differentiated mucinous adenocarcinoma.  Right hemicolectomy: Benign colon and small  intestine no evidence of carcinoma.  16 benign lymph nodes, 0/16, negative for dysplasia or malignancy.  Tumor size approximately 6 cm.  Grade 2, moderately differentiated.  Tumor invades through the muscularis profile into the base of appendix but does not extend to the serosal surface.  All margins uninvolved, no lymphovascular invasion identified.  Pathological stage pT3 pN0.         12/8/2017 Imaging    CT of the chest abdomen and pelvis negative.  Baseline CBC WBC 7100, hemoglobin 11.3, hematocrit 34.8%, platelet count 195,000.     3/20/2018 Procedure    Colonoscopy with Dr. Davidson was normal, recommendation for repeat surveillance colonoscopy in 3 years.     6/11/2018 Imaging    CT chest, abdomen and pelvis with no evidence metastatic disease.  CEA 1.1     9/24/2018 Imaging    CT abdomen pelvis showed no acute changes and nothing to suggest recurrence     12/28/2018 Imaging    CT chest, abdomen and pelvis negative. Normal CBC, CMP and CEA 0.7.     6/28/2019 Imaging    CT chest, abdomen and pelvis: No interval change from prior studies.  No recurrent or metastatic disease detected in the chest, abdomen or pelvis.  No acute process.  CEA 0.9     12/30/2019 Imaging    CT chest, abdomen and pelvis: No disease in the chest.  There was noted a subtle soft tissue density, one surrounding surgical clips in the right side of the pelvis and the other a soft tissue nodule intimately associated with the anterior wall of the bladder, which are considered suspicious for recurrent disease.  CEA 1.1.  CMP with normal creatinine 0.9, total bilirubin 1.9, AST 34, ALT 24, alkaline phosphatase 93.  CBC with WBC 6900, hemoglobin 15.9, platelet count 232,000.       1/21/2020 Procedure    Normal colonoscopy with Dr. Davidson.  He has made referral to Dr. Portillo Peoples at .     2/7/2020 Progression    12/30/2019 CT chest, abdomen and pelvis: No disease in the chest.  There are subtle soft tissue densities (1 surrounding surgical clips  in the right side of the pelvis and the other a soft tissue nodule intimately associated with the anterior wall of the bladder) which are considered suspicious for recurrent disease.  CMP with normal creatinine 0.9, total bilirubin 1.9, AST 34, ALT 24, alkaline phosphatase 93.  CBC with WBC 6900, hemoglobin 15.9, platelet count 232,000.  CEA 1.1.  2/7/2020 diagnostic laparoscopy with peritoneal biopsies performed at the [facility] by Dr. Peoples showed no sign of diffuse peritoneal carcinomatosis or liver metastasis.  There was a firm nodule in the superior dome of the bladder, adherent to omentum, as well as right pelvic sidewall nodule adjacent to surgical clips.  Biopsy of bladder dome nodule showed adenocarcinoma with mucinous features.     2/25/2020 -  Chemotherapy    OP COLORECTAL FOLFIRI Irinotecan / Leucovorin / Fluorouracil     5/29/2020 Imaging    CT chest abdomen pelvis shows slight improvement with decreased nodule anterolateral margin of urinary bladder with stable nodularity of the right lower quadrant adjacent to surgical clips and no progressive disease.  Slight hyperemia of the colonic mucosa     6/18/2020 Surgery    Surgery       -6/18/2020 Dr. Peoples performed exploratory laparotomy with excision of right lobe liver lesion and left lobe liver lesion, omentectomy, resection of pelvic peritoneal nodules, ileocolectomy with creation of ileal colostomy, hyperthermic intraperitoneal chemotherapy with mitomycin-C at 42 °C for deep tissue penetration for 90 minutes.  Dr. Perdue performed partial cystectomy with right ureteral stent placement     8/5/2020 Imaging     CT abdomen pelvis with contrast shows small soft tissue nodule anterior aspect of bladder stable.  New small amount of ascites as well as a new small left pleural effusion.  Creatinine 0.75 with hemoglobin 11.1 otherwise unremarkable CBC and CMP     2/15/2021 Imaging    CT abdomen pelvis with contrast compared to 8/5/2020 shows enlarging  soft tissue mass 4.2 cm over the bladder fundus and there is a calcification along the base of the urinary bladder.  Small stable multiple hypoattenuating indeterminate liver lesions.  Similar degree of ascites.     2/23/2021 Imaging    -2/23/2021 CT chest shows no evidence of metastasis with ascending aorta 42 mm.     4/22/2021 Surgery    -4/22/2021 cystoscopy (after prior office cystoscopy showed large fungating mass) with bilateral ureteral catheters, exploratory laparotomy, lysis of adhesions, right pelvic sidewall excisional biopsy, open partial cystectomy Dr. Ike Perdue. Peritoneal fluid showed predominant inflammatory reactive mesothelial cells with no malignancy. 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 Carcinoma was less than 1 mm from the lateral resection margins.     6/7/2021 Imaging    CT chest, abdomen and pelvis: No evidence of metastatic disease within the chest abdomen or pelvis.  Interval resection of enhancing bladder wall mass a small amount of residual enhancing soft tissue, possibly granulation tissue.  Interval resolution of abdominal and pelvic ascites.  Stable dilation of the ascending aorta mid segment measuring up to 41 mm.     9/10/2021 Imaging    CT chest abdomen pelvis with contrast shows under distended urinary bladder with mild asymmetric wall thickening dome and left lateral wall with a small 9 mm polypoid lesion in the bladder.  There is stable 2.3 cm right external iliac and a few other subcentimeter scattered nodes in the root of the small bowel mesentery and retroperitoneum.  Postsurgical right hemicolectomy changes.  Stable low-attenuation lesions in the liver unchanged.     11/24/2021 Imaging    CT chest abdomen pelvis compared to 6/7/2021 outside imaging shows stable 4.1 cm ascending thoracic aorta.  No lung nodules.  Liver homogenous with bilateral cysts.  No adenopathy.  Thickened  sigmoid colon and rectum suggestive of mild colitis or postradiation change.  No pelvic adenopathy.  Bony structures degenerative in the spine.  Some soft tissue anterior to the acetabulum on the right and extending along the medial aspect right pelvic sidewall 2.7 x 5.6 may represent intramuscular process versus adenopathy which could not be ruled out.  No prior study views available.     4/11/2022 - 5/6/2022 Radiation    Radiation OncologyTreatment Course:  Esteban Tse received 5000 cGy in 20 fractions to right pelvis/groin via External Beam Radiation - EBRT.     5/25/2022 Imaging    CT chest abdomen pelvis with contrast at [facility] shows nothing remarkable on the chest.  Stable liver cysts.  Focal soft tissue right iliac region appears to be increasing in size now 7.2 cm previously 3.9 cm with some mass-effect on surrounding structures with several stable inguinal borderline nodes.     10/19/2022 Imaging    - 10/19/2022 CT chest abdomen pelvis with contrast [facility] compared to 7/7/2022 shows no significant change or evidence of progression.     Peritoneal carcinoma   5/14/2020 Initial Diagnosis    Peritoneal carcinoma (HCC)     5/25/2022 Imaging    CT chest abdomen pelvis with contrast at [facility] shows nothing remarkable on the chest.  Stable liver cysts.  Focal soft tissue right iliac region appears to be increasing in size now 7.2 cm previously 3.9 cm with some mass-effect on surrounding structures with several stable inguinal borderline nodes.         HISTORY OF PRESENT ILLNESS:  The patient is a 67 y.o. 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  On watchful waiting off of any therapy.  Chronic mild right lower extremity swelling in the thigh not worsening.  Chronic right pelvic pain on oxycodone and gabapentin.  Modest relief    Past Medical History:   Diagnosis Date   • Ascending aortic aneurysm    • Benign hypertension    • Bladder  "cancer    • Colon cancer    • History of radiation therapy 05/06/2022    right groin/hemipelvis   • Hypertension    • Intermittent palpitations    • Mucinous adenocarcinoma    • Pounding heartbeat    • PVC's (premature ventricular contractions)      Past Surgical History:   Procedure Laterality Date   • APPENDECTOMY  2017    With Partial Colon    • BLADDER SURGERY      with partial colon   • CHOLECYSTECTOMY     • COLON SURGERY     • GALLBLADDER SURGERY         Allergies   Allergen Reactions   • Levofloxacin Swelling     Lips swellijng   • Tetracycline Swelling     Lips swelling   • Penicillins Other (See Comments)     childhood       Family History and Social History reviewed and changed as necessary    REVIEW OF SYSTEM:   No new somatic complaints    PHYSICAL EXAM:  Right thigh a few centimeters larger than the left thigh but no Homans' sign and no venous valvular insufficiency changes.  No visible respiratory distress    Vitals:    05/23/23 1023   BP: 160/86   Pulse: 86   Resp: 20   Temp: 98.9 °F (37.2 °C)   SpO2: 99%   Weight: 79.8 kg (176 lb)   Height: 185.4 cm (73\")     Vitals:    05/23/23 1023   PainSc:   4   PainLoc: Hip  Comment: right hip          ECOG score: 0           Vitals reviewed.    ECOG: (0) Fully Active - Able to Carry On All Pre-disease Performance Without Restriction    Lab Results   Component Value Date    HGB 13.2 06/06/2022    HCT 42.5 06/06/2022    MCV 87.5 06/06/2022     06/06/2022    WBC 7.50 06/06/2022    NEUTROABS 5.80 06/06/2022    LYMPHSABS 1.30 06/06/2022    MONOSABS 0.50 06/06/2022    EOSABS 0.09 04/05/2022    BASOSABS 0.02 04/05/2022       Lab Results   Component Value Date    GLUCOSE 113 (H) 06/06/2022    BUN 10 06/06/2022    CREATININE 1.00 06/06/2022     06/06/2022    K 4.6 06/06/2022     06/06/2022    CO2 27.0 06/06/2022    CALCIUM 9.1 06/06/2022    PROTEINTOT 6.4 06/06/2022    ALBUMIN 4.00 06/06/2022    BILITOT 0.5 06/06/2022    ALKPHOS 120 (H) 06/06/2022    " AST 21 06/06/2022    ALT 18 06/06/2022             ASSESSMENT & PLAN:  1.  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  Pathologic stage IIa with T3 extension into the base of the appendix through the muscularis propria but not into the serosal surface.  16 nodes negative.  Colonoscopy December 2017 negative postop.Laparoscopic diagnostic peritoneal biopsy confirming disease in the bladder dome February 2020.  Began Folfiri.  June 2020 Dr. Peoples excised right lobe of liver and left lobe liver lesion, omentectomy and resection of pelvic peritoneal nodules, ileocolectomy with ileal colostomy and intraperitoneal HIPEC with mitomycin.  Dr. Lawler performed right ureteral stent placement and partial cystectomy.  With large fungating mass, 4/22/2021 had exploratory laparotomy with lysis of adhesions and right pelvic sidewall excisional biopsy with open partial cystectomy.  No malignancy and peritoneal fluid.  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  Margins less than 1 mm.  October 2021 cystoscopy without malignancy.  TURBT 10/11/2021 did not show malignancy.  March 2022 Dr. Peoples for 3.9 cm external iliac recurrent adenopathy 6 weeks out from laminectomy which did not help pain was determined to be an unresectable recurrence with muscle and vascular involvement.  Radiation with Dr. Moody ended 5/6/2022.  Disease stabilized and pain improved.    -12/30/2019 medical oncology office visit: The patient had been on surveillance since surgery December 2017.  CT scans had been negative up until 12/30/2019 CT chest abdomen and pelvis that showed subtle soft tissue density surrounding surgical clips in the right side of the pelvis along with soft tissue nodule at the anterior wall of the bladder concerning for recurrent disease.  Patient sent back to Dr. Davidson for colonoscopy.    -2/7/2020 patient was referred to Dr. Peoples at  the [facility], he underwent diagnostic laparoscopic and peritoneal biopsies that confirmed recurrent disease with biopsy of bladder dome nodule showing adenocarcinoma with mucinous features.  Port was placed at this time also.    -2/25/2020 began FOLFIRI    -4/21/2020 patient having increased fatigue, FOLFIRI dose reduced by 10%.    -5/19/2020 cycle 7 FOLFIRI    -6/18/2020 Dr. Peoples performed exploratory laparotomy with excision of right lobe liver lesion and left lobe liver lesion, omentectomy, resection of pelvic peritoneal nodules, ileocolectomy with creation of ileal colostomy, hyperthermic intraperitoneal chemotherapy with mitomycin-C at 42 °C for deep tissue penetration for 90 minutes.  Dr. Perdue performed partial cystectomy with right ureteral stent placement    -8/5/2020 CT abdomen pelvis with contrast shows small soft tissue nodule anterior aspect of bladder stable.  New small amount of ascites as well as a new small left pleural effusion.  Creatinine 0.75 with hemoglobin 11.1 otherwise unremarkable CBC and CMP.    -2/15/2021 CT abdomen pelvis with contrast compared to 8/5/2020 shows enlarging soft tissue mass 4.2 cm over the bladder fundus and there is a calcification along the base of the urinary bladder.  Small stable multiple hypoattenuating indeterminate liver lesions.  Similar degree of ascites.    -2/23/2021 CT chest shows no evidence of metastasis with ascending aorta 42 mm.    -3/8/2021 follow-up with Dr. Portillo Peoples we reviewed his CTs suggested team effort resection with Dr. Perdue    -3/16/2021 follow-up with Dr. Perdue.  He plans for open partial cystectomy, possible ureteral implants, cystoscopy and stent placement in concert with Dr. Peoples.    -4/22/2021 cystoscopy (after prior office cystoscopy showed large fungating mass) with bilateral ureteral catheters, exploratory laparotomy, lysis of adhesions, right pelvic sidewall excisional biopsy, open partial cystectomy Dr. Ike Perdue.  "Peritoneal fluid showed predominant inflammatory reactive mesothelial cells with no malignancy. 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 Carcinoma was less than 1 mm from the lateral resection margins.    -5/18/2021 [facility] medical oncology follow-up visit: I reviewed his hospital notes, operative notes, and pathology report as above and went over this with the patient.  The general consensus from retrospective as well as a few prospective data over the last couple of decades with this low-grade malignancy does not show any profound benefit from \"adjuvant\" chemotherapy in this current setting.  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  There is no standard follow-up but general guidelines suggest following up as typical colon cancer.  I will repeat his CT chest abdomen pelvis now to reestablish his baseline postoperative imaging and have him see my nurse practitioner back in a couple of weeks to make sure there is no nia evidence of measurable residual disease.  Assuming this just shows postoperative changes, I would simply repeat scans again in 3 months or sooner as symptoms dictate.  If he has no evidence of persistent or metastatic disease on current imaging, then when he sees my nurse practitioner back she will also review what they say about his ascending aorta which was 42 mm in February and sent him to Dr. Evangelist Anthony for an opinion as to whether any intervention is needed relative to the aorta.  Obviously if his cancer is out of control on the upcoming scans then the aorta is a moot point.    -6/10/2021 [facility] oncology clinic follow-up: CT scans show no evidence of disease recurrence in the chest, abdomen or pelvis.  Ascending aortic aneurysm stable at 41 mm.  Referral made to Dr. Anthony, cardiothoracic surgeon for management and evaluation of a sending aortic aneurysm.  Plan to repeat CT scans in 3 months.    -9/14/2021 " [facility], [place], operated by [facility] medical oncology follow-up visit: I reviewed images and reports of 9/10/2021 CT chest abdomen pelvis with contrast which shows under distended urinary bladder with mild asymmetric wall thickening dome and left lateral wall with a small 9 mm polypoid lesion in the bladder.  There is stable 2.3 cm right external iliac and a few other subcentimeter scattered nodes in the root of the small bowel mesentery and retroperitoneum.  Postsurgical right hemicolectomy changes.  Stable low-attenuation lesions in the liver unchanged.  I will have him collect his discs from [facility] to take Dr. Perdue to decide whether to proceed with cystoscopy or just continue watchful waiting.  There is no major changes and I suspect we are looking at postoperative changes and we shall see him for video visit in a few weeks to see what urology at  decides.    -10/11/2021 cystoscopy Dr. Perdue showed marked acute and chronic inflammation but no evidence of malignancy.  Muscularis propria present.    -[facility]/14/[facility] virtual visit: I reviewed reports of pathology from cystoscopy 10/11/2021 and went over this with patient.  He is feeling fairly fit.  I will repeat his CT chest abdomen pelvis prior to return in 3 months and if in the interim, when he follows up with Dr. Perdue in the next couple of weeks post cystoscopy, plans are made for further cystoscopy before I see him back and any malignancy is found, I would ask the patient and Dr. Perdue to touch base as I would not know of such without that contact as the information comes into our chart without notification now that epic is being used by .  Assuming no further biopsies in the interim, I will simply repeat his scans in 3 months to follow what I suspected and is now confirmed to be inflammatory changes.  From the aneurysm standpoint, he has follow-up scanning March 2022 arranged by Dr. Anthony and he will follow him up after that.    -11/24/2021 CT  chest abdomen pelvis compared to 6/7/2021 outside imaging shows stable 4.1 cm ascending thoracic aorta.  No lung nodules.  Liver homogenous with bilateral cysts.  No adenopathy.  Thickened sigmoid colon and rectum suggestive of mild colitis or postradiation change.  No pelvic adenopathy.  Bony structures degenerative in the spine.  Some soft tissue anterior to the acetabulum on the right and extending along the medial aspect right pelvic sidewall 2.7 x 5.6 may represent intramuscular process versus adenopathy which could not be ruled out.  No prior study views available.    -1/4/2022 follow-up Dr. Ike Perdue [facility].  Per his note, he had TURBT 10/11/2021.  He had been placed on antibiotics for UTI for preop preparation for back surgery planned in 2 weeks.  Denies any mucus in his urine.  Some microhematuria on urinalysis this day.  No gross hematuria or dysuria.  The October 2021 TURBT did not reveal anything malignant on pathology.  There may be irritation or inflammation secondary to sutures in the bladder following partial cystectomy.  Patient asymptomatic from a urinary standpoint.  Recommended following up with [facility] with no plans for further urology follow-up.    -2/4/2022 [facility] medical oncology telehealth follow-up visit: I reviewed his November images and reports with the patient as well as with Dr. Gm Moody and the note from Dr. Perdue from 1 month ago.  I do suspect this pelvic sidewall abnormality in November represents persistent disease but as I have stated in prior dictations, the data on palliative or overall survival benefit of systemic 5-FU based combination chemotherapy and/or Avastin does not show a clear-cut survival advantage to chemotherapy for asymptomatic disease.  He is having some back pain and had spine decompression a couple of weeks ago without much benefit.  This is an area that may potentially be radiated but I will check his CT chest (angiogram of chest)  "abdomen and pelvis and get his blood counts and chemistries and urinalysis and see him back for virtual visit in a few weeks.  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  I will also converse with Dr. Peoples at that junction whether he thinks he has anything to offer if this is progressing though I doubt it given that the last intervention was a urologic procedure and the last cystoscopy/TURBT in October had no malignancy and the liver lesions are currently being called liver cysts albeit I am skeptical as to whether those are truly benign but they are certainly not growing.    -3/4/2022 CT angiogram of chest/CT abdomen pelvis for evaluation of dizziness and surveillance of thoracic aortic aneurysm and appendiceal carcinoma.  Thoracic ascending aortic ectasia 4 x 4 cm stable.  Small nodularity left upper lobe stable from September.  Stable small hepatic hypodensities status post cholecystectomy.  Right external iliac soft tissue fullness now 3 x 3.9 cm previously 2 x 2.3 cm concerning for enlarging adenopathy with symmetric nonenlarged inguinal nodes stable.  CBC unremarkable.  CMP unremarkable save for potassium 5.4.  CEA 18.2.  1-3 red cells and white cells per milliliter of urine.    -3/16/2022 office visit with Dr. Portillo Peoples.  He reviewed the CT from 3/4/2022.  He notes that the laminectomy from 6 weeks prior has not helped his \"hip pain\".  Given the location of this recurrence previously 2 x 2.3 cm now 3 x 3.9 cm in the external iliac area concerning for enlarging adenopathy with symmetric nonenlarged inguinal nodes stable, the mass appears unresectable with involvement of muscle and " vasculature.    -3/24/2022 [facility] medical oncology follow-up virtual visit: Pain is still significant.  We will get him in hopefully in the next day to [facility] for palliative care.  Have spoken with Dr. Peoples and no surgery.  I have spoken with Dr. Moody who thinks palliative radiation while not likely to shrink tumor dramatically may help pain considerably and we will get him there.  We will get him to bring the discs to our [place] office and asked them to bring that back to [place] to get scanned in for our invasive radiologist to look at for us to get CT-guided biopsy of this node in the right lower quadrant and send that for Caris MI profile once the pathology is obtained to hopefully find high tumor mutational burden or other molecular targets that might give us some options.  Apart from that, as stated multiple times, this is not a highly chemotherapy sensitive tumor and I am not sure I would palliate him well but, when I see him back in early June with CAT scans prior to return and post radiation, if he is not getting relief and wants to try a 5-FU based regimen plus or minus Avastin I would be okay with that but he knows it does not alter survival 1 way or the other and we are simply trying to palliate this inexorable process.  No other surgical options.    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  Mismatch repair proficient,NTRK1/2/3 fusion not detected.  BRAF V600E negative.  PD-L1 Negative, 1+, 5%.  PTEN Positive, 1+, 100%. MLH1 positive/3+, 100%. MSH2 positive/3+, 100%. MSH6 positive/3+, 100%. PMS2 positive/3+,100%.    -5/6/2022 last radiation    - 5/25/2022 CT chest abdomen pelvis with contrast at [place] [facility] shows nothing remarkable on the chest.  Stable liver cysts.  Focal soft tissue right iliac region appears to be increasing in size now 7.2 cm previously 3.9 cm with some mass-effect on  surrounding structures with several stable inguinal borderline nodes.    -6/6/2022 [facility] medical oncology follow-up visit: Now 1 month out from radiation just starting to get a little bit of improvement in his abdominal discomfort.  Slight growth on CT but some of that may be postradiation change this close to radiation and, as I stated in my note in March, it is not clear that 5-FU based therapy plus or minus Avastin is going to palliate much given its relatively insensitivity to chemotherapy and it certainly does not improve survival.  To that end, we will plan on just repeating his CAT scans again in about 6 weeks and if the pain is worsening and/or this continues to grow then I will probably give him Avastin FOLFOX.  If everything is stable and pain is controlled we will go continue watchful waiting with imaging about every 3 months from that point forward.    -7/11/2022 [facility] medical oncology follow-up: His 7/7/2022 CT chest abdomen pelvis showed stable right external iliac low-attenuation which had been previously growing and thickening of the distal descending and sigmoid colon with perhaps a small right iliac borderline 1 cm node.  Symptomatically improved post radiation.  For now we will hold on Avastin FOLFOX as the benefit of such is questionable with this histologic subtype and there are no actionable molecular targets.  Repeat CTs prior to return in 3 months.    -11/1/2022 [facility] medical oncology follow-up: 10/19/2022 CT chest abdomen pelvis with contrast [facility] compared to 7/7/2022 shows no significant change or evidence of progression.  Clinically quiescent.  We will repeat CTs in 6 months, sooner as symptoms dictate.    -5/19/2023 CT chest abdomen pelvis [facility] compared to 10/19/2022 showed no evidence of disease progression in the chest.  Nodular density adjacent to the descending colon 6 mm nonspecific.  Otherwise unchanged appearance of hepatic metastatic nodules,  right iliac adenopathy, and right pelvic mass.    -5/23/2023 [facility] medical oncology follow-up: I reviewed his report of CT chest abdomen pelvis from [place] 4 days ago showing no evidence of progression.  So area near the descending colon is nonspecific and would not change anything at this junction.  His October CT had not changed from July and the current one has not changed significantly since October.  We will continue to see palliative care for his right pelvic pain.  If leg swelling significantly worsens we could check Doppler and assuming no clot consider vascular intervention but as the thigh has not significantly changed over time nor is there significant tenderness in the calf I am doubtful that this is clot and is likely just due to the pelvic mass.  Vascular surgical intervention should swelling worsen is also an option but I would not want a stent placed through this area in the face malignancy particularly a mucinous variety that would make him prone towards clotting.  If the leg worsens I would get his Doppler and consider vascular intervention or anticoagulation as dictated but clinically stable so will not investigate further at this junction.  I will repeat his imaging again in 6 months.    Total time of care today inclusive of time spent today prior to patient's arrival reviewing interval CAT scans and during visit interviewing him as to signs or symptoms of his disease and management of symptoms as outlined in after visit instituting the above plan took 30 minutes of patient care time throughout the day today.  Cy Delcid MD    05/23/2023

## 2023-06-06 DIAGNOSIS — G89.3 CANCER ASSOCIATED PAIN: ICD-10-CM

## 2023-06-06 RX ORDER — OXYCODONE HYDROCHLORIDE 10 MG/1
10 TABLET ORAL EVERY 4 HOURS PRN
Qty: 180 TABLET | Refills: 0 | Status: SHIPPED | OUTPATIENT
Start: 2023-06-06 | End: 2023-07-06

## 2023-06-06 NOTE — TELEPHONE ENCOUNTER
YAIMA #: [id number]    Medication requested: oxyCODONE (ROXICODONE) 10 MG tablet     Last fill date: 4/13/23    Last appointment: 4/12/23    Next appointment: 7/12/23

## 2023-07-24 ENCOUNTER — TELEPHONE (OUTPATIENT)
Dept: PALLIATIVE CARE | Facility: CLINIC | Age: 68
End: 2023-07-24

## 2023-07-24 ENCOUNTER — OFFICE VISIT (OUTPATIENT)
Dept: PALLIATIVE CARE | Facility: CLINIC | Age: 68
End: 2023-07-24
Payer: MEDICARE

## 2023-07-24 VITALS
BODY MASS INDEX: 22.15 KG/M2 | RESPIRATION RATE: 18 BRPM | SYSTOLIC BLOOD PRESSURE: 188 MMHG | TEMPERATURE: 98.7 F | WEIGHT: 167.9 LBS | DIASTOLIC BLOOD PRESSURE: 100 MMHG | OXYGEN SATURATION: 100 % | HEART RATE: 73 BPM

## 2023-07-24 DIAGNOSIS — G89.3 CANCER ASSOCIATED PAIN: ICD-10-CM

## 2023-07-24 DIAGNOSIS — C18.1 MUCINOUS ADENOCARCINOMA OF APPENDIX: Primary | ICD-10-CM

## 2023-07-24 DIAGNOSIS — R11.0 NAUSEA: ICD-10-CM

## 2023-07-24 PROCEDURE — 1125F AMNT PAIN NOTED PAIN PRSNT: CPT | Performed by: PHYSICIAN ASSISTANT

## 2023-07-24 PROCEDURE — 99215 OFFICE O/P EST HI 40 MIN: CPT | Performed by: PHYSICIAN ASSISTANT

## 2023-07-24 PROCEDURE — 1160F RVW MEDS BY RX/DR IN RCRD: CPT | Performed by: PHYSICIAN ASSISTANT

## 2023-07-24 PROCEDURE — 3080F DIAST BP >= 90 MM HG: CPT | Performed by: PHYSICIAN ASSISTANT

## 2023-07-24 PROCEDURE — 1159F MED LIST DOCD IN RCRD: CPT | Performed by: PHYSICIAN ASSISTANT

## 2023-07-24 PROCEDURE — 3077F SYST BP >= 140 MM HG: CPT | Performed by: PHYSICIAN ASSISTANT

## 2023-07-24 RX ORDER — GABAPENTIN 600 MG/1
600 TABLET ORAL 3 TIMES DAILY
Qty: 90 TABLET | Refills: 0 | Status: SHIPPED | OUTPATIENT
Start: 2023-07-24 | End: 2023-08-23

## 2023-07-24 RX ORDER — OXYCODONE HYDROCHLORIDE 10 MG/1
10 TABLET ORAL EVERY 4 HOURS PRN
Qty: 180 TABLET | Refills: 0 | Status: SHIPPED | OUTPATIENT
Start: 2023-07-24

## 2023-07-24 RX ORDER — FENTANYL 12.5 UG/1
1 PATCH TRANSDERMAL
Qty: 10 PATCH | Refills: 0 | Status: SHIPPED | OUTPATIENT
Start: 2023-07-24 | End: 2023-08-23

## 2023-07-24 NOTE — TELEPHONE ENCOUNTER
I have reviewed patient's YAIMA report prior to prescribing Schedule II, III, and IV medications. Request # [id number]. Next refills for fentanyl 12 mcg/hr patches #10 and oxycodone 10 mg tab q4h PRN #180 were sent to the pharmacy. The patient is scheduled to follow-up in 1 month.

## 2023-07-24 NOTE — PROGRESS NOTES
Palliative Clinic Note      Name: Esteban Tse  Age: 68 y.o.  Sex: male  : 1955  MRN: [id number]  Date of Service: 2023   Medical Oncologist: Bhavin    Subjective:    Chief Complaint: Groin pain, nausea    History of Present Illness: Esteban Tse is a 68 y.o. male with past medical history significant for HTN, AAA, metastatic adenocarcinoma of the appendix who presents to the palliative clinic today as a follow up for pain and symptom management.     Treatment summary: The patient was diagnosed with cancer of the appendix at the time of appendectomy in 2017. Patient under surveillance until imaging in 2019 demonstrated several lesions suspicious for recurrent disease. He underwent a diagnostic laparoscopy with peritoneal biopsies on 2020 by Dr. Peoples that proved recurrent adenocarcinoma. He underwent an exploratory laparotomy with excision of right and left lobe liver lesions, omentectomy, resection of pelvic peritoneal nodules, ileocolectomy with creation of ileal colostomy, hyperthermic intraperitoneal chemotherapy and a partial cystectomy with right ureteral stent placement in 2020. He underwent a cystoscopy with bilateral ureteral catheters, exploratory laparotomy, lysis of adhesions, right pelvic sidewall excisional biopsy, and an open partial cystectomy in 2021. TURBT performed in 10/2021. Imaging from 3/4/2022 showed enlarging external illiac adenopathy. There are no surgical options per Dr. Peoples. Underwent CT-guided biopsy of the right iliac mass consistent with mucinous adenocarcinoma. Patient completed palliative radiation in 2023. CT chest abdomen and pelvis on 23 showed stable disease. Nodular density adjacent to the descending colon 6 mm nonspecific. Plan to continue watchful waiting.      Pain: Patient established care with [facility] in [place], [place] > 1 year ago. Patient's right hip pain was thought to be referred from his  "spine. Patient underwent several epidurals and a lumbar laminectomy with no improvement in his pain. Patient complains of pain predominantly in his left groin / LLE related to the cancer of the appendix. The pain occurs with movement. Patient is able to get comfortable sitting or lying down. Patient stopped morphine ER due to daytime drowsiness and constipation. Patient deferred rotation of long-acting opioid at the last appointment but is agreeable today. Unable to increase oxycodone to 15 mg due to insurance company. He occasionally takes Ibuprofen as well.     Other symptoms: The patient continues to have nausea in the mornings. He describes it as feeling \"queasy\". He states the Phenergan 12.5 mg and Zofran 8 mg tablets have not been effective. The patient did not try taking the phenergan before bed.  Patient's appetite improves as the day goes on. Constipation from pain medication managed with OTC stool softener. No issues with sleep. Patient takes two tablets of oxycodone before bed to prevent waking up in pain.     Pyschosocial: The patient lives alone. He is retired from working for the state. He enjoys playing golf and spending time outside. No personal history of a mental illness. The patient admits to anxiety/depression related to his cancer diagnosis but states overall his mood is stable and he is able to manage it on his own. The patient admits to increased stress related to the care of his 89 y/o mother. Patient's father passed away in 10/2022.     Spiritual: Patient describes himself as curious rather than practicing.      Goals: Improve quality of life with symptom management.     The following portions of the patient's history were reviewed and updated as appropriate: allergies, current medications, past family history, past medical history, past social history, past surgical history and problem list.    ORT-R: Low risk  Decisional capacity: Full  ECOG: (1) Restricted in physically strenuous " activity, ambulatory and able to do work of light nature   Palliative Performance Scale Score: 60%     Objective:    BP (!) 188/100   Pulse 73   Temp 98.7 °F (37.1 °C) (Temporal)   Resp 18   Wt 76.2 kg (167 lb 14.4 oz)   SpO2 100%   BMI 22.15 kg/m²     Constitutional: Awake, alert, normal gait, sitting up in exam chair, in no acute distress  Eyes: PERRLA, EOMS intact  HENT: NCAT, face symmetric  Neck: Supple, trachea midline  Respiratory: Nonlabored respirations  Cardiovascular: RRR, no edema observed  Gastrointestinal: Soft, no guarding  Musculoskeletal: Moves all extremities   Psychiatric: Appropriate affect, cooperative  Neurologic: Oriented x 3, Cranial Nerves grossly intact to confrontation, speech clear  Skin: Cool dry, no rashes or wounds appreciated     Medication Counts: Reviewed. See bottom of note for details. Did not bring medication to appointment  I have reviewed the patient's [place] PDMP. [facility] Req #[id number].   UDS: Last 3/25/22. Reviewed. Appropriate.     Assessment & Plan:    1. Mucinous adenocarcinoma of appendix  --CT chest abdomen and pelvis on 5/19/23 showed stable disease. Nodular density adjacent to the descending colon 6 mm nonspecific. Plan to continue watchful waiting.     2. Cancer associated pain  --Patient is appropriate for opioid therapy due to cancer related pain. Daily function and quality of life improved with pain medication. Pain poorly controlled with short-acting opioid therapy alone. Patient unable to tolerate morphine extended release. Will start fentanyl 12 mcg/hr patches every 72 hours. Discussed specific safety concerns regarding this medication. Continue oxycodone 10 mg every 4 hours as needed and gabapentin 600 mg TID. Side effects of the medication discussed at every visit. Patient was encouraged to continue bowel regimen of daily stool softeners, prn laxatives, and diet modifications.    3. Nausea  --Nausea is worse in the mornings. Instructed patient to take  Phenergan 12.5 mg before bed. Recommend increasing to 25 mg if 1 tablet is ineffective. May consider adding compazine in the future.     Code status: Full code  Advanced directives: Not on file  Health care surrogate: Shaylee Tse, mother     Return in about 1 month (around 8/24/2023) for Telephone.    I spent 40 minutes caring for Esteban Tse on this date of service. This time includes time spent by me in the following activities: preparing for the visit, reviewing tests, obtaining and/or reviewing a separately obtained history, performing a medically appropriate examination and/or evaluation , counseling and educating the patient/family/caregiver, ordering medications, tests, or procedures, documenting information in the medical record, independently interpreting results and communicating that information with the patient/family/caregiver, and care coordination    Qian Fleming PA-C  07/24/2023    Medication Date Filled # Filled Count Used # Days  SOY   Gabapentin 600 6/21/23 90 -- -- -- --   Oxycodone 10 6/6/23 180 -- -- -- --

## 2023-07-24 NOTE — TELEPHONE ENCOUNTER
Contacted patient regarding safety instructions for the fentanyl 12 patches.  Informed patient do not apply heat directly onto the patch or it can increase the medication into his system, If he starts to run a fever more than 100.4, to remove the patch until his body temperature is normal. And do not apply any type of bandage over the patch, it can change the effectiveness. Pt understood.

## 2023-08-24 ENCOUNTER — TELEMEDICINE (OUTPATIENT)
Dept: PALLIATIVE CARE | Facility: CLINIC | Age: 68
End: 2023-08-24
Payer: MEDICARE

## 2023-08-24 VITALS — SYSTOLIC BLOOD PRESSURE: 160 MMHG | WEIGHT: 167 LBS | BODY MASS INDEX: 22.03 KG/M2 | DIASTOLIC BLOOD PRESSURE: 86 MMHG

## 2023-08-24 DIAGNOSIS — C18.1 MUCINOUS ADENOCARCINOMA OF APPENDIX: Primary | ICD-10-CM

## 2023-08-24 DIAGNOSIS — G89.3 CANCER ASSOCIATED PAIN: ICD-10-CM

## 2023-08-24 DIAGNOSIS — R11.0 NAUSEA: ICD-10-CM

## 2023-08-24 DIAGNOSIS — C80.1 NEUROPATHY ASSOCIATED WITH MALIGNANT NEOPLASM: ICD-10-CM

## 2023-08-24 DIAGNOSIS — G63 NEUROPATHY ASSOCIATED WITH MALIGNANT NEOPLASM: ICD-10-CM

## 2023-08-24 RX ORDER — OXYCODONE HYDROCHLORIDE 10 MG/1
10 TABLET ORAL EVERY 4 HOURS PRN
Qty: 180 TABLET | Refills: 0 | Status: SHIPPED | OUTPATIENT
Start: 2023-08-24

## 2023-08-24 RX ORDER — GABAPENTIN 600 MG/1
600 TABLET ORAL 3 TIMES DAILY
Qty: 90 TABLET | Refills: 0 | Status: SHIPPED | OUTPATIENT
Start: 2023-08-24 | End: 2023-09-23

## 2023-08-24 NOTE — TELEPHONE ENCOUNTER
I have reviewed patient's YAIMA report prior to prescribing Schedule II, III, and IV medications. Request # [id number]. Next refill for oxycodone 10 mg q4h PRN #180 was sent to the pharmacy. The patient is scheduled to follow-up in 1 month.

## 2023-08-24 NOTE — PROGRESS NOTES
Palliative Clinic Note      Name: Esteban Tse  Age: 68 y.o.  Sex: male  : 1955  MRN: [id number]  Date of Service: 2023   Medical Oncologist: Dr. Delcid  Mode of visit: video-conference  Location of patient: Home  [facility] [place] [facility]    Subjective:    Chief Complaint: Body aches     History of Present Illness: Esteban Tse is a 68 y.o. male with past medical history significant for  HTN, AAA, metastatic adenocarcinoma of the appendix who presents via video-conference today as a follow up for pain and symptom management.     Treatment summary: The patient was diagnosed with cancer of the appendix at the time of appendectomy in 2017. Patient under surveillance until imaging in 2019 demonstrated several lesions suspicious for recurrent disease. He underwent a diagnostic laparoscopy with peritoneal biopsies on 2020 by Dr. Peoples that proved recurrent adenocarcinoma. He underwent an exploratory laparotomy with excision of right and left lobe liver lesions, omentectomy, resection of pelvic peritoneal nodules, ileocolectomy with creation of ileal colostomy, hyperthermic intraperitoneal chemotherapy and a partial cystectomy with right ureteral stent placement in 2020. He underwent a cystoscopy with bilateral ureteral catheters, exploratory laparotomy, lysis of adhesions, right pelvic sidewall excisional biopsy, and an open partial cystectomy in 2021. TURBT performed in 10/2021. Imaging from 3/4/2022 showed enlarging external illiac adenopathy. There are no surgical options per Dr. Peoples. Underwent CT-guided biopsy of the right iliac mass consistent with mucinous adenocarcinoma. Patient completed palliative radiation in 2023. CT chest abdomen and pelvis on 23 showed stable disease. Nodular density adjacent to the descending colon 6 mm nonspecific. Plan to continue watchful waiting.      Pain: Patient established care with [facility] in  [place] > 1 year ago. Patient's right hip pain was thought to be referred from his spine. Patient underwent several epidurals and a lumbar laminectomy with no improvement in his pain. Patient complains of pain predominantly in his left groin / LLE related to the cancer of the appendix. The pain occurs with movement. Patient is able to get comfortable sitting or lying down. Patient stopped morphine ER due to daytime drowsiness and constipation. Started patient on fentanyl 12 mcg/hr patches at last appointment on 7/24/23. Patient did very well with the first 3 patches (9 days). He reports experiencing body aches and nausea for the first couple hours after changing his patch with the next 3 patches. Patient discontinued the medication for fear that he was having an allergic reaction to the medication. While on the patches, he does reports improvement in his overall pain level. He has been taking the oxycodone 10 mg every 4-5 hours as needed for break through pain. We were unable to increase the oxycodone to 15 mg due to insurance company. He occasionally takes Ibuprofen as well.      Other symptoms: The patient reports improved AM nausea since taking Phenergan before bed.  He has not been taking any Zofran during the day. Patient's appetite improves as the day goes on. Constipation from pain medication managed with OTC stool softener. No issues with sleep. Patient takes two tablets of oxycodone before bed to prevent waking up in pain.     Pyschosocial: The patient lives alone. He is retired from working for the state. He enjoys playing golf and spending time outside. No personal history of a mental illness. The patient admits to anxiety/depression related to his cancer diagnosis but states overall his mood is stable and he is able to manage it on his own. The patient admits to increased stress related to the care of his 97 y/o mother. Patient's father passed away in 10/2022.     Spiritual: Patient describes  himself as curious rather than practicing.      Goals: Improve quality of life with symptom management.     The following portions of the patient's history were reviewed and updated as appropriate: allergies, current medications, past family history, past medical history, past social history, past surgical history and problem list.    ORT-R: Low risk   Decisional capacity: Full  ECOG: (1) Restricted in physically strenuous activity, ambulatory and able to do work of light nature   Palliative Performance Scale Score: 60%     Objective:    Constitutional: Awake, alert, sitting up   Eyes: PERRLA, EOMS intact  HENT: NCAT, face symmetric  Neck: Supple, trachea midline  Respiratory: Nonlabored respirations  Musculoskeletal: Moves all extremities   Psychiatric: Appropriate affect, cooperative  Neurologic: Oriented x 3, Cranial Nerves grossly intact to confrontation, speech clear    Medication Counts: Collected over the phone. See bottom of note for details. No misuse or overuse evident.   I have reviewed the patient's [place] PDMP. [facility] Req #[id number].   UDS: Last 3/25/22. Reviewed. Appropriate.     Assessment & Plan:    1. Mucinous adenocarcinoma of appendix  --Plan to continue watchful waiting.     2. Cancer associated pain  --Patient is appropriate for opioid therapy due to cancer related pain. Daily function and quality of life improved with pain medication. Discussed early side effects with new opioids that can resolve after 7-14 days including nausea, sedation, dizziness. Patient is agreeable to restarting the fentanyl 12 mcg/hr patches. Recommend patient document his pain and side effects in a journal over the next 9 days and to call the office with this information. Continue oxycodone 10 mg tablets q4h PRN for break through pain.  Side effects of the medication discussed at every visit. Patient was encouraged to continue bowel regimen of daily stool softeners, prn laxatives, and diet modifications.    3. Neuropathy  associated with malignant neoplasm  --Refilled gabapentin (NEURONTIN) 600 MG tablet; Take 1 tablet by mouth 3 (Three) Times a Day for 30 days.      4. Nausea  --Continue Phenergan 12.5-25 mg tablets before bed.     Code status: Full code  Advanced directives: Not on file  Health care surrogate: Shaylee Tse, mother     Return in about 1 month (around 9/24/2023) for Video visit.    The patient has chosen to receive care through a telehealth visit.   Does the patient consent to using a video visit for their medical care today? Yes   The visit included audio and video interaction. No technical issues occurred during this visit.  I spent 30 minutes caring for Esteban Tse on this date of service. This time includes time spent by me in the following activities: preparing for the visit, reviewing tests, obtaining and/or reviewing a separately obtained history, performing a medically appropriate examination and/or evaluation, counseling and educating the patient/family/caregiver, ordering medications, tests, or procedures, documenting information in the medical record, independently interpreting results and communicating that information with the patient/family/caregiver, and care coordination.    Qian Fleming PA-C  08/24/2023    Medication Date Filled # Filled Count Used # Days  SOY   Fentanyl 12 7/24/23 10 3 7 21 --   Gabapentin 600 7/24/23 90 2 88 31 3   Oxycodone 10 7/24/23 180 24 156 31 5

## 2023-09-18 ENCOUNTER — OFFICE VISIT (OUTPATIENT)
Dept: CARDIOLOGY | Facility: CLINIC | Age: 68
End: 2023-09-18
Payer: MEDICARE

## 2023-09-18 VITALS
SYSTOLIC BLOOD PRESSURE: 170 MMHG | OXYGEN SATURATION: 99 % | HEIGHT: 73 IN | WEIGHT: 178 LBS | BODY MASS INDEX: 23.59 KG/M2 | DIASTOLIC BLOOD PRESSURE: 80 MMHG | HEART RATE: 85 BPM | TEMPERATURE: 97.8 F

## 2023-09-18 DIAGNOSIS — I71.23 ANEURYSM OF DESCENDING THORACIC AORTA WITHOUT RUPTURE: ICD-10-CM

## 2023-09-18 DIAGNOSIS — I10 ESSENTIAL HYPERTENSION: Primary | ICD-10-CM

## 2023-09-18 DIAGNOSIS — E78.00 HYPERCHOLESTEREMIA: ICD-10-CM

## 2023-09-18 PROCEDURE — 3077F SYST BP >= 140 MM HG: CPT | Performed by: INTERNAL MEDICINE

## 2023-09-18 PROCEDURE — 3079F DIAST BP 80-89 MM HG: CPT | Performed by: INTERNAL MEDICINE

## 2023-09-18 PROCEDURE — 1160F RVW MEDS BY RX/DR IN RCRD: CPT | Performed by: INTERNAL MEDICINE

## 2023-09-18 PROCEDURE — 1159F MED LIST DOCD IN RCRD: CPT | Performed by: INTERNAL MEDICINE

## 2023-09-18 PROCEDURE — 99214 OFFICE O/P EST MOD 30 MIN: CPT | Performed by: INTERNAL MEDICINE

## 2023-09-18 PROCEDURE — 93000 ELECTROCARDIOGRAM COMPLETE: CPT | Performed by: INTERNAL MEDICINE

## 2023-09-18 RX ORDER — ROSUVASTATIN CALCIUM 5 MG/1
5 TABLET, COATED ORAL DAILY
Qty: 90 TABLET | Refills: 3 | Status: SHIPPED | OUTPATIENT
Start: 2023-09-18

## 2023-09-18 RX ORDER — LISINOPRIL AND HYDROCHLOROTHIAZIDE 20; 12.5 MG/1; MG/1
1 TABLET ORAL DAILY
Qty: 90 TABLET | Refills: 3 | Status: SHIPPED | OUTPATIENT
Start: 2023-09-18

## 2023-09-18 RX ORDER — LISINOPRIL 20 MG/1
20 TABLET ORAL DAILY
Qty: 90 TABLET | Refills: 2 | Status: SHIPPED | OUTPATIENT
Start: 2023-09-18

## 2023-09-18 RX ORDER — METOPROLOL SUCCINATE 100 MG/1
100 TABLET, EXTENDED RELEASE ORAL DAILY
Qty: 90 TABLET | Refills: 3 | Status: SHIPPED | OUTPATIENT
Start: 2023-09-18

## 2023-09-18 NOTE — PROGRESS NOTES
[facility]  [facility]  [address] [place] [address]  Dept: [phone]  Dept Fax: [phone]    Esteban Tse  1955    Follow Up Office Visit Note    History of Present Illness:  Esteban Tse is a 68 y.o. male who presents to the clinic for Hypertension.The BP is 170.80. on Toprol xl 100 mg and also Lisinopril 2012.5 his BP here is usually high but at home better but now per patient 135,140, will add plain lisinopril 20 mg    The following portions of the patient's history were reviewed and updated as appropriate: allergies, current medications, past family history, past medical history, past social history, past surgical history, and problem list.    Medications:  gabapentin  lisinopril  lisinopril-hydrochlorothiazide  metoprolol succinate XL  naloxone  ondansetron  oxyCODONE tablet  rosuvastatin    Subjective  Allergies   Allergen Reactions   • Levofloxacin Swelling     Lips swellijng   • Tetracycline Swelling     Lips swelling   • Penicillins Other (See Comments)     childhood        Past Medical History:   Diagnosis Date   • Aneurysm 2022   • Ascending aortic aneurysm    • Benign hypertension    • Bladder cancer    • Colon cancer    • History of radiation therapy 05/06/2022    right groin/hemipelvis   • Hypertension    • Intermittent palpitations    • Mucinous adenocarcinoma    • Pounding heartbeat    • PVC's (premature ventricular contractions)        Past Surgical History:   Procedure Laterality Date   • APPENDECTOMY  2017    With Partial Colon    • BLADDER SURGERY      with partial colon   • CHOLECYSTECTOMY     • COLON SURGERY     • GALLBLADDER SURGERY         Family History   Problem Relation Age of Onset   • Lung cancer Mother    • Stroke Mother    • Hypertension Mother    • Heart disease Father    • Hypertension Father         Social History     Socioeconomic History   • Marital status: Single   • Number of children: 0   Tobacco  "Use   • Smoking status: Never   • Smokeless tobacco: Never   Substance and Sexual Activity   • Alcohol use: Yes     Alcohol/week: 1.0 standard drink     Types: 1 Cans of beer per week     Comment: 1 dring weekly   • Drug use: Never   • Sexual activity: Not Currently       Review of Systems   Constitutional: Negative.    HENT: Negative.     Respiratory: Negative.     Cardiovascular: Negative.    Endocrine: Negative.    Genitourinary: Negative.    Musculoskeletal: Negative.    Skin: Negative.    Allergic/Immunologic: Negative.    Neurological: Negative.    Hematological: Negative.    Psychiatric/Behavioral: Negative.       Cardiovascular Procedures    ECHO/MUGA:  STRESS TESTS:   CARDIAC CATH:   DEVICES:   HOLTER:   CT/MRI:   VASCULAR:   CARDIOTHORACIC:     Objective  Vitals:    09/18/23 1406   BP: 154/96   Pulse: 85   Temp: 97.8 °F (36.6 °C)   SpO2: 99%   Weight: 80.7 kg (178 lb)   Height: 185.4 cm (73\")     Body mass index is 23.48 kg/m².     Physical Exam  Vitals reviewed.   Constitutional:       Appearance: Healthy appearance. Not in distress.   Eyes:      Pupils: Pupils are equal, round, and reactive to light.   HENT:    Mouth/Throat:      Pharynx: Oropharynx is clear.   Neck:      Thyroid: Thyroid normal.      Vascular: No JVR. JVD normal.   Pulmonary:      Effort: Pulmonary effort is normal.      Breath sounds: Normal breath sounds. No wheezing. No rhonchi. No rales.   Chest:      Chest wall: Not tender to palpatation.   Cardiovascular:      PMI at left midclavicular line. Normal rate. Regular rhythm. Normal S1. Normal S2.       Murmurs: There is no murmur.      No gallop.  No click. No rub.   Pulses:     Intact distal pulses.      Carotid: 3+ bilaterally.     Radial: 3+ bilaterally.     Femoral: 3+ bilaterally.     Dorsalis pedis: 3+ bilaterally.     Posterior tibial: 3+ bilaterally.  Edema:     Peripheral edema absent.   Abdominal:      General: Bowel sounds are normal.      Palpations: Abdomen is soft.      " Tenderness: There is no abdominal tenderness.   Musculoskeletal: Normal range of motion.         General: No tenderness.      Cervical back: Normal range of motion and neck supple. Skin:     General: Skin is warm and dry.   Neurological:      General: No focal deficit present.      Mental Status: Alert and oriented to person, place and time.        Diagnostic Data    ECG 12 Lead    Date/Time: 9/18/2023 2:53 PM  Performed by: Alli Dial MD  Authorized by: Alli Dial MD   Comparison: compared with previous ECG from 9/12/2022  Similar to previous ECG  Rhythm: sinus rhythm  Rate: normal  BPM: 72  QRS axis: normal    Clinical impression: normal ECG        Assessment and Plan  Diagnoses and all orders for this visit:    Essential hypertension-BP is 170.80, on Toprol xl 100 mg and also Lisinopril 20.12.5, will add Lisinopril 20 mg    Aneurysm of descending thoracic aorta without rupture- Really just ectasia 3,9 cm, in 2022.  Hypercholesterolemia - on Crestor 5mg, will need a Lipid, he tolerates well     Other orders  -     lisinopril (PRINIVIL,ZESTRIL) 20 MG tablet; Take 1 tablet by mouth Daily.  -     rosuvastatin (CRESTOR) 5 MG tablet; Take 1 tablet by mouth Daily.  -     lisinopril-hydrochlorothiazide (PRINZIDE,ZESTORETIC) 20-12.5 MG per tablet; Take 1 tablet by mouth Daily.  -     metoprolol succinate XL (TOPROL-XL) 100 MG 24 hr tablet; Take 1 tablet by mouth Daily.         Return in about 6 months (around 3/18/2024) for Recheck with Dr. Dial.    Alli Dial MD  09/18/2023

## 2023-09-20 NOTE — PROGRESS NOTES
Palliative Clinic Note      Name: Esteban Tse  Age: 68 y.o.  Sex: male  : 1955  MRN: [id number]  Date of Service: 2023   Medical Oncologist: Dr. Delcid  Mode of visit: video-conference  Location of patient: Home  [place] [facility]    Subjective:    Chief Complaint: Nausea, pain    History of Present Illness: Esteban Tse is a 68 y.o. male with past medical history significant for HTN, AAA, metastatic adenocarcinoma of the appendix who presents via video-conference today as a follow up for pain and symptom management.     Treatment summary: The patient was diagnosed with cancer of the appendix at the time of appendectomy in 2017. Patient under surveillance until imaging in 2019 demonstrated several lesions suspicious for recurrent disease. He underwent a diagnostic laparoscopy with peritoneal biopsies on 2020 by Dr. Peoples that proved recurrent adenocarcinoma. He underwent an exploratory laparotomy with excision of right and left lobe liver lesions, omentectomy, resection of pelvic peritoneal nodules, ileocolectomy with creation of ileal colostomy, hyperthermic intraperitoneal chemotherapy and a partial cystectomy with right ureteral stent placement in 2020. He underwent a cystoscopy with bilateral ureteral catheters, exploratory laparotomy, lysis of adhesions, right pelvic sidewall excisional biopsy, and an open partial cystectomy in 2021. TURBT performed in 10/2021. Imaging from 3/4/2022 showed enlarging external illiac adenopathy. There are no surgical options per Dr. Peoples. Underwent CT-guided biopsy of the right iliac mass consistent with mucinous adenocarcinoma. Patient completed palliative radiation in 2023. CT chest abdomen and pelvis on 23 showed stable disease. Nodular density adjacent to the descending colon 6 mm nonspecific. Plan to continue watchful waiting.      Pain: Patient [facility] care with [facility] [place] > 1 year ago. Patient's right hip pain was thought to be referred from his spine. Patient underwent several epidurals and a lumbar laminectomy with no improvement in his pain. Patient complains of pain predominantly in his left groin / LLE related to the cancer of the appendix. The pain occurs with physical activity. Patient is able to get comfortable sitting or lying down. Patient experienced drowsiness and constipation with morphine ER and experienced body aches and nausea with fentanyl 12 mcg/hr patches. He has been taking the oxycodone 10 mg every 4-5 hours as needed for the pain as well as gabapentin 600 mg TID for neuropathy. We were unable to increase the oxycodone to 15 mg due to insurance company. He occasionally takes Ibuprofen as well.      Other symptoms: The patient reports worsening nausea over the past several weeks. The Phenergan before bed was helpful for a little while. He requests refills of the Zofran and the Phenergan today.  Patient's appetite improves as the day goes on. Constipation from pain medication managed with OTC stool softener. No issues with sleep. Patient takes two tablets of oxycodone before bed to prevent waking up in pain.     Pyschosocial: The patient lives alone. He is retired from working for the state. He enjoys playing golf and spending time outside. No personal history of a mental illness. The patient admits to anxiety/depression related to his cancer diagnosis but states overall his mood is stable and he is able to manage it on his own. The patient admits to increased stress related to the care of his 97 y/o mother. Patient's father passed away in 10/2022.     Spiritual: Patient describes himself as curious rather than practicing.      Goals: Improve quality of life with symptom management.     The following portions of the patient's history were reviewed and updated as appropriate: allergies, current medications, past family history, past medical history,  past social history, past surgical history and problem list.    ORT-R: Low risk  Decisional capacity: Full  ECOG: (2) Ambulatory and capable of self care, unable to carry out work activity, up and about > 50% or waking hours   Palliative Performance Scale Score: 60%     Objective:    Constitutional: Awake, alert, sitting up   Eyes: PERRLA, EOMS intact  HENT: NCAT, face symmetric  Neck: Supple, trachea midline  Respiratory: Nonlabored respirations  Musculoskeletal: Moves all extremities   Psychiatric: Appropriate affect, cooperative  Neurologic: Oriented x 3, Cranial Nerves grossly intact to confrontation, speech clear    Medication Counts: Collected over the phone. See bottom of note for details. No misuse or overuse evident.   I have reviewed the patient's [place] PDMP. YAIMA Req #[id number].   UDS: Last 3/25/22. Reviewed. Appropriate.     Assessment & Plan:    1. Mucinous adenocarcinoma of appendix  - CT chest abdomen and pelvis on 5/19/23 showed stable disease. Nodular density adjacent to the descending colon 6 mm nonspecific. Plan to continue watchful waiting.    2. Cancer associated pain  - Patient is appropriate for opioid therapy due to cancer related pain. Daily function and quality of life improved with pain medication. Patient experienced intolerable side effects with both morphine extended release and fentanyl patches. Patient would like stick with just short-acting opioid therapy for now. Next refill of oxycodone 10 mg q4h PRN sent to the pharmacy. Side effects of the medication discussed at every visit. Patient was encouraged to continue bowel regimen of daily stool softeners, prn laxatives, and diet modifications.    3. Neuropathy associated with malignant neoplasm  - Refilled gabapentin (NEURONTIN) 600 MG tablet; Take 1 tablet by mouth 3 (Three) Times a Day for 30 days.      4. Nausea and vomiting, unspecified vomiting type  - Refilled promethazine (PHENERGAN) 25 MG tablet; Take 1 tablet by mouth Every 6  (Six) Hours As Needed for Nausea or Vomiting.    - Refilled ondansetron (Zofran) 8 MG tablet; Take 1 tablet by mouth Every 8 (Eight) Hours As Needed for Nausea or Vomiting.      Code status: Full code  Advanced directives: Not on file  Health care surrogate: Shaylee Tse, mother     Return in about 3 months (around 12/21/2023) for Office Visit.    The patient has chosen to receive care through a telehealth visit.   Does the patient consent to using a video visit for their medical care today? Yes   The visit included audio and video interaction. No technical issues occurred during this visit.  I spent 30 minutes caring for Esteban Tse on this date of service. This time includes time spent by me in the following activities: preparing for the visit, reviewing tests, obtaining and/or reviewing a separately obtained history, performing a medically appropriate examination and/or evaluation, counseling and educating the patient/family/caregiver, ordering medications, tests, or procedures, documenting information in the medical record, independently interpreting results and communicating that information with the patient/family/caregiver, and care coordination.    Qian Fleming PA-C  09/21/2023    Medication Date Filled # Filled Count Used # Days  SOY   Oxycodone 10 8/27/23 180 25 155 25 6   Gabapentin 100 8/24/23 90 10 80 28 3   Fentanyl 12 7/24/23 10 0 10 60 0

## 2023-09-21 ENCOUNTER — TELEMEDICINE (OUTPATIENT)
Dept: PALLIATIVE CARE | Facility: CLINIC | Age: 68
End: 2023-09-21
Payer: MEDICARE

## 2023-09-21 VITALS
WEIGHT: 178 LBS | TEMPERATURE: 97.8 F | OXYGEN SATURATION: 99 % | BODY MASS INDEX: 23.48 KG/M2 | SYSTOLIC BLOOD PRESSURE: 170 MMHG | DIASTOLIC BLOOD PRESSURE: 80 MMHG

## 2023-09-21 DIAGNOSIS — G89.3 CANCER ASSOCIATED PAIN: ICD-10-CM

## 2023-09-21 DIAGNOSIS — C80.1 NEUROPATHY ASSOCIATED WITH MALIGNANT NEOPLASM: ICD-10-CM

## 2023-09-21 DIAGNOSIS — G63 NEUROPATHY ASSOCIATED WITH MALIGNANT NEOPLASM: ICD-10-CM

## 2023-09-21 DIAGNOSIS — R11.2 NAUSEA AND VOMITING, UNSPECIFIED VOMITING TYPE: ICD-10-CM

## 2023-09-21 DIAGNOSIS — C18.1 MUCINOUS ADENOCARCINOMA OF APPENDIX: Primary | ICD-10-CM

## 2023-09-21 RX ORDER — ONDANSETRON HYDROCHLORIDE 8 MG/1
8 TABLET, FILM COATED ORAL EVERY 8 HOURS PRN
Qty: 30 TABLET | Refills: 3 | Status: SHIPPED | OUTPATIENT
Start: 2023-09-21

## 2023-09-21 RX ORDER — GABAPENTIN 600 MG/1
600 TABLET ORAL 3 TIMES DAILY
Qty: 90 TABLET | Refills: 0 | Status: SHIPPED | OUTPATIENT
Start: 2023-09-23 | End: 2023-10-23

## 2023-09-21 RX ORDER — PROMETHAZINE HYDROCHLORIDE 25 MG/1
25 TABLET ORAL EVERY 6 HOURS PRN
Qty: 30 TABLET | Refills: 3 | Status: SHIPPED | OUTPATIENT
Start: 2023-09-21

## 2023-09-21 RX ORDER — OXYCODONE HYDROCHLORIDE 10 MG/1
10 TABLET ORAL EVERY 4 HOURS PRN
Qty: 180 TABLET | Refills: 0 | Status: SHIPPED | OUTPATIENT
Start: 2023-09-26

## 2023-09-21 NOTE — TELEPHONE ENCOUNTER
I have reviewed patient's YAIMA report prior to prescribing Schedule II, III, and IV medications. Request # [id number]. Next refill for oxycodone 10 mg q4h PRN #180 was sent to the pharmacy. The patient is scheduled to follow-up in 3 months.

## 2023-10-24 DIAGNOSIS — G89.3 CANCER ASSOCIATED PAIN: ICD-10-CM

## 2023-10-24 RX ORDER — GABAPENTIN 600 MG/1
600 TABLET ORAL 3 TIMES DAILY
Qty: 90 TABLET | Refills: 0 | Status: SHIPPED | OUTPATIENT
Start: 2023-10-25 | End: 2023-11-24

## 2023-10-24 RX ORDER — OXYCODONE HYDROCHLORIDE 10 MG/1
10 TABLET ORAL EVERY 4 HOURS PRN
Qty: 180 TABLET | Refills: 0 | Status: SHIPPED | OUTPATIENT
Start: 2023-10-26 | End: 2023-11-25

## 2023-10-24 NOTE — TELEPHONE ENCOUNTER
YAIMA #: [id number]      Medication requested:  oxyCODONE (ROXICODONE) 10 MG tablet     Last fill date: 9/26/23      Medication requested:gabapentin (NEURONTIN) 600 MG tablet     Last fill date: 9/25/23      Last appointment: 9/21/23    Next appointment: 12/21/23

## 2023-11-21 DIAGNOSIS — G89.3 CANCER ASSOCIATED PAIN: ICD-10-CM

## 2023-11-21 RX ORDER — GABAPENTIN 600 MG/1
600 TABLET ORAL 3 TIMES DAILY
Qty: 90 TABLET | Refills: 0 | Status: SHIPPED | OUTPATIENT
Start: 2023-11-24 | End: 2023-12-24

## 2023-11-21 NOTE — TELEPHONE ENCOUNTER
YAIMA #: [id number]      Medication requested: oxyCODONE (ROXICODONE) 10 MG tablet     Last fill date: 10/28/23    Last appointment: 9/21/23    Next appointment: 12/21/23

## 2023-11-21 NOTE — TELEPHONE ENCOUNTER
YAIMA #: [id number]    Medication requested: gabapentin (NEURONTIN) 600 MG tablet     Last fill date: 10/25/23    Last appointment: 9/21/23    Next appointment: 12/21/23

## 2023-11-25 RX ORDER — OXYCODONE HYDROCHLORIDE 10 MG/1
10 TABLET ORAL EVERY 4 HOURS PRN
Qty: 180 TABLET | Refills: 0 | Status: SHIPPED | OUTPATIENT
Start: 2023-11-27 | End: 2023-12-27

## 2023-11-29 ENCOUNTER — OFFICE VISIT (OUTPATIENT)
Dept: ONCOLOGY | Facility: CLINIC | Age: 68
End: 2023-11-29
Payer: MEDICARE

## 2023-11-29 VITALS
BODY MASS INDEX: 22.93 KG/M2 | OXYGEN SATURATION: 97 % | DIASTOLIC BLOOD PRESSURE: 100 MMHG | RESPIRATION RATE: 18 BRPM | WEIGHT: 173 LBS | HEIGHT: 73 IN | SYSTOLIC BLOOD PRESSURE: 150 MMHG | TEMPERATURE: 98.2 F

## 2023-11-29 DIAGNOSIS — C18.1 MUCINOUS ADENOCARCINOMA OF APPENDIX: ICD-10-CM

## 2023-11-29 DIAGNOSIS — C48.2 PERITONEAL CARCINOMA: Primary | ICD-10-CM

## 2023-11-29 PROCEDURE — 3077F SYST BP >= 140 MM HG: CPT | Performed by: NURSE PRACTITIONER

## 2023-11-29 PROCEDURE — 1159F MED LIST DOCD IN RCRD: CPT | Performed by: NURSE PRACTITIONER

## 2023-11-29 PROCEDURE — 99214 OFFICE O/P EST MOD 30 MIN: CPT | Performed by: NURSE PRACTITIONER

## 2023-11-29 PROCEDURE — 1126F AMNT PAIN NOTED NONE PRSNT: CPT | Performed by: NURSE PRACTITIONER

## 2023-11-29 PROCEDURE — 1160F RVW MEDS BY RX/DR IN RCRD: CPT | Performed by: NURSE PRACTITIONER

## 2023-11-29 PROCEDURE — 3080F DIAST BP >= 90 MM HG: CPT | Performed by: NURSE PRACTITIONER

## 2023-11-29 NOTE — PROGRESS NOTES
CHIEF COMPLAINT: Chronic right leg swelling with chronic right lower pelvic pain    Problem List:  Oncology/Hematology History Overview Note   1.  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  Pathologic stage IIa with T3 extension into the base of the appendix through the muscularis propria but not into the serosal surface.  16 nodes negative.  Colonoscopy December 2017 negative postop.Laparoscopic diagnostic peritoneal biopsy confirming disease in the bladder dome February 2020.  Began Folfiri.  June 2020 Dr. Peoples excised right lobe of liver and left lobe liver lesion, omentectomy and resection of pelvic peritoneal nodules, ileocolectomy with ileal colostomy and intraperitoneal HIPEC with mitomycin.  Dr. Lawler performed right ureteral stent placement and partial cystectomy.  With large fungating mass, 4/22/2021 had exploratory laparotomy with lysis of adhesions and right pelvic sidewall excisional biopsy with open partial cystectomy.  No malignancy and peritoneal fluid.  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  Margins less than 1 mm.  October 2021 cystoscopy without malignancy.  TURBT 10/11/2021 did not show malignancy.  March 2022 Dr. Peoples for 3.9 cm external iliac recurrent adenopathy 6 weeks out from laminectomy which did not help pain was determined to be an unresectable recurrence with muscle and vascular involvement.  Radiation with Dr. Moody ended 5/6/2022.  Disease stabilized and pain improved.    -12/30/2019 medical oncology office visit: The patient had been on surveillance since surgery December 2017.  CT scans had been negative up until 12/30/2019 CT chest abdomen and pelvis that showed subtle soft tissue density surrounding surgical clips in the right side of the pelvis along with soft tissue nodule at the anterior wall of the bladder concerning for recurrent disease.  Patient sent back to   [facility] for colonoscopy.    -2/7/2020 patient was referred to Dr. Peoples at the [facility], he underwent diagnostic laparoscopic and peritoneal biopsies that confirmed recurrent disease with biopsy of bladder dome nodule showing adenocarcinoma with mucinous features.  Port was placed at this time also.    -2/25/2020 began FOLFIRI    -4/21/2020 patient having increased fatigue, FOLFIRI dose reduced by 10%.    -5/19/2020 cycle 7 FOLFIRI    -6/18/2020 Dr. Peoples performed exploratory laparotomy with excision of right lobe liver lesion and left lobe liver lesion, omentectomy, resection of pelvic peritoneal nodules, ileocolectomy with creation of ileal colostomy, hyperthermic intraperitoneal chemotherapy with mitomycin-C at 42 °C for deep tissue penetration for 90 minutes.  Dr. Perdue performed partial cystectomy with right ureteral stent placement    -8/5/2020 CT abdomen pelvis with contrast shows small soft tissue nodule anterior aspect of bladder stable.  New small amount of ascites as well as a new small left pleural effusion.  Creatinine 0.75 with hemoglobin 11.1 otherwise unremarkable CBC and CMP.    -2/15/2021 CT abdomen pelvis with contrast compared to 8/5/2020 shows enlarging soft tissue mass 4.2 cm over the bladder fundus and there is a calcification along the base of the urinary bladder.  Small stable multiple hypoattenuating indeterminate liver lesions.  Similar degree of ascites.    -2/23/2021 CT chest shows no evidence of metastasis with ascending aorta 42 mm.    -3/8/2021 follow-up with Dr. Portillo Peoples we reviewed his CTs suggested team effort resection with Dr. Perdue    -3/16/2021 follow-up with Dr. Perdue.  He plans for open partial cystectomy, possible ureteral implants, cystoscopy and stent placement in concert with Dr. Peoples.    -4/22/2021 cystoscopy (after prior office cystoscopy showed large fungating mass) with bilateral ureteral catheters, exploratory laparotomy, lysis of adhesions, right pelvic  "sidewall excisional biopsy, open partial cystectomy Dr. Ike Perdue. Peritoneal fluid showed predominant inflammatory reactive mesothelial cells with no malignancy. 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 Carcinoma was less than 1 mm from the lateral resection margins.    -5/18/2021 [facility] medical oncology follow-up visit: I reviewed his hospital notes, operative notes, and pathology report as above and went over this with the patient.  The general consensus from retrospective as well as a few prospective data over the last couple of decades with this low-grade malignancy does not show any profound benefit from \"adjuvant\" chemotherapy in this current setting.  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  There is no standard follow-up but general guidelines suggest following up as typical colon cancer.  I will repeat his CT chest abdomen pelvis now to reestablish his baseline postoperative imaging and have him see my nurse practitioner back in a couple of weeks to make sure there is no nia evidence of measurable residual disease.  Assuming this just shows postoperative changes, I would simply repeat scans again in 3 months or sooner as symptoms dictate.  If he has no evidence of persistent or metastatic disease on current imaging, then when he sees my nurse practitioner back she will also review what they say about his ascending aorta which was 42 mm in February and sent him to Dr. Evangelist Anthony for an opinion as to whether any intervention is needed relative to the aorta.  Obviously if his cancer is out of control on the upcoming scans then the aorta is a moot point.    -6/10/2021 [facility] oncology clinic follow-up: CT scans show no evidence of disease recurrence in the chest, abdomen or pelvis.  Ascending aortic aneurysm stable at 41 mm.  Referral made to Dr. Anthony, cardiothoracic surgeon for management and evaluation of a sending " aortic aneurysm.  Plan to repeat CT scans in 3 months.    -9/14/2021 [facility] oncology follow-up visit: I reviewed images and reports of 9/10/2021 CT chest abdomen pelvis with contrast which shows under distended urinary bladder with mild asymmetric wall thickening dome and left lateral wall with a small 9 mm polypoid lesion in the bladder.  There is stable 2.3 cm right external iliac and a few other subcentimeter scattered nodes in the root of the small bowel mesentery and retroperitoneum.  Postsurgical right hemicolectomy changes.  Stable low-attenuation lesions in the liver unchanged.  I will have him collect his discs from [facility] to take Dr. Perdue to decide whether to proceed with cystoscopy or just continue watchful waiting.  There is no major changes and I suspect we are looking at postoperative changes and we shall see him for video visit in a few weeks to see what urology at  decides.    -10/11/2021 cystoscopy Dr. Perdue showed marked acute and chronic inflammation but no evidence of malignancy.  Muscularis propria present.    -10/14/2021 [facility] medical oncology virtual visit: I reviewed reports of pathology from cystoscopy 10/11/2021 and went over this with patient.  He is feeling fairly fit.  I will repeat his CT chest abdomen pelvis prior to return in 3 months and if in the interim, when he follows up with Dr. Perdue in the next couple of weeks post cystoscopy, plans are made for further cystoscopy before I see him back and any malignancy is found, I would ask the patient and Dr. Perdue to touch base as I would not know of such without that contact as the information comes into our chart without notification now that epic is being used by .  Assuming no further biopsies in the interim, I will simply repeat his scans in 3 months to follow what I suspected and is now confirmed to be inflammatory changes.  From the aneurysm standpoint, he has follow-up scanning March 2022 arranged by   [facility] and he will follow him up after that.    -11/24/2021 CT chest abdomen pelvis compared to 6/7/2021 outside imaging shows stable 4.1 cm ascending thoracic aorta.  No lung nodules.  Liver homogenous with bilateral cysts.  No adenopathy.  Thickened sigmoid colon and rectum suggestive of mild colitis or postradiation change.  No pelvic adenopathy.  Bony structures degenerative in the spine.  Some soft tissue anterior to the acetabulum on the right and extending along the medial aspect right pelvic sidewall 2.7 x 5.6 may represent intramuscular process versus adenopathy which could not be ruled out.  No prior study views available.    -1/4/2022 follow-up Dr. Ike Perdue [facility].  Per his note, he had TURBT 10/11/2021.  He had been placed on antibiotics for UTI for preop preparation for back surgery planned in 2 weeks.  Denies any mucus in his urine.  Some microhematuria on urinalysis this day.  No gross hematuria or dysuria.  The October 2021 TURBT did not reveal anything malignant on pathology.  There may be irritation or inflammation secondary to sutures in the bladder following partial cystectomy.  Patient asymptomatic from a urinary standpoint.  Recommended following up with [facility] oncology with no plans for further urology follow-up.    -2/4/2022 [facility] medical oncology telehealth follow-up visit: I reviewed his November images and reports with the patient as well as with Dr. Gm Moody and the note from Dr. Perdue from 1 month ago.  I do suspect this pelvic sidewall abnormality in November represents persistent disease but as I have stated in prior dictations, the data on palliative or overall survival benefit of systemic 5-FU based combination chemotherapy and/or Avastin does not show a clear-cut survival advantage to chemotherapy for asymptomatic disease.  He is having some back pain and had spine decompression a couple of weeks ago without much benefit.  This is an area that may potentially  "be radiated but I will check his CT chest (angiogram of chest) abdomen and pelvis and get his blood counts and chemistries and urinalysis and see him back for virtual visit in a few weeks.  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  I will also converse with Dr. Peoples at that junction whether he thinks he has anything to offer if this is progressing though I doubt it given that the last intervention was a urologic procedure and the last cystoscopy/TURBT in October had no malignancy and the liver lesions are currently being called liver cysts albeit I am skeptical as to whether those are truly benign but they are certainly not growing.    -3/4/2022 CT angiogram of chest/CT abdomen pelvis for evaluation of dizziness and surveillance of thoracic aortic aneurysm and appendiceal carcinoma.  Thoracic ascending aortic ectasia 4 x 4 cm stable.  Small nodularity left upper lobe stable from September.  Stable small hepatic hypodensities status post cholecystectomy.  Right external iliac soft tissue fullness now 3 x 3.9 cm previously 2 x 2.3 cm concerning for enlarging adenopathy with symmetric nonenlarged inguinal nodes stable.  CBC unremarkable.  CMP unremarkable save for potassium 5.4.  CEA 18.2.  1-3 red cells and white cells per milliliter of urine.    -3/16/2022 office visit with Dr. Portillo Peoples.  He reviewed the CT from 3/4/2022.  He notes that the laminectomy from 6 weeks prior has not helped his \"hip pain\".  Given the location of this recurrence previously 2 x 2.3 cm now 3 x 3.9 cm in the external iliac area concerning for enlarging adenopathy with symmetric nonenlarged inguinal nodes stable, " the mass appears unresectable with involvement of muscle and vasculature.    -3/24/2022 [facility] medical oncology follow-up virtual visit: Pain is still significant.  We will get him in hopefully in the next day to [facility] for palliative care.  Have spoken with Dr. Peoples and no surgery.  I have spoken with Dr. Moody who thinks palliative radiation while not likely to shrink tumor dramatically may help pain considerably and we will get him there.  We will get him to bring the discs to our [place] office and asked them to bring that back to [place] to get scanned in for our invasive radiologist to look at for us to get CT-guided biopsy of this node in the right lower quadrant and send that for Caris MI profile once the pathology is obtained to hopefully find high tumor mutational burden or other molecular targets that might give us some options.  Apart from that, as stated multiple times, this is not a highly chemotherapy sensitive tumor and I am not sure I would palliate him well but, when I see him back in early June with CAT scans prior to return and post radiation, if he is not getting relief and wants to try a 5-FU based regimen plus or minus Avastin I would be okay with that but he knows it does not alter survival 1 way or the other and we are simply trying to palliate this inexorable process.  No other surgical options.    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  Mismatch repair proficient,NTRK1/2/3 fusion not detected.  BRAF V600E negative.  PD-L1 Negative, 1+, 5%.  PTEN Positive, 1+, 100%. MLH1 positive/3+, 100%. MSH2 positive/3+, 100%. MSH6 positive/3+, 100%. PMS2 positive/3+,100%.    -5/6/2022 last radiation    - 5/25/2022 CT chest abdomen pelvis with contrast at [place] [facility] shows nothing remarkable on the chest.  Stable liver cysts.  Focal soft tissue right iliac region appears to be increasing in  size now 7.2 cm previously 3.9 cm with some mass-effect on surrounding structures with several stable inguinal borderline nodes.    -6/6/2022 [facility] medical oncology follow-up visit: Now 1 month out from radiation just starting to get a little bit of improvement in his abdominal discomfort.  Slight growth on CT but some of that may be postradiation change this close to radiation and, as I stated in my note in March, it is not clear that 5-FU based therapy plus or minus Avastin is going to palliate much given its relatively insensitivity to chemotherapy and it certainly does not improve survival.  To that end, we will plan on just repeating his CAT scans again in about 6 weeks and if the pain is worsening and/or this continues to grow then I will probably give him Avastin FOLFOX.  If everything is stable and pain is controlled we will go continue watchful waiting with imaging about every 3 months from that point forward.    -7/11/2022 [facility] medical oncology follow-up: His 7/7/2022 CT chest abdomen pelvis showed stable right external iliac low-attenuation which had been previously growing and thickening of the distal descending and sigmoid colon with perhaps a small right iliac borderline 1 cm node.  Symptomatically improved post radiation.  For now we will hold on Avastin FOLFOX as the benefit of such is questionable with this histologic subtype and there are no actionable molecular targets.  Repeat CTs prior to return in 3 months.    -11/1/2022 [facility] medical oncology follow-up: 10/19/2022 CT chest abdomen pelvis with contrast [facility] compared to 7/7/2022 shows no significant change or evidence of progression.  Clinically quiescent.  We will repeat CTs in 6 months, sooner as symptoms dictate.    -5/19/2023 CT chest abdomen pelvis [facility] compared to 10/19/2022 showed no evidence of disease progression in the chest.  Nodular density adjacent to the descending colon 6 mm nonspecific.   Otherwise unchanged appearance of hepatic metastatic nodules, right iliac adenopathy, and right pelvic mass.    -5/23/2023 [facility] medical oncology follow-up: I reviewed his report of CT chest abdomen pelvis from [place] 4 days ago showing no evidence of progression.  So area near the descending colon is nonspecific and would not change anything at this junction.  His October CT had not changed from July and the current one has not changed significantly since October.  We will continue to see palliative care for his right pelvic pain.  If leg swelling significantly worsens we could check Doppler and assuming no clot consider vascular intervention but as the thigh has not significantly changed over time nor is there significant tenderness in the calf I am doubtful that this is clot and is likely just due to the pelvic mass.  Vascular surgical intervention should swelling worsen is also an option but I would not want a stent placed through this area in the face malignancy particularly a mucinous variety that would make him prone towards clotting.  If the leg worsens I would get his Doppler and consider vascular intervention or anticoagulation as dictated but clinically stable so will not investigate further at this junction.  I will repeat his imaging again in 6 months.    -11/20/2023 CT chest, abdomen and pelvis shows stable appearances of the chest with no evidence of metastatic disease.  Abdomen and pelvis reveals small interval decrease in size of the previously demonstrated peritoneal nodule adjacent to the descending colon.  No new suspicious omental/peritoneal soft tissue nodules or masses.  Stable right iliac adenopathy as well as a soft tissue mass along the right external iliac vasculature along the right pelvic sidewall.     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   Pathologic stage IIa with T3 extension into the base of the appendix through the muscularis propria but not into the serosal surface.  16 nodes negative.  Colonoscopy December 2017 negative postop     12/5/2017 Surgery    Surgery       Procedure:  Appendectomy and right hemicolectomy      Completeness of resection:  No evidence of residual tumor     Pathology revealed invasive moderately differentiated mucinous adenocarcinoma.  Right hemicolectomy: Benign colon and small intestine no evidence of carcinoma.  16 benign lymph nodes, 0/16, negative for dysplasia or malignancy.  Tumor size approximately 6 cm.  Grade 2, moderately differentiated.  Tumor invades through the muscularis profile into the base of appendix but does not extend to the serosal surface.  All margins uninvolved, no lymphovascular invasion identified.  Pathological stage pT3 pN0.         12/8/2017 Imaging    CT of the chest abdomen and pelvis negative.  Baseline CBC WBC 7100, hemoglobin 11.3, hematocrit 34.8%, platelet count 195,000.     3/20/2018 Procedure    Colonoscopy with Dr. Davidson was normal, recommendation for repeat surveillance colonoscopy in 3 years.     6/11/2018 Imaging    CT chest, abdomen and pelvis with no evidence metastatic disease.  CEA 1.1     9/24/2018 Imaging    CT abdomen pelvis showed no acute changes and nothing to suggest recurrence     12/28/2018 Imaging    CT chest, abdomen and pelvis negative. Normal CBC, CMP and CEA 0.7.     6/28/2019 Imaging    CT chest, abdomen and pelvis: No interval change from prior studies.  No recurrent or metastatic disease detected in the chest, abdomen or pelvis.  No acute process.  CEA 0.9     12/30/2019 Imaging    CT chest, abdomen and pelvis: No disease in the chest.  There was noted a subtle soft tissue density, one surrounding surgical clips in the right side of the pelvis and the other a soft tissue nodule intimately associated with the anterior wall of the bladder, which are considered  suspicious for recurrent disease.  CEA 1.1.  CMP with normal creatinine 0.9, total bilirubin 1.9, AST 34, ALT 24, alkaline phosphatase 93.  CBC with WBC 6900, hemoglobin 15.9, platelet count 232,000.       1/21/2020 Procedure    Normal colonoscopy with Dr. Davidson.  He has made referral to Dr. Portillo Peoples at .     2/7/2020 Progression    12/30/2019 CT chest, abdomen and pelvis: No disease in the chest.  There are subtle soft tissue densities (1 surrounding surgical clips in the right side of the pelvis and the other a soft tissue nodule intimately associated with the anterior wall of the bladder) which are considered suspicious for recurrent disease.  CMP with normal creatinine 0.9, total bilirubin 1.9, AST 34, ALT 24, alkaline phosphatase 93.  CBC with WBC 6900, hemoglobin 15.9, platelet count 232,000.  CEA 1.1.  2/7/2020 diagnostic laparoscopy with peritoneal biopsies performed at the [facility] by Dr. Peoples showed no sign of diffuse peritoneal carcinomatosis or liver metastasis.  There was a firm nodule in the superior dome of the bladder, adherent to omentum, as well as right pelvic sidewall nodule adjacent to surgical clips.  Biopsy of bladder dome nodule showed adenocarcinoma with mucinous features.     2/25/2020 -  Chemotherapy    OP COLORECTAL FOLFIRI Irinotecan / Leucovorin / Fluorouracil     5/29/2020 Imaging    CT chest abdomen pelvis shows slight improvement with decreased nodule anterolateral margin of urinary bladder with stable nodularity of the right lower quadrant adjacent to surgical clips and no progressive disease.  Slight hyperemia of the colonic mucosa     6/18/2020 Surgery    Surgery       -6/18/2020 Dr. Peoples performed exploratory laparotomy with excision of right lobe liver lesion and left lobe liver lesion, omentectomy, resection of pelvic peritoneal nodules, ileocolectomy with creation of ileal colostomy, hyperthermic intraperitoneal chemotherapy with mitomycin-C at 42 °C for deep  tissue penetration for 90 minutes.  Dr. Perdue performed partial cystectomy with right ureteral stent placement     8/5/2020 Imaging     CT abdomen pelvis with contrast shows small soft tissue nodule anterior aspect of bladder stable.  New small amount of ascites as well as a new small left pleural effusion.  Creatinine 0.75 with hemoglobin 11.1 otherwise unremarkable CBC and CMP     2/15/2021 Imaging    CT abdomen pelvis with contrast compared to 8/5/2020 shows enlarging soft tissue mass 4.2 cm over the bladder fundus and there is a calcification along the base of the urinary bladder.  Small stable multiple hypoattenuating indeterminate liver lesions.  Similar degree of ascites.     2/23/2021 Imaging    -2/23/2021 CT chest shows no evidence of metastasis with ascending aorta 42 mm.     4/22/2021 Surgery    -4/22/2021 cystoscopy (after prior office cystoscopy showed large fungating mass) with bilateral ureteral catheters, exploratory laparotomy, lysis of adhesions, right pelvic sidewall excisional biopsy, open partial cystectomy Dr. Ike Perdue. Peritoneal fluid showed predominant inflammatory reactive mesothelial cells with no malignancy. 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 Carcinoma was less than 1 mm from the lateral resection margins.     6/7/2021 Imaging    CT chest, abdomen and pelvis: No evidence of metastatic disease within the chest abdomen or pelvis.  Interval resection of enhancing bladder wall mass a small amount of residual enhancing soft tissue, possibly granulation tissue.  Interval resolution of abdominal and pelvic ascites.  Stable dilation of the ascending aorta mid segment measuring up to 41 mm.     9/10/2021 Imaging    CT chest abdomen pelvis with contrast shows under distended urinary bladder with mild asymmetric wall thickening dome and left lateral wall with a small 9 mm polypoid lesion in the  bladder.  There is stable 2.3 cm right external iliac and a few other subcentimeter scattered nodes in the root of the small bowel mesentery and retroperitoneum.  Postsurgical right hemicolectomy changes.  Stable low-attenuation lesions in the liver unchanged.     11/24/2021 Imaging    CT chest abdomen pelvis compared to 6/7/2021 outside imaging shows stable 4.1 cm ascending thoracic aorta.  No lung nodules.  Liver homogenous with bilateral cysts.  No adenopathy.  Thickened sigmoid colon and rectum suggestive of mild colitis or postradiation change.  No pelvic adenopathy.  Bony structures degenerative in the spine.  Some soft tissue anterior to the acetabulum on the right and extending along the medial aspect right pelvic sidewall 2.7 x 5.6 may represent intramuscular process versus adenopathy which could not be ruled out.  No prior study views available.     4/11/2022 - 5/6/2022 Radiation    Radiation OncologyTreatment Course:  Esteban Tse received 5000 cGy in 20 fractions to right pelvis/groin via External Beam Radiation - EBRT.     5/25/2022 Imaging    CT chest abdomen pelvis with contrast at [facility] shows nothing remarkable on the chest.  Stable liver cysts.  Focal soft tissue right iliac region appears to be increasing in size now 7.2 cm previously 3.9 cm with some mass-effect on surrounding structures with several stable inguinal borderline nodes.     10/19/2022 Imaging    - 10/19/2022 CT chest abdomen pelvis with contrast [place] [facility] [facility] compared to 7/7/2022 shows no significant change or evidence of progression.     Peritoneal carcinoma   5/14/2020 Initial Diagnosis    Peritoneal carcinoma (HCC)     5/25/2022 Imaging    CT chest abdomen pelvis with contrast at [facility] shows nothing remarkable on the chest.  Stable liver cysts.  Focal soft tissue right iliac region appears to be increasing in size now 7.2 cm previously 3.9 cm with some mass-effect on surrounding structures with  several stable inguinal borderline nodes.         HISTORY OF PRESENT ILLNESS:  The patient is a 68 y.o. 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  Jose Antonio reports overall he has been doing well since we saw him last with no change in his health.  Continues to have chronic swelling of the right thigh and right pelvic pain for which he follows with palliative care, he is on oxycodone and gabapentin.  He states that over the last few weeks the pain seems to be slightly increased.    Past Medical History:   Diagnosis Date    Aneurysm 2022    Ascending aortic aneurysm     Benign hypertension     Bladder cancer     Colon cancer     History of radiation therapy 05/06/2022    right groin/hemipelvis    Hypercholesteremia 9/18/2023    Hypertension     Intermittent palpitations     Mucinous adenocarcinoma     Pounding heartbeat     PVC's (premature ventricular contractions)      Past Surgical History:   Procedure Laterality Date    APPENDECTOMY  2017    With Partial Colon     BLADDER SURGERY      with partial colon    CHOLECYSTECTOMY      COLON SURGERY      GALLBLADDER SURGERY         Allergies   Allergen Reactions    Levofloxacin Swelling     Lips swellijng    Tetracycline Swelling     Lips swelling    Penicillins Other (See Comments)     childhood       Family History and Social History reviewed and changed as necessary    REVIEW OF SYSTEM:   Chronic swelling of the right thigh  Chronic right pelvic pain    PHYSICAL EXAM:  Thin but well-developed, well-nourished appearing male in no distress  Lungs clear to auscultation bilaterally, respirations regular nonlabored  Right thigh a few centimeters larger than the left thigh but no Homans' sign and no venous valvular insufficiency changes.      Vitals:    11/29/23 0957 11/29/23 1025   BP: (!) 168/107 150/100   Resp: 18    Temp: 98.2 °F (36.8 °C)    SpO2: 97%   "  Weight: 78.5 kg (173 lb)    Height: 185.4 cm (73\")      Vitals:    23 0957   PainSc: 0-No pain          ECOG score: 1           Vitals reviewed.  CT scans from 2023 reviewed at time of visit    ECO    Lab Results   Component Value Date    HGB 13.2 2022    HCT 42.5 2022    MCV 87.5 2022     2022    WBC 7.50 2022    NEUTROABS 5.80 2022    LYMPHSABS 1.30 2022    MONOSABS 0.50 2022    EOSABS 0.09 2022    BASOSABS 0.02 2022       Lab Results   Component Value Date    GLUCOSE 113 (H) 2022    BUN 10 2022    CREATININE 1.00 2022     2022    K 4.6 2022     2022    CO2 27.0 2022    CALCIUM 9.1 2022    PROTEINTOT 6.4 2022    ALBUMIN 4.00 2022    BILITOT 0.5 2022    ALKPHOS 120 (H) 2022    AST 21 2022    ALT 18 2022             ASSESSMENT & PLAN:  1.  Mucinous adenocarcinoma of appendix found at the time of appendectomy 2017 in the face of appendicitis.  Pathologic stage IIa with T3 extension into the base of the appendix through the muscularis propria but not into the serosal surface.  16 nodes negative.  Colonoscopy 2017 negative postop.Laparoscopic diagnostic peritoneal biopsy confirming disease in the bladder dome 2020.  Began Folfiri.  2020 Dr. Peoples excised right lobe of liver and left lobe liver lesion, omentectomy and resection of pelvic peritoneal nodules, ileocolectomy with ileal colostomy and intraperitoneal HIPEC with mitomycin.  Dr. Lawler performed right ureteral stent placement and partial cystectomy.  With large fungating mass, 2021 had exploratory laparotomy with lysis of adhesions and right pelvic sidewall excisional biopsy with open partial cystectomy.  No malignancy and peritoneal fluid.  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  Margins less than 1 mm.  " October 2021 cystoscopy without malignancy.  TURBT 10/11/2021 did not show malignancy.  March 2022 Dr. Peoples for 3.9 cm external iliac recurrent adenopathy 6 weeks out from laminectomy which did not help pain was determined to be an unresectable recurrence with muscle and vascular involvement.  Radiation with Dr. Moody ended 5/6/2022.  Disease stabilized and pain improved.  2.  Hypertension    -12/30/2019 medical oncology office visit: The patient had been on surveillance since surgery December 2017.  CT scans had been negative up until 12/30/2019 CT chest abdomen and pelvis that showed subtle soft tissue density surrounding surgical clips in the right side of the pelvis along with soft tissue nodule at the anterior wall of the bladder concerning for recurrent disease.  Patient sent back to Dr. Davidson for colonoscopy.    -2/7/2020 patient was referred to Dr. Peoples at the [facility], he underwent diagnostic laparoscopic and peritoneal biopsies that confirmed recurrent disease with biopsy of bladder dome nodule showing adenocarcinoma with mucinous features.  Port was placed at this time also.    -2/25/2020 began FOLFIRI    -4/21/2020 patient having increased fatigue, FOLFIRI dose reduced by 10%.    -5/19/2020 cycle 7 FOLFIRI    -6/18/2020 Dr. Peoples performed exploratory laparotomy with excision of right lobe liver lesion and left lobe liver lesion, omentectomy, resection of pelvic peritoneal nodules, ileocolectomy with creation of ileal colostomy, hyperthermic intraperitoneal chemotherapy with mitomycin-C at 42 °C for deep tissue penetration for 90 minutes.  Dr. Perdue performed partial cystectomy with right ureteral stent placement    -8/5/2020 CT abdomen pelvis with contrast shows small soft tissue nodule anterior aspect of bladder stable.  New small amount of ascites as well as a new small left pleural effusion.  Creatinine 0.75 with hemoglobin 11.1 otherwise unremarkable CBC and CMP.    -2/15/2021 CT  "abdomen pelvis with contrast compared to 8/5/2020 shows enlarging soft tissue mass 4.2 cm over the bladder fundus and there is a calcification along the base of the urinary bladder.  Small stable multiple hypoattenuating indeterminate liver lesions.  Similar degree of ascites.    -2/23/2021 CT chest shows no evidence of metastasis with ascending aorta 42 mm.    -3/8/2021 follow-up with Dr. Portillo Peoples we reviewed his CTs suggested team effort resection with Dr. Perdue    -3/16/2021 follow-up with Dr. Perdue.  He plans for open partial cystectomy, possible ureteral implants, cystoscopy and stent placement in concert with Dr. Peoples.    -4/22/2021 cystoscopy (after prior office cystoscopy showed large fungating mass) with bilateral ureteral catheters, exploratory laparotomy, lysis of adhesions, right pelvic sidewall excisional biopsy, open partial cystectomy Dr. Ike Perdue. Peritoneal fluid showed predominant inflammatory reactive mesothelial cells with no malignancy. 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 Carcinoma was less than 1 mm from the lateral resection margins.    -5/18/2021 [facility] medical oncology follow-up visit: I reviewed his hospital notes, operative notes, and pathology report as above and went over this with the patient.  The general consensus from retrospective as well as a few prospective data over the last couple of decades with this low-grade malignancy does not show any profound benefit from \"adjuvant\" chemotherapy in this current setting.  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  There is no standard follow-up but general guidelines suggest following up as typical colon cancer.  I will repeat his CT chest abdomen pelvis now to reestablish his baseline postoperative imaging and have him see my nurse practitioner back in a couple of weeks to make sure there is no nia evidence of measurable residual disease.  Assuming " this just shows postoperative changes, I would simply repeat scans again in 3 months or sooner as symptoms dictate.  If he has no evidence of persistent or metastatic disease on current imaging, then when he sees my nurse practitioner back she will also review what they say about his ascending aorta which was 42 mm in February and sent him to Dr. Evangelist Anthony for an opinion as to whether any intervention is needed relative to the aorta.  Obviously if his cancer is out of control on the upcoming scans then the aorta is a moot point.    -6/10/2021 [facility] oncology clinic follow-up: CT scans show no evidence of disease recurrence in the chest, abdomen or pelvis.  Ascending aortic aneurysm stable at 41 mm.  Referral made to Dr. Anthony, cardiothoracic surgeon for management and evaluation of a sending aortic aneurysm.  Plan to repeat CT scans in 3 months.    -9/14/2021 [facility] medical oncology follow-up visit: I reviewed images and reports of 9/10/2021 CT chest abdomen pelvis with contrast which shows under distended urinary bladder with mild asymmetric wall thickening dome and left lateral wall with a small 9 mm polypoid lesion in the bladder.  There is stable 2.3 cm right external iliac and a few other subcentimeter scattered nodes in the root of the small bowel mesentery and retroperitoneum.  Postsurgical right hemicolectomy changes.  Stable low-attenuation lesions in the liver unchanged.  I will have him collect his discs from [facility] to take Dr. Perdue to decide whether to proceed with cystoscopy or just continue watchful waiting.  There is no major changes and I suspect we are looking at postoperative changes and we shall see him for video visit in a few weeks to see what urology at  decides.    -10/11/2021 cystoscopy Dr. Perdue showed marked acute and chronic inflammation but no evidence of malignancy.  Muscularis propria present.    -10/14/2021 [facility] medical oncology virtual visit: I reviewed  reports of pathology from cystoscopy 10/11/2021 and went over this with patient.  He is feeling fairly fit.  I will repeat his CT chest abdomen pelvis prior to return in 3 months and if in the interim, when he follows up with Dr. Perdue in the next couple of weeks post cystoscopy, plans are made for further cystoscopy before I see him back and any malignancy is found, I would ask the patient and Dr. Perdue to touch base as I would not know of such without that contact as the information comes into our chart without notification now that epic is being used by [employer].  Assuming no further biopsies in the interim, I will simply repeat his scans in 3 months to follow what I suspected and is now confirmed to be inflammatory changes.  From the aneurysm standpoint, he has follow-up scanning March 2022 arranged by Dr. Anthony and he will follow him up after that.    -11/24/2021 CT chest abdomen pelvis compared to 6/7/2021 outside imaging shows stable 4.1 cm ascending thoracic aorta.  No lung nodules.  Liver homogenous with bilateral cysts.  No adenopathy.  Thickened sigmoid colon and rectum suggestive of mild colitis or postradiation change.  No pelvic adenopathy.  Bony structures degenerative in the spine.  Some soft tissue anterior to the acetabulum on the right and extending along the medial aspect right pelvic sidewall 2.7 x 5.6 may represent intramuscular process versus adenopathy which could not be ruled out.  No prior study views available.    -1/4/2022 follow-up Dr. Ike Perdue [facility].  Per his note, he had TURBT 10/11/2021.  He had been placed on antibiotics for UTI for preop preparation for back surgery planned in 2 weeks.  Denies any mucus in his urine.  Some microhematuria on urinalysis this day.  No gross hematuria or dysuria.  The October 2021 TURBT did not reveal anything malignant on pathology.  There may be irritation or inflammation secondary to sutures in the bladder following partial cystectomy.   Patient asymptomatic from a urinary standpoint.  Recommended following up with [facility] with no plans for further urology follow-up.    -2/4/2022 [facility] medical oncology telehealth follow-up visit: I reviewed his November images and reports with the patient as well as with Dr. Gm Moody and the note from Dr. Perdue from 1 month ago.  I do suspect this pelvic sidewall abnormality in November represents persistent disease but as I have stated in prior dictations, the data on palliative or overall survival benefit of systemic 5-FU based combination chemotherapy and/or Avastin does not show a clear-cut survival advantage to chemotherapy for asymptomatic disease.  He is having some back pain and had spine decompression a couple of weeks ago without much benefit.  This is an area that may potentially be radiated but I will check his CT chest (angiogram of chest) abdomen and pelvis and get his blood counts and chemistries and urinalysis and see him back for virtual visit in a few weeks.  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  I will also converse with Dr. Peoples at that junction whether he thinks he has anything to offer if this is progressing though I doubt it given that the last intervention was a urologic procedure and the last cystoscopy/TURBT in October had no malignancy and the liver lesions are currently being called liver cysts albeit I am skeptical as to whether those are truly benign but they are certainly not growing.    -3/4/2022 CT angiogram of chest/CT abdomen pelvis for evaluation of dizziness and surveillance of thoracic aortic aneurysm and appendiceal carcinoma.  " Thoracic ascending aortic ectasia 4 x 4 cm stable.  Small nodularity left upper lobe stable from September.  Stable small hepatic hypodensities status post cholecystectomy.  Right external iliac soft tissue fullness now 3 x 3.9 cm previously 2 x 2.3 cm concerning for enlarging adenopathy with symmetric nonenlarged inguinal nodes stable.  CBC unremarkable.  CMP unremarkable save for potassium 5.4.  CEA 18.2.  1-3 red cells and white cells per milliliter of urine.    -3/16/2022 office visit with Dr. Portillo Peoples.  He reviewed the CT from 3/4/2022.  He notes that the laminectomy from 6 weeks prior has not helped his \"hip pain\".  Given the location of this recurrence previously 2 x 2.3 cm now 3 x 3.9 cm in the external iliac area concerning for enlarging adenopathy with symmetric nonenlarged inguinal nodes stable, the mass appears unresectable with involvement of muscle and vasculature.    -3/24/2022 [facility] medical oncology follow-up virtual visit: Pain is still significant.  We will get him in hopefully in the next day to [facility] for palliative care.  Have spoken with Dr. Peoples and no surgery.  I have spoken with Dr. Moody who thinks palliative radiation while not likely to shrink tumor dramatically may help pain considerably and we will get him there.  We will get him to bring the discs to our [place] office and asked them to bring that back to [place] to get scanned in for our invasive radiologist to look at for us to get CT-guided biopsy of this node in the right lower quadrant and send that for Gab miguel once the pathology is obtained to hopefully find high tumor mutational burden or other molecular targets that might give us some options.  Apart from that, as stated multiple times, this is not a highly chemotherapy sensitive tumor and I am not sure I would palliate him well but, when I see him back in early June with CAT scans prior to return and post radiation, if he is not getting relief and " wants to try a 5-FU based regimen plus or minus Avastin I would be okay with that but he knows it does not alter survival 1 way or the other and we are simply trying to palliate this inexorable process.  No other surgical options.    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  Mismatch repair proficient,NTRK1/2/3 fusion not detected.  BRAF V600E negative.  PD-L1 Negative, 1+, 5%.  PTEN Positive, 1+, 100%. MLH1 positive/3+, 100%. MSH2 positive/3+, 100%. MSH6 positive/3+, 100%. PMS2 positive/3+,100%.    -5/6/2022 last radiation    - 5/25/2022 CT chest abdomen pelvis with contrast at [facility] shows nothing remarkable on the chest.  Stable liver cysts.  Focal soft tissue right iliac region appears to be increasing in size now 7.2 cm previously 3.9 cm with some mass-effect on surrounding structures with several stable inguinal borderline nodes.    -6/6/2022 [facility] medical oncology follow-up visit: Now 1 month out from radiation just starting to get a little bit of improvement in his abdominal discomfort.  Slight growth on CT but some of that may be postradiation change this close to radiation and, as I stated in my note in March, it is not clear that 5-FU based therapy plus or minus Avastin is going to palliate much given its relatively insensitivity to chemotherapy and it certainly does not improve survival.  To that end, we will plan on just repeating his CAT scans again in about 6 weeks and if the pain is worsening and/or this continues to grow then I will probably give him Avastin FOLFOX.  If everything is stable and pain is controlled we will go continue watchful waiting with imaging about every 3 months from that point forward.    -7/11/2022 [facility] medical oncology follow-up: His 7/7/2022 CT chest abdomen pelvis showed stable right external iliac low-attenuation which had been previously growing and thickening of the  distal descending and sigmoid colon with perhaps a small right iliac borderline 1 cm node.  Symptomatically improved post radiation.  For now we will hold on Avastin FOLFOX as the benefit of such is questionable with this histologic subtype and there are no actionable molecular targets.  Repeat CTs prior to return in 3 months.    -11/1/2022 [facility] oncology follow-up: 10/19/2022 CT chest abdomen pelvis with contrast [place] [facility] compared to 7/7/2022 shows no significant change or evidence of progression.  Clinically quiescent.  We will repeat CTs in 6 months, sooner as symptoms dictate.    -5/19/2023 CT chest abdomen pelvis [place] [facility] compared to 10/19/2022 showed no evidence of disease progression in the chest.  Nodular density adjacent to the descending colon 6 mm nonspecific.  Otherwise unchanged appearance of hepatic metastatic nodules, right iliac adenopathy, and right pelvic mass.    -5/23/2023 [facility] oncology follow-up: I reviewed his report of CT chest abdomen pelvis from [place] 4 days ago showing no evidence of progression.  So area near the descending colon is nonspecific and would not change anything at this junction.  His October CT had not changed from July and the current one has not changed significantly since October.  We will continue to see palliative care for his right pelvic pain.  If leg swelling significantly worsens we could check Doppler and assuming no clot consider vascular intervention but as the thigh has not significantly changed over time nor is there significant tenderness in the calf I am doubtful that this is clot and is likely just due to the pelvic mass.  Vascular surgical intervention should swelling worsen is also an option but I would not want a stent placed through this area in the face malignancy particularly a mucinous variety that would make him prone towards clotting.  If the leg worsens I would get his Doppler and consider vascular  intervention or anticoagulation as dictated but clinically stable so will not investigate further at this junction.  I will repeat his imaging again in 6 months.    -11/20/2023 CT chest, abdomen and pelvis shows stable appearances of the chest with no evidence of metastatic disease.  Abdomen and pelvis reveals small interval decrease in size of the previously demonstrated peritoneal nodule adjacent to the descending colon.  No new suspicious omental/peritoneal soft tissue nodules or masses.  Stable right iliac adenopathy as well as a soft tissue mass along the right external iliac vasculature along the right pelvic sidewall.  -11/29/2023 [facility] Oncology clinic follow-up: Current CT chest, abdomen and pelvis shows stable findings, no evidence of progression.  He has no new worrisome symptoms however he does still have swelling in his right thigh and lower pelvic pain for which she sees palliative care.  He states that it seems slightly more pronounced over the last few weeks, he has a follow-up with palliative care and will discuss further with him.  We reviewed recommendations discussed at his last visit with Dr. Delcid that as long as the swelling did not worsen significantly then we would hold off on considering any vascular intervention.  We will continue to monitor, Jose Antonio understands to let us know if he has any changes otherwise we will plan on repeating CT chest, abdomen and pelvis prior to return in 6 months and I have ordered those today.  His blood pressure was running high on arrival today, I did recheck it manually and it was 150/100.  He reports that he took his blood pressure earlier today at home and it was 140s over 85 or so.  I asked him to recheck when he got home and monitor and if his diastolic continued to run over 90 to get in with Dr. Dial for evaluation.    I spent 30 minutes caring for Esteban on this date of service. This time includes time spent by me in the following activities: preparing  for the visit, reviewing tests, performing a medically appropriate examination and/or evaluation, counseling and educating the patient/family/caregiver, ordering medications, tests, or procedures, documenting information in the medical record, and independently interpreting results and communicating that information with the patient/family/caregiver.     Tanya Alvarado, APRN    11/29/2023

## 2023-12-20 NOTE — PROGRESS NOTES
Palliative Clinic Note      Name: Esteban Tse  Age: 68 y.o.  Sex: male  : 1955  MRN: [id number]  Date of Service: 2023   Medical Oncologist: Dr. Delcid  Mode of visit: video-conference  Location of patient: Home  Location of provider: [facility] [place] [facility]    Subjective:    Chief Complaint: Pain, nausea, poor appetite    History of Present Illness: Esteban Tse is a 68 y.o. male with past medical history significant for HTN, AAA, metastatic adenocarcinoma of the appendix  who presents via video-conference today as a follow up for pain and symptom management.     Treatment summary: The patient was diagnosed with cancer of the appendix at the time of appendectomy in 2017. Patient under surveillance until imaging in 2019 demonstrated several lesions suspicious for recurrent disease. He underwent a diagnostic laparoscopy with peritoneal biopsies on 2020 by Dr. Peoples that proved recurrent adenocarcinoma. He underwent an exploratory laparotomy with excision of right and left lobe liver lesions, omentectomy, resection of pelvic peritoneal nodules, ileocolectomy with creation of ileal colostomy, hyperthermic intraperitoneal chemotherapy and a partial cystectomy with right ureteral stent placement in 2020. He underwent a cystoscopy with bilateral ureteral catheters, exploratory laparotomy, lysis of adhesions, right pelvic sidewall excisional biopsy, and an open partial cystectomy in 2021. TURBT performed in 10/2021. Imaging from 3/4/2022 showed enlarging external illiac adenopathy. There are no surgical options per Dr. Peoples. Underwent CT-guided biopsy of the right iliac mass consistent with mucinous adenocarcinoma. Patient completed palliative radiation in 2023. Scans from 2023 showed stable disease. Plan to continue watchful waiting.      Pain: Patient established care with [facility] in [place], [place] > 1 year ago. Patient's right hip pain was thought to be  referred from his spine. Patient underwent several epidurals and a lumbar laminectomy with no improvement in his pain. Patient complains of pain predominantly in his right groin / RLE related to the cancer of the appendix. The pain occurs with physical activity and worse with driving. Patient experienced drowsiness and constipation with morphine ER and experienced body aches and nausea with fentanyl 12 mcg/hr patches. He has been taking two oxycodone 10 mg tablets every 8 hours for the pain along with gabapentin 600 mg TID for neuropathy. He occasionally takes Ibuprofen as well. Overall, his pain is only partially controlled with this regimen.     Other symptoms: The patient continues to complain of persistent nausea, predominantly in the mornings. Associated with poor appetite. Phenergan before bed was helpful for a little while. He requests refills of the Zofran and the Phenergan today.  Patient's appetite improves as the day goes on. Constipation from pain medication managed with OTC stool softener. No issues with sleep.      Pyschosocial: The patient lives alone. He is retired from working for the state. He enjoys playing golf and spending time outside. No personal history of a mental illness. The patient admits to anxiety/depression related to his cancer diagnosis but states overall his mood is stable and he is able to manage it on his own. The patient admits to increased stress related to the care of his 97 y/o mother. Patient's father passed away in 10/2022.     Spiritual: Patient describes himself as curious rather than practicing.      Goals: Improve quality of life with symptom management.     The following portions of the patient's history were reviewed and updated as appropriate: allergies, current medications, past family history, past medical history, past social history, past surgical history and problem list.    ORT-R: Low risk  Decisional capacity: Full  ECOG: (2) Ambulatory and capable of self  care, unable to carry out work activity, up and about > 50% or waking hours   Palliative Performance Scale Score: 60%     Objective:    Constitutional: Awake, alert, sitting up   Eyes: PERRLA, EOMS intact  HENT: NCAT, face symmetric  Neck: Supple, trachea midline  Respiratory: Nonlabored respirations  Musculoskeletal: Moves all extremities   Psychiatric: Appropriate affect, cooperative  Neurologic: Oriented x 3, Cranial Nerves grossly intact to confrontation, speech clear    Medication Counts: Collected over the phone. See bottom of note for details. No misuse or overuse evident.   I have reviewed the patient's [place] PDMP. YAIMA Req #[id number].   UDS: Last 3/25/22. Reviewed. Appropriate.     Assessment & Plan:    1. Mucinous adenocarcinoma of appendix  - Scans from 11/2023 showed stable disease. Plan to continue watchful waiting.     2. Cancer associated pain  - Overall, the patient's pain is only partially controlled with current regimen. Pain control in the past complicated by intolerable side effects. Recommend trial of long-acting oxycodone (Xtampza) 9 mg BID. Will submit PA authorization to insurance company. Refill for oxycodone IR 10 mg q4h PRN was sent to the pharmacy. Side effects of the medication discussed at every visit. Patient was encouraged to continue bowel regimen of daily stool softeners, prn laxatives, and diet modifications. Instructed patient to stop new medication for any significant side effects of adverse reactions and contact palliative care.     3. Neuropathy associated with malignant neoplasm  - Refilled gabapentin (NEURONTIN) 600 MG tablet; Take 1 tablet by mouth 3 (Three) Times a Day for 30 days.      4. Nausea  - Recommend trial of OLANZapine (zyPREXA) 5 MG tablet; Take 1 tablet by mouth Every Night. Continue alternating between Zofran and Phenergan PRN for nausea and vomiting.  May consider rotating Phenergan to Compazine in the future.    Code status: Full code  Advanced directives: Not  on file  Health care surrogate: Shaylee Tse, mother     Return in about 1 month (around 1/21/2024) for Video visit.    The patient has chosen to receive care through a telehealth visit.   Does the patient consent to using a video visit for their medical care today? Yes   The visit included audio and video interaction. No technical issues occurred during this visit.  I spent 35 minutes caring for Esteban Tse on this date of service. This time includes time spent by me in the following activities: preparing for the visit, reviewing tests, obtaining and/or reviewing a separately obtained history, performing a medically appropriate examination and/or evaluation, counseling and educating the patient/family/caregiver, ordering medications, tests, or procedures, documenting information in the medical record, independently interpreting results and communicating that information with the patient/family/caregiver, and care coordination.    Qian Fleming PA-C  12/21/2023    Medication Date Filled # Filled Count Used # Days  SOY   Oxycodone 10 11/29/23 180 35 145 22 6-7   Gabapentin 600 11/26/23 90 20 70 25 3

## 2023-12-21 ENCOUNTER — TELEMEDICINE (OUTPATIENT)
Dept: PALLIATIVE CARE | Facility: CLINIC | Age: 68
End: 2023-12-21
Payer: MEDICARE

## 2023-12-21 VITALS
SYSTOLIC BLOOD PRESSURE: 150 MMHG | WEIGHT: 173 LBS | HEIGHT: 73 IN | DIASTOLIC BLOOD PRESSURE: 100 MMHG | BODY MASS INDEX: 22.93 KG/M2

## 2023-12-21 DIAGNOSIS — G89.3 CANCER ASSOCIATED PAIN: ICD-10-CM

## 2023-12-21 DIAGNOSIS — C80.1 NEUROPATHY ASSOCIATED WITH MALIGNANT NEOPLASM: ICD-10-CM

## 2023-12-21 DIAGNOSIS — G89.3 CANCER RELATED PAIN: Primary | ICD-10-CM

## 2023-12-21 DIAGNOSIS — R11.0 NAUSEA: ICD-10-CM

## 2023-12-21 DIAGNOSIS — C18.1 MUCINOUS ADENOCARCINOMA OF APPENDIX: Primary | ICD-10-CM

## 2023-12-21 DIAGNOSIS — R11.2 NAUSEA AND VOMITING, UNSPECIFIED VOMITING TYPE: ICD-10-CM

## 2023-12-21 DIAGNOSIS — G63 NEUROPATHY ASSOCIATED WITH MALIGNANT NEOPLASM: ICD-10-CM

## 2023-12-21 RX ORDER — OXYCODONE HYDROCHLORIDE 10 MG/1
10 TABLET ORAL EVERY 4 HOURS PRN
Qty: 180 TABLET | Refills: 0 | Status: SHIPPED | OUTPATIENT
Start: 2023-12-29 | End: 2024-01-28

## 2023-12-21 RX ORDER — ONDANSETRON HYDROCHLORIDE 8 MG/1
8 TABLET, FILM COATED ORAL EVERY 8 HOURS PRN
Qty: 30 TABLET | Refills: 3 | Status: SHIPPED | OUTPATIENT
Start: 2023-12-21

## 2023-12-21 RX ORDER — PROMETHAZINE HYDROCHLORIDE 25 MG/1
25 TABLET ORAL EVERY 6 HOURS PRN
Qty: 30 TABLET | Refills: 3 | Status: SHIPPED | OUTPATIENT
Start: 2023-12-21

## 2023-12-21 RX ORDER — OLANZAPINE 5 MG/1
5 TABLET ORAL NIGHTLY
Qty: 30 TABLET | Refills: 0 | Status: SHIPPED | OUTPATIENT
Start: 2023-12-21

## 2023-12-21 RX ORDER — GABAPENTIN 600 MG/1
600 TABLET ORAL 3 TIMES DAILY
Qty: 90 TABLET | Refills: 0 | Status: SHIPPED | OUTPATIENT
Start: 2023-12-26 | End: 2024-01-25

## 2023-12-21 RX ORDER — OXYCODONE 9 MG/1
9 CAPSULE, EXTENDED RELEASE ORAL EVERY 12 HOURS
Qty: 60 EACH | Refills: 0 | Status: SHIPPED | OUTPATIENT
Start: 2023-12-21

## 2023-12-21 NOTE — TELEPHONE ENCOUNTER
I have reviewed patient's YAIMA report prior to prescribing Schedule II, III, and IV medications. Request # [id number]. Next refills for Xtampza 9 mg BID #60 and oxycodone 10 mg q4h PRN #180 were sent to the pharmacy. The patient is scheduled to follow-up in 1 month.

## 2024-01-23 ENCOUNTER — TELEMEDICINE (OUTPATIENT)
Dept: PALLIATIVE CARE | Facility: CLINIC | Age: 69
End: 2024-01-23
Payer: MEDICARE

## 2024-01-23 VITALS
DIASTOLIC BLOOD PRESSURE: 100 MMHG | OXYGEN SATURATION: 97 % | BODY MASS INDEX: 22.83 KG/M2 | WEIGHT: 173 LBS | SYSTOLIC BLOOD PRESSURE: 150 MMHG | TEMPERATURE: 98.2 F | RESPIRATION RATE: 18 BRPM

## 2024-01-23 DIAGNOSIS — G89.3 CANCER ASSOCIATED PAIN: ICD-10-CM

## 2024-01-23 DIAGNOSIS — G89.3 CANCER RELATED PAIN: ICD-10-CM

## 2024-01-23 DIAGNOSIS — C18.1 MUCINOUS ADENOCARCINOMA OF APPENDIX: Primary | ICD-10-CM

## 2024-01-23 DIAGNOSIS — K59.03 THERAPEUTIC OPIOID INDUCED CONSTIPATION: ICD-10-CM

## 2024-01-23 DIAGNOSIS — R11.0 NAUSEA: ICD-10-CM

## 2024-01-23 DIAGNOSIS — T40.2X5A THERAPEUTIC OPIOID INDUCED CONSTIPATION: ICD-10-CM

## 2024-01-23 PROCEDURE — 1125F AMNT PAIN NOTED PAIN PRSNT: CPT | Performed by: PHYSICIAN ASSISTANT

## 2024-01-23 PROCEDURE — 3080F DIAST BP >= 90 MM HG: CPT | Performed by: PHYSICIAN ASSISTANT

## 2024-01-23 PROCEDURE — 99213 OFFICE O/P EST LOW 20 MIN: CPT | Performed by: PHYSICIAN ASSISTANT

## 2024-01-23 PROCEDURE — 3077F SYST BP >= 140 MM HG: CPT | Performed by: PHYSICIAN ASSISTANT

## 2024-01-23 RX ORDER — GABAPENTIN 600 MG/1
600 TABLET ORAL 3 TIMES DAILY
Qty: 90 TABLET | Refills: 0 | Status: SHIPPED | OUTPATIENT
Start: 2024-01-27 | End: 2024-02-26

## 2024-01-23 RX ORDER — OXYCODONE 9 MG/1
9 CAPSULE, EXTENDED RELEASE ORAL EVERY 12 HOURS
Qty: 60 EACH | Refills: 0 | Status: SHIPPED | OUTPATIENT
Start: 2024-01-23

## 2024-01-23 RX ORDER — AMOXICILLIN 250 MG
2 CAPSULE ORAL DAILY
Qty: 60 TABLET | Refills: 3 | Status: SHIPPED | OUTPATIENT
Start: 2024-01-23 | End: 2024-05-22

## 2024-01-23 RX ORDER — OLANZAPINE 5 MG/1
5 TABLET ORAL NIGHTLY
Qty: 30 TABLET | Refills: 3 | Status: SHIPPED | OUTPATIENT
Start: 2024-01-23

## 2024-01-23 RX ORDER — OXYCODONE HYDROCHLORIDE 10 MG/1
10 TABLET ORAL EVERY 4 HOURS PRN
Qty: 180 TABLET | Refills: 0 | Status: SHIPPED | OUTPATIENT
Start: 2024-02-08 | End: 2024-03-09

## 2024-01-23 NOTE — TELEPHONE ENCOUNTER
I have reviewed patient's YAIMA report prior to prescribing Schedule II, III, and IV medications. Request # [id number]. Next refills for Xtampza ER 9 mg BID #60 and oxycodone 10 mg tablets q4h PRN #180 were sent to the pharmacy. The patient is scheduled to follow-up in 3 months.

## 2024-01-23 NOTE — PROGRESS NOTES
Palliative Clinic Note      Name: Esteban Tse  Age: 68 y.o.  Sex: male  : 1955  MRN: [id number]  Date of Service: 2024   Medical Oncologist: Dr. Delcid  Mode of visit: video-conference  Location of patient: Home  Location of provider: [facility] [place] [facility]    Subjective:    Chief Complaint: Right groin and lower extremity pain, constipation    History of Present Illness: Esteban Tse is a 68 y.o. male with past medical history significant for HTN, AAA, metastatic adenocarcinoma of the appendix who presents via video-conference today as a follow up for pain and symptom management.     Treatment summary: The patient was diagnosed with cancer of the appendix at the time of appendectomy in 2017. Patient under surveillance until imaging in 2019 demonstrated several lesions suspicious for recurrent disease. He underwent a diagnostic laparoscopy with peritoneal biopsies on 2020 by Dr. Peoples that proved recurrent adenocarcinoma. He underwent an exploratory laparotomy with excision of right and left lobe liver lesions, omentectomy, resection of pelvic peritoneal nodules, ileocolectomy with creation of ileal colostomy, hyperthermic intraperitoneal chemotherapy and a partial cystectomy with right ureteral stent placement in 2020. He underwent a cystoscopy with bilateral ureteral catheters, exploratory laparotomy, lysis of adhesions, right pelvic sidewall excisional biopsy, and an open partial cystectomy in 2021. TURBT performed in 10/2021. Imaging from 3/4/2022 showed enlarging external illiac adenopathy. There are no surgical options per Dr. Peoples. Underwent CT-guided biopsy of the right iliac mass consistent with mucinous adenocarcinoma. Patient completed palliative radiation in 2023. Scans from 2023 showed stable disease. Plan to continue watchful waiting.      Pain: Patient established care with [facility] spine in [place], [place] > 1 year ago. Patient's right hip pain  was thought to be referred from his spine. Patient underwent several epidurals and a lumbar laminectomy with no improvement in his pain. Patient complains of pain predominantly in his right groin / RLE related to the cancer of the appendix. The pain occurs with physical activity and worse with driving. Patient experienced drowsiness and constipation with morphine ER and experienced body aches and nausea with fentanyl 12 mcg/hr patches. Patient started on Xtampza ER 9 mg BID at the last appointment. The patient noted worsening constipation with this medication and has only been taking on average 1 tablets per day. The patient continues oxycodone 10 mg tablets as needed for break through pain along with gabapentin 600 mg TID for neuropathy. He occasionally takes Ibuprofen as well.      Other symptoms: The patient complains of significant nausea this morning. However, he states his nausea has improved since starting the Zyprexa 5 mg nightly. The patient complains of worsening constipation since starting the long-acting opioid therapy. He is currently taking a stool softener and Milk of Mag PRN.      Pyschosocial: The patient lives alone. He is retired from working for the state. He enjoys playing golf and spending time outside. No personal history of a mental illness. The patient admits to anxiety/depression related to his cancer diagnosis but states overall his mood is stable and he is able to manage it on his own. The patient admits to increased stress related to the care of his 97 y/o mother. Patient's father passed away in 10/2022.     Spiritual: Patient describes himself as curious rather than practicing.      Goals: Improve quality of life with symptom management.     The following portions of the patient's history were reviewed and updated as appropriate: allergies, current medications, past family history, past medical history, past social history, past surgical history and problem list.    ORT-R: Low risk    Decisional capacity: Full  ECOG: (2) Ambulatory and capable of self care, unable to carry out work activity, up and about > 50% or waking hours   Palliative Performance Scale Score: 60%     Objective:    Constitutional: Awake, alert, sitting up   Eyes: PERRLA, EOMS intact  HENT: NCAT, face symmetric  Neck: Supple, trachea midline  Respiratory: Nonlabored respirations  Musculoskeletal: Moves all extremities   Psychiatric: Appropriate affect, cooperative  Neurologic: Oriented x 3, Cranial Nerves grossly intact to confrontation, speech clear    Medication Counts: Collected over the phone. See bottom of note for details. No misuse or overuse evident.   I have reviewed the patient's [place] PDMP. YAIMA Req #[id number].   UDS: Last 3/25/. Reviewed. Appropriate.     Assessment & Plan:    1. Mucinous adenocarcinoma of appendix  - Scans from 11/2023 showed stable disease. Plan to continue watchful waiting.     2. Cancer associated pain  - Patient is appropriate for opioid therapy due to cancer related pain. Per pill counts, the patient has only been taking on average 1 long-acting oxycodone tablet per day. Adjustments made to bowel regimen to address problem #4. Hopeful this will allow patient to tolerate the long-acting oxycodone every 12 hours for better pain control. Next refills for Xtampza ER 9 mg BID #60 and oxycodone 10 mg q4h PRN #180 were sent to the supervising physician. Side effects of the medication discussed at every visit. Patient was encouraged to continue bowel regimen of daily stool softeners, prn laxatives, and diet modifications.    - Refilled gabapentin (NEURONTIN) 600 MG tablet; Take 1 tablet by mouth 3 (Three) Times a Day for 30 days.     3. Nausea  - Refilled OLANZapine (zyPREXA) 5 MG tablet; Take 1 tablet by mouth Every Night.      4. Therapeutic opioid induced constipation  - Discussed diet modifications including water and fiber intake. Start Senna-S with directions to take two tablets daily. Continue  Milk of Mag as needed.     Code status: Full code  Advanced directives: Not on file  Health care surrogate: Shaylee Tse, mother     Return in about 3 months (around 4/23/2024) for Video visit.    The patient has chosen to receive care through a telehealth visit.   Does the patient consent to using a video visit for their medical care today? Yes   The visit included audio and video interaction. No technical issues occurred during this visit.  I spent 30 minutes caring for Esteban Tse on this date of service. This time includes time spent by me in the following activities: preparing for the visit, reviewing tests, obtaining and/or reviewing a separately obtained history, performing a medically appropriate examination and/or evaluation, counseling and educating the patient/family/caregiver, ordering medications, tests, or procedures, documenting information in the medical record, independently interpreting results and communicating that information with the patient/family/caregiver, and care coordination.    Qian Fleming PA-C  01/23/2024    Medication Date Filled # Filled Count Used # Days  SOY   Oxycodone 10 1/5/24 180 105 75 14 5   Gabapentin 600 12/28/23 90 30 60 26 2   Xtampza ER 9 12/21/23 60 16 44 33 1

## 2024-01-26 ENCOUNTER — PATIENT ROUNDING (BHMG ONLY) (OUTPATIENT)
Dept: PALLIATIVE CARE | Facility: CLINIC | Age: 69
End: 2024-01-26
Payer: MEDICARE

## 2024-01-26 NOTE — PROGRESS NOTES
A My-Chart message has been sent to the patient for PATIENT ROUNDING with [facility] [place].

## 2024-03-01 DIAGNOSIS — G89.3 CANCER ASSOCIATED PAIN: ICD-10-CM

## 2024-03-01 RX ORDER — GABAPENTIN 600 MG/1
600 TABLET ORAL 3 TIMES DAILY
Qty: 90 TABLET | Refills: 0 | Status: SHIPPED | OUTPATIENT
Start: 2024-03-01 | End: 2024-03-31

## 2024-03-01 NOTE — TELEPHONE ENCOUNTER
YAIMA #: [id number]    Medication requested: Gabapentin 600MG    Last fill date: 1/29/24    Last appointment: 1/23/24    Next appointment: 4/23/24

## 2024-03-11 ENCOUNTER — TELEPHONE (OUTPATIENT)
Dept: CARDIOLOGY | Facility: CLINIC | Age: 69
End: 2024-03-11
Payer: MEDICARE

## 2024-03-11 NOTE — TELEPHONE ENCOUNTER
Spoke with Shar Carmencita and he was good with being moved to a different time on Monday 18th per DR. Dial's request.  Moved from 8:30 to 9:15

## 2024-03-18 ENCOUNTER — OFFICE VISIT (OUTPATIENT)
Dept: CARDIOLOGY | Facility: CLINIC | Age: 69
End: 2024-03-18
Payer: MEDICARE

## 2024-03-18 VITALS
SYSTOLIC BLOOD PRESSURE: 145 MMHG | WEIGHT: 182 LBS | OXYGEN SATURATION: 99 % | DIASTOLIC BLOOD PRESSURE: 80 MMHG | HEIGHT: 73 IN | BODY MASS INDEX: 24.12 KG/M2 | RESPIRATION RATE: 16 BRPM | HEART RATE: 72 BPM

## 2024-03-18 DIAGNOSIS — I71.21 ANEURYSM OF ASCENDING AORTA WITHOUT RUPTURE: ICD-10-CM

## 2024-03-18 DIAGNOSIS — I10 ESSENTIAL HYPERTENSION: Primary | ICD-10-CM

## 2024-03-18 DIAGNOSIS — E78.00 HYPERCHOLESTEREMIA: ICD-10-CM

## 2024-03-18 PROCEDURE — 3079F DIAST BP 80-89 MM HG: CPT | Performed by: INTERNAL MEDICINE

## 2024-03-18 PROCEDURE — 1160F RVW MEDS BY RX/DR IN RCRD: CPT | Performed by: INTERNAL MEDICINE

## 2024-03-18 PROCEDURE — 3077F SYST BP >= 140 MM HG: CPT | Performed by: INTERNAL MEDICINE

## 2024-03-18 PROCEDURE — 99214 OFFICE O/P EST MOD 30 MIN: CPT | Performed by: INTERNAL MEDICINE

## 2024-03-18 PROCEDURE — 1159F MED LIST DOCD IN RCRD: CPT | Performed by: INTERNAL MEDICINE

## 2024-03-18 RX ORDER — AMLODIPINE BESYLATE 5 MG/1
5 TABLET ORAL DAILY
Qty: 90 TABLET | Refills: 3 | Status: SHIPPED | OUTPATIENT
Start: 2024-03-18

## 2024-03-18 RX ORDER — LISINOPRIL 20 MG/1
20 TABLET ORAL DAILY
Qty: 90 TABLET | Refills: 3 | Status: SHIPPED | OUTPATIENT
Start: 2024-03-18

## 2024-03-18 NOTE — PROGRESS NOTES
[facility] [place]  [facility]  [address] [place] [address]  Dept: [phone]  Dept Fax: [phone]    Esteban Tse  1955    Follow Up Office Visit Note    History of Present Illness:  Esteban Tse is a 68 y.o. male who presents to the clinic for Follow-up. Hypertension- The BP is 145.80 at home is also high, on Toprol xl 100 mg , Lisinopril 20.12.5 and Lisinopril 20 mg, , will add Amlodipine 5 mg, he denies any complaints, he needs a Lipid profile, he is on Crestor 45mg    The following portions of the patient's history were reviewed and updated as appropriate: allergies, current medications, past family history, past medical history, past social history, past surgical history, and problem list.    Medications:  amLODIPine  gabapentin  lisinopril  lisinopril-hydrochlorothiazide  metoprolol succinate XL  naloxone  OLANZapine  promethazine  rosuvastatin  sennosides-docusate  Xtampza ER capsule extended-release 12 hour     Subjective  Allergies   Allergen Reactions    Levofloxacin Swelling     Lips swellijng    Tetracycline Swelling     Lips swelling    Penicillins Other (See Comments)     childhood        Past Medical History:   Diagnosis Date    Aneurysm 2022    Ascending aortic aneurysm     Benign hypertension     Bladder cancer     Colon cancer     History of radiation therapy 05/06/2022    right groin/hemipelvis    Hypercholesteremia 9/18/2023    Hypertension     Intermittent palpitations     Mucinous adenocarcinoma     Pounding heartbeat     PVC's (premature ventricular contractions)        Past Surgical History:   Procedure Laterality Date    APPENDECTOMY  2017    With Partial Colon     BLADDER SURGERY      with partial colon    CHOLECYSTECTOMY      COLON SURGERY      GALLBLADDER SURGERY         Family History   Problem Relation Age of Onset    Lung cancer Mother     Stroke Mother     Hypertension Mother     Heart disease Father      "Hypertension Father         Social History     Socioeconomic History    Marital status: Single    Number of children: 0   Tobacco Use    Smoking status: Never    Smokeless tobacco: Never   Vaping Use    Vaping status: Never Used   Substance and Sexual Activity    Alcohol use: Yes     Alcohol/week: 1.0 standard drink of alcohol     Types: 1 Cans of beer per week     Comment: 1 dring weekly    Drug use: Never    Sexual activity: Not Currently       Review of Systems   Constitutional: Negative.    HENT: Negative.     Respiratory: Negative.     Cardiovascular: Negative.    Endocrine: Negative.    Genitourinary: Negative.    Musculoskeletal: Negative.    Skin: Negative.    Allergic/Immunologic: Negative.    Neurological: Negative.    Hematological: Negative.    Psychiatric/Behavioral: Negative.       Cardiovascular Procedures    ECHO/MUGA:  STRESS TESTS:   CARDIAC CATH:   DEVICES:   HOLTER:   CT/MRI:   VASCULAR:   CARDIOTHORACIC:     Objective  Vitals:    03/18/24 0904 03/18/24 0931   BP: 150/88 145/80   BP Location: Right arm    Patient Position: Lying    Cuff Size: Large Adult    Pulse: 72    Resp: 16    SpO2: 99%    Weight: 82.6 kg (182 lb)    Height: 185.4 cm (73\")    PainSc: 0-No pain      Body mass index is 24.01 kg/m².     Physical Exam  Vitals reviewed.   Constitutional:       Appearance: Healthy appearance. Not in distress.   Eyes:      Pupils: Pupils are equal, round, and reactive to light.   HENT:    Mouth/Throat:      Pharynx: Oropharynx is clear.   Neck:      Thyroid: Thyroid normal.      Vascular: No JVR. JVD normal.   Pulmonary:      Effort: Pulmonary effort is normal.      Breath sounds: Normal breath sounds. No wheezing. No rhonchi. No rales.   Chest:      Chest wall: Not tender to palpatation.   Cardiovascular:      PMI at left midclavicular line. Normal rate. Regular rhythm. Normal S1. Normal S2.       Murmurs: There is no murmur.      No gallop.  No click. No rub.   Pulses:     Intact distal pulses. "      Carotid: 3+ bilaterally.     Radial: 3+ bilaterally.     Femoral: 3+ bilaterally.     Dorsalis pedis: 3+ bilaterally.     Posterior tibial: 3+ bilaterally.  Edema:     Peripheral edema absent.   Abdominal:      General: Bowel sounds are normal.      Palpations: Abdomen is soft.      Tenderness: There is no abdominal tenderness.   Musculoskeletal: Normal range of motion.         General: No tenderness.      Cervical back: Normal range of motion and neck supple. Skin:     General: Skin is warm and dry.   Neurological:      General: No focal deficit present.      Mental Status: Alert and oriented to person, place and time.        Diagnostic Data  Procedures    Assessment and Plan  Diagnoses and all orders for this visit:    Essential hypertension- BP is 145.80 on Toprol xl 100 mg, Lisinopril 20.12.5  and also plain lisinopril 20 mg    Hypercholesteremia- On Crestor 5 mg, no complaints, tolerates wlel, will need a Lipid     Aneurysm of ascending aorta without rupture- last Ct 2023 3.,9 cm,     Other orders  -     amLODIPine (NORVASC) 5 MG tablet; Take 1 tablet by mouth Daily.  -     lisinopril (PRINIVIL,ZESTRIL) 20 MG tablet; Take 1 tablet by mouth Daily.         Return in about 6 months (around 9/18/2024) for Recheck with Dr. Dial.    Alli Dial MD  03/18/2024

## 2024-03-21 ENCOUNTER — CLINICAL SUPPORT (OUTPATIENT)
Dept: CARDIOLOGY | Facility: CLINIC | Age: 69
End: 2024-03-21
Payer: MEDICARE

## 2024-03-21 DIAGNOSIS — I10 ESSENTIAL HYPERTENSION: Primary | ICD-10-CM

## 2024-03-21 DIAGNOSIS — E78.00 HYPERCHOLESTEREMIA: ICD-10-CM

## 2024-03-21 DIAGNOSIS — I71.21 ANEURYSM OF ASCENDING AORTA WITHOUT RUPTURE: ICD-10-CM

## 2024-03-21 DIAGNOSIS — I71.23 ANEURYSM OF DESCENDING THORACIC AORTA WITHOUT RUPTURE: ICD-10-CM

## 2024-03-21 DIAGNOSIS — E78.5 HYPERLIPIDEMIA LDL GOAL <100: ICD-10-CM

## 2024-03-21 PROCEDURE — 36415 COLL VENOUS BLD VENIPUNCTURE: CPT | Performed by: INTERNAL MEDICINE

## 2024-03-21 NOTE — PROGRESS NOTES
Venipuncture Blood Specimen Collection  Venipuncture performed in clinic by Reshma Tran MA with good hemostasis. Patient tolerated the procedure well without complications.   03/21/24   Reshma Tran MA

## 2024-03-22 ENCOUNTER — TELEPHONE (OUTPATIENT)
Dept: CARDIOLOGY | Facility: CLINIC | Age: 69
End: 2024-03-22
Payer: MEDICARE

## 2024-03-22 LAB
ALBUMIN SERPL-MCNC: 4.3 G/DL (ref 3.9–4.9)
ALBUMIN/GLOB SERPL: 2.2 {RATIO} (ref 1.2–2.2)
ALP SERPL-CCNC: 114 IU/L (ref 44–121)
ALT SERPL-CCNC: 20 IU/L (ref 0–44)
AST SERPL-CCNC: 25 IU/L (ref 0–40)
BASOPHILS # BLD AUTO: 0 X10E3/UL (ref 0–0.2)
BASOPHILS NFR BLD AUTO: 0 %
BILIRUB SERPL-MCNC: 1 MG/DL (ref 0–1.2)
BUN SERPL-MCNC: 17 MG/DL (ref 8–27)
BUN/CREAT SERPL: 15 (ref 10–24)
CALCIUM SERPL-MCNC: 9.1 MG/DL (ref 8.6–10.2)
CHLORIDE SERPL-SCNC: 103 MMOL/L (ref 96–106)
CHOLEST SERPL-MCNC: 128 MG/DL (ref 100–199)
CK SERPL-CCNC: 46 U/L (ref 41–331)
CO2 SERPL-SCNC: 21 MMOL/L (ref 20–29)
CREAT SERPL-MCNC: 1.11 MG/DL (ref 0.76–1.27)
EGFRCR SERPLBLD CKD-EPI 2021: 73 ML/MIN/1.73
EOSINOPHIL # BLD AUTO: 0.1 X10E3/UL (ref 0–0.4)
EOSINOPHIL NFR BLD AUTO: 2 %
ERYTHROCYTE [DISTWIDTH] IN BLOOD BY AUTOMATED COUNT: 13.5 % (ref 11.6–15.4)
GLOBULIN SER CALC-MCNC: 2 G/DL (ref 1.5–4.5)
GLUCOSE SERPL-MCNC: 92 MG/DL (ref 70–99)
HCT VFR BLD AUTO: 40.5 % (ref 37.5–51)
HDLC SERPL-MCNC: 49 MG/DL
HGB BLD-MCNC: 14 G/DL (ref 13–17.7)
IMM GRANULOCYTES # BLD AUTO: 0 X10E3/UL (ref 0–0.1)
IMM GRANULOCYTES NFR BLD AUTO: 1 %
LDLC SERPL CALC-MCNC: 46 MG/DL (ref 0–99)
LYMPHOCYTES # BLD AUTO: 1.5 X10E3/UL (ref 0.7–3.1)
LYMPHOCYTES NFR BLD AUTO: 24 %
MCH RBC QN AUTO: 29.8 PG (ref 26.6–33)
MCHC RBC AUTO-ENTMCNC: 34.6 G/DL (ref 31.5–35.7)
MCV RBC AUTO: 86 FL (ref 79–97)
MONOCYTES # BLD AUTO: 0.6 X10E3/UL (ref 0.1–0.9)
MONOCYTES NFR BLD AUTO: 9 %
NEUTROPHILS # BLD AUTO: 4 X10E3/UL (ref 1.4–7)
NEUTROPHILS NFR BLD AUTO: 64 %
PLATELET # BLD AUTO: 205 X10E3/UL (ref 150–450)
POTASSIUM SERPL-SCNC: 4.5 MMOL/L (ref 3.5–5.2)
PROT SERPL-MCNC: 6.3 G/DL (ref 6–8.5)
RBC # BLD AUTO: 4.7 X10E6/UL (ref 4.14–5.8)
SODIUM SERPL-SCNC: 142 MMOL/L (ref 134–144)
TRIGL SERPL-MCNC: 203 MG/DL (ref 0–149)
VLDLC SERPL CALC-MCNC: 33 MG/DL (ref 5–40)
WBC # BLD AUTO: 6.3 X10E3/UL (ref 3.4–10.8)

## 2024-03-22 NOTE — TELEPHONE ENCOUNTER
----- Message from Alli Dial MD sent at 3/22/2024  3:17 PM EDT -----  Lab are good including Lipids, keep taking your meds

## 2024-04-01 DIAGNOSIS — G89.3 CANCER ASSOCIATED PAIN: ICD-10-CM

## 2024-04-01 RX ORDER — GABAPENTIN 600 MG/1
600 TABLET ORAL 3 TIMES DAILY
Qty: 90 TABLET | Refills: 0 | Status: SHIPPED | OUTPATIENT
Start: 2024-04-01 | End: 2024-05-01

## 2024-04-01 NOTE — TELEPHONE ENCOUNTER
YAIMA #: [id number]    Medication requested: gabapentin (NEURONTIN) 600 MG tablet     Last fill date: 3/1/24    Last appointment: 1/23/24    Next appointment: 4/23/24

## 2024-04-01 NOTE — TELEPHONE ENCOUNTER
YAIMA #:  [id number]    Medication requested: Oxycodone (ROXICODONE) 10 MG    Last fill date: 3/1/24    Last appointment: 1/23/24    Next appointment: 4/23/24

## 2024-04-02 RX ORDER — OXYCODONE HYDROCHLORIDE 10 MG/1
10 TABLET ORAL EVERY 4 HOURS PRN
Qty: 180 TABLET | Refills: 0 | Status: SHIPPED | OUTPATIENT
Start: 2024-04-02 | End: 2024-05-02

## 2024-04-22 NOTE — PROGRESS NOTES
Palliative Clinic Note      Name: Esteban Tse  Age: 68 y.o.  Sex: male  : 1955  MRN: [id number]  Date of Service: 2024   Medical Oncologist: Dr. Delcid  Mode of visit: video-conference  Location of patient: Home  Location of provider: home    Subjective:    Chief Complaint: RLE pain, psychosocial stress    History of Present Illness: Esteban Tse is a 68 y.o. male with past medical history significant for HTN, AAA, metastatic adenocarcinoma of the appendix  who presents via video-conference today as a follow up for pain and symptom management.     Treatment summary: The patient was diagnosed with cancer of the appendix at the time of appendectomy in 2017. Patient under surveillance until imaging in 2019 demonstrated several lesions suspicious for recurrent disease. He underwent a diagnostic laparoscopy with peritoneal biopsies on 2020 by Dr. Peoples that proved recurrent adenocarcinoma. He underwent an exploratory laparotomy with excision of right and left lobe liver lesions, omentectomy, resection of pelvic peritoneal nodules, ileocolectomy with creation of ileal colostomy, hyperthermic intraperitoneal chemotherapy and a partial cystectomy with right ureteral stent placement in 2020. He underwent a cystoscopy with bilateral ureteral catheters, exploratory laparotomy, lysis of adhesions, right pelvic sidewall excisional biopsy, and an open partial cystectomy in 2021. TURBT performed in 10/2021. Imaging from 3/4/2022 showed enlarging external illiac adenopathy. There are no surgical options per Dr. Peoples. Underwent CT-guided biopsy of the right iliac mass consistent with mucinous adenocarcinoma. Patient completed palliative radiation in 2023. Scans from 2023 showed stable disease. Plan to continue watchful waiting. Upcoming f/u with oncology scheduled for 24.     Pain: Patient established care with [facility] in [place], [place] > 1 year ago.  Patient's right hip pain was thought to be referred from his spine. Patient underwent several epidurals and a lumbar laminectomy with no improvement in his pain. Patient complains of pain predominantly in his right groin / hip and lower extremity secondary to cancer. The pain occurs with physical activity and worse with driving. Due to a recent decline in his mother's health, the patient has been driving in the car a lot more which has increased his pain. The patient has been taking two oxycodone 10 mg every 8 hours as needed along with gabapentin 600 mg TID. He occasionally takes Ibuprofen as well. The patient stopped Xtampza 9 mg BID due to worsening constipation. He did not feel comfortable escalated his bowel regimen due to all of the driving. The patient is interested in restarting this medication. Patient unable to tolerate MS Contin and Fentanyl patches due to side effects.      Other symptoms: The patient reports some nausea in the mornings. However, he states his nausea has improved since starting the Zyprexa 5 mg nightly. The patient complains of worsening constipation while on the long-acting opioid therapy. He is currently taking Sennna-S daily. He has Milk of Mag PRN. No changes in appetite. He reports gaining several pounds. No issues with sleep.      Pyschosocial: The patient lives alone. He is retired from working for the state. He enjoys playing golf and spending time outside. No personal history of a mental illness. The patient admits to worsening anxiety/depression recently due to the decline of his mother's health. Patient's father passed away in 10/2022 and the patient is an only child.     Spiritual: Patient describes himself as curious rather than practicing.      Goals: Improve quality of life with symptom management.     The following portions of the patient's history were reviewed and updated as appropriate: allergies, current medications, past family history, past medical history, past  social history, past surgical history and problem list.    ORT-R: Low risk  Decisional capacity: Full  ECOG: (1) Restricted in physically strenuous activity, ambulatory and able to do work of light nature    Objective:    Constitutional: Awake, alert, sitting up   Eyes: PERRLA, EOMS intact  HENT: NCAT, face symmetric  Neck: Supple, trachea midline  Respiratory: Nonlabored respirations  Musculoskeletal: Moves all extremities   Psychiatric: Appropriate affect, cooperative  Neurologic: Oriented x 3, Cranial Nerves grossly intact to confrontation, speech clear    Medication Counts: Collected over the phone. See bottom of note for details. No misuse or overuse evident.   I have reviewed the patient's [place] PDMP. YAIMA Req #[id number] .   UDS: Last 3/25/23. Reviewed. Appropriate.     Assessment & Plan:    1. Mucinous adenocarcinoma of appendix  - Watchful waiting. Next f/u with Dr. Delcid on 5/28/24.     2. Cancer associated pain  - Patient is appropriate for opioid therapy due to cancer related pain. Daily function and quality of life improved with pain medication. Refill for oxycodone 10 mg q4h PRN was sent to the pharmacy. Will also restart Xtampza 9 mg BID. Side effects of the medication discussed at every visit. Patient was encouraged to continue bowel regimen of daily stool softeners, prn laxatives, and diet modifications.    3. Neuropathy associated with malignant neoplasm  - Refilled gabapentin (NEURONTIN) 600 MG tablet; Take 1 tablet by mouth 3 (Three) Times a Day for 30 days.  Dispense: 90 tablet; Refill: 0    4. Therapeutic opioid induced constipation  - Encouraged adequate hydration and fiber intake. Continue Senna-S twice daily up to 4x daily. Recommend starting trial of Miralax, up to 3x daily. Continue Milk of Mag as needed.      Code status: Full code  Advanced directives: Not on file  Health care surrogate: Shaylee Tse, mother     Return in about 3 months (around 7/23/2024) for Office Visit.    The patient  has chosen to receive care through a telehealth visit.   Does the patient consent to using a video visit for their medical care today? Yes   The visit included audio and video interaction. No technical issues occurred during this visit.  I spent 24 minutes caring for Esteban Tse on this date of service. This time includes time spent by me in the following activities: preparing for the visit, reviewing tests, obtaining and/or reviewing a separately obtained history, performing a medically appropriate examination and/or evaluation, counseling and educating the patient/family/caregiver, ordering medications, tests, or procedures, documenting information in the medical record, independently interpreting results and communicating that information with the patient/family/caregiver, and care coordination.    Qian Fleming PA-C  04/23/2024    Medication Date Filled # Filled Count Used # Days  SOY   Oxycodone 10 4/2/24 180 65 115 21 5   Gabapentin 600 4/1/24 90 31 59 22 3   Xtampza 9 1/24/24 60 -- -- -- --

## 2024-04-23 ENCOUNTER — TELEMEDICINE (OUTPATIENT)
Dept: PALLIATIVE CARE | Facility: CLINIC | Age: 69
End: 2024-04-23
Payer: MEDICARE

## 2024-04-23 VITALS
HEART RATE: 72 BPM | OXYGEN SATURATION: 99 % | WEIGHT: 182 LBS | BODY MASS INDEX: 24.01 KG/M2 | SYSTOLIC BLOOD PRESSURE: 145 MMHG | DIASTOLIC BLOOD PRESSURE: 80 MMHG

## 2024-04-23 DIAGNOSIS — C80.1 NEUROPATHY ASSOCIATED WITH MALIGNANT NEOPLASM: ICD-10-CM

## 2024-04-23 DIAGNOSIS — G89.3 CANCER RELATED PAIN: ICD-10-CM

## 2024-04-23 DIAGNOSIS — K59.03 THERAPEUTIC OPIOID INDUCED CONSTIPATION: ICD-10-CM

## 2024-04-23 DIAGNOSIS — T40.2X5A THERAPEUTIC OPIOID INDUCED CONSTIPATION: ICD-10-CM

## 2024-04-23 DIAGNOSIS — C18.1 MUCINOUS ADENOCARCINOMA OF APPENDIX: Primary | ICD-10-CM

## 2024-04-23 DIAGNOSIS — G89.3 CANCER ASSOCIATED PAIN: ICD-10-CM

## 2024-04-23 DIAGNOSIS — G63 NEUROPATHY ASSOCIATED WITH MALIGNANT NEOPLASM: ICD-10-CM

## 2024-04-23 PROCEDURE — 3079F DIAST BP 80-89 MM HG: CPT | Performed by: PHYSICIAN ASSISTANT

## 2024-04-23 PROCEDURE — 3077F SYST BP >= 140 MM HG: CPT | Performed by: PHYSICIAN ASSISTANT

## 2024-04-23 PROCEDURE — 1160F RVW MEDS BY RX/DR IN RCRD: CPT | Performed by: PHYSICIAN ASSISTANT

## 2024-04-23 PROCEDURE — 1125F AMNT PAIN NOTED PAIN PRSNT: CPT | Performed by: PHYSICIAN ASSISTANT

## 2024-04-23 PROCEDURE — 99212 OFFICE O/P EST SF 10 MIN: CPT | Performed by: PHYSICIAN ASSISTANT

## 2024-04-23 PROCEDURE — 1159F MED LIST DOCD IN RCRD: CPT | Performed by: PHYSICIAN ASSISTANT

## 2024-04-23 RX ORDER — OXYCODONE 9 MG/1
9 CAPSULE, EXTENDED RELEASE ORAL EVERY 12 HOURS
Qty: 60 EACH | Refills: 0 | Status: SHIPPED | OUTPATIENT
Start: 2024-04-23

## 2024-04-23 RX ORDER — GABAPENTIN 600 MG/1
600 TABLET ORAL 3 TIMES DAILY
Qty: 90 TABLET | Refills: 0 | Status: SHIPPED | OUTPATIENT
Start: 2024-05-01 | End: 2024-05-31

## 2024-04-23 RX ORDER — OXYCODONE HYDROCHLORIDE 10 MG/1
10 TABLET ORAL EVERY 4 HOURS PRN
Qty: 180 TABLET | Refills: 0 | Status: SHIPPED | OUTPATIENT
Start: 2024-05-02 | End: 2024-06-01

## 2024-04-23 NOTE — TELEPHONE ENCOUNTER
I have reviewed patient's YAIMA report prior to prescribing Schedule II, III, and IV medications. Request # [id number] . Next refills for Xtampza 9 mg BID #60 and oxycodone 10 mg q4h PRN #180 were sent to the pharmacy. The patient is scheduled to follow-up in 3 months.

## 2024-05-28 ENCOUNTER — OFFICE VISIT (OUTPATIENT)
Dept: ONCOLOGY | Facility: CLINIC | Age: 69
End: 2024-05-28
Payer: MEDICARE

## 2024-05-28 VITALS
HEIGHT: 73 IN | HEART RATE: 73 BPM | TEMPERATURE: 97.5 F | SYSTOLIC BLOOD PRESSURE: 130 MMHG | BODY MASS INDEX: 23.19 KG/M2 | RESPIRATION RATE: 18 BRPM | OXYGEN SATURATION: 98 % | DIASTOLIC BLOOD PRESSURE: 86 MMHG | WEIGHT: 175 LBS

## 2024-05-28 DIAGNOSIS — C18.1 MUCINOUS ADENOCARCINOMA OF APPENDIX: Primary | ICD-10-CM

## 2024-05-28 DIAGNOSIS — R91.8 LUNG NODULES: ICD-10-CM

## 2024-05-28 PROCEDURE — 3079F DIAST BP 80-89 MM HG: CPT | Performed by: INTERNAL MEDICINE

## 2024-05-28 PROCEDURE — 3075F SYST BP GE 130 - 139MM HG: CPT | Performed by: INTERNAL MEDICINE

## 2024-05-28 PROCEDURE — 1159F MED LIST DOCD IN RCRD: CPT | Performed by: INTERNAL MEDICINE

## 2024-05-28 PROCEDURE — 1125F AMNT PAIN NOTED PAIN PRSNT: CPT | Performed by: INTERNAL MEDICINE

## 2024-05-28 PROCEDURE — 99215 OFFICE O/P EST HI 40 MIN: CPT | Performed by: INTERNAL MEDICINE

## 2024-05-28 PROCEDURE — 1160F RVW MEDS BY RX/DR IN RCRD: CPT | Performed by: INTERNAL MEDICINE

## 2024-05-28 NOTE — PROGRESS NOTES
CHIEF COMPLAINT: Crampy abdominal pain for 6 hours 3 episodes over the last 6 months self-limited    Problem List:  Oncology/Hematology History Overview Note   1.  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  Pathologic stage IIa with T3 extension into the base of the appendix through the muscularis propria but not into the serosal surface.  16 nodes negative.  Colonoscopy December 2017 negative postop.Laparoscopic diagnostic peritoneal biopsy confirming disease in the bladder dome February 2020.  Began Folfiri.  June 2020 Dr. Peoples excised right lobe of liver and left lobe liver lesion, omentectomy and resection of pelvic peritoneal nodules, ileocolectomy with ileal colostomy and intraperitoneal HIPEC with mitomycin.  Dr. Lawler performed right ureteral stent placement and partial cystectomy.  With large fungating mass, 4/22/2021 had exploratory laparotomy with lysis of adhesions and right pelvic sidewall excisional biopsy with open partial cystectomy.  No malignancy and peritoneal fluid.  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  Margins less than 1 mm.  October 2021 cystoscopy without malignancy.  TURBT 10/11/2021 did not show malignancy.  March 2022 Dr. Peoples for 3.9 cm external iliac recurrent adenopathy 6 weeks out from laminectomy which did not help pain was determined to be an unresectable recurrence with muscle and vascular involvement.  Radiation with Dr. Moody ended 5/6/2022.  Disease stabilized and pain improved.    -12/30/2019 medical oncology office visit: The patient had been on surveillance since surgery December 2017.  CT scans had been negative up until 12/30/2019 CT chest abdomen and pelvis that showed subtle soft tissue density surrounding surgical clips in the right side of the pelvis along with soft tissue nodule at the anterior wall of the bladder concerning for recurrent disease.  Patient  sent back to Dr. Davidson for colonoscopy.    -2/7/2020 patient was referred to Dr. Peoples at the [facility], he underwent diagnostic laparoscopic and peritoneal biopsies that confirmed recurrent disease with biopsy of bladder dome nodule showing adenocarcinoma with mucinous features.  Port was placed at this time also.    -2/25/2020 began FOLFIRI    -4/21/2020 patient having increased fatigue, FOLFIRI dose reduced by 10%.    -5/19/2020 cycle 7 FOLFIRI    -6/18/2020 Dr. Peoples performed exploratory laparotomy with excision of right lobe liver lesion and left lobe liver lesion, omentectomy, resection of pelvic peritoneal nodules, ileocolectomy with creation of ileal colostomy, hyperthermic intraperitoneal chemotherapy with mitomycin-C at 42 °C for deep tissue penetration for 90 minutes.  Dr. Perdue performed partial cystectomy with right ureteral stent placement    -8/5/2020 CT abdomen pelvis with contrast shows small soft tissue nodule anterior aspect of bladder stable.  New small amount of ascites as well as a new small left pleural effusion.  Creatinine 0.75 with hemoglobin 11.1 otherwise unremarkable CBC and CMP.    -2/15/2021 CT abdomen pelvis with contrast compared to 8/5/2020 shows enlarging soft tissue mass 4.2 cm over the bladder fundus and there is a calcification along the base of the urinary bladder.  Small stable multiple hypoattenuating indeterminate liver lesions.  Similar degree of ascites.    -2/23/2021 CT chest shows no evidence of metastasis with ascending aorta 42 mm.    -3/8/2021 follow-up with Dr. Portillo Peoples we reviewed his CTs suggested team effort resection with Dr. Perdue    -3/16/2021 follow-up with Dr. Perdue.  He plans for open partial cystectomy, possible ureteral implants, cystoscopy and stent placement in concert with Dr. Peoples.    -4/22/2021 cystoscopy (after prior office cystoscopy showed large fungating mass) with bilateral ureteral catheters, exploratory laparotomy, lysis of  "adhesions, right pelvic sidewall excisional biopsy, open partial cystectomy Dr. Ike Perdue. Peritoneal fluid showed predominant inflammatory reactive mesothelial cells with no malignancy. 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 Carcinoma was less than 1 mm from the lateral resection margins.    -5/18/2021 [facility] medical oncology follow-up visit: I reviewed his hospital notes, operative notes, and pathology report as above and went over this with the patient.  The general consensus from retrospective as well as a few prospective data over the last couple of decades with this low-grade malignancy does not show any profound benefit from \"adjuvant\" chemotherapy in this current setting.  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  There is no standard follow-up but general guidelines suggest following up as typical colon cancer.  I will repeat his CT chest abdomen pelvis now to reestablish his baseline postoperative imaging and have him see my nurse practitioner back in a couple of weeks to make sure there is no nia evidence of measurable residual disease.  Assuming this just shows postoperative changes, I would simply repeat scans again in 3 months or sooner as symptoms dictate.  If he has no evidence of persistent or metastatic disease on current imaging, then when he sees my nurse practitioner back she will also review what they say about his ascending aorta which was 42 mm in February and sent him to Dr. Evangelist Anthony for an opinion as to whether any intervention is needed relative to the aorta.  Obviously if his cancer is out of control on the upcoming scans then the aorta is a moot point.    -6/10/2021 [facility] oncology clinic follow-up: CT scans show no evidence of disease recurrence in the chest, abdomen or pelvis.  Ascending aortic aneurysm stable at 41 mm.  Referral made to Dr. Anthony, cardiothoracic surgeon for management and " evaluation of a sending aortic aneurysm.  Plan to repeat CT scans in 3 months.    -9/14/2021 [facility] medical oncology follow-up visit: I reviewed images and reports of 9/10/2021 CT chest abdomen pelvis with contrast which shows under distended urinary bladder with mild asymmetric wall thickening dome and left lateral wall with a small 9 mm polypoid lesion in the bladder.  There is stable 2.3 cm right external iliac and a few other subcentimeter scattered nodes in the root of the small bowel mesentery and retroperitoneum.  Postsurgical right hemicolectomy changes.  Stable low-attenuation lesions in the liver unchanged.  I will have him collect his discs from [facility] to take Dr. Perdue to decide whether to proceed with cystoscopy or just continue watchful waiting.  There is no major changes and I suspect we are looking at postoperative changes and we shall see him for video visit in a few weeks to see what urology at  decides.    -10/11/2021 cystoscopy Dr. Perdue showed marked acute and chronic inflammation but no evidence of malignancy.  Muscularis propria present.    -10/14/2021 [facility] medical oncology virtual visit: I reviewed reports of pathology from cystoscopy 10/11/2021 and went over this with patient.  He is feeling fairly fit.  I will repeat his CT chest abdomen pelvis prior to return in 3 months and if in the interim, when he follows up with Dr. Perdue in the next couple of weeks post cystoscopy, plans are made for further cystoscopy before I see him back and any malignancy is found, I would ask the patient and Dr. Perdue to touch base as I would not know of such without that contact as the information comes into our chart without notification now that epic is being used by .  Assuming no further biopsies in the interim, I will simply repeat his scans in 3 months to follow what I suspected and is now confirmed to be inflammatory changes.  From the aneurysm standpoint, he has follow-up scanning  March 2022 arranged by Dr. Anthony and he will follow him up after that.    -11/24/2021 CT chest abdomen pelvis compared to 6/7/2021 outside imaging shows stable 4.1 cm ascending thoracic aorta.  No lung nodules.  Liver homogenous with bilateral cysts.  No adenopathy.  Thickened sigmoid colon and rectum suggestive of mild colitis or postradiation change.  No pelvic adenopathy.  Bony structures degenerative in the spine.  Some soft tissue anterior to the acetabulum on the right and extending along the medial aspect right pelvic sidewall 2.7 x 5.6 may represent intramuscular process versus adenopathy which could not be ruled out.  No prior study views available.    -1/4/2022 follow-up Dr. Ike Perdue [facility].  Per his note, he had TURBT 10/11/2021.  He had been placed on antibiotics for UTI for preop preparation for back surgery planned in 2 weeks.  Denies any mucus in his urine.  Some microhematuria on urinalysis this day.  No gross hematuria or dysuria.  The October 2021 TURBT did not reveal anything malignant on pathology.  There may be irritation or inflammation secondary to sutures in the bladder following partial cystectomy.  Patient asymptomatic from a urinary standpoint.  Recommended following up with [facility] with no plans for further urology follow-up.    -2/4/2022 [facility] medical oncology telehealth follow-up visit: I reviewed his November images and reports with the patient as well as with Dr. Gm Moody and the note from Dr. Perdue from 1 month ago.  I do suspect this pelvic sidewall abnormality in November represents persistent disease but as I have stated in prior dictations, the data on palliative or overall survival benefit of systemic 5-FU based combination chemotherapy and/or Avastin does not show a clear-cut survival advantage to chemotherapy for asymptomatic disease.  He is having some back pain and had spine decompression a couple of weeks ago without much benefit.  This is an  "area that may potentially be radiated but I will check his CT chest (angiogram of chest) abdomen and pelvis and get his blood counts and chemistries and urinalysis and see him back for virtual visit in a few weeks.  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  I will also converse with Dr. Peoples at that junction whether he thinks he has anything to offer if this is progressing though I doubt it given that the last intervention was a urologic procedure and the last cystoscopy/TURBT in October had no malignancy and the liver lesions are currently being called liver cysts albeit I am skeptical as to whether those are truly benign but they are certainly not growing.    -3/4/2022 CT angiogram of chest/CT abdomen pelvis for evaluation of dizziness and surveillance of thoracic aortic aneurysm and appendiceal carcinoma.  Thoracic ascending aortic ectasia 4 x 4 cm stable.  Small nodularity left upper lobe stable from September.  Stable small hepatic hypodensities status post cholecystectomy.  Right external iliac soft tissue fullness now 3 x 3.9 cm previously 2 x 2.3 cm concerning for enlarging adenopathy with symmetric nonenlarged inguinal nodes stable.  CBC unremarkable.  CMP unremarkable save for potassium 5.4.  CEA 18.2.  1-3 red cells and white cells per milliliter of urine.    -3/16/2022 office visit with Dr. Portillo Peoples.  He reviewed the CT from 3/4/2022.  He notes that the laminectomy from 6 weeks prior has not helped his \"hip pain\".  Given the location of this recurrence previously 2 x 2.3 cm now 3 x 3.9 cm in the external iliac area concerning for enlarging adenopathy with symmetric " nonenlarged inguinal nodes stable, the mass appears unresectable with involvement of muscle and vasculature.    -3/24/2022 [facility] medical oncology follow-up virtual visit: Pain is still significant.  We will get him in hopefully in the next day to [facility] for palliative care.  Have spoken with Dr. Peoples and no surgery.  I have spoken with Dr. Moody who thinks palliative radiation while not likely to shrink tumor dramatically may help pain considerably and we will get him there.  We will get him to bring the discs to our [place] office and asked them to bring that back to [place] to get scanned in for our invasive radiologist to look at for us to get CT-guided biopsy of this node in the right lower quadrant and send that for Caris MI profile once the pathology is obtained to hopefully find high tumor mutational burden or other molecular targets that might give us some options.  Apart from that, as stated multiple times, this is not a highly chemotherapy sensitive tumor and I am not sure I would palliate him well but, when I see him back in early June with CAT scans prior to return and post radiation, if he is not getting relief and wants to try a 5-FU based regimen plus or minus Avastin I would be okay with that but he knows it does not alter survival 1 way or the other and we are simply trying to palliate this inexorable process.  No other surgical options.    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  Mismatch repair proficient,NTRK1/2/3 fusion not detected.  BRAF V600E negative.  PD-L1 Negative, 1+, 5%.  PTEN Positive, 1+, 100%. MLH1 positive/3+, 100%. MSH2 positive/3+, 100%. MSH6 positive/3+, 100%. PMS2 positive/3+,100%.    -5/6/2022 last radiation    - 5/25/2022 CT chest abdomen pelvis with contrast at [place] [facility] shows nothing remarkable on the chest.  Stable liver cysts.  Focal soft tissue right iliac  region appears to be increasing in size now 7.2 cm previously 3.9 cm with some mass-effect on surrounding structures with several stable inguinal borderline nodes.    -6/6/2022 [facility] medical oncology follow-up visit: Now 1 month out from radiation just starting to get a little bit of improvement in his abdominal discomfort.  Slight growth on CT but some of that may be postradiation change this close to radiation and, as I stated in my note in March, it is not clear that 5-FU based therapy plus or minus Avastin is going to palliate much given its relatively insensitivity to chemotherapy and it certainly does not improve survival.  To that end, we will plan on just repeating his CAT scans again in about 6 weeks and if the pain is worsening and/or this continues to grow then I will probably give him Avastin FOLFOX.  If everything is stable and pain is controlled we will go continue watchful waiting with imaging about every 3 months from that point forward.    -7/11/2022 [facility] medical oncology follow-up: His 7/7/2022 CT chest abdomen pelvis showed stable right external iliac low-attenuation which had been previously growing and thickening of the distal descending and sigmoid colon with perhaps a small right iliac borderline 1 cm node.  Symptomatically improved post radiation.  For now we will hold on Avastin FOLFOX as the benefit of such is questionable with this histologic subtype and there are no actionable molecular targets.  Repeat CTs prior to return in 3 months.    -11/1/2022 [facility] medical oncology follow-up: 10/19/2022 CT chest abdomen pelvis with contrast [place] [facility] compared to 7/7/2022 shows no significant change or evidence of progression.  Clinically quiescent.  We will repeat CTs in 6 months, sooner as symptoms dictate.    -5/19/2023 CT chest abdomen pelvis [place] [facility] compared to 10/19/2022 showed no evidence of disease progression in the chest.  Nodular density adjacent to the  descending colon 6 mm nonspecific.  Otherwise unchanged appearance of hepatic metastatic nodules, right iliac adenopathy, and right pelvic mass.    -5/23/2023 [facility] medical oncology follow-up: I reviewed his report of CT chest abdomen pelvis from [place] 4 days ago showing no evidence of progression.  So area near the descending colon is nonspecific and would not change anything at this junction.  His October CT had not changed from July and the current one has not changed significantly since October.  We will continue to see palliative care for his right pelvic pain.  If leg swelling significantly worsens we could check Doppler and assuming no clot consider vascular intervention but as the thigh has not significantly changed over time nor is there significant tenderness in the calf I am doubtful that this is clot and is likely just due to the pelvic mass.  Vascular surgical intervention should swelling worsen is also an option but I would not want a stent placed through this area in the face malignancy particularly a mucinous variety that would make him prone towards clotting.  If the leg worsens I would get his Doppler and consider vascular intervention or anticoagulation as dictated but clinically stable so will not investigate further at this junction.  I will repeat his imaging again in 6 months.    -11/20/2023 CT chest, abdomen and pelvis shows stable appearances of the chest with no evidence of metastatic disease.  Abdomen and pelvis reveals small interval decrease in size of the previously demonstrated peritoneal nodule adjacent to the descending colon.  No new suspicious omental/peritoneal soft tissue nodules or masses.  Stable right iliac adenopathy as well as a soft tissue mass along the right external iliac vasculature along the right pelvic sidewall.  -11/29/2023 [facility] Oncology clinic follow-up: Current CT chest, abdomen and pelvis shows stable findings, no evidence of progression.  He has no new  worrisome symptoms however he does still have swelling in his right thigh and lower pelvic pain for which she sees palliative care.  He states that it seems slightly more pronounced over the last few weeks, he has a follow-up with palliative care and will discuss further with him.  We reviewed recommendations discussed at his last visit with Dr. Delcid that as long as the swelling did not worsen significantly then we would hold off on considering any vascular intervention.  We will continue to monitor, Jose Antonio understands to let us know if he has any changes otherwise we will plan on repeating CT chest, abdomen and pelvis prior to return in 6 months and I have ordered those today.  His blood pressure was running high on arrival today, I did recheck it manually and it was 150/100.  He reports that he took his blood pressure earlier today at home and it was 140s over 85 or so.  I asked him to recheck when he got home and monitor and if his diastolic continued to run over 90 to get in with Dr. Dial for evaluation.    - 5/20/2023 CT chest abdomen pelvis [place] [facility] compared to November shows 10 mm nodule left lower lobe and right lower lobe.  Anterior lobe pleural-based nodule unchanged.  6.1 x 4 cm right pelvic sidewall stable.  12 mm right common iliac node stable.  No ascites.    -5/28/2024 [facility] oncology clinic follow-up: No signs or symptoms of recurrence.  3 times in the last 6 months he had a 6-hour.  Of crampy abdominal pain that subsequently subsided.  Potentially could have been having some obstructive symptomatology but none on imaging.  If this happens more frequently or it lasts he will let us know and also Dr. Peoples but would also go towards a more soft diet during those episodes and try FiberCon.  Otherwise imaging of the abdomen is stable but there is a couple of nodules in the lung that appeared new albeit small.  This is a very low-grade process and systemic therapies are not very effective and he  is asymptomatic so we will just repeat CT chest in 3 months.  If this grows then we will consider getting tissue as it would be a little odd for this low-grade process to show up in the lung.     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  Pathologic stage IIa with T3 extension into the base of the appendix through the muscularis propria but not into the serosal surface.  16 nodes negative.  Colonoscopy December 2017 negative postop     12/5/2017 Surgery    Surgery       Procedure:  Appendectomy and right hemicolectomy      Completeness of resection:  No evidence of residual tumor     Pathology revealed invasive moderately differentiated mucinous adenocarcinoma.  Right hemicolectomy: Benign colon and small intestine no evidence of carcinoma.  16 benign lymph nodes, 0/16, negative for dysplasia or malignancy.  Tumor size approximately 6 cm.  Grade 2, moderately differentiated.  Tumor invades through the muscularis profile into the base of appendix but does not extend to the serosal surface.  All margins uninvolved, no lymphovascular invasion identified.  Pathological stage pT3 pN0.         12/8/2017 Imaging    CT of the chest abdomen and pelvis negative.  Baseline CBC WBC 7100, hemoglobin 11.3, hematocrit 34.8%, platelet count 195,000.     3/20/2018 Procedure    Colonoscopy with Dr. Davidson was normal, recommendation for repeat surveillance colonoscopy in 3 years.     6/11/2018 Imaging    CT chest, abdomen and pelvis with no evidence metastatic disease.  CEA 1.1     9/24/2018 Imaging    CT abdomen pelvis showed no acute changes and nothing to suggest recurrence     12/28/2018 Imaging    CT chest, abdomen and pelvis negative. Normal CBC, CMP and CEA 0.7.     6/28/2019 Imaging    CT chest, abdomen and pelvis: No interval change from prior studies.  No recurrent or metastatic disease  detected in the chest, abdomen or pelvis.  No acute process.  CEA 0.9     12/30/2019 Imaging    CT chest, abdomen and pelvis: No disease in the chest.  There was noted a subtle soft tissue density, one surrounding surgical clips in the right side of the pelvis and the other a soft tissue nodule intimately associated with the anterior wall of the bladder, which are considered suspicious for recurrent disease.  CEA 1.1.  CMP with normal creatinine 0.9, total bilirubin 1.9, AST 34, ALT 24, alkaline phosphatase 93.  CBC with WBC 6900, hemoglobin 15.9, platelet count 232,000.       1/21/2020 Procedure    Normal colonoscopy with Dr. Davidson.  He has made referral to Dr. Portillo Peoples at .     2/7/2020 Progression    12/30/2019 CT chest, abdomen and pelvis: No disease in the chest.  There are subtle soft tissue densities (1 surrounding surgical clips in the right side of the pelvis and the other a soft tissue nodule intimately associated with the anterior wall of the bladder) which are considered suspicious for recurrent disease.  CMP with normal creatinine 0.9, total bilirubin 1.9, AST 34, ALT 24, alkaline phosphatase 93.  CBC with WBC 6900, hemoglobin 15.9, platelet count 232,000.  CEA 1.1.  2/7/2020 diagnostic laparoscopy with peritoneal biopsies performed at the [facility] by Dr. Peoples showed no sign of diffuse peritoneal carcinomatosis or liver metastasis.  There was a firm nodule in the superior dome of the bladder, adherent to omentum, as well as right pelvic sidewall nodule adjacent to surgical clips.  Biopsy of bladder dome nodule showed adenocarcinoma with mucinous features.     2/25/2020 -  Chemotherapy    OP COLORECTAL FOLFIRI Irinotecan / Leucovorin / Fluorouracil     5/29/2020 Imaging    CT chest abdomen pelvis shows slight improvement with decreased nodule anterolateral margin of urinary bladder with stable nodularity of the right lower quadrant adjacent to surgical clips and no progressive disease.   Slight hyperemia of the colonic mucosa     6/18/2020 Surgery    Surgery       -6/18/2020 Dr. Peoples performed exploratory laparotomy with excision of right lobe liver lesion and left lobe liver lesion, omentectomy, resection of pelvic peritoneal nodules, ileocolectomy with creation of ileal colostomy, hyperthermic intraperitoneal chemotherapy with mitomycin-C at 42 °C for deep tissue penetration for 90 minutes.  Dr. Perdue performed partial cystectomy with right ureteral stent placement     8/5/2020 Imaging     CT abdomen pelvis with contrast shows small soft tissue nodule anterior aspect of bladder stable.  New small amount of ascites as well as a new small left pleural effusion.  Creatinine 0.75 with hemoglobin 11.1 otherwise unremarkable CBC and CMP     2/15/2021 Imaging    CT abdomen pelvis with contrast compared to 8/5/2020 shows enlarging soft tissue mass 4.2 cm over the bladder fundus and there is a calcification along the base of the urinary bladder.  Small stable multiple hypoattenuating indeterminate liver lesions.  Similar degree of ascites.     2/23/2021 Imaging    -2/23/2021 CT chest shows no evidence of metastasis with ascending aorta 42 mm.     4/22/2021 Surgery    -4/22/2021 cystoscopy (after prior office cystoscopy showed large fungating mass) with bilateral ureteral catheters, exploratory laparotomy, lysis of adhesions, right pelvic sidewall excisional biopsy, open partial cystectomy Dr. Ike Perdue. Peritoneal fluid showed predominant inflammatory reactive mesothelial cells with no malignancy. 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 Carcinoma was less than 1 mm from the lateral resection margins.     6/7/2021 Imaging    CT chest, abdomen and pelvis: No evidence of metastatic disease within the chest abdomen or pelvis.  Interval resection of enhancing bladder wall mass a small amount of residual enhancing  soft tissue, possibly granulation tissue.  Interval resolution of abdominal and pelvic ascites.  Stable dilation of the ascending aorta mid segment measuring up to 41 mm.     9/10/2021 Imaging    CT chest abdomen pelvis with contrast shows under distended urinary bladder with mild asymmetric wall thickening dome and left lateral wall with a small 9 mm polypoid lesion in the bladder.  There is stable 2.3 cm right external iliac and a few other subcentimeter scattered nodes in the root of the small bowel mesentery and retroperitoneum.  Postsurgical right hemicolectomy changes.  Stable low-attenuation lesions in the liver unchanged.     11/24/2021 Imaging    CT chest abdomen pelvis compared to 6/7/2021 outside imaging shows stable 4.1 cm ascending thoracic aorta.  No lung nodules.  Liver homogenous with bilateral cysts.  No adenopathy.  Thickened sigmoid colon and rectum suggestive of mild colitis or postradiation change.  No pelvic adenopathy.  Bony structures degenerative in the spine.  Some soft tissue anterior to the acetabulum on the right and extending along the medial aspect right pelvic sidewall 2.7 x 5.6 may represent intramuscular process versus adenopathy which could not be ruled out.  No prior study views available.     4/11/2022 - 5/6/2022 Radiation    Radiation OncologyTreatment Course:  Esteban Tse received 5000 cGy in 20 fractions to right pelvis/groin via External Beam Radiation - EBRT.     5/25/2022 Imaging    CT chest abdomen pelvis with contrast at [facility] shows nothing remarkable on the chest.  Stable liver cysts.  Focal soft tissue right iliac region appears to be increasing in size now 7.2 cm previously 3.9 cm with some mass-effect on surrounding structures with several stable inguinal borderline nodes.     10/19/2022 Imaging    - 10/19/2022 CT chest abdomen pelvis with contrast [facility] compared to 7/7/2022 shows no significant change or evidence of progression.    "  Peritoneal carcinoma   5/14/2020 Initial Diagnosis    Peritoneal carcinoma (HCC)     5/25/2022 Imaging    CT chest abdomen pelvis with contrast at [facility] shows nothing remarkable on the chest.  Stable liver cysts.  Focal soft tissue right iliac region appears to be increasing in size now 7.2 cm previously 3.9 cm with some mass-effect on surrounding structures with several stable inguinal borderline nodes.         HISTORY OF PRESENT ILLNESS:  The patient is a 68 y.o. 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  3 episodes 6 hours long self-limited of crampy abdominal pain    Past Medical History:   Diagnosis Date    Aneurysm 2022    Ascending aortic aneurysm     Benign hypertension     Bladder cancer     Colon cancer     History of radiation therapy 05/06/2022    right groin/hemipelvis    Hypercholesteremia 9/18/2023    Hypertension     Intermittent palpitations     Mucinous adenocarcinoma     Pounding heartbeat     PVC's (premature ventricular contractions)      Past Surgical History:   Procedure Laterality Date    APPENDECTOMY  2017    With Partial Colon     BLADDER SURGERY      with partial colon    CHOLECYSTECTOMY      COLON SURGERY      GALLBLADDER SURGERY         Allergies   Allergen Reactions    Levofloxacin Swelling     Lips swellijng    Tetracycline Swelling     Lips swelling    Penicillins Other (See Comments)     childhood       Family History and Social History reviewed and changed as necessary    REVIEW OF SYSTEM:   No new somatic complaints    PHYSICAL EXAM:  No jaundice icterus or pallor.  No respiratory distress.    Vitals:    05/28/24 1003   BP: 130/86   Pulse: 73   Resp: 18   Temp: 97.5 °F (36.4 °C)   SpO2: 98%   Weight: 79.4 kg (175 lb)   Height: 185.4 cm (73\")     Vitals:    05/28/24 1003   PainSc:   8   PainLoc: Hip  Comment: right hip          ECOG score: 0           Vitals reviewed.    ECOG: (0) Fully Active - Able to Carry On All Pre-disease " Performance Without Restriction    Lab Results   Component Value Date    HGB 14.0 03/21/2024    HCT 40.5 03/21/2024    MCV 86 03/21/2024     03/21/2024    WBC 6.3 03/21/2024    NEUTROABS 4.0 03/21/2024    LYMPHSABS 1.5 03/21/2024    MONOSABS 0.6 03/21/2024    EOSABS 0.1 03/21/2024    BASOSABS 0.0 03/21/2024       Lab Results   Component Value Date    GLUCOSE 92 03/21/2024    BUN 17 03/21/2024    CREATININE 1.11 03/21/2024     03/21/2024    K 4.5 03/21/2024     03/21/2024    CO2 21 03/21/2024    CALCIUM 9.1 03/21/2024    PROTEINTOT 6.4 06/06/2022    ALBUMIN 4.3 03/21/2024    BILITOT 1.0 03/21/2024    ALKPHOS 114 03/21/2024    AST 25 03/21/2024    ALT 20 03/21/2024             ASSESSMENT & PLAN:  1.  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  Pathologic stage IIa with T3 extension into the base of the appendix through the muscularis propria but not into the serosal surface.  16 nodes negative.  Colonoscopy December 2017 negative postop.Laparoscopic diagnostic peritoneal biopsy confirming disease in the bladder dome February 2020.  Began Folfiri.  June 2020 Dr. Peoples excised right lobe of liver and left lobe liver lesion, omentectomy and resection of pelvic peritoneal nodules, ileocolectomy with ileal colostomy and intraperitoneal HIPEC with mitomycin.  Dr. Lawler performed right ureteral stent placement and partial cystectomy.  With large fungating mass, 4/22/2021 had exploratory laparotomy with lysis of adhesions and right pelvic sidewall excisional biopsy with open partial cystectomy.  No malignancy and peritoneal fluid.  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  Margins less than 1 mm.  October 2021 cystoscopy without malignancy.  TURBT 10/11/2021 did not show malignancy.  March 2022 Dr. Peoples for 3.9 cm external iliac recurrent adenopathy 6 weeks out from laminectomy which did not help pain  was determined to be an unresectable recurrence with muscle and vascular involvement.  Radiation with Dr. Moody ended 5/6/2022.  Disease stabilized and pain improved.    -12/30/2019 medical oncology office visit: The patient had been on surveillance since surgery December 2017.  CT scans had been negative up until 12/30/2019 CT chest abdomen and pelvis that showed subtle soft tissue density surrounding surgical clips in the right side of the pelvis along with soft tissue nodule at the anterior wall of the bladder concerning for recurrent disease.  Patient sent back to Dr. Davidson for colonoscopy.    -2/7/2020 patient was referred to Dr. Peoples at the [facility], he underwent diagnostic laparoscopic and peritoneal biopsies that confirmed recurrent disease with biopsy of bladder dome nodule showing adenocarcinoma with mucinous features.  Port was placed at this time also.    -2/25/2020 began FOLFIRI    -4/21/2020 patient having increased fatigue, FOLFIRI dose reduced by 10%.    -5/19/2020 cycle 7 FOLFIRI    -6/18/2020 Dr. Peoples performed exploratory laparotomy with excision of right lobe liver lesion and left lobe liver lesion, omentectomy, resection of pelvic peritoneal nodules, ileocolectomy with creation of ileal colostomy, hyperthermic intraperitoneal chemotherapy with mitomycin-C at 42 °C for deep tissue penetration for 90 minutes.  Dr. Perdue performed partial cystectomy with right ureteral stent placement    -8/5/2020 CT abdomen pelvis with contrast shows small soft tissue nodule anterior aspect of bladder stable.  New small amount of ascites as well as a new small left pleural effusion.  Creatinine 0.75 with hemoglobin 11.1 otherwise unremarkable CBC and CMP.    -2/15/2021 CT abdomen pelvis with contrast compared to 8/5/2020 shows enlarging soft tissue mass 4.2 cm over the bladder fundus and there is a calcification along the base of the urinary bladder.  Small stable multiple hypoattenuating  "indeterminate liver lesions.  Similar degree of ascites.    -2/23/2021 CT chest shows no evidence of metastasis with ascending aorta 42 mm.    -3/8/2021 follow-up with Dr. Portillo Peoples we reviewed his CTs suggested team effort resection with Dr. Perdue    -3/16/2021 follow-up with Dr. Perdue.  He plans for open partial cystectomy, possible ureteral implants, cystoscopy and stent placement in concert with Dr. Peoples.    -4/22/2021 cystoscopy (after prior office cystoscopy showed large fungating mass) with bilateral ureteral catheters, exploratory laparotomy, lysis of adhesions, right pelvic sidewall excisional biopsy, open partial cystectomy Dr. Ike Perdue. Peritoneal fluid showed predominant inflammatory reactive mesothelial cells with no malignancy. 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 Carcinoma was less than 1 mm from the lateral resection margins.    -5/18/2021 [facility] medical oncology follow-up visit: I reviewed his hospital notes, operative notes, and pathology report as above and went over this with the patient.  The general consensus from retrospective as well as a few prospective data over the last couple of decades with this low-grade malignancy does not show any profound benefit from \"adjuvant\" chemotherapy in this current setting.  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  There is no standard follow-up but general guidelines suggest following up as typical colon cancer.  I will repeat his CT chest abdomen pelvis now to reestablish his baseline postoperative imaging and have him see my nurse practitioner back in a couple of weeks to make sure there is no nia evidence of measurable residual disease.  Assuming this just shows postoperative changes, I would simply repeat scans again in 3 months or sooner as symptoms dictate.  If he has no evidence of persistent or metastatic disease on current imaging, then when he sees my nurse " practitioner back she will also review what they say about his ascending aorta which was 42 mm in February and sent him to Dr. Evangelist Anthony for an opinion as to whether any intervention is needed relative to the aorta.  Obviously if his cancer is out of control on the upcoming scans then the aorta is a moot point.    -6/10/2021 [facility] oncology clinic follow-up: CT scans show no evidence of disease recurrence in the chest, abdomen or pelvis.  Ascending aortic aneurysm stable at 41 mm.  Referral made to Dr. Anthony, cardiothoracic surgeon for management and evaluation of a sending aortic aneurysm.  Plan to repeat CT scans in 3 months.    -9/14/2021 [facility] oncology follow-up visit: I reviewed images and reports of 9/10/2021 CT chest abdomen pelvis with contrast which shows under distended urinary bladder with mild asymmetric wall thickening dome and left lateral wall with a small 9 mm polypoid lesion in the bladder.  There is stable 2.3 cm right external iliac and a few other subcentimeter scattered nodes in the root of the small bowel mesentery and retroperitoneum.  Postsurgical right hemicolectomy changes.  Stable low-attenuation lesions in the liver unchanged.  I will have him collect his discs from [facility] to take Dr. Perdue to decide whether to proceed with cystoscopy or just continue watchful waiting.  There is no major changes and I suspect we are looking at postoperative changes and we shall see him for video visit in a few weeks to see what urology at  decides.    -10/11/2021 cystoscopy Dr. Perdue showed marked acute and chronic inflammation but no evidence of malignancy.  Muscularis propria present.    -10/14/2021 [facility] medical oncology virtual visit: I reviewed reports of pathology from cystoscopy 10/11/2021 and went over this with patient.  He is feeling fairly fit.  I will repeat his CT chest abdomen pelvis prior to return in 3 months and if in the interim, when he follows up with  Dr. Perdue in the next couple of weeks post cystoscopy, plans are made for further cystoscopy before I see him back and any malignancy is found, I would ask the patient and Dr. Perdue to touch base as I would not know of such without that contact as the information comes into our chart without notification now that epic is being used by .  Assuming no further biopsies in the interim, I will simply repeat his scans in 3 months to follow what I suspected and is now confirmed to be inflammatory changes.  From the aneurysm standpoint, he has follow-up scanning March 2022 arranged by Dr. Anthony and he will follow him up after that.    -11/24/2021 CT chest abdomen pelvis compared to 6/7/2021 outside imaging shows stable 4.1 cm ascending thoracic aorta.  No lung nodules.  Liver homogenous with bilateral cysts.  No adenopathy.  Thickened sigmoid colon and rectum suggestive of mild colitis or postradiation change.  No pelvic adenopathy.  Bony structures degenerative in the spine.  Some soft tissue anterior to the acetabulum on the right and extending along the medial aspect right pelvic sidewall 2.7 x 5.6 may represent intramuscular process versus adenopathy which could not be ruled out.  No prior study views available.    -1/4/2022 follow-up Dr. Ike Perdue [facility].  Per his note, he had TURBT 10/11/2021.  He had been placed on antibiotics for UTI for preop preparation for back surgery planned in 2 weeks.  Denies any mucus in his urine.  Some microhematuria on urinalysis this day.  No gross hematuria or dysuria.  The October 2021 TURBT did not reveal anything malignant on pathology.  There may be irritation or inflammation secondary to sutures in the bladder following partial cystectomy.  Patient asymptomatic from a urinary standpoint.  Recommended following up with [facility] with no plans for further urology follow-up.    -2/4/2022 [facility] medical oncology telehealth follow-up visit: I reviewed his November  images and reports with the patient as well as with Dr. Gm Moody and the note from Dr. Perdue from 1 month ago.  I do suspect this pelvic sidewall abnormality in November represents persistent disease but as I have stated in prior dictations, the data on palliative or overall survival benefit of systemic 5-FU based combination chemotherapy and/or Avastin does not show a clear-cut survival advantage to chemotherapy for asymptomatic disease.  He is having some back pain and had spine decompression a couple of weeks ago without much benefit.  This is an area that may potentially be radiated but I will check his CT chest (angiogram of chest) abdomen and pelvis and get his blood counts and chemistries and urinalysis and see him back for virtual visit in a few weeks.  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  I will also converse with Dr. Peoples at that junction whether he thinks he has anything to offer if this is progressing though I doubt it given that the last intervention was a urologic procedure and the last cystoscopy/TURBT in October had no malignancy and the liver lesions are currently being called liver cysts albeit I am skeptical as to whether those are truly benign but they are certainly not growing.    -3/4/2022 CT angiogram of chest/CT abdomen pelvis for evaluation of dizziness and surveillance of thoracic aortic aneurysm and appendiceal carcinoma.  Thoracic ascending aortic ectasia 4 x 4 cm stable.  Small nodularity left upper lobe stable from September.  Stable small hepatic hypodensities status post cholecystectomy.  Right external iliac soft tissue fullness now 3 x 3.9  "cm previously 2 x 2.3 cm concerning for enlarging adenopathy with symmetric nonenlarged inguinal nodes stable.  CBC unremarkable.  CMP unremarkable save for potassium 5.4.  CEA 18.2.  1-3 red cells and white cells per milliliter of urine.    -3/16/2022 office visit with Dr. Portillo Peoples.  He reviewed the CT from 3/4/2022.  He notes that the laminectomy from 6 weeks prior has not helped his \"hip pain\".  Given the location of this recurrence previously 2 x 2.3 cm now 3 x 3.9 cm in the external iliac area concerning for enlarging adenopathy with symmetric nonenlarged inguinal nodes stable, the mass appears unresectable with involvement of muscle and vasculature.    -3/24/2022 [facility] medical oncology follow-up virtual visit: Pain is still significant.  We will get him in hopefully in the next day to [facility] for palliative care.  Have spoken with Dr. Peoples and no surgery.  I have spoken with Dr. Moody who thinks palliative radiation while not likely to shrink tumor dramatically may help pain considerably and we will get him there.  We will get him to bring the discs to our [place] office and asked them to bring that back to [place] to get scanned in for our invasive radiologist to look at for us to get CT-guided biopsy of this node in the right lower quadrant and send that for [facility] once the pathology is obtained to hopefully find high tumor mutational burden or other molecular targets that might give us some options.  Apart from that, as stated multiple times, this is not a highly chemotherapy sensitive tumor and I am not sure I would palliate him well but, when I see him back in early June with CAT scans prior to return and post radiation, if he is not getting relief and wants to try a 5-FU based regimen plus or minus Avastin I would be okay with that but he knows it does not alter survival 1 way or the other and we are simply trying to palliate this inexorable process.  No other surgical " options.    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  Mismatch repair proficient,NTRK1/2/3 fusion not detected.  BRAF V600E negative.  PD-L1 Negative, 1+, 5%.  PTEN Positive, 1+, 100%. MLH1 positive/3+, 100%. MSH2 positive/3+, 100%. MSH6 positive/3+, 100%. PMS2 positive/3+,100%.    -5/6/2022 last radiation    - 5/25/2022 CT chest abdomen pelvis with contrast at [facility] shows nothing remarkable on the chest.  Stable liver cysts.  Focal soft tissue right iliac region appears to be increasing in size now 7.2 cm previously 3.9 cm with some mass-effect on surrounding structures with several stable inguinal borderline nodes.    -6/6/2022 [facility], [place], operated by [facility] medical oncology follow-up visit: Now 1 month out from radiation just starting to get a little bit of improvement in his abdominal discomfort.  Slight growth on CT but some of that may be postradiation change this close to radiation and, as I stated in my note in March, it is not clear that 5-FU based therapy plus or minus Avastin is going to palliate much given its relatively insensitivity to chemotherapy and it certainly does not improve survival.  To that end, we will plan on just repeating his CAT scans again in about 6 weeks and if the pain is worsening and/or this continues to grow then I will probably give him Avastin FOLFOX.  If everything is stable and pain is controlled we will go continue watchful waiting with imaging about every 3 months from that point forward.    -7/11/2022 [facility] [place], operated by [facility] medical oncology follow-up: His 7/7/2022 CT chest abdomen pelvis showed stable right external iliac low-attenuation which had been previously growing and thickening of the distal descending and sigmoid colon with perhaps a small right iliac borderline 1 cm node.  Symptomatically improved post radiation.  For now we will hold on Avastin FOLFOX as the benefit of such is questionable with this  histologic subtype and there are no actionable molecular targets.  Repeat CTs prior to return in 3 months.    -11/1/2022 [facility] medical oncology follow-up: 10/19/2022 CT chest abdomen pelvis with contrast [facility] compared to 7/7/2022 shows no significant change or evidence of progression.  Clinically quiescent.  We will repeat CTs in 6 months, sooner as symptoms dictate.    -5/19/2023 CT chest abdomen pelvis [facility] compared to 10/19/2022 showed no evidence of disease progression in the chest.  Nodular density adjacent to the descending colon 6 mm nonspecific.  Otherwise unchanged appearance of hepatic metastatic nodules, right iliac adenopathy, and right pelvic mass.    -5/23/2023 [facility] medical oncology follow-up: I reviewed his report of CT chest abdomen pelvis from [place] 4 days ago showing no evidence of progression.  So area near the descending colon is nonspecific and would not change anything at this junction.  His October CT had not changed from July and the current one has not changed significantly since October.  We will continue to see palliative care for his right pelvic pain.  If leg swelling significantly worsens we could check Doppler and assuming no clot consider vascular intervention but as the thigh has not significantly changed over time nor is there significant tenderness in the calf I am doubtful that this is clot and is likely just due to the pelvic mass.  Vascular surgical intervention should swelling worsen is also an option but I would not want a stent placed through this area in the face malignancy particularly a mucinous variety that would make him prone towards clotting.  If the leg worsens I would get his Doppler and consider vascular intervention or anticoagulation as dictated but clinically stable so will not investigate further at this junction.  I will repeat his imaging again in 6 months.    -11/20/2023 CT chest, abdomen and pelvis shows stable  appearances of the chest with no evidence of metastatic disease.  Abdomen and pelvis reveals small interval decrease in size of the previously demonstrated peritoneal nodule adjacent to the descending colon.  No new suspicious omental/peritoneal soft tissue nodules or masses.  Stable right iliac adenopathy as well as a soft tissue mass along the right external iliac vasculature along the right pelvic sidewall.    -11/29/2023 [facility] follow-up: Current CT chest, abdomen and pelvis shows stable findings, no evidence of progression.  He has no new worrisome symptoms however he does still have swelling in his right thigh and lower pelvic pain for which she sees palliative care.  He states that it seems slightly more pronounced over the last few weeks, he has a follow-up with palliative care and will discuss further with him.  We reviewed recommendations discussed at his last visit with Dr. Delcid that as long as the swelling did not worsen significantly then we would hold off on considering any vascular intervention.  We will continue to monitor, Jose Antonio understands to let us know if he has any changes otherwise we will plan on repeating CT chest, abdomen and pelvis prior to return in 6 months and I have ordered those today.  His blood pressure was running high on arrival today, I did recheck it manually and it was 150/100.  He reports that he took his blood pressure earlier today at home and it was 140s over 85 or so.  I asked him to recheck when he got home and monitor and if his diastolic continued to run over 90 to get in with Dr. Dial for evaluation.    - 5/20/2023 CT chest abdomen pelvis [facility] compared to November shows 10 mm nodule left lower lobe and right lower lobe.  Anterior lobe pleural-based nodule unchanged.  6.1 x 4 cm right pelvic sidewall stable.  12 mm right common iliac node stable.  No ascites.    -5/28/2024 [facility] follow-up: No signs or symptoms of  recurrence.  3 times in the last 6 months he had a 6-hour.  Of crampy abdominal pain that subsequently subsided.  Potentially could have been having some obstructive symptomatology but none on imaging.  If this happens more frequently or it lasts he will let us know and also Dr. Peoples but would also go towards a more soft diet during those episodes and try FiberCon.  Otherwise imaging of the abdomen is stable but there is a couple of nodules in the lung that appeared new albeit small.  This is a very low-grade process and systemic therapies are not very effective and he is asymptomatic so we will just repeat CT chest in 3 months.  If this grows then we will consider getting tissue as it would be a little odd for this low-grade process to show up in the lung.    Total time of care today inclusive of time spent today prior to patient's arrival reviewing interval images and reports thereof and during visit translating that patient interviewing him as to signs or symptoms of his disease and afterwards instituting the plan and communicating to his other providers took 50 minutes of patient care time throughout the day today.  Cy Delcid MD    05/28/2024

## 2024-05-28 NOTE — LETTER
May 28, 2024       No Recipients    Patient: Esteban Tse   YOB: 1955   Date of Visit: 5/28/2024     Dear Saul Davidson MD:       Thank you for referring Esteban Tse to me for evaluation. Below are the relevant portions of my assessment and plan of care.    If you have questions, please do not hesitate to call me. I look forward to following Esteban along with you.         Sincerely,        Cy Delcid MD        CC:   No Recipients    Cy Delcid MD  05/28/24 1102  Sign when Signing Visit  CHIEF COMPLAINT: Crampy abdominal pain for 6 hours 3 episodes over the last 6 months self-limited    Problem List:  Oncology/Hematology History Overview Note   1.  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  Pathologic stage IIa with T3 extension into the base of the appendix through the muscularis propria but not into the serosal surface.  16 nodes negative.  Colonoscopy December 2017 negative postop.Laparoscopic diagnostic peritoneal biopsy confirming disease in the bladder dome February 2020.  Began Folfiri.  June 2020 Dr. Peoples excised right lobe of liver and left lobe liver lesion, omentectomy and resection of pelvic peritoneal nodules, ileocolectomy with ileal colostomy and intraperitoneal HIPEC with mitomycin.  Dr. Lawler performed right ureteral stent placement and partial cystectomy.  With large fungating mass, 4/22/2021 had exploratory laparotomy with lysis of adhesions and right pelvic sidewall excisional biopsy with open partial cystectomy.  No malignancy and peritoneal fluid.  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  Margins less than 1 mm.  October 2021 cystoscopy without malignancy.  TURBT 10/11/2021 did not show malignancy.  March 2022 Dr. Peoples for 3.9 cm external iliac recurrent adenopathy 6 weeks out from laminectomy which did not help pain was determined to be an  unresectable recurrence with muscle and vascular involvement.  Radiation with Dr. Moody ended 5/6/2022.  Disease stabilized and pain improved.    -12/30/2019 medical oncology office visit: The patient had been on surveillance since surgery December 2017.  CT scans had been negative up until 12/30/2019 CT chest abdomen and pelvis that showed subtle soft tissue density surrounding surgical clips in the right side of the pelvis along with soft tissue nodule at the anterior wall of the bladder concerning for recurrent disease.  Patient sent back to Dr. Davidson for colonoscopy.    -2/7/2020 patient was referred to Dr. Peoples at the [facility], he underwent diagnostic laparoscopic and peritoneal biopsies that confirmed recurrent disease with biopsy of bladder dome nodule showing adenocarcinoma with mucinous features.  Port was placed at this time also.    -2/25/2020 began FOLFIRI    -4/21/2020 patient having increased fatigue, FOLFIRI dose reduced by 10%.    -5/19/2020 cycle 7 FOLFIRI    -6/18/2020 Dr. Peoples performed exploratory laparotomy with excision of right lobe liver lesion and left lobe liver lesion, omentectomy, resection of pelvic peritoneal nodules, ileocolectomy with creation of ileal colostomy, hyperthermic intraperitoneal chemotherapy with mitomycin-C at 42 °C for deep tissue penetration for 90 minutes.  Dr. Perdue performed partial cystectomy with right ureteral stent placement    -8/5/2020 CT abdomen pelvis with contrast shows small soft tissue nodule anterior aspect of bladder stable.  New small amount of ascites as well as a new small left pleural effusion.  Creatinine 0.75 with hemoglobin 11.1 otherwise unremarkable CBC and CMP.    -2/15/2021 CT abdomen pelvis with contrast compared to 8/5/2020 shows enlarging soft tissue mass 4.2 cm over the bladder fundus and there is a calcification along the base of the urinary bladder.  Small stable multiple hypoattenuating indeterminate liver lesions.   "Similar degree of ascites.    -2/23/2021 CT chest shows no evidence of metastasis with ascending aorta 42 mm.    -3/8/2021 follow-up with Dr. Portillo Peoples we reviewed his CTs suggested team effort resection with Dr. Perdue    -3/16/2021 follow-up with Dr. Perdue.  He plans for open partial cystectomy, possible ureteral implants, cystoscopy and stent placement in concert with Dr. Peoples.    -4/22/2021 cystoscopy (after prior office cystoscopy showed large fungating mass) with bilateral ureteral catheters, exploratory laparotomy, lysis of adhesions, right pelvic sidewall excisional biopsy, open partial cystectomy Dr. Ike Perdue. Peritoneal fluid showed predominant inflammatory reactive mesothelial cells with no malignancy. 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 Carcinoma was less than 1 mm from the lateral resection margins.    -5/18/2021 [facility] medical oncology follow-up visit: I reviewed his hospital notes, operative notes, and pathology report as above and went over this with the patient.  The general consensus from retrospective as well as a few prospective data over the last couple of decades with this low-grade malignancy does not show any profound benefit from \"adjuvant\" chemotherapy in this current setting.  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  There is no standard follow-up but general guidelines suggest following up as typical colon cancer.  I will repeat his CT chest abdomen pelvis now to reestablish his baseline postoperative imaging and have him see my nurse practitioner back in a couple of weeks to make sure there is no nia evidence of measurable residual disease.  Assuming this just shows postoperative changes, I would simply repeat scans again in 3 months or sooner as symptoms dictate.  If he has no evidence of persistent or metastatic disease on current imaging, then when he sees my nurse practitioner back she will also " review what they say about his ascending aorta which was 42 mm in February and sent him to Dr. Evangelist Anthony for an opinion as to whether any intervention is needed relative to the aorta.  Obviously if his cancer is out of control on the upcoming scans then the aorta is a moot point.    -6/10/2021 [facility] oncology clinic follow-up: CT scans show no evidence of disease recurrence in the chest, abdomen or pelvis.  Ascending aortic aneurysm stable at 41 mm.  Referral made to Dr. Anthony, cardiothoracic surgeon for management and evaluation of a sending aortic aneurysm.  Plan to repeat CT scans in 3 months.    -9/14/2021 [facility] medical oncology follow-up visit: I reviewed images and reports of 9/10/2021 CT chest abdomen pelvis with contrast which shows under distended urinary bladder with mild asymmetric wall thickening dome and left lateral wall with a small 9 mm polypoid lesion in the bladder.  There is stable 2.3 cm right external iliac and a few other subcentimeter scattered nodes in the root of the small bowel mesentery and retroperitoneum.  Postsurgical right hemicolectomy changes.  Stable low-attenuation lesions in the liver unchanged.  I will have him collect his discs from [facility] to take Dr. Perdue to decide whether to proceed with cystoscopy or just continue watchful waiting.  There is no major changes and I suspect we are looking at postoperative changes and we shall see him for video visit in a few weeks to see what urology at  decides.    -10/11/2021 cystoscopy Dr. Perdue showed marked acute and chronic inflammation but no evidence of malignancy.  Muscularis propria present.    -10/14/2021 [facility] medical oncology virtual visit: I reviewed reports of pathology from cystoscopy 10/11/2021 and went over this with patient.  He is feeling fairly fit.  I will repeat his CT chest abdomen pelvis prior to return in 3 months and if in the interim, when he follows up with Dr. Perdue in the next couple of  weeks post cystoscopy, plans are made for further cystoscopy before I see him back and any malignancy is found, I would ask the patient and Dr. Perdue to touch base as I would not know of such without that contact as the information comes into our chart without notification now that epic is being used by .  Assuming no further biopsies in the interim, I will simply repeat his scans in 3 months to follow what I suspected and is now confirmed to be inflammatory changes.  From the aneurysm standpoint, he has follow-up scanning March 2022 arranged by Dr. Anthony and he will follow him up after that.    -11/24/2021 CT chest abdomen pelvis compared to 6/7/2021 outside imaging shows stable 4.1 cm ascending thoracic aorta.  No lung nodules.  Liver homogenous with bilateral cysts.  No adenopathy.  Thickened sigmoid colon and rectum suggestive of mild colitis or postradiation change.  No pelvic adenopathy.  Bony structures degenerative in the spine.  Some soft tissue anterior to the acetabulum on the right and extending along the medial aspect right pelvic sidewall 2.7 x 5.6 may represent intramuscular process versus adenopathy which could not be ruled out.  No prior study views available.    -1/4/2022 follow-up Dr. Ike Perdue urology .  Per his note, he had TURBT 10/11/2021.  He had been placed on antibiotics for UTI for preop preparation for back surgery planned in 2 weeks.  Denies any mucus in his urine.  Some microhematuria on urinalysis this day.  No gross hematuria or dysuria.  The October 2021 TURBT did not reveal anything malignant on pathology.  There may be irritation or inflammation secondary to sutures in the bladder following partial cystectomy.  Patient asymptomatic from a urinary standpoint.  Recommended following up with [facility] with no plans for further urology follow-up.    -2/4/2022 [facility] medical oncology telehealth follow-up visit: I reviewed his November images and reports with the  patient as well as with Dr. Gm Moody and the note from Dr. Perdue from 1 month ago.  I do suspect this pelvic sidewall abnormality in November represents persistent disease but as I have stated in prior dictations, the data on palliative or overall survival benefit of systemic 5-FU based combination chemotherapy and/or Avastin does not show a clear-cut survival advantage to chemotherapy for asymptomatic disease.  He is having some back pain and had spine decompression a couple of weeks ago without much benefit.  This is an area that may potentially be radiated but I will check his CT chest (angiogram of chest) abdomen and pelvis and get his blood counts and chemistries and urinalysis and see him back for virtual visit in a few weeks.  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  I will also converse with Dr. Peoples at that junction whether he thinks he has anything to offer if this is progressing though I doubt it given that the last intervention was a urologic procedure and the last cystoscopy/TURBT in October had no malignancy and the liver lesions are currently being called liver cysts albeit I am skeptical as to whether those are truly benign but they are certainly not growing.    -3/4/2022 CT angiogram of chest/CT abdomen pelvis for evaluation of dizziness and surveillance of thoracic aortic aneurysm and appendiceal carcinoma.  Thoracic ascending aortic ectasia 4 x 4 cm stable.  Small nodularity left upper lobe stable from September.  Stable small hepatic hypodensities status post cholecystectomy.  Right external iliac soft tissue fullness now 3 x 3.9 cm previously 2 x 2.3 cm  "concerning for enlarging adenopathy with symmetric nonenlarged inguinal nodes stable.  CBC unremarkable.  CMP unremarkable save for potassium 5.4.  CEA 18.2.  1-3 red cells and white cells per milliliter of urine.    -3/16/2022 office visit with Dr. Portillo Peoples.  He reviewed the CT from 3/4/2022.  He notes that the laminectomy from 6 weeks prior has not helped his \"hip pain\".  Given the location of this recurrence previously 2 x 2.3 cm now 3 x 3.9 cm in the external iliac area concerning for enlarging adenopathy with symmetric nonenlarged inguinal nodes stable, the mass appears unresectable with involvement of muscle and vasculature.    -3/24/2022 [facility] medical oncology follow-up virtual visit: Pain is still significant.  We will get him in hopefully in the next day to [facility] for palliative care.  Have spoken with Dr. Peoples and no surgery.  I have spoken with Dr. Moody who thinks palliative radiation while not likely to shrink tumor dramatically may help pain considerably and we will get him there.  We will get him to bring the discs to our [place] office and asked them to bring that back to [place] to get scanned in for our invasive radiologist to look at for us to get CT-guided biopsy of this node in the right lower quadrant and send that for [facility] miguel once the pathology is obtained to hopefully find high tumor mutational burden or other molecular targets that might give us some options.  Apart from that, as stated multiple times, this is not a highly chemotherapy sensitive tumor and I am not sure I would palliate him well but, when I see him back in early June with CAT scans prior to return and post radiation, if he is not getting relief and wants to try a 5-FU based regimen plus or minus Avastin I would be okay with that but he knows it does not alter survival 1 way or the other and we are simply trying to palliate this inexorable process.  No other surgical options.    - 4/5/2022  Right " lower quadrant mass CT biopsy positive mucinous adenocarcinoma    -5/2/2022 [facility] profile: HER2/harris negative.  Mismatch repair proficient,NTRK1/2/3 fusion not detected.  BRAF V600E negative.  PD-L1 Negative, 1+, 5%.  PTEN Positive, 1+, 100%. MLH1 positive/3+, 100%. MSH2 positive/3+, 100%. MSH6 positive/3+, 100%. PMS2 positive/3+,100%.    -5/6/2022 last radiation    - 5/25/2022 CT chest abdomen pelvis with contrast at [facility] shows nothing remarkable on the chest.  Stable liver cysts.  Focal soft tissue right iliac region appears to be increasing in size now 7.2 cm previously 3.9 cm with some mass-effect on surrounding structures with several stable inguinal borderline nodes.    -6/6/2022 [facility] medical oncology follow-up visit: Now 1 month out from radiation just starting to get a little bit of improvement in his abdominal discomfort.  Slight growth on CT but some of that may be postradiation change this close to radiation and, as I stated in my note in March, it is not clear that 5-FU based therapy plus or minus Avastin is going to palliate much given its relatively insensitivity to chemotherapy and it certainly does not improve survival.  To that end, we will plan on just repeating his CAT scans again in about 6 weeks and if the pain is worsening and/or this continues to grow then I will probably give him Avastin FOLFOX.  If everything is stable and pain is controlled we will go continue watchful waiting with imaging about every 3 months from that point forward.    -7/11/2022 [facility] medical oncology follow-up: His 7/7/2022 CT chest abdomen pelvis showed stable right external iliac low-attenuation which had been previously growing and thickening of the distal descending and sigmoid colon with perhaps a small right iliac borderline 1 cm node.  Symptomatically improved post radiation.  For now we will hold on Avastin FOLFOX as the benefit of such is questionable with this histologic subtype and there  are no actionable molecular targets.  Repeat CTs prior to return in 3 months.    -11/1/2022 [facility] medical oncology follow-up: 10/19/2022 CT chest abdomen pelvis with contrast [facility] compared to 7/7/2022 shows no significant change or evidence of progression.  Clinically quiescent.  We will repeat CTs in 6 months, sooner as symptoms dictate.    -5/19/2023 CT chest abdomen pelvis [facility] compared to 10/19/2022 showed no evidence of disease progression in the chest.  Nodular density adjacent to the descending colon 6 mm nonspecific.  Otherwise unchanged appearance of hepatic metastatic nodules, right iliac adenopathy, and right pelvic mass.    -5/23/2023 [facility] medical oncology follow-up: I reviewed his report of CT chest abdomen pelvis from [facility] 4 days ago showing no evidence of progression.  So area near the descending colon is nonspecific and would not change anything at this junction.  His October CT had not changed from July and the current one has not changed significantly since October.  We will continue to see palliative care for his right pelvic pain.  If leg swelling significantly worsens we could check Doppler and assuming no clot consider vascular intervention but as the thigh has not significantly changed over time nor is there significant tenderness in the calf I am doubtful that this is clot and is likely just due to the pelvic mass.  Vascular surgical intervention should swelling worsen is also an option but I would not want a stent placed through this area in the face malignancy particularly a mucinous variety that would make him prone towards clotting.  If the leg worsens I would get his Doppler and consider vascular intervention or anticoagulation as dictated but clinically stable so will not investigate further at this junction.  I will repeat his imaging again in 6 months.    -11/20/2023 CT chest, abdomen and pelvis shows stable appearances of the chest with no  evidence of metastatic disease.  Abdomen and pelvis reveals small interval decrease in size of the previously demonstrated peritoneal nodule adjacent to the descending colon.  No new suspicious omental/peritoneal soft tissue nodules or masses.  Stable right iliac adenopathy as well as a soft tissue mass along the right external iliac vasculature along the right pelvic sidewall.  -11/29/2023 [facility] Oncology clinic follow-up: Current CT chest, abdomen and pelvis shows stable findings, no evidence of progression.  He has no new worrisome symptoms however he does still have swelling in his right thigh and lower pelvic pain for which she sees palliative care.  He states that it seems slightly more pronounced over the last few weeks, he has a follow-up with palliative care and will discuss further with him.  We reviewed recommendations discussed at his last visit with Dr. Delcid that as long as the swelling did not worsen significantly then we would hold off on considering any vascular intervention.  We will continue to monitor, Jose Antonio understands to let us know if he has any changes otherwise we will plan on repeating CT chest, abdomen and pelvis prior to return in 6 months and I have ordered those today.  His blood pressure was running high on arrival today, I did recheck it manually and it was 150/100.  He reports that he took his blood pressure earlier today at home and it was 140s over 85 or so.  I asked him to recheck when he got home and monitor and if his diastolic continued to run over 90 to get in with Dr. Dial for evaluation.    - 5/20/2023 CT chest abdomen pelvis [facility] compared to November shows 10 mm nodule left lower lobe and right lower lobe.  Anterior lobe pleural-based nodule unchanged.  6.1 x 4 cm right pelvic sidewall stable.  12 mm right common iliac node stable.  No ascites.    -5/28/2024 [facility] oncology clinic follow-up: No signs or symptoms of recurrence.  3 times in the last 6 months  he had a 6-hour.  Of crampy abdominal pain that subsequently subsided.  Potentially could have been having some obstructive symptomatology but none on imaging.  If this happens more frequently or it lasts he will let us know and also Dr. Peoples but would also go towards a more soft diet during those episodes and try FiberCon.  Otherwise imaging of the abdomen is stable but there is a couple of nodules in the lung that appeared new albeit small.  This is a very low-grade process and systemic therapies are not very effective and he is asymptomatic so we will just repeat CT chest in 3 months.  If this grows then we will consider getting tissue as it would be a little odd for this low-grade process to show up in the lung.     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  Pathologic stage IIa with T3 extension into the base of the appendix through the muscularis propria but not into the serosal surface.  16 nodes negative.  Colonoscopy December 2017 negative postop     12/5/2017 Surgery    Surgery       Procedure:  Appendectomy and right hemicolectomy      Completeness of resection:  No evidence of residual tumor     Pathology revealed invasive moderately differentiated mucinous adenocarcinoma.  Right hemicolectomy: Benign colon and small intestine no evidence of carcinoma.  16 benign lymph nodes, 0/16, negative for dysplasia or malignancy.  Tumor size approximately 6 cm.  Grade 2, moderately differentiated.  Tumor invades through the muscularis profile into the base of appendix but does not extend to the serosal surface.  All margins uninvolved, no lymphovascular invasion identified.  Pathological stage pT3 pN0.         12/8/2017 Imaging    CT of the chest abdomen and pelvis negative.  Baseline CBC WBC 7100, hemoglobin 11.3, hematocrit 34.8%, platelet count 195,000.     3/20/2018 Procedure     Colonoscopy with Dr. Davidson was normal, recommendation for repeat surveillance colonoscopy in 3 years.     6/11/2018 Imaging    CT chest, abdomen and pelvis with no evidence metastatic disease.  CEA 1.1     9/24/2018 Imaging    CT abdomen pelvis showed no acute changes and nothing to suggest recurrence     12/28/2018 Imaging    CT chest, abdomen and pelvis negative. Normal CBC, CMP and CEA 0.7.     6/28/2019 Imaging    CT chest, abdomen and pelvis: No interval change from prior studies.  No recurrent or metastatic disease detected in the chest, abdomen or pelvis.  No acute process.  CEA 0.9     12/30/2019 Imaging    CT chest, abdomen and pelvis: No disease in the chest.  There was noted a subtle soft tissue density, one surrounding surgical clips in the right side of the pelvis and the other a soft tissue nodule intimately associated with the anterior wall of the bladder, which are considered suspicious for recurrent disease.  CEA 1.1.  CMP with normal creatinine 0.9, total bilirubin 1.9, AST 34, ALT 24, alkaline phosphatase 93.  CBC with WBC 6900, hemoglobin 15.9, platelet count 232,000.       1/21/2020 Procedure    Normal colonoscopy with Dr. Davidson.  He has made referral to Dr. Portillo Peoples at .     2/7/2020 Progression    12/30/2019 CT chest, abdomen and pelvis: No disease in the chest.  There are subtle soft tissue densities (1 surrounding surgical clips in the right side of the pelvis and the other a soft tissue nodule intimately associated with the anterior wall of the bladder) which are considered suspicious for recurrent disease.  CMP with normal creatinine 0.9, total bilirubin 1.9, AST 34, ALT 24, alkaline phosphatase 93.  CBC with WBC 6900, hemoglobin 15.9, platelet count 232,000.  CEA 1.1.  2/7/2020 diagnostic laparoscopy with peritoneal biopsies performed at the [facility] by Dr. Peoples showed no sign of diffuse peritoneal carcinomatosis or liver metastasis.  There was a firm nodule in the  superior dome of the bladder, adherent to omentum, as well as right pelvic sidewall nodule adjacent to surgical clips.  Biopsy of bladder dome nodule showed adenocarcinoma with mucinous features.     2/25/2020 -  Chemotherapy    OP COLORECTAL FOLFIRI Irinotecan / Leucovorin / Fluorouracil     5/29/2020 Imaging    CT chest abdomen pelvis shows slight improvement with decreased nodule anterolateral margin of urinary bladder with stable nodularity of the right lower quadrant adjacent to surgical clips and no progressive disease.  Slight hyperemia of the colonic mucosa     6/18/2020 Surgery    Surgery       -6/18/2020 Dr. Peoples performed exploratory laparotomy with excision of right lobe liver lesion and left lobe liver lesion, omentectomy, resection of pelvic peritoneal nodules, ileocolectomy with creation of ileal colostomy, hyperthermic intraperitoneal chemotherapy with mitomycin-C at 42 °C for deep tissue penetration for 90 minutes.  Dr. Perdue performed partial cystectomy with right ureteral stent placement     8/5/2020 Imaging     CT abdomen pelvis with contrast shows small soft tissue nodule anterior aspect of bladder stable.  New small amount of ascites as well as a new small left pleural effusion.  Creatinine 0.75 with hemoglobin 11.1 otherwise unremarkable CBC and CMP     2/15/2021 Imaging    CT abdomen pelvis with contrast compared to 8/5/2020 shows enlarging soft tissue mass 4.2 cm over the bladder fundus and there is a calcification along the base of the urinary bladder.  Small stable multiple hypoattenuating indeterminate liver lesions.  Similar degree of ascites.     2/23/2021 Imaging    -2/23/2021 CT chest shows no evidence of metastasis with ascending aorta 42 mm.     4/22/2021 Surgery    -4/22/2021 cystoscopy (after prior office cystoscopy showed large fungating mass) with bilateral ureteral catheters, exploratory laparotomy, lysis of adhesions, right pelvic sidewall excisional biopsy, open partial  cystectomy Dr. Ike Perdue. Peritoneal fluid showed predominant inflammatory reactive mesothelial cells with no malignancy. 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 Carcinoma was less than 1 mm from the lateral resection margins.     6/7/2021 Imaging    CT chest, abdomen and pelvis: No evidence of metastatic disease within the chest abdomen or pelvis.  Interval resection of enhancing bladder wall mass a small amount of residual enhancing soft tissue, possibly granulation tissue.  Interval resolution of abdominal and pelvic ascites.  Stable dilation of the ascending aorta mid segment measuring up to 41 mm.     9/10/2021 Imaging    CT chest abdomen pelvis with contrast shows under distended urinary bladder with mild asymmetric wall thickening dome and left lateral wall with a small 9 mm polypoid lesion in the bladder.  There is stable 2.3 cm right external iliac and a few other subcentimeter scattered nodes in the root of the small bowel mesentery and retroperitoneum.  Postsurgical right hemicolectomy changes.  Stable low-attenuation lesions in the liver unchanged.     11/24/2021 Imaging    CT chest abdomen pelvis compared to 6/7/2021 outside imaging shows stable 4.1 cm ascending thoracic aorta.  No lung nodules.  Liver homogenous with bilateral cysts.  No adenopathy.  Thickened sigmoid colon and rectum suggestive of mild colitis or postradiation change.  No pelvic adenopathy.  Bony structures degenerative in the spine.  Some soft tissue anterior to the acetabulum on the right and extending along the medial aspect right pelvic sidewall 2.7 x 5.6 may represent intramuscular process versus adenopathy which could not be ruled out.  No prior study views available.     4/11/2022 - 5/6/2022 Radiation    Radiation OncologyTreatment Course:  Esteban Tse received 5000 cGy in 20 fractions to right pelvis/groin via External Beam  Radiation - EBRT.     5/25/2022 Imaging    CT chest abdomen pelvis with contrast at [facility] shows nothing remarkable on the chest.  Stable liver cysts.  Focal soft tissue right iliac region appears to be increasing in size now 7.2 cm previously 3.9 cm with some mass-effect on surrounding structures with several stable inguinal borderline nodes.     10/19/2022 Imaging    - 10/19/2022 CT chest abdomen pelvis with contrast [facility] compared to 7/7/2022 shows no significant change or evidence of progression.     Peritoneal carcinoma   5/14/2020 Initial Diagnosis    Peritoneal carcinoma (HCC)     5/25/2022 Imaging    CT chest abdomen pelvis with contrast at [facility] shows nothing remarkable on the chest.  Stable liver cysts.  Focal soft tissue right iliac region appears to be increasing in size now 7.2 cm previously 3.9 cm with some mass-effect on surrounding structures with several stable inguinal borderline nodes.         HISTORY OF PRESENT ILLNESS:  The patient is a 68 y.o. 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  3 episodes 6 hours long self-limited of crampy abdominal pain    Past Medical History:   Diagnosis Date   • Aneurysm 2022   • Ascending aortic aneurysm    • Benign hypertension    • Bladder cancer    • Colon cancer    • History of radiation therapy 05/06/2022    right groin/hemipelvis   • Hypercholesteremia 9/18/2023   • Hypertension    • Intermittent palpitations    • Mucinous adenocarcinoma    • Pounding heartbeat    • PVC's (premature ventricular contractions)      Past Surgical History:   Procedure Laterality Date   • APPENDECTOMY  2017    With Partial Colon    • BLADDER SURGERY      with partial colon   • CHOLECYSTECTOMY     • COLON SURGERY     • GALLBLADDER SURGERY         Allergies   Allergen Reactions   • Levofloxacin Swelling     Lips swellijng   • Tetracycline Swelling     Lips swelling   • Penicillins Other (See Comments)     " childhood       Family History and Social History reviewed and changed as necessary    REVIEW OF SYSTEM:   No new somatic complaints    PHYSICAL EXAM:  No jaundice icterus or pallor.  No respiratory distress.    Vitals:    05/28/24 1003   BP: 130/86   Pulse: 73   Resp: 18   Temp: 97.5 °F (36.4 °C)   SpO2: 98%   Weight: 79.4 kg (175 lb)   Height: 185.4 cm (73\")     Vitals:    05/28/24 1003   PainSc:   8   PainLoc: Hip  Comment: right hip          ECOG score: 0           Vitals reviewed.    ECOG: (0) Fully Active - Able to Carry On All Pre-disease Performance Without Restriction    Lab Results   Component Value Date    HGB 14.0 03/21/2024    HCT 40.5 03/21/2024    MCV 86 03/21/2024     03/21/2024    WBC 6.3 03/21/2024    NEUTROABS 4.0 03/21/2024    LYMPHSABS 1.5 03/21/2024    MONOSABS 0.6 03/21/2024    EOSABS 0.1 03/21/2024    BASOSABS 0.0 03/21/2024       Lab Results   Component Value Date    GLUCOSE 92 03/21/2024    BUN 17 03/21/2024    CREATININE 1.11 03/21/2024     03/21/2024    K 4.5 03/21/2024     03/21/2024    CO2 21 03/21/2024    CALCIUM 9.1 03/21/2024    PROTEINTOT 6.4 06/06/2022    ALBUMIN 4.3 03/21/2024    BILITOT 1.0 03/21/2024    ALKPHOS 114 03/21/2024    AST 25 03/21/2024    ALT 20 03/21/2024             ASSESSMENT & PLAN:  1.  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  Pathologic stage IIa with T3 extension into the base of the appendix through the muscularis propria but not into the serosal surface.  16 nodes negative.  Colonoscopy December 2017 negative postop.Laparoscopic diagnostic peritoneal biopsy confirming disease in the bladder dome February 2020.  Began Folfiri.  June 2020 Dr. Peoples excised right lobe of liver and left lobe liver lesion, omentectomy and resection of pelvic peritoneal nodules, ileocolectomy with ileal colostomy and intraperitoneal HIPEC with mitomycin.  Dr. Lawler performed right ureteral stent " placement and partial cystectomy.  With large fungating mass, 4/22/2021 had exploratory laparotomy with lysis of adhesions and right pelvic sidewall excisional biopsy with open partial cystectomy.  No malignancy and peritoneal fluid.  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  Margins less than 1 mm.  October 2021 cystoscopy without malignancy.  TURBT 10/11/2021 did not show malignancy.  March 2022 Dr. Peoples for 3.9 cm external iliac recurrent adenopathy 6 weeks out from laminectomy which did not help pain was determined to be an unresectable recurrence with muscle and vascular involvement.  Radiation with Dr. Moody ended 5/6/2022.  Disease stabilized and pain improved.    -12/30/2019 medical oncology office visit: The patient had been on surveillance since surgery December 2017.  CT scans had been negative up until 12/30/2019 CT chest abdomen and pelvis that showed subtle soft tissue density surrounding surgical clips in the right side of the pelvis along with soft tissue nodule at the anterior wall of the bladder concerning for recurrent disease.  Patient sent back to Dr. Davidson for colonoscopy.    -2/7/2020 patient was referred to Dr. Peoples at the [facility], he underwent diagnostic laparoscopic and peritoneal biopsies that confirmed recurrent disease with biopsy of bladder dome nodule showing adenocarcinoma with mucinous features.  Port was placed at this time also.    -2/25/2020 began FOLFIRI    -4/21/2020 patient having increased fatigue, FOLFIRI dose reduced by 10%.    -5/19/2020 cycle 7 FOLFIRI    -6/18/2020 Dr. Peoples performed exploratory laparotomy with excision of right lobe liver lesion and left lobe liver lesion, omentectomy, resection of pelvic peritoneal nodules, ileocolectomy with creation of ileal colostomy, hyperthermic intraperitoneal chemotherapy with mitomycin-C at 42 °C for deep tissue penetration for 90 minutes.  Dr. Perdue performed partial  "cystectomy with right ureteral stent placement    -8/5/2020 CT abdomen pelvis with contrast shows small soft tissue nodule anterior aspect of bladder stable.  New small amount of ascites as well as a new small left pleural effusion.  Creatinine 0.75 with hemoglobin 11.1 otherwise unremarkable CBC and CMP.    -2/15/2021 CT abdomen pelvis with contrast compared to 8/5/2020 shows enlarging soft tissue mass 4.2 cm over the bladder fundus and there is a calcification along the base of the urinary bladder.  Small stable multiple hypoattenuating indeterminate liver lesions.  Similar degree of ascites.    -2/23/2021 CT chest shows no evidence of metastasis with ascending aorta 42 mm.    -3/8/2021 follow-up with Dr. Portillo Peoples we reviewed his CTs suggested team effort resection with Dr. Perdue    -3/16/2021 follow-up with Dr. Perdue.  He plans for open partial cystectomy, possible ureteral implants, cystoscopy and stent placement in concert with Dr. Peoples.    -4/22/2021 cystoscopy (after prior office cystoscopy showed large fungating mass) with bilateral ureteral catheters, exploratory laparotomy, lysis of adhesions, right pelvic sidewall excisional biopsy, open partial cystectomy Dr. Ike Perdue. Peritoneal fluid showed predominant inflammatory reactive mesothelial cells with no malignancy. 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 Carcinoma was less than 1 mm from the lateral resection margins.    -5/18/2021 [facility] medical oncology follow-up visit: I reviewed his hospital notes, operative notes, and pathology report as above and went over this with the patient.  The general consensus from retrospective as well as a few prospective data over the last couple of decades with this low-grade malignancy does not show any profound benefit from \"adjuvant\" chemotherapy in this current setting.  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  There is no standard " follow-up but general guidelines suggest following up as typical colon cancer.  I will repeat his CT chest abdomen pelvis now to reestablish his baseline postoperative imaging and have him see my nurse practitioner back in a couple of weeks to make sure there is no nia evidence of measurable residual disease.  Assuming this just shows postoperative changes, I would simply repeat scans again in 3 months or sooner as symptoms dictate.  If he has no evidence of persistent or metastatic disease on current imaging, then when he sees my nurse practitioner back she will also review what they say about his ascending aorta which was 42 mm in February and sent him to Dr. Evangelist Anthony for an opinion as to whether any intervention is needed relative to the aorta.  Obviously if his cancer is out of control on the upcoming scans then the aorta is a moot point.    -6/10/2021 [facility] oncology clinic follow-up: CT scans show no evidence of disease recurrence in the chest, abdomen or pelvis.  Ascending aortic aneurysm stable at 41 mm.  Referral made to Dr. Anthony, cardiothoracic surgeon for management and evaluation of a sending aortic aneurysm.  Plan to repeat CT scans in 3 months.    -9/14/2021 [facility] medical oncology follow-up visit: I reviewed images and reports of 9/10/2021 CT chest abdomen pelvis with contrast which shows under distended urinary bladder with mild asymmetric wall thickening dome and left lateral wall with a small 9 mm polypoid lesion in the bladder.  There is stable 2.3 cm right external iliac and a few other subcentimeter scattered nodes in the root of the small bowel mesentery and retroperitoneum.  Postsurgical right hemicolectomy changes.  Stable low-attenuation lesions in the liver unchanged.  I will have him collect his discs from [facility] to take Dr. Perdue to decide whether to proceed with cystoscopy or just continue watchful waiting.  There is no major changes and I suspect we are looking  at postoperative changes and we shall see him for video visit in a few weeks to see what urology at  decides.    -10/11/2021 cystoscopy Dr. Perdue showed marked acute and chronic inflammation but no evidence of malignancy.  Muscularis propria present.    -10/14/2021 [facility] medical oncology virtual visit: I reviewed reports of pathology from cystoscopy 10/11/2021 and went over this with patient.  He is feeling fairly fit.  I will repeat his CT chest abdomen pelvis prior to return in 3 months and if in the interim, when he follows up with Dr. Perdue in the next couple of weeks post cystoscopy, plans are made for further cystoscopy before I see him back and any malignancy is found, I would ask the patient and Dr. Perdue to touch base as I would not know of such without that contact as the information comes into our chart without notification now that epic is being used by .  Assuming no further biopsies in the interim, I will simply repeat his scans in 3 months to follow what I suspected and is now confirmed to be inflammatory changes.  From the aneurysm standpoint, he has follow-up scanning March 2022 arranged by Dr. Anthony and he will follow him up after that.    -11/24/2021 CT chest abdomen pelvis compared to 6/7/2021 outside imaging shows stable 4.1 cm ascending thoracic aorta.  No lung nodules.  Liver homogenous with bilateral cysts.  No adenopathy.  Thickened sigmoid colon and rectum suggestive of mild colitis or postradiation change.  No pelvic adenopathy.  Bony structures degenerative in the spine.  Some soft tissue anterior to the acetabulum on the right and extending along the medial aspect right pelvic sidewall 2.7 x 5.6 may represent intramuscular process versus adenopathy which could not be ruled out.  No prior study views available.    -1/4/2022 follow-up Dr. Ike Perdue [facility].  Per his note, he had TURBT 10/11/2021.  He had been placed on antibiotics for UTI for preop preparation for back  surgery planned in 2 weeks.  Denies any mucus in his urine.  Some microhematuria on urinalysis this day.  No gross hematuria or dysuria.  The October 2021 TURBT did not reveal anything malignant on pathology.  There may be irritation or inflammation secondary to sutures in the bladder following partial cystectomy.  Patient asymptomatic from a urinary standpoint.  Recommended following up with [facility] oncology with no plans for further urology follow-up.    -2/4/2022 [facility] medical oncology telehealth follow-up visit: I reviewed his November images and reports with the patient as well as with Dr. Gm Moody and the note from Dr. Perdue from 1 month ago.  I do suspect this pelvic sidewall abnormality in November represents persistent disease but as I have stated in prior dictations, the data on palliative or overall survival benefit of systemic 5-FU based combination chemotherapy and/or Avastin does not show a clear-cut survival advantage to chemotherapy for asymptomatic disease.  He is having some back pain and had spine decompression a couple of weeks ago without much benefit.  This is an area that may potentially be radiated but I will check his CT chest (angiogram of chest) abdomen and pelvis and get his blood counts and chemistries and urinalysis and see him back for virtual visit in a few weeks.  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  I will also converse with Dr. Peoples at that junction whether he thinks he has anything to offer if this is progressing though I doubt it given that the last intervention was a urologic procedure and the last cystoscopy/TURBT in October had no  "malignancy and the liver lesions are currently being called liver cysts albeit I am skeptical as to whether those are truly benign but they are certainly not growing.    -3/4/2022 CT angiogram of chest/CT abdomen pelvis for evaluation of dizziness and surveillance of thoracic aortic aneurysm and appendiceal carcinoma.  Thoracic ascending aortic ectasia 4 x 4 cm stable.  Small nodularity left upper lobe stable from September.  Stable small hepatic hypodensities status post cholecystectomy.  Right external iliac soft tissue fullness now 3 x 3.9 cm previously 2 x 2.3 cm concerning for enlarging adenopathy with symmetric nonenlarged inguinal nodes stable.  CBC unremarkable.  CMP unremarkable save for potassium 5.4.  CEA 18.2.  1-3 red cells and white cells per milliliter of urine.    -3/16/2022 office visit with Dr. Portillo Peoples.  He reviewed the CT from 3/4/2022.  He notes that the laminectomy from 6 weeks prior has not helped his \"hip pain\".  Given the location of this recurrence previously 2 x 2.3 cm now 3 x 3.9 cm in the external iliac area concerning for enlarging adenopathy with symmetric nonenlarged inguinal nodes stable, the mass appears unresectable with involvement of muscle and vasculature.    -3/24/2022 [facility] medical oncology follow-up virtual visit: Pain is still significant.  We will get him in hopefully in the next day to [facility] for palliative care.  Have spoken with Dr. Peoples and no surgery.  I have spoken with Dr. Moody who thinks palliative radiation while not likely to shrink tumor dramatically may help pain considerably and we will get him there.  We will get him to bring the discs to our [place] office and asked them to bring that back to [place] to get scanned in for our invasive radiologist to look at for us to get CT-guided biopsy of this node in the right lower quadrant and send that for Gab miguel once the pathology is obtained to hopefully find high tumor mutational burden or " other molecular targets that might give us some options.  Apart from that, as stated multiple times, this is not a highly chemotherapy sensitive tumor and I am not sure I would palliate him well but, when I see him back in early June with CAT scans prior to return and post radiation, if he is not getting relief and wants to try a 5-FU based regimen plus or minus Avastin I would be okay with that but he knows it does not alter survival 1 way or the other and we are simply trying to palliate this inexorable process.  No other surgical options.    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  Mismatch repair proficient,NTRK1/2/3 fusion not detected.  BRAF V600E negative.  PD-L1 Negative, 1+, 5%.  PTEN Positive, 1+, 100%. MLH1 positive/3+, 100%. MSH2 positive/3+, 100%. MSH6 positive/3+, 100%. PMS2 positive/3+,100%.    -5/6/2022 last radiation    - 5/25/2022 CT chest abdomen pelvis with contrast at [facility] shows nothing remarkable on the chest.  Stable liver cysts.  Focal soft tissue right iliac region appears to be increasing in size now 7.2 cm previously 3.9 cm with some mass-effect on surrounding structures with several stable inguinal borderline nodes.    -6/6/2022 [facility] [place], operated by [facility] medical oncology follow-up visit: Now 1 month out from radiation just starting to get a little bit of improvement in his abdominal discomfort.  Slight growth on CT but some of that may be postradiation change this close to radiation and, as I stated in my note in March, it is not clear that 5-FU based therapy plus or minus Avastin is going to palliate much given its relatively insensitivity to chemotherapy and it certainly does not improve survival.  To that end, we will plan on just repeating his CAT scans again in about 6 weeks and if the pain is worsening and/or this continues to grow then I will probably give him Avastin FOLFOX.  If everything is  stable and pain is controlled we will go continue watchful waiting with imaging about every 3 months from that point forward.    -7/11/2022 [facility] medical oncology follow-up: His 7/7/2022 CT chest abdomen pelvis showed stable right external iliac low-attenuation which had been previously growing and thickening of the distal descending and sigmoid colon with perhaps a small right iliac borderline 1 cm node.  Symptomatically improved post radiation.  For now we will hold on Avastin FOLFOX as the benefit of such is questionable with this histologic subtype and there are no actionable molecular targets.  Repeat CTs prior to return in 3 months.    -11/1/2022 [facility] medical oncology follow-up: 10/19/2022 CT chest abdomen pelvis with contrast [place] [facility] compared to 7/7/2022 shows no significant change or evidence of progression.  Clinically quiescent.  We will repeat CTs in 6 months, sooner as symptoms dictate.    -5/19/2023 CT chest abdomen pelvis [place] [facility] compared to 10/19/2022 showed no evidence of disease progression in the chest.  Nodular density adjacent to the descending colon 6 mm nonspecific.  Otherwise unchanged appearance of hepatic metastatic nodules, right iliac adenopathy, and right pelvic mass.    -5/23/2023 [facility] medical oncology follow-up: I reviewed his report of CT chest abdomen pelvis from [place] 4 days ago showing no evidence of progression.  So area near the descending colon is nonspecific and would not change anything at this junction.  His October CT had not changed from July and the current one has not changed significantly since October.  We will continue to see palliative care for his right pelvic pain.  If leg swelling significantly worsens we could check Doppler and assuming no clot consider vascular intervention but as the thigh has not significantly changed over time nor is there significant tenderness in the calf I am doubtful that this is clot and is likely just due  to the pelvic mass.  Vascular surgical intervention should swelling worsen is also an option but I would not want a stent placed through this area in the face malignancy particularly a mucinous variety that would make him prone towards clotting.  If the leg worsens I would get his Doppler and consider vascular intervention or anticoagulation as dictated but clinically stable so will not investigate further at this junction.  I will repeat his imaging again in 6 months.    -11/20/2023 CT chest, abdomen and pelvis shows stable appearances of the chest with no evidence of metastatic disease.  Abdomen and pelvis reveals small interval decrease in size of the previously demonstrated peritoneal nodule adjacent to the descending colon.  No new suspicious omental/peritoneal soft tissue nodules or masses.  Stable right iliac adenopathy as well as a soft tissue mass along the right external iliac vasculature along the right pelvic sidewall.    -11/29/2023 [facility] follow-up: Current CT chest, abdomen and pelvis shows stable findings, no evidence of progression.  He has no new worrisome symptoms however he does still have swelling in his right thigh and lower pelvic pain for which she sees palliative care.  He states that it seems slightly more pronounced over the last few weeks, he has a follow-up with palliative care and will discuss further with him.  We reviewed recommendations discussed at his last visit with Dr. Delcid that as long as the swelling did not worsen significantly then we would hold off on considering any vascular intervention.  We will continue to monitor, Jose Antonio understands to let us know if he has any changes otherwise we will plan on repeating CT chest, abdomen and pelvis prior to return in 6 months and I have ordered those today.  His blood pressure was running high on arrival today, I did recheck it manually and it was 150/100.  He reports that he took his blood pressure earlier today at home  and it was 140s over 85 or so.  I asked him to recheck when he got home and monitor and if his diastolic continued to run over 90 to get in with Dr. Dial for evaluation.    - 5/20/2023 CT chest abdomen pelvis [facility] compared to November shows 10 mm nodule left lower lobe and right lower lobe.  Anterior lobe pleural-based nodule unchanged.  6.1 x 4 cm right pelvic sidewall stable.  12 mm right common iliac node stable.  No ascites.    -5/28/2024 [facility] clinic follow-up: No signs or symptoms of recurrence.  3 times in the last 6 months he had a 6-hour.  Of crampy abdominal pain that subsequently subsided.  Potentially could have been having some obstructive symptomatology but none on imaging.  If this happens more frequently or it lasts he will let us know and also Dr. Peoples but would also go towards a more soft diet during those episodes and try FiberCon.  Otherwise imaging of the abdomen is stable but there is a couple of nodules in the lung that appeared new albeit small.  This is a very low-grade process and systemic therapies are not very effective and he is asymptomatic so we will just repeat CT chest in 3 months.  If this grows then we will consider getting tissue as it would be a little odd for this low-grade process to show up in the lung.    Total time of care today inclusive of time spent today prior to patient's arrival reviewing interval images and reports thereof and during visit translating that patient interviewing him as to signs or symptoms of his disease and afterwards instituting the plan and communicating to his other providers took 50 minutes of patient care time throughout the day today.  Cy Delcid MD    05/28/2024

## 2024-06-03 DIAGNOSIS — G89.3 CANCER ASSOCIATED PAIN: ICD-10-CM

## 2024-06-03 RX ORDER — OXYCODONE HYDROCHLORIDE 10 MG/1
10 TABLET ORAL EVERY 4 HOURS PRN
Qty: 180 TABLET | Refills: 0 | Status: SHIPPED | OUTPATIENT
Start: 2024-06-03 | End: 2024-07-03

## 2024-06-03 RX ORDER — GABAPENTIN 600 MG/1
600 TABLET ORAL 3 TIMES DAILY
Qty: 90 TABLET | Refills: 0 | Status: SHIPPED | OUTPATIENT
Start: 2024-06-03 | End: 2024-07-03

## 2024-06-03 NOTE — TELEPHONE ENCOUNTER
YAIMA #: [id number]    Medication requested: Oxycodone (roxicodone) 10 MG    Last fill date: 5/2/24    Last appointment: 4/23/24    Next appointment: 7/11/24

## 2024-06-03 NOTE — TELEPHONE ENCOUNTER
AYIMA #: [id number]    Medication requested: gabapentin (NEURONTIN) 600 MG tablet     Last fill date: 5/3/24    Last appointment: 4/23/24    Next appointment:7/11/24

## 2024-07-03 DIAGNOSIS — G89.3 CANCER ASSOCIATED PAIN: ICD-10-CM

## 2024-07-03 DIAGNOSIS — R11.0 NAUSEA: ICD-10-CM

## 2024-07-03 RX ORDER — GABAPENTIN 600 MG/1
600 TABLET ORAL 3 TIMES DAILY
Qty: 90 TABLET | Refills: 0 | Status: SHIPPED | OUTPATIENT
Start: 2024-07-03 | End: 2024-08-02

## 2024-07-03 RX ORDER — METOPROLOL SUCCINATE 100 MG/1
100 TABLET, EXTENDED RELEASE ORAL DAILY
Qty: 90 TABLET | Refills: 3 | Status: SHIPPED | OUTPATIENT
Start: 2024-07-03

## 2024-07-03 RX ORDER — OLANZAPINE 5 MG/1
5 TABLET ORAL NIGHTLY
Qty: 30 TABLET | Refills: 3 | Status: SHIPPED | OUTPATIENT
Start: 2024-07-03

## 2024-07-03 NOTE — TELEPHONE ENCOUNTER
YAIMA #: [id number]      Medication requested:  gabapentin (NEURONTIN) 600 MG tablet     Last fill date:6/3/24    Medication requested: OLANZapine (zyPREXA) 5 MG tablet     Last fill date: 5/1/24      Last appointment: 4/23/24     Next appointment: 7/11/24

## 2024-07-05 RX ORDER — OXYCODONE HYDROCHLORIDE 10 MG/1
10 TABLET ORAL EVERY 4 HOURS PRN
Qty: 60 TABLET | Refills: 0 | Status: SHIPPED | OUTPATIENT
Start: 2024-07-05 | End: 2024-08-04

## 2024-07-05 NOTE — TELEPHONE ENCOUNTER
YAIMA #: [id number]    Medication requested: oxyCODONE (ROXICODONE) 10 MG tablet     Last fill date: 6/3/24    Last appointment: 4/23/24    Next appointment: 7/11/24

## 2024-07-17 NOTE — PROGRESS NOTES
Palliative Clinic Note      Name: Esteban Tse  Age: 69 y.o.  Sex: male  : 1955  MRN: [id number]  Date of Service: 2024   Medical Oncologist: Dr. Delcid    Subjective:    Chief Complaint: Pain, indigestion, constipation    History of Present Illness: Esteban Tse is a 69 y.o. male with past medical history significant for HTN, AAA, metastatic adenocarcinoma of the appendix who presents to the palliative clinic today as a follow up for pain and symptom management.     Treatment summary: The patient was diagnosed with cancer of the appendix at the time of appendectomy in 2017. Patient under surveillance until imaging in 2019 demonstrated several lesions suspicious for recurrent disease. He underwent a diagnostic laparoscopy with peritoneal biopsies on 2020 by Dr. Peoples that proved recurrent adenocarcinoma. He underwent an exploratory laparotomy with excision of right and left lobe liver lesions, omentectomy, resection of pelvic peritoneal nodules, ileocolectomy with creation of ileal colostomy, hyperthermic intraperitoneal chemotherapy and a partial cystectomy with right ureteral stent placement in 2020. He underwent a cystoscopy with bilateral ureteral catheters, exploratory laparotomy, lysis of adhesions, right pelvic sidewall excisional biopsy, and an open partial cystectomy in 2021. TURBT performed in 10/2021. Imaging from 3/4/2022 showed enlarging external illiac adenopathy. There are no surgical options per Dr. Peoples. Underwent CT-guided biopsy of the right iliac mass consistent with mucinous adenocarcinoma. Patient completed palliative radiation in 2024. Scans from 2023 showed new nodules in the left lower lobe and right lower lobe.  Plan to repeat imaging in 2024.     Pain: Patient established care with [facility] in [place], [place] > 1 year ago. Patient's right hip pain was thought to be referred from his spine. Patient underwent several  "epidurals and a lumbar laminectomy with no improvement in his pain. Patient complains of pain predominantly in his right groin / hip and lower extremity secondary to cancer. The pain occurs with physical activity and worse with driving. Due to a recent decline in his mother's health, the patient has been driving in the car a lot more which has increased his pain.  Patient had stopped long-acting oxycodone, Xtampza 9 mg due to worsening constipation however restarted about 1 week ago.  Patient is unsure if it makes a significant difference for his pain.  He continues oxycodone 10 mg every 4 hours as needed and gabapentin 600 mg 3 times daily.He occasionally takes Ibuprofen as well. Patient unable to tolerate MS Contin and Fentanyl patches due to side effects.      Other symptoms: The patient reports some nausea in the mornings.  Upon further questioning, he believes it may be acid or indigestion.  He is not currently on any antacid therapy.  Zyprexa 5 mg nightly no longer effective.  Patient reports worsening constipation due to opioids.  Stool softeners are not working.  Milk of magnesium works \"too well.\"  Discussed trial of senna-S.  May consider MiraLAX as well. No changes in appetite. No issues with sleep.  Patient reports low energy.  He occasionally falls back asleep in the mornings which is helpful.     Pyschosocial: The patient lives alone. He is retired from working for the state. He enjoys playing golf and spending time outside. No personal history of a mental illness. The patient admits to worsening anxiety/depression recently due to the decline of his mother's health. Patient's father passed away in 10/2022 and the patient is an only child.     Spiritual: Patient describes himself as curious rather than practicing.      Goals: Improve quality of life with symptom management.     The following portions of the patient's history were reviewed and updated as appropriate: allergies, current medications, past " family history, past medical history, past social history, past surgical history and problem list.    ORT-R: Low risk   Decisional capacity: Full  ECOG: (1) Restricted in physically strenuous activity, ambulatory and able to do work of light nature     Objective:    /86   Pulse 52   Temp 97.7 °F (36.5 °C) (Temporal)   Resp 18   Wt 78.5 kg (173 lb)   SpO2 100%   BMI 22.82 kg/m²     Constitutional: Awake, alert, normal gait, sitting up in exam chair, in no acute distress  Eyes: PERRLA, EOMS intact  HENT: NCAT, face symmetric  Neck: Supple, trachea midline  Respiratory: Nonlabored respirations  Cardiovascular: RR, bradycardic, no edema observed  Gastrointestinal: Soft, no guarding  Musculoskeletal: Moves all extremities   Psychiatric: Appropriate affect, cooperative  Neurologic: Oriented x 3, Cranial Nerves grossly intact to confrontation, speech clear  Skin: Cool dry, no rashes or wounds appreciated     Medication Counts: Reviewed. See bottom of note for details. Brought medication.  No overuse or misuse evident.  I have reviewed the patient's [place] PDMP. [facility] [place] [facility] #[id number].   UDS: Last 3/25/23. Reviewed. Appropriate.     Assessment & Plan:    1. Mucinous adenocarcinoma of appendix  - Not on active treatment. Scans from 5/2023 showed new nodules in the left lower lobe and right lower lobe.  Plan to repeat imaging in 8/2024.    2. Cancer associated pain  - Patient is appropriate for opioid therapy due to cancer related pain. Daily function and quality of life improved with pain medication.  Partial prescription for oxycodone 10 mg tablets provided on 7/5/2024.  Will send prescription for remaining 120 tablets to the pharmacy.  Will also refill Xtampza 9 mg twice daily. Medication side effects of the medication discussed at every visit. Patient was encouraged to continue bowel regimen of daily stool softeners, prn laxatives, and diet modifications.    3. Neuropathy associated with malignant neoplasm  -  Refilled gabapentin (NEURONTIN) 600 MG tablet; Take 1 tablet by mouth 3 (Three) Times a Day for 30 days.      4. Therapeutic drug monitoring  - Patient due for annual urine Drug Screen.  Unable to leave a sample today, he has agreed to complete in the near future.    5. Therapeutic opioid induced constipation  - Start trial of sennosides-docusate (senna-docusate sodium) 8.6-50 MG per tablet; Take 2 tablets by mouth 2 (Two) Times a Day for 30 days.  May consider adding Miralax.     6. Indigestion  - Start trial of famotidine (PEPCID) 20 MG tablet; Take 1 tablet by mouth At Night As Needed for Heartburn or Indigestion.      Code status: Full code  Advanced directives: Not on file  Health care surrogate: Shaylee Tse, mother     Return in about 3 months (around 10/18/2024) for Video visit.    I spent 30 minutes caring for Esteban Tse on this date of service. This time includes time spent by me in the following activities: preparing for the visit, reviewing tests, obtaining and/or reviewing a separately obtained history, performing a medically appropriate examination and/or evaluation , counseling and educating the patient/family/caregiver, ordering medications, tests, or procedures, documenting information in the medical record, independently interpreting results and communicating that information with the patient/family/caregiver, and care coordination    Qian Fleming PA-C  07/18/2024    Medication Date Filled # Filled Count Used # Days  SOY   Oxycodone 10 7/5/24 60 24 36 13 3   Gabapentin 600 7/3/24 90 52 38 15 3

## 2024-07-18 ENCOUNTER — OFFICE VISIT (OUTPATIENT)
Dept: PALLIATIVE CARE | Facility: CLINIC | Age: 69
End: 2024-07-18
Payer: MEDICARE

## 2024-07-18 VITALS
HEART RATE: 52 BPM | DIASTOLIC BLOOD PRESSURE: 86 MMHG | TEMPERATURE: 97.7 F | BODY MASS INDEX: 22.82 KG/M2 | RESPIRATION RATE: 18 BRPM | OXYGEN SATURATION: 100 % | SYSTOLIC BLOOD PRESSURE: 150 MMHG | WEIGHT: 173 LBS

## 2024-07-18 DIAGNOSIS — T40.2X5A THERAPEUTIC OPIOID INDUCED CONSTIPATION: ICD-10-CM

## 2024-07-18 DIAGNOSIS — G89.3 CANCER ASSOCIATED PAIN: ICD-10-CM

## 2024-07-18 DIAGNOSIS — Z51.81 THERAPEUTIC DRUG MONITORING: Primary | ICD-10-CM

## 2024-07-18 DIAGNOSIS — C80.1 NEUROPATHY ASSOCIATED WITH MALIGNANT NEOPLASM: ICD-10-CM

## 2024-07-18 DIAGNOSIS — C18.1 MUCINOUS ADENOCARCINOMA OF APPENDIX: ICD-10-CM

## 2024-07-18 DIAGNOSIS — G89.3 CANCER RELATED PAIN: ICD-10-CM

## 2024-07-18 DIAGNOSIS — K30 INDIGESTION: ICD-10-CM

## 2024-07-18 DIAGNOSIS — G63 NEUROPATHY ASSOCIATED WITH MALIGNANT NEOPLASM: ICD-10-CM

## 2024-07-18 DIAGNOSIS — K59.03 THERAPEUTIC OPIOID INDUCED CONSTIPATION: ICD-10-CM

## 2024-07-18 RX ORDER — OXYCODONE HYDROCHLORIDE 10 MG/1
10 TABLET ORAL EVERY 4 HOURS PRN
Qty: 120 TABLET | Refills: 0 | Status: SHIPPED | OUTPATIENT
Start: 2024-07-18 | End: 2024-08-17

## 2024-07-18 RX ORDER — AMOXICILLIN 250 MG
2 CAPSULE ORAL 2 TIMES DAILY
Qty: 120 TABLET | Refills: 3 | Status: SHIPPED | OUTPATIENT
Start: 2024-07-18 | End: 2024-08-17

## 2024-07-18 RX ORDER — OXYCODONE 9 MG/1
9 CAPSULE, EXTENDED RELEASE ORAL EVERY 12 HOURS
Qty: 60 EACH | Refills: 0 | Status: SHIPPED | OUTPATIENT
Start: 2024-07-18

## 2024-07-18 RX ORDER — FAMOTIDINE 20 MG/1
20 TABLET, FILM COATED ORAL NIGHTLY PRN
Qty: 30 TABLET | Refills: 3 | Status: SHIPPED | OUTPATIENT
Start: 2024-07-18

## 2024-07-18 RX ORDER — GABAPENTIN 600 MG/1
600 TABLET ORAL 3 TIMES DAILY
Qty: 90 TABLET | Refills: 0 | Status: SHIPPED | OUTPATIENT
Start: 2024-08-04 | End: 2024-09-03

## 2024-07-18 NOTE — TELEPHONE ENCOUNTER
I have reviewed patient's YAIMA report prior to prescribing Schedule II, III, and IV medications. Request # [id number].  Sending remaining tablets of partial prescription sent on 7/5/2024 of oxycodone 10 mg tablets.  Refill Xtampza 9 mg twice daily #60.  The patient is scheduled to follow-up in 3 months.

## 2024-08-05 DIAGNOSIS — G89.3 CANCER ASSOCIATED PAIN: ICD-10-CM

## 2024-08-06 RX ORDER — GABAPENTIN 600 MG/1
600 TABLET ORAL 3 TIMES DAILY
Qty: 90 TABLET | Refills: 0 | OUTPATIENT
Start: 2024-08-06 | End: 2024-09-05

## 2024-08-06 RX ORDER — GABAPENTIN 600 MG/1
600 TABLET ORAL 3 TIMES DAILY
Qty: 90 TABLET | OUTPATIENT
Start: 2024-08-06

## 2024-08-06 NOTE — TELEPHONE ENCOUNTER
Called pt to get an update on his gabapentin. Pharmacy tried to use an older script and it would not process the most recent script. Pt's pharmacy will get the refill taken care of today. Left voicemail for pt regarding update

## 2024-09-03 ENCOUNTER — OFFICE VISIT (OUTPATIENT)
Dept: ONCOLOGY | Facility: CLINIC | Age: 69
End: 2024-09-03
Payer: MEDICARE

## 2024-09-03 VITALS
SYSTOLIC BLOOD PRESSURE: 137 MMHG | DIASTOLIC BLOOD PRESSURE: 87 MMHG | RESPIRATION RATE: 18 BRPM | HEART RATE: 80 BPM | OXYGEN SATURATION: 99 % | WEIGHT: 173 LBS | BODY MASS INDEX: 22.93 KG/M2 | TEMPERATURE: 98.4 F | HEIGHT: 73 IN

## 2024-09-03 DIAGNOSIS — C18.1 MUCINOUS ADENOCARCINOMA OF APPENDIX: Primary | ICD-10-CM

## 2024-09-03 DIAGNOSIS — C18.1 MUCINOUS ADENOCARCINOMA OF APPENDIX: ICD-10-CM

## 2024-09-03 PROCEDURE — 1126F AMNT PAIN NOTED NONE PRSNT: CPT | Performed by: INTERNAL MEDICINE

## 2024-09-03 PROCEDURE — 3079F DIAST BP 80-89 MM HG: CPT | Performed by: INTERNAL MEDICINE

## 2024-09-03 PROCEDURE — 99215 OFFICE O/P EST HI 40 MIN: CPT | Performed by: INTERNAL MEDICINE

## 2024-09-03 PROCEDURE — 1160F RVW MEDS BY RX/DR IN RCRD: CPT | Performed by: INTERNAL MEDICINE

## 2024-09-03 PROCEDURE — 3075F SYST BP GE 130 - 139MM HG: CPT | Performed by: INTERNAL MEDICINE

## 2024-09-03 PROCEDURE — 1159F MED LIST DOCD IN RCRD: CPT | Performed by: INTERNAL MEDICINE

## 2024-09-03 NOTE — PROGRESS NOTES
CHIEF COMPLAINT: No new somatic complaints    Problem List:  Oncology/Hematology History Overview Note   1.  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  Pathologic stage IIa with T3 extension into the base of the appendix through the muscularis propria but not into the serosal surface.  16 nodes negative.  Colonoscopy December 2017 negative postop.Laparoscopic diagnostic peritoneal biopsy confirming disease in the bladder dome February 2020.  Began Folfiri.  June 2020 Dr. Peoples excised right lobe of liver and left lobe liver lesion, omentectomy and resection of pelvic peritoneal nodules, ileocolectomy with ileal colostomy and intraperitoneal HIPEC with mitomycin.  Dr. Lawler performed right ureteral stent placement and partial cystectomy.  With large fungating mass, 4/22/2021 had exploratory laparotomy with lysis of adhesions and right pelvic sidewall excisional biopsy with open partial cystectomy.  No malignancy and peritoneal fluid.  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  Margins less than 1 mm.  October 2021 cystoscopy without malignancy.  TURBT 10/11/2021 did not show malignancy.  March 2022 Dr. Peoples for 3.9 cm external iliac recurrent adenopathy 6 weeks out from laminectomy which did not help pain was determined to be an unresectable recurrence with muscle and vascular involvement.  Radiation with Dr. Moody ended 5/6/2022.  Disease stabilized and pain improved.    -12/30/2019 medical oncology office visit: The patient had been on surveillance since surgery December 2017.  CT scans had been negative up until 12/30/2019 CT chest abdomen and pelvis that showed subtle soft tissue density surrounding surgical clips in the right side of the pelvis along with soft tissue nodule at the anterior wall of the bladder concerning for recurrent disease.  Patient sent back to Dr. Davidson for colonoscopy.    -2/7/2020  patient was referred to Dr. Peoples at the [facility], he underwent diagnostic laparoscopic and peritoneal biopsies that confirmed recurrent disease with biopsy of bladder dome nodule showing adenocarcinoma with mucinous features.  Port was placed at this time also.    -2/25/2020 began FOLFIRI    -4/21/2020 patient having increased fatigue, FOLFIRI dose reduced by 10%.    -5/19/2020 cycle 7 FOLFIRI    -6/18/2020 Dr. Peoples performed exploratory laparotomy with excision of right lobe liver lesion and left lobe liver lesion, omentectomy, resection of pelvic peritoneal nodules, ileocolectomy with creation of ileal colostomy, hyperthermic intraperitoneal chemotherapy with mitomycin-C at 42 °C for deep tissue penetration for 90 minutes.  Dr. Perdue performed partial cystectomy with right ureteral stent placement    -8/5/2020 CT abdomen pelvis with contrast shows small soft tissue nodule anterior aspect of bladder stable.  New small amount of ascites as well as a new small left pleural effusion.  Creatinine 0.75 with hemoglobin 11.1 otherwise unremarkable CBC and CMP.    -2/15/2021 CT abdomen pelvis with contrast compared to 8/5/2020 shows enlarging soft tissue mass 4.2 cm over the bladder fundus and there is a calcification along the base of the urinary bladder.  Small stable multiple hypoattenuating indeterminate liver lesions.  Similar degree of ascites.    -2/23/2021 CT chest shows no evidence of metastasis with ascending aorta 42 mm.    -3/8/2021 follow-up with Dr. Portillo Peoples we reviewed his CTs suggested team effort resection with Dr. Perdue    -3/16/2021 follow-up with Dr. Perdue.  He plans for open partial cystectomy, possible ureteral implants, cystoscopy and stent placement in concert with Dr. Peoples.    -4/22/2021 cystoscopy (after prior office cystoscopy showed large fungating mass) with bilateral ureteral catheters, exploratory laparotomy, lysis of adhesions, right pelvic sidewall excisional biopsy, open  "partial cystectomy Dr. Ike Perdeu. Peritoneal fluid showed predominant inflammatory reactive mesothelial cells with no malignancy. 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 Carcinoma was less than 1 mm from the lateral resection margins.    -5/18/2021 [facility] medical oncology follow-up visit: I reviewed his hospital notes, operative notes, and pathology report as above and went over this with the patient.  The general consensus from retrospective as well as a few prospective data over the last couple of decades with this low-grade malignancy does not show any profound benefit from \"adjuvant\" chemotherapy in this current setting.  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  There is no standard follow-up but general guidelines suggest following up as typical colon cancer.  I will repeat his CT chest abdomen pelvis now to reestablish his baseline postoperative imaging and have him see my nurse practitioner back in a couple of weeks to make sure there is no nia evidence of measurable residual disease.  Assuming this just shows postoperative changes, I would simply repeat scans again in 3 months or sooner as symptoms dictate.  If he has no evidence of persistent or metastatic disease on current imaging, then when he sees my nurse practitioner back she will also review what they say about his ascending aorta which was 42 mm in February and sent him to Dr. Evangelist Anthony for an opinion as to whether any intervention is needed relative to the aorta.  Obviously if his cancer is out of control on the upcoming scans then the aorta is a moot point.    -6/10/2021 [facility] oncology clinic follow-up: CT scans show no evidence of disease recurrence in the chest, abdomen or pelvis.  Ascending aortic aneurysm stable at 41 mm.  Referral made to Dr. Anthony, cardiothoracic surgeon for management and evaluation of a sending aortic aneurysm.  Plan to repeat CT " scans in 3 months.    -9/14/2021 [facility] medical oncology follow-up visit: I reviewed images and reports of 9/10/2021 CT chest abdomen pelvis with contrast which shows under distended urinary bladder with mild asymmetric wall thickening dome and left lateral wall with a small 9 mm polypoid lesion in the bladder.  There is stable 2.3 cm right external iliac and a few other subcentimeter scattered nodes in the root of the small bowel mesentery and retroperitoneum.  Postsurgical right hemicolectomy changes.  Stable low-attenuation lesions in the liver unchanged.  I will have him collect his discs from [facility] to take Dr. Perdue to decide whether to proceed with cystoscopy or just continue watchful waiting.  There is no major changes and I suspect we are looking at postoperative changes and we shall see him for video visit in a few weeks to see what urology at  decides.    -10/11/2021 cystoscopy Dr. Perdue showed marked acute and chronic inflammation but no evidence of malignancy.  Muscularis propria present.    -10/14/2021 [facility] medical oncology virtual visit: I reviewed reports of pathology from cystoscopy 10/11/2021 and went over this with patient.  He is feeling fairly fit.  I will repeat his CT chest abdomen pelvis prior to return in 3 months and if in the interim, when he follows up with Dr. Perdue in the next couple of weeks post cystoscopy, plans are made for further cystoscopy before I see him back and any malignancy is found, I would ask the patient and Dr. Perdue to touch base as I would not know of such without that contact as the information comes into our chart without notification now that epic is being used by .  Assuming no further biopsies in the interim, I will simply repeat his scans in 3 months to follow what I suspected and is now confirmed to be inflammatory changes.  From the aneurysm standpoint, he has follow-up scanning March 2022 arranged by Dr. Anthony and he will follow him up  after that.    -11/24/2021 CT chest abdomen pelvis compared to 6/7/2021 outside imaging shows stable 4.1 cm ascending thoracic aorta.  No lung nodules.  Liver homogenous with bilateral cysts.  No adenopathy.  Thickened sigmoid colon and rectum suggestive of mild colitis or postradiation change.  No pelvic adenopathy.  Bony structures degenerative in the spine.  Some soft tissue anterior to the acetabulum on the right and extending along the medial aspect right pelvic sidewall 2.7 x 5.6 may represent intramuscular process versus adenopathy which could not be ruled out.  No prior study views available.    -1/4/2022 follow-up Dr. Iek Perdue [facility].  Per his note, he had TURBT 10/11/2021.  He had been placed on antibiotics for UTI for preop preparation for back surgery planned in 2 weeks.  Denies any mucus in his urine.  Some microhematuria on urinalysis this day.  No gross hematuria or dysuria.  The October 2021 TURBT did not reveal anything malignant on pathology.  There may be irritation or inflammation secondary to sutures in the bladder following partial cystectomy.  Patient asymptomatic from a urinary standpoint.  Recommended following up with [facility] with no plans for further urology follow-up.    -2/4/2022 [facility]ology telehealth follow-up visit: I reviewed his November images and reports with the patient as well as with Dr. Gm Moody and the note from Dr. Perdue from 1 month ago.  I do suspect this pelvic sidewall abnormality in November represents persistent disease but as I have stated in prior dictations, the data on palliative or overall survival benefit of systemic 5-FU based combination chemotherapy and/or Avastin does not show a clear-cut survival advantage to chemotherapy for asymptomatic disease.  He is having some back pain and had spine decompression a couple of weeks ago without much benefit.  This is an area that may potentially be radiated but I will check his CT  "chest (angiogram of chest) abdomen and pelvis and get his blood counts and chemistries and urinalysis and see him back for virtual visit in a few weeks.  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  I will also converse with Dr. Peoples at that junction whether he thinks he has anything to offer if this is progressing though I doubt it given that the last intervention was a urologic procedure and the last cystoscopy/TURBT in October had no malignancy and the liver lesions are currently being called liver cysts albeit I am skeptical as to whether those are truly benign but they are certainly not growing.    -3/4/2022 CT angiogram of chest/CT abdomen pelvis for evaluation of dizziness and surveillance of thoracic aortic aneurysm and appendiceal carcinoma.  Thoracic ascending aortic ectasia 4 x 4 cm stable.  Small nodularity left upper lobe stable from September.  Stable small hepatic hypodensities status post cholecystectomy.  Right external iliac soft tissue fullness now 3 x 3.9 cm previously 2 x 2.3 cm concerning for enlarging adenopathy with symmetric nonenlarged inguinal nodes stable.  CBC unremarkable.  CMP unremarkable save for potassium 5.4.  CEA 18.2.  1-3 red cells and white cells per milliliter of urine.    -3/16/2022 office visit with Dr. Portillo Peoples.  He reviewed the CT from 3/4/2022.  He notes that the laminectomy from 6 weeks prior has not helped his \"hip pain\".  Given the location of this recurrence previously 2 x 2.3 cm now 3 x 3.9 cm in the external iliac area concerning for enlarging adenopathy with symmetric nonenlarged inguinal nodes stable, the mass appears unresectable with " involvement of muscle and vasculature.    -3/24/2022 [facility] medical oncology follow-up virtual visit: Pain is still significant.  We will get him in hopefully in the next day to [facility] for palliative care.  Have spoken with Dr. Peoples and no surgery.  I have spoken with Dr. Moody who thinks palliative radiation while not likely to shrink tumor dramatically may help pain considerably and we will get him there.  We will get him to bring the discs to our [place] office and asked them to bring that back to [place] to get scanned in for our invasive radiologist to look at for us to get CT-guided biopsy of this node in the right lower quadrant and send that for Caris MI profile once the pathology is obtained to hopefully find high tumor mutational burden or other molecular targets that might give us some options.  Apart from that, as stated multiple times, this is not a highly chemotherapy sensitive tumor and I am not sure I would palliate him well but, when I see him back in early June with CAT scans prior to return and post radiation, if he is not getting relief and wants to try a 5-FU based regimen plus or minus Avastin I would be okay with that but he knows it does not alter survival 1 way or the other and we are simply trying to palliate this inexorable process.  No other surgical options.    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  Mismatch repair proficient,NTRK1/2/3 fusion not detected.  BRAF V600E negative.  PD-L1 Negative, 1+, 5%.  PTEN Positive, 1+, 100%. MLH1 positive/3+, 100%. MSH2 positive/3+, 100%. MSH6 positive/3+, 100%. PMS2 positive/3+,100%.    -5/6/2022 last radiation    - 5/25/2022 CT chest abdomen pelvis with contrast at [place] [facility] shows nothing remarkable on the chest.  Stable liver cysts.  Focal soft tissue right iliac region appears to be increasing in size now 7.2 cm previously 3.9 cm  with some mass-effect on surrounding structures with several stable inguinal borderline nodes.    -6/6/2022 [facility] medical oncology follow-up visit: Now 1 month out from radiation just starting to get a little bit of improvement in his abdominal discomfort.  Slight growth on CT but some of that may be postradiation change this close to radiation and, as I stated in my note in March, it is not clear that 5-FU based therapy plus or minus Avastin is going to palliate much given its relatively insensitivity to chemotherapy and it certainly does not improve survival.  To that end, we will plan on just repeating his CAT scans again in about 6 weeks and if the pain is worsening and/or this continues to grow then I will probably give him Avastin FOLFOX.  If everything is stable and pain is controlled we will go continue watchful waiting with imaging about every 3 months from that point forward.    -7/11/2022 [facility] medical oncology follow-up: His 7/7/2022 CT chest abdomen pelvis showed stable right external iliac low-attenuation which had been previously growing and thickening of the distal descending and sigmoid colon with perhaps a small right iliac borderline 1 cm node.  Symptomatically improved post radiation.  For now we will hold on Avastin FOLFOX as the benefit of such is questionable with this histologic subtype and there are no actionable molecular targets.  Repeat CTs prior to return in 3 months.    -11/1/2022 [facility] medical oncology follow-up: 10/19/2022 CT chest abdomen pelvis with contrast [place] [facility] compared to 7/7/2022 shows no significant change or evidence of progression.  Clinically quiescent.  We will repeat CTs in 6 months, sooner as symptoms dictate.    -5/19/2023 CT chest abdomen pelvis [place] [facility] compared to 10/19/2022 showed no evidence of disease progression in the chest.  Nodular density adjacent to the descending colon 6 mm nonspecific.  Otherwise unchanged appearance of hepatic  metastatic nodules, right iliac adenopathy, and right pelvic mass.    -5/23/2023 [facility] medical oncology follow-up: I reviewed his report of CT chest abdomen pelvis from [place] 4 days ago showing no evidence of progression.  So area near the descending colon is nonspecific and would not change anything at this junction.  His October CT had not changed from July and the current one has not changed significantly since October.  We will continue to see palliative care for his right pelvic pain.  If leg swelling significantly worsens we could check Doppler and assuming no clot consider vascular intervention but as the thigh has not significantly changed over time nor is there significant tenderness in the calf I am doubtful that this is clot and is likely just due to the pelvic mass.  Vascular surgical intervention should swelling worsen is also an option but I would not want a stent placed through this area in the face malignancy particularly a mucinous variety that would make him prone towards clotting.  If the leg worsens I would get his Doppler and consider vascular intervention or anticoagulation as dictated but clinically stable so will not investigate further at this junction.  I will repeat his imaging again in 6 months.    -11/20/2023 CT chest, abdomen and pelvis shows stable appearances of the chest with no evidence of metastatic disease.  Abdomen and pelvis reveals small interval decrease in size of the previously demonstrated peritoneal nodule adjacent to the descending colon.  No new suspicious omental/peritoneal soft tissue nodules or masses.  Stable right iliac adenopathy as well as a soft tissue mass along the right external iliac vasculature along the right pelvic sidewall.  -11/29/2023 [facility] Oncology clinic follow-up: Current CT chest, abdomen and pelvis shows stable findings, no evidence of progression.  He has no new worrisome symptoms however he does still have swelling in his right thigh and  lower pelvic pain for which she sees palliative care.  He states that it seems slightly more pronounced over the last few weeks, he has a follow-up with palliative care and will discuss further with him.  We reviewed recommendations discussed at his last visit with Dr. Delcid that as long as the swelling did not worsen significantly then we would hold off on considering any vascular intervention.  We will continue to monitor, Jose Antonio understands to let us know if he has any changes otherwise we will plan on repeating CT chest, abdomen and pelvis prior to return in 6 months and I have ordered those today.  His blood pressure was running high on arrival today, I did recheck it manually and it was 150/100.  He reports that he took his blood pressure earlier today at home and it was 140s over 85 or so.  I asked him to recheck when he got home and monitor and if his diastolic continued to run over 90 to get in with Dr. Dial for evaluation.    - 5/20/2023 CT chest abdomen pelvis [place] [facility] compared to November shows 10 mm nodule left lower lobe and right lower lobe.  Anterior lobe pleural-based nodule unchanged.  6.1 x 4 cm right pelvic sidewall stable.  12 mm right common iliac node stable.  No ascites.    -5/28/2024 [facility] oncology clinic follow-up: No signs or symptoms of recurrence.  3 times in the last 6 months he had a 6-hour period of crampy abdominal pain that subsequently subsided.  Potentially could have been having some obstructive symptomatology but none on imaging.  If this happens more frequently or it lasts he will let us know and also Dr. Peoples but would also go towards a more soft diet during those episodes and try FiberCon.  Otherwise imaging of the abdomen is stable but there is a couple of nodules in the lung that appeared new albeit small.  This is a very low-grade process and systemic therapies are not very effective and he is asymptomatic so we will just repeat CT chest in 3 months.  If this  grows then we will consider getting tissue as it would be a little odd for this low-grade process to show up in the lung.    -8/28/2024 [place] [facility] chest with contrast compared to May 2024 shows left lower lobe nodule 16 mm compared to 10 mm with right lower lobe nodule 16 mm previously 10 mm and small subpleural right upper lobe nodule stable most likely benign intrapulmonary node and small stable nodule right lower lobe likely intrapulmonary.  Several small low-density liver lesions likely cysts.    -9/3/2024 [facility] oncology clinic follow-up: No signs or symptoms of recurrence 1 lung nodule in each lung base has grown.  Other nodules stable.  High likelihood for recurrence.  Will get CT abdomen pelvis and then get him to Dr. Santos for consideration of navigational bronchoscopy/Ion biopsy these lesions and might consider CyberKnife for which I will get him to Dr. Moody for opinion and coordination with Dr. Santos pending results of CT abdomen pelvis.  If on the other hand his CT abdomen pelvis shows additional sites of progression beyond the lung then we will likely go back to systemic therapy of which he has very little likelihood to respond but I would like to get tissue for molecular testing.      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  Pathologic stage IIa with T3 extension into the base of the appendix through the muscularis propria but not into the serosal surface.  16 nodes negative.  Colonoscopy December 2017 negative postop     12/5/2017 Surgery    Surgery       Procedure:  Appendectomy and right hemicolectomy      Completeness of resection:  No evidence of residual tumor     Pathology revealed invasive moderately differentiated mucinous adenocarcinoma.  Right hemicolectomy: Benign colon and small intestine no evidence of carcinoma.  16 benign lymph nodes,  0/16, negative for dysplasia or malignancy.  Tumor size approximately 6 cm.  Grade 2, moderately differentiated.  Tumor invades through the muscularis profile into the base of appendix but does not extend to the serosal surface.  All margins uninvolved, no lymphovascular invasion identified.  Pathological stage pT3 pN0.         12/8/2017 Imaging    CT of the chest abdomen and pelvis negative.  Baseline CBC WBC 7100, hemoglobin 11.3, hematocrit 34.8%, platelet count 195,000.     3/20/2018 Procedure    Colonoscopy with Dr. Davidson was normal, recommendation for repeat surveillance colonoscopy in 3 years.     6/11/2018 Imaging    CT chest, abdomen and pelvis with no evidence metastatic disease.  CEA 1.1     9/24/2018 Imaging    CT abdomen pelvis showed no acute changes and nothing to suggest recurrence     12/28/2018 Imaging    CT chest, abdomen and pelvis negative. Normal CBC, CMP and CEA 0.7.     6/28/2019 Imaging    CT chest, abdomen and pelvis: No interval change from prior studies.  No recurrent or metastatic disease detected in the chest, abdomen or pelvis.  No acute process.  CEA 0.9     12/30/2019 Imaging    CT chest, abdomen and pelvis: No disease in the chest.  There was noted a subtle soft tissue density, one surrounding surgical clips in the right side of the pelvis and the other a soft tissue nodule intimately associated with the anterior wall of the bladder, which are considered suspicious for recurrent disease.  CEA 1.1.  CMP with normal creatinine 0.9, total bilirubin 1.9, AST 34, ALT 24, alkaline phosphatase 93.  CBC with WBC 6900, hemoglobin 15.9, platelet count 232,000.       1/21/2020 Procedure    Normal colonoscopy with Dr. Davidson.  He has made referral to Dr. Portillo Peoples at .     2/7/2020 Progression    12/30/2019 CT chest, abdomen and pelvis: No disease in the chest.  There are subtle soft tissue densities (1 surrounding surgical clips in the right side of the pelvis and the other a soft tissue  nodule intimately associated with the anterior wall of the bladder) which are considered suspicious for recurrent disease.  CMP with normal creatinine 0.9, total bilirubin 1.9, AST 34, ALT 24, alkaline phosphatase 93.  CBC with WBC 6900, hemoglobin 15.9, platelet count 232,000.  CEA 1.1.  2/7/2020 diagnostic laparoscopy with peritoneal biopsies performed at the [facility] by Dr. Peoples showed no sign of diffuse peritoneal carcinomatosis or liver metastasis.  There was a firm nodule in the superior dome of the bladder, adherent to omentum, as well as right pelvic sidewall nodule adjacent to surgical clips.  Biopsy of bladder dome nodule showed adenocarcinoma with mucinous features.     2/25/2020 -  Chemotherapy    OP COLORECTAL FOLFIRI Irinotecan / Leucovorin / Fluorouracil     5/29/2020 Imaging    CT chest abdomen pelvis shows slight improvement with decreased nodule anterolateral margin of urinary bladder with stable nodularity of the right lower quadrant adjacent to surgical clips and no progressive disease.  Slight hyperemia of the colonic mucosa     6/18/2020 Surgery    Surgery       -6/18/2020 Dr. Peoples performed exploratory laparotomy with excision of right lobe liver lesion and left lobe liver lesion, omentectomy, resection of pelvic peritoneal nodules, ileocolectomy with creation of ileal colostomy, hyperthermic intraperitoneal chemotherapy with mitomycin-C at 42 °C for deep tissue penetration for 90 minutes.  Dr. Perdue performed partial cystectomy with right ureteral stent placement     8/5/2020 Imaging     CT abdomen pelvis with contrast shows small soft tissue nodule anterior aspect of bladder stable.  New small amount of ascites as well as a new small left pleural effusion.  Creatinine 0.75 with hemoglobin 11.1 otherwise unremarkable CBC and CMP     2/15/2021 Imaging    CT abdomen pelvis with contrast compared to 8/5/2020 shows enlarging soft tissue mass 4.2 cm over the bladder fundus and there is  a calcification along the base of the urinary bladder.  Small stable multiple hypoattenuating indeterminate liver lesions.  Similar degree of ascites.     2/23/2021 Imaging    -2/23/2021 CT chest shows no evidence of metastasis with ascending aorta 42 mm.     4/22/2021 Surgery    -4/22/2021 cystoscopy (after prior office cystoscopy showed large fungating mass) with bilateral ureteral catheters, exploratory laparotomy, lysis of adhesions, right pelvic sidewall excisional biopsy, open partial cystectomy Dr. Ike Perdue. Peritoneal fluid showed predominant inflammatory reactive mesothelial cells with no malignancy. 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 Carcinoma was less than 1 mm from the lateral resection margins.     6/7/2021 Imaging    CT chest, abdomen and pelvis: No evidence of metastatic disease within the chest abdomen or pelvis.  Interval resection of enhancing bladder wall mass a small amount of residual enhancing soft tissue, possibly granulation tissue.  Interval resolution of abdominal and pelvic ascites.  Stable dilation of the ascending aorta mid segment measuring up to 41 mm.     9/10/2021 Imaging    CT chest abdomen pelvis with contrast shows under distended urinary bladder with mild asymmetric wall thickening dome and left lateral wall with a small 9 mm polypoid lesion in the bladder.  There is stable 2.3 cm right external iliac and a few other subcentimeter scattered nodes in the root of the small bowel mesentery and retroperitoneum.  Postsurgical right hemicolectomy changes.  Stable low-attenuation lesions in the liver unchanged.     11/24/2021 Imaging    CT chest abdomen pelvis compared to 6/7/2021 outside imaging shows stable 4.1 cm ascending thoracic aorta.  No lung nodules.  Liver homogenous with bilateral cysts.  No adenopathy.  Thickened sigmoid colon and rectum suggestive of mild colitis or  postradiation change.  No pelvic adenopathy.  Bony structures degenerative in the spine.  Some soft tissue anterior to the acetabulum on the right and extending along the medial aspect right pelvic sidewall 2.7 x 5.6 may represent intramuscular process versus adenopathy which could not be ruled out.  No prior study views available.     4/11/2022 - 5/6/2022 Radiation    Radiation OncologyTreatment Course:  Esteban Tse received 5000 cGy in 20 fractions to right pelvis/groin via External Beam Radiation - EBRT.     5/25/2022 Imaging    CT chest abdomen pelvis with contrast at [facility] shows nothing remarkable on the chest.  Stable liver cysts.  Focal soft tissue right iliac region appears to be increasing in size now 7.2 cm previously 3.9 cm with some mass-effect on surrounding structures with several stable inguinal borderline nodes.     10/19/2022 Imaging    - 10/19/2022 CT chest abdomen pelvis with contrast [facility] compared to 7/7/2022 shows no significant change or evidence of progression.     Peritoneal carcinoma   5/14/2020 Initial Diagnosis    Peritoneal carcinoma (HCC)     5/25/2022 Imaging    CT chest abdomen pelvis with contrast at [facility] shows nothing remarkable on the chest.  Stable liver cysts.  Focal soft tissue right iliac region appears to be increasing in size now 7.2 cm previously 3.9 cm with some mass-effect on surrounding structures with several stable inguinal borderline nodes.         HISTORY OF PRESENT ILLNESS:  The patient is a 69 y.o. male, here for follow up on management of metastatic mucinous appendiceal carcinoma    Past Medical History:   Diagnosis Date    Aneurysm 2022    Ascending aortic aneurysm     Benign hypertension     Bladder cancer     Colon cancer     History of radiation therapy 05/06/2022    right groin/hemipelvis    Hypercholesteremia 9/18/2023    Hypertension     Intermittent palpitations     Mucinous adenocarcinoma     Pounding heartbeat   "   PVC's (premature ventricular contractions)      Past Surgical History:   Procedure Laterality Date    APPENDECTOMY  2017    With Partial Colon     BLADDER SURGERY      with partial colon    CHOLECYSTECTOMY      COLON SURGERY      GALLBLADDER SURGERY         Allergies   Allergen Reactions    Levofloxacin Swelling     Lips swellijng    Tetracycline Swelling     Lips swelling    Penicillins Other (See Comments)     childhood       Family History and Social History reviewed and changed as necessary    REVIEW OF SYSTEM:   No new somatic complaint    PHYSICAL EXAM:  No jaundice icterus or pallor.  No respiratory distress.    Vitals:    09/03/24 0956   BP: 137/87   Pulse: 80   Resp: 18   Temp: 98.4 °F (36.9 °C)   SpO2: 99%   Weight: 78.5 kg (173 lb)   Height: 185.4 cm (73\")     Vitals:    09/03/24 0956   PainSc: 0-No pain          ECOG score: 0           Vitals reviewed.    ECOG: (0) Fully Active - Able to Carry On All Pre-disease Performance Without Restriction    Lab Results   Component Value Date    HGB 14.0 03/21/2024    HCT 40.5 03/21/2024    MCV 86 03/21/2024     03/21/2024    WBC 6.3 03/21/2024    NEUTROABS 4.0 03/21/2024    LYMPHSABS 1.5 03/21/2024    MONOSABS 0.6 03/21/2024    EOSABS 0.1 03/21/2024    BASOSABS 0.0 03/21/2024       Lab Results   Component Value Date    GLUCOSE 92 03/21/2024    BUN 17 03/21/2024    CREATININE 1.11 03/21/2024     03/21/2024    K 4.5 03/21/2024     03/21/2024    CO2 21 03/21/2024    CALCIUM 9.1 03/21/2024    PROTEINTOT 6.4 06/06/2022    ALBUMIN 4.3 03/21/2024    BILITOT 1.0 03/21/2024    ALKPHOS 114 03/21/2024    AST 25 03/21/2024    ALT 20 03/21/2024             ASSESSMENT & PLAN:  1.  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  Pathologic stage IIa with T3 extension into the base of the appendix through the muscularis propria but not into the serosal surface.  16 nodes negative.  Colonoscopy " December 2017 negative postop.Laparoscopic diagnostic peritoneal biopsy confirming disease in the bladder dome February 2020.  Began Folfiri.  June 2020 Dr. Peoples excised right lobe of liver and left lobe liver lesion, omentectomy and resection of pelvic peritoneal nodules, ileocolectomy with ileal colostomy and intraperitoneal HIPEC with mitomycin.  Dr. Lawler performed right ureteral stent placement and partial cystectomy.  With large fungating mass, 4/22/2021 had exploratory laparotomy with lysis of adhesions and right pelvic sidewall excisional biopsy with open partial cystectomy.  No malignancy and peritoneal fluid.  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  Margins less than 1 mm.  October 2021 cystoscopy without malignancy.  TURBT 10/11/2021 did not show malignancy.  March 2022 Dr. Peoples for 3.9 cm external iliac recurrent adenopathy 6 weeks out from laminectomy which did not help pain was determined to be an unresectable recurrence with muscle and vascular involvement.  Radiation with Dr. Moody ended 5/6/2022.  Disease stabilized and pain improved.    -12/30/2019 medical oncology office visit: The patient had been on surveillance since surgery December 2017.  CT scans had been negative up until 12/30/2019 CT chest abdomen and pelvis that showed subtle soft tissue density surrounding surgical clips in the right side of the pelvis along with soft tissue nodule at the anterior wall of the bladder concerning for recurrent disease.  Patient sent back to Dr. Davidson for colonoscopy.    -2/7/2020 patient was referred to Dr. Peoplse at the [facility], he underwent diagnostic laparoscopic and peritoneal biopsies that confirmed recurrent disease with biopsy of bladder dome nodule showing adenocarcinoma with mucinous features.  Port was placed at this time also.    -2/25/2020 began FOLFIRI    -4/21/2020 patient having increased fatigue, FOLFIRI dose reduced by  10%.    -5/19/2020 cycle 7 FOLFIRI    -6/18/2020 Dr. Peoples performed exploratory laparotomy with excision of right lobe liver lesion and left lobe liver lesion, omentectomy, resection of pelvic peritoneal nodules, ileocolectomy with creation of ileal colostomy, hyperthermic intraperitoneal chemotherapy with mitomycin-C at 42 °C for deep tissue penetration for 90 minutes.  Dr. Perdue performed partial cystectomy with right ureteral stent placement    -8/5/2020 CT abdomen pelvis with contrast shows small soft tissue nodule anterior aspect of bladder stable.  New small amount of ascites as well as a new small left pleural effusion.  Creatinine 0.75 with hemoglobin 11.1 otherwise unremarkable CBC and CMP.    -2/15/2021 CT abdomen pelvis with contrast compared to 8/5/2020 shows enlarging soft tissue mass 4.2 cm over the bladder fundus and there is a calcification along the base of the urinary bladder.  Small stable multiple hypoattenuating indeterminate liver lesions.  Similar degree of ascites.    -2/23/2021 CT chest shows no evidence of metastasis with ascending aorta 42 mm.    -3/8/2021 follow-up with Dr. Portillo Peoples we reviewed his CTs suggested team effort resection with Dr. Perdue    -3/16/2021 follow-up with Dr. Predue.  He plans for open partial cystectomy, possible ureteral implants, cystoscopy and stent placement in concert with Dr. Peoples.    -4/22/2021 cystoscopy (after prior office cystoscopy showed large fungating mass) with bilateral ureteral catheters, exploratory laparotomy, lysis of adhesions, right pelvic sidewall excisional biopsy, open partial cystectomy Dr. Ike Perdue. Peritoneal fluid showed predominant inflammatory reactive mesothelial cells with no malignancy. 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 Carcinoma was less than 1 mm from the lateral resection margins.    -5/18/2021 [facility] medical oncology follow-up  "visit: I reviewed his hospital notes, operative notes, and pathology report as above and went over this with the patient.  The general consensus from retrospective as well as a few prospective data over the last couple of decades with this low-grade malignancy does not show any profound benefit from \"adjuvant\" chemotherapy in this current setting.  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  There is no standard follow-up but general guidelines suggest following up as typical colon cancer.  I will repeat his CT chest abdomen pelvis now to reestablish his baseline postoperative imaging and have him see my nurse practitioner back in a couple of weeks to make sure there is no nia evidence of measurable residual disease.  Assuming this just shows postoperative changes, I would simply repeat scans again in 3 months or sooner as symptoms dictate.  If he has no evidence of persistent or metastatic disease on current imaging, then when he sees my nurse practitioner back she will also review what they say about his ascending aorta which was 42 mm in February and sent him to Dr. Evangelist Anthony for an opinion as to whether any intervention is needed relative to the aorta.  Obviously if his cancer is out of control on the upcoming scans then the aorta is a moot point.    -6/10/2021 [facility] oncology clinic follow-up: CT scans show no evidence of disease recurrence in the chest, abdomen or pelvis.  Ascending aortic aneurysm stable at 41 mm.  Referral made to Dr. Anthony, cardiothoracic surgeon for management and evaluation of a sending aortic aneurysm.  Plan to repeat CT scans in 3 months.    -9/14/2021 [facility] medical oncology follow-up visit: I reviewed images and reports of 9/10/2021 CT chest abdomen pelvis with contrast which shows under distended urinary bladder with mild asymmetric wall thickening dome and left lateral wall with a small 9 mm polypoid lesion in the bladder.  There is stable 2.3 cm right external iliac and a few other " subcentimeter scattered nodes in the root of the small bowel mesentery and retroperitoneum.  Postsurgical right hemicolectomy changes.  Stable low-attenuation lesions in the liver unchanged.  I will have him collect his discs from [facility] to take Dr. Perdue to decide whether to proceed with cystoscopy or just continue watchful waiting.  There is no major changes and I suspect we are looking at postoperative changes and we shall see him for video visit in a few weeks to see what urology at  decides.    -10/11/2021 cystoscopy Dr. Perdue showed marked acute and chronic inflammation but no evidence of malignancy.  Muscularis propria present.    -10/14/2021 [facility] medical oncology virtual visit: I reviewed reports of pathology from cystoscopy 10/11/2021 and went over this with patient.  He is feeling fairly fit.  I will repeat his CT chest abdomen pelvis prior to return in 3 months and if in the interim, when he follows up with Dr. Perdue in the next couple of weeks post cystoscopy, plans are made for further cystoscopy before I see him back and any malignancy is found, I would ask the patient and Dr. Perdue to touch base as I would not know of such without that contact as the information comes into our chart without notification now that epic is being used by .  Assuming no further biopsies in the interim, I will simply repeat his scans in 3 months to follow what I suspected and is now confirmed to be inflammatory changes.  From the aneurysm standpoint, he has follow-up scanning March 2022 arranged by Dr. Anthony and he will follow him up after that.    -11/24/2021 CT chest abdomen pelvis compared to 6/7/2021 outside imaging shows stable 4.1 cm ascending thoracic aorta.  No lung nodules.  Liver homogenous with bilateral cysts.  No adenopathy.  Thickened sigmoid colon and rectum suggestive of mild colitis or postradiation change.  No pelvic adenopathy.  Bony structures degenerative in the spine.  Some soft  tissue anterior to the acetabulum on the right and extending along the medial aspect right pelvic sidewall 2.7 x 5.6 may represent intramuscular process versus adenopathy which could not be ruled out.  No prior study views available.    -1/4/2022 follow-up Dr. Ike Perdue [facility].  Per his note, he had TURBT 10/11/2021.  He had been placed on antibiotics for UTI for preop preparation for back surgery planned in 2 weeks.  Denies any mucus in his urine.  Some microhematuria on urinalysis this day.  No gross hematuria or dysuria.  The October 2021 TURBT did not reveal anything malignant on pathology.  There may be irritation or inflammation secondary to sutures in the bladder following partial cystectomy.  Patient asymptomatic from a urinary standpoint.  Recommended following up with [facility] with no plans for further urology follow-up.    -2/4/2022 [facility] telehealth follow-up visit: I reviewed his November images and reports with the patient as well as with Dr. Gm Moody and the note from Dr. Perdue from 1 month ago.  I do suspect this pelvic sidewall abnormality in November represents persistent disease but as I have stated in prior dictations, the data on palliative or overall survival benefit of systemic 5-FU based combination chemotherapy and/or Avastin does not show a clear-cut survival advantage to chemotherapy for asymptomatic disease.  He is having some back pain and had spine decompression a couple of weeks ago without much benefit.  This is an area that may potentially be radiated but I will check his CT chest (angiogram of chest) abdomen and pelvis and get his blood counts and chemistries and urinalysis and see him back for virtual visit in a few weeks.  If we have growth, I will get biopsy to not only verify the virtual certainty of the recurrence in this area given that he had known disease likely residual at the time of his surgery as outlined from my note in May and  "the natural history of this low-grade mucinous adenocarcinoma of the appendix having multiple recurrences even after debulking and he has already had HIPEC.  I will also converse with Dr. Peoples at that junction whether he thinks he has anything to offer if this is progressing though I doubt it given that the last intervention was a urologic procedure and the last cystoscopy/TURBT in October had no malignancy and the liver lesions are currently being called liver cysts albeit I am skeptical as to whether those are truly benign but they are certainly not growing.    -3/4/2022 CT angiogram of chest/CT abdomen pelvis for evaluation of dizziness and surveillance of thoracic aortic aneurysm and appendiceal carcinoma.  Thoracic ascending aortic ectasia 4 x 4 cm stable.  Small nodularity left upper lobe stable from September.  Stable small hepatic hypodensities status post cholecystectomy.  Right external iliac soft tissue fullness now 3 x 3.9 cm previously 2 x 2.3 cm concerning for enlarging adenopathy with symmetric nonenlarged inguinal nodes stable.  CBC unremarkable.  CMP unremarkable save for potassium 5.4.  CEA 18.2.  1-3 red cells and white cells per milliliter of urine.    -3/16/2022 office visit with Dr. Portillo Peoples.  He reviewed the CT from 3/4/2022.  He notes that the laminectomy from 6 weeks prior has not helped his \"hip pain\".  Given the location of this recurrence previously 2 x 2.3 cm now 3 x 3.9 cm in the external iliac area concerning for enlarging adenopathy with symmetric nonenlarged inguinal nodes stable, the mass appears unresectable with involvement of muscle and vasculature.    -3/24/2022 [facility] medical oncology follow-up virtual visit: Pain is still significant.  We will get him in hopefully in the next day to [facility] for palliative care.  Have spoken with Dr. Peoples and no surgery.  I have spoken with Dr. Moody who thinks palliative radiation while not likely to shrink tumor dramatically may " help pain considerably and we will get him there.  We will get him to bring the discs to our [place] office and asked them to bring that back to [place] to get scanned in for our invasive radiologist to look at for us to get CT-guided biopsy of this node in the right lower quadrant and send that for [facility] HERNANDEZ profile once the pathology is obtained to hopefully find high tumor mutational burden or other molecular targets that might give us some options.  Apart from that, as stated multiple times, this is not a highly chemotherapy sensitive tumor and I am not sure I would palliate him well but, when I see him back in early June with CAT scans prior to return and post radiation, if he is not getting relief and wants to try a 5-FU based regimen plus or minus Avastin I would be okay with that but he knows it does not alter survival 1 way or the other and we are simply trying to palliate this inexorable process.  No other surgical options.    - 4/5/2022  Right lower quadrant mass CT biopsy positive mucinous adenocarcinoma    -5/2/2022 [facility] profile: HER2/harris negative.  Mismatch repair proficient,NTRK1/2/3 fusion not detected.  BRAF V600E negative.  PD-L1 Negative, 1+, 5%.  PTEN Positive, 1+, 100%. MLH1 positive/3+, 100%. MSH2 positive/3+, 100%. MSH6 positive/3+, 100%. PMS2 positive/3+,100%.    -5/6/2022 last radiation    - 5/25/2022 CT chest abdomen pelvis with contrast at [facility] shows nothing remarkable on the chest.  Stable liver cysts.  Focal soft tissue right iliac region appears to be increasing in size now 7.2 cm previously 3.9 cm with some mass-effect on surrounding structures with several stable inguinal borderline nodes.    -6/6/2022 [facility] medical oncology follow-up visit: Now 1 month out from radiation just starting to get a little bit of improvement in his abdominal discomfort.  Slight growth on CT but some of that may be postradiation change this close to radiation and, as I stated in my  note in March, it is not clear that 5-FU based therapy plus or minus Avastin is going to palliate much given its relatively insensitivity to chemotherapy and it certainly does not improve survival.  To that end, we will plan on just repeating his CAT scans again in about 6 weeks and if the pain is worsening and/or this continues to grow then I will probably give him Avastin FOLFOX.  If everything is stable and pain is controlled we will go continue watchful waiting with imaging about every 3 months from that point forward.    -7/11/2022 [facility] operated by [facility] medical oncology follow-up: His 7/7/2022 CT chest abdomen pelvis showed stable right external iliac low-attenuation which had been previously growing and thickening of the distal descending and sigmoid colon with perhaps a small right iliac borderline 1 cm node.  Symptomatically improved post radiation.  For now we will hold on Avastin FOLFOX as the benefit of such is questionable with this histologic subtype and there are no actionable molecular targets.  Repeat CTs prior to return in 3 months.    -11/1/2022 [facility] medical oncology follow-up: 10/19/2022 CT chest abdomen pelvis with contrast [facility] compared to 7/7/2022 shows no significant change or evidence of progression.  Clinically quiescent.  We will repeat CTs in 6 months, sooner as symptoms dictate.    -5/19/2023 CT chest abdomen pelvis [facility] compared to 10/19/2022 showed no evidence of disease progression in the chest.  Nodular density adjacent to the descending colon 6 mm nonspecific.  Otherwise unchanged appearance of hepatic metastatic nodules, right iliac adenopathy, and right pelvic mass.    -5/23/2023 [facility] medical oncology follow-up: I reviewed his report of CT chest abdomen pelvis from [facility] 4 days ago showing no evidence of progression.  So area near the descending colon is nonspecific and would not change anything at this junction.  His October CT had not changed from July and  the current one has not changed significantly since October.  We will continue to see palliative care for his right pelvic pain.  If leg swelling significantly worsens we could check Doppler and assuming no clot consider vascular intervention but as the thigh has not significantly changed over time nor is there significant tenderness in the calf I am doubtful that this is clot and is likely just due to the pelvic mass.  Vascular surgical intervention should swelling worsen is also an option but I would not want a stent placed through this area in the face malignancy particularly a mucinous variety that would make him prone towards clotting.  If the leg worsens I would get his Doppler and consider vascular intervention or anticoagulation as dictated but clinically stable so will not investigate further at this junction.  I will repeat his imaging again in 6 months.    -11/20/2023 CT chest, abdomen and pelvis shows stable appearances of the chest with no evidence of metastatic disease.  Abdomen and pelvis reveals small interval decrease in size of the previously demonstrated peritoneal nodule adjacent to the descending colon.  No new suspicious omental/peritoneal soft tissue nodules or masses.  Stable right iliac adenopathy as well as a soft tissue mass along the right external iliac vasculature along the right pelvic sidewall.  -11/29/2023 [facility] follow-up: Current CT chest, abdomen and pelvis shows stable findings, no evidence of progression.  He has no new worrisome symptoms however he does still have swelling in his right thigh and lower pelvic pain for which she sees palliative care.  He states that it seems slightly more pronounced over the last few weeks, he has a follow-up with palliative care and will discuss further with him.  We reviewed recommendations discussed at his last visit with Dr. Delcid that as long as the swelling did not worsen significantly then we would hold off on considering  any vascular intervention.  We will continue to monitor, Jose Antonio understands to let us know if he has any changes otherwise we will plan on repeating CT chest, abdomen and pelvis prior to return in 6 months and I have ordered those today.  His blood pressure was running high on arrival today, I did recheck it manually and it was 150/100.  He reports that he took his blood pressure earlier today at home and it was 140s over 85 or so.  I asked him to recheck when he got home and monitor and if his diastolic continued to run over 90 to get in with Dr. Dial for evaluation.    - 5/20/2023 CT chest abdomen pelvis [facility] compared to November shows 10 mm nodule left lower lobe and right lower lobe.  Anterior lobe pleural-based nodule unchanged.  6.1 x 4 cm right pelvic sidewall stable.  12 mm right common iliac node stable.  No ascites.    -5/28/2024 [facility] follow-up: No signs or symptoms of recurrence.  3 times in the last 6 months he had a 6-hour period of crampy abdominal pain that subsequently subsided.  Potentially could have been having some obstructive symptomatology but none on imaging.  If this happens more frequently or it lasts he will let us know and also Dr. Peoples but would also go towards a more soft diet during those episodes and try FiberCon.  Otherwise imaging of the abdomen is stable but there is a couple of nodules in the lung that appeared new albeit small.  This is a very low-grade process and systemic therapies are not very effective and he is asymptomatic so we will just repeat CT chest in 3 months.  If this grows then we will consider getting tissue as it would be a little odd for this low-grade process to show up in the lung.    -8/28/2024 [facility] chest with contrast compared to May 2024 shows left lower lobe nodule 16 mm compared to 10 mm with right lower lobe nodule 16 mm previously 10 mm and small subpleural right upper lobe nodule stable most likely benign  intrapulmonary node and small stable nodule right lower lobe likely intrapulmonary.  Several small low-density liver lesions likely cysts.    -9/3/2024 [facility] clinic follow-up: No signs or symptoms of recurrence 1 lung nodule in each lung base has grown.  Other nodules stable.  High likelihood for recurrence.  Will get CT abdomen pelvis and then get him to Dr. Santos for consideration of navigational bronchoscopy/Ion biopsy these lesions and might consider CyberKnife for which I will get him to Dr. Moody for opinion and coordination with Dr. Santos pending results of CT abdomen pelvis.  If on the other hand his CT abdomen pelvis shows additional sites of progression beyond the lung then we will likely go back to systemic therapy of which he has very little likelihood to respond but I would like to get tissue for molecular testing.    Total time of care today inclusive of time spent today prior to his arrival reviewing interval data and during visit translating that information to patient communicating these plans as outlined to his other providers and secure chatting with Dr. Santos and after visit instituting this plan took 50 minutes of patient care time throughout the day today.    Cy Delcdi MD    09/03/2024

## 2024-09-03 NOTE — LETTER
September 3, 2024       No Recipients    Patient: Esteban Tse   YOB: 1955   Date of Visit: 9/3/2024     Dear Saul Davidson MD:       Thank you for referring Esteban Tse to me for evaluation. Below are the relevant portions of my assessment and plan of care.    If you have questions, please do not hesitate to call me. I look forward to following Esteban along with you.         Sincerely,        Cy Delcid MD        CC:   No Recipients    Cy Delcid MD  09/03/24 1029  Sign when Signing Visit  CHIEF COMPLAINT: No new somatic complaints    Problem List:  Oncology/Hematology History Overview Note   1.  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  Pathologic stage IIa with T3 extension into the base of the appendix through the muscularis propria but not into the serosal surface.  16 nodes negative.  Colonoscopy December 2017 negative postop.Laparoscopic diagnostic peritoneal biopsy confirming disease in the bladder dome February 2020.  Began Folfiri.  June 2020 Dr. Peoples excised right lobe of liver and left lobe liver lesion, omentectomy and resection of pelvic peritoneal nodules, ileocolectomy with ileal colostomy and intraperitoneal HIPEC with mitomycin.  Dr. Lawler performed right ureteral stent placement and partial cystectomy.  With large fungating mass, 4/22/2021 had exploratory laparotomy with lysis of adhesions and right pelvic sidewall excisional biopsy with open partial cystectomy.  No malignancy and peritoneal fluid.  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  Margins less than 1 mm.  October 2021 cystoscopy without malignancy.  TURBT 10/11/2021 did not show malignancy.  March 2022 Dr. Peoples for 3.9 cm external iliac recurrent adenopathy 6 weeks out from laminectomy which did not help pain was determined to be an unresectable recurrence with muscle and vascular  involvement.  Radiation with Dr. Moody ended 5/6/2022.  Disease stabilized and pain improved.    -12/30/2019 medical oncology office visit: The patient had been on surveillance since surgery December 2017.  CT scans had been negative up until 12/30/2019 CT chest abdomen and pelvis that showed subtle soft tissue density surrounding surgical clips in the right side of the pelvis along with soft tissue nodule at the anterior wall of the bladder concerning for recurrent disease.  Patient sent back to Dr. Davidson for colonoscopy.    -2/7/2020 patient was referred to Dr. Peoples at the [facility], he underwent diagnostic laparoscopic and peritoneal biopsies that confirmed recurrent disease with biopsy of bladder dome nodule showing adenocarcinoma with mucinous features.  Port was placed at this time also.    -2/25/2020 began FOLFIRI    -4/21/2020 patient having increased fatigue, FOLFIRI dose reduced by 10%.    -5/19/2020 cycle 7 FOLFIRI    -6/18/2020 Dr. Peoples performed exploratory laparotomy with excision of right lobe liver lesion and left lobe liver lesion, omentectomy, resection of pelvic peritoneal nodules, ileocolectomy with creation of ileal colostomy, hyperthermic intraperitoneal chemotherapy with mitomycin-C at 42 °C for deep tissue penetration for 90 minutes.  Dr. Predue performed partial cystectomy with right ureteral stent placement    -8/5/2020 CT abdomen pelvis with contrast shows small soft tissue nodule anterior aspect of bladder stable.  New small amount of ascites as well as a new small left pleural effusion.  Creatinine 0.75 with hemoglobin 11.1 otherwise unremarkable CBC and CMP.    -2/15/2021 CT abdomen pelvis with contrast compared to 8/5/2020 shows enlarging soft tissue mass 4.2 cm over the bladder fundus and there is a calcification along the base of the urinary bladder.  Small stable multiple hypoattenuating indeterminate liver lesions.  Similar degree of ascites.    -2/23/2021 CT chest  "shows no evidence of metastasis with ascending aorta 42 mm.    -3/8/2021 follow-up with Dr. Portillo Peoples we reviewed his CTs suggested team effort resection with Dr. Perdue    -3/16/2021 follow-up with Dr. Perdue.  He plans for open partial cystectomy, possible ureteral implants, cystoscopy and stent placement in concert with Dr. Peoples.    -4/22/2021 cystoscopy (after prior office cystoscopy showed large fungating mass) with bilateral ureteral catheters, exploratory laparotomy, lysis of adhesions, right pelvic sidewall excisional biopsy, open partial cystectomy Dr. Ike Perdue. Peritoneal fluid showed predominant inflammatory reactive mesothelial cells with no malignancy. 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 Carcinoma was less than 1 mm from the lateral resection margins.    -5/18/2021 [facility] medical oncology follow-up visit: I reviewed his hospital notes, operative notes, and pathology report as above and went over this with the patient.  The general consensus from retrospective as well as a few prospective data over the last couple of decades with this low-grade malignancy does not show any profound benefit from \"adjuvant\" chemotherapy in this current setting.  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  There is no standard follow-up but general guidelines suggest following up as typical colon cancer.  I will repeat his CT chest abdomen pelvis now to reestablish his baseline postoperative imaging and have him see my nurse practitioner back in a couple of weeks to make sure there is no nia evidence of measurable residual disease.  Assuming this just shows postoperative changes, I would simply repeat scans again in 3 months or sooner as symptoms dictate.  If he has no evidence of persistent or metastatic disease on current imaging, then when he sees my nurse practitioner back she will also review what they say about his ascending aorta " which was 42 mm in February and sent him to Dr. Evangelist Anthony for an opinion as to whether any intervention is needed relative to the aorta.  Obviously if his cancer is out of control on the upcoming scans then the aorta is a moot point.    -6/10/2021 [facility] oncology clinic follow-up: CT scans show no evidence of disease recurrence in the chest, abdomen or pelvis.  Ascending aortic aneurysm stable at 41 mm.  Referral made to Dr. Anthony, cardiothoracic surgeon for management and evaluation of a sending aortic aneurysm.  Plan to repeat CT scans in 3 months.    -9/14/2021 [facility] medical oncology follow-up visit: I reviewed images and reports of 9/10/2021 CT chest abdomen pelvis with contrast which shows under distended urinary bladder with mild asymmetric wall thickening dome and left lateral wall with a small 9 mm polypoid lesion in the bladder.  There is stable 2.3 cm right external iliac and a few other subcentimeter scattered nodes in the root of the small bowel mesentery and retroperitoneum.  Postsurgical right hemicolectomy changes.  Stable low-attenuation lesions in the liver unchanged.  I will have him collect his discs from [facility] to take Dr. Perdue to decide whether to proceed with cystoscopy or just continue watchful waiting.  There is no major changes and I suspect we are looking at postoperative changes and we shall see him for video visit in a few weeks to see what urology at  decides.    -10/11/2021 cystoscopy Dr. Perdue showed marked acute and chronic inflammation but no evidence of malignancy.  Muscularis propria present.    -10/14/2021 [facility] medical oncology virtual visit: I reviewed reports of pathology from cystoscopy 10/11/2021 and went over this with patient.  He is feeling fairly fit.  I will repeat his CT chest abdomen pelvis prior to return in 3 months and if in the interim, when he follows up with Dr. Perdue in the next couple of weeks post cystoscopy, plans are made for  further cystoscopy before I see him back and any malignancy is found, I would ask the patient and Dr. Perdue to touch base as I would not know of such without that contact as the information comes into our chart without notification now that epic is being used by [employer].  Assuming no further biopsies in the interim, I will simply repeat his scans in 3 months to follow what I suspected and is now confirmed to be inflammatory changes.  From the aneurysm standpoint, he has follow-up scanning March 2022 arranged by Dr. Anthony and he will follow him up after that.    -11/24/2021 CT chest abdomen pelvis compared to 6/7/2021 outside imaging shows stable 4.1 cm ascending thoracic aorta.  No lung nodules.  Liver homogenous with bilateral cysts.  No adenopathy.  Thickened sigmoid colon and rectum suggestive of mild colitis or postradiation change.  No pelvic adenopathy.  Bony structures degenerative in the spine.  Some soft tissue anterior to the acetabulum on the right and extending along the medial aspect right pelvic sidewall 2.7 x 5.6 may represent intramuscular process versus adenopathy which could not be ruled out.  No prior study views available.    -1/4/2022 follow-up Dr. Ike Perdue [facility].  Per his note, he had TURBT 10/11/2021.  He had been placed on antibiotics for UTI for preop preparation for back surgery planned in 2 weeks.  Denies any mucus in his urine.  Some microhematuria on urinalysis this day.  No gross hematuria or dysuria.  The October 2021 TURBT did not reveal anything malignant on pathology.  There may be irritation or inflammation secondary to sutures in the bladder following partial cystectomy.  Patient asymptomatic from a urinary standpoint.  Recommended following up with [facility] with no plans for further urology follow-up.    -2/4/2022 [facility] medical oncology telehealth follow-up visit: I reviewed his November images and reports with the patient as well as with Dr. Gm Moody  and the note from Dr. Perdue from 1 month ago.  I do suspect this pelvic sidewall abnormality in November represents persistent disease but as I have stated in prior dictations, the data on palliative or overall survival benefit of systemic 5-FU based combination chemotherapy and/or Avastin does not show a clear-cut survival advantage to chemotherapy for asymptomatic disease.  He is having some back pain and had spine decompression a couple of weeks ago without much benefit.  This is an area that may potentially be radiated but I will check his CT chest (angiogram of chest) abdomen and pelvis and get his blood counts and chemistries and urinalysis and see him back for virtual visit in a few weeks.  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  I will also converse with Dr. Peoples at that junction whether he thinks he has anything to offer if this is progressing though I doubt it given that the last intervention was a urologic procedure and the last cystoscopy/TURBT in October had no malignancy and the liver lesions are currently being called liver cysts albeit I am skeptical as to whether those are truly benign but they are certainly not growing.    -3/4/2022 CT angiogram of chest/CT abdomen pelvis for evaluation of dizziness and surveillance of thoracic aortic aneurysm and appendiceal carcinoma.  Thoracic ascending aortic ectasia 4 x 4 cm stable.  Small nodularity left upper lobe stable from September.  Stable small hepatic hypodensities status post cholecystectomy.  Right external iliac soft tissue fullness now 3 x 3.9 cm previously 2 x 2.3 cm concerning for enlarging adenopathy with  "symmetric nonenlarged inguinal nodes stable.  CBC unremarkable.  CMP unremarkable save for potassium 5.4.  CEA 18.2.  1-3 red cells and white cells per milliliter of urine.    -3/16/2022 office visit with Dr. Portillo Peoples.  He reviewed the CT from 3/4/2022.  He notes that the laminectomy from 6 weeks prior has not helped his \"hip pain\".  Given the location of this recurrence previously 2 x 2.3 cm now 3 x 3.9 cm in the external iliac area concerning for enlarging adenopathy with symmetric nonenlarged inguinal nodes stable, the mass appears unresectable with involvement of muscle and vasculature.    -3/24/2022 [facility] medical oncology follow-up virtual visit: Pain is still significant.  We will get him in hopefully in the next day to [facility] for palliative care.  Have spoken with Dr. Peoples and no surgery.  I have spoken with Dr. Moody who thinks palliative radiation while not likely to shrink tumor dramatically may help pain considerably and we will get him there.  We will get him to bring the discs to our [place] office and asked them to bring that back to [place] to get scanned in for our invasive radiologist to look at for us to get CT-guided biopsy of this node in the right lower quadrant and send that for [facility] miguel once the pathology is obtained to hopefully find high tumor mutational burden or other molecular targets that might give us some options.  Apart from that, as stated multiple times, this is not a highly chemotherapy sensitive tumor and I am not sure I would palliate him well but, when I see him back in early June with CAT scans prior to return and post radiation, if he is not getting relief and wants to try a 5-FU based regimen plus or minus Avastin I would be okay with that but he knows it does not alter survival 1 way or the other and we are simply trying to palliate this inexorable process.  No other surgical options.    - 4/5/2022  Right lower quadrant mass CT biopsy positive " mucinous adenocarcinoma    -5/2/2022 [facility] profile: HER2/harris negative.  Mismatch repair proficient,NTRK1/2/3 fusion not detected.  BRAF V600E negative.  PD-L1 Negative, 1+, 5%.  PTEN Positive, 1+, 100%. MLH1 positive/3+, 100%. MSH2 positive/3+, 100%. MSH6 positive/3+, 100%. PMS2 positive/3+,100%.    -5/6/2022 last radiation    - 5/25/2022 CT chest abdomen pelvis with contrast at [facility] shows nothing remarkable on the chest.  Stable liver cysts.  Focal soft tissue right iliac region appears to be increasing in size now 7.2 cm previously 3.9 cm with some mass-effect on surrounding structures with several stable inguinal borderline nodes.    -6/6/2022 [facility] medical oncology follow-up visit: Now 1 month out from radiation just starting to get a little bit of improvement in his abdominal discomfort.  Slight growth on CT but some of that may be postradiation change this close to radiation and, as I stated in my note in March, it is not clear that 5-FU based therapy plus or minus Avastin is going to palliate much given its relatively insensitivity to chemotherapy and it certainly does not improve survival.  To that end, we will plan on just repeating his CAT scans again in about 6 weeks and if the pain is worsening and/or this continues to grow then I will probably give him Avastin FOLFOX.  If everything is stable and pain is controlled we will go continue watchful waiting with imaging about every 3 months from that point forward.    -7/11/2022 [facility] medical oncology follow-up: His 7/7/2022 CT chest abdomen pelvis showed stable right external iliac low-attenuation which had been previously growing and thickening of the distal descending and sigmoid colon with perhaps a small right iliac borderline 1 cm node.  Symptomatically improved post radiation.  For now we will hold on Avastin FOLFOX as the benefit of such is questionable with this histologic subtype and there are no actionable molecular targets.   Repeat CTs prior to return in 3 months.    -11/1/2022 [facility] medical oncology follow-up: 10/19/2022 CT chest abdomen pelvis with contrast [place] [facility] compared to 7/7/2022 shows no significant change or evidence of progression.  Clinically quiescent.  We will repeat CTs in 6 months, sooner as symptoms dictate.    -5/19/2023 CT chest abdomen pelvis [facility] [facility] compared to 10/19/2022 showed no evidence of disease progression in the chest.  Nodular density adjacent to the descending colon 6 mm nonspecific.  Otherwise unchanged appearance of hepatic metastatic nodules, right iliac adenopathy, and right pelvic mass.    -5/23/2023 [facility] medical oncology follow-up: I reviewed his report of CT chest abdomen pelvis from [place] 4 days ago showing no evidence of progression.  So area near the descending colon is nonspecific and would not change anything at this junction.  His October CT had not changed from July and the current one has not changed significantly since October.  We will continue to see palliative care for his right pelvic pain.  If leg swelling significantly worsens we could check Doppler and assuming no clot consider vascular intervention but as the thigh has not significantly changed over time nor is there significant tenderness in the calf I am doubtful that this is clot and is likely just due to the pelvic mass.  Vascular surgical intervention should swelling worsen is also an option but I would not want a stent placed through this area in the face malignancy particularly a mucinous variety that would make him prone towards clotting.  If the leg worsens I would get his Doppler and consider vascular intervention or anticoagulation as dictated but clinically stable so will not investigate further at this junction.  I will repeat his imaging again in 6 months.    -11/20/2023 CT chest, abdomen and pelvis shows stable appearances of the chest with no evidence of metastatic disease.  Abdomen and  pelvis reveals small interval decrease in size of the previously demonstrated peritoneal nodule adjacent to the descending colon.  No new suspicious omental/peritoneal soft tissue nodules or masses.  Stable right iliac adenopathy as well as a soft tissue mass along the right external iliac vasculature along the right pelvic sidewall.  -11/29/2023 [facility] follow-up: Current CT chest, abdomen and pelvis shows stable findings, no evidence of progression.  He has no new worrisome symptoms however he does still have swelling in his right thigh and lower pelvic pain for which she sees palliative care.  He states that it seems slightly more pronounced over the last few weeks, he has a follow-up with palliative care and will discuss further with him.  We reviewed recommendations discussed at his last visit with Dr. Delcid that as long as the swelling did not worsen significantly then we would hold off on considering any vascular intervention.  We will continue to monitor, Jose Antonio understands to let us know if he has any changes otherwise we will plan on repeating CT chest, abdomen and pelvis prior to return in 6 months and I have ordered those today.  His blood pressure was running high on arrival today, I did recheck it manually and it was 150/100.  He reports that he took his blood pressure earlier today at home and it was 140s over 85 or so.  I asked him to recheck when he got home and monitor and if his diastolic continued to run over 90 to get in with Dr. Dial for evaluation.    - 5/20/2023 CT chest abdomen pelvis [facility] [facility] compared to November shows 10 mm nodule left lower lobe and right lower lobe.  Anterior lobe pleural-based nodule unchanged.  6.1 x 4 cm right pelvic sidewall stable.  12 mm right common iliac node stable.  No ascites.    -5/28/2024 [facility] follow-up: No signs or symptoms of recurrence.  3 times in the last 6 months he had a 6-hour period of crampy abdominal  pain that subsequently subsided.  Potentially could have been having some obstructive symptomatology but none on imaging.  If this happens more frequently or it lasts he will let us know and also Dr. Peoples but would also go towards a more soft diet during those episodes and try FiberCon.  Otherwise imaging of the abdomen is stable but there is a couple of nodules in the lung that appeared new albeit small.  This is a very low-grade process and systemic therapies are not very effective and he is asymptomatic so we will just repeat CT chest in 3 months.  If this grows then we will consider getting tissue as it would be a little odd for this low-grade process to show up in the lung.    -8/28/2024 [place] [facility] chest with contrast compared to May 2024 shows left lower lobe nodule 16 mm compared to 10 mm with right lower lobe nodule 16 mm previously 10 mm and small subpleural right upper lobe nodule stable most likely benign intrapulmonary node and small stable nodule right lower lobe likely intrapulmonary.  Several small low-density liver lesions likely cysts.    -9/3/2024 [facility] clinic follow-up: No signs or symptoms of recurrence 1 lung nodule in each lung base has grown.  Other nodules stable.  High likelihood for recurrence.  Will get CT abdomen pelvis and then get him to Dr. Santos for consideration of navigational bronchoscopy/Ion biopsy these lesions and might consider CyberKnife for which I will get him to Dr. Moody for opinion and coordination with Dr. Santos pending results of CT abdomen pelvis.  If on the other hand his CT abdomen pelvis shows additional sites of progression beyond the lung then we will likely go back to systemic therapy of which he has very little likelihood to respond but I would like to get tissue for molecular testing.      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  Pathologic stage IIa with T3 extension into the base of the appendix through the muscularis propria but not into the serosal surface.  16 nodes negative.  Colonoscopy December 2017 negative postop     12/5/2017 Surgery    Surgery       Procedure:  Appendectomy and right hemicolectomy      Completeness of resection:  No evidence of residual tumor     Pathology revealed invasive moderately differentiated mucinous adenocarcinoma.  Right hemicolectomy: Benign colon and small intestine no evidence of carcinoma.  16 benign lymph nodes, 0/16, negative for dysplasia or malignancy.  Tumor size approximately 6 cm.  Grade 2, moderately differentiated.  Tumor invades through the muscularis profile into the base of appendix but does not extend to the serosal surface.  All margins uninvolved, no lymphovascular invasion identified.  Pathological stage pT3 pN0.         12/8/2017 Imaging    CT of the chest abdomen and pelvis negative.  Baseline CBC WBC 7100, hemoglobin 11.3, hematocrit 34.8%, platelet count 195,000.     3/20/2018 Procedure    Colonoscopy with Dr. Davidson was normal, recommendation for repeat surveillance colonoscopy in 3 years.     6/11/2018 Imaging    CT chest, abdomen and pelvis with no evidence metastatic disease.  CEA 1.1     9/24/2018 Imaging    CT abdomen pelvis showed no acute changes and nothing to suggest recurrence     12/28/2018 Imaging    CT chest, abdomen and pelvis negative. Normal CBC, CMP and CEA 0.7.     6/28/2019 Imaging    CT chest, abdomen and pelvis: No interval change from prior studies.  No recurrent or metastatic disease detected in the chest, abdomen or pelvis.  No acute process.  CEA 0.9     12/30/2019 Imaging    CT chest, abdomen and pelvis: No disease in the chest.  There was noted a subtle soft tissue density, one surrounding surgical clips in the right side of the pelvis and the other a soft tissue nodule intimately associated with the anterior wall of the  bladder, which are considered suspicious for recurrent disease.  CEA 1.1.  CMP with normal creatinine 0.9, total bilirubin 1.9, AST 34, ALT 24, alkaline phosphatase 93.  CBC with WBC 6900, hemoglobin 15.9, platelet count 232,000.       1/21/2020 Procedure    Normal colonoscopy with Dr. Davidson.  He has made referral to Dr. Portillo Peoples at .     2/7/2020 Progression    12/30/2019 CT chest, abdomen and pelvis: No disease in the chest.  There are subtle soft tissue densities (1 surrounding surgical clips in the right side of the pelvis and the other a soft tissue nodule intimately associated with the anterior wall of the bladder) which are considered suspicious for recurrent disease.  CMP with normal creatinine 0.9, total bilirubin 1.9, AST 34, ALT 24, alkaline phosphatase 93.  CBC with WBC 6900, hemoglobin 15.9, platelet count 232,000.  CEA 1.1.  2/7/2020 diagnostic laparoscopy with peritoneal biopsies performed at the [facility] by Dr. Peoples showed no sign of diffuse peritoneal carcinomatosis or liver metastasis.  There was a firm nodule in the superior dome of the bladder, adherent to omentum, as well as right pelvic sidewall nodule adjacent to surgical clips.  Biopsy of bladder dome nodule showed adenocarcinoma with mucinous features.     2/25/2020 -  Chemotherapy    OP COLORECTAL FOLFIRI Irinotecan / Leucovorin / Fluorouracil     5/29/2020 Imaging    CT chest abdomen pelvis shows slight improvement with decreased nodule anterolateral margin of urinary bladder with stable nodularity of the right lower quadrant adjacent to surgical clips and no progressive disease.  Slight hyperemia of the colonic mucosa     6/18/2020 Surgery    Surgery       -6/18/2020 Dr. Peoples performed exploratory laparotomy with excision of right lobe liver lesion and left lobe liver lesion, omentectomy, resection of pelvic peritoneal nodules, ileocolectomy with creation of ileal colostomy, hyperthermic intraperitoneal chemotherapy with  mitomycin-C at 42 °C for deep tissue penetration for 90 minutes.  Dr. Perdue performed partial cystectomy with right ureteral stent placement     8/5/2020 Imaging     CT abdomen pelvis with contrast shows small soft tissue nodule anterior aspect of bladder stable.  New small amount of ascites as well as a new small left pleural effusion.  Creatinine 0.75 with hemoglobin 11.1 otherwise unremarkable CBC and CMP     2/15/2021 Imaging    CT abdomen pelvis with contrast compared to 8/5/2020 shows enlarging soft tissue mass 4.2 cm over the bladder fundus and there is a calcification along the base of the urinary bladder.  Small stable multiple hypoattenuating indeterminate liver lesions.  Similar degree of ascites.     2/23/2021 Imaging    -2/23/2021 CT chest shows no evidence of metastasis with ascending aorta 42 mm.     4/22/2021 Surgery    -4/22/2021 cystoscopy (after prior office cystoscopy showed large fungating mass) with bilateral ureteral catheters, exploratory laparotomy, lysis of adhesions, right pelvic sidewall excisional biopsy, open partial cystectomy Dr. Ike Perdue. Peritoneal fluid showed predominant inflammatory reactive mesothelial cells with no malignancy. 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 Carcinoma was less than 1 mm from the lateral resection margins.     6/7/2021 Imaging    CT chest, abdomen and pelvis: No evidence of metastatic disease within the chest abdomen or pelvis.  Interval resection of enhancing bladder wall mass a small amount of residual enhancing soft tissue, possibly granulation tissue.  Interval resolution of abdominal and pelvic ascites.  Stable dilation of the ascending aorta mid segment measuring up to 41 mm.     9/10/2021 Imaging    CT chest abdomen pelvis with contrast shows under distended urinary bladder with mild asymmetric wall thickening dome and left lateral wall with a small 9  mm polypoid lesion in the bladder.  There is stable 2.3 cm right external iliac and a few other subcentimeter scattered nodes in the root of the small bowel mesentery and retroperitoneum.  Postsurgical right hemicolectomy changes.  Stable low-attenuation lesions in the liver unchanged.     11/24/2021 Imaging    CT chest abdomen pelvis compared to 6/7/2021 outside imaging shows stable 4.1 cm ascending thoracic aorta.  No lung nodules.  Liver homogenous with bilateral cysts.  No adenopathy.  Thickened sigmoid colon and rectum suggestive of mild colitis or postradiation change.  No pelvic adenopathy.  Bony structures degenerative in the spine.  Some soft tissue anterior to the acetabulum on the right and extending along the medial aspect right pelvic sidewall 2.7 x 5.6 may represent intramuscular process versus adenopathy which could not be ruled out.  No prior study views available.     4/11/2022 - 5/6/2022 Radiation    Radiation OncologyTreatment Course:  Esteban Tse received 5000 cGy in 20 fractions to right pelvis/groin via External Beam Radiation - EBRT.     5/25/2022 Imaging    CT chest abdomen pelvis with contrast at [facility] shows nothing remarkable on the chest.  Stable liver cysts.  Focal soft tissue right iliac region appears to be increasing in size now 7.2 cm previously 3.9 cm with some mass-effect on surrounding structures with several stable inguinal borderline nodes.     10/19/2022 Imaging    - 10/19/2022 CT chest abdomen pelvis with contrast [facility] compared to 7/7/2022 shows no significant change or evidence of progression.     Peritoneal carcinoma   5/14/2020 Initial Diagnosis    Peritoneal carcinoma (HCC)     5/25/2022 Imaging    CT chest abdomen pelvis with contrast at [facility] shows nothing remarkable on the chest.  Stable liver cysts.  Focal soft tissue right iliac region appears to be increasing in size now 7.2 cm previously 3.9 cm with some mass-effect on  "surrounding structures with several stable inguinal borderline nodes.         HISTORY OF PRESENT ILLNESS:  The patient is a 69 y.o. male, here for follow up on management of metastatic mucinous appendiceal carcinoma    Past Medical History:   Diagnosis Date   • Aneurysm 2022   • Ascending aortic aneurysm    • Benign hypertension    • Bladder cancer    • Colon cancer    • History of radiation therapy 05/06/2022    right groin/hemipelvis   • Hypercholesteremia 9/18/2023   • Hypertension    • Intermittent palpitations    • Mucinous adenocarcinoma    • Pounding heartbeat    • PVC's (premature ventricular contractions)      Past Surgical History:   Procedure Laterality Date   • APPENDECTOMY  2017    With Partial Colon    • BLADDER SURGERY      with partial colon   • CHOLECYSTECTOMY     • COLON SURGERY     • GALLBLADDER SURGERY         Allergies   Allergen Reactions   • Levofloxacin Swelling     Lips swellijng   • Tetracycline Swelling     Lips swelling   • Penicillins Other (See Comments)     childhood       Family History and Social History reviewed and changed as necessary    REVIEW OF SYSTEM:   No new somatic complaint    PHYSICAL EXAM:  No jaundice icterus or pallor.  No respiratory distress.    Vitals:    09/03/24 0956   BP: 137/87   Pulse: 80   Resp: 18   Temp: 98.4 °F (36.9 °C)   SpO2: 99%   Weight: 78.5 kg (173 lb)   Height: 185.4 cm (73\")     Vitals:    09/03/24 0956   PainSc: 0-No pain          ECOG score: 0           Vitals reviewed.    ECOG: (0) Fully Active - Able to Carry On All Pre-disease Performance Without Restriction    Lab Results   Component Value Date    HGB 14.0 03/21/2024    HCT 40.5 03/21/2024    MCV 86 03/21/2024     03/21/2024    WBC 6.3 03/21/2024    NEUTROABS 4.0 03/21/2024    LYMPHSABS 1.5 03/21/2024    MONOSABS 0.6 03/21/2024    EOSABS 0.1 03/21/2024    BASOSABS 0.0 03/21/2024       Lab Results   Component Value Date    GLUCOSE 92 03/21/2024    BUN 17 03/21/2024    CREATININE 1.11 " 03/21/2024     03/21/2024    K 4.5 03/21/2024     03/21/2024    CO2 21 03/21/2024    CALCIUM 9.1 03/21/2024    PROTEINTOT 6.4 06/06/2022    ALBUMIN 4.3 03/21/2024    BILITOT 1.0 03/21/2024    ALKPHOS 114 03/21/2024    AST 25 03/21/2024    ALT 20 03/21/2024             ASSESSMENT & PLAN:  1.  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  Pathologic stage IIa with T3 extension into the base of the appendix through the muscularis propria but not into the serosal surface.  16 nodes negative.  Colonoscopy December 2017 negative postop.Laparoscopic diagnostic peritoneal biopsy confirming disease in the bladder dome February 2020.  Began Folfiri.  June 2020 Dr. Peoples excised right lobe of liver and left lobe liver lesion, omentectomy and resection of pelvic peritoneal nodules, ileocolectomy with ileal colostomy and intraperitoneal HIPEC with mitomycin.  Dr. Lawler performed right ureteral stent placement and partial cystectomy.  With large fungating mass, 4/22/2021 had exploratory laparotomy with lysis of adhesions and right pelvic sidewall excisional biopsy with open partial cystectomy.  No malignancy and peritoneal fluid.  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  Margins less than 1 mm.  October 2021 cystoscopy without malignancy.  TURBT 10/11/2021 did not show malignancy.  March 2022 Dr. Peoples for 3.9 cm external iliac recurrent adenopathy 6 weeks out from laminectomy which did not help pain was determined to be an unresectable recurrence with muscle and vascular involvement.  Radiation with Dr. Moody ended 5/6/2022.  Disease stabilized and pain improved.    -12/30/2019 medical oncology office visit: The patient had been on surveillance since surgery December 2017.  CT scans had been negative up until 12/30/2019 CT chest abdomen and pelvis that showed subtle soft tissue density surrounding surgical clips  in the right side of the pelvis along with soft tissue nodule at the anterior wall of the bladder concerning for recurrent disease.  Patient sent back to Dr. Davidson for colonoscopy.    -2/7/2020 patient was referred to Dr. Peoples at the [facility], he underwent diagnostic laparoscopic and peritoneal biopsies that confirmed recurrent disease with biopsy of bladder dome nodule showing adenocarcinoma with mucinous features.  Port was placed at this time also.    -2/25/2020 began FOLFIRI    -4/21/2020 patient having increased fatigue, FOLFIRI dose reduced by 10%.    -5/19/2020 cycle 7 FOLFIRI    -6/18/2020 Dr. Peoples performed exploratory laparotomy with excision of right lobe liver lesion and left lobe liver lesion, omentectomy, resection of pelvic peritoneal nodules, ileocolectomy with creation of ileal colostomy, hyperthermic intraperitoneal chemotherapy with mitomycin-C at 42 °C for deep tissue penetration for 90 minutes.  Dr. Perdue performed partial cystectomy with right ureteral stent placement    -8/5/2020 CT abdomen pelvis with contrast shows small soft tissue nodule anterior aspect of bladder stable.  New small amount of ascites as well as a new small left pleural effusion.  Creatinine 0.75 with hemoglobin 11.1 otherwise unremarkable CBC and CMP.    -2/15/2021 CT abdomen pelvis with contrast compared to 8/5/2020 shows enlarging soft tissue mass 4.2 cm over the bladder fundus and there is a calcification along the base of the urinary bladder.  Small stable multiple hypoattenuating indeterminate liver lesions.  Similar degree of ascites.    -2/23/2021 CT chest shows no evidence of metastasis with ascending aorta 42 mm.    -3/8/2021 follow-up with Dr. Portillo Peoples we reviewed his CTs suggested team effort resection with Dr. Perdue    -3/16/2021 follow-up with Dr. Perdue.  He plans for open partial cystectomy, possible ureteral implants, cystoscopy and stent placement in concert with Dr. Peoples.    -4/22/2021  "cystoscopy (after prior office cystoscopy showed large fungating mass) with bilateral ureteral catheters, exploratory laparotomy, lysis of adhesions, right pelvic sidewall excisional biopsy, open partial cystectomy Dr. Ike Perdue. Peritoneal fluid showed predominant inflammatory reactive mesothelial cells with no malignancy. 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 Carcinoma was less than 1 mm from the lateral resection margins.    -5/18/2021 [facility] medical oncology follow-up visit: I reviewed his hospital notes, operative notes, and pathology report as above and went over this with the patient.  The general consensus from retrospective as well as a few prospective data over the last couple of decades with this low-grade malignancy does not show any profound benefit from \"adjuvant\" chemotherapy in this current setting.  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  There is no standard follow-up but general guidelines suggest following up as typical colon cancer.  I will repeat his CT chest abdomen pelvis now to reestablish his baseline postoperative imaging and have him see my nurse practitioner back in a couple of weeks to make sure there is no nia evidence of measurable residual disease.  Assuming this just shows postoperative changes, I would simply repeat scans again in 3 months or sooner as symptoms dictate.  If he has no evidence of persistent or metastatic disease on current imaging, then when he sees my nurse practitioner back she will also review what they say about his ascending aorta which was 42 mm in February and sent him to Dr. Evangelist Anthony for an opinion as to whether any intervention is needed relative to the aorta.  Obviously if his cancer is out of control on the upcoming scans then the aorta is a moot point.    -6/10/2021 [facility] oncology clinic follow-up: CT scans show no evidence of disease recurrence in the chest, " abdomen or pelvis.  Ascending aortic aneurysm stable at 41 mm.  Referral made to Dr. Anthony, cardiothoracic surgeon for management and evaluation of a sending aortic aneurysm.  Plan to repeat CT scans in 3 months.    -9/14/2021 [facility] medical oncology follow-up visit: I reviewed images and reports of 9/10/2021 CT chest abdomen pelvis with contrast which shows under distended urinary bladder with mild asymmetric wall thickening dome and left lateral wall with a small 9 mm polypoid lesion in the bladder.  There is stable 2.3 cm right external iliac and a few other subcentimeter scattered nodes in the root of the small bowel mesentery and retroperitoneum.  Postsurgical right hemicolectomy changes.  Stable low-attenuation lesions in the liver unchanged.  I will have him collect his discs from [facility] to take Dr. Perdue to decide whether to proceed with cystoscopy or just continue watchful waiting.  There is no major changes and I suspect we are looking at postoperative changes and we shall see him for video visit in a few weeks to see what urology at  decides.    -10/11/2021 cystoscopy Dr. Perdue showed marked acute and chronic inflammation but no evidence of malignancy.  Muscularis propria present.    -10/14/2021 [facility] medical oncology virtual visit: I reviewed reports of pathology from cystoscopy 10/11/2021 and went over this with patient.  He is feeling fairly fit.  I will repeat his CT chest abdomen pelvis prior to return in 3 months and if in the interim, when he follows up with Dr. Perdue in the next couple of weeks post cystoscopy, plans are made for further cystoscopy before I see him back and any malignancy is found, I would ask the patient and Dr. Perdue to touch base as I would not know of such without that contact as the information comes into our chart without notification now that epic is being used by .  Assuming no further biopsies in the interim, I will simply repeat his scans in 3  months to follow what I suspected and is now confirmed to be inflammatory changes.  From the aneurysm standpoint, he has follow-up scanning March 2022 arranged by Dr. Anthony and he will follow him up after that.    -11/24/2021 CT chest abdomen pelvis compared to 6/7/2021 outside imaging shows stable 4.1 cm ascending thoracic aorta.  No lung nodules.  Liver homogenous with bilateral cysts.  No adenopathy.  Thickened sigmoid colon and rectum suggestive of mild colitis or postradiation change.  No pelvic adenopathy.  Bony structures degenerative in the spine.  Some soft tissue anterior to the acetabulum on the right and extending along the medial aspect right pelvic sidewall 2.7 x 5.6 may represent intramuscular process versus adenopathy which could not be ruled out.  No prior study views available.    -1/4/2022 follow-up Dr. Ike Perdue [facility].  Per his note, he had TURBT 10/11/2021.  He had been placed on antibiotics for UTI for preop preparation for back surgery planned in 2 weeks.  Denies any mucus in his urine.  Some microhematuria on urinalysis this day.  No gross hematuria or dysuria.  The October 2021 TURBT did not reveal anything malignant on pathology.  There may be irritation or inflammation secondary to sutures in the bladder following partial cystectomy.  Patient asymptomatic from a urinary standpoint.  Recommended following up with [facility] oncology with no plans for further urology follow-up.    -2/4/2022 [facility] medical oncology telehealth follow-up visit: I reviewed his November images and reports with the patient as well as with Dr. Gm Moody and the note from Dr. Perdue from 1 month ago.  I do suspect this pelvic sidewall abnormality in November represents persistent disease but as I have stated in prior dictations, the data on palliative or overall survival benefit of systemic 5-FU based combination chemotherapy and/or Avastin does not show a clear-cut survival advantage to chemotherapy  "for asymptomatic disease.  He is having some back pain and had spine decompression a couple of weeks ago without much benefit.  This is an area that may potentially be radiated but I will check his CT chest (angiogram of chest) abdomen and pelvis and get his blood counts and chemistries and urinalysis and see him back for virtual visit in a few weeks.  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  I will also converse with Dr. Peoples at that junction whether he thinks he has anything to offer if this is progressing though I doubt it given that the last intervention was a urologic procedure and the last cystoscopy/TURBT in October had no malignancy and the liver lesions are currently being called liver cysts albeit I am skeptical as to whether those are truly benign but they are certainly not growing.    -3/4/2022 CT angiogram of chest/CT abdomen pelvis for evaluation of dizziness and surveillance of thoracic aortic aneurysm and appendiceal carcinoma.  Thoracic ascending aortic ectasia 4 x 4 cm stable.  Small nodularity left upper lobe stable from September.  Stable small hepatic hypodensities status post cholecystectomy.  Right external iliac soft tissue fullness now 3 x 3.9 cm previously 2 x 2.3 cm concerning for enlarging adenopathy with symmetric nonenlarged inguinal nodes stable.  CBC unremarkable.  CMP unremarkable save for potassium 5.4.  CEA 18.2.  1-3 red cells and white cells per milliliter of urine.    -3/16/2022 office visit with Dr. Portillo Peoples.  He reviewed the CT from 3/4/2022.  He notes that the laminectomy from 6 weeks prior has not helped his \"hip pain\".  Given the location of " this recurrence previously 2 x 2.3 cm now 3 x 3.9 cm in the external iliac area concerning for enlarging adenopathy with symmetric nonenlarged inguinal nodes stable, the mass appears unresectable with involvement of muscle and vasculature.    -3/24/2022 [facility] medical oncology follow-up virtual visit: Pain is still significant.  We will get him in hopefully in the next day to [facility] for palliative care.  Have spoken with Dr. Peoples and no surgery.  I have spoken with Dr. Moody who thinks palliative radiation while not likely to shrink tumor dramatically may help pain considerably and we will get him there.  We will get him to bring the discs to our [place] office and asked them to bring that back to [place] to get scanned in for our invasive radiologist to look at for us to get CT-guided biopsy of this node in the right lower quadrant and send that for Caris MI profile once the pathology is obtained to hopefully find high tumor mutational burden or other molecular targets that might give us some options.  Apart from that, as stated multiple times, this is not a highly chemotherapy sensitive tumor and I am not sure I would palliate him well but, when I see him back in early June with CAT scans prior to return and post radiation, if he is not getting relief and wants to try a 5-FU based regimen plus or minus Avastin I would be okay with that but he knows it does not alter survival 1 way or the other and we are simply trying to palliate this inexorable process.  No other surgical options.    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  Mismatch repair proficient,NTRK1/2/3 fusion not detected.  BRAF V600E negative.  PD-L1 Negative, 1+, 5%.  PTEN Positive, 1+, 100%. MLH1 positive/3+, 100%. MSH2 positive/3+, 100%. MSH6 positive/3+, 100%. PMS2 positive/3+,100%.    -5/6/2022 last radiation    - 5/25/2022 CT chest abdomen  pelvis with contrast at [facility] shows nothing remarkable on the chest.  Stable liver cysts.  Focal soft tissue right iliac region appears to be increasing in size now 7.2 cm previously 3.9 cm with some mass-effect on surrounding structures with several stable inguinal borderline nodes.    -6/6/2022 [facility] medical oncology follow-up visit: Now 1 month out from radiation just starting to get a little bit of improvement in his abdominal discomfort.  Slight growth on CT but some of that may be postradiation change this close to radiation and, as I stated in my note in March, it is not clear that 5-FU based therapy plus or minus Avastin is going to palliate much given its relatively insensitivity to chemotherapy and it certainly does not improve survival.  To that end, we will plan on just repeating his CAT scans again in about 6 weeks and if the pain is worsening and/or this continues to grow then I will probably give him Avastin FOLFOX.  If everything is stable and pain is controlled we will go continue watchful waiting with imaging about every 3 months from that point forward.    -7/11/2022 [facility] medical oncology follow-up: His 7/7/2022 CT chest abdomen pelvis showed stable right external iliac low-attenuation which had been previously growing and thickening of the distal descending and sigmoid colon with perhaps a small right iliac borderline 1 cm node.  Symptomatically improved post radiation.  For now we will hold on Avastin FOLFOX as the benefit of such is questionable with this histologic subtype and there are no actionable molecular targets.  Repeat CTs prior to return in 3 months.    -11/1/2022 [facility] medical oncology follow-up: 10/19/2022 CT chest abdomen pelvis with contrast [facility] compared to 7/7/2022 shows no significant change or evidence of progression.  Clinically quiescent.  We will repeat CTs in 6 months, sooner as symptoms dictate.    -5/19/2023 CT chest abdomen pelvis  [facility] compared to 10/19/2022 showed no evidence of disease progression in the chest.  Nodular density adjacent to the descending colon 6 mm nonspecific.  Otherwise unchanged appearance of hepatic metastatic nodules, right iliac adenopathy, and right pelvic mass.    -5/23/2023 [facility] medical oncology follow-up: I reviewed his report of CT chest abdomen pelvis from [place] 4 days ago showing no evidence of progression.  So area near the descending colon is nonspecific and would not change anything at this junction.  His October CT had not changed from July and the current one has not changed significantly since October.  We will continue to see palliative care for his right pelvic pain.  If leg swelling significantly worsens we could check Doppler and assuming no clot consider vascular intervention but as the thigh has not significantly changed over time nor is there significant tenderness in the calf I am doubtful that this is clot and is likely just due to the pelvic mass.  Vascular surgical intervention should swelling worsen is also an option but I would not want a stent placed through this area in the face malignancy particularly a mucinous variety that would make him prone towards clotting.  If the leg worsens I would get his Doppler and consider vascular intervention or anticoagulation as dictated but clinically stable so will not investigate further at this junction.  I will repeat his imaging again in 6 months.    -11/20/2023 CT chest, abdomen and pelvis shows stable appearances of the chest with no evidence of metastatic disease.  Abdomen and pelvis reveals small interval decrease in size of the previously demonstrated peritoneal nodule adjacent to the descending colon.  No new suspicious omental/peritoneal soft tissue nodules or masses.  Stable right iliac adenopathy as well as a soft tissue mass along the right external iliac vasculature along the right pelvic sidewall.  -11/29/2023 [facility]  Oncology clinic follow-up: Current CT chest, abdomen and pelvis shows stable findings, no evidence of progression.  He has no new worrisome symptoms however he does still have swelling in his right thigh and lower pelvic pain for which she sees palliative care.  He states that it seems slightly more pronounced over the last few weeks, he has a follow-up with palliative care and will discuss further with him.  We reviewed recommendations discussed at his last visit with Dr. Delcid that as long as the swelling did not worsen significantly then we would hold off on considering any vascular intervention.  We will continue to monitor, Jose Antonio understands to let us know if he has any changes otherwise we will plan on repeating CT chest, abdomen and pelvis prior to return in 6 months and I have ordered those today.  His blood pressure was running high on arrival today, I did recheck it manually and it was 150/100.  He reports that he took his blood pressure earlier today at home and it was 140s over 85 or so.  I asked him to recheck when he got home and monitor and if his diastolic continued to run over 90 to get in with Dr. Dial for evaluation.    - 5/20/2023 CT chest abdomen pelvis [facility] compared to November shows 10 mm nodule left lower lobe and right lower lobe.  Anterior lobe pleural-based nodule unchanged.  6.1 x 4 cm right pelvic sidewall stable.  12 mm right common iliac node stable.  No ascites.    -5/28/2024 [facility] oncology clinic follow-up: No signs or symptoms of recurrence.  3 times in the last 6 months he had a 6-hour period of crampy abdominal pain that subsequently subsided.  Potentially could have been having some obstructive symptomatology but none on imaging.  If this happens more frequently or it lasts he will let us know and also Dr. Peoples but would also go towards a more soft diet during those episodes and try FiberCon.  Otherwise imaging of the abdomen is stable but there is a couple of  nodules in the lung that appeared new albeit small.  This is a very low-grade process and systemic therapies are not very effective and he is asymptomatic so we will just repeat CT chest in 3 months.  If this grows then we will consider getting tissue as it would be a little odd for this low-grade process to show up in the lung.    -8/28/2024 [facility] chest with contrast compared to May 2024 shows left lower lobe nodule 16 mm compared to 10 mm with right lower lobe nodule 16 mm previously 10 mm and small subpleural right upper lobe nodule stable most likely benign intrapulmonary node and small stable nodule right lower lobe likely intrapulmonary.  Several small low-density liver lesions likely cysts.    -9/3/2024 [facility] oncology clinic follow-up: No signs or symptoms of recurrence 1 lung nodule in each lung base has grown.  Other nodules stable.  High likelihood for recurrence.  Will get CT abdomen pelvis and then get him to Dr. Santos for consideration of navigational bronchoscopy/Ion biopsy these lesions and might consider CyberKnife for which I will get him to Dr. Moody for opinion and coordination with Dr. Santos pending results of CT abdomen pelvis.  If on the other hand his CT abdomen pelvis shows additional sites of progression beyond the lung then we will likely go back to systemic therapy of which he has very little likelihood to respond but I would like to get tissue for molecular testing.    Total time of care today inclusive of time spent today prior to his arrival reviewing interval data and during visit translating that information to patient communicating these plans as outlined to his other providers and secure chatting with Dr. Santos and after visit instituting this plan took 50 minutes of patient care time throughout the day today.    Cy Delcid MD    09/03/2024

## 2024-09-04 LAB — CEA SERPL-MCNC: 3.6 NG/ML (ref 0–4.7)

## 2024-09-11 DIAGNOSIS — G89.3 CANCER ASSOCIATED PAIN: ICD-10-CM

## 2024-09-11 RX ORDER — GABAPENTIN 600 MG/1
600 TABLET ORAL 3 TIMES DAILY
Qty: 90 TABLET | Refills: 0 | Status: SHIPPED | OUTPATIENT
Start: 2024-09-11 | End: 2024-10-11

## 2024-09-11 NOTE — TELEPHONE ENCOUNTER
YAIMA #: [id number]    Medication requested: gabapentin (NEURONTIN) 600 MG tablet      Last fill date: 8/6/24    Last appointment: 7/18/24    Next appointment: 10/21/24

## 2024-09-12 ENCOUNTER — PREP FOR SURGERY (OUTPATIENT)
Dept: OTHER | Facility: HOSPITAL | Age: 69
End: 2024-09-12
Payer: MEDICARE

## 2024-09-12 DIAGNOSIS — R91.1 LUNG NODULE: Primary | ICD-10-CM

## 2024-09-12 DIAGNOSIS — R91.8 LUNG NODULES: Primary | ICD-10-CM

## 2024-09-12 RX ORDER — SODIUM CHLORIDE 0.9 % (FLUSH) 0.9 %
10 SYRINGE (ML) INJECTION EVERY 12 HOURS SCHEDULED
OUTPATIENT
Start: 2024-09-12

## 2024-09-12 RX ORDER — LIDOCAINE HYDROCHLORIDE 40 MG/ML
4 INJECTION, SOLUTION RETROBULBAR; TOPICAL ONCE
OUTPATIENT
Start: 2024-09-12 | End: 2024-09-12

## 2024-09-12 RX ORDER — SODIUM CHLORIDE 0.9 % (FLUSH) 0.9 %
10 SYRINGE (ML) INJECTION AS NEEDED
OUTPATIENT
Start: 2024-09-12

## 2024-09-12 RX ORDER — SODIUM CHLORIDE 9 MG/ML
40 INJECTION, SOLUTION INTRAVENOUS AS NEEDED
OUTPATIENT
Start: 2024-09-12

## 2024-09-16 PROBLEM — R91.8 LUNG NODULES: Status: ACTIVE | Noted: 2024-09-12

## 2024-09-17 ENCOUNTER — TELEPHONE (OUTPATIENT)
Dept: ONCOLOGY | Facility: CLINIC | Age: 69
End: 2024-09-17
Payer: MEDICARE

## 2024-09-25 ENCOUNTER — HOSPITAL ENCOUNTER (OUTPATIENT)
Dept: CT IMAGING | Facility: HOSPITAL | Age: 69
Discharge: HOME | End: 2024-09-25
Payer: MEDICARE

## 2024-09-25 ENCOUNTER — PRE-ADMISSION TESTING (OUTPATIENT)
Dept: PREADMISSION TESTING | Facility: HOSPITAL | Age: 69
End: 2024-09-25
Payer: MEDICARE

## 2024-09-25 VITALS — BODY MASS INDEX: 22.98 KG/M2 | HEIGHT: 73 IN | WEIGHT: 173.39 LBS

## 2024-09-25 DIAGNOSIS — R91.1 LUNG NODULE: ICD-10-CM

## 2024-09-25 DIAGNOSIS — R91.8 LUNG NODULES: ICD-10-CM

## 2024-09-25 LAB
ALBUMIN SERPL-MCNC: 4.5 G/DL (ref 3.5–5.2)
ALBUMIN/GLOB SERPL: 1.9 G/DL
ALP SERPL-CCNC: 103 U/L (ref 39–117)
ALT SERPL W P-5'-P-CCNC: 19 U/L (ref 1–41)
ANION GAP SERPL CALCULATED.3IONS-SCNC: 9 MMOL/L (ref 5–15)
APTT PPP: 31.8 SECONDS (ref 22–39)
AST SERPL-CCNC: 25 U/L (ref 1–40)
BASOPHILS # BLD AUTO: 0.03 10*3/MM3 (ref 0–0.2)
BASOPHILS NFR BLD AUTO: 0.5 % (ref 0–1.5)
BILIRUB SERPL-MCNC: 1 MG/DL (ref 0–1.2)
BUN SERPL-MCNC: 11 MG/DL (ref 8–23)
BUN/CREAT SERPL: 10.1 (ref 7–25)
CALCIUM SPEC-SCNC: 9.4 MG/DL (ref 8.6–10.5)
CHLORIDE SERPL-SCNC: 101 MMOL/L (ref 98–107)
CO2 SERPL-SCNC: 29 MMOL/L (ref 22–29)
CREAT SERPL-MCNC: 1.09 MG/DL (ref 0.76–1.27)
DEPRECATED RDW RBC AUTO: 40.7 FL (ref 37–54)
EGFRCR SERPLBLD CKD-EPI 2021: 74.4 ML/MIN/1.73
EOSINOPHIL # BLD AUTO: 0.08 10*3/MM3 (ref 0–0.4)
EOSINOPHIL NFR BLD AUTO: 1.3 % (ref 0.3–6.2)
ERYTHROCYTE [DISTWIDTH] IN BLOOD BY AUTOMATED COUNT: 13 % (ref 12.3–15.4)
GLOBULIN UR ELPH-MCNC: 2.4 GM/DL
GLUCOSE SERPL-MCNC: 109 MG/DL (ref 65–99)
HCT VFR BLD AUTO: 42.2 % (ref 37.5–51)
HGB BLD-MCNC: 14.2 G/DL (ref 13–17.7)
IMM GRANULOCYTES # BLD AUTO: 0.01 10*3/MM3 (ref 0–0.05)
IMM GRANULOCYTES NFR BLD AUTO: 0.2 % (ref 0–0.5)
INR PPP: 1 (ref 0.89–1.12)
LYMPHOCYTES # BLD AUTO: 1.47 10*3/MM3 (ref 0.7–3.1)
LYMPHOCYTES NFR BLD AUTO: 23.4 % (ref 19.6–45.3)
MCH RBC QN AUTO: 29.3 PG (ref 26.6–33)
MCHC RBC AUTO-ENTMCNC: 33.6 G/DL (ref 31.5–35.7)
MCV RBC AUTO: 87.2 FL (ref 79–97)
MONOCYTES # BLD AUTO: 0.55 10*3/MM3 (ref 0.1–0.9)
MONOCYTES NFR BLD AUTO: 8.7 % (ref 5–12)
NEUTROPHILS NFR BLD AUTO: 4.15 10*3/MM3 (ref 1.7–7)
NEUTROPHILS NFR BLD AUTO: 65.9 % (ref 42.7–76)
NRBC BLD AUTO-RTO: 0 /100 WBC (ref 0–0.2)
PLATELET # BLD AUTO: 237 10*3/MM3 (ref 140–450)
PMV BLD AUTO: 10.8 FL (ref 6–12)
POTASSIUM SERPL-SCNC: 4.3 MMOL/L (ref 3.5–5.2)
PROT SERPL-MCNC: 6.9 G/DL (ref 6–8.5)
PROTHROMBIN TIME: 13.3 SECONDS (ref 12.2–14.5)
QT INTERVAL: 404 MS
QTC INTERVAL: 432 MS
RBC # BLD AUTO: 4.84 10*6/MM3 (ref 4.14–5.8)
SODIUM SERPL-SCNC: 139 MMOL/L (ref 136–145)
WBC NRBC COR # BLD AUTO: 6.29 10*3/MM3 (ref 3.4–10.8)

## 2024-09-25 PROCEDURE — 85730 THROMBOPLASTIN TIME PARTIAL: CPT

## 2024-09-25 PROCEDURE — 85610 PROTHROMBIN TIME: CPT

## 2024-09-25 PROCEDURE — 36415 COLL VENOUS BLD VENIPUNCTURE: CPT

## 2024-09-25 PROCEDURE — 71250 CT THORAX DX C-: CPT

## 2024-09-25 PROCEDURE — 93005 ELECTROCARDIOGRAM TRACING: CPT

## 2024-09-25 PROCEDURE — 85025 COMPLETE CBC W/AUTO DIFF WBC: CPT

## 2024-09-25 PROCEDURE — 80053 COMPREHEN METABOLIC PANEL: CPT

## 2024-09-26 ENCOUNTER — OFFICE VISIT (OUTPATIENT)
Dept: ONCOLOGY | Facility: CLINIC | Age: 69
End: 2024-09-26
Payer: MEDICARE

## 2024-09-26 VITALS
HEART RATE: 99 BPM | OXYGEN SATURATION: 99 % | DIASTOLIC BLOOD PRESSURE: 93 MMHG | BODY MASS INDEX: 22.8 KG/M2 | HEIGHT: 73 IN | WEIGHT: 172 LBS | SYSTOLIC BLOOD PRESSURE: 146 MMHG | TEMPERATURE: 98.4 F | RESPIRATION RATE: 18 BRPM

## 2024-09-26 DIAGNOSIS — C18.1 MUCINOUS ADENOCARCINOMA OF APPENDIX: Primary | ICD-10-CM

## 2024-09-26 DIAGNOSIS — C48.2 PERITONEAL CARCINOMA: ICD-10-CM

## 2024-09-26 PROCEDURE — 1159F MED LIST DOCD IN RCRD: CPT | Performed by: INTERNAL MEDICINE

## 2024-09-26 PROCEDURE — 99214 OFFICE O/P EST MOD 30 MIN: CPT | Performed by: INTERNAL MEDICINE

## 2024-09-26 PROCEDURE — 1160F RVW MEDS BY RX/DR IN RCRD: CPT | Performed by: INTERNAL MEDICINE

## 2024-09-26 PROCEDURE — 1126F AMNT PAIN NOTED NONE PRSNT: CPT | Performed by: INTERNAL MEDICINE

## 2024-09-26 PROCEDURE — 3080F DIAST BP >= 90 MM HG: CPT | Performed by: INTERNAL MEDICINE

## 2024-09-26 PROCEDURE — 3077F SYST BP >= 140 MM HG: CPT | Performed by: INTERNAL MEDICINE

## 2024-10-01 ENCOUNTER — PATIENT MESSAGE (OUTPATIENT)
Dept: PALLIATIVE CARE | Facility: CLINIC | Age: 69
End: 2024-10-01
Payer: MEDICARE

## 2024-10-01 DIAGNOSIS — G89.3 CANCER RELATED PAIN: Primary | ICD-10-CM

## 2024-10-01 NOTE — TELEPHONE ENCOUNTER
YAIMA #: [id number]    Medication requested: Oxycodone 10 MG IR    Last fill date: 7/19/24    Last appointment: 7/18/24    Next appointment: 10/21/24

## 2024-10-02 ENCOUNTER — ANESTHESIA EVENT (OUTPATIENT)
Dept: GASTROENTEROLOGY | Facility: HOSPITAL | Age: 69
End: 2024-10-02
Payer: MEDICARE

## 2024-10-02 ENCOUNTER — ANESTHESIA (OUTPATIENT)
Dept: GASTROENTEROLOGY | Facility: HOSPITAL | Age: 69
End: 2024-10-02
Payer: MEDICARE

## 2024-10-02 ENCOUNTER — APPOINTMENT (OUTPATIENT)
Dept: GENERAL RADIOLOGY | Facility: HOSPITAL | Age: 69
End: 2024-10-02
Payer: MEDICARE

## 2024-10-02 ENCOUNTER — HOSPITAL ENCOUNTER (OUTPATIENT)
Facility: HOSPITAL | Age: 69
Setting detail: HOSPITAL OUTPATIENT SURGERY
Discharge: HOME | End: 2024-10-02
Attending: INTERNAL MEDICINE | Admitting: INTERNAL MEDICINE
Payer: MEDICARE

## 2024-10-02 VITALS
HEART RATE: 93 BPM | TEMPERATURE: 98.4 F | OXYGEN SATURATION: 97 % | RESPIRATION RATE: 18 BRPM | DIASTOLIC BLOOD PRESSURE: 90 MMHG | SYSTOLIC BLOOD PRESSURE: 144 MMHG

## 2024-10-02 DIAGNOSIS — R91.8 LUNG NODULES: ICD-10-CM

## 2024-10-02 PROCEDURE — 31654 BRONCH EBUS IVNTJ PERPH LES: CPT | Performed by: INTERNAL MEDICINE

## 2024-10-02 PROCEDURE — 31624 DX BRONCHOSCOPE/LAVAGE: CPT | Performed by: INTERNAL MEDICINE

## 2024-10-02 PROCEDURE — 25010000002 DEXAMETHASONE PER 1 MG: Performed by: NURSE ANESTHETIST, CERTIFIED REGISTERED

## 2024-10-02 PROCEDURE — C1726 CATH, BAL DIL, NON-VASCULAR: HCPCS | Performed by: INTERNAL MEDICINE

## 2024-10-02 PROCEDURE — 25010000002 LIDOCAINE PF 1% 1 % SOLUTION: Performed by: NURSE ANESTHETIST, CERTIFIED REGISTERED

## 2024-10-02 PROCEDURE — 88305 TISSUE EXAM BY PATHOLOGIST: CPT | Performed by: INTERNAL MEDICINE

## 2024-10-02 PROCEDURE — 87205 SMEAR GRAM STAIN: CPT | Performed by: INTERNAL MEDICINE

## 2024-10-02 PROCEDURE — 25810000003 SODIUM CHLORIDE 0.9 % SOLUTION: Performed by: NURSE ANESTHETIST, CERTIFIED REGISTERED

## 2024-10-02 PROCEDURE — 25010000002 MIDAZOLAM PER 1 MG: Performed by: NURSE ANESTHETIST, CERTIFIED REGISTERED

## 2024-10-02 PROCEDURE — 87206 SMEAR FLUORESCENT/ACID STAI: CPT | Performed by: INTERNAL MEDICINE

## 2024-10-02 PROCEDURE — 31628 BRONCHOSCOPY/LUNG BX EACH: CPT | Performed by: INTERNAL MEDICINE

## 2024-10-02 PROCEDURE — 25010000002 SUGAMMADEX 500 MG/5ML SOLUTION: Performed by: NURSE ANESTHETIST, CERTIFIED REGISTERED

## 2024-10-02 PROCEDURE — 31632 BRONCHOSCOPY/LUNG BX ADDL: CPT | Performed by: INTERNAL MEDICINE

## 2024-10-02 PROCEDURE — 31629 BRONCHOSCOPY/NEEDLE BX EACH: CPT | Performed by: INTERNAL MEDICINE

## 2024-10-02 PROCEDURE — 87102 FUNGUS ISOLATION CULTURE: CPT | Performed by: INTERNAL MEDICINE

## 2024-10-02 PROCEDURE — 99204 OFFICE O/P NEW MOD 45 MIN: CPT | Performed by: INTERNAL MEDICINE

## 2024-10-02 PROCEDURE — 31633 BRONCHOSCOPY/NEEDLE BX ADDL: CPT | Performed by: INTERNAL MEDICINE

## 2024-10-02 PROCEDURE — 31627 NAVIGATIONAL BRONCHOSCOPY: CPT | Performed by: INTERNAL MEDICINE

## 2024-10-02 PROCEDURE — 31623 DX BRONCHOSCOPE/BRUSH: CPT | Performed by: INTERNAL MEDICINE

## 2024-10-02 PROCEDURE — 25010000002 FENTANYL CITRATE (PF) 100 MCG/2ML SOLUTION: Performed by: NURSE ANESTHETIST, CERTIFIED REGISTERED

## 2024-10-02 PROCEDURE — 25010000002 PROPOFOL 10 MG/ML EMULSION: Performed by: NURSE ANESTHETIST, CERTIFIED REGISTERED

## 2024-10-02 PROCEDURE — 25010000002 ONDANSETRON PER 1 MG: Performed by: NURSE ANESTHETIST, CERTIFIED REGISTERED

## 2024-10-02 PROCEDURE — 88341 IMHCHEM/IMCYTCHM EA ADD ANTB: CPT | Performed by: INTERNAL MEDICINE

## 2024-10-02 PROCEDURE — 71045 X-RAY EXAM CHEST 1 VIEW: CPT

## 2024-10-02 PROCEDURE — 87116 MYCOBACTERIA CULTURE: CPT | Performed by: INTERNAL MEDICINE

## 2024-10-02 PROCEDURE — 87070 CULTURE OTHR SPECIMN AEROBIC: CPT | Performed by: INTERNAL MEDICINE

## 2024-10-02 PROCEDURE — 76000 FLUOROSCOPY <1 HR PHYS/QHP: CPT

## 2024-10-02 RX ORDER — ROCURONIUM BROMIDE 10 MG/ML
INJECTION, SOLUTION INTRAVENOUS AS NEEDED
Status: DISCONTINUED | OUTPATIENT
Start: 2024-10-02 | End: 2024-10-02 | Stop reason: SURG

## 2024-10-02 RX ORDER — DROPERIDOL 2.5 MG/ML
0.62 INJECTION, SOLUTION INTRAMUSCULAR; INTRAVENOUS ONCE AS NEEDED
Status: DISCONTINUED | OUTPATIENT
Start: 2024-10-02 | End: 2024-10-02 | Stop reason: HOSPADM

## 2024-10-02 RX ORDER — LIDOCAINE HYDROCHLORIDE 40 MG/ML
4 INJECTION, SOLUTION RETROBULBAR; TOPICAL ONCE
Status: DISCONTINUED | OUTPATIENT
Start: 2024-10-02 | End: 2024-10-02 | Stop reason: HOSPADM

## 2024-10-02 RX ORDER — OXYCODONE HYDROCHLORIDE 10 MG/1
10 TABLET ORAL EVERY 4 HOURS PRN
Qty: 120 TABLET | Refills: 0 | Status: SHIPPED | OUTPATIENT
Start: 2024-10-02 | End: 2024-11-01

## 2024-10-02 RX ORDER — LABETALOL HYDROCHLORIDE 5 MG/ML
5 INJECTION, SOLUTION INTRAVENOUS
Status: DISCONTINUED | OUTPATIENT
Start: 2024-10-02 | End: 2024-10-02 | Stop reason: HOSPADM

## 2024-10-02 RX ORDER — HYDROCODONE BITARTRATE AND ACETAMINOPHEN 5; 325 MG/1; MG/1
1 TABLET ORAL ONCE AS NEEDED
Status: DISCONTINUED | OUTPATIENT
Start: 2024-10-02 | End: 2024-10-02 | Stop reason: HOSPADM

## 2024-10-02 RX ORDER — IPRATROPIUM BROMIDE AND ALBUTEROL SULFATE 2.5; .5 MG/3ML; MG/3ML
3 SOLUTION RESPIRATORY (INHALATION) ONCE AS NEEDED
Status: DISCONTINUED | OUTPATIENT
Start: 2024-10-02 | End: 2024-10-02 | Stop reason: HOSPADM

## 2024-10-02 RX ORDER — SODIUM CHLORIDE 9 MG/ML
40 INJECTION, SOLUTION INTRAVENOUS AS NEEDED
Status: DISCONTINUED | OUTPATIENT
Start: 2024-10-02 | End: 2024-10-02 | Stop reason: HOSPADM

## 2024-10-02 RX ORDER — HYDROMORPHONE HYDROCHLORIDE 1 MG/ML
0.5 INJECTION, SOLUTION INTRAMUSCULAR; INTRAVENOUS; SUBCUTANEOUS
Status: DISCONTINUED | OUTPATIENT
Start: 2024-10-02 | End: 2024-10-02 | Stop reason: HOSPADM

## 2024-10-02 RX ORDER — MIDAZOLAM HYDROCHLORIDE 1 MG/ML
INJECTION INTRAMUSCULAR; INTRAVENOUS AS NEEDED
Status: DISCONTINUED | OUTPATIENT
Start: 2024-10-02 | End: 2024-10-02 | Stop reason: SURG

## 2024-10-02 RX ORDER — DROPERIDOL 2.5 MG/ML
0.62 INJECTION, SOLUTION INTRAMUSCULAR; INTRAVENOUS
Status: DISCONTINUED | OUTPATIENT
Start: 2024-10-02 | End: 2024-10-02 | Stop reason: HOSPADM

## 2024-10-02 RX ORDER — PROMETHAZINE HYDROCHLORIDE 25 MG/1
25 TABLET ORAL ONCE AS NEEDED
Status: DISCONTINUED | OUTPATIENT
Start: 2024-10-02 | End: 2024-10-02 | Stop reason: HOSPADM

## 2024-10-02 RX ORDER — SODIUM CHLORIDE 0.9 % (FLUSH) 0.9 %
3 SYRINGE (ML) INJECTION EVERY 12 HOURS SCHEDULED
Status: DISCONTINUED | OUTPATIENT
Start: 2024-10-02 | End: 2024-10-02 | Stop reason: HOSPADM

## 2024-10-02 RX ORDER — ONDANSETRON 2 MG/ML
4 INJECTION INTRAMUSCULAR; INTRAVENOUS ONCE AS NEEDED
Status: DISCONTINUED | OUTPATIENT
Start: 2024-10-02 | End: 2024-10-02 | Stop reason: HOSPADM

## 2024-10-02 RX ORDER — BUPIVACAINE HCL/0.9 % NACL/PF 0.125 %
PLASTIC BAG, INJECTION (ML) EPIDURAL AS NEEDED
Status: DISCONTINUED | OUTPATIENT
Start: 2024-10-02 | End: 2024-10-02 | Stop reason: SURG

## 2024-10-02 RX ORDER — PROPOFOL 10 MG/ML
VIAL (ML) INTRAVENOUS AS NEEDED
Status: DISCONTINUED | OUTPATIENT
Start: 2024-10-02 | End: 2024-10-02 | Stop reason: SURG

## 2024-10-02 RX ORDER — ONDANSETRON 2 MG/ML
INJECTION INTRAMUSCULAR; INTRAVENOUS AS NEEDED
Status: DISCONTINUED | OUTPATIENT
Start: 2024-10-02 | End: 2024-10-02 | Stop reason: SURG

## 2024-10-02 RX ORDER — SODIUM CHLORIDE 0.9 % (FLUSH) 0.9 %
10 SYRINGE (ML) INJECTION AS NEEDED
Status: DISCONTINUED | OUTPATIENT
Start: 2024-10-02 | End: 2024-10-02 | Stop reason: HOSPADM

## 2024-10-02 RX ORDER — SODIUM CHLORIDE 0.9 % (FLUSH) 0.9 %
10 SYRINGE (ML) INJECTION EVERY 12 HOURS SCHEDULED
Status: DISCONTINUED | OUTPATIENT
Start: 2024-10-02 | End: 2024-10-02 | Stop reason: HOSPADM

## 2024-10-02 RX ORDER — DEXAMETHASONE SODIUM PHOSPHATE 4 MG/ML
INJECTION, SOLUTION INTRA-ARTICULAR; INTRALESIONAL; INTRAMUSCULAR; INTRAVENOUS; SOFT TISSUE AS NEEDED
Status: DISCONTINUED | OUTPATIENT
Start: 2024-10-02 | End: 2024-10-02 | Stop reason: SURG

## 2024-10-02 RX ORDER — FENTANYL CITRATE 50 UG/ML
INJECTION, SOLUTION INTRAMUSCULAR; INTRAVENOUS AS NEEDED
Status: DISCONTINUED | OUTPATIENT
Start: 2024-10-02 | End: 2024-10-02 | Stop reason: SDUPTHER

## 2024-10-02 RX ORDER — MEPERIDINE HYDROCHLORIDE 50 MG/ML
12.5 INJECTION INTRAMUSCULAR; INTRAVENOUS; SUBCUTANEOUS
Status: DISCONTINUED | OUTPATIENT
Start: 2024-10-02 | End: 2024-10-02 | Stop reason: HOSPADM

## 2024-10-02 RX ORDER — SODIUM CHLORIDE 0.9 % (FLUSH) 0.9 %
3-10 SYRINGE (ML) INJECTION AS NEEDED
Status: DISCONTINUED | OUTPATIENT
Start: 2024-10-02 | End: 2024-10-02 | Stop reason: HOSPADM

## 2024-10-02 RX ORDER — FENTANYL CITRATE 50 UG/ML
50 INJECTION, SOLUTION INTRAMUSCULAR; INTRAVENOUS
Status: DISCONTINUED | OUTPATIENT
Start: 2024-10-02 | End: 2024-10-02 | Stop reason: HOSPADM

## 2024-10-02 RX ORDER — NALOXONE HCL 0.4 MG/ML
0.4 VIAL (ML) INJECTION AS NEEDED
Status: DISCONTINUED | OUTPATIENT
Start: 2024-10-02 | End: 2024-10-02 | Stop reason: HOSPADM

## 2024-10-02 RX ORDER — SODIUM CHLORIDE 9 MG/ML
INJECTION, SOLUTION INTRAVENOUS CONTINUOUS PRN
Status: DISCONTINUED | OUTPATIENT
Start: 2024-10-02 | End: 2024-10-02 | Stop reason: SURG

## 2024-10-02 RX ORDER — LIDOCAINE HYDROCHLORIDE 10 MG/ML
INJECTION, SOLUTION EPIDURAL; INFILTRATION; INTRACAUDAL; PERINEURAL AS NEEDED
Status: DISCONTINUED | OUTPATIENT
Start: 2024-10-02 | End: 2024-10-02 | Stop reason: SURG

## 2024-10-02 RX ORDER — HYDRALAZINE HYDROCHLORIDE 20 MG/ML
5 INJECTION INTRAMUSCULAR; INTRAVENOUS
Status: DISCONTINUED | OUTPATIENT
Start: 2024-10-02 | End: 2024-10-02 | Stop reason: HOSPADM

## 2024-10-02 RX ORDER — PROMETHAZINE HYDROCHLORIDE 25 MG/1
25 SUPPOSITORY RECTAL ONCE AS NEEDED
Status: DISCONTINUED | OUTPATIENT
Start: 2024-10-02 | End: 2024-10-02 | Stop reason: HOSPADM

## 2024-10-02 RX ADMIN — Medication 100 MCG: at 13:20

## 2024-10-02 RX ADMIN — Medication 200 MCG: at 14:09

## 2024-10-02 RX ADMIN — SODIUM CHLORIDE: 9 INJECTION, SOLUTION INTRAVENOUS at 12:55

## 2024-10-02 RX ADMIN — Medication 150 MCG: at 13:31

## 2024-10-02 RX ADMIN — Medication 150 MCG: at 13:44

## 2024-10-02 RX ADMIN — DEXAMETHASONE SODIUM PHOSPHATE 8 MG: 4 INJECTION INTRA-ARTICULAR; INTRALESIONAL; INTRAMUSCULAR; INTRAVENOUS; SOFT TISSUE at 13:16

## 2024-10-02 RX ADMIN — FENTANYL CITRATE 100 MCG: 50 INJECTION, SOLUTION INTRAMUSCULAR; INTRAVENOUS at 13:00

## 2024-10-02 RX ADMIN — ONDANSETRON 4 MG: 2 INJECTION INTRAMUSCULAR; INTRAVENOUS at 14:16

## 2024-10-02 RX ADMIN — PROPOFOL 160 MG: 10 INJECTION, EMULSION INTRAVENOUS at 13:04

## 2024-10-02 RX ADMIN — LIDOCAINE HYDROCHLORIDE 50 MG: 10 INJECTION, SOLUTION EPIDURAL; INFILTRATION; INTRACAUDAL; PERINEURAL at 13:03

## 2024-10-02 RX ADMIN — Medication 100 MCG: at 13:13

## 2024-10-02 RX ADMIN — SUGAMMADEX 300 MG: 100 INJECTION, SOLUTION INTRAVENOUS at 14:17

## 2024-10-02 RX ADMIN — ROCURONIUM BROMIDE 50 MG: 10 INJECTION INTRAVENOUS at 13:05

## 2024-10-02 RX ADMIN — MIDAZOLAM HYDROCHLORIDE 2 MG: 1 INJECTION, SOLUTION INTRAMUSCULAR; INTRAVENOUS at 12:57

## 2024-10-02 NOTE — H&P
Pulmonary Consult Note     Chief Complaint:  Lung nodules    History of Present Illness     69-year-old male with a history of bladder cancer and colon cancer.  Who was referred to pulmonary secondary to multiple pulmonary nodules.  Found on CT scan of the chest.  Denies any chest pain, nausea, fever, or chills.  Has been following with Dr. Delcid for treatment of his colon cancer.  No new or acute complaints currently.    Problem List, Surgical History, Family, Social History, and ROS     Past Medical History:   Diagnosis Date    Aneurysm 2022    Ascending aortic aneurysm     Benign hypertension     Bladder cancer     Colon cancer     History of radiation therapy 05/06/2022    right groin/hemipelvis    Hypercholesteremia 09/18/2023    Hypertension     Intermittent palpitations     Mucinous adenocarcinoma     Pounding heartbeat     PVC's (premature ventricular contractions)       Past Surgical History:   Procedure Laterality Date    APPENDECTOMY  2017    With Partial Colon     BLADDER SURGERY      with partial colon    CHOLECYSTECTOMY      COLON SURGERY      COLONOSCOPY         Allergies   Allergen Reactions    Levofloxacin Swelling     Lips swellijng    Tetracycline Swelling     Lips swelling    Penicillins Other (See Comments)     childhood     No current facility-administered medications on file prior to encounter.     Current Outpatient Medications on File Prior to Encounter   Medication Sig    amLODIPine (NORVASC) 5 MG tablet Take 1 tablet by mouth Daily. (Patient taking differently: Take 1 tablet by mouth Every Night.)    famotidine (PEPCID) 20 MG tablet Take 1 tablet by mouth At Night As Needed for Heartburn or Indigestion.    gabapentin (NEURONTIN) 600 MG tablet Take 1 tablet by mouth 3 (Three) Times a Day for 30 days.    lisinopril (PRINIVIL,ZESTRIL) 20 MG tablet Take 1 tablet by mouth Daily.    metoprolol succinate XL (TOPROL-XL) 100 MG 24 hr tablet Take 1 tablet by mouth Daily.    oxyCODONE ER (Xtampza ER)  9 MG capsule extended-release 12 hour  Take 1 capsule by mouth Every 12 (Twelve) Hours.    rosuvastatin (CRESTOR) 5 MG tablet Take 1 tablet by mouth Daily. (Patient taking differently: Take 1 tablet by mouth Every Night.)    naloxone (NARCAN) 4 MG/0.1ML nasal spray 1 spray into the nostril(s) as directed by provider As Needed (opioid overdose).     MEDICATION LIST AND ALLERGIES REVIEWED.    Family History   Problem Relation Age of Onset    Lung cancer Mother     Stroke Mother     Hypertension Mother     Heart disease Father     Hypertension Father      Social History     Tobacco Use    Smoking status: Never    Smokeless tobacco: Never   Vaping Use    Vaping status: Never Used   Substance Use Topics    Alcohol use: Yes     Alcohol/week: 1.0 standard drink of alcohol     Types: 1 Cans of beer per week     Comment: 1 dring weekly    Drug use: Never     Social History     Social History Narrative    Lives in [place].      FAMILY AND SOCIAL HISTORY REVIEWED.    Review of Systems   Constitutional:  Negative for activity change, appetite change, chills and fever.   HENT:  Negative for congestion, sore throat and voice change.    Eyes:  Negative for photophobia and visual disturbance.   Respiratory:  Negative for cough, shortness of breath and wheezing.    Cardiovascular:  Negative for chest pain, palpitations and leg swelling.   Gastrointestinal:  Negative for abdominal distention and abdominal pain.   Genitourinary:  Negative for difficulty urinating and flank pain.   Musculoskeletal:  Negative for myalgias and neck stiffness.   Skin:  Negative for color change and rash.   Neurological:  Negative for dizziness, seizures and headaches.   Hematological:  Negative for adenopathy.   Psychiatric/Behavioral:  Negative for agitation, hallucinations and sleep disturbance.      ALL OTHER SYSTEMS REVIEWED AND ARE NEGATIVE.    Physical Exam and Clinical Information   /100 (BP Location: Left arm, Patient Position: Lying)    "Pulse 80   Temp 98 °F (36.7 °C) (Temporal)   Resp 16   SpO2 99%   Physical Exam  Vitals and nursing note reviewed.   Constitutional:       General: He is not in acute distress.     Appearance: He is well-developed and normal weight. He is not ill-appearing or toxic-appearing.   Cardiovascular:      Rate and Rhythm: Normal rate and regular rhythm.      Pulses: Normal pulses.      Heart sounds: Normal heart sounds. No murmur heard.     No gallop.   Pulmonary:      Effort: Pulmonary effort is normal.      Breath sounds: Normal breath sounds. No wheezing, rhonchi or rales.   Musculoskeletal:      Right lower leg: No edema.      Left lower leg: No edema.   Neurological:      Mental Status: He is alert.         Results from last 7 days   Lab Units 09/25/24  1326   WBC 10*3/mm3 6.29   HEMOGLOBIN g/dL 14.2   PLATELETS 10*3/mm3 237     Results from last 7 days   Lab Units 09/25/24  1326   SODIUM mmol/L 139   POTASSIUM mmol/L 4.3   CO2 mmol/L 29.0   BUN mg/dL 11   CREATININE mg/dL 1.09   GLUCOSE mg/dL 109*     Estimated Creatinine Clearance: 72.6 mL/min (by C-G formula based on SCr of 1.09 mg/dL).          No results found for: \"LACTATE\"         I reviewed the patient's results/ Images and I agree with the reports     Impression     Lung nodules    Mucinous adenocarcinoma of appendix      Plan/Recommendations     - I personally reviewed the pts imaging from CT scan of the chest from 9/25/2024 showed a right and left lower lobe nodule that has been gradually enlarging in size since August 2024 concerning for underlying metastatic disease.  - I personally reviewed the pts labs from 9/25/2024 which showed a normal CMP, CBC and coags  - I personally reviewed the pts chart with regards to evaluation by oncology    -Plan to proceed with navigational bronchoscopy and EBUS with a biopsy of each lesion to confirm malignancy.  -I discussed the risk and benefits of the procedure with the patient, this includes but is not limited to " hoarseness/vocal cord damage, pain, infection, cough, bleeding, pneumothorax, and anesthesia complications.  Patient verbalized understanding of this and agreed to proceed.      DUANE Santos DO  Pulmonary and Critical Care Medicine  10/02/24 12:10 EDT     CC: Provider, No Known

## 2024-10-02 NOTE — ANESTHESIA PREPROCEDURE EVALUATION
Anesthesia Evaluation     Patient summary reviewed and Nursing notes reviewed   NPO Solid Status: > 8 hours  NPO Liquid Status: > 2 hours           Airway   Mallampati: II  TM distance: >3 FB  Neck ROM: full  No difficulty expected  Dental      Pulmonary    (-) asthma, shortness of breath, recent URI, sleep apnea, not a smoker  Cardiovascular     ECG reviewed    (+) hypertension, hyperlipidemia  (-) past MI, dysrhythmias, angina, cardiac stents    ROS comment: ECG NSR     ECHO 2013 Normal LV size, systolic function and RWM. 2. The EF 65% 3. The aortic root seems mildly dilated 3,5 cm 4. Normal valves 5. No pericardial effusion 6. Normal mitral inflow 7. No signficant regurgitation.    Holter 2010 norm al few PVCs       Neuro/Psych  (-) seizures, CVA  GI/Hepatic/Renal/Endo    (-) no renal disease, diabetes, no thyroid disorder    Musculoskeletal     Abdominal    Substance History      OB/GYN          Other      history of cancer    ROS/Med Hx Other: Appx colon resection for ca   Colon Bladder resection   Bladder resection   All over 7 years   Pulm lesion seen on scan                     Anesthesia Plan    ASA 3     general     intravenous induction     Anesthetic plan, risks, benefits, and alternatives have been provided, discussed and informed consent has been obtained with: patient.    Plan discussed with CRNA.        CODE STATUS:

## 2024-10-02 NOTE — ANESTHESIA POSTPROCEDURE EVALUATION
Patient: Esteban Tse    Procedure Summary       Date: 10/02/24 Room / [facility]:  [facility]    Anesthesia Start: 1255 Anesthesia Stop: 1438    Procedure: BRONCHOSCOPY NAVIGATION WITH ENDOBRONCHIAL ULTRASOUND AND ION ROBOT Diagnosis:       Lung nodules      (Lung nodules [R91.8])    Surgeons: Levi Santos DO Provider: Shlomo Rodríguez MD    Anesthesia Type: general ASA Status: 3            Anesthesia Type: general    Vitals  Vitals Value Taken Time   BP     Temp     Pulse 99 10/02/24 1438   Resp     SpO2 97 % 10/02/24 1438   Vitals shown include unfiled device data.        Post Anesthesia Care and Evaluation    Patient location during evaluation: PACU  Patient participation: complete - patient participated  Level of consciousness: awake and alert  Pain management: adequate    Airway patency: patent  Anesthetic complications: No anesthetic complications  PONV Status: none  Cardiovascular status: hemodynamically stable and acceptable  Respiratory status: nonlabored ventilation, acceptable and nasal cannula  Hydration status: acceptable

## 2024-10-02 NOTE — ANESTHESIA PROCEDURE NOTES
Airway  Urgency: elective    Date/Time: 10/2/2024 1:08 PM  Airway not difficult    General Information and Staff    Patient location during procedure: OR    Indications and Patient Condition  Indications for airway management: airway protection    Preoxygenated: yes  MILS not maintained throughout  Mask difficulty assessment: 2 - vent by mask + OA or adjuvant +/- NMBA    Final Airway Details  Final airway type: endotracheal airway      Successful airway: ETT  Cuffed: yes   Successful intubation technique: video laryngoscopy  Facilitating devices/methods: intubating stylet  Endotracheal tube insertion site: oral  Blade: Alatorre  Blade size: 3  ETT size (mm): 8.5  Cormack-Lehane Classification: grade I - full view of glottis  Placement verified by: chest auscultation and capnometry   Inital cuff pressure (cm H2O): 6  Measured from: lips  ETT/EBT  to lips (cm): 24  Number of attempts at approach: 1  Assessment: lips, teeth, and gum same as pre-op and atraumatic intubation    Additional Comments  Negative epigastric sounds, Breath sound equal bilaterally with symmetric chest rise and fall

## 2024-10-03 LAB
GIE STN SPEC: NORMAL
GIE STN SPEC: NORMAL

## 2024-10-04 ENCOUNTER — HOSPITAL ENCOUNTER (OUTPATIENT)
Facility: HOSPITAL | Age: 69
Setting detail: RADIATION/ONCOLOGY SERIES
End: 2024-10-04
Payer: MEDICARE

## 2024-10-04 LAB
BACTERIA SPEC RESP CULT: NO GROWTH
BACTERIA SPEC RESP CULT: NO GROWTH
GRAM STN SPEC: NORMAL

## 2024-10-07 LAB
CYTO UR: NORMAL
LAB AP CASE REPORT: NORMAL
LAB AP CLINICAL INFORMATION: NORMAL
LAB AP DIAGNOSIS COMMENT: NORMAL
PATH REPORT.FINAL DX SPEC: NORMAL
PATH REPORT.GROSS SPEC: NORMAL
REF LAB TEST METHOD: NORMAL

## 2024-10-07 NOTE — PROGRESS NOTES
I called the patient to inform him that his nodules are both positive for a mucinous adenocarcinoma.  He has plans to follow-up with medical oncology and radiation oncology.  I will defer to them for further therapy.    Electronically signed by Levi Santos DO, 10/07/24, 12:40 PM EDT.

## 2024-10-09 ENCOUNTER — OFFICE VISIT (OUTPATIENT)
Age: 69
End: 2024-10-09
Payer: MEDICARE

## 2024-10-09 VITALS
WEIGHT: 173 LBS | TEMPERATURE: 98.2 F | RESPIRATION RATE: 16 BRPM | OXYGEN SATURATION: 99 % | BODY MASS INDEX: 22.93 KG/M2 | DIASTOLIC BLOOD PRESSURE: 95 MMHG | SYSTOLIC BLOOD PRESSURE: 158 MMHG | HEIGHT: 73 IN | HEART RATE: 77 BPM

## 2024-10-09 DIAGNOSIS — R91.8 LUNG NODULES: ICD-10-CM

## 2024-10-09 DIAGNOSIS — C18.1 MUCINOUS ADENOCARCINOMA OF APPENDIX: ICD-10-CM

## 2024-10-09 DIAGNOSIS — C78.02 MALIGNANT NEOPLASM METASTATIC TO BOTH LUNGS: Primary | ICD-10-CM

## 2024-10-09 DIAGNOSIS — C78.01 MALIGNANT NEOPLASM METASTATIC TO BOTH LUNGS: Primary | ICD-10-CM

## 2024-10-09 LAB
FUNGUS WND CULT: NORMAL
FUNGUS WND CULT: NORMAL
MYCOBACTERIUM SPEC CULT: NORMAL
MYCOBACTERIUM SPEC CULT: NORMAL
NIGHT BLUE STAIN TISS: NORMAL
NIGHT BLUE STAIN TISS: NORMAL

## 2024-10-09 PROCEDURE — G0463 HOSPITAL OUTPT CLINIC VISIT: HCPCS

## 2024-10-09 NOTE — PROGRESS NOTES
CONSULTATION NOTE    NAME:      Esteban Tse  :                                                          1955  DATE OF CONSULTATION:                       10/9/2024   REQUESTING PHYSICIAN:                   Cy Delcid MD  REASON FOR CONSULTATION:             1. Malignant neoplasm metastatic to both lungs    2. Mucinous adenocarcinoma of appendix    3. Lung nodules       Thank you for requesting my services in evaluation of this pleasant individual.  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     BRIEF HISTORY:  Esteban Tse  is a very pleasant 69 y.o. male with a known history of recurrent and metastatic low-grade appendiceal carcinoma.  He is underwent rather extensive previous treatments including surgical resection and chemotherapy.  He also underwent a course of involved field radiation therapy targeting a recurrent right pelvic sidewall mass consisting of a conglomerate of external iliac and inguinal lymph nodes.  He completed a dose of 50 Gray over 20 fractions in May of 2022.  He has had a very slow response in the right lower pelvis, with essentially a slight reduction in the size of the involved disease.  Unfortunately, he has continued to experience right hip and groin pain due to the adenopathy, and he still requires pain medications off and on to manage his symptoms.  He has been followed closely with Dr. Delcid, and on his most recent surveillance scans, he has been found to have the interval development of 2 lung nodules in the right and left lung bases, respectively.  A recent bronch and navigation biopsy identified both of these nodules to be metastatic adenocarcinoma with mucinous features compatible with the appendiceal primary.  He denies any ongoing symptoms of shortness of breath, cough, hemoptysis, or other respiratory complaints.  I am being asked to see him to discuss radiation therapy to the  lungs.    Allergies   Allergen Reactions    Levofloxacin Swelling     Lips swellijng    Tetracycline Swelling     Lips swelling    Penicillins Other (See Comments)     childhood       Social History     Tobacco Use    Smoking status: Never    Smokeless tobacco: Never   Vaping Use    Vaping status: Never Used   Substance Use Topics    Alcohol use: Yes     Alcohol/week: 1.0 standard drink of alcohol     Types: 1 Cans of beer per week     Comment: 1 dring weekly    Drug use: Never       Past Medical History:   Diagnosis Date    Aneurysm 2022    Ascending aortic aneurysm     Benign hypertension     Bladder cancer     Colon cancer     History of radiation therapy 05/06/2022    right groin/hemipelvis    Hypercholesteremia 09/18/2023    Hypertension     Intermittent palpitations     Mucinous adenocarcinoma     Pounding heartbeat     PVC's (premature ventricular contractions)        family history includes Heart disease in his father; Hypertension in his father and mother; Lung cancer in his mother; Stroke in his mother.     Past Surgical History:   Procedure Laterality Date    APPENDECTOMY  2017    With Partial Colon     BLADDER SURGERY      with partial colon    BRONCHOSCOPY WITH ION ROBOTIC ASSIST N/A 10/2/2024    Procedure: BRONCHOSCOPY NAVIGATION WITH ENDOBRONCHIAL ULTRASOUND AND ION ROBOT;  Surgeon: Levi Santos DO;  Location: [facility] ENDOSCOPY;  Service: Robotics - Pulmonary;  Laterality: N/A;  ION CATH #3  CATH [id number]   [id number]  [id number]  CATH GUIDE ID [id number]    CHOLECYSTECTOMY      COLON SURGERY      COLONOSCOPY          Review of Systems   Constitutional:  Negative for fatigue.   Respiratory:  Negative for chest tightness, cough, shortness of breath and wheezing.    Cardiovascular:  Negative for chest pain.   Musculoskeletal:  Positive for arthralgias (right hip).           Objective   VITAL SIGNS:   Vitals:    10/09/24 1003   BP: 158/95   Pulse: 77   Resp: 16   Temp: 98.2 °F (36.8 °C)   TempSrc: Oral  "  SpO2: 99%   Weight: 78.5 kg (173 lb)   Height: 185.4 cm (72.99\")   PainSc:   4   PainLoc: Hip  Comment: right hip              Karnofsky score: 90       Physical Exam  Vitals and nursing note reviewed.   Constitutional:       General: He is not in acute distress.     Appearance: He is well-developed.   HENT:      Head: Normocephalic and atraumatic.   Eyes:      Conjunctiva/sclera: Conjunctivae normal.      Pupils: Pupils are equal, round, and reactive to light.   Cardiovascular:      Rate and Rhythm: Normal rate and regular rhythm.      Heart sounds: No murmur heard.     No friction rub.   Pulmonary:      Effort: Pulmonary effort is normal.      Breath sounds: Normal breath sounds. No wheezing.   Abdominal:      General: Bowel sounds are normal. There is no distension.      Palpations: Abdomen is soft. There is no mass.      Tenderness: There is no abdominal tenderness.   Musculoskeletal:         General: Normal range of motion.      Cervical back: Normal range of motion and neck supple.   Lymphadenopathy:      Cervical: No cervical adenopathy.   Skin:     General: Skin is warm and dry.   Neurological:      Mental Status: He is alert and oriented to person, place, and time.   Psychiatric:         Behavior: Behavior normal.         Thought Content: Thought content normal.         Judgment: Judgment normal.     IMAGING  I have personally reviewed the relevant imaging studies, as follows:  XR Chest 1 View    Result Date: 10/2/2024  Impression: Bilateral pulmonary nodules. No pneumothorax. Electronically Signed: Nacho Chu MD  10/2/2024 3:16 PM EDT  Workstation ID: [id number]    CT Chest Without Contrast Diagnostic    Result Date: 9/26/2024  Impression: 1. Slight further interval enlargement the patient's bilateral lower lobe pulmonary nodules, consistent with metastatic disease. Concern for early pleural spread of disease associated with the left lower lobe pulmonary nodule, as well as transpleural involvement of the " left upper lobe. 2. Otherwise stable appearance of the chest compared to 8/20/2024 exam. Ectatic thoracic aorta noted to approximately 4.2 cm. Electronically Signed: Alphonso Barragan MD  9/26/2024 4:00 PM EDT  Workstation ID: [id number]     PATHOLOGY  Navigational bronchoscopy with biopsy 10/2/2024:  1. LUNG, LEFT LOWER LOBE, BIOPSY:  Adenocarcinoma (see comment).  2. LUNG, RIGHT LOWER LOBE, BIOPSY:  Adenocarcinoma (see comment).  Electronically signed by Dioni Thomas MD on 10/7/2024 at 1000    Comment  Both biopsies show evidence of an adenocarcinoma with the right lower lobe specimen showing extensive extracellular  mucin. Immunohistochemical staining was undertaken on the left lower lobe specimen. Tumor cells are negative for  cytokeratin 7 and show patchy cytokeratin 20 positivity. CDX2 is strongly positive. Findings in both biopsies are  consistent with adenocarcinoma. Given the histologic appearance in the right lower lobe sample and the  immunohistochemical staining pattern this would be compatible with metastasis from the patient's previously diagnosed  carcinoma although the possibility of a new primary cannot be entirely excluded as mucinous carcinoma primary to the  lung often shows a similar staining pattern.           The following portions of the patient's history were reviewed and updated as appropriate: allergies, current medications, past family history, past medical history, past social history, past surgical history, and problem list.    Assessment      IMPRESSION:  Mr. Tse is a 69-year-old gentleman with a known history of metastatic and recurrent appendiceal carcinoma.  He now demonstrates bilateral pulmonary nodules with pathological confirmation to be metastatic mucinous adenocarcinoma.  I recommend that we treat him with stereotactic body radiation therapy as these 2 sites represent his only active disease I recommend that we treat the right lung to a dose of 54 Gray over 3 fractions on our  CyberKnife unit.  Once he completes that treatment course, we will reevaluate him shortly thereafter and if he tolerates treatment well, then we will likely proceed with treatment to the left lower lung and a short interval.    RECOMMENDATIONS:    1.  Plan to treat the right lung metastasis to a dose of 54 Hammonds in 3 fractions  2.  Pending tolerance of CyberKnife to the right lung, will reevaluate shortly thereafter for potential treatment to the left lung    Return in about 4 days (around 10/13/2024) for Radiation Simulation.    Diagnoses and all orders for this visit:    1. Malignant neoplasm metastatic to both lungs (Primary)    2. Mucinous adenocarcinoma of appendix    3. Lung nodules  -     Ambulatory Referral to [facility] Social Work    I spent a total of 40 minutes on today's visit, with 25 minutes in direct face-to-face communication, and the remainder of time spent reviewing his medical records, imaging, and for documentation.    Thank you for allowing me to participate in the care of this individual.    Sincerely,            Gm Moody MD

## 2024-10-10 ENCOUNTER — HOSPITAL ENCOUNTER (OUTPATIENT)
Dept: RADIATION ONCOLOGY | Facility: HOSPITAL | Age: 69
Discharge: HOME | End: 2024-10-10

## 2024-10-10 ENCOUNTER — HOSPITAL ENCOUNTER (OUTPATIENT)
Dept: RADIATION ONCOLOGY | Facility: HOSPITAL | Age: 69
Setting detail: RADIATION/ONCOLOGY SERIES
Discharge: HOME | End: 2024-10-10
Payer: MEDICARE

## 2024-10-10 ENCOUNTER — OFFICE VISIT (OUTPATIENT)
Dept: ONCOLOGY | Facility: CLINIC | Age: 69
End: 2024-10-10
Payer: MEDICARE

## 2024-10-10 VITALS
WEIGHT: 172 LBS | SYSTOLIC BLOOD PRESSURE: 139 MMHG | BODY MASS INDEX: 22.8 KG/M2 | TEMPERATURE: 97.6 F | OXYGEN SATURATION: 100 % | HEART RATE: 72 BPM | DIASTOLIC BLOOD PRESSURE: 90 MMHG | RESPIRATION RATE: 18 BRPM | HEIGHT: 73 IN

## 2024-10-10 DIAGNOSIS — C18.1 MUCINOUS ADENOCARCINOMA OF APPENDIX: Primary | ICD-10-CM

## 2024-10-10 DIAGNOSIS — C48.2 PERITONEAL CARCINOMA: ICD-10-CM

## 2024-10-10 PROCEDURE — 77332 RADIATION TREATMENT AID(S): CPT | Performed by: RADIOLOGY

## 2024-10-10 NOTE — LETTER
October 10, 2024       No Recipients    Patient: Esteban Tse   YOB: 1955   Date of Visit: 10/10/2024     Dear Saul Davidson MD:       Thank you for referring Esteban Tse to me for evaluation. Below are the relevant portions of my assessment and plan of care.    If you have questions, please do not hesitate to call me. I look forward to following Esteban along with you.         Sincerely,        Cy Delcid MD        CC:   No Recipients    Cy Delcid MD  10/10/24 1015  Sign when Signing Visit  CHIEF COMPLAINT: No new somatic complaints    Problem List:  Oncology/Hematology History Overview Note   1.  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  Pathologic stage IIa with T3 extension into the base of the appendix through the muscularis propria but not into the serosal surface.  16 nodes negative.  Colonoscopy December 2017 negative postop.Laparoscopic diagnostic peritoneal biopsy confirming disease in the bladder dome February 2020.  Began Folfiri.  June 2020 Dr. Peoples excised right lobe of liver and left lobe liver lesion, omentectomy and resection of pelvic peritoneal nodules, ileocolectomy with ileal colostomy and intraperitoneal HIPEC with mitomycin.  Dr. Lawler performed right ureteral stent placement and partial cystectomy.  With large fungating mass, 4/22/2021 had exploratory laparotomy with lysis of adhesions and right pelvic sidewall excisional biopsy with open partial cystectomy.  No malignancy and peritoneal fluid.  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  Margins less than 1 mm.  October 2021 cystoscopy without malignancy.  TURBT 10/11/2021 did not show malignancy.  March 2022 Dr. Peoples for 3.9 cm external iliac recurrent adenopathy 6 weeks out from laminectomy which did not help pain was determined to be an unresectable recurrence with muscle and vascular  involvement.  Radiation with Dr. Moody ended 5/6/2022.  Disease stabilized and pain improved.    -12/30/2019 medical oncology office visit: The patient had been on surveillance since surgery December 2017.  CT scans had been negative up until 12/30/2019 CT chest abdomen and pelvis that showed subtle soft tissue density surrounding surgical clips in the right side of the pelvis along with soft tissue nodule at the anterior wall of the bladder concerning for recurrent disease.  Patient sent back to Dr. Davidson for colonoscopy.    -2/7/2020 patient was referred to Dr. Peoples at the [facility], he underwent diagnostic laparoscopic and peritoneal biopsies that confirmed recurrent disease with biopsy of bladder dome nodule showing adenocarcinoma with mucinous features.  Port was placed at this time also.    -2/25/2020 began FOLFIRI    -4/21/2020 patient having increased fatigue, FOLFIRI dose reduced by 10%.    -5/19/2020 cycle 7 FOLFIRI    -6/18/2020 Dr. Peoples performed exploratory laparotomy with excision of right lobe liver lesion and left lobe liver lesion, omentectomy, resection of pelvic peritoneal nodules, ileocolectomy with creation of ileal colostomy, hyperthermic intraperitoneal chemotherapy with mitomycin-C at 42 °C for deep tissue penetration for 90 minutes.  Dr. Perdue performed partial cystectomy with right ureteral stent placement    -8/5/2020 CT abdomen pelvis with contrast shows small soft tissue nodule anterior aspect of bladder stable.  New small amount of ascites as well as a new small left pleural effusion.  Creatinine 0.75 with hemoglobin 11.1 otherwise unremarkable CBC and CMP.    -2/15/2021 CT abdomen pelvis with contrast compared to 8/5/2020 shows enlarging soft tissue mass 4.2 cm over the bladder fundus and there is a calcification along the base of the urinary bladder.  Small stable multiple hypoattenuating indeterminate liver lesions.  Similar degree of ascites.    -2/23/2021 CT chest  "shows no evidence of metastasis with ascending aorta 42 mm.    -3/8/2021 follow-up with Dr. Portillo Peoples we reviewed his CTs suggested team effort resection with Dr. Perdue    -3/16/2021 follow-up with Dr. Perdue.  He plans for open partial cystectomy, possible ureteral implants, cystoscopy and stent placement in concert with Dr. Peoples.    -4/22/2021 cystoscopy (after prior office cystoscopy showed large fungating mass) with bilateral ureteral catheters, exploratory laparotomy, lysis of adhesions, right pelvic sidewall excisional biopsy, open partial cystectomy Dr. Ike Perdue. Peritoneal fluid showed predominant inflammatory reactive mesothelial cells with no malignancy. 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 Carcinoma was less than 1 mm from the lateral resection margins.    -5/18/2021 [facility] medical oncology follow-up visit: I reviewed his hospital notes, operative notes, and pathology report as above and went over this with the patient.  The general consensus from retrospective as well as a few prospective data over the last couple of decades with this low-grade malignancy does not show any profound benefit from \"adjuvant\" chemotherapy in this current setting.  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  There is no standard follow-up but general guidelines suggest following up as typical colon cancer.  I will repeat his CT chest abdomen pelvis now to reestablish his baseline postoperative imaging and have him see my nurse practitioner back in a couple of weeks to make sure there is no nia evidence of measurable residual disease.  Assuming this just shows postoperative changes, I would simply repeat scans again in 3 months or sooner as symptoms dictate.  If he has no evidence of persistent or metastatic disease on current imaging, then when he sees my nurse practitioner back she will also review what they say about his ascending aorta " which was 42 mm in February and sent him to Dr. Evangelist Anthony for an opinion as to whether any intervention is needed relative to the aorta.  Obviously if his cancer is out of control on the upcoming scans then the aorta is a moot point.    -6/10/2021 [facility] oncology clinic follow-up: CT scans show no evidence of disease recurrence in the chest, abdomen or pelvis.  Ascending aortic aneurysm stable at 41 mm.  Referral made to Dr. Anthony, cardiothoracic surgeon for management and evaluation of a sending aortic aneurysm.  Plan to repeat CT scans in 3 months.    -9/14/2021 [facility] medical oncology follow-up visit: I reviewed images and reports of 9/10/2021 CT chest abdomen pelvis with contrast which shows under distended urinary bladder with mild asymmetric wall thickening dome and left lateral wall with a small 9 mm polypoid lesion in the bladder.  There is stable 2.3 cm right external iliac and a few other subcentimeter scattered nodes in the root of the small bowel mesentery and retroperitoneum.  Postsurgical right hemicolectomy changes.  Stable low-attenuation lesions in the liver unchanged.  I will have him collect his discs from [facility] to take Dr. Perdue to decide whether to proceed with cystoscopy or just continue watchful waiting.  There is no major changes and I suspect we are looking at postoperative changes and we shall see him for video visit in a few weeks to see what urology at  decides.    -10/11/2021 cystoscopy Dr. Perdue showed marked acute and chronic inflammation but no evidence of malignancy.  Muscularis propria present.    -10/14/2021 [facility] medical oncology virtual visit: I reviewed reports of pathology from cystoscopy 10/11/2021 and went over this with patient.  He is feeling fairly fit.  I will repeat his CT chest abdomen pelvis prior to return in 3 months and if in the interim, when he follows up with Dr. Perdue in the next couple of weeks post cystoscopy, plans are made for  further cystoscopy before I see him back and any malignancy is found, I would ask the patient and Dr. Perdue to touch base as I would not know of such without that contact as the information comes into our chart without notification now that epic is being used by [facility].  Assuming no further biopsies in the interim, I will simply repeat his scans in 3 months to follow what I suspected and is now confirmed to be inflammatory changes.  From the aneurysm standpoint, he has follow-up scanning March 2022 arranged by Dr. Anthony and he will follow him up after that.    -11/24/2021 CT chest abdomen pelvis compared to 6/7/2021 outside imaging shows stable 4.1 cm ascending thoracic aorta.  No lung nodules.  Liver homogenous with bilateral cysts.  No adenopathy.  Thickened sigmoid colon and rectum suggestive of mild colitis or postradiation change.  No pelvic adenopathy.  Bony structures degenerative in the spine.  Some soft tissue anterior to the acetabulum on the right and extending along the medial aspect right pelvic sidewall 2.7 x 5.6 may represent intramuscular process versus adenopathy which could not be ruled out.  No prior study views available.    -1/4/2022 follow-up Dr. Ike Perdue [facility].  Per his note, he had TURBT 10/11/2021.  He had been placed on antibiotics for UTI for preop preparation for back surgery planned in 2 weeks.  Denies any mucus in his urine.  Some microhematuria on urinalysis this day.  No gross hematuria or dysuria.  The October 2021 TURBT did not reveal anything malignant on pathology.  There may be irritation or inflammation secondary to sutures in the bladder following partial cystectomy.  Patient asymptomatic from a urinary standpoint.  Recommended following up with [facility] with no plans for further urology follow-up.    -2/4/2022 [facility] medical oncology telehealth follow-up visit: I reviewed his November images and reports with the patient as well as with Dr. Gm Moody  and the note from Dr. Perdue from 1 month ago.  I do suspect this pelvic sidewall abnormality in November represents persistent disease but as I have stated in prior dictations, the data on palliative or overall survival benefit of systemic 5-FU based combination chemotherapy and/or Avastin does not show a clear-cut survival advantage to chemotherapy for asymptomatic disease.  He is having some back pain and had spine decompression a couple of weeks ago without much benefit.  This is an area that may potentially be radiated but I will check his CT chest (angiogram of chest) abdomen and pelvis and get his blood counts and chemistries and urinalysis and see him back for virtual visit in a few weeks.  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  I will also converse with Dr. Peoples at that junction whether he thinks he has anything to offer if this is progressing though I doubt it given that the last intervention was a urologic procedure and the last cystoscopy/TURBT in October had no malignancy and the liver lesions are currently being called liver cysts albeit I am skeptical as to whether those are truly benign but they are certainly not growing.    -3/4/2022 CT angiogram of chest/CT abdomen pelvis for evaluation of dizziness and surveillance of thoracic aortic aneurysm and appendiceal carcinoma.  Thoracic ascending aortic ectasia 4 x 4 cm stable.  Small nodularity left upper lobe stable from September.  Stable small hepatic hypodensities status post cholecystectomy.  Right external iliac soft tissue fullness now 3 x 3.9 cm previously 2 x 2.3 cm concerning for enlarging adenopathy with  "symmetric nonenlarged inguinal nodes stable.  CBC unremarkable.  CMP unremarkable save for potassium 5.4.  CEA 18.2.  1-3 red cells and white cells per milliliter of urine.    -3/16/2022 office visit with Dr. Portillo Peoples.  He reviewed the CT from 3/4/2022.  He notes that the laminectomy from 6 weeks prior has not helped his \"hip pain\".  Given the location of this recurrence previously 2 x 2.3 cm now 3 x 3.9 cm in the external iliac area concerning for enlarging adenopathy with symmetric nonenlarged inguinal nodes stable, the mass appears unresectable with involvement of muscle and vasculature.    -3/24/2022 [facility] medical oncology follow-up virtual visit: Pain is still significant.  We will get him in hopefully in the next day to [facility] for palliative care.  Have spoken with Dr. Peoples and no surgery.  I have spoken with Dr. Moody who thinks palliative radiation while not likely to shrink tumor dramatically may help pain considerably and we will get him there.  We will get him to bring the discs to our [place] office and asked them to bring that back to [place] to get scanned in for our invasive radiologist to look at for us to get CT-guided biopsy of this node in the right lower quadrant and send that for [facility]uel once the pathology is obtained to hopefully find high tumor mutational burden or other molecular targets that might give us some options.  Apart from that, as stated multiple times, this is not a highly chemotherapy sensitive tumor and I am not sure I would palliate him well but, when I see him back in early June with CAT scans prior to return and post radiation, if he is not getting relief and wants to try a 5-FU based regimen plus or minus Avastin I would be okay with that but he knows it does not alter survival 1 way or the other and we are simply trying to palliate this inexorable process.  No other surgical options.    - 4/5/2022  Right lower quadrant mass CT biopsy positive " mucinous adenocarcinoma    -5/2/2022 [facility] MI profile: HER2/harris negative.  Mismatch repair proficient,NTRK1/2/3 fusion not detected.  BRAF V600E negative.  PD-L1 Negative, 1+, 5%.  PTEN Positive, 1+, 100%. MLH1 positive/3+, 100%. MSH2 positive/3+, 100%. MSH6 positive/3+, 100%. PMS2 positive/3+,100%.    -5/6/2022 last radiation    - 5/25/2022 CT chest abdomen pelvis with contrast at [facility] shows nothing remarkable on the chest.  Stable liver cysts.  Focal soft tissue right iliac region appears to be increasing in size now 7.2 cm previously 3.9 cm with some mass-effect on surrounding structures with several stable inguinal borderline nodes.    -6/6/2022 [facility] medical oncology follow-up visit: Now 1 month out from radiation just starting to get a little bit of improvement in his abdominal discomfort.  Slight growth on CT but some of that may be postradiation change this close to radiation and, as I stated in my note in March, it is not clear that 5-FU based therapy plus or minus Avastin is going to palliate much given its relatively insensitivity to chemotherapy and it certainly does not improve survival.  To that end, we will plan on just repeating his CAT scans again in about 6 weeks and if the pain is worsening and/or this continues to grow then I will probably give him Avastin FOLFOX.  If everything is stable and pain is controlled we will go continue watchful waiting with imaging about every 3 months from that point forward.    -7/11/2022 [facility] medical oncology follow-up: His 7/7/2022 CT chest abdomen pelvis showed stable right external iliac low-attenuation which had been previously growing and thickening of the distal descending and sigmoid colon with perhaps a small right iliac borderline 1 cm node.  Symptomatically improved post radiation.  For now we will hold on Avastin FOLFOX as the benefit of such is questionable with this histologic subtype and there are no actionable molecular targets.   Repeat CTs prior to return in 3 months.    -11/1/2022 [facility] medical oncology follow-up: 10/19/2022 CT chest abdomen pelvis with contrast [facility] compared to 7/7/2022 shows no significant change or evidence of progression.  Clinically quiescent.  We will repeat CTs in 6 months, sooner as symptoms dictate.    -5/19/2023 CT chest abdomen pelvis [facility] compared to 10/19/2022 showed no evidence of disease progression in the chest.  Nodular density adjacent to the descending colon 6 mm nonspecific.  Otherwise unchanged appearance of hepatic metastatic nodules, right iliac adenopathy, and right pelvic mass.    -5/23/2023 [facility] medical oncology follow-up: I reviewed his report of CT chest abdomen pelvis from [place] 4 days ago showing no evidence of progression.  So area near the descending colon is nonspecific and would not change anything at this junction.  His October CT had not changed from July and the current one has not changed significantly since October.  We will continue to see palliative care for his right pelvic pain.  If leg swelling significantly worsens we could check Doppler and assuming no clot consider vascular intervention but as the thigh has not significantly changed over time nor is there significant tenderness in the calf I am doubtful that this is clot and is likely just due to the pelvic mass.  Vascular surgical intervention should swelling worsen is also an option but I would not want a stent placed through this area in the face malignancy particularly a mucinous variety that would make him prone towards clotting.  If the leg worsens I would get his Doppler and consider vascular intervention or anticoagulation as dictated but clinically stable so will not investigate further at this junction.  I will repeat his imaging again in 6 months.    -11/20/2023 CT chest, abdomen and pelvis shows stable appearances of the chest with no evidence of metastatic disease.  Abdomen and  pelvis reveals small interval decrease in size of the previously demonstrated peritoneal nodule adjacent to the descending colon.  No new suspicious omental/peritoneal soft tissue nodules or masses.  Stable right iliac adenopathy as well as a soft tissue mass along the right external iliac vasculature along the right pelvic sidewall.  -11/29/2023 [facility] follow-up: Current CT chest, abdomen and pelvis shows stable findings, no evidence of progression.  He has no new worrisome symptoms however he does still have swelling in his right thigh and lower pelvic pain for which she sees palliative care.  He states that it seems slightly more pronounced over the last few weeks, he has a follow-up with palliative care and will discuss further with him.  We reviewed recommendations discussed at his last visit with Dr. Delcid that as long as the swelling did not worsen significantly then we would hold off on considering any vascular intervention.  We will continue to monitor, Jose Antonio understands to let us know if he has any changes otherwise we will plan on repeating CT chest, abdomen and pelvis prior to return in 6 months and I have ordered those today.  His blood pressure was running high on arrival today, I did recheck it manually and it was 150/100.  He reports that he took his blood pressure earlier today at home and it was 140s over 85 or so.  I asked him to recheck when he got home and monitor and if his diastolic continued to run over 90 to get in with Dr. Dial for evaluation.    - 5/20/2023 CT chest abdomen pelvis [facility] compared to November shows 10 mm nodule left lower lobe and right lower lobe.  Anterior lobe pleural-based nodule unchanged.  6.1 x 4 cm right pelvic sidewall stable.  12 mm right common iliac node stable.  No ascites.    -5/28/2024 [facility] clinic follow-up: No signs or symptoms of recurrence.  3 times in the last 6 months he had a 6-hour period of crampy abdominal  pain that subsequently subsided.  Potentially could have been having some obstructive symptomatology but none on imaging.  If this happens more frequently or it lasts he will let us know and also Dr. Peoples but would also go towards a more soft diet during those episodes and try FiberCon.  Otherwise imaging of the abdomen is stable but there is a couple of nodules in the lung that appeared new albeit small.  This is a very low-grade process and systemic therapies are not very effective and he is asymptomatic so we will just repeat CT chest in 3 months.  If this grows then we will consider getting tissue as it would be a little odd for this low-grade process to show up in the lung.    -8/28/2024 [facility] chest with contrast compared to May 2024 shows left lower lobe nodule 16 mm compared to 10 mm with right lower lobe nodule 16 mm previously 10 mm and small subpleural right upper lobe nodule stable most likely benign intrapulmonary node and small stable nodule right lower lobe likely intrapulmonary.  Several small low-density liver lesions likely cysts.    -9/3/2024 [facility] clinic follow-up: No signs or symptoms of recurrence 1 lung nodule in each lung base has grown.  Other nodules stable.  High likelihood for recurrence.  Will get CT abdomen pelvis and then get him to Dr. Santos for consideration of navigational bronchoscopy/Ion biopsy these lesions and might consider CyberKnife for which I will get him to Dr. Moody for opinion and coordination with Dr. Santos pending results of CT abdomen pelvis.  If on the other hand his CT abdomen pelvis shows additional sites of progression beyond the lung then we will likely go back to systemic therapy of which he has very little likelihood to respond but I would like to get tissue for molecular testing.     -9/24/2024 [facility] abdominal pelvic CT with contrast compared to May 2024 showed continued decrease in size of hepatic fat necrosis  left lower quadrant and decrease in right common iliac and external iliac adenopathy and no new metastatic disease.    -9/25/2024 robotic bronchoscopy preparative CT chest    -9/26/2024 [facility] hematology oncology follow-up: Reviewed recent CT abdomen with no clear-cut malignancy and continued regression fat necrosis left lower quadrant with decreasing adenopathy in the right common and external iliac nodes.  CT chest done yesterday has not been read but is a noncontrast robotic preparative CT to be used by Dr. Santos for navigational bronchoscopy on October 2.  Will get him back to me around October 10 and Dr. Moody in the interim for opinion as to CyberKnife particularly if paucimetastatic malignancy proven.    - 10/2/2024 bronchoscopy Dr. Santos Ion robot platform biopsy 2 cm nodule middle one third of the left lower lobe.  FNA left lower lobe, bronchial brush, transbronchial needle aspirate right lower lobe and right lower lobe brush all show adenocarcinoma consistent with prior pathology.  Left lower lobe lung biopsy and right lower lobe lung biopsy both show adenocarcinoma with extensive extracellular mucin CDX2 strongly positive.  Negative for CK7 and patchy CK20 positivity.  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 Moody plans to treat right lung metastasis [address] in 3 fractions and then pending tolerance will reevaluate shortly thereafter for treatment to the left lung.    -10/10/2024 [facility] hematology oncology follow-up: I reviewed with him the bronchoscopy and path reports and the note from Dr. Moody.  I will have him see my nurse practitioner back in 6 weeks to make sure he is having no new symptoms and at that point she  will order a follow-up CT chest abdomen pelvis about 8 weeks out from the end of radiation.  I will get [facility] MI profile on the lung biopsy.     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  Pathologic stage IIa with T3 extension into the base of the appendix through the muscularis propria but not into the serosal surface.  16 nodes negative.  Colonoscopy December 2017 negative postop     12/5/2017 Surgery    Surgery       Procedure:  Appendectomy and right hemicolectomy      Completeness of resection:  No evidence of residual tumor     Pathology revealed invasive moderately differentiated mucinous adenocarcinoma.  Right hemicolectomy: Benign colon and small intestine no evidence of carcinoma.  16 benign lymph nodes, 0/16, negative for dysplasia or malignancy.  Tumor size approximately 6 cm.  Grade 2, moderately differentiated.  Tumor invades through the muscularis profile into the base of appendix but does not extend to the serosal surface.  All margins uninvolved, no lymphovascular invasion identified.  Pathological stage pT3 pN0.         12/8/2017 Imaging    CT of the chest abdomen and pelvis negative.  Baseline CBC WBC 7100, hemoglobin 11.3, hematocrit 34.8%, platelet count 195,000.     3/20/2018 Procedure    Colonoscopy with Dr. Davidson was normal, recommendation for repeat surveillance colonoscopy in 3 years.     6/11/2018 Imaging    CT chest, abdomen and pelvis with no evidence metastatic disease.  CEA 1.1     9/24/2018 Imaging    CT abdomen pelvis showed no acute changes and nothing to suggest recurrence     12/28/2018 Imaging    CT chest, abdomen and pelvis negative. Normal CBC, CMP and CEA 0.7.     6/28/2019 Imaging    CT chest, abdomen and pelvis: No interval change from prior studies.  No recurrent or metastatic disease detected in the chest, abdomen or pelvis.  No acute  process.  CEA 0.9     12/30/2019 Imaging    CT chest, abdomen and pelvis: No disease in the chest.  There was noted a subtle soft tissue density, one surrounding surgical clips in the right side of the pelvis and the other a soft tissue nodule intimately associated with the anterior wall of the bladder, which are considered suspicious for recurrent disease.  CEA 1.1.  CMP with normal creatinine 0.9, total bilirubin 1.9, AST 34, ALT 24, alkaline phosphatase 93.  CBC with WBC 6900, hemoglobin 15.9, platelet count 232,000.       1/21/2020 Procedure    Normal colonoscopy with Dr. Davidson.  He has made referral to Dr. Portillo Peoples at .     2/7/2020 Progression    12/30/2019 CT chest, abdomen and pelvis: No disease in the chest.  There are subtle soft tissue densities (1 surrounding surgical clips in the right side of the pelvis and the other a soft tissue nodule intimately associated with the anterior wall of the bladder) which are considered suspicious for recurrent disease.  CMP with normal creatinine 0.9, total bilirubin 1.9, AST 34, ALT 24, alkaline phosphatase 93.  CBC with WBC 6900, hemoglobin 15.9, platelet count 232,000.  CEA 1.1.  2/7/2020 diagnostic laparoscopy with peritoneal biopsies performed at the [facility] by Dr. Peoples showed no sign of diffuse peritoneal carcinomatosis or liver metastasis.  There was a firm nodule in the superior dome of the bladder, adherent to omentum, as well as right pelvic sidewall nodule adjacent to surgical clips.  Biopsy of bladder dome nodule showed adenocarcinoma with mucinous features.     2/25/2020 -  Chemotherapy    OP COLORECTAL FOLFIRI Irinotecan / Leucovorin / Fluorouracil     5/29/2020 Imaging    CT chest abdomen pelvis shows slight improvement with decreased nodule anterolateral margin of urinary bladder with stable nodularity of the right lower quadrant adjacent to surgical clips and no progressive disease.  Slight hyperemia of the colonic mucosa      6/18/2020 Surgery    Surgery       -6/18/2020 Dr. Peoples performed exploratory laparotomy with excision of right lobe liver lesion and left lobe liver lesion, omentectomy, resection of pelvic peritoneal nodules, ileocolectomy with creation of ileal colostomy, hyperthermic intraperitoneal chemotherapy with mitomycin-C at 42 °C for deep tissue penetration for 90 minutes.  Dr. Perdue performed partial cystectomy with right ureteral stent placement     8/5/2020 Imaging     CT abdomen pelvis with contrast shows small soft tissue nodule anterior aspect of bladder stable.  New small amount of ascites as well as a new small left pleural effusion.  Creatinine 0.75 with hemoglobin 11.1 otherwise unremarkable CBC and CMP     2/15/2021 Imaging    CT abdomen pelvis with contrast compared to 8/5/2020 shows enlarging soft tissue mass 4.2 cm over the bladder fundus and there is a calcification along the base of the urinary bladder.  Small stable multiple hypoattenuating indeterminate liver lesions.  Similar degree of ascites.     2/23/2021 Imaging    -2/23/2021 CT chest shows no evidence of metastasis with ascending aorta 42 mm.     4/22/2021 Surgery    -4/22/2021 cystoscopy (after prior office cystoscopy showed large fungating mass) with bilateral ureteral catheters, exploratory laparotomy, lysis of adhesions, right pelvic sidewall excisional biopsy, open partial cystectomy Dr. Ike Perdue. Peritoneal fluid showed predominant inflammatory reactive mesothelial cells with no malignancy. 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 Carcinoma was less than 1 mm from the lateral resection margins.     6/7/2021 Imaging    CT chest, abdomen and pelvis: No evidence of metastatic disease within the chest abdomen or pelvis.  Interval resection of enhancing bladder wall mass a small amount of residual enhancing soft tissue, possibly granulation tissue.   Interval resolution of abdominal and pelvic ascites.  Stable dilation of the ascending aorta mid segment measuring up to 41 mm.     9/10/2021 Imaging    CT chest abdomen pelvis with contrast shows under distended urinary bladder with mild asymmetric wall thickening dome and left lateral wall with a small 9 mm polypoid lesion in the bladder.  There is stable 2.3 cm right external iliac and a few other subcentimeter scattered nodes in the root of the small bowel mesentery and retroperitoneum.  Postsurgical right hemicolectomy changes.  Stable low-attenuation lesions in the liver unchanged.     11/24/2021 Imaging    CT chest abdomen pelvis compared to 6/7/2021 outside imaging shows stable 4.1 cm ascending thoracic aorta.  No lung nodules.  Liver homogenous with bilateral cysts.  No adenopathy.  Thickened sigmoid colon and rectum suggestive of mild colitis or postradiation change.  No pelvic adenopathy.  Bony structures degenerative in the spine.  Some soft tissue anterior to the acetabulum on the right and extending along the medial aspect right pelvic sidewall 2.7 x 5.6 may represent intramuscular process versus adenopathy which could not be ruled out.  No prior study views available.     4/11/2022 - 5/6/2022 Radiation    Radiation OncologyTreatment Course:  Esteban Tse received 5000 cGy in 20 fractions to right pelvis/groin via External Beam Radiation - EBRT.     5/25/2022 Imaging    CT chest abdomen pelvis with contrast at [facility] shows nothing remarkable on the chest.  Stable liver cysts.  Focal soft tissue right iliac region appears to be increasing in size now 7.2 cm previously 3.9 cm with some mass-effect on surrounding structures with several stable inguinal borderline nodes.     10/19/2022 Imaging    - 10/19/2022 CT chest abdomen pelvis with contrast [facility] compared to 7/7/2022 shows no significant change or evidence of progression.     Peritoneal carcinoma   5/14/2020 Initial  Diagnosis    Peritoneal carcinoma (HCC)     5/25/2022 Imaging    CT chest abdomen pelvis with contrast at [facility] shows nothing remarkable on the chest.  Stable liver cysts.  Focal soft tissue right iliac region appears to be increasing in size now 7.2 cm previously 3.9 cm with some mass-effect on surrounding structures with several stable inguinal borderline nodes.         HISTORY OF PRESENT ILLNESS:  The patient is a 69 y.o. 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 Santos due for CyberKnife with Dr. Moody    Past Medical History:   Diagnosis Date   • Aneurysm 2022   • Ascending aortic aneurysm    • Benign hypertension    • Bladder cancer    • Colon cancer    • History of radiation therapy 05/06/2022    right groin/hemipelvis   • Hypercholesteremia 09/18/2023   • Hypertension    • Intermittent palpitations    • Mucinous adenocarcinoma    • Pounding heartbeat    • PVC's (premature ventricular contractions)      Past Surgical History:   Procedure Laterality Date   • APPENDECTOMY  2017    With Partial Colon    • BLADDER SURGERY      with partial colon   • BRONCHOSCOPY WITH ION ROBOTIC ASSIST N/A 10/2/2024    Procedure: BRONCHOSCOPY NAVIGATION WITH ENDOBRONCHIAL ULTRASOUND AND ION ROBOT;  Surgeon: Levi Santos DO;  Location: [facility] ENDOSCOPY;  Service: Robotics - Pulmonary;  Laterality: N/A;  ION CATH #3  CATH [id number]   [id number]  [id number]  CATH GUIDE ID [id number]   • CHOLECYSTECTOMY     • COLON SURGERY     • COLONOSCOPY         Allergies   Allergen Reactions   • Levofloxacin Swelling     Lips swellijng   • Tetracycline Swelling     Lips swelling   • Penicillins Other (See Comments)     childhood       Family History and Social History reviewed and changed as necessary    REVIEW OF SYSTEM:   No new somatic complaints    PHYSICAL EXAM:  No jaundice icterus or pallor.  No respiratory distress or audible  "wheezes    Vitals:    10/10/24 0946   BP: 139/90   Pulse: 72   Resp: 18   Temp: 97.6 °F (36.4 °C)   TempSrc: Temporal   SpO2: 100%   Weight: 78 kg (172 lb)   Height: 185.4 cm (72.99\")     Vitals:    10/10/24 0946   PainSc: 0-No pain          ECOG score: 0           Vitals reviewed.    ECOG: (0) Fully Active - Able to Carry On All Pre-disease Performance Without Restriction    Lab Results   Component Value Date    HGB 14.2 09/25/2024    HCT 42.2 09/25/2024    MCV 87.2 09/25/2024     09/25/2024    WBC 6.29 09/25/2024    NEUTROABS 4.15 09/25/2024    LYMPHSABS 1.47 09/25/2024    MONOSABS 0.55 09/25/2024    EOSABS 0.08 09/25/2024    BASOSABS 0.03 09/25/2024       Lab Results   Component Value Date    GLUCOSE 109 (H) 09/25/2024    BUN 11 09/25/2024    CREATININE 1.09 09/25/2024     09/25/2024    K 4.3 09/25/2024     09/25/2024    CO2 29.0 09/25/2024    CALCIUM 9.4 09/25/2024    PROTEINTOT 6.9 09/25/2024    ALBUMIN 4.5 09/25/2024    BILITOT 1.0 09/25/2024    ALKPHOS 103 09/25/2024    AST 25 09/25/2024    ALT 19 09/25/2024             ASSESSMENT & PLAN:  1.  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  Pathologic stage IIa with T3 extension into the base of the appendix through the muscularis propria but not into the serosal surface.  16 nodes negative.  Colonoscopy December 2017 negative postop.Laparoscopic diagnostic peritoneal biopsy confirming disease in the bladder dome February 2020.  Began Folfiri.  June 2020 Dr. Peoples excised right lobe of liver and left lobe liver lesion, omentectomy and resection of pelvic peritoneal nodules, ileocolectomy with ileal colostomy and intraperitoneal HIPEC with mitomycin.  Dr. Lawler performed right ureteral stent placement and partial cystectomy.  With large fungating mass, 4/22/2021 had exploratory laparotomy with lysis of adhesions and right pelvic sidewall excisional biopsy with open partial " cystectomy.  No malignancy and peritoneal fluid.  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  Margins less than 1 mm.  October 2021 cystoscopy without malignancy.  TURBT 10/11/2021 did not show malignancy.  March 2022 Dr. Peoples for 3.9 cm external iliac recurrent adenopathy 6 weeks out from laminectomy which did not help pain was determined to be an unresectable recurrence with muscle and vascular involvement.  Radiation with Dr. Moody ended 5/6/2022.  Disease stabilized and pain improved.    -12/30/2019 medical oncology office visit: The patient had been on surveillance since surgery December 2017.  CT scans had been negative up until 12/30/2019 CT chest abdomen and pelvis that showed subtle soft tissue density surrounding surgical clips in the right side of the pelvis along with soft tissue nodule at the anterior wall of the bladder concerning for recurrent disease.  Patient sent back to Dr. Davidson for colonoscopy.    -2/7/2020 patient was referred to Dr. Peoples at the [facility], he underwent diagnostic laparoscopic and peritoneal biopsies that confirmed recurrent disease with biopsy of bladder dome nodule showing adenocarcinoma with mucinous features.  Port was placed at this time also.    -2/25/2020 began FOLFIRI    -4/21/2020 patient having increased fatigue, FOLFIRI dose reduced by 10%.    -5/19/2020 cycle 7 FOLFIRI    -6/18/2020 Dr. Peoples performed exploratory laparotomy with excision of right lobe liver lesion and left lobe liver lesion, omentectomy, resection of pelvic peritoneal nodules, ileocolectomy with creation of ileal colostomy, hyperthermic intraperitoneal chemotherapy with mitomycin-C at 42 °C for deep tissue penetration for 90 minutes.  Dr. Perdue performed partial cystectomy with right ureteral stent placement    -8/5/2020 CT abdomen pelvis with contrast shows small soft tissue nodule anterior aspect of bladder stable.  New small amount of ascites  "as well as a new small left pleural effusion.  Creatinine 0.75 with hemoglobin 11.1 otherwise unremarkable CBC and CMP.    -2/15/2021 CT abdomen pelvis with contrast compared to 8/5/2020 shows enlarging soft tissue mass 4.2 cm over the bladder fundus and there is a calcification along the base of the urinary bladder.  Small stable multiple hypoattenuating indeterminate liver lesions.  Similar degree of ascites.    -2/23/2021 CT chest shows no evidence of metastasis with ascending aorta 42 mm.    -3/8/2021 follow-up with Dr. Portillo Peoples we reviewed his CTs suggested team effort resection with Dr. Perdue    -3/16/2021 follow-up with Dr. Perdue.  He plans for open partial cystectomy, possible ureteral implants, cystoscopy and stent placement in concert with Dr. Peoples.    -4/22/2021 cystoscopy (after prior office cystoscopy showed large fungating mass) with bilateral ureteral catheters, exploratory laparotomy, lysis of adhesions, right pelvic sidewall excisional biopsy, open partial cystectomy Dr. Ike Perdue. Peritoneal fluid showed predominant inflammatory reactive mesothelial cells with no malignancy. 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 Carcinoma was less than 1 mm from the lateral resection margins.    -5/18/2021 [facility] medical oncology follow-up visit: I reviewed his hospital notes, operative notes, and pathology report as above and went over this with the patient.  The general consensus from retrospective as well as a few prospective data over the last couple of decades with this low-grade malignancy does not show any profound benefit from \"adjuvant\" chemotherapy in this current setting.  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  There is no standard follow-up but general guidelines suggest following up as typical colon cancer.  I will repeat his CT chest abdomen pelvis now to reestablish his baseline postoperative imaging and have him " see my nurse practitioner back in a couple of weeks to make sure there is no nia evidence of measurable residual disease.  Assuming this just shows postoperative changes, I would simply repeat scans again in 3 months or sooner as symptoms dictate.  If he has no evidence of persistent or metastatic disease on current imaging, then when he sees my nurse practitioner back she will also review what they say about his ascending aorta which was 42 mm in February and sent him to Dr. Evangelist Anthony for an opinion as to whether any intervention is needed relative to the aorta.  Obviously if his cancer is out of control on the upcoming scans then the aorta is a moot point.    -6/10/2021 [facility] oncology clinic follow-up: CT scans show no evidence of disease recurrence in the chest, abdomen or pelvis.  Ascending aortic aneurysm stable at 41 mm.  Referral made to Dr. Anthony, cardiothoracic surgeon for management and evaluation of a sending aortic aneurysm.  Plan to repeat CT scans in 3 months.    -9/14/2021 [facility] medical oncology follow-up visit: I reviewed images and reports of 9/10/2021 CT chest abdomen pelvis with contrast which shows under distended urinary bladder with mild asymmetric wall thickening dome and left lateral wall with a small 9 mm polypoid lesion in the bladder.  There is stable 2.3 cm right external iliac and a few other subcentimeter scattered nodes in the root of the small bowel mesentery and retroperitoneum.  Postsurgical right hemicolectomy changes.  Stable low-attenuation lesions in the liver unchanged.  I will have him collect his discs from [facility] to take Dr. Perdue to decide whether to proceed with cystoscopy or just continue watchful waiting.  There is no major changes and I suspect we are looking at postoperative changes and we shall see him for video visit in a few weeks to see what urology at  decides.    -10/11/2021 cystoscopy Dr. Perdue showed marked acute and chronic  inflammation but no evidence of malignancy.  Muscularis propria present.    -10/14/2021 [facility] medical oncology virtual visit: I reviewed reports of pathology from cystoscopy 10/11/2021 and went over this with patient.  He is feeling fairly fit.  I will repeat his CT chest abdomen pelvis prior to return in 3 months and if in the interim, when he follows up with Dr. Perdue in the next couple of weeks post cystoscopy, plans are made for further cystoscopy before I see him back and any malignancy is found, I would ask the patient and Dr. Perdue to touch base as I would not know of such without that contact as the information comes into our chart without notification now that epic is being used by the [facility].  Assuming no further biopsies in the interim, I will simply repeat his scans in 3 months to follow what I suspected and is now confirmed to be inflammatory changes.  From the aneurysm standpoint, he has follow-up scanning March 2022 arranged by Dr. Anthony and he will follow him up after that.    -11/24/2021 CT chest abdomen pelvis compared to 6/7/2021 outside imaging shows stable 4.1 cm ascending thoracic aorta.  No lung nodules.  Liver homogenous with bilateral cysts.  No adenopathy.  Thickened sigmoid colon and rectum suggestive of mild colitis or postradiation change.  No pelvic adenopathy.  Bony structures degenerative in the spine.  Some soft tissue anterior to the acetabulum on the right and extending along the medial aspect right pelvic sidewall 2.7 x 5.6 may represent intramuscular process versus adenopathy which could not be ruled out.  No prior study views available.    -1/4/2022 follow-up Dr. Ike Perdue [facility].  Per his note, he had TURBT 10/11/2021.  He had been placed on antibiotics for UTI for preop preparation for back surgery planned in 2 weeks.  Denies any mucus in his urine.  Some microhematuria on urinalysis this day.  No gross hematuria or dysuria.  The October 2021 TURBT did not reveal anything  malignant on pathology.  There may be irritation or inflammation secondary to sutures in the bladder following partial cystectomy.  Patient asymptomatic from a urinary standpoint.  Recommended following up with [facility] [place] oncology with no plans for further urology follow-up.    -2/4/2022 [facility] medical oncology telehealth follow-up visit: I reviewed his November images and reports with the patient as well as with Dr. Gm Moody and the note from Dr. Perdue from 1 month ago.  I do suspect this pelvic sidewall abnormality in November represents persistent disease but as I have stated in prior dictations, the data on palliative or overall survival benefit of systemic 5-FU based combination chemotherapy and/or Avastin does not show a clear-cut survival advantage to chemotherapy for asymptomatic disease.  He is having some back pain and had spine decompression a couple of weeks ago without much benefit.  This is an area that may potentially be radiated but I will check his CT chest (angiogram of chest) abdomen and pelvis and get his blood counts and chemistries and urinalysis and see him back for virtual visit in a few weeks.  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  I will also converse with Dr. Peoples at that junction whether he thinks he has anything to offer if this is progressing though I doubt it given that the last intervention was a urologic procedure and the last cystoscopy/TURBT in October had no malignancy and the liver lesions are currently being called liver cysts albeit I am skeptical as to whether those are truly benign but they are certainly not growing.    -3/4/2022 CT  "angiogram of chest/CT abdomen pelvis for evaluation of dizziness and surveillance of thoracic aortic aneurysm and appendiceal carcinoma.  Thoracic ascending aortic ectasia 4 x 4 cm stable.  Small nodularity left upper lobe stable from September.  Stable small hepatic hypodensities status post cholecystectomy.  Right external iliac soft tissue fullness now 3 x 3.9 cm previously 2 x 2.3 cm concerning for enlarging adenopathy with symmetric nonenlarged inguinal nodes stable.  CBC unremarkable.  CMP unremarkable save for potassium 5.4.  CEA 18.2.  1-3 red cells and white cells per milliliter of urine.    -3/16/2022 office visit with Dr. Portillo Peoples.  He reviewed the CT from 3/4/2022.  He notes that the laminectomy from 6 weeks prior has not helped his \"hip pain\".  Given the location of this recurrence previously 2 x 2.3 cm now 3 x 3.9 cm in the external iliac area concerning for enlarging adenopathy with symmetric nonenlarged inguinal nodes stable, the mass appears unresectable with involvement of muscle and vasculature.    -3/24/2022 [facility] medical oncology follow-up virtual visit: Pain is still significant.  We will get him in hopefully in the next day to [facility] for palliative care.  Have spoken with Dr. Peoples and no surgery.  I have spoken with Dr. Moody who thinks palliative radiation while not likely to shrink tumor dramatically may help pain considerably and we will get him there.  We will get him to bring the discs to our [place] office and asked them to bring that back to [place] to get scanned in for our invasive radiologist to look at for us to get CT-guided biopsy of this node in the right lower quadrant and send that for Gab miguel once the pathology is obtained to hopefully find high tumor mutational burden or other molecular targets that might give us some options.  Apart from that, as stated multiple times, this is not a highly chemotherapy sensitive tumor and I am not sure I would " palliate him well but, when I see him back in early June with CAT scans prior to return and post radiation, if he is not getting relief and wants to try a 5-FU based regimen plus or minus Avastin I would be okay with that but he knows it does not alter survival 1 way or the other and we are simply trying to palliate this inexorable process.  No other surgical options.    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  Mismatch repair proficient,NTRK1/2/3 fusion not detected.  BRAF V600E negative.  PD-L1 Negative, 1+, 5%.  PTEN Positive, 1+, 100%. MLH1 positive/3+, 100%. MSH2 positive/3+, 100%. MSH6 positive/3+, 100%. PMS2 positive/3+,100%.    -5/6/2022 last radiation    - 5/25/2022 CT chest abdomen pelvis with contrast at [facility] shows nothing remarkable on the chest.  Stable liver cysts.  Focal soft tissue right iliac region appears to be increasing in size now 7.2 cm previously 3.9 cm with some mass-effect on surrounding structures with several stable inguinal borderline nodes.    -6/6/2022 [facility] follow-up visit: Now 1 month out from radiation just starting to get a little bit of improvement in his abdominal discomfort.  Slight growth on CT but some of that may be postradiation change this close to radiation and, as I stated in my note in March, it is not clear that 5-FU based therapy plus or minus Avastin is going to palliate much given its relatively insensitivity to chemotherapy and it certainly does not improve survival.  To that end, we will plan on just repeating his CAT scans again in about 6 weeks and if the pain is worsening and/or this continues to grow then I will probably give him Avastin FOLFOX.  If everything is stable and pain is controlled we will go continue watchful waiting with imaging about every 3 months from that point forward.    -7/11/2022 [facility] follow-up: His  7/7/2022 CT chest abdomen pelvis showed stable right external iliac low-attenuation which had been previously growing and thickening of the distal descending and sigmoid colon with perhaps a small right iliac borderline 1 cm node.  Symptomatically improved post radiation.  For now we will hold on Avastin FOLFOX as the benefit of such is questionable with this histologic subtype and there are no actionable molecular targets.  Repeat CTs prior to return in 3 months.    -11/1/2022 [facility] oncology follow-up: 10/19/2022 CT chest abdomen pelvis with contrast [place] [facility] compared to 7/7/2022 shows no significant change or evidence of progression.  Clinically quiescent.  We will repeat CTs in 6 months, sooner as symptoms dictate.    -5/19/2023 CT chest abdomen pelvis [place] [facility] compared to 10/19/2022 showed no evidence of disease progression in the chest.  Nodular density adjacent to the descending colon 6 mm nonspecific.  Otherwise unchanged appearance of hepatic metastatic nodules, right iliac adenopathy, and right pelvic mass.    -5/23/2023 [facility] oncology follow-up: I reviewed his report of CT chest abdomen pelvis from [place] 4 days ago showing no evidence of progression.  So area near the descending colon is nonspecific and would not change anything at this junction.  His October CT had not changed from July and the current one has not changed significantly since October.  We will continue to see palliative care for his right pelvic pain.  If leg swelling significantly worsens we could check Doppler and assuming no clot consider vascular intervention but as the thigh has not significantly changed over time nor is there significant tenderness in the calf I am doubtful that this is clot and is likely just due to the pelvic mass.  Vascular surgical intervention should swelling worsen is also an option but I would not want a stent placed through this area in the face malignancy  particularly a mucinous variety that would make him prone towards clotting.  If the leg worsens I would get his Doppler and consider vascular intervention or anticoagulation as dictated but clinically stable so will not investigate further at this junction.  I will repeat his imaging again in 6 months.    -11/20/2023 CT chest, abdomen and pelvis shows stable appearances of the chest with no evidence of metastatic disease.  Abdomen and pelvis reveals small interval decrease in size of the previously demonstrated peritoneal nodule adjacent to the descending colon.  No new suspicious omental/peritoneal soft tissue nodules or masses.  Stable right iliac adenopathy as well as a soft tissue mass along the right external iliac vasculature along the right pelvic sidewall.  -11/29/2023 [facility] follow-up: Current CT chest, abdomen and pelvis shows stable findings, no evidence of progression.  He has no new worrisome symptoms however he does still have swelling in his right thigh and lower pelvic pain for which she sees palliative care.  He states that it seems slightly more pronounced over the last few weeks, he has a follow-up with palliative care and will discuss further with him.  We reviewed recommendations discussed at his last visit with Dr. Delcid that as long as the swelling did not worsen significantly then we would hold off on considering any vascular intervention.  We will continue to monitor, Jose Antonio understands to let us know if he has any changes otherwise we will plan on repeating CT chest, abdomen and pelvis prior to return in 6 months and I have ordered those today.  His blood pressure was running high on arrival today, I did recheck it manually and it was 150/100.  He reports that he took his blood pressure earlier today at home and it was 140s over 85 or so.  I asked him to recheck when he got home and monitor and if his diastolic continued to run over 90 to get in with Dr. Dial for  evaluation.    - 5/20/2023 CT chest abdomen pelvis [place] [facility] compared to November shows 10 mm nodule left lower lobe and right lower lobe.  Anterior lobe pleural-based nodule unchanged.  6.1 x 4 cm right pelvic sidewall stable.  12 mm right common iliac node stable.  No ascites.    -5/28/2024 [facility] clinic follow-up: No signs or symptoms of recurrence.  3 times in the last 6 months he had a 6-hour period of crampy abdominal pain that subsequently subsided.  Potentially could have been having some obstructive symptomatology but none on imaging.  If this happens more frequently or it lasts he will let us know and also Dr. Peoples but would also go towards a more soft diet during those episodes and try FiberCon.  Otherwise imaging of the abdomen is stable but there is a couple of nodules in the lung that appeared new albeit small.  This is a very low-grade process and systemic therapies are not very effective and he is asymptomatic so we will just repeat CT chest in 3 months.  If this grows then we will consider getting tissue as it would be a little odd for this low-grade process to show up in the lung.    -8/28/2024 [place] [facility] chest with contrast compared to May 2024 shows left lower lobe nodule 16 mm compared to 10 mm with right lower lobe nodule 16 mm previously 10 mm and small subpleural right upper lobe nodule stable most likely benign intrapulmonary node and small stable nodule right lower lobe likely intrapulmonary.  Several small low-density liver lesions likely cysts.    -9/3/2024 [facility] clinic follow-up: No signs or symptoms of recurrence 1 lung nodule in each lung base has grown.  Other nodules stable.  High likelihood for recurrence.  Will get CT abdomen pelvis and then get him to Dr. Santos for consideration of navigational bronchoscopy/Ion biopsy these lesions and might consider CyberKnife for which I will get him to Dr. Moody for opinion and coordination with   Danielle pending results of CT abdomen pelvis.  If on the other hand his CT abdomen pelvis shows additional sites of progression beyond the lung then we will likely go back to systemic therapy of which he has very little likelihood to respond but I would like to get tissue for molecular testing.     -9/24/2024 [facility] abdominal pelvic CT with contrast compared to May 2024 showed continued decrease in size of hepatic fat necrosis left lower quadrant and decrease in right common iliac and external iliac adenopathy and no new metastatic disease.    -9/25/2024 robotic bronchoscopy preparative CT chest    -9/26/2024 [facility] hemat[facility] oncology follow-up: Reviewed recent CT abdomen with no clear-cut malignancy and continued regression fat necrosis left lower quadrant with decreasing adenopathy in the right common and external iliac nodes.  CT chest done yesterday has not been read but is a noncontrast robotic preparative CT to be used by Dr. Santos for navigational bronchoscopy on October 2.  Will get him back to me around October 10 and Dr. Moody in the interim for opinion as to CyberKnife particularly if paucimetastatic malignancy proven.    - 10/2/2024 bronchoscopy Dr. Santos Ion robot platform biopsy 2 cm nodule middle one third of the left lower lobe.  FNA left lower lobe, bronchial brush, transbronchial needle aspirate right lower lobe and right lower lobe brush all show adenocarcinoma consistent with prior pathology.  Left lower lobe lung biopsy and right lower lobe lung biopsy both show adenocarcinoma with extensive extracellular mucin CDX2 strongly positive.  Negative for CK7 and patchy CK20 positivity.  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 Moody plans to treat right lung metastasis [address] in 3 fractions and then pending tolerance will reevaluate shortly thereafter for treatment to the left lung.    -10/10/2024 [facility] hematology oncology follow-up: I reviewed with him the bronchoscopy and path reports and the note from Dr. Moody.  I will have him see my nurse practitioner back in 6 weeks to make sure he is having no new symptoms and at that point she will order a follow-up CT chest abdomen pelvis about 8 weeks out from the end of radiation.  I will get Gab HERNANDEZ profile on the lung biopsy.    Total time of care today inclusive of time spent today prior to patient's arrival reviewing interval bronchoscopy and path report and note with Dr. Moody and during visit translating to him and interviewing as to signs or symptoms of his disease and afterwards putting forth a plan as outlined above took 50 minutes patient care time throughout the day today.  Cy Delcid MD    10/10/2024

## 2024-10-10 NOTE — PROGRESS NOTES
CHIEF COMPLAINT: No new somatic complaints    Problem List:  Oncology/Hematology History Overview Note   1.  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  Pathologic stage IIa with T3 extension into the base of the appendix through the muscularis propria but not into the serosal surface.  16 nodes negative.  Colonoscopy December 2017 negative postop.Laparoscopic diagnostic peritoneal biopsy confirming disease in the bladder dome February 2020.  Began Folfiri.  June 2020 Dr. Peoples excised right lobe of liver and left lobe liver lesion, omentectomy and resection of pelvic peritoneal nodules, ileocolectomy with ileal colostomy and intraperitoneal HIPEC with mitomycin.  Dr. Lawler performed right ureteral stent placement and partial cystectomy.  With large fungating mass, 4/22/2021 had exploratory laparotomy with lysis of adhesions and right pelvic sidewall excisional biopsy with open partial cystectomy.  No malignancy and peritoneal fluid.  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  Margins less than 1 mm.  October 2021 cystoscopy without malignancy.  TURBT 10/11/2021 did not show malignancy.  March 2022 Dr. Peoples for 3.9 cm external iliac recurrent adenopathy 6 weeks out from laminectomy which did not help pain was determined to be an unresectable recurrence with muscle and vascular involvement.  Radiation with Dr. Moody ended 5/6/2022.  Disease stabilized and pain improved.    -12/30/2019 medical oncology office visit: The patient had been on surveillance since surgery December 2017.  CT scans had been negative up until 12/30/2019 CT chest abdomen and pelvis that showed subtle soft tissue density surrounding surgical clips in the right side of the pelvis along with soft tissue nodule at the anterior wall of the bladder concerning for recurrent disease.  Patient sent back to Dr. Davidson for colonoscopy.    -2/7/2020  patient was referred to Dr. Peoples at the [facility], he underwent diagnostic laparoscopic and peritoneal biopsies that confirmed recurrent disease with biopsy of bladder dome nodule showing adenocarcinoma with mucinous features.  Port was placed at this time also.    -2/25/2020 began FOLFIRI    -4/21/2020 patient having increased fatigue, FOLFIRI dose reduced by 10%.    -5/19/2020 cycle 7 FOLFIRI    -6/18/2020 Dr. Peoples performed exploratory laparotomy with excision of right lobe liver lesion and left lobe liver lesion, omentectomy, resection of pelvic peritoneal nodules, ileocolectomy with creation of ileal colostomy, hyperthermic intraperitoneal chemotherapy with mitomycin-C at 42 °C for deep tissue penetration for 90 minutes.  Dr. Perdue performed partial cystectomy with right ureteral stent placement    -8/5/2020 CT abdomen pelvis with contrast shows small soft tissue nodule anterior aspect of bladder stable.  New small amount of ascites as well as a new small left pleural effusion.  Creatinine 0.75 with hemoglobin 11.1 otherwise unremarkable CBC and CMP.    -2/15/2021 CT abdomen pelvis with contrast compared to 8/5/2020 shows enlarging soft tissue mass 4.2 cm over the bladder fundus and there is a calcification along the base of the urinary bladder.  Small stable multiple hypoattenuating indeterminate liver lesions.  Similar degree of ascites.    -2/23/2021 CT chest shows no evidence of metastasis with ascending aorta 42 mm.    -3/8/2021 follow-up with Dr. Portillo Peoples we reviewed his CTs suggested team effort resection with Dr. Perdue    -3/16/2021 follow-up with Dr. Perdue.  He plans for open partial cystectomy, possible ureteral implants, cystoscopy and stent placement in concert with Dr. Peoples.    -4/22/2021 cystoscopy (after prior office cystoscopy showed large fungating mass) with bilateral ureteral catheters, exploratory laparotomy, lysis of adhesions, right pelvic sidewall excisional biopsy, open  "partial cystectomy Dr. Ike Perdue. Peritoneal fluid showed predominant inflammatory reactive mesothelial cells with no malignancy. 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 Carcinoma was less than 1 mm from the lateral resection margins.    -5/18/2021 [facility] medical oncology follow-up visit: I reviewed his hospital notes, operative notes, and pathology report as above and went over this with the patient.  The general consensus from retrospective as well as a few prospective data over the last couple of decades with this low-grade malignancy does not show any profound benefit from \"adjuvant\" chemotherapy in this current setting.  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  There is no standard follow-up but general guidelines suggest following up as typical colon cancer.  I will repeat his CT chest abdomen pelvis now to reestablish his baseline postoperative imaging and have him see my nurse practitioner back in a couple of weeks to make sure there is no nia evidence of measurable residual disease.  Assuming this just shows postoperative changes, I would simply repeat scans again in 3 months or sooner as symptoms dictate.  If he has no evidence of persistent or metastatic disease on current imaging, then when he sees my nurse practitioner back she will also review what they say about his ascending aorta which was 42 mm in February and sent him to Dr. Evangelist Anthony for an opinion as to whether any intervention is needed relative to the aorta.  Obviously if his cancer is out of control on the upcoming scans then the aorta is a moot point.    -6/10/2021 [facility] oncology clinic follow-up: CT scans show no evidence of disease recurrence in the chest, abdomen or pelvis.  Ascending aortic aneurysm stable at 41 mm.  Referral made to Dr. Anthony, cardiothoracic surgeon for management and evaluation of a sending aortic aneurysm.  Plan to repeat CT " scans in 3 months.    -9/14/2021 [facility] medical oncology follow-up visit: I reviewed images and reports of 9/10/2021 CT chest abdomen pelvis with contrast which shows under distended urinary bladder with mild asymmetric wall thickening dome and left lateral wall with a small 9 mm polypoid lesion in the bladder.  There is stable 2.3 cm right external iliac and a few other subcentimeter scattered nodes in the root of the small bowel mesentery and retroperitoneum.  Postsurgical right hemicolectomy changes.  Stable low-attenuation lesions in the liver unchanged.  I will have him collect his discs from [facility] to take Dr. Perdue to decide whether to proceed with cystoscopy or just continue watchful waiting.  There is no major changes and I suspect we are looking at postoperative changes and we shall see him for video visit in a few weeks to see what urology at  decides.    -10/11/2021 cystoscopy Dr. Perdue showed marked acute and chronic inflammation but no evidence of malignancy.  Muscularis propria present.    -10/14/2021 [facility] medical oncology virtual visit: I reviewed reports of pathology from cystoscopy 10/11/2021 and went over this with patient.  He is feeling fairly fit.  I will repeat his CT chest abdomen pelvis prior to return in 3 months and if in the interim, when he follows up with Dr. Perdue in the next couple of weeks post cystoscopy, plans are made for further cystoscopy before I see him back and any malignancy is found, I would ask the patient and Dr. Perdue to touch base as I would not know of such without that contact as the information comes into our chart without notification now that epic is being used by .  Assuming no further biopsies in the interim, I will simply repeat his scans in 3 months to follow what I suspected and is now confirmed to be inflammatory changes.  From the aneurysm standpoint, he has follow-up scanning March 2022 arranged by Dr. Anthony and he will follow him up  after that.    -11/24/2021 CT chest abdomen pelvis compared to 6/7/2021 outside imaging shows stable 4.1 cm ascending thoracic aorta.  No lung nodules.  Liver homogenous with bilateral cysts.  No adenopathy.  Thickened sigmoid colon and rectum suggestive of mild colitis or postradiation change.  No pelvic adenopathy.  Bony structures degenerative in the spine.  Some soft tissue anterior to the acetabulum on the right and extending along the medial aspect right pelvic sidewall 2.7 x 5.6 may represent intramuscular process versus adenopathy which could not be ruled out.  No prior study views available.    -1/4/2022 follow-up Dr. Ike Perdue [facility].  Per his note, he had TURBT 10/11/2021.  He had been placed on antibiotics for UTI for preop preparation for back surgery planned in 2 weeks.  Denies any mucus in his urine.  Some microhematuria on urinalysis this day.  No gross hematuria or dysuria.  The October 2021 TURBT did not reveal anything malignant on pathology.  There may be irritation or inflammation secondary to sutures in the bladder following partial cystectomy.  Patient asymptomatic from a urinary standpoint.  Recommended following up with [facility] with no plans for further urology follow-up.    -2/4/2022 [facility] telehealth follow-up visit: I reviewed his November images and reports with the patient as well as with Dr. Gm Moody and the note from Dr. Perdue from 1 month ago.  I do suspect this pelvic sidewall abnormality in November represents persistent disease but as I have stated in prior dictations, the data on palliative or overall survival benefit of systemic 5-FU based combination chemotherapy and/or Avastin does not show a clear-cut survival advantage to chemotherapy for asymptomatic disease.  He is having some back pain and had spine decompression a couple of weeks ago without much benefit.  This is an area that may potentially be radiated but I will check his CT  "chest (angiogram of chest) abdomen and pelvis and get his blood counts and chemistries and urinalysis and see him back for virtual visit in a few weeks.  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  I will also converse with Dr. Peoples at that junction whether he thinks he has anything to offer if this is progressing though I doubt it given that the last intervention was a urologic procedure and the last cystoscopy/TURBT in October had no malignancy and the liver lesions are currently being called liver cysts albeit I am skeptical as to whether those are truly benign but they are certainly not growing.    -3/4/2022 CT angiogram of chest/CT abdomen pelvis for evaluation of dizziness and surveillance of thoracic aortic aneurysm and appendiceal carcinoma.  Thoracic ascending aortic ectasia 4 x 4 cm stable.  Small nodularity left upper lobe stable from September.  Stable small hepatic hypodensities status post cholecystectomy.  Right external iliac soft tissue fullness now 3 x 3.9 cm previously 2 x 2.3 cm concerning for enlarging adenopathy with symmetric nonenlarged inguinal nodes stable.  CBC unremarkable.  CMP unremarkable save for potassium 5.4.  CEA 18.2.  1-3 red cells and white cells per milliliter of urine.    -3/16/2022 office visit with Dr. Portillo Peoples.  He reviewed the CT from 3/4/2022.  He notes that the laminectomy from 6 weeks prior has not helped his \"hip pain\".  Given the location of this recurrence previously 2 x 2.3 cm now 3 x 3.9 cm in the external iliac area concerning for enlarging adenopathy with symmetric nonenlarged inguinal nodes stable, the mass appears unresectable with " involvement of muscle and vasculature.    -3/24/2022 [facility] medical oncology follow-up virtual visit: Pain is still significant.  We will get him in hopefully in the next day to [facility] for palliative care.  Have spoken with Dr. Peoples and no surgery.  I have spoken with Dr. Moody who thinks palliative radiation while not likely to shrink tumor dramatically may help pain considerably and we will get him there.  We will get him to bring the discs to our [place] office and asked them to bring that back to [place] to get scanned in for our invasive radiologist to look at for us to get CT-guided biopsy of this node in the right lower quadrant and send that for Caris MI profile once the pathology is obtained to hopefully find high tumor mutational burden or other molecular targets that might give us some options.  Apart from that, as stated multiple times, this is not a highly chemotherapy sensitive tumor and I am not sure I would palliate him well but, when I see him back in early June with CAT scans prior to return and post radiation, if he is not getting relief and wants to try a 5-FU based regimen plus or minus Avastin I would be okay with that but he knows it does not alter survival 1 way or the other and we are simply trying to palliate this inexorable process.  No other surgical options.    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  Mismatch repair proficient,NTRK1/2/3 fusion not detected.  BRAF V600E negative.  PD-L1 Negative, 1+, 5%.  PTEN Positive, 1+, 100%. MLH1 positive/3+, 100%. MSH2 positive/3+, 100%. MSH6 positive/3+, 100%. PMS2 positive/3+,100%.    -5/6/2022 last radiation    - 5/25/2022 CT chest abdomen pelvis with contrast at [place] [facility] shows nothing remarkable on the chest.  Stable liver cysts.  Focal soft tissue right iliac region appears to be increasing in size now 7.2 cm previously 3.9 cm  with some mass-effect on surrounding structures with several stable inguinal borderline nodes.    -6/6/2022 [facility] medical oncology follow-up visit: Now 1 month out from radiation just starting to get a little bit of improvement in his abdominal discomfort.  Slight growth on CT but some of that may be postradiation change this close to radiation and, as I stated in my note in March, it is not clear that 5-FU based therapy plus or minus Avastin is going to palliate much given its relatively insensitivity to chemotherapy and it certainly does not improve survival.  To that end, we will plan on just repeating his CAT scans again in about 6 weeks and if the pain is worsening and/or this continues to grow then I will probably give him Avastin FOLFOX.  If everything is stable and pain is controlled we will go continue watchful waiting with imaging about every 3 months from that point forward.    -7/11/2022 [facility] medical oncology follow-up: His 7/7/2022 CT chest abdomen pelvis showed stable right external iliac low-attenuation which had been previously growing and thickening of the distal descending and sigmoid colon with perhaps a small right iliac borderline 1 cm node.  Symptomatically improved post radiation.  For now we will hold on Avastin FOLFOX as the benefit of such is questionable with this histologic subtype and there are no actionable molecular targets.  Repeat CTs prior to return in 3 months.    -11/1/2022 [facility] medical oncology follow-up: 10/19/2022 CT chest abdomen pelvis with contrast [place] [facility] compared to 7/7/2022 shows no significant change or evidence of progression.  Clinically quiescent.  We will repeat CTs in 6 months, sooner as symptoms dictate.    -5/19/2023 CT chest abdomen pelvis [place] [facility] compared to 10/19/2022 showed no evidence of disease progression in the chest.  Nodular density adjacent to the descending colon 6 mm nonspecific.  Otherwise unchanged appearance of hepatic  metastatic nodules, right iliac adenopathy, and right pelvic mass.    -5/23/2023 [facility] medical oncology follow-up: I reviewed his report of CT chest abdomen pelvis from [place] 4 days ago showing no evidence of progression.  So area near the descending colon is nonspecific and would not change anything at this junction.  His October CT had not changed from July and the current one has not changed significantly since October.  We will continue to see palliative care for his right pelvic pain.  If leg swelling significantly worsens we could check Doppler and assuming no clot consider vascular intervention but as the thigh has not significantly changed over time nor is there significant tenderness in the calf I am doubtful that this is clot and is likely just due to the pelvic mass.  Vascular surgical intervention should swelling worsen is also an option but I would not want a stent placed through this area in the face malignancy particularly a mucinous variety that would make him prone towards clotting.  If the leg worsens I would get his Doppler and consider vascular intervention or anticoagulation as dictated but clinically stable so will not investigate further at this junction.  I will repeat his imaging again in 6 months.    -11/20/2023 CT chest, abdomen and pelvis shows stable appearances of the chest with no evidence of metastatic disease.  Abdomen and pelvis reveals small interval decrease in size of the previously demonstrated peritoneal nodule adjacent to the descending colon.  No new suspicious omental/peritoneal soft tissue nodules or masses.  Stable right iliac adenopathy as well as a soft tissue mass along the right external iliac vasculature along the right pelvic sidewall.  -11/29/2023 [facility] Oncology clinic follow-up: Current CT chest, abdomen and pelvis shows stable findings, no evidence of progression.  He has no new worrisome symptoms however he does still have swelling in his right thigh and  lower pelvic pain for which she sees palliative care.  He states that it seems slightly more pronounced over the last few weeks, he has a follow-up with palliative care and will discuss further with him.  We reviewed recommendations discussed at his last visit with Dr. Delcid that as long as the swelling did not worsen significantly then we would hold off on considering any vascular intervention.  We will continue to monitor, Jose Antonio understands to let us know if he has any changes otherwise we will plan on repeating CT chest, abdomen and pelvis prior to return in 6 months and I have ordered those today.  His blood pressure was running high on arrival today, I did recheck it manually and it was 150/100.  He reports that he took his blood pressure earlier today at home and it was 140s over 85 or so.  I asked him to recheck when he got home and monitor and if his diastolic continued to run over 90 to get in with Dr. Dial for evaluation.    - 5/20/2023 CT chest abdomen pelvis [place] [facility] compared to November shows 10 mm nodule left lower lobe and right lower lobe.  Anterior lobe pleural-based nodule unchanged.  6.1 x 4 cm right pelvic sidewall stable.  12 mm right common iliac node stable.  No ascites.    -5/28/2024 [facility] clinic follow-up: No signs or symptoms of recurrence.  3 times in the last 6 months he had a 6-hour period of crampy abdominal pain that subsequently subsided.  Potentially could have been having some obstructive symptomatology but none on imaging.  If this happens more frequently or it lasts he will let us know and also Dr. Peoples but would also go towards a more soft diet during those episodes and try FiberCon.  Otherwise imaging of the abdomen is stable but there is a couple of nodules in the lung that appeared new albeit small.  This is a very low-grade process and systemic therapies are not very effective and he is asymptomatic so we will just repeat CT chest in 3 months.  If this  grows then we will consider getting tissue as it would be a little odd for this low-grade process to show up in the lung.    -8/28/2024 [place] [facility] chest with contrast compared to May 2024 shows left lower lobe nodule 16 mm compared to 10 mm with right lower lobe nodule 16 mm previously 10 mm and small subpleural right upper lobe nodule stable most likely benign intrapulmonary node and small stable nodule right lower lobe likely intrapulmonary.  Several small low-density liver lesions likely cysts.    -9/3/2024 [facility], [place], operated by [facility] oncology clinic follow-up: No signs or symptoms of recurrence 1 lung nodule in each lung base has grown.  Other nodules stable.  High likelihood for recurrence.  Will get CT abdomen pelvis and then get him to Dr. Santos for consideration of navigational bronchoscopy/Ion biopsy these lesions and might consider CyberKnife for which I will get him to Dr. Moody for opinion and coordination with Dr. Santos pending results of CT abdomen pelvis.  If on the other hand his CT abdomen pelvis shows additional sites of progression beyond the lung then we will likely go back to systemic therapy of which he has very little likelihood to respond but I would like to get tissue for molecular testing.     -9/24/2024 [facility] abdominal pelvic CT with contrast compared to May 2024 showed continued decrease in size of hepatic fat necrosis left lower quadrant and decrease in right common iliac and external iliac adenopathy and no new metastatic disease.    -9/25/2024 robotic bronchoscopy preparative CT chest    -9/26/2024 [facility], [place], operated by [facility] hematology oncology follow-up: Reviewed recent CT abdomen with no clear-cut malignancy and continued regression fat necrosis left lower quadrant with decreasing adenopathy in the right common and external iliac nodes.  CT chest done yesterday has not been read but is a noncontrast robotic preparative CT to be used by Dr. Santos for navigational bronchoscopy on October 2.   Will get him back to me around October 10 and Dr. Moody in the interim for opinion as to CyberKnife particularly if paucimetastatic malignancy proven.    - 10/2/2024 bronchoscopy Dr. Santos Ion robot platform biopsy 2 cm nodule middle one third of the left lower lobe.  FNA left lower lobe, bronchial brush, transbronchial needle aspirate right lower lobe and right lower lobe brush all show adenocarcinoma consistent with prior pathology.  Left lower lobe lung biopsy and right lower lobe lung biopsy both show adenocarcinoma with extensive extracellular mucin CDX2 strongly positive.  Negative for CK7 and patchy CK20 positivity.  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 Moody plans to treat right lung metastasis [address] in 3 fractions and then pending tolerance will reevaluate shortly thereafter for treatment to the left lung.    -10/10/2024 [facility] hematology oncology follow-up: I reviewed with him the bronchoscopy and path reports and the note from Dr. Moody.  I will have him see my nurse practitioner back in 6 weeks to make sure he is having no new symptoms and at that point she will order a follow-up CT chest abdomen pelvis about 8 weeks out from the end of radiation.  I will get Gab HERNANDEZ profile on the lung biopsy.     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  Pathologic stage IIa with T3 extension into the base of the appendix through the muscularis propria but not into the serosal surface.  16 nodes negative.  Colonoscopy December 2017 negative postop     12/5/2017 Surgery    Surgery       Procedure:   Appendectomy and right hemicolectomy      Completeness of resection:  No evidence of residual tumor     Pathology revealed invasive moderately differentiated mucinous adenocarcinoma.  Right hemicolectomy: Benign colon and small intestine no evidence of carcinoma.  16 benign lymph nodes, 0/16, negative for dysplasia or malignancy.  Tumor size approximately 6 cm.  Grade 2, moderately differentiated.  Tumor invades through the muscularis profile into the base of appendix but does not extend to the serosal surface.  All margins uninvolved, no lymphovascular invasion identified.  Pathological stage pT3 pN0.         12/8/2017 Imaging    CT of the chest abdomen and pelvis negative.  Baseline CBC WBC 7100, hemoglobin 11.3, hematocrit 34.8%, platelet count 195,000.     3/20/2018 Procedure    Colonoscopy with Dr. Davidson was normal, recommendation for repeat surveillance colonoscopy in 3 years.     6/11/2018 Imaging    CT chest, abdomen and pelvis with no evidence metastatic disease.  CEA 1.1     9/24/2018 Imaging    CT abdomen pelvis showed no acute changes and nothing to suggest recurrence     12/28/2018 Imaging    CT chest, abdomen and pelvis negative. Normal CBC, CMP and CEA 0.7.     6/28/2019 Imaging    CT chest, abdomen and pelvis: No interval change from prior studies.  No recurrent or metastatic disease detected in the chest, abdomen or pelvis.  No acute process.  CEA 0.9     12/30/2019 Imaging    CT chest, abdomen and pelvis: No disease in the chest.  There was noted a subtle soft tissue density, one surrounding surgical clips in the right side of the pelvis and the other a soft tissue nodule intimately associated with the anterior wall of the bladder, which are considered suspicious for recurrent disease.  CEA 1.1.  CMP with normal creatinine 0.9, total bilirubin 1.9, AST 34, ALT 24, alkaline phosphatase 93.  CBC with WBC 6900, hemoglobin 15.9, platelet count 232,000.       1/21/2020 Procedure    Normal colonoscopy  with Dr. Davidson.  He has made referral to Dr. Portillo Peoples at .     2/7/2020 Progression    12/30/2019 CT chest, abdomen and pelvis: No disease in the chest.  There are subtle soft tissue densities (1 surrounding surgical clips in the right side of the pelvis and the other a soft tissue nodule intimately associated with the anterior wall of the bladder) which are considered suspicious for recurrent disease.  CMP with normal creatinine 0.9, total bilirubin 1.9, AST 34, ALT 24, alkaline phosphatase 93.  CBC with WBC 6900, hemoglobin 15.9, platelet count 232,000.  CEA 1.1.  2/7/2020 diagnostic laparoscopy with peritoneal biopsies performed at the [facility] by Dr. Peoples showed no sign of diffuse peritoneal carcinomatosis or liver metastasis.  There was a firm nodule in the superior dome of the bladder, adherent to omentum, as well as right pelvic sidewall nodule adjacent to surgical clips.  Biopsy of bladder dome nodule showed adenocarcinoma with mucinous features.     2/25/2020 -  Chemotherapy    OP COLORECTAL FOLFIRI Irinotecan / Leucovorin / Fluorouracil     5/29/2020 Imaging    CT chest abdomen pelvis shows slight improvement with decreased nodule anterolateral margin of urinary bladder with stable nodularity of the right lower quadrant adjacent to surgical clips and no progressive disease.  Slight hyperemia of the colonic mucosa     6/18/2020 Surgery    Surgery       -6/18/2020 Dr. Peoples performed exploratory laparotomy with excision of right lobe liver lesion and left lobe liver lesion, omentectomy, resection of pelvic peritoneal nodules, ileocolectomy with creation of ileal colostomy, hyperthermic intraperitoneal chemotherapy with mitomycin-C at 42 °C for deep tissue penetration for 90 minutes.  Dr. Perdue performed partial cystectomy with right ureteral stent placement     8/5/2020 Imaging     CT abdomen pelvis with contrast shows small soft tissue nodule anterior aspect of bladder stable.  New small  amount of ascites as well as a new small left pleural effusion.  Creatinine 0.75 with hemoglobin 11.1 otherwise unremarkable CBC and CMP     2/15/2021 Imaging    CT abdomen pelvis with contrast compared to 8/5/2020 shows enlarging soft tissue mass 4.2 cm over the bladder fundus and there is a calcification along the base of the urinary bladder.  Small stable multiple hypoattenuating indeterminate liver lesions.  Similar degree of ascites.     2/23/2021 Imaging    -2/23/2021 CT chest shows no evidence of metastasis with ascending aorta 42 mm.     4/22/2021 Surgery    -4/22/2021 cystoscopy (after prior office cystoscopy showed large fungating mass) with bilateral ureteral catheters, exploratory laparotomy, lysis of adhesions, right pelvic sidewall excisional biopsy, open partial cystectomy Dr. Ike Perdue. Peritoneal fluid showed predominant inflammatory reactive mesothelial cells with no malignancy. 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 Carcinoma was less than 1 mm from the lateral resection margins.     6/7/2021 Imaging    CT chest, abdomen and pelvis: No evidence of metastatic disease within the chest abdomen or pelvis.  Interval resection of enhancing bladder wall mass a small amount of residual enhancing soft tissue, possibly granulation tissue.  Interval resolution of abdominal and pelvic ascites.  Stable dilation of the ascending aorta mid segment measuring up to 41 mm.     9/10/2021 Imaging    CT chest abdomen pelvis with contrast shows under distended urinary bladder with mild asymmetric wall thickening dome and left lateral wall with a small 9 mm polypoid lesion in the bladder.  There is stable 2.3 cm right external iliac and a few other subcentimeter scattered nodes in the root of the small bowel mesentery and retroperitoneum.  Postsurgical right hemicolectomy changes.  Stable low-attenuation lesions in the liver  unchanged.     11/24/2021 Imaging    CT chest abdomen pelvis compared to 6/7/2021 outside imaging shows stable 4.1 cm ascending thoracic aorta.  No lung nodules.  Liver homogenous with bilateral cysts.  No adenopathy.  Thickened sigmoid colon and rectum suggestive of mild colitis or postradiation change.  No pelvic adenopathy.  Bony structures degenerative in the spine.  Some soft tissue anterior to the acetabulum on the right and extending along the medial aspect right pelvic sidewall 2.7 x 5.6 may represent intramuscular process versus adenopathy which could not be ruled out.  No prior study views available.     4/11/2022 - 5/6/2022 Radiation    Radiation OncologyTreatment Course:  Esteban Tse received 5000 cGy in 20 fractions to right pelvis/groin via External Beam Radiation - EBRT.     5/25/2022 Imaging    CT chest abdomen pelvis with contrast at [facility] shows nothing remarkable on the chest.  Stable liver cysts.  Focal soft tissue right iliac region appears to be increasing in size now 7.2 cm previously 3.9 cm with some mass-effect on surrounding structures with several stable inguinal borderline nodes.     10/19/2022 Imaging    - 10/19/2022 CT chest abdomen pelvis with contrast [facility] compared to 7/7/2022 shows no significant change or evidence of progression.     Peritoneal carcinoma   5/14/2020 Initial Diagnosis    Peritoneal carcinoma (HCC)     5/25/2022 Imaging    CT chest abdomen pelvis with contrast at [facility] shows nothing remarkable on the chest.  Stable liver cysts.  Focal soft tissue right iliac region appears to be increasing in size now 7.2 cm previously 3.9 cm with some mass-effect on surrounding structures with several stable inguinal borderline nodes.         HISTORY OF PRESENT ILLNESS:  The patient is a 69 y.o. 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 Santos  "due for [employer] with Dr. Moody    Past Medical History:   Diagnosis Date    Aneurysm 2022    Ascending aortic aneurysm     Benign hypertension     Bladder cancer     Colon cancer     History of radiation therapy 05/06/2022    right groin/hemipelvis    Hypercholesteremia 09/18/2023    Hypertension     Intermittent palpitations     Mucinous adenocarcinoma     Pounding heartbeat     PVC's (premature ventricular contractions)      Past Surgical History:   Procedure Laterality Date    APPENDECTOMY  2017    With Partial Colon     BLADDER SURGERY      with partial colon    BRONCHOSCOPY WITH ION ROBOTIC ASSIST N/A 10/2/2024    Procedure: BRONCHOSCOPY NAVIGATION WITH ENDOBRONCHIAL ULTRASOUND AND ION ROBOT;  Surgeon: Levi Santos DO;  Location: [facility] ENDOSCOPY;  Service: Robotics - Pulmonary;  Laterality: N/A;  ION CATH #3  CATH [id number]   [id number]  [id number]  CATH GUIDE ID [id number]    CHOLECYSTECTOMY      COLON SURGERY      COLONOSCOPY         Allergies   Allergen Reactions    Levofloxacin Swelling     Lips swellijng    Tetracycline Swelling     Lips swelling    Penicillins Other (See Comments)     childhood       Family History and Social History reviewed and changed as necessary    REVIEW OF SYSTEM:   No new somatic complaints    PHYSICAL EXAM:  No jaundice icterus or pallor.  No respiratory distress or audible wheezes    Vitals:    10/10/24 0946   BP: 139/90   Pulse: 72   Resp: 18   Temp: 97.6 °F (36.4 °C)   TempSrc: Temporal   SpO2: 100%   Weight: 78 kg (172 lb)   Height: 185.4 cm (72.99\")     Vitals:    10/10/24 0946   PainSc: 0-No pain          ECOG score: 0           Vitals reviewed.    ECOG: (0) Fully Active - Able to Carry On All Pre-disease Performance Without Restriction    Lab Results   Component Value Date    HGB 14.2 09/25/2024    HCT 42.2 09/25/2024    MCV 87.2 09/25/2024     09/25/2024    WBC 6.29 09/25/2024    NEUTROABS 4.15 09/25/2024    LYMPHSABS 1.47 09/25/2024    MONOSABS 0.55 09/25/2024 "    EOSABS 0.08 09/25/2024    BASOSABS 0.03 09/25/2024       Lab Results   Component Value Date    GLUCOSE 109 (H) 09/25/2024    BUN 11 09/25/2024    CREATININE 1.09 09/25/2024     09/25/2024    K 4.3 09/25/2024     09/25/2024    CO2 29.0 09/25/2024    CALCIUM 9.4 09/25/2024    PROTEINTOT 6.9 09/25/2024    ALBUMIN 4.5 09/25/2024    BILITOT 1.0 09/25/2024    ALKPHOS 103 09/25/2024    AST 25 09/25/2024    ALT 19 09/25/2024             ASSESSMENT & PLAN:  1.  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  Pathologic stage IIa with T3 extension into the base of the appendix through the muscularis propria but not into the serosal surface.  16 nodes negative.  Colonoscopy December 2017 negative postop.Laparoscopic diagnostic peritoneal biopsy confirming disease in the bladder dome February 2020.  Began Folfiri.  June 2020 Dr. Peoples excised right lobe of liver and left lobe liver lesion, omentectomy and resection of pelvic peritoneal nodules, ileocolectomy with ileal colostomy and intraperitoneal HIPEC with mitomycin.  Dr. Lawler performed right ureteral stent placement and partial cystectomy.  With large fungating mass, 4/22/2021 had exploratory laparotomy with lysis of adhesions and right pelvic sidewall excisional biopsy with open partial cystectomy.  No malignancy and peritoneal fluid.  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  Margins less than 1 mm.  October 2021 cystoscopy without malignancy.  TURBT 10/11/2021 did not show malignancy.  March 2022 Dr. Peoples for 3.9 cm external iliac recurrent adenopathy 6 weeks out from laminectomy which did not help pain was determined to be an unresectable recurrence with muscle and vascular involvement.  Radiation with Dr. Moody ended 5/6/2022.  Disease stabilized and pain improved.    -12/30/2019 medical oncology office visit: The patient had been on surveillance  since surgery December 2017.  CT scans had been negative up until 12/30/2019 CT chest abdomen and pelvis that showed subtle soft tissue density surrounding surgical clips in the right side of the pelvis along with soft tissue nodule at the anterior wall of the bladder concerning for recurrent disease.  Patient sent back to Dr. Davidson for colonoscopy.    -2/7/2020 patient was referred to Dr. Peoples at the [facility], he underwent diagnostic laparoscopic and peritoneal biopsies that confirmed recurrent disease with biopsy of bladder dome nodule showing adenocarcinoma with mucinous features.  Port was placed at this time also.    -2/25/2020 began FOLFIRI    -4/21/2020 patient having increased fatigue, FOLFIRI dose reduced by 10%.    -5/19/2020 cycle 7 FOLFIRI    -6/18/2020 Dr. Peoples performed exploratory laparotomy with excision of right lobe liver lesion and left lobe liver lesion, omentectomy, resection of pelvic peritoneal nodules, ileocolectomy with creation of ileal colostomy, hyperthermic intraperitoneal chemotherapy with mitomycin-C at 42 °C for deep tissue penetration for 90 minutes.  Dr. Perdue performed partial cystectomy with right ureteral stent placement    -8/5/2020 CT abdomen pelvis with contrast shows small soft tissue nodule anterior aspect of bladder stable.  New small amount of ascites as well as a new small left pleural effusion.  Creatinine 0.75 with hemoglobin 11.1 otherwise unremarkable CBC and CMP.    -2/15/2021 CT abdomen pelvis with contrast compared to 8/5/2020 shows enlarging soft tissue mass 4.2 cm over the bladder fundus and there is a calcification along the base of the urinary bladder.  Small stable multiple hypoattenuating indeterminate liver lesions.  Similar degree of ascites.    -2/23/2021 CT chest shows no evidence of metastasis with ascending aorta 42 mm.    -3/8/2021 follow-up with Dr. Portillo Peoples we reviewed his CTs suggested team effort resection with   "Iron    -3/16/2021 follow-up with Dr. Perdue.  He plans for open partial cystectomy, possible ureteral implants, cystoscopy and stent placement in concert with Dr. Peoples.    -4/22/2021 cystoscopy (after prior office cystoscopy showed large fungating mass) with bilateral ureteral catheters, exploratory laparotomy, lysis of adhesions, right pelvic sidewall excisional biopsy, open partial cystectomy Dr. Ike Perdue. Peritoneal fluid showed predominant inflammatory reactive mesothelial cells with no malignancy. 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 Carcinoma was less than 1 mm from the lateral resection margins.    -5/18/2021 [facility] medical oncology follow-up visit: I reviewed his hospital notes, operative notes, and pathology report as above and went over this with the patient.  The general consensus from retrospective as well as a few prospective data over the last couple of decades with this low-grade malignancy does not show any profound benefit from \"adjuvant\" chemotherapy in this current setting.  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  There is no standard follow-up but general guidelines suggest following up as typical colon cancer.  I will repeat his CT chest abdomen pelvis now to reestablish his baseline postoperative imaging and have him see my nurse practitioner back in a couple of weeks to make sure there is no nia evidence of measurable residual disease.  Assuming this just shows postoperative changes, I would simply repeat scans again in 3 months or sooner as symptoms dictate.  If he has no evidence of persistent or metastatic disease on current imaging, then when he sees my nurse practitioner back she will also review what they say about his ascending aorta which was 42 mm in February and sent him to Dr. Evangelist Anthony for an opinion as to whether any intervention is needed relative to the aorta.  Obviously if his cancer " is out of control on the upcoming scans then the aorta is a moot point.    -6/10/2021 [facility] oncology clinic follow-up: CT scans show no evidence of disease recurrence in the chest, abdomen or pelvis.  Ascending aortic aneurysm stable at 41 mm.  Referral made to Dr. Anthony, cardiothoracic surgeon for management and evaluation of a sending aortic aneurysm.  Plan to repeat CT scans in 3 months.    -9/14/2021 [facility] medical oncology follow-up visit: I reviewed images and reports of 9/10/2021 CT chest abdomen pelvis with contrast which shows under distended urinary bladder with mild asymmetric wall thickening dome and left lateral wall with a small 9 mm polypoid lesion in the bladder.  There is stable 2.3 cm right external iliac and a few other subcentimeter scattered nodes in the root of the small bowel mesentery and retroperitoneum.  Postsurgical right hemicolectomy changes.  Stable low-attenuation lesions in the liver unchanged.  I will have him collect his discs from [facility] to take Dr. Perdue to decide whether to proceed with cystoscopy or just continue watchful waiting.  There is no major changes and I suspect we are looking at postoperative changes and we shall see him for video visit in a few weeks to see what urology at  decides.    -10/11/2021 cystoscopy Dr. Perdue showed marked acute and chronic inflammation but no evidence of malignancy.  Muscularis propria present.    -10/14/2021 [facility] medical oncology virtual visit: I reviewed reports of pathology from cystoscopy 10/11/2021 and went over this with patient.  He is feeling fairly fit.  I will repeat his CT chest abdomen pelvis prior to return in 3 months and if in the interim, when he follows up with Dr. Perdue in the next couple of weeks post cystoscopy, plans are made for further cystoscopy before I see him back and any malignancy is found, I would ask the patient and Dr. Perdue to touch base as I would not know of such without that contact  as the information comes into our chart without notification now that epic is being used by .  Assuming no further biopsies in the interim, I will simply repeat his scans in 3 months to follow what I suspected and is now confirmed to be inflammatory changes.  From the aneurysm standpoint, he has follow-up scanning March 2022 arranged by Dr. Anthony and he will follow him up after that.    -11/24/2021 CT chest abdomen pelvis compared to 6/7/2021 outside imaging shows stable 4.1 cm ascending thoracic aorta.  No lung nodules.  Liver homogenous with bilateral cysts.  No adenopathy.  Thickened sigmoid colon and rectum suggestive of mild colitis or postradiation change.  No pelvic adenopathy.  Bony structures degenerative in the spine.  Some soft tissue anterior to the acetabulum on the right and extending along the medial aspect right pelvic sidewall 2.7 x 5.6 may represent intramuscular process versus adenopathy which could not be ruled out.  No prior study views available.    -1/4/2022 follow-up Dr. Ike Perdue urology .  Per his note, he had TURBT 10/11/2021.  He had been placed on antibiotics for UTI for preop preparation for back surgery planned in 2 weeks.  Denies any mucus in his urine.  Some microhematuria on urinalysis this day.  No gross hematuria or dysuria.  The October 2021 TURBT did not reveal anything malignant on pathology.  There may be irritation or inflammation secondary to sutures in the bladder following partial cystectomy.  Patient asymptomatic from a urinary standpoint.  Recommended following up with [facility] with no plans for further urology follow-up.    -2/4/2022 [facility] medical oncology telehealth follow-up visit: I reviewed his November images and reports with the patient as well as with Dr. Gm Moody and the note from Dr. Perdue from 1 month ago.  I do suspect this pelvic sidewall abnormality in November represents persistent disease but as I have stated in prior  dictations, the data on palliative or overall survival benefit of systemic 5-FU based combination chemotherapy and/or Avastin does not show a clear-cut survival advantage to chemotherapy for asymptomatic disease.  He is having some back pain and had spine decompression a couple of weeks ago without much benefit.  This is an area that may potentially be radiated but I will check his CT chest (angiogram of chest) abdomen and pelvis and get his blood counts and chemistries and urinalysis and see him back for virtual visit in a few weeks.  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  I will also converse with Dr. Peoples at that junction whether he thinks he has anything to offer if this is progressing though I doubt it given that the last intervention was a urologic procedure and the last cystoscopy/TURBT in October had no malignancy and the liver lesions are currently being called liver cysts albeit I am skeptical as to whether those are truly benign but they are certainly not growing.    -3/4/2022 CT angiogram of chest/CT abdomen pelvis for evaluation of dizziness and surveillance of thoracic aortic aneurysm and appendiceal carcinoma.  Thoracic ascending aortic ectasia 4 x 4 cm stable.  Small nodularity left upper lobe stable from September.  Stable small hepatic hypodensities status post cholecystectomy.  Right external iliac soft tissue fullness now 3 x 3.9 cm previously 2 x 2.3 cm concerning for enlarging adenopathy with symmetric nonenlarged inguinal nodes stable.  CBC unremarkable.  CMP unremarkable save for potassium 5.4.  CEA 18.2.  1-3 red cells and white cells per milliliter of  "urine.    -3/16/2022 office visit with Dr. Portillo Peoples.  He reviewed the CT from 3/4/2022.  He notes that the laminectomy from 6 weeks prior has not helped his \"hip pain\".  Given the location of this recurrence previously 2 x 2.3 cm now 3 x 3.9 cm in the external iliac area concerning for enlarging adenopathy with symmetric nonenlarged inguinal nodes stable, the mass appears unresectable with involvement of muscle and vasculature.    -3/24/2022 [facility] medical oncology follow-up virtual visit: Pain is still significant.  We will get him in hopefully in the next day to [facility] for palliative care.  Have spoken with Dr. Peoples and no surgery.  I have spoken with Dr. Moody who thinks palliative radiation while not likely to shrink tumor dramatically may help pain considerably and we will get him there.  We will get him to bring the discs to our [place] office and asked them to bring that back to [place] to get scanned in for our invasive radiologist to look at for us to get CT-guided biopsy of this node in the right lower quadrant and send that for Caris MI profile once the pathology is obtained to hopefully find high tumor mutational burden or other molecular targets that might give us some options.  Apart from that, as stated multiple times, this is not a highly chemotherapy sensitive tumor and I am not sure I would palliate him well but, when I see him back in early June with CAT scans prior to return and post radiation, if he is not getting relief and wants to try a 5-FU based regimen plus or minus Avastin I would be okay with that but he knows it does not alter survival 1 way or the other and we are simply trying to palliate this inexorable process.  No other surgical options.    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  Mismatch repair proficient,NTRK1/2/3 fusion not detected.  BRAF V600E negative.  PD-L1 Negative, " 1+, 5%.  PTEN Positive, 1+, 100%. MLH1 positive/3+, 100%. MSH2 positive/3+, 100%. MSH6 positive/3+, 100%. PMS2 positive/3+,100%.    -5/6/2022 last radiation    - 5/25/2022 CT chest abdomen pelvis with contrast at [facility] shows nothing remarkable on the chest.  Stable liver cysts.  Focal soft tissue right iliac region appears to be increasing in size now 7.2 cm previously 3.9 cm with some mass-effect on surrounding structures with several stable inguinal borderline nodes.    -6/6/2022 [facility] medical oncology follow-up visit: Now 1 month out from radiation just starting to get a little bit of improvement in his abdominal discomfort.  Slight growth on CT but some of that may be postradiation change this close to radiation and, as I stated in my note in March, it is not clear that 5-FU based therapy plus or minus Avastin is going to palliate much given its relatively insensitivity to chemotherapy and it certainly does not improve survival.  To that end, we will plan on just repeating his CAT scans again in about 6 weeks and if the pain is worsening and/or this continues to grow then I will probably give him Avastin FOLFOX.  If everything is stable and pain is controlled we will go continue watchful waiting with imaging about every 3 months from that point forward.    -7/11/2022 [facility] medical oncology follow-up: His 7/7/2022 CT chest abdomen pelvis showed stable right external iliac low-attenuation which had been previously growing and thickening of the distal descending and sigmoid colon with perhaps a small right iliac borderline 1 cm node.  Symptomatically improved post radiation.  For now we will hold on Avastin FOLFOX as the benefit of such is questionable with this histologic subtype and there are no actionable molecular targets.  Repeat CTs prior to return in 3 months.    -11/1/2022 [facility] medical oncology follow-up: 10/19/2022 CT chest abdomen pelvis with contrast [place] [facility] compared to  7/7/2022 shows no significant change or evidence of progression.  Clinically quiescent.  We will repeat CTs in 6 months, sooner as symptoms dictate.    -5/19/2023 CT chest abdomen pelvis [place] [facility] compared to 10/19/2022 showed no evidence of disease progression in the chest.  Nodular density adjacent to the descending colon 6 mm nonspecific.  Otherwise unchanged appearance of hepatic metastatic nodules, right iliac adenopathy, and right pelvic mass.    -5/23/2023 [facility] medical oncology follow-up: I reviewed his report of CT chest abdomen pelvis from [place] 4 days ago showing no evidence of progression.  So area near the descending colon is nonspecific and would not change anything at this junction.  His October CT had not changed from July and the current one has not changed significantly since October.  We will continue to see palliative care for his right pelvic pain.  If leg swelling significantly worsens we could check Doppler and assuming no clot consider vascular intervention but as the thigh has not significantly changed over time nor is there significant tenderness in the calf I am doubtful that this is clot and is likely just due to the pelvic mass.  Vascular surgical intervention should swelling worsen is also an option but I would not want a stent placed through this area in the face malignancy particularly a mucinous variety that would make him prone towards clotting.  If the leg worsens I would get his Doppler and consider vascular intervention or anticoagulation as dictated but clinically stable so will not investigate further at this junction.  I will repeat his imaging again in 6 months.    -11/20/2023 CT chest, abdomen and pelvis shows stable appearances of the chest with no evidence of metastatic disease.  Abdomen and pelvis reveals small interval decrease in size of the previously demonstrated peritoneal nodule adjacent to the descending colon.  No new suspicious omental/peritoneal  soft tissue nodules or masses.  Stable right iliac adenopathy as well as a soft tissue mass along the right external iliac vasculature along the right pelvic sidewall.  -11/29/2023 [facility] follow-up: Current CT chest, abdomen and pelvis shows stable findings, no evidence of progression.  He has no new worrisome symptoms however he does still have swelling in his right thigh and lower pelvic pain for which she sees palliative care.  He states that it seems slightly more pronounced over the last few weeks, he has a follow-up with palliative care and will discuss further with him.  We reviewed recommendations discussed at his last visit with Dr. Delcid that as long as the swelling did not worsen significantly then we would hold off on considering any vascular intervention.  We will continue to monitor, Jose Antonio understands to let us know if he has any changes otherwise we will plan on repeating CT chest, abdomen and pelvis prior to return in 6 months and I have ordered those today.  His blood pressure was running high on arrival today, I did recheck it manually and it was 150/100.  He reports that he took his blood pressure earlier today at home and it was 140s over 85 or so.  I asked him to recheck when he got home and monitor and if his diastolic continued to run over 90 to get in with Dr. Dial for evaluation.    - 5/20/2023 CT chest abdomen pelvis [place] [facility] compared to November shows 10 mm nodule left lower lobe and right lower lobe.  Anterior lobe pleural-based nodule unchanged.  6.1 x 4 cm right pelvic sidewall stable.  12 mm right common iliac node stable.  No ascites.    -5/28/2024 [facility] follow-up: No signs or symptoms of recurrence.  3 times in the last 6 months he had a 6-hour period of crampy abdominal pain that subsequently subsided.  Potentially could have been having some obstructive symptomatology but none on imaging.  If this happens more frequently or it lasts he  will let us know and also Dr. Peoples but would also go towards a more soft diet during those episodes and try FiberCon.  Otherwise imaging of the abdomen is stable but there is a couple of nodules in the lung that appeared new albeit small.  This is a very low-grade process and systemic therapies are not very effective and he is asymptomatic so we will just repeat CT chest in 3 months.  If this grows then we will consider getting tissue as it would be a little odd for this low-grade process to show up in the lung.    -8/28/2024 [place] [facility] chest with contrast compared to May 2024 shows left lower lobe nodule 16 mm compared to 10 mm with right lower lobe nodule 16 mm previously 10 mm and small subpleural right upper lobe nodule stable most likely benign intrapulmonary node and small stable nodule right lower lobe likely intrapulmonary.  Several small low-density liver lesions likely cysts.    -9/3/2024 [facility] follow-up: No signs or symptoms of recurrence 1 lung nodule in each lung base has grown.  Other nodules stable.  High likelihood for recurrence.  Will get CT abdomen pelvis and then get him to Dr. Santos for consideration of navigational bronchoscopy/Ion biopsy these lesions and might consider CyberKnife for which I will get him to Dr. Moody for opinion and coordination with Dr. Santos pending results of CT abdomen pelvis.  If on the other hand his CT abdomen pelvis shows additional sites of progression beyond the lung then we will likely go back to systemic therapy of which he has very little likelihood to respond but I would like to get tissue for molecular testing.     -9/24/2024 [facility] abdominal pelvic CT with contrast compared to May 2024 showed continued decrease in size of hepatic fat necrosis left lower quadrant and decrease in right common iliac and external iliac adenopathy and no new metastatic disease.    -9/25/2024 robotic bronchoscopy preparative CT  chest    -9/26/2024 [facility] hematology oncology follow-up: Reviewed recent CT abdomen with no clear-cut malignancy and continued regression fat necrosis left lower quadrant with decreasing adenopathy in the right common and external iliac nodes.  CT chest done yesterday has not been read but is a noncontrast robotic preparative CT to be used by Dr. Santos for navigational bronchoscopy on October 2.  Will get him back to me around October 10 and Dr. Moody in the interim for opinion as to CyberKnife particularly if paucimetastatic malignancy proven.    - 10/2/2024 bronchoscopy Dr. Santos Ion robot platform biopsy 2 cm nodule middle one third of the left lower lobe.  FNA left lower lobe, bronchial brush, transbronchial needle aspirate right lower lobe and right lower lobe brush all show adenocarcinoma consistent with prior pathology.  Left lower lobe lung biopsy and right lower lobe lung biopsy both show adenocarcinoma with extensive extracellular mucin CDX2 strongly positive.  Negative for CK7 and patchy CK20 positivity.  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 Moody plans to treat right lung metastasis 54 Hammonds in 3 fractions and then pending tolerance will reevaluate shortly thereafter for treatment to the left lung.    -10/10/2024 [facility] hematology oncology follow-up: I reviewed with him the bronchoscopy and path reports and the note from Dr. Moody.  I will have him see my nurse practitioner back in 6 weeks to make sure he is having no new symptoms and at that point she will order a follow-up CT chest abdomen pelvis about 8 weeks out from the end of radiation.  I will get Gab HERNANDEZ profile on the lung biopsy.    Total time of care  today inclusive of time spent today prior to patient's arrival reviewing interval bronchoscopy and path report and note with Dr. Moody and during visit translating to him and interviewing as to signs or symptoms of his disease and afterwards putting forth a plan as outlined above took 50 minutes patient care time throughout the day today.  Cy Delcid MD    10/10/2024

## 2024-10-11 ENCOUNTER — DOCUMENTATION (OUTPATIENT)
Dept: OTHER | Facility: HOSPITAL | Age: 69
End: 2024-10-11
Payer: MEDICARE

## 2024-10-11 DIAGNOSIS — G89.3 CANCER ASSOCIATED PAIN: ICD-10-CM

## 2024-10-11 RX ORDER — GABAPENTIN 600 MG/1
600 TABLET ORAL 3 TIMES DAILY
Qty: 90 TABLET | Refills: 0 | Status: SHIPPED | OUTPATIENT
Start: 2024-10-11 | End: 2024-11-10

## 2024-10-11 NOTE — PROGRESS NOTES
Distress Screening Follow-up    Name: Esteban Tse    : 1955    Diagnosis: Malignant neoplasm metastatic to both lungs     Location of Distress Screening: Radiation Oncology    Distress Level: 4 (10/9/2024 10:29 AM)      Physical Concerns:  Pain: Y      Emotional Concerns:  Worry or anxiety: Y     Interventions:        Comments:  BRYAN contacted pt to follow up on Distress Screen from recent visit. SW provided introductions and explained nature of the call. Pt communicated he is doing okay at this time, and asked appropriate questions about transportation assistance if needed. SW provided education on [employer] cars through insurance, medical lyft if needed and road to recovery. Pt reported he lives alone and has not had any issues with transportation, but was appreciative of information should he need it in the future. SW provided contact information and education on other resources available should needs arise. Pt thanked BRYAN for the call and was agreeable to reach out ongoing.

## 2024-10-11 NOTE — TELEPHONE ENCOUNTER
YAIMA #:[id number]    Medication requested: gabapentin (NEURONTIN) 600 MG tablet     Last fill date: 9/11/24    Last appointment: 7/18/24    Next appointment: 10/21/24

## 2024-10-17 PROCEDURE — 77338 DESIGN MLC DEVICE FOR IMRT: CPT | Performed by: RADIOLOGY

## 2024-10-17 PROCEDURE — 77300 RADIATION THERAPY DOSE PLAN: CPT | Performed by: RADIOLOGY

## 2024-10-17 PROCEDURE — 77301 RADIOTHERAPY DOSE PLAN IMRT: CPT | Performed by: RADIOLOGY

## 2024-10-21 ENCOUNTER — TELEMEDICINE (OUTPATIENT)
Dept: PALLIATIVE CARE | Facility: CLINIC | Age: 69
End: 2024-10-21
Payer: MEDICARE

## 2024-10-21 VITALS
TEMPERATURE: 97.6 F | WEIGHT: 172 LBS | DIASTOLIC BLOOD PRESSURE: 90 MMHG | OXYGEN SATURATION: 100 % | BODY MASS INDEX: 22.7 KG/M2 | HEART RATE: 72 BPM | SYSTOLIC BLOOD PRESSURE: 139 MMHG | RESPIRATION RATE: 18 BRPM

## 2024-10-21 DIAGNOSIS — G89.3 CANCER ASSOCIATED PAIN: ICD-10-CM

## 2024-10-21 DIAGNOSIS — K30 INDIGESTION: Primary | ICD-10-CM

## 2024-10-21 DIAGNOSIS — C18.1 MUCINOUS ADENOCARCINOMA OF APPENDIX: ICD-10-CM

## 2024-10-21 DIAGNOSIS — G89.3 CANCER RELATED PAIN: ICD-10-CM

## 2024-10-21 DIAGNOSIS — K59.03 THERAPEUTIC OPIOID INDUCED CONSTIPATION: ICD-10-CM

## 2024-10-21 DIAGNOSIS — T40.2X5A THERAPEUTIC OPIOID INDUCED CONSTIPATION: ICD-10-CM

## 2024-10-21 PROCEDURE — 3075F SYST BP GE 130 - 139MM HG: CPT | Performed by: PHYSICIAN ASSISTANT

## 2024-10-21 PROCEDURE — 1159F MED LIST DOCD IN RCRD: CPT | Performed by: PHYSICIAN ASSISTANT

## 2024-10-21 PROCEDURE — 1125F AMNT PAIN NOTED PAIN PRSNT: CPT | Performed by: PHYSICIAN ASSISTANT

## 2024-10-21 PROCEDURE — 99213 OFFICE O/P EST LOW 20 MIN: CPT | Performed by: PHYSICIAN ASSISTANT

## 2024-10-21 PROCEDURE — 3080F DIAST BP >= 90 MM HG: CPT | Performed by: PHYSICIAN ASSISTANT

## 2024-10-21 PROCEDURE — 1160F RVW MEDS BY RX/DR IN RCRD: CPT | Performed by: PHYSICIAN ASSISTANT

## 2024-10-21 RX ORDER — OXYCODONE HYDROCHLORIDE 10 MG/1
10 TABLET ORAL EVERY 4 HOURS PRN
Qty: 180 TABLET | Refills: 0 | Status: SHIPPED | OUTPATIENT
Start: 2024-10-21 | End: 2024-11-20

## 2024-10-21 RX ORDER — OMEPRAZOLE 10 MG/1
10 CAPSULE, DELAYED RELEASE ORAL DAILY
Qty: 30 CAPSULE | Refills: 3 | Status: SHIPPED | OUTPATIENT
Start: 2024-10-21

## 2024-10-21 RX ORDER — GABAPENTIN 600 MG/1
600 TABLET ORAL 3 TIMES DAILY
Qty: 90 TABLET | Refills: 0 | Status: SHIPPED | OUTPATIENT
Start: 2024-11-12 | End: 2024-12-12

## 2024-10-21 NOTE — TELEPHONE ENCOUNTER
I have reviewed patient's YAIMA report prior to prescribing Schedule II, III, and IV medications. Request # [id number]. Next refill for oxycodone 10 mg tab q4h PRN #180 was sent to the pharmacy. The patient is scheduled to follow-up in 3 months.

## 2024-10-21 NOTE — PROGRESS NOTES
Palliative Clinic Note      Name: Esteban Tse  Age: 69 y.o.  Sex: male  : 1955  MRN: [id number]  Date of Service: 10/21/2024   Medical Oncologist: Dr. Delcid  Mode of visit: video-conference  Location of patient: Home  [place] [facility] [place]    Subjective:    Chief Complaint: Opioid induced constipation, indigestion     History of Present Illness: Esteban Tse is a 69 y.o. male with past medical history significant for HTN, AAA, metastatic adenocarcinoma of the appendix  who presents via video-conference today as a follow up for pain and symptom management.     Treatment summary: The patient was diagnosed with cancer of the appendix at the time of appendectomy in 2017. Patient under surveillance until imaging in 2019 demonstrated several lesions suspicious for recurrent disease. He underwent a diagnostic laparoscopy with peritoneal biopsies on 2020 by Dr. Peoples that proved recurrent adenocarcinoma. He underwent an exploratory laparotomy with excision of right and left lobe liver lesions, omentectomy, resection of pelvic peritoneal nodules, ileocolectomy with creation of ileal colostomy, hyperthermic intraperitoneal chemotherapy and a partial cystectomy with right ureteral stent placement in 2020. He underwent a cystoscopy with bilateral ureteral catheters, exploratory laparotomy, lysis of adhesions, right pelvic sidewall excisional biopsy, and an open partial cystectomy in 2021. TURBT performed in 10/2021. Imaging from 3/4/2022 showed enlarging external illiac adenopathy. There are no surgical options per Dr. Peoples. Underwent CT-guided biopsy of the right iliac mass consistent with mucinous adenocarcinoma. Patient completed palliative radiation in 2024. Scans from 2024 showed growth in the lungs. Patient underwent bronchoscopy in 10/2024 by Dr. Santos, biopsies were consistent with prior pathology. Plan to start with radiation of the right lung and consider  left lung in the future.     Pain: Patient established care with [facility] in [place], [place] > 1 year ago. Patient's right hip pain was thought to be referred from his spine. Patient underwent several epidurals and a lumbar laminectomy with no improvement in his pain. Patient complains of pain predominantly in his right groin / hip and lower extremity secondary to cancer. The pain occurs with physical activity and worse with driving. Patient is currently prescribed oxycodone 10 mg q4h PRN and gabapentin 600 mg TID. He occasionally takes Ibuprofen as well. The patient is not taking the long-acting oxycodone, Xtampza 9 mg due to worsening constipation and questionable efficacy. Patient unable to tolerate MS Contin and Fentanyl patches due to side effects.      Other symptoms: The patient was started on Pepcid at his last appointment for indigestion. He reports efficacy for the first month or so on the medication but no longer believes it is effective. He feels this is related to his nausea. Patient reports worsening constipation due to opioids. He is taking the sennosides-docusate twice daily. He is not going as often as normal but is still going. Patient is sensitive to MiraLAX and Milk of Mag. No changes in appetite. No issues with sleep. He occasionally falls back asleep in the mornings.     Pyschosocial: The patient lives alone. He is retired from working for the state. He enjoys playing golf and spending time outside. No personal history of a mental illness. The patient admits to worsening anxiety/depression recently due to the decline of his mother's health. Patient's father passed away in 10/2022 and the patient is an only child.     Spiritual: Patient describes himself as curious rather than practicing.      Goals: Improve quality of life with symptom management.     The following portions of the patient's history were reviewed and updated as appropriate: allergies, current medications, past  family history, past medical history, past social history, past surgical history and problem list.    ORT-R: Low risk  Decisional capacity: Full  ECOG: (2) Ambulatory and capable of self care, unable to carry out work activity, up and about > 50% or waking hours   Palliative Performance Scale Score: 70%     Objective:    Constitutional: Awake, alert, sitting up   Eyes: PERRLA, EOMS intact  HENT: NCAT, face symmetric  Neck: Supple, trachea midline  Respiratory: Nonlabored respirations  Musculoskeletal: Moves all extremities   Psychiatric: Appropriate affect, cooperative  Neurologic: Oriented x 3, Cranial Nerves grossly intact to confrontation, speech clear    Medication Counts: Collected over the phone. See bottom of note for details. No misuse or overuse evident.   I have reviewed the patient's [place] PDMP. YAIMA Req #[id number].   UDS: Last 3/25/23. Reviewed. Appropriate.     Assessment & Plan:    1. Mucinous adenocarcinoma of appendix  - Scans from 9/2024 showed growth in the lungs. Patient underwent bronchoscopy in 10/2024 by Dr. Santos, biopsies were consistent with prior pathology. Plan to start with radiation of the right lung and consider left lung in the future.    2. Cancer associated pain  - Patient is appropriate for opioid therapy due to cancer related pain. Daily function and quality of life improved with pain medication. Refills for oxycodone 10 mg q4h PRN and gabapentin 600 mg TID were sent to the pharmacy. Side effects of the medication discussed at every visit. Patient was encouraged to continue bowel regimen of daily stool softeners, prn laxatives, and diet modifications.    3. Indigestion  - Start trial of omeprazole (prilOSEC) 10 MG capsule; Take 1 capsule by mouth Daily.  Dispense: 30 capsule; Refill: 3    4. Therapeutic opioid induced constipation  - Continue Senna-S twice daily. Recommend increasing to 4x daily and/or adding osmotic laxative (i.e. MiraLAX) to regimen if constipation worsens.  Continue adequate hydration and fiber.     Code status: Full code  Advanced directives: Not on file  Health care surrogate: Shaylee Tse, mother     Return in about 3 months (around 1/21/2025) for Video visit.    The patient has chosen to receive care through a telehealth visit.   Does the patient consent to using a video visit for their medical care today? Yes   The visit included audio and video interaction. No technical issues occurred during this visit.  I spent 30 minutes caring for Esteban Tse on this date of service. This time includes time spent by me in the following activities: preparing for the visit, reviewing tests, obtaining and/or reviewing a separately obtained history, performing a medically appropriate examination and/or evaluation, counseling and educating the patient/family/caregiver, ordering medications, tests, or procedures, documenting information in the medical record, independently interpreting results and communicating that information with the patient/family/caregiver, and care coordination.    Qian Fleming PA-C  10/21/2024    Medication Date Filled # Filled Count Used # Days  SOY   Oxycodone 10 10/2/24 120 60 60 19 3   Gabapentin 600 10/16/24 90 68 22 8 3

## 2024-10-25 ENCOUNTER — PATIENT ROUNDING (BHMG ONLY) (OUTPATIENT)
Dept: PALLIATIVE CARE | Facility: CLINIC | Age: 69
End: 2024-10-25
Payer: MEDICARE

## 2024-10-25 NOTE — PROGRESS NOTES
A My-Chart message has been sent to the patient for [facility] [place] [facility]

## 2024-11-01 ENCOUNTER — HOSPITAL ENCOUNTER (OUTPATIENT)
Dept: RADIATION ONCOLOGY | Facility: HOSPITAL | Age: 69
Setting detail: RADIATION/ONCOLOGY SERIES
Discharge: HOME | End: 2024-11-01
Payer: MEDICARE

## 2024-11-06 ENCOUNTER — HOSPITAL ENCOUNTER (OUTPATIENT)
Dept: RADIATION ONCOLOGY | Facility: HOSPITAL | Age: 69
Discharge: HOME | End: 2024-11-06

## 2024-11-06 PROCEDURE — 77332 RADIATION TREATMENT AID(S): CPT | Performed by: RADIOLOGY

## 2024-11-06 PROCEDURE — 77373 STRTCTC BDY RAD THER TX DLVR: CPT | Performed by: RADIOLOGY

## 2024-11-07 ENCOUNTER — HOSPITAL ENCOUNTER (OUTPATIENT)
Dept: RADIATION ONCOLOGY | Facility: HOSPITAL | Age: 69
Discharge: HOME | End: 2024-11-07

## 2024-11-07 PROCEDURE — 77373 STRTCTC BDY RAD THER TX DLVR: CPT | Performed by: RADIOLOGY

## 2024-11-08 ENCOUNTER — HOSPITAL ENCOUNTER (OUTPATIENT)
Dept: RADIATION ONCOLOGY | Facility: HOSPITAL | Age: 69
Discharge: HOME | End: 2024-11-08

## 2024-11-08 PROCEDURE — 77336 RADIATION PHYSICS CONSULT: CPT | Performed by: RADIOLOGY

## 2024-11-08 PROCEDURE — 77373 STRTCTC BDY RAD THER TX DLVR: CPT | Performed by: RADIOLOGY

## 2024-11-13 DIAGNOSIS — G89.3 CANCER ASSOCIATED PAIN: ICD-10-CM

## 2024-11-13 NOTE — TELEPHONE ENCOUNTER
YAIMA #: [id number]    Medication requested: gabapentin (NEURONTIN) 600 MG tablet     Last fill date: 10/13/24    Last appointment: 10/21/24    Next appointment: 1/22/25

## 2024-11-14 ENCOUNTER — OFFICE VISIT (OUTPATIENT)
Dept: RADIATION ONCOLOGY | Facility: HOSPITAL | Age: 69
End: 2024-11-14
Payer: MEDICARE

## 2024-11-14 ENCOUNTER — HOSPITAL ENCOUNTER (OUTPATIENT)
Dept: RADIATION ONCOLOGY | Facility: HOSPITAL | Age: 69
Discharge: HOME | End: 2024-11-14

## 2024-11-14 DIAGNOSIS — C78.02 MALIGNANT NEOPLASM METASTATIC TO BOTH LUNGS: Primary | ICD-10-CM

## 2024-11-14 DIAGNOSIS — C78.01 MALIGNANT NEOPLASM METASTATIC TO BOTH LUNGS: Primary | ICD-10-CM

## 2024-11-14 PROCEDURE — 77470 SPECIAL RADIATION TREATMENT: CPT | Performed by: RADIOLOGY

## 2024-11-14 PROCEDURE — 77399 UNLISTED PX MED RADJ PHYSICS: CPT | Performed by: RADIOLOGY

## 2024-11-14 RX ORDER — GABAPENTIN 600 MG/1
600 TABLET ORAL 3 TIMES DAILY
Qty: 90 TABLET | Refills: 0 | Status: SHIPPED | OUTPATIENT
Start: 2024-11-14 | End: 2024-12-14

## 2024-11-18 PROCEDURE — 77301 RADIOTHERAPY DOSE PLAN IMRT: CPT | Performed by: RADIOLOGY

## 2024-11-18 PROCEDURE — 77338 DESIGN MLC DEVICE FOR IMRT: CPT | Performed by: RADIOLOGY

## 2024-11-18 PROCEDURE — 77300 RADIATION THERAPY DOSE PLAN: CPT | Performed by: RADIOLOGY

## 2024-11-18 NOTE — PROGRESS NOTES
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 male with a known history of recurrent and metastatic low-grade appendiceal carcinoma.  He is underwent rather extensive previous treatments including surgical resection and chemotherapy.  He also underwent a course of involved field radiation therapy targeting a recurrent right pelvic sidewall mass consisting of a conglomerate of external iliac and inguinal lymph nodes.  He completed a dose of 50 Gray over 20 fractions in May of 2022.  He has had a very slow response in the right lower pelvis, with essentially a slight reduction in the size of the involved disease.  Unfortunately, he has continued to experience right hip and groin pain due to the adenopathy, and he still requires pain medications off and on to manage his symptoms.  He has been followed closely with Dr. Delcid, and on his most recent surveillance scans, he has been found to have the interval development of 2 lung nodules in the right and left lung bases, respectively.      ASSESSMENT/PLAN: He recently completed a course of stereotactic body radiation therapy to the left lung, which he tolerated very well and appears to have already demonstrated a positive response.  We plan to now address the tumor that is in the right lung and I plan to treat him using stereotactic body radiation therapy to a dose of 54 Hammonds in 3 fractions.  We will proceed and complete a radiation set up and simulation session and I will aim to have him ready to begin treatments in the next 1-2 weeks pending insurance authorization.

## 2024-11-21 ENCOUNTER — TELEPHONE (OUTPATIENT)
Dept: ONCOLOGY | Facility: CLINIC | Age: 69
End: 2024-11-21
Payer: MEDICARE

## 2024-11-21 NOTE — TELEPHONE ENCOUNTER
He has completed radiation to the left lung, planning is process for radiation to the right lung.  I will move his follow-up with me out several weeks since he has not completed radiation.  He states agreement and also did not feel he needed to come in tomorrow.  No new concerns currently.  We will have his follow-up in our [place] office.

## 2024-12-02 ENCOUNTER — HOSPITAL ENCOUNTER (OUTPATIENT)
Dept: RADIATION ONCOLOGY | Facility: HOSPITAL | Age: 69
Setting detail: RADIATION/ONCOLOGY SERIES
End: 2024-12-02
Payer: MEDICARE

## 2024-12-02 DIAGNOSIS — G89.3 CANCER RELATED PAIN: ICD-10-CM

## 2024-12-02 RX ORDER — OXYCODONE HYDROCHLORIDE 10 MG/1
10 TABLET ORAL EVERY 4 HOURS PRN
Qty: 180 TABLET | Refills: 0 | Status: SHIPPED | OUTPATIENT
Start: 2024-12-02 | End: 2025-01-01

## 2024-12-02 NOTE — TELEPHONE ENCOUNTER
YAIMA #: [id number]    Medication requested: Oxycodone 10MG    Last fill date: 10/23/24    Last appointment: 10/21/24    Next appointment: 1/22/25

## 2024-12-03 ENCOUNTER — HOSPITAL ENCOUNTER (OUTPATIENT)
Dept: RADIATION ONCOLOGY | Facility: HOSPITAL | Age: 69
Discharge: HOME | End: 2024-12-03

## 2024-12-03 PROCEDURE — 77332 RADIATION TREATMENT AID(S): CPT | Performed by: RADIOLOGY

## 2024-12-03 PROCEDURE — 77373 STRTCTC BDY RAD THER TX DLVR: CPT | Performed by: RADIOLOGY

## 2024-12-05 ENCOUNTER — HOSPITAL ENCOUNTER (OUTPATIENT)
Dept: RADIATION ONCOLOGY | Facility: HOSPITAL | Age: 69
Discharge: HOME | End: 2024-12-05

## 2024-12-05 PROCEDURE — 77373 STRTCTC BDY RAD THER TX DLVR: CPT | Performed by: RADIOLOGY

## 2024-12-06 ENCOUNTER — HOSPITAL ENCOUNTER (OUTPATIENT)
Dept: RADIATION ONCOLOGY | Facility: HOSPITAL | Age: 69
Discharge: HOME | End: 2024-12-06

## 2024-12-06 PROCEDURE — 77336 RADIATION PHYSICS CONSULT: CPT | Performed by: RADIOLOGY

## 2024-12-06 PROCEDURE — 77373 STRTCTC BDY RAD THER TX DLVR: CPT | Performed by: RADIOLOGY

## 2024-12-12 ENCOUNTER — OFFICE VISIT (OUTPATIENT)
Dept: ONCOLOGY | Facility: CLINIC | Age: 69
End: 2024-12-12
Payer: MEDICARE

## 2024-12-12 VITALS
TEMPERATURE: 97.7 F | WEIGHT: 172 LBS | HEIGHT: 73 IN | OXYGEN SATURATION: 99 % | RESPIRATION RATE: 18 BRPM | BODY MASS INDEX: 22.8 KG/M2 | SYSTOLIC BLOOD PRESSURE: 157 MMHG | HEART RATE: 91 BPM | DIASTOLIC BLOOD PRESSURE: 96 MMHG

## 2024-12-12 DIAGNOSIS — C78.02 MALIGNANT NEOPLASM METASTATIC TO BOTH LUNGS: ICD-10-CM

## 2024-12-12 DIAGNOSIS — C18.1 MUCINOUS ADENOCARCINOMA OF APPENDIX: Primary | ICD-10-CM

## 2024-12-12 DIAGNOSIS — C78.01 MALIGNANT NEOPLASM METASTATIC TO BOTH LUNGS: ICD-10-CM

## 2024-12-12 PROCEDURE — 1160F RVW MEDS BY RX/DR IN RCRD: CPT | Performed by: NURSE PRACTITIONER

## 2024-12-12 PROCEDURE — 3080F DIAST BP >= 90 MM HG: CPT | Performed by: NURSE PRACTITIONER

## 2024-12-12 PROCEDURE — 3077F SYST BP >= 140 MM HG: CPT | Performed by: NURSE PRACTITIONER

## 2024-12-12 PROCEDURE — 1126F AMNT PAIN NOTED NONE PRSNT: CPT | Performed by: NURSE PRACTITIONER

## 2024-12-12 PROCEDURE — 1159F MED LIST DOCD IN RCRD: CPT | Performed by: NURSE PRACTITIONER

## 2024-12-12 PROCEDURE — 99213 OFFICE O/P EST LOW 20 MIN: CPT | Performed by: NURSE PRACTITIONER

## 2024-12-12 NOTE — PROGRESS NOTES
CHIEF COMPLAINT: Mild fatigue after radiation    Problem List:  Oncology/Hematology History Overview Note   1.  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  Pathologic stage IIa with T3 extension into the base of the appendix through the muscularis propria but not into the serosal surface.  16 nodes negative.  Colonoscopy December 2017 negative postop.Laparoscopic diagnostic peritoneal biopsy confirming disease in the bladder dome February 2020.  Began Folfiri.  June 2020 Dr. Peoples excised right lobe of liver and left lobe liver lesion, omentectomy and resection of pelvic peritoneal nodules, ileocolectomy with ileal colostomy and intraperitoneal HIPEC with mitomycin.  Dr. Lawler performed right ureteral stent placement and partial cystectomy.  With large fungating mass, 4/22/2021 had exploratory laparotomy with lysis of adhesions and right pelvic sidewall excisional biopsy with open partial cystectomy.  No malignancy and peritoneal fluid.  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  Margins less than 1 mm.  October 2021 cystoscopy without malignancy.  TURBT 10/11/2021 did not show malignancy.  March 2022 Dr. Peoples for 3.9 cm external iliac recurrent adenopathy 6 weeks out from laminectomy which did not help pain was determined to be an unresectable recurrence with muscle and vascular involvement.  Radiation with Dr. Moody ended 5/6/2022.  Disease stabilized and pain improved.    -12/30/2019 medical oncology office visit: The patient had been on surveillance since surgery December 2017.  CT scans had been negative up until 12/30/2019 CT chest abdomen and pelvis that showed subtle soft tissue density surrounding surgical clips in the right side of the pelvis along with soft tissue nodule at the anterior wall of the bladder concerning for recurrent disease.  Patient sent back to Dr. Davidson for  colonoscopy.    -2/7/2020 patient was referred to Dr. Peoples at the [facility], he underwent diagnostic laparoscopic and peritoneal biopsies that confirmed recurrent disease with biopsy of bladder dome nodule showing adenocarcinoma with mucinous features.  Port was placed at this time also.    -2/25/2020 began FOLFIRI    -4/21/2020 patient having increased fatigue, FOLFIRI dose reduced by 10%.    -5/19/2020 cycle 7 FOLFIRI    -6/18/2020 Dr. Peoples performed exploratory laparotomy with excision of right lobe liver lesion and left lobe liver lesion, omentectomy, resection of pelvic peritoneal nodules, ileocolectomy with creation of ileal colostomy, hyperthermic intraperitoneal chemotherapy with mitomycin-C at 42 °C for deep tissue penetration for 90 minutes.  Dr. Perdue performed partial cystectomy with right ureteral stent placement    -8/5/2020 CT abdomen pelvis with contrast shows small soft tissue nodule anterior aspect of bladder stable.  New small amount of ascites as well as a new small left pleural effusion.  Creatinine 0.75 with hemoglobin 11.1 otherwise unremarkable CBC and CMP.    -2/15/2021 CT abdomen pelvis with contrast compared to 8/5/2020 shows enlarging soft tissue mass 4.2 cm over the bladder fundus and there is a calcification along the base of the urinary bladder.  Small stable multiple hypoattenuating indeterminate liver lesions.  Similar degree of ascites.    -2/23/2021 CT chest shows no evidence of metastasis with ascending aorta 42 mm.    -3/8/2021 follow-up with Dr. Portillo Peoples we reviewed his CTs suggested team effort resection with Dr. Perdue    -3/16/2021 follow-up with Dr. Perdue.  He plans for open partial cystectomy, possible ureteral implants, cystoscopy and stent placement in concert with Dr. Peoples.    -4/22/2021 cystoscopy (after prior office cystoscopy showed large fungating mass) with bilateral ureteral catheters, exploratory laparotomy, lysis of adhesions, right pelvic sidewall  "excisional biopsy, open partial cystectomy Dr. Ike Perdue. Peritoneal fluid showed predominant inflammatory reactive mesothelial cells with no malignancy. 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 Carcinoma was less than 1 mm from the lateral resection margins.    -5/18/2021 [facility] medical oncology follow-up visit: I reviewed his hospital notes, operative notes, and pathology report as above and went over this with the patient.  The general consensus from retrospective as well as a few prospective data over the last couple of decades with this low-grade malignancy does not show any profound benefit from \"adjuvant\" chemotherapy in this current setting.  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  There is no standard follow-up but general guidelines suggest following up as typical colon cancer.  I will repeat his CT chest abdomen pelvis now to reestablish his baseline postoperative imaging and have him see my nurse practitioner back in a couple of weeks to make sure there is no nia evidence of measurable residual disease.  Assuming this just shows postoperative changes, I would simply repeat scans again in 3 months or sooner as symptoms dictate.  If he has no evidence of persistent or metastatic disease on current imaging, then when he sees my nurse practitioner back she will also review what they say about his ascending aorta which was 42 mm in February and sent him to Dr. Evangelist Anthony for an opinion as to whether any intervention is needed relative to the aorta.  Obviously if his cancer is out of control on the upcoming scans then the aorta is a moot point.    -6/10/2021 [facility] oncology clinic follow-up: CT scans show no evidence of disease recurrence in the chest, abdomen or pelvis.  Ascending aortic aneurysm stable at 41 mm.  Referral made to Dr. Anthony, cardiothoracic surgeon for management and evaluation of a sending aortic " aneurysm.  Plan to repeat CT scans in 3 months.    -9/14/2021 [facility] oncology follow-up visit: I reviewed images and reports of 9/10/2021 CT chest abdomen pelvis with contrast which shows under distended urinary bladder with mild asymmetric wall thickening dome and left lateral wall with a small 9 mm polypoid lesion in the bladder.  There is stable 2.3 cm right external iliac and a few other subcentimeter scattered nodes in the root of the small bowel mesentery and retroperitoneum.  Postsurgical right hemicolectomy changes.  Stable low-attenuation lesions in the liver unchanged.  I will have him collect his discs from [facility] to take Dr. Perdue to decide whether to proceed with cystoscopy or just continue watchful waiting.  There is no major changes and I suspect we are looking at postoperative changes and we shall see him for video visit in a few weeks to see what urology at  decides.    -10/11/2021 cystoscopy Dr. Perdue showed marked acute and chronic inflammation but no evidence of malignancy.  Muscularis propria present.    -10/14/2021 [facility] medical oncology virtual visit: I reviewed reports of pathology from cystoscopy 10/11/2021 and went over this with patient.  He is feeling fairly fit.  I will repeat his CT chest abdomen pelvis prior to return in 3 months and if in the interim, when he follows up with Dr. Perdue in the next couple of weeks post cystoscopy, plans are made for further cystoscopy before I see him back and any malignancy is found, I would ask the patient and Dr. Perdue to touch base as I would not know of such without that contact as the information comes into our chart without notification now that epic is being used by .  Assuming no further biopsies in the interim, I will simply repeat his scans in 3 months to follow what I suspected and is now confirmed to be inflammatory changes.  From the aneurysm standpoint, he has follow-up scanning March 2022 arranged by   Raj and he will follow him up after that.    -11/24/2021 CT chest abdomen pelvis compared to 6/7/2021 outside imaging shows stable 4.1 cm ascending thoracic aorta.  No lung nodules.  Liver homogenous with bilateral cysts.  No adenopathy.  Thickened sigmoid colon and rectum suggestive of mild colitis or postradiation change.  No pelvic adenopathy.  Bony structures degenerative in the spine.  Some soft tissue anterior to the acetabulum on the right and extending along the medial aspect right pelvic sidewall 2.7 x 5.6 may represent intramuscular process versus adenopathy which could not be ruled out.  No prior study views available.    -1/4/2022 follow-up Dr. Ike Perdue [facility].  Per his note, he had TURBT 10/11/2021.  He had been placed on antibiotics for UTI for preop preparation for back surgery planned in 2 weeks.  Denies any mucus in his urine.  Some microhematuria on urinalysis this day.  No gross hematuria or dysuria.  The October 2021 TURBT did not reveal anything malignant on pathology.  There may be irritation or inflammation secondary to sutures in the bladder following partial cystectomy.  Patient asymptomatic from a urinary standpoint.  Recommended following up with [facility] with no plans for further urology follow-up.    -2/4/2022 [facility] medical oncology telehealth follow-up visit: I reviewed his November images and reports with the patient as well as with Dr. Gm Moody and the note from Dr. Perdue from 1 month ago.  I do suspect this pelvic sidewall abnormality in November represents persistent disease but as I have stated in prior dictations, the data on palliative or overall survival benefit of systemic 5-FU based combination chemotherapy and/or Avastin does not show a clear-cut survival advantage to chemotherapy for asymptomatic disease.  He is having some back pain and had spine decompression a couple of weeks ago without much benefit.  This is an area that may potentially  "be radiated but I will check his CT chest (angiogram of chest) abdomen and pelvis and get his blood counts and chemistries and urinalysis and see him back for virtual visit in a few weeks.  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  I will also converse with Dr. Peoples at that junction whether he thinks he has anything to offer if this is progressing though I doubt it given that the last intervention was a urologic procedure and the last cystoscopy/TURBT in October had no malignancy and the liver lesions are currently being called liver cysts albeit I am skeptical as to whether those are truly benign but they are certainly not growing.    -3/4/2022 CT angiogram of chest/CT abdomen pelvis for evaluation of dizziness and surveillance of thoracic aortic aneurysm and appendiceal carcinoma.  Thoracic ascending aortic ectasia 4 x 4 cm stable.  Small nodularity left upper lobe stable from September.  Stable small hepatic hypodensities status post cholecystectomy.  Right external iliac soft tissue fullness now 3 x 3.9 cm previously 2 x 2.3 cm concerning for enlarging adenopathy with symmetric nonenlarged inguinal nodes stable.  CBC unremarkable.  CMP unremarkable save for potassium 5.4.  CEA 18.2.  1-3 red cells and white cells per milliliter of urine.    -3/16/2022 office visit with Dr. Portillo Peoples.  He reviewed the CT from 3/4/2022.  He notes that the laminectomy from 6 weeks prior has not helped his \"hip pain\".  Given the location of this recurrence previously 2 x 2.3 cm now 3 x 3.9 cm in the external iliac area concerning for enlarging adenopathy with symmetric nonenlarged inguinal nodes stable, " the mass appears unresectable with involvement of muscle and vasculature.    -3/24/2022 [facility] medical oncology follow-up virtual visit: Pain is still significant.  We will get him in hopefully in the next day to [facility] for palliative care.  Have spoken with Dr. Peoples and no surgery.  I have spoken with Dr. Moody who thinks palliative radiation while not likely to shrink tumor dramatically may help pain considerably and we will get him there.  We will get him to bring the discs to our [place] office and asked them to bring that back to [place] to get scanned in for our invasive radiologist to look at for us to get CT-guided biopsy of this node in the right lower quadrant and send that for Caris MI profile once the pathology is obtained to hopefully find high tumor mutational burden or other molecular targets that might give us some options.  Apart from that, as stated multiple times, this is not a highly chemotherapy sensitive tumor and I am not sure I would palliate him well but, when I see him back in early June with CAT scans prior to return and post radiation, if he is not getting relief and wants to try a 5-FU based regimen plus or minus Avastin I would be okay with that but he knows it does not alter survival 1 way or the other and we are simply trying to palliate this inexorable process.  No other surgical options.    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  Mismatch repair proficient,NTRK1/2/3 fusion not detected.  BRAF V600E negative.  PD-L1 Negative, 1+, 5%.  PTEN Positive, 1+, 100%. MLH1 positive/3+, 100%. MSH2 positive/3+, 100%. MSH6 positive/3+, 100%. PMS2 positive/3+,100%.    -5/6/2022 last radiation    - 5/25/2022 CT chest abdomen pelvis with contrast at [place] [facility] shows nothing remarkable on the chest.  Stable liver cysts.  Focal soft tissue right iliac region appears to be increasing in  size now 7.2 cm previously 3.9 cm with some mass-effect on surrounding structures with several stable inguinal borderline nodes.    -6/6/2022 [facility] medical oncology follow-up visit: Now 1 month out from radiation just starting to get a little bit of improvement in his abdominal discomfort.  Slight growth on CT but some of that may be postradiation change this close to radiation and, as I stated in my note in March, it is not clear that 5-FU based therapy plus or minus Avastin is going to palliate much given its relatively insensitivity to chemotherapy and it certainly does not improve survival.  To that end, we will plan on just repeating his CAT scans again in about 6 weeks and if the pain is worsening and/or this continues to grow then I will probably give him Avastin FOLFOX.  If everything is stable and pain is controlled we will go continue watchful waiting with imaging about every 3 months from that point forward.    -7/11/2022 [facility] medical oncology follow-up: His 7/7/2022 CT chest abdomen pelvis showed stable right external iliac low-attenuation which had been previously growing and thickening of the distal descending and sigmoid colon with perhaps a small right iliac borderline 1 cm node.  Symptomatically improved post radiation.  For now we will hold on Avastin FOLFOX as the benefit of such is questionable with this histologic subtype and there are no actionable molecular targets.  Repeat CTs prior to return in 3 months.    -11/1/2022 [facility] medical oncology follow-up: 10/19/2022 CT chest abdomen pelvis with contrast [place] [facility] compared to 7/7/2022 shows no significant change or evidence of progression.  Clinically quiescent.  We will repeat CTs in 6 months, sooner as symptoms dictate.    -5/19/2023 CT chest abdomen pelvis [place] [facility] compared to 10/19/2022 showed no evidence of disease progression in the chest.  Nodular density adjacent to the descending colon 6 mm nonspecific.   Otherwise unchanged appearance of hepatic metastatic nodules, right iliac adenopathy, and right pelvic mass.    -5/23/2023 [facility] medical oncology follow-up: I reviewed his report of CT chest abdomen pelvis from [place] 4 days ago showing no evidence of progression.  So area near the descending colon is nonspecific and would not change anything at this junction.  His October CT had not changed from July and the current one has not changed significantly since October.  We will continue to see palliative care for his right pelvic pain.  If leg swelling significantly worsens we could check Doppler and assuming no clot consider vascular intervention but as the thigh has not significantly changed over time nor is there significant tenderness in the calf I am doubtful that this is clot and is likely just due to the pelvic mass.  Vascular surgical intervention should swelling worsen is also an option but I would not want a stent placed through this area in the face malignancy particularly a mucinous variety that would make him prone towards clotting.  If the leg worsens I would get his Doppler and consider vascular intervention or anticoagulation as dictated but clinically stable so will not investigate further at this junction.  I will repeat his imaging again in 6 months.    -11/20/2023 CT chest, abdomen and pelvis shows stable appearances of the chest with no evidence of metastatic disease.  Abdomen and pelvis reveals small interval decrease in size of the previously demonstrated peritoneal nodule adjacent to the descending colon.  No new suspicious omental/peritoneal soft tissue nodules or masses.  Stable right iliac adenopathy as well as a soft tissue mass along the right external iliac vasculature along the right pelvic sidewall.  -11/29/2023 [facility] Oncology clinic follow-up: Current CT chest, abdomen and pelvis shows stable findings, no evidence of progression.  He has no new worrisome symptoms however he does  still have swelling in his right thigh and lower pelvic pain for which she sees palliative care.  He states that it seems slightly more pronounced over the last few weeks, he has a follow-up with palliative care and will discuss further with him.  We reviewed recommendations discussed at his last visit with Dr. Delcid that as long as the swelling did not worsen significantly then we would hold off on considering any vascular intervention.  We will continue to monitor, Jose Antonio understands to let us know if he has any changes otherwise we will plan on repeating CT chest, abdomen and pelvis prior to return in 6 months and I have ordered those today.  His blood pressure was running high on arrival today, I did recheck it manually and it was 150/100.  He reports that he took his blood pressure earlier today at home and it was 140s over 85 or so.  I asked him to recheck when he got home and monitor and if his diastolic continued to run over 90 to get in with Dr. Dial for evaluation.    - 5/20/2023 CT chest abdomen pelvis [place] [facility] compared to November shows 10 mm nodule left lower lobe and right lower lobe.  Anterior lobe pleural-based nodule unchanged.  6.1 x 4 cm right pelvic sidewall stable.  12 mm right common iliac node stable.  No ascites.    -5/28/2024 [facility] oncology clinic follow-up: No signs or symptoms of recurrence.  3 times in the last 6 months he had a 6-hour period of crampy abdominal pain that subsequently subsided.  Potentially could have been having some obstructive symptomatology but none on imaging.  If this happens more frequently or it lasts he will let us know and also Dr. Peoples but would also go towards a more soft diet during those episodes and try FiberCon.  Otherwise imaging of the abdomen is stable but there is a couple of nodules in the lung that appeared new albeit small.  This is a very low-grade process and systemic therapies are not very effective and he is asymptomatic so we will  just repeat CT chest in 3 months.  If this grows then we will consider getting tissue as it would be a little odd for this low-grade process to show up in the lung.    -8/28/2024 [facility] chest with contrast compared to May 2024 shows left lower lobe nodule 16 mm compared to 10 mm with right lower lobe nodule 16 mm previously 10 mm and small subpleural right upper lobe nodule stable most likely benign intrapulmonary node and small stable nodule right lower lobe likely intrapulmonary.  Several small low-density liver lesions likely cysts.    -9/3/2024 [facility] oncology clinic follow-up: No signs or symptoms of recurrence 1 lung nodule in each lung base has grown.  Other nodules stable.  High likelihood for recurrence.  Will get CT abdomen pelvis and then get him to Dr. Santos for consideration of navigational bronchoscopy/Ion biopsy these lesions and might consider CyberKnife for which I will get him to Dr. Moody for opinion and coordination with Dr. Santos pending results of CT abdomen pelvis.  If on the other hand his CT abdomen pelvis shows additional sites of progression beyond the lung then we will likely go back to systemic therapy of which he has very little likelihood to respond but I would like to get tissue for molecular testing.     -9/24/2024 [facility] abdominal pelvic CT with contrast compared to May 2024 showed continued decrease in size of hepatic fat necrosis left lower quadrant and decrease in right common iliac and external iliac adenopathy and no new metastatic disease.    -9/25/2024 robotic bronchoscopy preparative CT chest    -9/26/2024 [facility] [place] hematology oncology follow-up: Reviewed recent CT abdomen with no clear-cut malignancy and continued regression fat necrosis left lower quadrant with decreasing adenopathy in the right common and external iliac nodes.  CT chest done yesterday has not been read but is a noncontrast robotic preparative CT to be used by Dr. Santos  for navigational bronchoscopy on October 2.  Will get him back to me around October 10 and Dr. Moody in the interim for opinion as to CyberKnife particularly if paucimetastatic malignancy proven.    - 10/2/2024 bronchoscopy Dr. Santos Ion robot platform biopsy 2 cm nodule middle one third of the left lower lobe.  FNA left lower lobe, bronchial brush, transbronchial needle aspirate right lower lobe and right lower lobe brush all show adenocarcinoma consistent with prior pathology.  Left lower lobe lung biopsy and right lower lobe lung biopsy both show adenocarcinoma with extensive extracellular mucin CDX2 strongly positive.  Negative for CK7 and patchy CK20 positivity.  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 Moody plans to treat right lung metastasis [address] in 3 fractions and then pending tolerance will reevaluate shortly thereafter for treatment to the left lung.    -10/10/2024 [facility] hematology oncology follow-up: I reviewed with him the bronchoscopy and path reports and the note from Dr. Moody.  I will have him see my nurse practitioner back in 6 weeks to make sure he is having no new symptoms and at that point she will order a follow-up CT chest abdomen pelvis about 8 weeks out from the end of radiation.  I will get [facility] MI profile on the lung biopsy.    -10/27/2024 [facility] MI profile: KRAS pathogenic variant Exon 2/p.G12D therefore lack of benefit to cetuximab or panitumumab.  PD-L1 negative, 0%.  No actionable biomarkers identified.  Microsatellite stable, TMB low.   Genes with pathogenic or likely pathogenic alterations: ANNA, CDKN2A, CREBBP, KRAS.Potential clinical trials available, see full report.     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  Pathologic stage IIa with T3 extension into the base of the appendix through the muscularis propria but not into the serosal surface.  16 nodes negative.  Colonoscopy December 2017 negative postop     12/5/2017 Surgery    Surgery       Procedure:  Appendectomy and right hemicolectomy      Completeness of resection:  No evidence of residual tumor     Pathology revealed invasive moderately differentiated mucinous adenocarcinoma.  Right hemicolectomy: Benign colon and small intestine no evidence of carcinoma.  16 benign lymph nodes, 0/16, negative for dysplasia or malignancy.  Tumor size approximately 6 cm.  Grade 2, moderately differentiated.  Tumor invades through the muscularis profile into the base of appendix but does not extend to the serosal surface.  All margins uninvolved, no lymphovascular invasion identified.  Pathological stage pT3 pN0.         12/8/2017 Imaging    CT of the chest abdomen and pelvis negative.  Baseline CBC WBC 7100, hemoglobin 11.3, hematocrit 34.8%, platelet count 195,000.     3/20/2018 Procedure    Colonoscopy with Dr. Davidson was normal, recommendation for repeat surveillance colonoscopy in 3 years.     6/11/2018 Imaging    CT chest, abdomen and pelvis with no evidence metastatic disease.  CEA 1.1     9/24/2018 Imaging    CT abdomen pelvis showed no acute changes and nothing to suggest recurrence     12/28/2018 Imaging    CT chest, abdomen and pelvis negative. Normal CBC, CMP and CEA 0.7.     6/28/2019 Imaging    CT chest, abdomen and pelvis: No interval change from prior studies.  No recurrent or metastatic disease detected in the chest, abdomen or pelvis.  No acute process.  CEA 0.9     12/30/2019 Imaging    CT chest, abdomen and pelvis: No disease in the chest.  There was noted a subtle soft tissue density, one surrounding surgical  clips in the right side of the pelvis and the other a soft tissue nodule intimately associated with the anterior wall of the bladder, which are considered suspicious for recurrent disease.  CEA 1.1.  CMP with normal creatinine 0.9, total bilirubin 1.9, AST 34, ALT 24, alkaline phosphatase 93.  CBC with WBC 6900, hemoglobin 15.9, platelet count 232,000.       1/21/2020 Procedure    Normal colonoscopy with Dr. Davidson.  He has made referral to Dr. Portillo Peoples at .     2/7/2020 Progression    12/30/2019 CT chest, abdomen and pelvis: No disease in the chest.  There are subtle soft tissue densities (1 surrounding surgical clips in the right side of the pelvis and the other a soft tissue nodule intimately associated with the anterior wall of the bladder) which are considered suspicious for recurrent disease.  CMP with normal creatinine 0.9, total bilirubin 1.9, AST 34, ALT 24, alkaline phosphatase 93.  CBC with WBC 6900, hemoglobin 15.9, platelet count 232,000.  CEA 1.1.  2/7/2020 diagnostic laparoscopy with peritoneal biopsies performed at the [facility] by Dr. Peoples showed no sign of diffuse peritoneal carcinomatosis or liver metastasis.  There was a firm nodule in the superior dome of the bladder, adherent to omentum, as well as right pelvic sidewall nodule adjacent to surgical clips.  Biopsy of bladder dome nodule showed adenocarcinoma with mucinous features.     2/25/2020 -  Chemotherapy    OP COLORECTAL FOLFIRI Irinotecan / Leucovorin / Fluorouracil     5/29/2020 Imaging    CT chest abdomen pelvis shows slight improvement with decreased nodule anterolateral margin of urinary bladder with stable nodularity of the right lower quadrant adjacent to surgical clips and no progressive disease.  Slight hyperemia of the colonic mucosa     6/18/2020 Surgery    Surgery       -6/18/2020 Dr. Peoples performed exploratory laparotomy with excision of right lobe liver lesion and left lobe liver lesion, omentectomy, resection  of pelvic peritoneal nodules, ileocolectomy with creation of ileal colostomy, hyperthermic intraperitoneal chemotherapy with mitomycin-C at 42 °C for deep tissue penetration for 90 minutes.  Dr. Perdue performed partial cystectomy with right ureteral stent placement     8/5/2020 Imaging     CT abdomen pelvis with contrast shows small soft tissue nodule anterior aspect of bladder stable.  New small amount of ascites as well as a new small left pleural effusion.  Creatinine 0.75 with hemoglobin 11.1 otherwise unremarkable CBC and CMP     2/15/2021 Imaging    CT abdomen pelvis with contrast compared to 8/5/2020 shows enlarging soft tissue mass 4.2 cm over the bladder fundus and there is a calcification along the base of the urinary bladder.  Small stable multiple hypoattenuating indeterminate liver lesions.  Similar degree of ascites.     2/23/2021 Imaging    -2/23/2021 CT chest shows no evidence of metastasis with ascending aorta 42 mm.     4/22/2021 Surgery    -4/22/2021 cystoscopy (after prior office cystoscopy showed large fungating mass) with bilateral ureteral catheters, exploratory laparotomy, lysis of adhesions, right pelvic sidewall excisional biopsy, open partial cystectomy Dr. Ike Perdue. Peritoneal fluid showed predominant inflammatory reactive mesothelial cells with no malignancy. 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 Carcinoma was less than 1 mm from the lateral resection margins.     6/7/2021 Imaging    CT chest, abdomen and pelvis: No evidence of metastatic disease within the chest abdomen or pelvis.  Interval resection of enhancing bladder wall mass a small amount of residual enhancing soft tissue, possibly granulation tissue.  Interval resolution of abdominal and pelvic ascites.  Stable dilation of the ascending aorta mid segment measuring up to 41 mm.     9/10/2021 Imaging    CT chest abdomen pelvis with  contrast shows under distended urinary bladder with mild asymmetric wall thickening dome and left lateral wall with a small 9 mm polypoid lesion in the bladder.  There is stable 2.3 cm right external iliac and a few other subcentimeter scattered nodes in the root of the small bowel mesentery and retroperitoneum.  Postsurgical right hemicolectomy changes.  Stable low-attenuation lesions in the liver unchanged.     11/24/2021 Imaging    CT chest abdomen pelvis compared to 6/7/2021 outside imaging shows stable 4.1 cm ascending thoracic aorta.  No lung nodules.  Liver homogenous with bilateral cysts.  No adenopathy.  Thickened sigmoid colon and rectum suggestive of mild colitis or postradiation change.  No pelvic adenopathy.  Bony structures degenerative in the spine.  Some soft tissue anterior to the acetabulum on the right and extending along the medial aspect right pelvic sidewall 2.7 x 5.6 may represent intramuscular process versus adenopathy which could not be ruled out.  No prior study views available.     4/11/2022 - 5/6/2022 Radiation    Radiation OncologyTreatment Course:  Esteban Tse received 5000 cGy in 20 fractions to right pelvis/groin via External Beam Radiation - EBRT.     5/25/2022 Imaging    CT chest abdomen pelvis with contrast at [facility] shows nothing remarkable on the chest.  Stable liver cysts.  Focal soft tissue right iliac region appears to be increasing in size now 7.2 cm previously 3.9 cm with some mass-effect on surrounding structures with several stable inguinal borderline nodes.     10/19/2022 Imaging    - 10/19/2022 CT chest abdomen pelvis with contrast [facility] compared to 7/7/2022 shows no significant change or evidence of progression.     Peritoneal carcinoma   5/14/2020 Initial Diagnosis    Peritoneal carcinoma (HCC)     5/25/2022 Imaging    CT chest abdomen pelvis with contrast at [facility] shows nothing remarkable on the chest.  Stable liver cysts.   Focal soft tissue right iliac region appears to be increasing in size now 7.2 cm previously 3.9 cm with some mass-effect on surrounding structures with several stable inguinal borderline nodes.     Malignant neoplasm metastatic to both lungs   9/12/2024 Initial Diagnosis    Malignant neoplasm metastatic to both lungs     11/6/2024 - 11/8/2024 Radiation    Radiation OncologyTreatment Course:  Esteban Tse received 5400 cGy in 3 fractions to left lung via Stereotactic Radiation Therapy - SRT.     12/3/2024 - 12/6/2024 Radiation    Radiation OncologyTreatment Course:  Esteban Tse received 5400 cGy in 3 fractions to left lung via Stereotactic Radiation Therapy - SRT.         HISTORY OF PRESENT ILLNESS:  The patient is a 69 y.o. male, here for follow up on management of metastatic mucinous adenocarcinoma, recently completed radiation to the left and right lung.  TM had radiation to the left lung in November, completed 11/8/2024.  Apparently there was some delay in his insurance approval of the radiation to the right lung, he completed radiation to the right lung 12/6/2024.  He states he had a little more fatigue after his more recent radiation but is starting to feel more himself.  Otherwise he has had no change since we saw him last.  He has no cough, no unusual shortness of breath.  He denies any fevers or chills.  Appetite is normal.  Weight is stable.  No change in his bowel or bladder habits.    Past Medical History:   Diagnosis Date    Aneurysm 2022    Ascending aortic aneurysm     Benign hypertension     Bladder cancer     Colon cancer     History of radiation therapy 05/06/2022    right groin/hemipelvis    Hypercholesteremia 09/18/2023    Hypertension     Intermittent palpitations     Mucinous adenocarcinoma     Pounding heartbeat     PVC's (premature ventricular contractions)      Past Surgical History:   Procedure Laterality Date    APPENDECTOMY  2017    With Partial Colon     BLADDER  "SURGERY      with partial colon    BRONCHOSCOPY WITH ION ROBOTIC ASSIST N/A 10/2/2024    Procedure: BRONCHOSCOPY NAVIGATION WITH ENDOBRONCHIAL ULTRASOUND AND ION ROBOT;  Surgeon: Levi Santos DO;  Location: [facility] END[facility]COPY;  Service: Robotics - Pulmonary;  Laterality: N/A;  ION CATH #3  CATH 0028   [id number]  [id number]  CATH GUIDE ID [id number]    CHOLECYSTECTOMY      COLON SURGERY      COLONOSCOPY         Allergies   Allergen Reactions    Levofloxacin Swelling     Lips swellijng    Tetracycline Swelling     Lips swelling    Penicillins Other (See Comments)     childhood       Family History and Social History reviewed and changed as necessary    REVIEW OF SYSTEM:   No new somatic concerns    PHYSICAL EXAM:  Well-developed, well-nourished appearing male in no distress  Heart regular rate and rhythm  Lungs clear to auscultation bilaterally, respirations regular and unlabored    Vitals:    12/12/24 1051   BP: 157/96   Pulse: 91   Resp: 18   Temp: 97.7 °F (36.5 °C)   SpO2: 99%   Weight: 78 kg (172 lb)   Height: 185.4 cm (73\")     Vitals:    12/12/24 1051   PainSc: 0-No pain          ECOG score: 0           Vitals reviewed.    ECOG: (0) Fully Active - Able to Carry On All Pre-disease Performance Without Restriction    Lab Results   Component Value Date    HGB 14.2 09/25/2024    HCT 42.2 09/25/2024    MCV 87.2 09/25/2024     09/25/2024    WBC 6.29 09/25/2024    NEUTROABS 4.15 09/25/2024    LYMPHSABS 1.47 09/25/2024    MONOSABS 0.55 09/25/2024    EOSABS 0.08 09/25/2024    BASOSABS 0.03 09/25/2024       Lab Results   Component Value Date    GLUCOSE 109 (H) 09/25/2024    BUN 11 09/25/2024    CREATININE 1.09 09/25/2024     09/25/2024    K 4.3 09/25/2024     09/25/2024    CO2 29.0 09/25/2024    CALCIUM 9.4 09/25/2024    PROTEINTOT 6.9 09/25/2024    ALBUMIN 4.5 09/25/2024    BILITOT 1.0 09/25/2024    ALKPHOS 103 09/25/2024    AST 25 09/25/2024    ALT 19 09/25/2024             ASSESSMENT & PLAN:    1.  " Metastatic mucinous adenocarcinoma of the appendix: Vishal has completed radiation to both lungs, left lung 11/8/2024 and more recently completed radiation to the right lung 12/6/2024.  He had some fatigue but is starting to feel better.  I have ordered restaging CT scans about 8 weeks out from his completion of radiation which will put this late January.  He has a follow-up with radiation oncology on 1/10/2024.  We will see him back after his CT scans.    Return to clinic in about 8 weeks for follow-up    I spent 23 minutes caring for Esteban on this date of service. This time includes time spent by me in the following activities: preparing for the visit, obtaining and/or reviewing a separately obtained history, performing a medically appropriate examination and/or evaluation, ordering medications, tests, or procedures, documenting information in the medical record, and care coordination.     Tanya Alvarado, APRN    12/12/2024

## 2024-12-16 DIAGNOSIS — G89.3 CANCER ASSOCIATED PAIN: ICD-10-CM

## 2024-12-16 RX ORDER — GABAPENTIN 600 MG/1
600 TABLET ORAL 3 TIMES DAILY
Qty: 90 TABLET | Refills: 0 | Status: SHIPPED | OUTPATIENT
Start: 2024-12-16 | End: 2025-01-15

## 2024-12-16 NOTE — TELEPHONE ENCOUNTER
YAIMA #: [id number]    Medication requested: gabapentin (NEURONTIN) 600 MG tablet     Last fill date: 11/14/24    Last appointment: 10/21/24    Next appointment: 1/22/25

## 2025-01-10 ENCOUNTER — CLINICAL SUPPORT (OUTPATIENT)
Dept: RADIATION ONCOLOGY | Facility: HOSPITAL | Age: 70
End: 2025-01-10
Payer: MEDICARE

## 2025-01-10 ENCOUNTER — HOSPITAL ENCOUNTER (OUTPATIENT)
Dept: RADIATION ONCOLOGY | Facility: HOSPITAL | Age: 70
Setting detail: RADIATION/ONCOLOGY SERIES
Discharge: HOME | End: 2025-01-10
Payer: MEDICARE

## 2025-01-10 DIAGNOSIS — C78.02 MALIGNANT NEOPLASM METASTATIC TO BOTH LUNGS: Primary | ICD-10-CM

## 2025-01-10 DIAGNOSIS — C78.01 MALIGNANT NEOPLASM METASTATIC TO BOTH LUNGS: Primary | ICD-10-CM

## 2025-01-10 DIAGNOSIS — G89.3 CANCER RELATED PAIN: ICD-10-CM

## 2025-01-10 RX ORDER — OXYCODONE HYDROCHLORIDE 10 MG/1
10 TABLET ORAL EVERY 4 HOURS PRN
Qty: 180 TABLET | Refills: 0 | Status: SHIPPED | OUTPATIENT
Start: 2025-01-10 | End: 2025-02-09

## 2025-01-10 NOTE — PROGRESS NOTES
TELEMEDICINE FOLLOW UP NOTE    PATIENT:                                                      Esteban Tse  MEDICAL RECORD #:                        [id number]  :                                                          1955  COMPLETION DATE:   2024  DIAGNOSIS:     Mucinous adenocarcinoma of appendix  - Stage IIA (T3, N0, cM0, G2)    This visit has been converted to a telehealth virtual visit, the patient's preferred method for today's follow-up given this week's winter snowstorm.  Total time of discussion was 10 minutes.  The patient has given verbal consent.      BRIEF HISTORY:  Esteban Tse is a very pleasant 69 y.o. male presenting by phone for initial follow up visit regarding his recurrent and metastatic low-grade appendiceal carcinoma.  Prior treatment history includes surgical resection and chemotherapy, in addition to previous course of involved field radiation therapy targeting a recurrent right pelvic sidewall mass consisting of a conglomerate of external iliac and inguinal lymph nodes, completing 2022.  More recently, he was found on surveillance imaging to have interval development of 2 pulmonary nodules in the left lower lobe and right lower lobe of lung which represent his only known sites of active disease.  Bronchoscopy and navigational biopsy identified both of these nodules to be metastatic adenocarcinoma with mucinous features, compatible with his appendiceal primary.  The patient underwent a course of CyberKnife SBRT to a dose of 54 Gy in 3 fractions delivered to the left lower lobe lung tumor, completing 2024.  Sequentially, he underwent a course of CyberKnife SBRT to a dose of 54 Gy in 3 fractions delivered to the contralateral right lower lobe lung tumor, completing 2024.  He tolerated treatment well.  He continues to note a bit more fatigue than usual, but remains active and independent with ADLs.  He otherwise denies any respiratory complaints.   No dyspnea, cough, hemoptysis, chest or rib pain, or dysphagia.  He denies fever, chills, weight loss.        MEDICATIONS: Medication reconciliation for the patient was reviewed and confirmed in the electronic medical record.    Review of Systems   Constitutional:  Positive for fatigue.   Musculoskeletal:  Positive for arthralgias.   All other systems reviewed and are negative.          KPS 90%      Physical Exam  Pulmonary:      Respirations even, unlabored. No audible wheezing or cough.  Neurological:      A+Ox4, conversant, answers questions appropriately.  Psychiatric:     Judgement, affect, and decision-making WNL.    Limited physical exam as visit was conducted remotely via telephone.            The following portions of the patient's history were reviewed and updated as appropriate: allergies, current medications, past family history, past medical history, past social history, past surgical history and problem list.         Diagnoses and all orders for this visit:    1. Malignant neoplasm metastatic to both lungs (Primary)         IMPRESSION:  Mr. Tse is a 69 y.o. gentleman with a known history of recurrent and metastatic appendiceal carcinoma.  He now demonstrates 2 bilateral pulmonary nodules with pathological confirmation to be metastatic mucinous adenocarcinoma.  He is 2 months out from completing CyberKnife SBRT to the nodule in the left lung base and 1 month out from completing CyberKnife SBRT to the nodule in the right lung base.  He tolerated treatment well and aside from acute grade 1 fatigue, he did not develop acute radiation related toxicities.  On treatment planning CT scan of the right lung, his recently treated left lung had already demonstrated a positive response to therapy.  He is scheduled for repeat systemic CT scans on 1/29/2025 with Dr. Delcid to further evaluate response to treatment.  The patient and I reviewed follow-up intervals, surveillance imaging, and expectations for response  to treatment.    RECOMMENDATIONS:  Continue routine oncologic surveillance under the care of Dr. Delcid.  RTC in approximately 1 month, or sooner as needed, for follow up and imaging review.    Return in about 1 month (around 2/10/2025) for Office Visit.    KING Nuñez      I spent a total of 29 minutes on today's visit, with more than 10 minutes in virtual communication with the patient via telephone, and the remainder of the time spent in reviewing the relevant history, records, available imaging, and for documentation.

## 2025-01-10 NOTE — TELEPHONE ENCOUNTER
YAIMA #: [id number]    Medication requested: Oxycodone 10    Last fill date: 12/4/24    Last appointment: 10/21/24    Next appointment: 1/22/25

## 2025-01-16 ENCOUNTER — TELEPHONE (OUTPATIENT)
Dept: PALLIATIVE CARE | Facility: CLINIC | Age: 70
End: 2025-01-16
Payer: MEDICARE

## 2025-01-16 NOTE — TELEPHONE ENCOUNTER
"    Hub staff attempted to follow warm transfer process and was unsuccessful     Caller: Esteban Tse \"Vishal\"    Relationship to patient: Self    Best call back number: [phone]     Patient is needing: PT IS WANTING TO RESCHEDULE HIS MYCHART VISIT. PLEASE CALLBACK AND ADVISE         "

## 2025-01-20 DIAGNOSIS — G89.3 CANCER ASSOCIATED PAIN: ICD-10-CM

## 2025-01-20 RX ORDER — GABAPENTIN 600 MG/1
600 TABLET ORAL 3 TIMES DAILY
Qty: 90 TABLET | Refills: 0 | Status: SHIPPED | OUTPATIENT
Start: 2025-01-20 | End: 2025-02-19

## 2025-01-20 NOTE — TELEPHONE ENCOUNTER
YAIMA #: [id number]    Medication requested:gabapentin (NEURONTIN) 600 MG tablet     Last fill date: 12/17/24    Last appointment: 10/21/24    Next appointment: 1/29/25

## 2025-01-28 NOTE — PROGRESS NOTES
Palliative Clinic Note      Name: Esteban Tse  Age: 69 y.o.  Sex: male  : 1955  MRN: [id number]  Date of Service: 2025   Medical Oncologist: Dr. Delcid  Mode of visit: video-conference  Location of patient: Home  Location of provider: [facility]    Subjective:    Chief Complaint: Pain, nausea, poor appetite    History of Present Illness: Esteban Tse is a 69 y.o. male with past medical history significant for HTN, AAA, metastatic adenocarcinoma of the appendix who presents via video-conference today as a follow up for pain and symptom management.     Treatment summary: The patient was diagnosed with cancer of the appendix at the time of appendectomy in 2017. Patient under surveillance until imaging in 2019 demonstrated several lesions suspicious for recurrent disease. He underwent a diagnostic laparoscopy with peritoneal biopsies on 2020 by Dr. Peoples that proved recurrent adenocarcinoma. He underwent an exploratory laparotomy with excision of right and left lobe liver lesions, omentectomy, resection of pelvic peritoneal nodules, ileocolectomy with creation of ileal colostomy, hyperthermic intraperitoneal chemotherapy and a partial cystectomy with right ureteral stent placement in 2020. He underwent a cystoscopy with bilateral ureteral catheters, exploratory laparotomy, lysis of adhesions, right pelvic sidewall excisional biopsy, and an open partial cystectomy in 2021. TURBT performed in 10/2021. Imaging from 3/4/2022 showed enlarging external illiac adenopathy. There are no surgical options per Dr. Peoples. Underwent CT-guided biopsy of the right iliac mass consistent with mucinous adenocarcinoma. Patient completed palliative radiation in 2024. Scans from 2024 showed growth in the lungs. Patient underwent bronchoscopy in 10/2024 by Dr. Santos, biopsies were consistent with prior pathology. Patient received radiation to the right lung on 24. May consider left  lung in the future.     Pain: Patient established care with [facility] in [place], [place] > 1 year ago. Patient's right hip pain was thought to be referred from his spine. Patient underwent several epidurals and a lumbar laminectomy with no improvement in his pain. Patient complains of pain predominantly in his right groin / hip and lower extremity secondary to cancer. The pain occurs with physical activity and worse with driving. Patient is currently prescribed oxycodone 10 mg q4h PRN and gabapentin 600 mg TID. He occasionally takes Ibuprofen as well. The patient stopped the long-acting oxycodone, Xtampza 9 mg due to worsening constipation, and questionable efficacy. The patient states he also received notification from his insurance company that they were no longer going to cover the medication. Patient unable to tolerate MS Contin and Fentanyl patches due to side effects.      Other symptoms: The patient stopped the Pepcid for indigestion due to lack of efficacy. He continues to complain of nausea, specifically several hours after eating. He has been taking an old prescription of Phenergan. He states it kicks after a few hours. Appetite negatively impacted by nausea. The patient notes slight worsening of constipation. He uses Milk of Mag about once per week. No concerns with sleep today.      Pyschosocial: The patient lives alone. He is retired from working for the state. He enjoys playing golf and spending time outside. No personal history of a mental illness. The patient admits to worsening anxiety/depression recently due to the decline of his mother's health. Patient's father passed away in 10/2022 and the patient is an only child.     Spiritual: Patient describes himself as curious rather than practicing.      Goals: Improve quality of life with symptom management.     The following portions of the patient's history were reviewed and updated as appropriate: allergies, current medications,  past family history, past medical history, past social history, past surgical history and problem list.    ORT-R: Low risk  Decisional capacity: Full  ECOG: (1) Restricted in physically strenuous activity, ambulatory and able to do work of light nature     Objective:    Constitutional: Awake, alert, sitting up   Eyes: PERRLA, EOMS intact  HENT: NCAT, face symmetric  Neck: Supple, trachea midline  Respiratory: Nonlabored respirations  Musculoskeletal: Moves all extremities   Psychiatric: Appropriate affect, cooperative  Neurologic: Oriented x 3, Cranial Nerves grossly intact to confrontation, speech clear    Medication Counts: Collected over the phone. See bottom of note for details. No misuse or overuse evident.   I have reviewed the patient's [place] PDMP. YAIMA Req #[id number] .   UDS: Last 3/25/23. Reviewed. Appropriate.     Assessment & Plan:    1. Mucinous adenocarcinoma of appendix  - Recently completed radiation to the right lung. Plan to continue surveillance. Upcoming appointment with Oncology and Radiation Oncology,     2. Cancer related pain  - Continue oxycodone 10 mg q4h PRN.     3. Neuropathy associated with malignant neoplasm  - Continue gabapentin 600 mg TID.    4. Nausea  - Start trial of metoclopramide (REGLAN) 5 MG tablet; Take 1 tablet by mouth 4 (Four) Times a Day Before Meals & at Bedtime.  Dispense: 30 tablet; Refill: 0  - Start trial of dronabinol (Marinol) 5 MG capsule; Take 1 capsule by mouth 2 (Two) Times a Day Before Meals for 30 days.  Dispense: 60 capsule; Refill: 0    5. Therapeutic opioid induced constipation  - Recommend diet modifications including hydration and fiber. Continue Milk of Magnesia as needed.     Code status: Full code  Advanced directives: Not on file  Health care surrogate: Shaylee Tse, mother     Return in about 3 months (around 4/29/2025) for Video visit.    The patient has chosen to receive care through a telehealth visit.   Does the patient consent to using a video  visit for their medical care today? Yes   The visit included audio and video interaction. No technical issues occurred during this visit.  I spent 30 minutes caring for Esteban Tse on this date of service. This time includes time spent by me in the following activities: preparing for the visit, reviewing tests, obtaining and/or reviewing a separately obtained history, performing a medically appropriate examination and/or evaluation, counseling and educating the patient/family/caregiver, ordering medications, tests, or procedures, documenting information in the medical record, independently interpreting results and communicating that information with the patient/family/caregiver, and care coordination.    Qian Fleming PA-C  01/29/2025    Medication Date Filled # Filled Count Used # Days  SOY   Oxycodone 10 1/10/25 180 95 85 19 4   Gabapentin 600 1/20/25 90 90 20 43 2

## 2025-01-29 ENCOUNTER — TELEMEDICINE (OUTPATIENT)
Dept: PALLIATIVE CARE | Facility: CLINIC | Age: 70
End: 2025-01-29
Payer: MEDICARE

## 2025-01-29 VITALS
OXYGEN SATURATION: 99 % | WEIGHT: 172 LBS | TEMPERATURE: 97.7 F | RESPIRATION RATE: 18 BRPM | HEART RATE: 91 BPM | SYSTOLIC BLOOD PRESSURE: 157 MMHG | DIASTOLIC BLOOD PRESSURE: 96 MMHG | BODY MASS INDEX: 22.69 KG/M2

## 2025-01-29 DIAGNOSIS — G63 NEUROPATHY ASSOCIATED WITH MALIGNANT NEOPLASM: ICD-10-CM

## 2025-01-29 DIAGNOSIS — C18.1 MUCINOUS ADENOCARCINOMA OF APPENDIX: Primary | ICD-10-CM

## 2025-01-29 DIAGNOSIS — T40.2X5A THERAPEUTIC OPIOID INDUCED CONSTIPATION: ICD-10-CM

## 2025-01-29 DIAGNOSIS — G89.3 CANCER RELATED PAIN: ICD-10-CM

## 2025-01-29 DIAGNOSIS — K59.03 THERAPEUTIC OPIOID INDUCED CONSTIPATION: ICD-10-CM

## 2025-01-29 DIAGNOSIS — R11.0 NAUSEA: ICD-10-CM

## 2025-01-29 DIAGNOSIS — C80.1 NEUROPATHY ASSOCIATED WITH MALIGNANT NEOPLASM: ICD-10-CM

## 2025-01-29 PROCEDURE — 99213 OFFICE O/P EST LOW 20 MIN: CPT | Performed by: PHYSICIAN ASSISTANT

## 2025-01-29 PROCEDURE — 1160F RVW MEDS BY RX/DR IN RCRD: CPT | Performed by: PHYSICIAN ASSISTANT

## 2025-01-29 PROCEDURE — 3077F SYST BP >= 140 MM HG: CPT | Performed by: PHYSICIAN ASSISTANT

## 2025-01-29 PROCEDURE — 1159F MED LIST DOCD IN RCRD: CPT | Performed by: PHYSICIAN ASSISTANT

## 2025-01-29 PROCEDURE — 1125F AMNT PAIN NOTED PAIN PRSNT: CPT | Performed by: PHYSICIAN ASSISTANT

## 2025-01-29 PROCEDURE — 3080F DIAST BP >= 90 MM HG: CPT | Performed by: PHYSICIAN ASSISTANT

## 2025-01-29 RX ORDER — METOCLOPRAMIDE 5 MG/1
5 TABLET ORAL
Qty: 30 TABLET | Refills: 0 | Status: SHIPPED | OUTPATIENT
Start: 2025-01-29

## 2025-01-29 RX ORDER — DRONABINOL 5 MG/1
5 CAPSULE ORAL
Qty: 60 CAPSULE | Refills: 0 | Status: SHIPPED | OUTPATIENT
Start: 2025-01-29 | End: 2025-03-01

## 2025-01-29 NOTE — TELEPHONE ENCOUNTER
I have reviewed patient's YAIMA report prior to prescribing Schedule II, III, and IV medications. Request # [id number] . Next refill for oxycodone 10 mg tab q4h PRN #180 was sent to the pharmacy. The patient is scheduled to follow-up in 3 months.

## 2025-01-30 RX ORDER — OXYCODONE HYDROCHLORIDE 10 MG/1
10 TABLET ORAL EVERY 4 HOURS PRN
Qty: 180 TABLET | Refills: 0 | Status: SHIPPED | OUTPATIENT
Start: 2025-02-08 | End: 2025-03-10

## 2025-02-04 ENCOUNTER — OFFICE VISIT (OUTPATIENT)
Dept: ONCOLOGY | Facility: CLINIC | Age: 70
End: 2025-02-04
Payer: MEDICARE

## 2025-02-04 VITALS
BODY MASS INDEX: 22.8 KG/M2 | RESPIRATION RATE: 18 BRPM | DIASTOLIC BLOOD PRESSURE: 86 MMHG | HEIGHT: 73 IN | SYSTOLIC BLOOD PRESSURE: 133 MMHG | HEART RATE: 87 BPM | WEIGHT: 172 LBS | TEMPERATURE: 98.6 F | OXYGEN SATURATION: 98 %

## 2025-02-04 DIAGNOSIS — C18.1 MUCINOUS ADENOCARCINOMA OF APPENDIX: Primary | ICD-10-CM

## 2025-02-04 PROCEDURE — 1159F MED LIST DOCD IN RCRD: CPT | Performed by: INTERNAL MEDICINE

## 2025-02-04 PROCEDURE — 1160F RVW MEDS BY RX/DR IN RCRD: CPT | Performed by: INTERNAL MEDICINE

## 2025-02-04 PROCEDURE — 1125F AMNT PAIN NOTED PAIN PRSNT: CPT | Performed by: INTERNAL MEDICINE

## 2025-02-04 PROCEDURE — 3075F SYST BP GE 130 - 139MM HG: CPT | Performed by: INTERNAL MEDICINE

## 2025-02-04 PROCEDURE — 99214 OFFICE O/P EST MOD 30 MIN: CPT | Performed by: INTERNAL MEDICINE

## 2025-02-04 PROCEDURE — 3079F DIAST BP 80-89 MM HG: CPT | Performed by: INTERNAL MEDICINE

## 2025-02-04 NOTE — LETTER
February 4, 2025     Saul Davidson MD  [address] [place] [address]    Patient: Esteban Tse   YOB: 1955   Date of Visit: 2/4/2025     Dear Saul Davidson MD:       Thank you for referring Esteban Tse to me for evaluation. Below are the relevant portions of my assessment and plan of care.    If you have questions, please do not hesitate to call me. I look forward to following Esteban along with you.         Sincerely,        Cy Delcid MD        CC: MD Ike Morgan MD Robert O Mitchell, MD Jonathan Michael Feddock, MD Raul Edmundo Heredia, MD Danae Stephens, PA-C Christopher Caleb Meenach, DO Hicks, Lee G, MD  02/04/25 5000  Sign when Signing Visit  CHIEF COMPLAINT: Follow-up of mucinous adenocarcinoma    Problem List:  Oncology/Hematology History Overview Note   1.  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  Pathologic stage IIa with T3 extension into the base of the appendix through the muscularis propria but not into the serosal surface.  16 nodes negative.  Colonoscopy December 2017 negative postop.Laparoscopic diagnostic peritoneal biopsy confirming disease in the bladder dome February 2020.  Began Folfiri.  June 2020 Dr. Peoples excised right lobe of liver and left lobe liver lesion, omentectomy and resection of pelvic peritoneal nodules, ileocolectomy with ileal colostomy and intraperitoneal HIPEC with mitomycin.  Dr. Lawler performed right ureteral stent placement and partial cystectomy.  With large fungating mass, 4/22/2021 had exploratory laparotomy with lysis of adhesions and right pelvic sidewall excisional biopsy with open partial cystectomy.  No malignancy and peritoneal fluid.  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  Margins less than 1 mm.  October 2021 cystoscopy without malignancy.  TURBT 10/11/2021 did not show  malignancy.  March 2022 Dr. Peoples for 3.9 cm external iliac recurrent adenopathy 6 weeks out from laminectomy which did not help pain was determined to be an unresectable recurrence with muscle and vascular involvement.  Radiation with Dr. Moody ended 5/6/2022.  Disease stabilized and pain improved. Paucimetastatic disease progression to the lung biopsy-proven, treated with radiation to both lungs ending 12/6/2024 with good response on 1/29/2025 CT and no new sites of disease.    -12/30/2019 medical oncology office visit: The patient had been on surveillance since surgery December 2017.  CT scans had been negative up until 12/30/2019 CT chest abdomen and pelvis that showed subtle soft tissue density surrounding surgical clips in the right side of the pelvis along with soft tissue nodule at the anterior wall of the bladder concerning for recurrent disease.  Patient sent back to Dr. Davidson for colonoscopy.    -2/7/2020 patient was referred to Dr. Peoples at the [facility], he underwent diagnostic laparoscopic and peritoneal biopsies that confirmed recurrent disease with biopsy of bladder dome nodule showing adenocarcinoma with mucinous features.  Port was placed at this time also.    -2/25/2020 began FOLFIRI    -4/21/2020 patient having increased fatigue, FOLFIRI dose reduced by 10%.    -5/19/2020 cycle 7 FOLFIRI    -6/18/2020 Dr. Peoples performed exploratory laparotomy with excision of right lobe liver lesion and left lobe liver lesion, omentectomy, resection of pelvic peritoneal nodules, ileocolectomy with creation of ileal colostomy, hyperthermic intraperitoneal chemotherapy with mitomycin-C at 42 °C for deep tissue penetration for 90 minutes.  Dr. Perdue performed partial cystectomy with right ureteral stent placement    -8/5/2020 CT abdomen pelvis with contrast shows small soft tissue nodule anterior aspect of bladder stable.  New small amount of ascites as well as a new small left pleural effusion.   "Creatinine 0.75 with hemoglobin 11.1 otherwise unremarkable CBC and CMP.    -2/15/2021 CT abdomen pelvis with contrast compared to 8/5/2020 shows enlarging soft tissue mass 4.2 cm over the bladder fundus and there is a calcification along the base of the urinary bladder.  Small stable multiple hypoattenuating indeterminate liver lesions.  Similar degree of ascites.    -2/23/2021 CT chest shows no evidence of metastasis with ascending aorta 42 mm.    -3/8/2021 follow-up with Dr. Portillo Peoples we reviewed his CTs suggested team effort resection with Dr. Perdue    -3/16/2021 follow-up with Dr. Perdue.  He plans for open partial cystectomy, possible ureteral implants, cystoscopy and stent placement in concert with Dr. Peoples.    -4/22/2021 cystoscopy (after prior office cystoscopy showed large fungating mass) with bilateral ureteral catheters, exploratory laparotomy, lysis of adhesions, right pelvic sidewall excisional biopsy, open partial cystectomy Dr. Ike Perdue. Peritoneal fluid showed predominant inflammatory reactive mesothelial cells with no malignancy. 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 Carcinoma was less than 1 mm from the lateral resection margins.    -5/18/2021 [facility] medical oncology follow-up visit: I reviewed his hospital notes, operative notes, and pathology report as above and went over this with the patient.  The general consensus from retrospective as well as a few prospective data over the last couple of decades with this low-grade malignancy does not show any profound benefit from \"adjuvant\" chemotherapy in this current setting.  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  There is no standard follow-up but general guidelines suggest following up as typical colon cancer.  I will repeat his CT chest abdomen pelvis now to reestablish his baseline postoperative imaging and have him see my nurse practitioner back in a couple of " weeks to make sure there is no nia evidence of measurable residual disease.  Assuming this just shows postoperative changes, I would simply repeat scans again in 3 months or sooner as symptoms dictate.  If he has no evidence of persistent or metastatic disease on current imaging, then when he sees my nurse practitioner back she will also review what they say about his ascending aorta which was 42 mm in February and sent him to Dr. Evangelist Anthony for an opinion as to whether any intervention is needed relative to the aorta.  Obviously if his cancer is out of control on the upcoming scans then the aorta is a moot point.    -6/10/2021 [facility] oncology clinic follow-up: CT scans show no evidence of disease recurrence in the chest, abdomen or pelvis.  Ascending aortic aneurysm stable at 41 mm.  Referral made to Dr. Anthony, cardiothoracic surgeon for management and evaluation of a sending aortic aneurysm.  Plan to repeat CT scans in 3 months.    -9/14/2021 [facility] medical oncology follow-up visit: I reviewed images and reports of 9/10/2021 CT chest abdomen pelvis with contrast which shows under distended urinary bladder with mild asymmetric wall thickening dome and left lateral wall with a small 9 mm polypoid lesion in the bladder.  There is stable 2.3 cm right external iliac and a few other subcentimeter scattered nodes in the root of the small bowel mesentery and retroperitoneum.  Postsurgical right hemicolectomy changes.  Stable low-attenuation lesions in the liver unchanged.  I will have him collect his discs from [place] to take Dr. Perdue to decide whether to proceed with cystoscopy or just continue watchful waiting.  There is no major changes and I suspect we are looking at postoperative changes and we shall see him for video visit in a few weeks to see what urology at  decides.    -10/11/2021 cystoscopy Dr. Perdue showed marked acute and chronic inflammation but no evidence of malignancy.  Muscularis  propria present.    -10/14/2021 [facility] medical oncology virtual visit: I reviewed reports of pathology from cystoscopy 10/11/2021 and went over this with patient.  He is feeling fairly fit.  I will repeat his CT chest abdomen pelvis prior to return in 3 months and if in the interim, when he follows up with Dr. Perdue in the next couple of weeks post cystoscopy, plans are made for further cystoscopy before I see him back and any malignancy is found, I would ask the patient and Dr. Perdue to touch base as I would not know of such without that contact as the information comes into our chart without notification now that epic is being used by [employer].  Assuming no further biopsies in the interim, I will simply repeat his scans in 3 months to follow what I suspected and is now confirmed to be inflammatory changes.  From the aneurysm standpoint, he has follow-up scanning March 2022 arranged by Dr. Anthony and he will follow him up after that.    -11/24/2021 CT chest abdomen pelvis compared to 6/7/2021 outside imaging shows stable 4.1 cm ascending thoracic aorta.  No lung nodules.  Liver homogenous with bilateral cysts.  No adenopathy.  Thickened sigmoid colon and rectum suggestive of mild colitis or postradiation change.  No pelvic adenopathy.  Bony structures degenerative in the spine.  Some soft tissue anterior to the acetabulum on the right and extending along the medial aspect right pelvic sidewall 2.7 x 5.6 may represent intramuscular process versus adenopathy which could not be ruled out.  No prior study views available.    -1/4/2022 follow-up Dr. Ike Perdue [facility].  Per his note, he had TURBT 10/11/2021.  He had been placed on antibiotics for UTI for preop preparation for back surgery planned in 2 weeks.  Denies any mucus in his urine.  Some microhematuria on urinalysis this day.  No gross hematuria or dysuria.  The October 2021 TURBT did not reveal anything malignant on pathology.  There may be irritation or  inflammation secondary to sutures in the bladder following partial cystectomy.  Patient asymptomatic from a urinary standpoint.  Recommended following up with [facility] with no plans for further urology follow-up.    -2/4/2022 [facility] telehealth follow-up visit: I reviewed his November images and reports with the patient as well as with Dr. Gm Moody and the note from Dr. Perdue from 1 month ago.  I do suspect this pelvic sidewall abnormality in November represents persistent disease but as I have stated in prior dictations, the data on palliative or overall survival benefit of systemic 5-FU based combination chemotherapy and/or Avastin does not show a clear-cut survival advantage to chemotherapy for asymptomatic disease.  He is having some back pain and had spine decompression a couple of weeks ago without much benefit.  This is an area that may potentially be radiated but I will check his CT chest (angiogram of chest) abdomen and pelvis and get his blood counts and chemistries and urinalysis and see him back for virtual visit in a few weeks.  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  I will also converse with Dr. Peoples at that junction whether he thinks he has anything to offer if this is progressing though I doubt it given that the last intervention was a urologic procedure and the last cystoscopy/TURBT in October had no malignancy and the liver lesions are currently being called liver cysts albeit I am skeptical as to whether those are truly benign but they are certainly not growing.    -3/4/2022 CT angiogram of chest/CT abdomen pelvis for evaluation of  "dizziness and surveillance of thoracic aortic aneurysm and appendiceal carcinoma.  Thoracic ascending aortic ectasia 4 x 4 cm stable.  Small nodularity left upper lobe stable from September.  Stable small hepatic hypodensities status post cholecystectomy.  Right external iliac soft tissue fullness now 3 x 3.9 cm previously 2 x 2.3 cm concerning for enlarging adenopathy with symmetric nonenlarged inguinal nodes stable.  CBC unremarkable.  CMP unremarkable save for potassium 5.4.  CEA 18.2.  1-3 red cells and white cells per milliliter of urine.    -3/16/2022 office visit with Dr. Portillo Peoples.  He reviewed the CT from 3/4/2022.  He notes that the laminectomy from 6 weeks prior has not helped his \"hip pain\".  Given the location of this recurrence previously 2 x 2.3 cm now 3 x 3.9 cm in the external iliac area concerning for enlarging adenopathy with symmetric nonenlarged inguinal nodes stable, the mass appears unresectable with involvement of muscle and vasculature.    -3/24/2022 [facility] medical oncology follow-up virtual visit: Pain is still significant.  We will get him in hopefully in the next day to [facility] for palliative care.  Have spoken with Dr. Peoples and no surgery.  I have spoken with Dr. Moody who thinks palliative radiation while not likely to shrink tumor dramatically may help pain considerably and we will get him there.  We will get him to bring the discs to our [place] office and asked them to bring that back to [place] to get scanned in for our invasive radiologist to look at for us to get CT-guided biopsy of this node in the right lower quadrant and send that for Gab miguel once the pathology is obtained to hopefully find high tumor mutational burden or other molecular targets that might give us some options.  Apart from that, as stated multiple times, this is not a highly chemotherapy sensitive tumor and I am not sure I would palliate him well but, when I see him back in early June " with CAT scans prior to return and post radiation, if he is not getting relief and wants to try a 5-FU based regimen plus or minus Avastin I would be okay with that but he knows it does not alter survival 1 way or the other and we are simply trying to palliate this inexorable process.  No other surgical options.    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  Mismatch repair proficient,NTRK1/2/3 fusion not detected.  BRAF V600E negative.  PD-L1 Negative, 1+, 5%.  PTEN Positive, 1+, 100%. MLH1 positive/3+, 100%. MSH2 positive/3+, 100%. MSH6 positive/3+, 100%. PMS2 positive/3+,100%.    -5/6/2022 last radiation    - 5/25/2022 CT chest abdomen pelvis with contrast at [place] [facility] shows nothing remarkable on the chest.  Stable liver cysts.  Focal soft tissue right iliac region appears to be increasing in size now 7.2 cm previously 3.9 cm with some mass-effect on surrounding structures with several stable inguinal borderline nodes.    -6/6/2022 [facility] medical oncology follow-up visit: Now 1 month out from radiation just starting to get a little bit of improvement in his abdominal discomfort.  Slight growth on CT but some of that may be postradiation change this close to radiation and, as I stated in my note in March, it is not clear that 5-FU based therapy plus or minus Avastin is going to palliate much given its relatively insensitivity to chemotherapy and it certainly does not improve survival.  To that end, we will plan on just repeating his CAT scans again in about 6 weeks and if the pain is worsening and/or this continues to grow then I will probably give him Avastin FOLFOX.  If everything is stable and pain is controlled we will go continue watchful waiting with imaging about every 3 months from that point forward.    -7/11/2022 [facility] medical oncology follow-up: His 7/7/2022 CT chest abdomen pelvis showed stable right  external iliac low-attenuation which had been previously growing and thickening of the distal descending and sigmoid colon with perhaps a small right iliac borderline 1 cm node.  Symptomatically improved post radiation.  For now we will hold on Avastin FOLFOX as the benefit of such is questionable with this histologic subtype and there are no actionable molecular targets.  Repeat CTs prior to return in 3 months.    -11/1/2022 [facility] medical oncology follow-up: 10/19/2022 CT chest abdomen pelvis with contrast [place] [facility] compared to 7/7/2022 shows no significant change or evidence of progression.  Clinically quiescent.  We will repeat CTs in 6 months, sooner as symptoms dictate.    -5/19/2023 CT chest abdomen pelvis [place] [facility] compared to 10/19/2022 showed no evidence of disease progression in the chest.  Nodular density adjacent to the descending colon 6 mm nonspecific.  Otherwise unchanged appearance of hepatic metastatic nodules, right iliac adenopathy, and right pelvic mass.    -5/23/2023 [facility] oncology follow-up: I reviewed his report of CT chest abdomen pelvis from [place] 4 days ago showing no evidence of progression.  So area near the descending colon is nonspecific and would not change anything at this junction.  His October CT had not changed from July and the current one has not changed significantly since October.  We will continue to see palliative care for his right pelvic pain.  If leg swelling significantly worsens we could check Doppler and assuming no clot consider vascular intervention but as the thigh has not significantly changed over time nor is there significant tenderness in the calf I am doubtful that this is clot and is likely just due to the pelvic mass.  Vascular surgical intervention should swelling worsen is also an option but I would not want a stent placed through this area in the face malignancy particularly a mucinous variety that would make him prone  towards clotting.  If the leg worsens I would get his Doppler and consider vascular intervention or anticoagulation as dictated but clinically stable so will not investigate further at this junction.  I will repeat his imaging again in 6 months.    -11/20/2023 CT chest, abdomen and pelvis shows stable appearances of the chest with no evidence of metastatic disease.  Abdomen and pelvis reveals small interval decrease in size of the previously demonstrated peritoneal nodule adjacent to the descending colon.  No new suspicious omental/peritoneal soft tissue nodules or masses.  Stable right iliac adenopathy as well as a soft tissue mass along the right external iliac vasculature along the right pelvic sidewall.  -11/29/2023 [facility]: Current CT chest, abdomen and pelvis shows stable findings, no evidence of progression.  He has no new worrisome symptoms however he does still have swelling in his right thigh and lower pelvic pain for which she sees palliative care.  He states that it seems slightly more pronounced over the last few weeks, he has a follow-up with palliative care and will discuss further with him.  We reviewed recommendations discussed at his last visit with Dr. Delcid that as long as the swelling did not worsen significantly then we would hold off on considering any vascular intervention.  We will continue to monitor, Jose Antonio understands to let us know if he has any changes otherwise we will plan on repeating CT chest, abdomen and pelvis prior to return in 6 months and I have ordered those today.  His blood pressure was running high on arrival today, I did recheck it manually and it was 150/100.  He reports that he took his blood pressure earlier today at home and it was 140s over 85 or so.  I asked him to recheck when he got home and monitor and if his diastolic continued to run over 90 to get in with Dr. Dial for evaluation.    - 5/20/2023 CT chest abdomen pelvis [place]  [facility] compared to November shows 10 mm nodule left lower lobe and right lower lobe.  Anterior lobe pleural-based nodule unchanged.  6.1 x 4 cm right pelvic sidewall stable.  12 mm right common iliac node stable.  No ascites.    -5/28/2024 [facility] oncology clinic follow-up: No signs or symptoms of recurrence.  3 times in the last 6 months he had a 6-hour period of crampy abdominal pain that subsequently subsided.  Potentially could have been having some obstructive symptomatology but none on imaging.  If this happens more frequently or it lasts he will let us know and also Dr. Peoples but would also go towards a more soft diet during those episodes and try FiberCon.  Otherwise imaging of the abdomen is stable but there is a couple of nodules in the lung that appeared new albeit small.  This is a very low-grade process and systemic therapies are not very effective and he is asymptomatic so we will just repeat CT chest in 3 months.  If this grows then we will consider getting tissue as it would be a little odd for this low-grade process to show up in the lung.    -8/28/2024 [facility] [place] [facility] chest with contrast compared to May 2024 shows left lower lobe nodule 16 mm compared to 10 mm with right lower lobe nodule 16 mm previously 10 mm and small subpleural right upper lobe nodule stable most likely benign intrapulmonary node and small stable nodule right lower lobe likely intrapulmonary.  Several small low-density liver lesions likely cysts.    -9/3/2024 [facility] oncology clinic follow-up: No signs or symptoms of recurrence 1 lung nodule in each lung base has grown.  Other nodules stable.  High likelihood for recurrence.  Will get CT abdomen pelvis and then get him to Dr. Santos for consideration of navigational bronchoscopy/Ion biopsy these lesions and might consider CyberKnife for which I will get him to Dr. Moody for opinion and coordination with Dr. Santos pending results of CT abdomen pelvis.  If on the other  hand his CT abdomen pelvis shows additional sites of progression beyond the lung then we will likely go back to systemic therapy of which he has very little likelihood to respond but I would like to get tissue for molecular testing.     -9/24/2024 [facility] abdominal pelvic CT with contrast compared to May 2024 showed continued decrease in size of hepatic fat necrosis left lower quadrant and decrease in right common iliac and external iliac adenopathy and no new metastatic disease.    -9/25/2024 robotic bronchoscopy preparative CT chest    -9/26/2024 [facility] hematology oncology follow-up: Reviewed recent CT abdomen with no clear-cut malignancy and continued regression fat necrosis left lower quadrant with decreasing adenopathy in the right common and external iliac nodes.  CT chest done yesterday has not been read but is a noncontrast robotic preparative CT to be used by Dr. Santos for navigational bronchoscopy on October 2.  Will get him back to me around October 10 and Dr. Moody in the interim for opinion as to CyberKnife particularly if paucimetastatic malignancy proven.    - 10/2/2024 bronchoscopy Dr. Santos Ion robot platform biopsy 2 cm nodule middle one third of the left lower lobe.  FNA left lower lobe, bronchial brush, transbronchial needle aspirate right lower lobe and right lower lobe brush all show adenocarcinoma consistent with prior pathology.  Left lower lobe lung biopsy and right lower lobe lung biopsy both show adenocarcinoma with extensive extracellular mucin CDX2 strongly positive.  Negative for CK7 and patchy CK20 positivity.  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 Moody plans to treat right lung  metastasis [address] in 3 fractions and then pending tolerance will reevaluate shortly thereafter for treatment to the left lung.    -10/10/2024 [facility] hematology oncology follow-up: I reviewed with him the bronchoscopy and path reports and the note from Dr. Moody.  I will have him see my nurse practitioner back in 6 weeks to make sure he is having no new symptoms and at that point she will order a follow-up CT chest abdomen pelvis about 8 weeks out from the end of radiation.  I will get [facility] MI profile on the lung biopsy.    -10/27/2024 [facility] profile: KRAS pathogenic variant Exon 2/p.G12D therefore lack of benefit to cetuximab or panitumumab.  PD-L1 negative, 0%.  No actionable biomarkers identified.  Microsatellite stable, TMB low.   Genes with pathogenic or likely pathogenic alterations: ANNA, CDKN2A, CREBBP, KRAS.Potential clinical trials available, see full report.    -11/8/2024 completed radiation to the left lung  -12/6/2024 completed radiation to the right lung    -12/12/2024 [facility] oncology clinic follow-up: completed radiation to both lungs, left lung 11/8/2024 and more recently completed radiation to the right lung 12/6/2024.  He had some fatigue but is starting to feel better.  I have ordered restaging CT scans about 8 weeks out from his completion of radiation which will put this late January.  He has a follow-up with radiation oncology on 1/10/2024.  We will see him back after his CT scans.    -1/29/2025 CT chest abdomen and pelvis shows stable right lower lobe nodule and slightly decreased left lower lobe nodule.  Stable iliac adenopathy in the pelvis.  No new sites of disease.    -2/4/2025 [facility] oncology clinic follow-up: Good response to radiation.  Feeling fit.  Will repeat imaging in mid May     Mucinous adenocarcinoma of appendix   12/5/2017 Initial Diagnosis    Mucinous adenocarcinoma of appendix found at the time of appendectomy 12/2017 in the face of appendicitis.  Pathologic stage IIa  with T3 extension into the base of the appendix through the muscularis propria but not into the serosal surface.  16 nodes negative.  Colonoscopy December 2017 negative postop     12/5/2017 Surgery    Surgery       Procedure:  Appendectomy and right hemicolectomy      Completeness of resection:  No evidence of residual tumor     Pathology revealed invasive moderately differentiated mucinous adenocarcinoma.  Right hemicolectomy: Benign colon and small intestine no evidence of carcinoma.  16 benign lymph nodes, 0/16, negative for dysplasia or malignancy.  Tumor size approximately 6 cm.  Grade 2, moderately differentiated.  Tumor invades through the muscularis profile into the base of appendix but does not extend to the serosal surface.  All margins uninvolved, no lymphovascular invasion identified.  Pathological stage pT3 pN0.         12/8/2017 Imaging    CT of the chest abdomen and pelvis negative.  Baseline CBC WBC 7100, hemoglobin 11.3, hematocrit 34.8%, platelet count 195,000.     3/20/2018 Procedure    Colonoscopy with Dr. Davidson was normal, recommendation for repeat surveillance colonoscopy in 3 years.     6/11/2018 Imaging    CT chest, abdomen and pelvis with no evidence metastatic disease.  CEA 1.1     9/24/2018 Imaging    CT abdomen pelvis showed no acute changes and nothing to suggest recurrence     12/28/2018 Imaging    CT chest, abdomen and pelvis negative. Normal CBC, CMP and CEA 0.7.     6/28/2019 Imaging    CT chest, abdomen and pelvis: No interval change from prior studies.  No recurrent or metastatic disease detected in the chest, abdomen or pelvis.  No acute process.  CEA 0.9     12/30/2019 Imaging    CT chest, abdomen and pelvis: No disease in the chest.  There was noted a subtle soft tissue density, one surrounding surgical clips in the right side of the pelvis and the other a soft tissue nodule intimately associated with the anterior wall of the bladder, which are considered suspicious for  recurrent disease.  CEA 1.1.  CMP with normal creatinine 0.9, total bilirubin 1.9, AST 34, ALT 24, alkaline phosphatase 93.  CBC with WBC 6900, hemoglobin 15.9, platelet count 232,000.       1/21/2020 Procedure    Normal colonoscopy with Dr. Davidson.  He has made referral to Dr. Portillo Peoples at .     2/7/2020 Progression    12/30/2019 CT chest, abdomen and pelvis: No disease in the chest.  There are subtle soft tissue densities (1 surrounding surgical clips in the right side of the pelvis and the other a soft tissue nodule intimately associated with the anterior wall of the bladder) which are considered suspicious for recurrent disease.  CMP with normal creatinine 0.9, total bilirubin 1.9, AST 34, ALT 24, alkaline phosphatase 93.  CBC with WBC 6900, hemoglobin 15.9, platelet count 232,000.  CEA 1.1.  2/7/2020 diagnostic laparoscopy with peritoneal biopsies performed at the [facility] by Dr. Peoples showed no sign of diffuse peritoneal carcinomatosis or liver metastasis.  There was a firm nodule in the superior dome of the bladder, adherent to omentum, as well as right pelvic sidewall nodule adjacent to surgical clips.  Biopsy of bladder dome nodule showed adenocarcinoma with mucinous features.     2/25/2020 -  Chemotherapy    OP COLORECTAL FOLFIRI Irinotecan / Leucovorin / Fluorouracil     5/29/2020 Imaging    CT chest abdomen pelvis shows slight improvement with decreased nodule anterolateral margin of urinary bladder with stable nodularity of the right lower quadrant adjacent to surgical clips and no progressive disease.  Slight hyperemia of the colonic mucosa     6/18/2020 Surgery    Surgery       -6/18/2020 Dr. Peoples performed exploratory laparotomy with excision of right lobe liver lesion and left lobe liver lesion, omentectomy, resection of pelvic peritoneal nodules, ileocolectomy with creation of ileal colostomy, hyperthermic intraperitoneal chemotherapy with mitomycin-C at 42 °C for deep tissue  penetration for 90 minutes.  Dr. Perdue performed partial cystectomy with right ureteral stent placement     8/5/2020 Imaging     CT abdomen pelvis with contrast shows small soft tissue nodule anterior aspect of bladder stable.  New small amount of ascites as well as a new small left pleural effusion.  Creatinine 0.75 with hemoglobin 11.1 otherwise unremarkable CBC and CMP     2/15/2021 Imaging    CT abdomen pelvis with contrast compared to 8/5/2020 shows enlarging soft tissue mass 4.2 cm over the bladder fundus and there is a calcification along the base of the urinary bladder.  Small stable multiple hypoattenuating indeterminate liver lesions.  Similar degree of ascites.     2/23/2021 Imaging    -2/23/2021 CT chest shows no evidence of metastasis with ascending aorta 42 mm.     4/22/2021 Surgery    -4/22/2021 cystoscopy (after prior office cystoscopy showed large fungating mass) with bilateral ureteral catheters, exploratory laparotomy, lysis of adhesions, right pelvic sidewall excisional biopsy, open partial cystectomy Dr. Ike Perdue. Peritoneal fluid showed predominant inflammatory reactive mesothelial cells with no malignancy. 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 Carcinoma was less than 1 mm from the lateral resection margins.     6/7/2021 Imaging    CT chest, abdomen and pelvis: No evidence of metastatic disease within the chest abdomen or pelvis.  Interval resection of enhancing bladder wall mass a small amount of residual enhancing soft tissue, possibly granulation tissue.  Interval resolution of abdominal and pelvic ascites.  Stable dilation of the ascending aorta mid segment measuring up to 41 mm.     9/10/2021 Imaging    CT chest abdomen pelvis with contrast shows under distended urinary bladder with mild asymmetric wall thickening dome and left lateral wall with a small 9 mm polypoid lesion in the bladder.   There is stable 2.3 cm right external iliac and a few other subcentimeter scattered nodes in the root of the small bowel mesentery and retroperitoneum.  Postsurgical right hemicolectomy changes.  Stable low-attenuation lesions in the liver unchanged.     11/24/2021 Imaging    CT chest abdomen pelvis compared to 6/7/2021 outside imaging shows stable 4.1 cm ascending thoracic aorta.  No lung nodules.  Liver homogenous with bilateral cysts.  No adenopathy.  Thickened sigmoid colon and rectum suggestive of mild colitis or postradiation change.  No pelvic adenopathy.  Bony structures degenerative in the spine.  Some soft tissue anterior to the acetabulum on the right and extending along the medial aspect right pelvic sidewall 2.7 x 5.6 may represent intramuscular process versus adenopathy which could not be ruled out.  No prior study views available.     4/11/2022 - 5/6/2022 Radiation    Radiation OncologyTreatment Course:  Esteban Tse received 5000 cGy in 20 fractions to right pelvis/groin via External Beam Radiation - EBRT.     5/25/2022 Imaging    CT chest abdomen pelvis with contrast at [facility] shows nothing remarkable on the chest.  Stable liver cysts.  Focal soft tissue right iliac region appears to be increasing in size now 7.2 cm previously 3.9 cm with some mass-effect on surrounding structures with several stable inguinal borderline nodes.     10/19/2022 Imaging    - 10/19/2022 CT chest abdomen pelvis with contrast [facility] compared to 7/7/2022 shows no significant change or evidence of progression.     Peritoneal carcinoma   5/14/2020 Initial Diagnosis    Peritoneal carcinoma (HCC)     5/25/2022 Imaging    CT chest abdomen pelvis with contrast at [facility] shows nothing remarkable on the chest.  Stable liver cysts.  Focal soft tissue right iliac region appears to be increasing in size now 7.2 cm previously 3.9 cm with some mass-effect on surrounding structures with several  stable inguinal borderline nodes.     Malignant neoplasm metastatic to both lungs   9/12/2024 Initial Diagnosis    Malignant neoplasm metastatic to both lungs     11/6/2024 - 11/8/2024 Radiation    Radiation OncologyTreatment Course:  Esteban Tse received 5400 cGy in 3 fractions to left lung via Stereotactic Radiation Therapy - SRT.     12/3/2024 - 12/6/2024 Radiation    Radiation OncologyTreatment Course:  Esteban Tse received 5400 cGy in 3 fractions to right lung via Stereotactic Radiation Therapy - SRT.         HISTORY OF PRESENT ILLNESS:  The patient is a 69 y.o. 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 now status post radiation to left lung ending November and right lung ending in December with good response on current imaging    Past Medical History:   Diagnosis Date   • Aneurysm 2022   • Ascending aortic aneurysm    • Benign hypertension    • Bladder cancer    • Colon cancer    • History of radiation therapy 05/06/2022    right groin/hemipelvis   • Hypercholesteremia 09/18/2023   • Hypertension    • Intermittent palpitations    • Mucinous adenocarcinoma    • Pounding heartbeat    • PVC's (premature ventricular contractions)      Past Surgical History:   Procedure Laterality Date   • APPENDECTOMY  2017    With Partial Colon    • BLADDER SURGERY      with partial colon   • BRONCHOSCOPY WITH ION ROBOTIC ASSIST N/A 10/02/2024    Procedure: BRONCHOSCOPY NAVIGATION WITH ENDOBRONCHIAL ULTRASOUND AND ION ROBOT;  Surgeon: Levi Santos DO;  Location: [facility];  Service: Robotics - Pulmonary;  Laterality: N/A;  ION CATH #3  CATH [id number]   [id number]  [id number]  CATH GUIDE ID [id number]   • CHOLECYSTECTOMY     • COLON SURGERY     • COLONOSCOPY     • CYBERKNIFE  11/08/2024    LLL lung met   • CYBERKNIFE  12/06/2024    RLL lung met       Allergies   Allergen Reactions   • Levofloxacin Swelling     Lips swellijng   • Tetracycline  "Swelling     Lips swelling   • Penicillins Other (See Comments)     childhood       Family History and Social History reviewed and changed as necessary    REVIEW OF SYSTEM:   No new somatic complaintsIntermittent right hip pain not new.    PHYSICAL EXAM:  No jaundice icterus or pallor.  No respiratory distress.    Vitals:    02/04/25 1440   BP: 133/86   Pulse: 87   Resp: 18   Temp: 98.6 °F (37 °C)   SpO2: 98%   Weight: 78 kg (172 lb)   Height: 185.4 cm (73\")     Vitals:    02/04/25 1440   PainSc:   6   PainLoc: Hip  Comment: right hip only- intermittent          ECOG score: 0           Vitals reviewed.    ECOG: (0) Fully Active - Able to Carry On All Pre-disease Performance Without Restriction    Lab Results   Component Value Date    HGB 14.2 09/25/2024    HCT 42.2 09/25/2024    MCV 87.2 09/25/2024     09/25/2024    WBC 6.29 09/25/2024    NEUTROABS 4.15 09/25/2024    LYMPHSABS 1.47 09/25/2024    MONOSABS 0.55 09/25/2024    EOSABS 0.08 09/25/2024    BASOSABS 0.03 09/25/2024       Lab Results   Component Value Date    GLUCOSE 109 (H) 09/25/2024    BUN 11 09/25/2024    CREATININE 1.09 09/25/2024     09/25/2024    K 4.3 09/25/2024     09/25/2024    CO2 29.0 09/25/2024    CALCIUM 9.4 09/25/2024    PROTEINTOT 6.9 09/25/2024    ALBUMIN 4.5 09/25/2024    BILITOT 1.0 09/25/2024    ALKPHOS 103 09/25/2024    AST 25 09/25/2024    ALT 19 09/25/2024             ASSESSMENT & PLAN:  1.  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  Pathologic stage IIa with T3 extension into the base of the appendix through the muscularis propria but not into the serosal surface.  16 nodes negative.  Colonoscopy December 2017 negative postop.Laparoscopic diagnostic peritoneal biopsy confirming disease in the bladder dome February 2020.  Began Folfiri.  June 2020 Dr. Peoples excised right lobe of liver and left lobe liver lesion, omentectomy and resection of pelvic " peritoneal nodules, ileocolectomy with ileal colostomy and intraperitoneal HIPEC with mitomycin.  Dr. Lawler performed right ureteral stent placement and partial cystectomy.  With large fungating mass, 4/22/2021 had exploratory laparotomy with lysis of adhesions and right pelvic sidewall excisional biopsy with open partial cystectomy.  No malignancy and peritoneal fluid.  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  Margins less than 1 mm.  October 2021 cystoscopy without malignancy.  TURBT 10/11/2021 did not show malignancy.  March 2022 Dr. Peoples for 3.9 cm external iliac recurrent adenopathy 6 weeks out from laminectomy which did not help pain was determined to be an unresectable recurrence with muscle and vascular involvement.  Radiation with Dr. Moody ended 5/6/2022.  Disease stabilized and pain improved. Paucimetastatic disease progression to the lung biopsy-proven, treated with radiation to both lungs ending 12/6/2024 with good response on 1/29/2025 CT and no new sites of disease.    -12/30/2019 medical oncology office visit: The patient had been on surveillance since surgery December 2017.  CT scans had been negative up until 12/30/2019 CT chest abdomen and pelvis that showed subtle soft tissue density surrounding surgical clips in the right side of the pelvis along with soft tissue nodule at the anterior wall of the bladder concerning for recurrent disease.  Patient sent back to Dr. Davidson for colonoscopy.    -2/7/2020 patient was referred to Dr. Peoples at the [facility], he underwent diagnostic laparoscopic and peritoneal biopsies that confirmed recurrent disease with biopsy of bladder dome nodule showing adenocarcinoma with mucinous features.  Port was placed at this time also.    -2/25/2020 began FOLFIRI    -4/21/2020 patient having increased fatigue, FOLFIRI dose reduced by 10%.    -5/19/2020 cycle 7 FOLFIRI    -6/18/2020 Dr. Peoples performed exploratory  laparotomy with excision of right lobe liver lesion and left lobe liver lesion, omentectomy, resection of pelvic peritoneal nodules, ileocolectomy with creation of ileal colostomy, hyperthermic intraperitoneal chemotherapy with mitomycin-C at 42 °C for deep tissue penetration for 90 minutes.  Dr. Perdue performed partial cystectomy with right ureteral stent placement    -8/5/2020 CT abdomen pelvis with contrast shows small soft tissue nodule anterior aspect of bladder stable.  New small amount of ascites as well as a new small left pleural effusion.  Creatinine 0.75 with hemoglobin 11.1 otherwise unremarkable CBC and CMP.    -2/15/2021 CT abdomen pelvis with contrast compared to 8/5/2020 shows enlarging soft tissue mass 4.2 cm over the bladder fundus and there is a calcification along the base of the urinary bladder.  Small stable multiple hypoattenuating indeterminate liver lesions.  Similar degree of ascites.    -2/23/2021 CT chest shows no evidence of metastasis with ascending aorta 42 mm.    -3/8/2021 follow-up with Dr. Portillo Peoples we reviewed his CTs suggested team effort resection with Dr. Perdue    -3/16/2021 follow-up with Dr. Perdue.  He plans for open partial cystectomy, possible ureteral implants, cystoscopy and stent placement in concert with Dr. Peoples.    -4/22/2021 cystoscopy (after prior office cystoscopy showed large fungating mass) with bilateral ureteral catheters, exploratory laparotomy, lysis of adhesions, right pelvic sidewall excisional biopsy, open partial cystectomy Dr. Ike Perdue. Peritoneal fluid showed predominant inflammatory reactive mesothelial cells with no malignancy. 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 Carcinoma was less than 1 mm from the lateral resection margins.    -5/18/2021 [facility] medical oncology follow-up visit: I reviewed his hospital notes, operative notes, and pathology report as  "above and went over this with the patient.  The general consensus from retrospective as well as a few prospective data over the last couple of decades with this low-grade malignancy does not show any profound benefit from \"adjuvant\" chemotherapy in this current setting.  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  There is no standard follow-up but general guidelines suggest following up as typical colon cancer.  I will repeat his CT chest abdomen pelvis now to reestablish his baseline postoperative imaging and have him see my nurse practitioner back in a couple of weeks to make sure there is no nia evidence of measurable residual disease.  Assuming this just shows postoperative changes, I would simply repeat scans again in 3 months or sooner as symptoms dictate.  If he has no evidence of persistent or metastatic disease on current imaging, then when he sees my nurse practitioner back she will also review what they say about his ascending aorta which was 42 mm in February and sent him to Dr. Evangelist Anthony for an opinion as to whether any intervention is needed relative to the aorta.  Obviously if his cancer is out of control on the upcoming scans then the aorta is a moot point.    -6/10/2021 [facility] oncology clinic follow-up: CT scans show no evidence of disease recurrence in the chest, abdomen or pelvis.  Ascending aortic aneurysm stable at 41 mm.  Referral made to Dr. Anthony, cardiothoracic surgeon for management and evaluation of a sending aortic aneurysm.  Plan to repeat CT scans in 3 months.    -9/14/2021 [facility] medical oncology follow-up visit: I reviewed images and reports of 9/10/2021 CT chest abdomen pelvis with contrast which shows under distended urinary bladder with mild asymmetric wall thickening dome and left lateral wall with a small 9 mm polypoid lesion in the bladder.  There is stable 2.3 cm right external iliac and a few other subcentimeter scattered nodes in the root of the small bowel mesentery and " retroperitoneum.  Postsurgical right hemicolectomy changes.  Stable low-attenuation lesions in the liver unchanged.  I will have him collect his discs from [facility] to take Dr. Perdue to decide whether to proceed with cystoscopy or just continue watchful waiting.  There is no major changes and I suspect we are looking at postoperative changes and we shall see him for video visit in a few weeks to see what urology at  decides.    -10/11/2021 cystoscopy Dr. Perdue showed marked acute and chronic inflammation but no evidence of malignancy.  Muscularis propria present.    -10/14/2021 [facility] [place] medical oncology virtual visit: I reviewed reports of pathology from cystoscopy 10/11/2021 and went over this with patient.  He is feeling fairly fit.  I will repeat his CT chest abdomen pelvis prior to return in 3 months and if in the interim, when he follows up with Dr. Perdue in the next couple of weeks post cystoscopy, plans are made for further cystoscopy before I see him back and any malignancy is found, I would ask the patient and Dr. Perdue to touch base as I would not know of such without that contact as the information comes into our chart without notification now that epic is being used by .  Assuming no further biopsies in the interim, I will simply repeat his scans in 3 months to follow what I suspected and is now confirmed to be inflammatory changes.  From the aneurysm standpoint, he has follow-up scanning March 2022 arranged by Dr. Anthony and he will follow him up after that.    -11/24/2021 CT chest abdomen pelvis compared to 6/7/2021 outside imaging shows stable 4.1 cm ascending thoracic aorta.  No lung nodules.  Liver homogenous with bilateral cysts.  No adenopathy.  Thickened sigmoid colon and rectum suggestive of mild colitis or postradiation change.  No pelvic adenopathy.  Bony structures degenerative in the spine.  Some soft tissue anterior to the acetabulum on the right and extending along the  medial aspect right pelvic sidewall 2.7 x 5.6 may represent intramuscular process versus adenopathy which could not be ruled out.  No prior study views available.    -1/4/2022 follow-up Dr. Ike Perdue [facility].  Per his note, he had TURBT 10/11/2021.  He had been placed on antibiotics for UTI for preop preparation for back surgery planned in 2 weeks.  Denies any mucus in his urine.  Some microhematuria on urinalysis this day.  No gross hematuria or dysuria.  The October 2021 TURBT did not reveal anything malignant on pathology.  There may be irritation or inflammation secondary to sutures in the bladder following partial cystectomy.  Patient asymptomatic from a urinary standpoint.  Recommended following up with [facility] with no plans for further urology follow-up.    -2/4/2022 [facility] medical oncology telehealth follow-up visit: I reviewed his November images and reports with the patient as well as with Dr. Gm Moody and the note from Dr. Perdue from 1 month ago.  I do suspect this pelvic sidewall abnormality in November represents persistent disease but as I have stated in prior dictations, the data on palliative or overall survival benefit of systemic 5-FU based combination chemotherapy and/or Avastin does not show a clear-cut survival advantage to chemotherapy for asymptomatic disease.  He is having some back pain and had spine decompression a couple of weeks ago without much benefit.  This is an area that may potentially be radiated but I will check his CT chest (angiogram of chest) abdomen and pelvis and get his blood counts and chemistries and urinalysis and see him back for virtual visit in a few weeks.  If we have growth, I will get biopsy to not only verify the virtual certainty of the recurrence in this area given that he had known disease likely residual at the time of his surgery as outlined from my note in May and the natural history of this low-grade mucinous adenocarcinoma of the  "appendix having multiple recurrences even after debulking and he has already had HIPEC.  I will also converse with Dr. Peoples at that junction whether he thinks he has anything to offer if this is progressing though I doubt it given that the last intervention was a urologic procedure and the last cystoscopy/TURBT in October had no malignancy and the liver lesions are currently being called liver cysts albeit I am skeptical as to whether those are truly benign but they are certainly not growing.    -3/4/2022 CT angiogram of chest/CT abdomen pelvis for evaluation of dizziness and surveillance of thoracic aortic aneurysm and appendiceal carcinoma.  Thoracic ascending aortic ectasia 4 x 4 cm stable.  Small nodularity left upper lobe stable from September.  Stable small hepatic hypodensities status post cholecystectomy.  Right external iliac soft tissue fullness now 3 x 3.9 cm previously 2 x 2.3 cm concerning for enlarging adenopathy with symmetric nonenlarged inguinal nodes stable.  CBC unremarkable.  CMP unremarkable save for potassium 5.4.  CEA 18.2.  1-3 red cells and white cells per milliliter of urine.    -3/16/2022 office visit with Dr. Portillo Peoples.  He reviewed the CT from 3/4/2022.  He notes that the laminectomy from 6 weeks prior has not helped his \"hip pain\".  Given the location of this recurrence previously 2 x 2.3 cm now 3 x 3.9 cm in the external iliac area concerning for enlarging adenopathy with symmetric nonenlarged inguinal nodes stable, the mass appears unresectable with involvement of muscle and vasculature.    -3/24/2022 [facility] medical oncology follow-up virtual visit: Pain is still significant.  We will get him in hopefully in the next day to [facility] for palliative care.  Have spoken with Dr. Peoples and no surgery.  I have spoken with Dr. Moody who thinks palliative radiation while not likely to shrink tumor dramatically may help pain considerably and we will get him there.  We will get him to " bring the discs to our [place] office and asked them to bring that back to [place] to get scanned in for our invasive radiologist to look at for us to get CT-guided biopsy of this node in the right lower quadrant and send that for [facility] MI profile once the pathology is obtained to hopefully find high tumor mutational burden or other molecular targets that might give us some options.  Apart from that, as stated multiple times, this is not a highly chemotherapy sensitive tumor and I am not sure I would palliate him well but, when I see him back in early June with CAT scans prior to return and post radiation, if he is not getting relief and wants to try a 5-FU based regimen plus or minus Avastin I would be okay with that but he knows it does not alter survival 1 way or the other and we are simply trying to palliate this inexorable process.  No other surgical options.    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  Mismatch repair proficient,NTRK1/2/3 fusion not detected.  BRAF V600E negative.  PD-L1 Negative, 1+, 5%.  PTEN Positive, 1+, 100%. MLH1 positive/3+, 100%. MSH2 positive/3+, 100%. MSH6 positive/3+, 100%. PMS2 positive/3+,100%.    -5/6/2022 last radiation    - 5/25/2022 CT chest abdomen pelvis with contrast at [place] [facility] shows nothing remarkable on the chest.  Stable liver cysts.  Focal soft tissue right iliac region appears to be increasing in size now 7.2 cm previously 3.9 cm with some mass-effect on surrounding structures with several stable inguinal borderline nodes.    -6/6/2022 [facility] medical oncology follow-up visit: Now 1 month out from radiation just starting to get a little bit of improvement in his abdominal discomfort.  Slight growth on CT but some of that may be postradiation change this close to radiation and, as I stated in my note in March, it is not clear that 5-FU based therapy plus or minus  Avastin is going to palliate much given its relatively insensitivity to chemotherapy and it certainly does not improve survival.  To that end, we will plan on just repeating his CAT scans again in about 6 weeks and if the pain is worsening and/or this continues to grow then I will probably give him Avastin FOLFOX.  If everything is stable and pain is controlled we will go continue watchful waiting with imaging about every 3 months from that point forward.    -7/11/2022 [facility] medical oncology follow-up: His 7/7/2022 CT chest abdomen pelvis showed stable right external iliac low-attenuation which had been previously growing and thickening of the distal descending and sigmoid colon with perhaps a small right iliac borderline 1 cm node.  Symptomatically improved post radiation.  For now we will hold on Avastin FOLFOX as the benefit of such is questionable with this histologic subtype and there are no actionable molecular targets.  Repeat CTs prior to return in 3 months.    -11/1/2022 [facility] medical oncology follow-up: 10/19/2022 CT chest abdomen pelvis with contrast [facility] compared to 7/7/2022 shows no significant change or evidence of progression.  Clinically quiescent.  We will repeat CTs in 6 months, sooner as symptoms dictate.    -5/19/2023 CT chest abdomen pelvis [facility] compared to 10/19/2022 showed no evidence of disease progression in the chest.  Nodular density adjacent to the descending colon 6 mm nonspecific.  Otherwise unchanged appearance of hepatic metastatic nodules, right iliac adenopathy, and right pelvic mass.    -5/23/2023 [facility] medical oncology follow-up: I reviewed his report of CT chest abdomen pelvis from [facility] 4 days ago showing no evidence of progression.  So area near the descending colon is nonspecific and would not change anything at this junction.  His October CT had not changed from July and the current one has not changed significantly since October.  We  will continue to see palliative care for his right pelvic pain.  If leg swelling significantly worsens we could check Doppler and assuming no clot consider vascular intervention but as the thigh has not significantly changed over time nor is there significant tenderness in the calf I am doubtful that this is clot and is likely just due to the pelvic mass.  Vascular surgical intervention should swelling worsen is also an option but I would not want a stent placed through this area in the face malignancy particularly a mucinous variety that would make him prone towards clotting.  If the leg worsens I would get his Doppler and consider vascular intervention or anticoagulation as dictated but clinically stable so will not investigate further at this junction.  I will repeat his imaging again in 6 months.    -11/20/2023 CT chest, abdomen and pelvis shows stable appearances of the chest with no evidence of metastatic disease.  Abdomen and pelvis reveals small interval decrease in size of the previously demonstrated peritoneal nodule adjacent to the descending colon.  No new suspicious omental/peritoneal soft tissue nodules or masses.  Stable right iliac adenopathy as well as a soft tissue mass along the right external iliac vasculature along the right pelvic sidewall.  -11/29/2023 [facility] follow[facility]: Current CT chest, abdomen and pelvis shows stable findings, no evidence of progression.  He has no new worrisome symptoms however he does still have swelling in his right thigh and lower pelvic pain for which she sees palliative care.  He states that it seems slightly more pronounced over the last few weeks, he has a follow-up with palliative care and will discuss further with him.  We reviewed recommendations discussed at his last visit with Dr. Delcid that as long as the swelling did not worsen significantly then we would hold off on considering any vascular intervention.  We will continue to monitor, Jose Antonio  understands to let us know if he has any changes otherwise we will plan on repeating CT chest, abdomen and pelvis prior to return in 6 months and I have ordered those today.  His blood pressure was running high on arrival today, I did recheck it manually and it was 150/100.  He reports that he took his blood pressure earlier today at home and it was 140s over 85 or so.  I asked him to recheck when he got home and monitor and if his diastolic continued to run over 90 to get in with Dr. Dial for evaluation.    - 5/20/2023 CT chest abdomen pelvis [facility] compared to November shows 10 mm nodule left lower lobe and right lower lobe.  Anterior lobe pleural-based nodule unchanged.  6.1 x 4 cm right pelvic sidewall stable.  12 mm right common iliac node stable.  No ascites.    -5/28/2024 [facility] follow[facility]: No signs or symptoms of recurrence.  3 times in the last 6 months he had a 6-hour period of crampy abdominal pain that subsequently subsided.  Potentially could have been having some obstructive symptomatology but none on imaging.  If this happens more frequently or it lasts he will let us know and also Dr. Peoples but would also go towards a more soft diet during those episodes and try FiberCon.  Otherwise imaging of the abdomen is stable but there is a couple of nodules in the lung that appeared new albeit small.  This is a very low-grade process and systemic therapies are not very effective and he is asymptomatic so we will just repeat CT chest in 3 months.  If this grows then we will consider getting tissue as it would be a little odd for this low-grade process to show up in the lung.    -8/28/2024 [facility] chest with contrast compared to May 2024 shows left lower lobe nodule 16 mm compared to 10 mm with right lower lobe nodule 16 mm previously 10 mm and small subpleural right upper lobe nodule stable most likely benign intrapulmonary node and small stable nodule right lower lobe  likely intrapulmonary.  Several small low-density liver lesions likely cysts.    -9/3/2024 [facility] oncology clinic follow-up: No signs or symptoms of recurrence 1 lung nodule in each lung base has grown.  Other nodules stable.  High likelihood for recurrence.  Will get CT abdomen pelvis and then get him to Dr. Santos for consideration of navigational bronchoscopy/Ion biopsy these lesions and might consider CyberKnife for which I will get him to Dr. Moody for opinion and coordination with Dr. Santos pending results of CT abdomen pelvis.  If on the other hand his CT abdomen pelvis shows additional sites of progression beyond the lung then we will likely go back to systemic therapy of which he has very little likelihood to respond but I would like to get tissue for molecular testing.     -9/24/2024 [facility] abdominal pelvic CT with contrast compared to May 2024 showed continued decrease in size of hepatic fat necrosis left lower quadrant and decrease in right common iliac and external iliac adenopathy and no new metastatic disease.    -9/25/2024 robotic bronchoscopy preparative CT chest    -9/26/2024 [facility] hematology oncology follow-up: Reviewed recent CT abdomen with no clear-cut malignancy and continued regression fat necrosis left lower quadrant with decreasing adenopathy in the right common and external iliac nodes.  CT chest done yesterday has not been read but is a noncontrast robotic preparative CT to be used by Dr. Santos for navigational bronchoscopy on October 2.  Will get him back to me around October 10 and Dr. Moody in the interim for opinion as to CyberKnife particularly if paucimetastatic malignancy proven.    - 10/2/2024 bronchoscopy Dr. Santos Ion robot platform biopsy 2 cm nodule middle one third of the left lower lobe.  FNA left lower lobe, bronchial brush, transbronchial needle aspirate right lower lobe and right lower lobe brush all show adenocarcinoma consistent with prior  pathology.  Left lower lobe lung biopsy and right lower lobe lung biopsy both show adenocarcinoma with extensive extracellular mucin CDX2 strongly positive.  Negative for CK7 and patchy CK20 positivity.  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 Moody plans to treat right lung metastasis [address] in 3 fractions and then pending tolerance will reevaluate shortly thereafter for treatment to the left lung.    -10/10/2024 [facility] hematology oncology follow-up: I reviewed with him the bronchoscopy and path reports and the note from Dr. Moody.  I will have him see my nurse practitioner back in 6 weeks to make sure he is having no new symptoms and at that point she will order a follow-up CT chest abdomen pelvis about 8 weeks out from the end of radiation.  I will get [facility] profile on the lung biopsy.    -10/27/2024 [facility] profile: KRAS pathogenic variant Exon 2/p.G12D therefore lack of benefit to cetuximab or panitumumab.  PD-L1 negative, 0%.  No actionable biomarkers identified.  Microsatellite stable, TMB low.   Genes with pathogenic or likely pathogenic alterations: ANNA, CDKN2A, CREBBP, KRAS.Potential clinical trials available, see full report.    -11/8/2024 completed radiation to the left lung  -12/6/2024 completed radiation to the right lung    -12/12/2024 [facility] oncology clinic follow-up: completed radiation to both lungs, left lung 11/8/2024 and more recently completed radiation to the right lung 12/6/2024.  He had some fatigue but is starting to feel better.  I have ordered restaging CT scans about 8 weeks out from his completion of radiation which will put this late January.  He has a follow-up with radiation oncology on 1/10/2024.   We will see him back after his CT scans.    -1/29/2025 CT chest abdomen and pelvis shows stable right lower lobe nodule and slightly decreased left lower lobe nodule.  Stable iliac adenopathy in the pelvis.  No new sites of disease.    -2/4/2025 [facility] oncology clinic follow-up: Good response to radiation.  Feeling fit.  Will repeat imaging in mid May    Total time of care today inclusive of time spent today prior to patient's arrival reviewing interval data and during visit translating to patient putting forth a plan as outlined above took 40 minutes patient care time throughout the day today  Cy Delcid MD    02/04/2025

## 2025-02-04 NOTE — PROGRESS NOTES
CHIEF COMPLAINT: Follow-up of mucinous adenocarcinoma    Problem List:  Oncology/Hematology History Overview Note   1.  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  Pathologic stage IIa with T3 extension into the base of the appendix through the muscularis propria but not into the serosal surface.  16 nodes negative.  Colonoscopy December 2017 negative postop.Laparoscopic diagnostic peritoneal biopsy confirming disease in the bladder dome February 2020.  Began Folfiri.  June 2020 Dr. Peoples excised right lobe of liver and left lobe liver lesion, omentectomy and resection of pelvic peritoneal nodules, ileocolectomy with ileal colostomy and intraperitoneal HIPEC with mitomycin.  Dr. Lawler performed right ureteral stent placement and partial cystectomy.  With large fungating mass, 4/22/2021 had exploratory laparotomy with lysis of adhesions and right pelvic sidewall excisional biopsy with open partial cystectomy.  No malignancy and peritoneal fluid.  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  Margins less than 1 mm.  October 2021 cystoscopy without malignancy.  TURBT 10/11/2021 did not show malignancy.  March 2022 Dr. Peoples for 3.9 cm external iliac recurrent adenopathy 6 weeks out from laminectomy which did not help pain was determined to be an unresectable recurrence with muscle and vascular involvement.  Radiation with Dr. Moody ended 5/6/2022.  Disease stabilized and pain improved. Paucimetastatic disease progression to the lung biopsy-proven, treated with radiation to both lungs ending 12/6/2024 with good response on 1/29/2025 CT and no new sites of disease.    -12/30/2019 medical oncology office visit: The patient had been on surveillance since surgery December 2017.  CT scans had been negative up until 12/30/2019 CT chest abdomen and pelvis that showed subtle soft tissue density surrounding surgical clips in  the right side of the pelvis along with soft tissue nodule at the anterior wall of the bladder concerning for recurrent disease.  Patient sent back to Dr. Davidson for colonoscopy.    -2/7/2020 patient was referred to Dr. Peoples at the [facility], he underwent diagnostic laparoscopic and peritoneal biopsies that confirmed recurrent disease with biopsy of bladder dome nodule showing adenocarcinoma with mucinous features.  Port was placed at this time also.    -2/25/2020 began FOLFIRI    -4/21/2020 patient having increased fatigue, FOLFIRI dose reduced by 10%.    -5/19/2020 cycle 7 FOLFIRI    -6/18/2020 Dr. Peoples performed exploratory laparotomy with excision of right lobe liver lesion and left lobe liver lesion, omentectomy, resection of pelvic peritoneal nodules, ileocolectomy with creation of ileal colostomy, hyperthermic intraperitoneal chemotherapy with mitomycin-C at 42 °C for deep tissue penetration for 90 minutes.  Dr. Perdue performed partial cystectomy with right ureteral stent placement    -8/5/2020 CT abdomen pelvis with contrast shows small soft tissue nodule anterior aspect of bladder stable.  New small amount of ascites as well as a new small left pleural effusion.  Creatinine 0.75 with hemoglobin 11.1 otherwise unremarkable CBC and CMP.    -2/15/2021 CT abdomen pelvis with contrast compared to 8/5/2020 shows enlarging soft tissue mass 4.2 cm over the bladder fundus and there is a calcification along the base of the urinary bladder.  Small stable multiple hypoattenuating indeterminate liver lesions.  Similar degree of ascites.    -2/23/2021 CT chest shows no evidence of metastasis with ascending aorta 42 mm.    -3/8/2021 follow-up with Dr. Portillo Peoples we reviewed his CTs suggested team effort resection with Dr. Perdue    -3/16/2021 follow-up with Dr. Perdue.  He plans for open partial cystectomy, possible ureteral implants, cystoscopy and stent placement in concert with Dr. Peoples.    -4/22/2021  "cystoscopy (after prior office cystoscopy showed large fungating mass) with bilateral ureteral catheters, exploratory laparotomy, lysis of adhesions, right pelvic sidewall excisional biopsy, open partial cystectomy Dr. Ike Perdue. Peritoneal fluid showed predominant inflammatory reactive mesothelial cells with no malignancy. 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 Carcinoma was less than 1 mm from the lateral resection margins.    -5/18/2021 [facility] medical oncology follow-up visit: I reviewed his hospital notes, operative notes, and pathology report as above and went over this with the patient.  The general consensus from retrospective as well as a few prospective data over the last couple of decades with this low-grade malignancy does not show any profound benefit from \"adjuvant\" chemotherapy in this current setting.  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  There is no standard follow-up but general guidelines suggest following up as typical colon cancer.  I will repeat his CT chest abdomen pelvis now to reestablish his baseline postoperative imaging and have him see my nurse practitioner back in a couple of weeks to make sure there is no nia evidence of measurable residual disease.  Assuming this just shows postoperative changes, I would simply repeat scans again in 3 months or sooner as symptoms dictate.  If he has no evidence of persistent or metastatic disease on current imaging, then when he sees my nurse practitioner back she will also review what they say about his ascending aorta which was 42 mm in February and sent him to Dr. Evangelist Anthony for an opinion as to whether any intervention is needed relative to the aorta.  Obviously if his cancer is out of control on the upcoming scans then the aorta is a moot point.    -6/10/2021 [facility] oncology clinic follow-up: CT scans show no evidence of disease recurrence in the chest, " abdomen or pelvis.  Ascending aortic aneurysm stable at 41 mm.  Referral made to Dr. Anthony, cardiothoracic surgeon for management and evaluation of a sending aortic aneurysm.  Plan to repeat CT scans in 3 months.    -9/14/2021 [facility] medical oncology follow-up visit: I reviewed images and reports of 9/10/2021 CT chest abdomen pelvis with contrast which shows under distended urinary bladder with mild asymmetric wall thickening dome and left lateral wall with a small 9 mm polypoid lesion in the bladder.  There is stable 2.3 cm right external iliac and a few other subcentimeter scattered nodes in the root of the small bowel mesentery and retroperitoneum.  Postsurgical right hemicolectomy changes.  Stable low-attenuation lesions in the liver unchanged.  I will have him collect his discs from [facility] to take Dr. Perdue to decide whether to proceed with cystoscopy or just continue watchful waiting.  There is no major changes and I suspect we are looking at postoperative changes and we shall see him for video visit in a few weeks to see what urology at  decides.    -10/11/2021 cystoscopy Dr. Perdue showed marked acute and chronic inflammation but no evidence of malignancy.  Muscularis propria present.    -10/14/2021 [facility] medical oncology virtual visit: I reviewed reports of pathology from cystoscopy 10/11/2021 and went over this with patient.  He is feeling fairly fit.  I will repeat his CT chest abdomen pelvis prior to return in 3 months and if in the interim, when he follows up with Dr. Perdue in the next couple of weeks post cystoscopy, plans are made for further cystoscopy before I see him back and any malignancy is found, I would ask the patient and Dr. Perdue to touch base as I would not know of such without that contact as the information comes into our chart without notification now that epic is being used by .  Assuming no further biopsies in the interim, I will simply repeat his scans in 3  months to follow what I suspected and is now confirmed to be inflammatory changes.  From the aneurysm standpoint, he has follow-up scanning March 2022 arranged by Dr. Anthony and he will follow him up after that.    -11/24/2021 CT chest abdomen pelvis compared to 6/7/2021 outside imaging shows stable 4.1 cm ascending thoracic aorta.  No lung nodules.  Liver homogenous with bilateral cysts.  No adenopathy.  Thickened sigmoid colon and rectum suggestive of mild colitis or postradiation change.  No pelvic adenopathy.  Bony structures degenerative in the spine.  Some soft tissue anterior to the acetabulum on the right and extending along the medial aspect right pelvic sidewall 2.7 x 5.6 may represent intramuscular process versus adenopathy which could not be ruled out.  No prior study views available.    -1/4/2022 follow-up Dr. Ike Perdue [facility].  Per his note, he had TURBT 10/11/2021.  He had been placed on antibiotics for UTI for preop preparation for back surgery planned in 2 weeks.  Denies any mucus in his urine.  Some microhematuria on urinalysis this day.  No gross hematuria or dysuria.  The October 2021 TURBT did not reveal anything malignant on pathology.  There may be irritation or inflammation secondary to sutures in the bladder following partial cystectomy.  Patient asymptomatic from a urinary standpoint.  Recommended following up with [facility] with no plans for further urology follow-up.    -2/4/2022 [facility]ology telehealth follow-up visit: I reviewed his November images and reports with the patient as well as with Dr. Gm Moody and the note from Dr. Perdue from 1 month ago.  I do suspect this pelvic sidewall abnormality in November represents persistent disease but as I have stated in prior dictations, the data on palliative or overall survival benefit of systemic 5-FU based combination chemotherapy and/or Avastin does not show a clear-cut survival advantage to chemotherapy  "for asymptomatic disease.  He is having some back pain and had spine decompression a couple of weeks ago without much benefit.  This is an area that may potentially be radiated but I will check his CT chest (angiogram of chest) abdomen and pelvis and get his blood counts and chemistries and urinalysis and see him back for virtual visit in a few weeks.  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  I will also converse with Dr. Peoples at that junction whether he thinks he has anything to offer if this is progressing though I doubt it given that the last intervention was a urologic procedure and the last cystoscopy/TURBT in October had no malignancy and the liver lesions are currently being called liver cysts albeit I am skeptical as to whether those are truly benign but they are certainly not growing.    -3/4/2022 CT angiogram of chest/CT abdomen pelvis for evaluation of dizziness and surveillance of thoracic aortic aneurysm and appendiceal carcinoma.  Thoracic ascending aortic ectasia 4 x 4 cm stable.  Small nodularity left upper lobe stable from September.  Stable small hepatic hypodensities status post cholecystectomy.  Right external iliac soft tissue fullness now 3 x 3.9 cm previously 2 x 2.3 cm concerning for enlarging adenopathy with symmetric nonenlarged inguinal nodes stable.  CBC unremarkable.  CMP unremarkable save for potassium 5.4.  CEA 18.2.  1-3 red cells and white cells per milliliter of urine.    -3/16/2022 office visit with Dr. Portillo Poeples.  He reviewed the CT from 3/4/2022.  He notes that the laminectomy from 6 weeks prior has not helped his \"hip pain\".  Given the location of " this recurrence previously 2 x 2.3 cm now 3 x 3.9 cm in the external iliac area concerning for enlarging adenopathy with symmetric nonenlarged inguinal nodes stable, the mass appears unresectable with involvement of muscle and vasculature.    -3/24/2022 [facility] medical oncology follow-up virtual visit: Pain is still significant.  We will get him in hopefully in the next day to [facility] for palliative care.  Have spoken with Dr. Peoples and no surgery.  I have spoken with Dr. Moody who thinks palliative radiation while not likely to shrink tumor dramatically may help pain considerably and we will get him there.  We will get him to bring the discs to our [place] office and asked them to bring that back to [place] to get scanned in for our invasive radiologist to look at for us to get CT-guided biopsy of this node in the right lower quadrant and send that for Caris MI profile once the pathology is obtained to hopefully find high tumor mutational burden or other molecular targets that might give us some options.  Apart from that, as stated multiple times, this is not a highly chemotherapy sensitive tumor and I am not sure I would palliate him well but, when I see him back in early June with CAT scans prior to return and post radiation, if he is not getting relief and wants to try a 5-FU based regimen plus or minus Avastin I would be okay with that but he knows it does not alter survival 1 way or the other and we are simply trying to palliate this inexorable process.  No other surgical options.    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  Mismatch repair proficient,NTRK1/2/3 fusion not detected.  BRAF V600E negative.  PD-L1 Negative, 1+, 5%.  PTEN Positive, 1+, 100%. MLH1 positive/3+, 100%. MSH2 positive/3+, 100%. MSH6 positive/3+, 100%. PMS2 positive/3+,100%.    -5/6/2022 last radiation    - 5/25/2022 CT chest abdomen  pelvis with contrast at [facility] shows nothing remarkable on the chest.  Stable liver cysts.  Focal soft tissue right iliac region appears to be increasing in size now 7.2 cm previously 3.9 cm with some mass-effect on surrounding structures with several stable inguinal borderline nodes.    -6/6/2022 [facility] medical oncology follow-up visit: Now 1 month out from radiation just starting to get a little bit of improvement in his abdominal discomfort.  Slight growth on CT but some of that may be postradiation change this close to radiation and, as I stated in my note in March, it is not clear that 5-FU based therapy plus or minus Avastin is going to palliate much given its relatively insensitivity to chemotherapy and it certainly does not improve survival.  To that end, we will plan on just repeating his CAT scans again in about 6 weeks and if the pain is worsening and/or this continues to grow then I will probably give him Avastin FOLFOX.  If everything is stable and pain is controlled we will go continue watchful waiting with imaging about every 3 months from that point forward.    -7/11/2022 [facility] medical oncology follow-up: His 7/7/2022 CT chest abdomen pelvis showed stable right external iliac low-attenuation which had been previously growing and thickening of the distal descending and sigmoid colon with perhaps a small right iliac borderline 1 cm node.  Symptomatically improved post radiation.  For now we will hold on Avastin FOLFOX as the benefit of such is questionable with this histologic subtype and there are no actionable molecular targets.  Repeat CTs prior to return in 3 months.    -11/1/2022 [facility] medical oncology follow-up: 10/19/2022 CT chest abdomen pelvis with contrast [facility] compared to 7/7/2022 shows no significant change or evidence of progression.  Clinically quiescent.  We will repeat CTs in 6 months, sooner as symptoms dictate.    -5/19/2023 CT chest abdomen pelvis  [facility] [facility] compared to 10/19/2022 showed no evidence of disease progression in the chest.  Nodular density adjacent to the descending colon 6 mm nonspecific.  Otherwise unchanged appearance of hepatic metastatic nodules, right iliac adenopathy, and right pelvic mass.    -5/23/2023 [facility] medical oncology follow-up: I reviewed his report of CT chest abdomen pelvis from [place] 4 days ago showing no evidence of progression.  So area near the descending colon is nonspecific and would not change anything at this junction.  His October CT had not changed from July and the current one has not changed significantly since October.  We will continue to see palliative care for his right pelvic pain.  If leg swelling significantly worsens we could check Doppler and assuming no clot consider vascular intervention but as the thigh has not significantly changed over time nor is there significant tenderness in the calf I am doubtful that this is clot and is likely just due to the pelvic mass.  Vascular surgical intervention should swelling worsen is also an option but I would not want a stent placed through this area in the face malignancy particularly a mucinous variety that would make him prone towards clotting.  If the leg worsens I would get his Doppler and consider vascular intervention or anticoagulation as dictated but clinically stable so will not investigate further at this junction.  I will repeat his imaging again in 6 months.    -11/20/2023 CT chest, abdomen and pelvis shows stable appearances of the chest with no evidence of metastatic disease.  Abdomen and pelvis reveals small interval decrease in size of the previously demonstrated peritoneal nodule adjacent to the descending colon.  No new suspicious omental/peritoneal soft tissue nodules or masses.  Stable right iliac adenopathy as well as a soft tissue mass along the right external iliac vasculature along the right pelvic sidewall.  -11/29/2023 [facility]  Oncology clinic follow-up: Current CT chest, abdomen and pelvis shows stable findings, no evidence of progression.  He has no new worrisome symptoms however he does still have swelling in his right thigh and lower pelvic pain for which she sees palliative care.  He states that it seems slightly more pronounced over the last few weeks, he has a follow-up with palliative care and will discuss further with him.  We reviewed recommendations discussed at his last visit with Dr. Delcid that as long as the swelling did not worsen significantly then we would hold off on considering any vascular intervention.  We will continue to monitor, Jose Antonio understands to let us know if he has any changes otherwise we will plan on repeating CT chest, abdomen and pelvis prior to return in 6 months and I have ordered those today.  His blood pressure was running high on arrival today, I did recheck it manually and it was 150/100.  He reports that he took his blood pressure earlier today at home and it was 140s over 85 or so.  I asked him to recheck when he got home and monitor and if his diastolic continued to run over 90 to get in with Dr. Dial for evaluation.    - 5/20/2023 CT chest abdomen pelvis [facility] compared to November shows 10 mm nodule left lower lobe and right lower lobe.  Anterior lobe pleural-based nodule unchanged.  6.1 x 4 cm right pelvic sidewall stable.  12 mm right common iliac node stable.  No ascites.    -5/28/2024 [facility] oncology clinic follow-up: No signs or symptoms of recurrence.  3 times in the last 6 months he had a 6-hour period of crampy abdominal pain that subsequently subsided.  Potentially could have been having some obstructive symptomatology but none on imaging.  If this happens more frequently or it lasts he will let us know and also Dr. Peoples but would also go towards a more soft diet during those episodes and try FiberCon.  Otherwise imaging of the abdomen is stable but there is a couple of  nodules in the lung that appeared new albeit small.  This is a very low-grade process and systemic therapies are not very effective and he is asymptomatic so we will just repeat CT chest in 3 months.  If this grows then we will consider getting tissue as it would be a little odd for this low-grade process to show up in the lung.    -8/28/2024 [facility] chest with contrast compared to May 2024 shows left lower lobe nodule 16 mm compared to 10 mm with right lower lobe nodule 16 mm previously 10 mm and small subpleural right upper lobe nodule stable most likely benign intrapulmonary node and small stable nodule right lower lobe likely intrapulmonary.  Several small low-density liver lesions likely cysts.    -9/3/2024 [facility] oncology clinic follow-up: No signs or symptoms of recurrence 1 lung nodule in each lung base has grown.  Other nodules stable.  High likelihood for recurrence.  Will get CT abdomen pelvis and then get him to Dr. Santos for consideration of navigational bronchoscopy/Ion biopsy these lesions and might consider CyberKnife for which I will get him to Dr. Moody for opinion and coordination with Dr. Santos pending results of CT abdomen pelvis.  If on the other hand his CT abdomen pelvis shows additional sites of progression beyond the lung then we will likely go back to systemic therapy of which he has very little likelihood to respond but I would like to get tissue for molecular testing.     -9/24/2024 [facility] abdominal pelvic CT with contrast compared to May 2024 showed continued decrease in size of hepatic fat necrosis left lower quadrant and decrease in right common iliac and external iliac adenopathy and no new metastatic disease.    -9/25/2024 robotic bronchoscopy preparative CT chest    -9/26/2024 [facility] hematology oncology follow-up: Reviewed recent CT abdomen with no clear-cut malignancy and continued regression fat necrosis left lower quadrant with decreasing  adenopathy in the right common and external iliac nodes.  CT chest done yesterday has not been read but is a noncontrast robotic preparative CT to be used by Dr. Santos for navigational bronchoscopy on October 2.  Will get him back to me around October 10 and Dr. Moody in the interim for opinion as to CyberKnife particularly if paucimetastatic malignancy proven.    - 10/2/2024 bronchoscopy Dr. Santos Ion robot platform biopsy 2 cm nodule middle one third of the left lower lobe.  FNA left lower lobe, bronchial brush, transbronchial needle aspirate right lower lobe and right lower lobe brush all show adenocarcinoma consistent with prior pathology.  Left lower lobe lung biopsy and right lower lobe lung biopsy both show adenocarcinoma with extensive extracellular mucin CDX2 strongly positive.  Negative for CK7 and patchy CK20 positivity.  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 Moody plans to treat right lung metastasis [address] in 3 fractions and then pending tolerance will reevaluate shortly thereafter for treatment to the left lung.    -10/10/2024 [facility] hematology oncology follow-up: I reviewed with him the bronchoscopy and path reports and the note from Dr. Moody.  I will have him see my nurse practitioner back in 6 weeks to make sure he is having no new symptoms and at that point she will order a follow-up CT chest abdomen pelvis about 8 weeks out from the end of radiation.  I will get [facility]is MI profile on the lung biopsy.    -10/27/2024 [facility] MI profile: KRAS pathogenic variant Exon 2/p.G12D therefore lack of benefit to cetuximab or panitumumab.  PD-L1 negative, 0%.  No actionable biomarkers identified.  Microsatellite stable, TMB  low.   Genes with pathogenic or likely pathogenic alterations: ANNA, CDKN2A, CREBBP, KRAS.Potential clinical trials available, see full report.    -11/8/2024 completed radiation to the left lung  -12/6/2024 completed radiation to the right lung    -12/12/2024 [facility] follow-up: completed radiation to both lungs, left lung 11/8/2024 and more recently completed radiation to the right lung 12/6/2024.  He had some fatigue but is starting to feel better.  I have ordered restaging CT scans about 8 weeks out from his completion of radiation which will put this late January.  He has a follow-up with radiation oncology on 1/10/2024.  We will see him back after his CT scans.    -1/29/2025 CT chest abdomen and pelvis shows stable right lower lobe nodule and slightly decreased left lower lobe nodule.  Stable iliac adenopathy in the pelvis.  No new sites of disease.    -2/4/2025 [facility] follow-up: Good response to radiation.  Feeling fit.  Will repeat imaging in mid May     Mucinous adenocarcinoma of appendix   12/5/2017 Initial Diagnosis    Mucinous adenocarcinoma of appendix found at the time of appendectomy 12/2017 in the face of appendicitis.  Pathologic stage IIa with T3 extension into the base of the appendix through the muscularis propria but not into the serosal surface.  16 nodes negative.  Colonoscopy December 2017 negative postop     12/5/2017 Surgery    Surgery       Procedure:  Appendectomy and right hemicolectomy      Completeness of resection:  No evidence of residual tumor     Pathology revealed invasive moderately differentiated mucinous adenocarcinoma.  Right hemicolectomy: Benign colon and small intestine no evidence of carcinoma.  16 benign lymph nodes, 0/16, negative for dysplasia or malignancy.  Tumor size approximately 6 cm.  Grade 2, moderately differentiated.  Tumor invades through the muscularis profile into the base of appendix but does not extend to the serosal surface.   All margins uninvolved, no lymphovascular invasion identified.  Pathological stage pT3 pN0.         12/8/2017 Imaging    CT of the chest abdomen and pelvis negative.  Baseline CBC WBC 7100, hemoglobin 11.3, hematocrit 34.8%, platelet count 195,000.     3/20/2018 Procedure    Colonoscopy with Dr. Davidson was normal, recommendation for repeat surveillance colonoscopy in 3 years.     6/11/2018 Imaging    CT chest, abdomen and pelvis with no evidence metastatic disease.  CEA 1.1     9/24/2018 Imaging    CT abdomen pelvis showed no acute changes and nothing to suggest recurrence     12/28/2018 Imaging    CT chest, abdomen and pelvis negative. Normal CBC, CMP and CEA 0.7.     6/28/2019 Imaging    CT chest, abdomen and pelvis: No interval change from prior studies.  No recurrent or metastatic disease detected in the chest, abdomen or pelvis.  No acute process.  CEA 0.9     12/30/2019 Imaging    CT chest, abdomen and pelvis: No disease in the chest.  There was noted a subtle soft tissue density, one surrounding surgical clips in the right side of the pelvis and the other a soft tissue nodule intimately associated with the anterior wall of the bladder, which are considered suspicious for recurrent disease.  CEA 1.1.  CMP with normal creatinine 0.9, total bilirubin 1.9, AST 34, ALT 24, alkaline phosphatase 93.  CBC with WBC 6900, hemoglobin 15.9, platelet count 232,000.       1/21/2020 Procedure    Normal colonoscopy with Dr. Davidson.  He has made referral to Dr. Portillo Peoples at .     2/7/2020 Progression    12/30/2019 CT chest, abdomen and pelvis: No disease in the chest.  There are subtle soft tissue densities (1 surrounding surgical clips in the right side of the pelvis and the other a soft tissue nodule intimately associated with the anterior wall of the bladder) which are considered suspicious for recurrent disease.  CMP with normal creatinine 0.9, total bilirubin 1.9, AST 34, ALT 24, alkaline phosphatase 93.  CBC with  WBC 6900, hemoglobin 15.9, platelet count 232,000.  CEA 1.1.  2/7/2020 diagnostic laparoscopy with peritoneal biopsies performed at the [facility] by Dr. Peoples showed no sign of diffuse peritoneal carcinomatosis or liver metastasis.  There was a firm nodule in the superior dome of the bladder, adherent to omentum, as well as right pelvic sidewall nodule adjacent to surgical clips.  Biopsy of bladder dome nodule showed adenocarcinoma with mucinous features.     2/25/2020 -  Chemotherapy    OP COLORECTAL FOLFIRI Irinotecan / Leucovorin / Fluorouracil     5/29/2020 Imaging    CT chest abdomen pelvis shows slight improvement with decreased nodule anterolateral margin of urinary bladder with stable nodularity of the right lower quadrant adjacent to surgical clips and no progressive disease.  Slight hyperemia of the colonic mucosa     6/18/2020 Surgery    Surgery       -6/18/2020 Dr. Peoples performed exploratory laparotomy with excision of right lobe liver lesion and left lobe liver lesion, omentectomy, resection of pelvic peritoneal nodules, ileocolectomy with creation of ileal colostomy, hyperthermic intraperitoneal chemotherapy with mitomycin-C at 42 °C for deep tissue penetration for 90 minutes.  Dr. Perdue performed partial cystectomy with right ureteral stent placement     8/5/2020 Imaging     CT abdomen pelvis with contrast shows small soft tissue nodule anterior aspect of bladder stable.  New small amount of ascites as well as a new small left pleural effusion.  Creatinine 0.75 with hemoglobin 11.1 otherwise unremarkable CBC and CMP     2/15/2021 Imaging    CT abdomen pelvis with contrast compared to 8/5/2020 shows enlarging soft tissue mass 4.2 cm over the bladder fundus and there is a calcification along the base of the urinary bladder.  Small stable multiple hypoattenuating indeterminate liver lesions.  Similar degree of ascites.     2/23/2021 Imaging    -2/23/2021 CT chest shows no evidence of metastasis  with ascending aorta 42 mm.     4/22/2021 Surgery    -4/22/2021 cystoscopy (after prior office cystoscopy showed large fungating mass) with bilateral ureteral catheters, exploratory laparotomy, lysis of adhesions, right pelvic sidewall excisional biopsy, open partial cystectomy Dr. Ike Perdue. Peritoneal fluid showed predominant inflammatory reactive mesothelial cells with no malignancy. 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 Carcinoma was less than 1 mm from the lateral resection margins.     6/7/2021 Imaging    CT chest, abdomen and pelvis: No evidence of metastatic disease within the chest abdomen or pelvis.  Interval resection of enhancing bladder wall mass a small amount of residual enhancing soft tissue, possibly granulation tissue.  Interval resolution of abdominal and pelvic ascites.  Stable dilation of the ascending aorta mid segment measuring up to 41 mm.     9/10/2021 Imaging    CT chest abdomen pelvis with contrast shows under distended urinary bladder with mild asymmetric wall thickening dome and left lateral wall with a small 9 mm polypoid lesion in the bladder.  There is stable 2.3 cm right external iliac and a few other subcentimeter scattered nodes in the root of the small bowel mesentery and retroperitoneum.  Postsurgical right hemicolectomy changes.  Stable low-attenuation lesions in the liver unchanged.     11/24/2021 Imaging    CT chest abdomen pelvis compared to 6/7/2021 outside imaging shows stable 4.1 cm ascending thoracic aorta.  No lung nodules.  Liver homogenous with bilateral cysts.  No adenopathy.  Thickened sigmoid colon and rectum suggestive of mild colitis or postradiation change.  No pelvic adenopathy.  Bony structures degenerative in the spine.  Some soft tissue anterior to the acetabulum on the right and extending along the medial aspect right pelvic sidewall 2.7 x 5.6 may represent  intramuscular process versus adenopathy which could not be ruled out.  No prior study views available.     4/11/2022 - 5/6/2022 Radiation    Radiation OncologyTreatment Course:  Esteban Tse received 5000 cGy in 20 fractions to right pelvis/groin via External Beam Radiation - EBRT.     5/25/2022 Imaging    CT chest abdomen pelvis with contrast at [facility] shows nothing remarkable on the chest.  Stable liver cysts.  Focal soft tissue right iliac region appears to be increasing in size now 7.2 cm previously 3.9 cm with some mass-effect on surrounding structures with several stable inguinal borderline nodes.     10/19/2022 Imaging    - 10/19/2022 CT chest abdomen pelvis with contrast [facility] compared to 7/7/2022 shows no significant change or evidence of progression.     Peritoneal carcinoma   5/14/2020 Initial Diagnosis    Peritoneal carcinoma (HCC)     5/25/2022 Imaging    CT chest abdomen pelvis with contrast at [facility] shows nothing remarkable on the chest.  Stable liver cysts.  Focal soft tissue right iliac region appears to be increasing in size now 7.2 cm previously 3.9 cm with some mass-effect on surrounding structures with several stable inguinal borderline nodes.     Malignant neoplasm metastatic to both lungs   9/12/2024 Initial Diagnosis    Malignant neoplasm metastatic to both lungs     11/6/2024 - 11/8/2024 Radiation    Radiation OncologyTreatment Course:  Esteban Tse received 5400 cGy in 3 fractions to left lung via Stereotactic Radiation Therapy - SRT.     12/3/2024 - 12/6/2024 Radiation    Radiation OncologyTreatment Course:  Esteban Tse received 5400 cGy in 3 fractions to right lung via Stereotactic Radiation Therapy - SRT.         HISTORY OF PRESENT ILLNESS:  The patient is a 69 y.o. 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 now status post radiation to left lung ending November and  "right lung ending in December with good response on current imaging    Past Medical History:   Diagnosis Date    Aneurysm 2022    Ascending aortic aneurysm     Benign hypertension     Bladder cancer     Colon cancer     History of radiation therapy 05/06/2022    right groin/hemipelvis    Hypercholesteremia 09/18/2023    Hypertension     Intermittent palpitations     Mucinous adenocarcinoma     Pounding heartbeat     PVC's (premature ventricular contractions)      Past Surgical History:   Procedure Laterality Date    APPENDECTOMY  2017    With Partial Colon     BLADDER SURGERY      with partial colon    BRONCHOSCOPY WITH ION ROBOTIC ASSIST N/A 10/02/2024    Procedure: BRONCHOSCOPY NAVIGATION WITH ENDOBRONCHIAL ULTRASOUND AND ION ROBOT;  Surgeon: Levi Santos DO;  Location: [facility] ENDOSCOPY;  Service: Robotics - Pulmonary;  Laterality: N/A;  ION CATH #3  CATH [id number]   [id number]  [id number]  CATH GUIDE ID [id number]    CHOLECYSTECTOMY      COLON SURGERY      COLONOSCOPY      CYBERKNIFE  11/08/2024    LLL lung met    CYBERKNIFE  12/06/2024    RLL lung met       Allergies   Allergen Reactions    Levofloxacin Swelling     Lips swellijng    Tetracycline Swelling     Lips swelling    Penicillins Other (See Comments)     childhood       Family History and Social History reviewed and changed as necessary    REVIEW OF SYSTEM:   No new somatic complaintsIntermittent right hip pain not new.    PHYSICAL EXAM:  No jaundice icterus or pallor.  No respiratory distress.    Vitals:    02/04/25 1440   BP: 133/86   Pulse: 87   Resp: 18   Temp: 98.6 °F (37 °C)   SpO2: 98%   Weight: 78 kg (172 lb)   Height: 185.4 cm (73\")     Vitals:    02/04/25 1440   PainSc:   6   PainLoc: Hip  Comment: right hip only- intermittent          ECOG score: 0           Vitals reviewed.    ECOG: (0) Fully Active - Able to Carry On All Pre-disease Performance Without Restriction    Lab Results   Component Value Date    HGB 14.2 09/25/2024    HCT 42.2 " 09/25/2024    MCV 87.2 09/25/2024     09/25/2024    WBC 6.29 09/25/2024    NEUTROABS 4.15 09/25/2024    LYMPHSABS 1.47 09/25/2024    MONOSABS 0.55 09/25/2024    EOSABS 0.08 09/25/2024    BASOSABS 0.03 09/25/2024       Lab Results   Component Value Date    GLUCOSE 109 (H) 09/25/2024    BUN 11 09/25/2024    CREATININE 1.09 09/25/2024     09/25/2024    K 4.3 09/25/2024     09/25/2024    CO2 29.0 09/25/2024    CALCIUM 9.4 09/25/2024    PROTEINTOT 6.9 09/25/2024    ALBUMIN 4.5 09/25/2024    BILITOT 1.0 09/25/2024    ALKPHOS 103 09/25/2024    AST 25 09/25/2024    ALT 19 09/25/2024             ASSESSMENT & PLAN:  1.  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  Pathologic stage IIa with T3 extension into the base of the appendix through the muscularis propria but not into the serosal surface.  16 nodes negative.  Colonoscopy December 2017 negative postop.Laparoscopic diagnostic peritoneal biopsy confirming disease in the bladder dome February 2020.  Began Folfiri.  June 2020 Dr. Peoples excised right lobe of liver and left lobe liver lesion, omentectomy and resection of pelvic peritoneal nodules, ileocolectomy with ileal colostomy and intraperitoneal HIPEC with mitomycin.  Dr. Lawler performed right ureteral stent placement and partial cystectomy.  With large fungating mass, 4/22/2021 had exploratory laparotomy with lysis of adhesions and right pelvic sidewall excisional biopsy with open partial cystectomy.  No malignancy and peritoneal fluid.  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  Margins less than 1 mm.  October 2021 cystoscopy without malignancy.  TURBT 10/11/2021 did not show malignancy.  March 2022 Dr. Peoples for 3.9 cm external iliac recurrent adenopathy 6 weeks out from laminectomy which did not help pain was determined to be an unresectable recurrence with muscle and vascular involvement.   Radiation with Dr. Moody ended 5/6/2022.  Disease stabilized and pain improved. Paucimetastatic disease progression to the lung biopsy-proven, treated with radiation to both lungs ending 12/6/2024 with good response on 1/29/2025 CT and no new sites of disease.    -12/30/2019 medical oncology office visit: The patient had been on surveillance since surgery December 2017.  CT scans had been negative up until 12/30/2019 CT chest abdomen and pelvis that showed subtle soft tissue density surrounding surgical clips in the right side of the pelvis along with soft tissue nodule at the anterior wall of the bladder concerning for recurrent disease.  Patient sent back to Dr. Davidson for colonoscopy.    -2/7/2020 patient was referred to Dr. Peoples at the [facility], he underwent diagnostic laparoscopic and peritoneal biopsies that confirmed recurrent disease with biopsy of bladder dome nodule showing adenocarcinoma with mucinous features.  Port was placed at this time also.    -2/25/2020 began FOLFIRI    -4/21/2020 patient having increased fatigue, FOLFIRI dose reduced by 10%.    -5/19/2020 cycle 7 FOLFIRI    -6/18/2020 Dr. Peoples performed exploratory laparotomy with excision of right lobe liver lesion and left lobe liver lesion, omentectomy, resection of pelvic peritoneal nodules, ileocolectomy with creation of ileal colostomy, hyperthermic intraperitoneal chemotherapy with mitomycin-C at 42 °C for deep tissue penetration for 90 minutes.  Dr. Perdue performed partial cystectomy with right ureteral stent placement    -8/5/2020 CT abdomen pelvis with contrast shows small soft tissue nodule anterior aspect of bladder stable.  New small amount of ascites as well as a new small left pleural effusion.  Creatinine 0.75 with hemoglobin 11.1 otherwise unremarkable CBC and CMP.    -2/15/2021 CT abdomen pelvis with contrast compared to 8/5/2020 shows enlarging soft tissue mass 4.2 cm over the bladder fundus and there is a  "calcification along the base of the urinary bladder.  Small stable multiple hypoattenuating indeterminate liver lesions.  Similar degree of ascites.    -2/23/2021 CT chest shows no evidence of metastasis with ascending aorta 42 mm.    -3/8/2021 follow-up with Dr. Portillo Peoples we reviewed his CTs suggested team effort resection with Dr. Perdue    -3/16/2021 follow-up with Dr. Perdue.  He plans for open partial cystectomy, possible ureteral implants, cystoscopy and stent placement in concert with Dr. Peoples.    -4/22/2021 cystoscopy (after prior office cystoscopy showed large fungating mass) with bilateral ureteral catheters, exploratory laparotomy, lysis of adhesions, right pelvic sidewall excisional biopsy, open partial cystectomy Dr. Ike Perdue. Peritoneal fluid showed predominant inflammatory reactive mesothelial cells with no malignancy. 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 Carcinoma was less than 1 mm from the lateral resection margins.    -5/18/2021 [facility] medical oncology follow-up visit: I reviewed his hospital notes, operative notes, and pathology report as above and went over this with the patient.  The general consensus from retrospective as well as a few prospective data over the last couple of decades with this low-grade malignancy does not show any profound benefit from \"adjuvant\" chemotherapy in this current setting.  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  There is no standard follow-up but general guidelines suggest following up as typical colon cancer.  I will repeat his CT chest abdomen pelvis now to reestablish his baseline postoperative imaging and have him see my nurse practitioner back in a couple of weeks to make sure there is no nia evidence of measurable residual disease.  Assuming this just shows postoperative changes, I would simply repeat scans again in 3 months or sooner as symptoms dictate.  If he has no " evidence of persistent or metastatic disease on current imaging, then when he sees my nurse practitioner back she will also review what they say about his ascending aorta which was 42 mm in February and sent him to Dr. Evangelist Anthony for an opinion as to whether any intervention is needed relative to the aorta.  Obviously if his cancer is out of control on the upcoming scans then the aorta is a moot point.    -6/10/2021 [facility] oncology clinic follow-up: CT scans show no evidence of disease recurrence in the chest, abdomen or pelvis.  Ascending aortic aneurysm stable at 41 mm.  Referral made to Dr. Anthony, cardiothoracic surgeon for management and evaluation of a sending aortic aneurysm.  Plan to repeat CT scans in 3 months.    -9/14/2021 [facility] oncology follow-up visit: I reviewed images and reports of 9/10/2021 CT chest abdomen pelvis with contrast which shows under distended urinary bladder with mild asymmetric wall thickening dome and left lateral wall with a small 9 mm polypoid lesion in the bladder.  There is stable 2.3 cm right external iliac and a few other subcentimeter scattered nodes in the root of the small bowel mesentery and retroperitoneum.  Postsurgical right hemicolectomy changes.  Stable low-attenuation lesions in the liver unchanged.  I will have him collect his discs from [facility] to take Dr. Perdue to decide whether to proceed with cystoscopy or just continue watchful waiting.  There is no major changes and I suspect we are looking at postoperative changes and we shall see him for video visit in a few weeks to see what urology at  decides.    -10/11/2021 cystoscopy Dr. Perdue showed marked acute and chronic inflammation but no evidence of malignancy.  Muscularis propria present.    -10/14/2021 [facility] medical oncology virtual visit: I reviewed reports of pathology from cystoscopy 10/11/2021 and went over this with patient.  He is feeling fairly fit.  I will repeat his CT chest  abdomen pelvis prior to return in 3 months and if in the interim, when he follows up with Dr. Perdue in the next couple of weeks post cystoscopy, plans are made for further cystoscopy before I see him back and any malignancy is found, I would ask the patient and Dr. Perdue to touch base as I would not know of such without that contact as the information comes into our chart without notification now that epic is being used by .  Assuming no further biopsies in the interim, I will simply repeat his scans in 3 months to follow what I suspected and is now confirmed to be inflammatory changes.  From the aneurysm standpoint, he has follow-up scanning March 2022 arranged by Dr. Anthony and he will follow him up after that.    -11/24/2021 CT chest abdomen pelvis compared to 6/7/2021 outside imaging shows stable 4.1 cm ascending thoracic aorta.  No lung nodules.  Liver homogenous with bilateral cysts.  No adenopathy.  Thickened sigmoid colon and rectum suggestive of mild colitis or postradiation change.  No pelvic adenopathy.  Bony structures degenerative in the spine.  Some soft tissue anterior to the acetabulum on the right and extending along the medial aspect right pelvic sidewall 2.7 x 5.6 may represent intramuscular process versus adenopathy which could not be ruled out.  No prior study views available.    -1/4/2022 follow-up Dr. Ike Perdue [facility].  Per his note, he had TURBT 10/11/2021.  He had been placed on antibiotics for UTI for preop preparation for back surgery planned in 2 weeks.  Denies any mucus in his urine.  Some microhematuria on urinalysis this day.  No gross hematuria or dysuria.  The October 2021 TURBT did not reveal anything malignant on pathology.  There may be irritation or inflammation secondary to sutures in the bladder following partial cystectomy.  Patient asymptomatic from a urinary standpoint.  Recommended following up with [facility] with no plans for further urology  follow-up.    -2/4/2022 [facility] medical oncology telehealth follow-up visit: I reviewed his November images and reports with the patient as well as with Dr. Gm Moody and the note from Dr. Perdue from 1 month ago.  I do suspect this pelvic sidewall abnormality in November represents persistent disease but as I have stated in prior dictations, the data on palliative or overall survival benefit of systemic 5-FU based combination chemotherapy and/or Avastin does not show a clear-cut survival advantage to chemotherapy for asymptomatic disease.  He is having some back pain and had spine decompression a couple of weeks ago without much benefit.  This is an area that may potentially be radiated but I will check his CT chest (angiogram of chest) abdomen and pelvis and get his blood counts and chemistries and urinalysis and see him back for virtual visit in a few weeks.  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  I will also converse with Dr. Peoples at that junction whether he thinks he has anything to offer if this is progressing though I doubt it given that the last intervention was a urologic procedure and the last cystoscopy/TURBT in October had no malignancy and the liver lesions are currently being called liver cysts albeit I am skeptical as to whether those are truly benign but they are certainly not growing.    -3/4/2022 CT angiogram of chest/CT abdomen pelvis for evaluation of dizziness and surveillance of thoracic aortic aneurysm and appendiceal carcinoma.  Thoracic ascending aortic ectasia 4 x 4 cm stable.  Small nodularity left upper lobe stable from September.  Stable small  "hepatic hypodensities status post cholecystectomy.  Right external iliac soft tissue fullness now 3 x 3.9 cm previously 2 x 2.3 cm concerning for enlarging adenopathy with symmetric nonenlarged inguinal nodes stable.  CBC unremarkable.  CMP unremarkable save for potassium 5.4.  CEA 18.2.  1-3 red cells and white cells per milliliter of urine.    -3/16/2022 office visit with Dr. Portillo Peoples.  He reviewed the CT from 3/4/2022.  He notes that the laminectomy from 6 weeks prior has not helped his \"hip pain\".  Given the location of this recurrence previously 2 x 2.3 cm now 3 x 3.9 cm in the external iliac area concerning for enlarging adenopathy with symmetric nonenlarged inguinal nodes stable, the mass appears unresectable with involvement of muscle and vasculature.    -3/24/2022 [facility] medical oncology follow-up virtual visit: Pain is still significant.  We will get him in hopefully in the next day to [facility] for palliative care.  Have spoken with Dr. Peoples and no surgery.  I have spoken with Dr. Moody who thinks palliative radiation while not likely to shrink tumor dramatically may help pain considerably and we will get him there.  We will get him to bring the discs to our [place] office and asked them to bring that back to [place] to get scanned in for our invasive radiologist to look at for us to get CT-guided biopsy of this node in the right lower quadrant and send that for Gab miguel once the pathology is obtained to hopefully find high tumor mutational burden or other molecular targets that might give us some options.  Apart from that, as stated multiple times, this is not a highly chemotherapy sensitive tumor and I am not sure I would palliate him well but, when I see him back in early June with CAT scans prior to return and post radiation, if he is not getting relief and wants to try a 5-FU based regimen plus or minus Avastin I would be okay with that but he knows it does not alter survival 1 " way or the other and we are simply trying to palliate this inexorable process.  No other surgical options.    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  Mismatch repair proficient,NTRK1/2/3 fusion not detected.  BRAF V600E negative.  PD-L1 Negative, 1+, 5%.  PTEN Positive, 1+, 100%. MLH1 positive/3+, 100%. MSH2 positive/3+, 100%. MSH6 positive/3+, 100%. PMS2 positive/3+,100%.    -5/6/2022 last radiation    - 5/25/2022 CT chest abdomen pelvis with contrast at [facility] shows nothing remarkable on the chest.  Stable liver cysts.  Focal soft tissue right iliac region appears to be increasing in size now 7.2 cm previously 3.9 cm with some mass-effect on surrounding structures with several stable inguinal borderline nodes.    -6/6/2022 [facility] medical oncology follow-up visit: Now 1 month out from radiation just starting to get a little bit of improvement in his abdominal discomfort.  Slight growth on CT but some of that may be postradiation change this close to radiation and, as I stated in my note in March, it is not clear that 5-FU based therapy plus or minus Avastin is going to palliate much given its relatively insensitivity to chemotherapy and it certainly does not improve survival.  To that end, we will plan on just repeating his CAT scans again in about 6 weeks and if the pain is worsening and/or this continues to grow then I will probably give him Avastin FOLFOX.  If everything is stable and pain is controlled we will go continue watchful waiting with imaging about every 3 months from that point forward.    -7/11/2022 [facility] medical oncology follow-up: His 7/7/2022 CT chest abdomen pelvis showed stable right external iliac low-attenuation which had been previously growing and thickening of the distal descending and sigmoid colon with perhaps a small right iliac borderline 1 cm node.  Symptomatically improved post  radiation.  For now we will hold on Avastin FOLFOX as the benefit of such is questionable with this histologic subtype and there are no actionable molecular targets.  Repeat CTs prior to return in 3 months.    -11/1/2022 [facility] medical oncology follow-up: 10/19/2022 CT chest abdomen pelvis with contrast [facility] compared to 7/7/2022 shows no significant change or evidence of progression.  Clinically quiescent.  We will repeat CTs in 6 months, sooner as symptoms dictate.    -5/19/2023 CT chest abdomen pelvis [facility] compared to 10/19/2022 showed no evidence of disease progression in the chest.  Nodular density adjacent to the descending colon 6 mm nonspecific.  Otherwise unchanged appearance of hepatic metastatic nodules, right iliac adenopathy, and right pelvic mass.    -5/23/2023 [facility] medical oncology follow-up: I reviewed his report of CT chest abdomen pelvis from [facility] 4 days ago showing no evidence of progression.  So area near the descending colon is nonspecific and would not change anything at this junction.  His October CT had not changed from July and the current one has not changed significantly since October.  We will continue to see palliative care for his right pelvic pain.  If leg swelling significantly worsens we could check Doppler and assuming no clot consider vascular intervention but as the thigh has not significantly changed over time nor is there significant tenderness in the calf I am doubtful that this is clot and is likely just due to the pelvic mass.  Vascular surgical intervention should swelling worsen is also an option but I would not want a stent placed through this area in the face malignancy particularly a mucinous variety that would make him prone towards clotting.  If the leg worsens I would get his Doppler and consider vascular intervention or anticoagulation as dictated but clinically stable so will not investigate further at this junction.  I will  repeat his imaging again in 6 months.    -11/20/2023 CT chest, abdomen and pelvis shows stable appearances of the chest with no evidence of metastatic disease.  Abdomen and pelvis reveals small interval decrease in size of the previously demonstrated peritoneal nodule adjacent to the descending colon.  No new suspicious omental/peritoneal soft tissue nodules or masses.  Stable right iliac adenopathy as well as a soft tissue mass along the right external iliac vasculature along the right pelvic sidewall.  -11/29/2023 [facility] Oncology clinic follow-up: Current CT chest, abdomen and pelvis shows stable findings, no evidence of progression.  He has no new worrisome symptoms however he does still have swelling in his right thigh and lower pelvic pain for which she sees palliative care.  He states that it seems slightly more pronounced over the last few weeks, he has a follow-up with palliative care and will discuss further with him.  We reviewed recommendations discussed at his last visit with Dr. Delcid that as long as the swelling did not worsen significantly then we would hold off on considering any vascular intervention.  We will continue to monitor, Jose Antonio understands to let us know if he has any changes otherwise we will plan on repeating CT chest, abdomen and pelvis prior to return in 6 months and I have ordered those today.  His blood pressure was running high on arrival today, I did recheck it manually and it was 150/100.  He reports that he took his blood pressure earlier today at home and it was 140s over 85 or so.  I asked him to recheck when he got home and monitor and if his diastolic continued to run over 90 to get in with Dr. Dial for evaluation.    - 5/20/2023 CT chest abdomen pelvis [facility] compared to November shows 10 mm nodule left lower lobe and right lower lobe.  Anterior lobe pleural-based nodule unchanged.  6.1 x 4 cm right pelvic sidewall stable.  12 mm right common iliac node stable.   No ascites.    -5/28/2024 [facility] oncology clinic follow-up: No signs or symptoms of recurrence.  3 times in the last 6 months he had a 6-hour period of crampy abdominal pain that subsequently subsided.  Potentially could have been having some obstructive symptomatology but none on imaging.  If this happens more frequently or it lasts he will let us know and also Dr. Peoples but would also go towards a more soft diet during those episodes and try FiberCon.  Otherwise imaging of the abdomen is stable but there is a couple of nodules in the lung that appeared new albeit small.  This is a very low-grade process and systemic therapies are not very effective and he is asymptomatic so we will just repeat CT chest in 3 months.  If this grows then we will consider getting tissue as it would be a little odd for this low-grade process to show up in the lung.    -8/28/2024 [facility] chest with contrast compared to May 2024 shows left lower lobe nodule 16 mm compared to 10 mm with right lower lobe nodule 16 mm previously 10 mm and small subpleural right upper lobe nodule stable most likely benign intrapulmonary node and small stable nodule right lower lobe likely intrapulmonary.  Several small low-density liver lesions likely cysts.    -9/3/2024 [facility] oncology clinic follow-up: No signs or symptoms of recurrence 1 lung nodule in each lung base has grown.  Other nodules stable.  High likelihood for recurrence.  Will get CT abdomen pelvis and then get him to Dr. Santos for consideration of navigational bronchoscopy/Ion biopsy these lesions and might consider CyberKnife for which I will get him to Dr. Moody for opinion and coordination with Dr. Santos pending results of CT abdomen pelvis.  If on the other hand his CT abdomen pelvis shows additional sites of progression beyond the lung then we will likely go back to systemic therapy of which he has very little likelihood to respond but I would like to get tissue for  molecular testing.     -9/24/2024 [facility] abdominal pelvic CT with contrast compared to May 2024 showed continued decrease in size of hepatic fat necrosis left lower quadrant and decrease in right common iliac and external iliac adenopathy and no new metastatic disease.    -9/25/2024 robotic bronchoscopy preparative CT chest    -9/26/2024 [facility] hematology oncology follow-up: Reviewed recent CT abdomen with no clear-cut malignancy and continued regression fat necrosis left lower quadrant with decreasing adenopathy in the right common and external iliac nodes.  CT chest done yesterday has not been read but is a noncontrast robotic preparative CT to be used by Dr. Santos for navigational bronchoscopy on October 2.  Will get him back to me around October 10 and Dr. Moody in the interim for opinion as to CyberKnife particularly if paucimetastatic malignancy proven.    - 10/2/2024 bronchoscopy Dr. Santos Ion robot platform biopsy 2 cm nodule middle one third of the left lower lobe.  FNA left lower lobe, bronchial brush, transbronchial needle aspirate right lower lobe and right lower lobe brush all show adenocarcinoma consistent with prior pathology.  Left lower lobe lung biopsy and right lower lobe lung biopsy both show adenocarcinoma with extensive extracellular mucin CDX2 strongly positive.  Negative for CK7 and patchy CK20 positivity.  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 Moody plans to treat right lung metastasis 54 [address] in 3 fractions and then pending tolerance will reevaluate shortly thereafter for treatment to the left lung.    -10/10/2024 [facility] hematology oncology follow-up: I reviewed with him the bronchoscopy  and path reports and the note from Dr. Moody.  I will have him see my nurse practitioner back in 6 weeks to make sure he is having no new symptoms and at that point she will order a follow-up CT chest abdomen pelvis about 8 weeks out from the end of radiation.  I will get Gab HERNANDEZ profile on the lung biopsy.    -10/27/2024 [facility] profile: KRAS pathogenic variant Exon 2/p.G12D therefore lack of benefit to cetuximab or panitumumab.  PD-L1 negative, 0%.  No actionable biomarkers identified.  Microsatellite stable, TMB low.   Genes with pathogenic or likely pathogenic alterations: ANNA, CDKN2A, CREBBP, KRAS.Potential clinical trials available, see full report.    -11/8/2024 completed radiation to the left lung  -12/6/2024 completed radiation to the right lung    -12/12/2024 [facility] oncology clinic follow-up: completed radiation to both lungs, left lung 11/8/2024 and more recently completed radiation to the right lung 12/6/2024.  He had some fatigue but is starting to feel better.  I have ordered restaging CT scans about 8 weeks out from his completion of radiation which will put this late January.  He has a follow-up with radiation oncology on 1/10/2024.  We will see him back after his CT scans.    -1/29/2025 CT chest abdomen and pelvis shows stable right lower lobe nodule and slightly decreased left lower lobe nodule.  Stable iliac adenopathy in the pelvis.  No new sites of disease.    -2/4/2025 [facility] oncology clinic follow-up: Good response to radiation.  Feeling fit.  Will repeat imaging in mid May    Total time of care today inclusive of time spent today prior to patient's arrival reviewing interval data and during visit translating to patient putting forth a plan as outlined above took 40 minutes patient care time throughout the day today  Cy Delcid MD    02/04/2025

## 2025-02-10 ENCOUNTER — OFFICE VISIT (OUTPATIENT)
Dept: CARDIOLOGY | Facility: CLINIC | Age: 70
End: 2025-02-10
Payer: MEDICARE

## 2025-02-10 VITALS
RESPIRATION RATE: 16 BRPM | DIASTOLIC BLOOD PRESSURE: 84 MMHG | HEIGHT: 73 IN | HEART RATE: 78 BPM | OXYGEN SATURATION: 98 % | BODY MASS INDEX: 22.93 KG/M2 | WEIGHT: 173 LBS | SYSTOLIC BLOOD PRESSURE: 158 MMHG | TEMPERATURE: 96 F

## 2025-02-10 DIAGNOSIS — E78.00 HYPERCHOLESTEREMIA: ICD-10-CM

## 2025-02-10 DIAGNOSIS — I71.20 THORACIC AORTIC ANEURYSM WITHOUT RUPTURE, UNSPECIFIED PART: ICD-10-CM

## 2025-02-10 DIAGNOSIS — I10 ESSENTIAL HYPERTENSION: Primary | ICD-10-CM

## 2025-02-10 PROCEDURE — 1160F RVW MEDS BY RX/DR IN RCRD: CPT | Performed by: INTERNAL MEDICINE

## 2025-02-10 PROCEDURE — 93000 ELECTROCARDIOGRAM COMPLETE: CPT | Performed by: INTERNAL MEDICINE

## 2025-02-10 PROCEDURE — 99214 OFFICE O/P EST MOD 30 MIN: CPT | Performed by: INTERNAL MEDICINE

## 2025-02-10 PROCEDURE — 1159F MED LIST DOCD IN RCRD: CPT | Performed by: INTERNAL MEDICINE

## 2025-02-10 PROCEDURE — 3079F DIAST BP 80-89 MM HG: CPT | Performed by: INTERNAL MEDICINE

## 2025-02-10 PROCEDURE — 3077F SYST BP >= 140 MM HG: CPT | Performed by: INTERNAL MEDICINE

## 2025-02-10 RX ORDER — AMLODIPINE BESYLATE 5 MG/1
5 TABLET ORAL NIGHTLY
Qty: 90 TABLET | Refills: 3 | Status: SHIPPED | OUTPATIENT
Start: 2025-02-10

## 2025-02-10 NOTE — PROGRESS NOTES
[facility]  [facility] CARDIOLOGY  [address] [place] [address]  Dept: [phone]  Dept Fax: [phone]    Esteban Tse  1955    Follow Up Office Visit Note    History of Present Illness:  Esteban Tse is a 69 y.o. male who presents to the clinic for Follow-up. Hypertension- He has not been taking his med daily , has noticed that his BP was elevated, he restarted taking last week on Lisinopril 20 mg, Toprol xl 100 mg and also Amlodipine 5mg, he denies any CP, SOB, palpitations, EKG sinus HR 66.  His cardiac exam is normal advised to come back in 1 month to recheck BP might need higher dose Lisinopril and Amlodipine,     The following portions of the patient's history were reviewed and updated as appropriate: allergies, current medications, past family history, past medical history, past social history, past surgical history, and problem list.    Medications:  amLODIPine  dronabinol  gabapentin  lisinopril  metoclopramide  metoprolol succinate XL  naloxone  omeprazole  oxyCODONE tablet  rosuvastatin    Subjective  Allergies   Allergen Reactions   • Levofloxacin Swelling     Lips swellijng   • Tetracycline Swelling     Lips swelling   • Penicillins Other (See Comments)     childhood        Past Medical History:   Diagnosis Date   • Aneurysm 2022   • Ascending aortic aneurysm    • Benign hypertension    • Bladder cancer    • Colon cancer    • History of radiation therapy 05/06/2022    right groin/hemipelvis   • Hypercholesteremia 09/18/2023   • Hypertension    • Intermittent palpitations    • Mucinous adenocarcinoma    • Pounding heartbeat    • PVC's (premature ventricular contractions)        Past Surgical History:   Procedure Laterality Date   • APPENDECTOMY  2017    With Partial Colon    • BLADDER SURGERY      with partial colon   • BRONCHOSCOPY WITH ION ROBOTIC ASSIST N/A 10/02/2024    Procedure: BRONCHOSCOPY NAVIGATION WITH ENDOBRONCHIAL ULTRASOUND  "AND ION ROBOT;  Surgeon: Levi Santos DO;  Location: [facility] ENDOSCOPY;  Service: Robotics - Pulmonary;  Laterality: N/A;  [facility] CATH #3  CATH [id number]   [id number]  [id number]  CATH GUIDE ID [id number]   • CHOLECYSTECTOMY     • COLON SURGERY     • COLONOSCOPY     • CYBERKNIFE  11/08/2024    LLL lung met   • CYBERKNIFE  12/06/2024    RLL lung met       Family History   Problem Relation Age of Onset   • Lung cancer Mother    • Stroke Mother    • Hypertension Mother    • Heart disease Father    • Hypertension Father         Social History     Socioeconomic History   • Marital status: Single   • Number of children: 0   Tobacco Use   • Smoking status: Never   • Smokeless tobacco: Never   Vaping Use   • Vaping status: Never Used   Substance and Sexual Activity   • Alcohol use: Yes     Alcohol/week: 1.0 standard drink of alcohol     Types: 1 Cans of beer per week     Comment: 1 dring weekly   • Drug use: Never   • Sexual activity: Not Currently       Review of Systems   Constitutional: Negative.    HENT: Negative.     Respiratory: Negative.     Cardiovascular: Negative.    Endocrine: Negative.    Genitourinary: Negative.    Musculoskeletal: Negative.    Skin: Negative.    Allergic/Immunologic: Negative.    Neurological: Negative.    Hematological: Negative.    Psychiatric/Behavioral: Negative.       Cardiovascular Procedures    ECHO/MUGA:  STRESS TESTS:   CARDIAC CATH:   DEVICES:   HOLTER:   CT/MRI:   VASCULAR:   CARDIOTHORACIC:     Objective  Vitals:    02/10/25 1550   BP: 158/84   BP Location: Right arm   Patient Position: Sitting   Cuff Size: Adult   Pulse: 78   Resp: 16   Temp: 96 °F (35.6 °C)   TempSrc: Infrared   SpO2: 98%   Weight: 78.5 kg (173 lb)   Height: 185.4 cm (73\")   PainSc: 0-No pain     Body mass index is 22.82 kg/m².     Physical Exam  Vitals reviewed.   Constitutional:       Appearance: Healthy appearance. Not in distress.   Eyes:      Pupils: Pupils are equal, round, and reactive to light.   HENT:    " Mouth/Throat:      Pharynx: Oropharynx is clear.   Neck:      Thyroid: Thyroid normal.      Vascular: No JVR. JVD normal.   Pulmonary:      Effort: Pulmonary effort is normal.      Breath sounds: Normal breath sounds. No wheezing. No rhonchi. No rales.   Chest:      Chest wall: Not tender to palpatation.   Cardiovascular:      PMI at left midclavicular line. Normal rate. Regular rhythm. Normal S1. Normal S2.       Murmurs: There is no murmur.      No gallop.  No click. No rub.   Pulses:     Intact distal pulses.      Carotid: 3+ bilaterally.     Radial: 3+ bilaterally.     Femoral: 3+ bilaterally.     Dorsalis pedis: 3+ bilaterally.     Posterior tibial: 3+ bilaterally.  Edema:     Peripheral edema absent.   Abdominal:      General: Bowel sounds are normal.      Palpations: Abdomen is soft.      Tenderness: There is no abdominal tenderness.   Musculoskeletal: Normal range of motion.         General: No tenderness.      Cervical back: Normal range of motion and neck supple. Skin:     General: Skin is warm and dry.   Neurological:      General: No focal deficit present.      Mental Status: Alert and oriented to person, place and time.        Diagnostic Data    ECG 12 Lead    Date/Time: 2/10/2025 4:41 PM  Performed by: Alli Dial MD    Authorized by: Alli Dial MD  Comparison: compared with previous ECG from 9/12/2024  Similar to previous ECG  Rhythm: sinus rhythm  Rate: normal  BPM: 66  QRS axis: normal    Clinical impression: normal ECG        Assessment and Plan  Diagnoses and all orders for this visit:    Essential hypertension- BP is 145.80 on Amlodipine 5 mg, Lisinopril 20 mg and also Toprol xl 100 mg,  he just restart last week the lisinopril, will ask him to come back in 1 month to recheck BP and I think might need higher dose of Lisinopril and Amlodipine    Hypercholesteremia- On Crestor 5 mg , tolerates well, he has not been taking it also     Thoracic aortic aneurysm without rupture,  unspecified part- Last CT 4,2 cm ascending aneurysm, need lower Bp in the 115,120    Other orders  -     amLODIPine (NORVASC) 5 MG tablet; Take 1 tablet by mouth Every Night.         Return in about 6 months (around 8/10/2025) for Recheck with Dr. Dial.    Alli Dial MD  02/10/2025

## 2025-02-11 ENCOUNTER — CLINICAL SUPPORT (OUTPATIENT)
Dept: RADIATION ONCOLOGY | Facility: HOSPITAL | Age: 70
End: 2025-02-11
Payer: MEDICARE

## 2025-02-11 ENCOUNTER — HOSPITAL ENCOUNTER (OUTPATIENT)
Dept: RADIATION ONCOLOGY | Facility: HOSPITAL | Age: 70
Setting detail: RADIATION/ONCOLOGY SERIES
Discharge: HOME | End: 2025-02-11
Payer: MEDICARE

## 2025-02-11 DIAGNOSIS — C78.01 MALIGNANT NEOPLASM METASTATIC TO BOTH LUNGS: Primary | ICD-10-CM

## 2025-02-11 DIAGNOSIS — C78.02 MALIGNANT NEOPLASM METASTATIC TO BOTH LUNGS: Primary | ICD-10-CM

## 2025-02-11 NOTE — PROGRESS NOTES
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  Total time of discussion was <10 minutes.  The patient has given verbal consent.      BRIEF HISTORY:  Esteban Tse is a very pleasant 69 y.o. male presenting by phone for scheduled follow up visit regarding his recurrent and metastatic low-grade appendiceal carcinoma.  Prior treatment history includes surgical resection and chemotherapy, in addition to previous course of involved field radiation therapy targeting a recurrent right pelvic sidewall mass consisting of a conglomerate of external iliac and inguinal lymph nodes, completing 2022.  More recently, he was found on surveillance imaging to have interval development of 2 pulmonary nodules in the left lower lobe and right lower lobe of lung which represent his only known sites of active disease.  Bronchoscopy and navigational biopsy identified both of these nodules to be metastatic adenocarcinoma with mucinous features, compatible with his appendiceal primary.  The patient underwent a course of CyberKnife SBRT to a dose of 54 Gy in 3 fractions delivered to the left lower lobe lung tumor, completing 2024.  Sequentially, he underwent a course of CyberKnife SBRT to a dose of 54 Gy in 3 fractions delivered to the contralateral right lower lobe lung tumor, completing 2024.  He tolerated treatment well and presents following repeat CT scans performed earlier this month.  From a symptom standpoint, he reports good energy level and is active and independent with ADLs.  He denies any  respiratory complaints.  No dyspnea, cough, hemoptysis, chest or rib pain, or dysphagia.  He denies fever, chills, weight loss.       MEDICATIONS: Medication reconciliation for the patient was reviewed and confirmed in the electronic medical record.    Review of Systems   Musculoskeletal:  Positive for arthralgias.   All other systems reviewed and are negative.          KPS 90%      Physical Exam  Pulmonary:      Respirations even, unlabored. No audible wheezing or cough.  Neurological:      A+Ox4, conversant, answers questions appropriately.  Psychiatric:     Judgement, affect, and decision-making WNL.    Limited physical exam as visit was conducted remotely via telephone.          The following portions of the patient's history were reviewed and updated as appropriate: allergies, current medications, past family history, past medical history, past social history, past surgical history and problem list.         Diagnoses and all orders for this visit:    1. Malignant neoplasm metastatic to both lungs (Primary)         IMPRESSION:  Mr. Tse is a 69 y.o. gentleman with a known history of recurrent and metastatic appendiceal carcinoma.  He was found recently to have 2 bilateral pulmonary nodules with pathological confirmation to be metastatic mucinous adenocarcinoma.  He is 3 months out from completing CyberKnife SBRT to the nodule in the left lung base and 2 month out from completing CyberKnife SBRT to the nodule in the right lung base.  He tolerated treatment well.  Repeat CT scans of the chest, abdomen pelvis performed 1/29/2025 at [facility] demonstrate stable size and appearance of the right lower lobe lung nodule and decrease in size of the left lower lobe lung nodule, with evolving consolidative airspace disease surrounding both nodules consistent with treatment related changes.  There is stable iliac adenopathy in the pelvis and no evidence of new or progressive disease.  Dr. Delcid is planning for repeat  surveillance scans in mid-May 2025 which is when we will see the patient back for follow up as well.     RECOMMENDATIONS:  Continue routine oncologic surveillance under the care of Dr. Delcid.  RTC in approximately 3 months, or sooner as needed, for follow up and imaging review.       Return in about 3 months (around 5/15/2025) for Office Visit.    KING Nuñez      I spent a total of 20 minutes on todays visit, with <10 minutes in virtual communication with the patient via telephone, and the remainder of the time spent in reviewing the relevant history, records, available imaging, and for documentation.

## 2025-02-21 DIAGNOSIS — G89.3 CANCER ASSOCIATED PAIN: ICD-10-CM

## 2025-02-21 RX ORDER — GABAPENTIN 600 MG/1
600 TABLET ORAL 3 TIMES DAILY
Qty: 90 TABLET | Refills: 0 | Status: CANCELLED | OUTPATIENT
Start: 2025-02-21 | End: 2025-03-23

## 2025-02-21 NOTE — TELEPHONE ENCOUNTER
YAIMA #: [id number]    Medication requested: gabapentin (NEURONTIN) 600 MG tablet      Last fill date: 1/20/25    Last appointment: 1/29/25    Next appointment: 4/30/25

## 2025-02-25 DIAGNOSIS — G89.3 CANCER ASSOCIATED PAIN: ICD-10-CM

## 2025-02-25 RX ORDER — GABAPENTIN 600 MG/1
600 TABLET ORAL 3 TIMES DAILY
Qty: 90 TABLET | Refills: 0 | Status: SHIPPED | OUTPATIENT
Start: 2025-02-25 | End: 2025-03-27

## 2025-02-25 NOTE — TELEPHONE ENCOUNTER
YAIMA #: [id number]     Medication requested: gabapentin (NEURONTIN) 600 MG tablet       Last fill date: 1/20/25     Last appointment: 1/29/25     Next appointment: 4/30/25

## 2025-03-05 DIAGNOSIS — R11.0 NAUSEA: ICD-10-CM

## 2025-03-05 DIAGNOSIS — G89.3 CANCER RELATED PAIN: ICD-10-CM

## 2025-03-05 RX ORDER — DRONABINOL 5 MG/1
5 CAPSULE ORAL
Qty: 60 CAPSULE | Refills: 0 | Status: SHIPPED | OUTPATIENT
Start: 2025-03-05 | End: 2025-04-06

## 2025-03-05 RX ORDER — METOCLOPRAMIDE 5 MG/1
5 TABLET ORAL
Qty: 30 TABLET | Refills: 0 | Status: SHIPPED | OUTPATIENT
Start: 2025-03-05

## 2025-03-05 NOTE — TELEPHONE ENCOUNTER
YAIMA #: [id number]    Medication requested: dronabinol (Marinol) 5 MG capsule     Last fill date: 1/30/25    Last appointment: 1/29/25    Next appointment: 4/30/25

## 2025-03-05 NOTE — TELEPHONE ENCOUNTER
Medication requested: metoclopramide (REGLAN) 5 MG tablet     Last fill date: 1/29/25    Last appointment: 1/29/25    Next appointment:4/30/25

## 2025-03-05 NOTE — TELEPHONE ENCOUNTER
YAIMA #: [id number]    Medication requested:  oxyCODONE (ROXICODONE) 10 MG tablet     Last fill date: 1/10/25    Last appointment:1/29/25    Next appointment:4/30/25

## 2025-03-06 RX ORDER — OXYCODONE HYDROCHLORIDE 10 MG/1
10 TABLET ORAL EVERY 4 HOURS PRN
Qty: 180 TABLET | Refills: 0 | Status: SHIPPED | OUTPATIENT
Start: 2025-03-06 | End: 2025-04-05

## 2025-03-10 ENCOUNTER — TELEPHONE (OUTPATIENT)
Dept: CARDIOLOGY | Facility: CLINIC | Age: 70
End: 2025-03-10

## 2025-03-10 ENCOUNTER — CLINICAL SUPPORT (OUTPATIENT)
Dept: CARDIOLOGY | Facility: CLINIC | Age: 70
End: 2025-03-10
Payer: MEDICARE

## 2025-03-10 VITALS — DIASTOLIC BLOOD PRESSURE: 74 MMHG | HEART RATE: 84 BPM | SYSTOLIC BLOOD PRESSURE: 116 MMHG

## 2025-03-10 NOTE — PROGRESS NOTES
Pt came  for blood pressure check . Pt said Dr keen wanted him to have it checked in 1 month . No complaints

## 2025-03-10 NOTE — TELEPHONE ENCOUNTER
Pt came  for blood pressure check . Pt said Dr keen wanted him to have it checked in 1 month . No complaints        Per dr kenney he said ok . Pt was told he can keep an list at home

## 2025-03-27 DIAGNOSIS — G89.3 CANCER ASSOCIATED PAIN: ICD-10-CM

## 2025-03-27 NOTE — TELEPHONE ENCOUNTER
YAIMA #: [id number]    Medication requested: gabapentin (NEURONTIN) 600 MG tablet     Last fill date: 2/25/25    Last appointment: 1/29/25    Next appointment: 4/30/25

## 2025-03-28 RX ORDER — GABAPENTIN 600 MG/1
600 TABLET ORAL 3 TIMES DAILY
Qty: 90 TABLET | Refills: 0 | Status: SHIPPED | OUTPATIENT
Start: 2025-03-28 | End: 2025-04-27

## 2025-04-10 DIAGNOSIS — G89.3 CANCER RELATED PAIN: ICD-10-CM

## 2025-04-10 DIAGNOSIS — R11.0 NAUSEA: ICD-10-CM

## 2025-04-10 RX ORDER — DRONABINOL 5 MG/1
5 CAPSULE ORAL
Qty: 60 CAPSULE | Refills: 0 | Status: SHIPPED | OUTPATIENT
Start: 2025-04-10 | End: 2025-05-12

## 2025-04-10 RX ORDER — OXYCODONE HYDROCHLORIDE 10 MG/1
10 TABLET ORAL EVERY 4 HOURS PRN
Qty: 180 TABLET | Refills: 0 | Status: SHIPPED | OUTPATIENT
Start: 2025-04-10 | End: 2025-05-12

## 2025-04-10 NOTE — TELEPHONE ENCOUNTER
YAIMA #: [id number]    Medication requested: dronabinol 5MG    Last fill date:3/5/25    Last appointment: 1/29/25    Next appointment: 4/30/25

## 2025-04-10 NOTE — TELEPHONE ENCOUNTER
YAIMA #: [id number]    Medication requested: Oxycodone 10 MG    Last fill date: 3/6/25    Last appointment: 1/29/25    Next appointment: 4/30/25

## 2025-04-28 NOTE — PROGRESS NOTES
Palliative Clinic Note      Name: Esteban Tse  Age: 69 y.o.  Sex: male  : 1955  MRN: [id number]  Date of Service: 2025   Medical Oncologist: Dr. Delcid   Mode of visit: video-conference  Location of patient: Home  Location of provider: home    Subjective:    Chief Complaint: Increase right hip and leg pain, persistent nausea    History of Present Illness: Esteban Tse is a 69 y.o. male with past medical history significant for  HTN, AAA, metastatic adenocarcinoma of the appendix who presents via video-conference today as a follow up for pain and symptom management.     Treatment summary: The patient was diagnosed with cancer of the appendix at the time of appendectomy in 2017. Patient under surveillance until imaging in 2019 demonstrated several lesions suspicious for recurrent disease. He underwent a diagnostic laparoscopy with peritoneal biopsies on 2020 by Dr. Peoples that proved recurrent adenocarcinoma. He underwent an exploratory laparotomy with excision of right and left lobe liver lesions, omentectomy, resection of pelvic peritoneal nodules, ileocolectomy with creation of ileal colostomy, hyperthermic intraperitoneal chemotherapy and a partial cystectomy with right ureteral stent placement in 2020. He underwent a cystoscopy with bilateral ureteral catheters, exploratory laparotomy, lysis of adhesions, right pelvic sidewall excisional biopsy, and an open partial cystectomy in 2021. TURBT performed in 10/2021. Imaging from 3/4/2022 showed enlarging external illiac adenopathy. There are no surgical options per Dr. Peoples. Underwent CT-guided biopsy of the right iliac mass consistent with mucinous adenocarcinoma. Patient completed palliative radiation in 2024. Scans from 2024 showed growth in the lungs. Patient underwent bronchoscopy in 10/2024 by Dr. Santos, biopsies were consistent with prior pathology. Patient completed radiation to the left lung on 2024  and the right lung on 12/6/2024. CT scans in 1/2025 demonstrated stable right lower lobe nodule and slightly decreased left lower lobe nodule. Next scans in 2 weeks.      Pain: Patient established care with [facility] in [place], [place] > 1 year ago. Patient's right hip pain was thought to be referred from his spine. Patient underwent several epidurals and a lumbar laminectomy with no improvement in his pain. Patient complains of pain predominantly in his right groin / hip and lower extremity secondary to cancer.  Patient is currently prescribed oxycodone 10 mg q4h PRN and gabapentin 600 mg TID.  He occasionally takes Ibuprofen as well. He does not feel the medication is controlling his pain.  Patient unable to tolerate MS Contin due to constipation and Fentanyl patches due to side effects. Insurance company stopped covering Xtampza.      Other symptoms: The patient stopped the Pepcid for indigestion due to lack of efficacy. He continues to complain of nausea, specifically several hours after eating. He denies vomiting. Tried and failed numerous antiemetics due to lack of efficacy. He endorses improvement in his appetite with Marinol 5 mg BID. The patient reports mild constipation managed with Milk of Mag as needed.  No concerns with sleep today.      Pyschosocial: The patient lives alone. He is retired from working for the state. He enjoys playing golf and spending time outside. No personal history of a mental illness. The patient admits to worsening anxiety/depression recently due to the decline of his mother's health. Patient's father passed away in 10/2022 and the patient is an only child.     Spiritual: Patient describes himself as curious rather than practicing.      Goals: Improve quality of life with symptom management.     The following portions of the patient's history were reviewed and updated as appropriate: allergies, current medications, past family history, past medical history, past  social history, past surgical history and problem list.    ORT-R: Low risk  Decisional capacity: Full  ECOG: (2) Ambulatory and capable of self care, unable to carry out work activity, up and about > 50% or waking hours     Objective:    Constitutional: Awake, alert, sitting up   Eyes: PERRLA, EOMS intact  HENT: NCAT, face symmetric  Neck: Supple, trachea midline  Respiratory: Nonlabored respirations  Musculoskeletal: Moves all extremities   Psychiatric: Appropriate affect, cooperative  Neurologic: Oriented x 3, Cranial Nerves grossly intact to confrontation, speech clear    Medication Counts: Collected over the phone. See bottom of note for details. No misuse or overuse evident.   I have reviewed the patient's [place] PDMP. YAIMA Req #[id number].   UDS: Last 3/25/23. Reviewed. Appropriate.     Assessment & Plan:    1. Mucinous adenocarcinoma of appendix  - Patient completed radiation to the left lung on 11/8/2024 and the right lung on 12/6/2024. CT scans in 1/2025 demonstrated stable right lower lobe nodule and slightly decreased left lower lobe nodule. Next scans in 2 weeks.    2. Cancer related pain  - Patient is appropriate for opioid therapy due to cancer related pain. Short-acting opioid therapy is not controlling the patient's cancer pain. Patient would like to restart morphine extended release.  Script for MS Contin 15 mg BID #60 was sent to the pharmacy. Continue oxycodone 10 mg q4h PRN #180. Side effects of the medication discussed at every visit. Patient was encouraged to continue bowel regimen of daily stool softeners, prn laxatives, and diet modifications.  \  3. Neuropathy associated with malignant neoplasm  - Start trial of DULoxetine (Cymbalta) 30 MG capsule; Take 1 capsule by mouth Daily.  Dispense: 30 capsule; Refill: 1  - Increased gabapentin (NEURONTIN) 800 MG tablet; Take 1 tablet by mouth 3 (Three) Times a Day.  Dispense: 90 tablet; Refill: 0    4. Nausea  - Restart prochlorperazine (COMPAZINE) 10  MG tablet; Take 1 tablet by mouth Every 6 (Six) Hours As Needed for Nausea or Vomiting.  Dispense: 30 tablet; Refill: 0    Code status: Full code  Advanced directives: Not on file  Health care surrogate: Shaylee Tse, mother     Return in about 1 month (around 5/30/2025) for Video visit.    The patient has chosen to receive care through a telehealth visit.   Does the patient consent to using a video visit for their medical care today? Yes   The visit included audio and video interaction. No technical issues occurred during this visit.  I spent 30 minutes caring for Esteban Tse on this date of service. This time includes time spent by me in the following activities: preparing for the visit, reviewing tests, obtaining and/or reviewing a separately obtained history, performing a medically appropriate examination and/or evaluation, counseling and educating the patient/family/caregiver, ordering medications, tests, or procedures, documenting information in the medical record, independently interpreting results and communicating that information with the patient/family/caregiver, and care coordination.    Qian Fleming PA-C  04/30/2025    Medication Date Filled # Filled Count Used # Days  SOY   Marinol 5  60 27 33 20 2   Oxycodone 10  180 83 97 20 5   Gabapentin 600  90 2 88 33 3

## 2025-04-30 ENCOUNTER — TELEMEDICINE (OUTPATIENT)
Dept: PALLIATIVE CARE | Facility: CLINIC | Age: 70
End: 2025-04-30
Payer: MEDICARE

## 2025-04-30 VITALS
BODY MASS INDEX: 22.82 KG/M2 | DIASTOLIC BLOOD PRESSURE: 84 MMHG | WEIGHT: 173 LBS | RESPIRATION RATE: 16 BRPM | TEMPERATURE: 96 F | SYSTOLIC BLOOD PRESSURE: 158 MMHG | OXYGEN SATURATION: 98 % | HEART RATE: 78 BPM

## 2025-04-30 DIAGNOSIS — G63 NEUROPATHY ASSOCIATED WITH MALIGNANT NEOPLASM: ICD-10-CM

## 2025-04-30 DIAGNOSIS — G89.3 CANCER RELATED PAIN: ICD-10-CM

## 2025-04-30 DIAGNOSIS — G89.3 CANCER RELATED PAIN: Primary | ICD-10-CM

## 2025-04-30 DIAGNOSIS — C18.1 MUCINOUS ADENOCARCINOMA OF APPENDIX: Primary | ICD-10-CM

## 2025-04-30 DIAGNOSIS — R11.0 NAUSEA: ICD-10-CM

## 2025-04-30 DIAGNOSIS — C80.1 NEUROPATHY ASSOCIATED WITH MALIGNANT NEOPLASM: ICD-10-CM

## 2025-04-30 RX ORDER — MORPHINE SULFATE 15 MG/1
15 TABLET, FILM COATED, EXTENDED RELEASE ORAL 2 TIMES DAILY
Qty: 60 TABLET | Refills: 0 | Status: SHIPPED | OUTPATIENT
Start: 2025-04-30

## 2025-04-30 RX ORDER — DULOXETIN HYDROCHLORIDE 30 MG/1
30 CAPSULE, DELAYED RELEASE ORAL DAILY
Qty: 30 CAPSULE | Refills: 1 | Status: SHIPPED | OUTPATIENT
Start: 2025-04-30

## 2025-04-30 RX ORDER — GABAPENTIN 800 MG/1
800 TABLET ORAL 3 TIMES DAILY
Qty: 90 TABLET | Refills: 0 | Status: SHIPPED | OUTPATIENT
Start: 2025-04-30

## 2025-04-30 RX ORDER — PROCHLORPERAZINE MALEATE 10 MG
10 TABLET ORAL EVERY 6 HOURS PRN
Qty: 30 TABLET | Refills: 0 | Status: SHIPPED | OUTPATIENT
Start: 2025-04-30

## 2025-04-30 NOTE — TELEPHONE ENCOUNTER
I have reviewed patient's YAIMA report prior to prescribing Schedule II, III, and IV medications. Next refills for MS Contin 15 mg BID #60 was sent to the pharmacy. The patient is scheduled to follow-up in 1 month.

## 2025-05-20 DIAGNOSIS — G89.3 CANCER RELATED PAIN: ICD-10-CM

## 2025-05-20 DIAGNOSIS — R11.0 NAUSEA: ICD-10-CM

## 2025-05-20 RX ORDER — DRONABINOL 5 MG/1
5 CAPSULE ORAL
Qty: 60 CAPSULE | Refills: 0 | Status: SHIPPED | OUTPATIENT
Start: 2025-05-20 | End: 2025-06-19

## 2025-05-20 RX ORDER — OXYCODONE HYDROCHLORIDE 10 MG/1
10 TABLET ORAL EVERY 4 HOURS PRN
Qty: 180 TABLET | Refills: 0 | Status: SHIPPED | OUTPATIENT
Start: 2025-05-20 | End: 2025-06-19

## 2025-05-20 NOTE — TELEPHONE ENCOUNTER
YAIMA #: [id number]    Medication requested: oxycodone 10 MG    Last fill date:4/10/25    Last appointment: 4/30/25    Next appointment:

## 2025-05-20 NOTE — TELEPHONE ENCOUNTER
YAIMA #: [id number]    Medication requested: dronabinol 5 MG    Last fill date: 4/10/25    Last appointment:4/30/25    Next appointment:

## 2025-05-27 ENCOUNTER — OFFICE VISIT (OUTPATIENT)
Dept: ONCOLOGY | Facility: CLINIC | Age: 70
End: 2025-05-27
Payer: MEDICARE

## 2025-05-27 VITALS
WEIGHT: 165 LBS | HEART RATE: 66 BPM | HEIGHT: 73 IN | DIASTOLIC BLOOD PRESSURE: 86 MMHG | TEMPERATURE: 97.7 F | RESPIRATION RATE: 18 BRPM | SYSTOLIC BLOOD PRESSURE: 134 MMHG | OXYGEN SATURATION: 98 % | BODY MASS INDEX: 21.87 KG/M2

## 2025-05-27 DIAGNOSIS — M25.551 RIGHT HIP PAIN: ICD-10-CM

## 2025-05-27 DIAGNOSIS — C18.1 MUCINOUS ADENOCARCINOMA OF APPENDIX: Primary | ICD-10-CM

## 2025-05-27 NOTE — PROGRESS NOTES
CHIEF COMPLAINT: Increased right hip pain over the last month    Problem List:  Oncology/Hematology History Overview Note   1.  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  Pathologic stage IIa with T3 extension into the base of the appendix through the muscularis propria but not into the serosal surface.  16 nodes negative.  Colonoscopy December 2017 negative postop.Laparoscopic diagnostic peritoneal biopsy confirming disease in the bladder dome February 2020.  Began Folfiri.  June 2020 Dr. Peoples excised right lobe of liver and left lobe liver lesion, omentectomy and resection of pelvic peritoneal nodules, ileocolectomy with ileal colostomy and intraperitoneal HIPEC with mitomycin.  Dr. Lawler performed right ureteral stent placement and partial cystectomy.  With large fungating mass, 4/22/2021 had exploratory laparotomy with lysis of adhesions and right pelvic sidewall excisional biopsy with open partial cystectomy.  No malignancy and peritoneal fluid.  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  Margins less than 1 mm.  October 2021 cystoscopy without malignancy.  TURBT 10/11/2021 did not show malignancy.  March 2022 Dr. Peoples for 3.9 cm external iliac recurrent adenopathy 6 weeks out from laminectomy which did not help pain was determined to be an unresectable recurrence with muscle and vascular involvement.  Radiation with Dr. Moody ended 5/6/2022.  Disease stabilized and pain improved. Paucimetastatic disease progression to the lung biopsy-proven, treated with radiation to both lungs ending 12/6/2024 with good response on 1/29/2025 CT and no new sites of disease.    -12/30/2019 medical oncology office visit: The patient had been on surveillance since surgery December 2017.  CT scans had been negative up until 12/30/2019 CT chest abdomen and pelvis that showed subtle soft tissue density surrounding surgical  clips in the right side of the pelvis along with soft tissue nodule at the anterior wall of the bladder concerning for recurrent disease.  Patient sent back to Dr. Davidson for colonoscopy.    -2/7/2020 patient was referred to Dr. Peoples at the [facility], he underwent diagnostic laparoscopic and peritoneal biopsies that confirmed recurrent disease with biopsy of bladder dome nodule showing adenocarcinoma with mucinous features.  Port was placed at this time also.    -2/25/2020 began FOLFIRI    -4/21/2020 patient having increased fatigue, FOLFIRI dose reduced by 10%.    -5/19/2020 cycle 7 FOLFIRI    -6/18/2020 Dr. Peoples performed exploratory laparotomy with excision of right lobe liver lesion and left lobe liver lesion, omentectomy, resection of pelvic peritoneal nodules, ileocolectomy with creation of ileal colostomy, hyperthermic intraperitoneal chemotherapy with mitomycin-C at 42 °C for deep tissue penetration for 90 minutes.  Dr. Perdue performed partial cystectomy with right ureteral stent placement    -8/5/2020 CT abdomen pelvis with contrast shows small soft tissue nodule anterior aspect of bladder stable.  New small amount of ascites as well as a new small left pleural effusion.  Creatinine 0.75 with hemoglobin 11.1 otherwise unremarkable CBC and CMP.    -2/15/2021 CT abdomen pelvis with contrast compared to 8/5/2020 shows enlarging soft tissue mass 4.2 cm over the bladder fundus and there is a calcification along the base of the urinary bladder.  Small stable multiple hypoattenuating indeterminate liver lesions.  Similar degree of ascites.    -2/23/2021 CT chest shows no evidence of metastasis with ascending aorta 42 mm.    -3/8/2021 follow-up with Dr. Portillo Peoples we reviewed his CTs suggested team effort resection with Dr. Perdue    -3/16/2021 follow-up with Dr. Perdue.  He plans for open partial cystectomy, possible ureteral implants, cystoscopy and stent placement in concert with Dr. Peoples.    -4/22/2021  "cystoscopy (after prior office cystoscopy showed large fungating mass) with bilateral ureteral catheters, exploratory laparotomy, lysis of adhesions, right pelvic sidewall excisional biopsy, open partial cystectomy Dr. Ike Perdue. Peritoneal fluid showed predominant inflammatory reactive mesothelial cells with no malignancy. 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 Carcinoma was less than 1 mm from the lateral resection margins.    -5/18/2021 [facility] medical oncology follow-up visit: I reviewed his hospital notes, operative notes, and pathology report as above and went over this with the patient.  The general consensus from retrospective as well as a few prospective data over the last couple of decades with this low-grade malignancy does not show any profound benefit from \"adjuvant\" chemotherapy in this current setting.  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  There is no standard follow-up but general guidelines suggest following up as typical colon cancer.  I will repeat his CT chest abdomen pelvis now to reestablish his baseline postoperative imaging and have him see my nurse practitioner back in a couple of weeks to make sure there is no nia evidence of measurable residual disease.  Assuming this just shows postoperative changes, I would simply repeat scans again in 3 months or sooner as symptoms dictate.  If he has no evidence of persistent or metastatic disease on current imaging, then when he sees my nurse practitioner back she will also review what they say about his ascending aorta which was 42 mm in February and sent him to Dr. Evangelist Anthony for an opinion as to whether any intervention is needed relative to the aorta.  Obviously if his cancer is out of control on the upcoming scans then the aorta is a moot point.    -6/10/2021 [facility] oncology clinic follow-up: CT scans show no evidence of disease recurrence in the chest, " abdomen or pelvis.  Ascending aortic aneurysm stable at 41 mm.  Referral made to Dr. Anthony, cardiothoracic surgeon for management and evaluation of a sending aortic aneurysm.  Plan to repeat CT scans in 3 months.    -9/14/2021 [facility] medical oncology follow-up visit: I reviewed images and reports of 9/10/2021 CT chest abdomen pelvis with contrast which shows under distended urinary bladder with mild asymmetric wall thickening dome and left lateral wall with a small 9 mm polypoid lesion in the bladder.  There is stable 2.3 cm right external iliac and a few other subcentimeter scattered nodes in the root of the small bowel mesentery and retroperitoneum.  Postsurgical right hemicolectomy changes.  Stable low-attenuation lesions in the liver unchanged.  I will have him collect his discs from [facility] to take Dr. Perdue to decide whether to proceed with cystoscopy or just continue watchful waiting.  There is no major changes and I suspect we are looking at postoperative changes and we shall see him for video visit in a few weeks to see what urology at  decides.    -10/11/2021 cystoscopy Dr. Perdue showed marked acute and chronic inflammation but no evidence of malignancy.  Muscularis propria present.    -10/14/2021 [facility] medical oncology virtual visit: I reviewed reports of pathology from cystoscopy 10/11/2021 and went over this with patient.  He is feeling fairly fit.  I will repeat his CT chest abdomen pelvis prior to return in 3 months and if in the interim, when he follows up with Dr. Perdue in the next couple of weeks post cystoscopy, plans are made for further cystoscopy before I see him back and any malignancy is found, I would ask the patient and Dr. Perdue to touch base as I would not know of such without that contact as the information comes into our chart without notification now that epic is being used by .  Assuming no further biopsies in the interim, I will simply repeat his scans in 3  months to follow what I suspected and is now confirmed to be inflammatory changes.  From the aneurysm standpoint, he has follow-up scanning March 2022 arranged by Dr. Anthony and he will follow him up after that.    -11/24/2021 CT chest abdomen pelvis compared to 6/7/2021 outside imaging shows stable 4.1 cm ascending thoracic aorta.  No lung nodules.  Liver homogenous with bilateral cysts.  No adenopathy.  Thickened sigmoid colon and rectum suggestive of mild colitis or postradiation change.  No pelvic adenopathy.  Bony structures degenerative in the spine.  Some soft tissue anterior to the acetabulum on the right and extending along the medial aspect right pelvic sidewall 2.7 x 5.6 may represent intramuscular process versus adenopathy which could not be ruled out.  No prior study views available.    -1/4/2022 follow-up Dr. Ike Perdue [facility].  Per his note, he had TURBT 10/11/2021.  He had been placed on antibiotics for UTI for preop preparation for back surgery planned in 2 weeks.  Denies any mucus in his urine.  Some microhematuria on urinalysis this day.  No gross hematuria or dysuria.  The October 2021 TURBT did not reveal anything malignant on pathology.  There may be irritation or inflammation secondary to sutures in the bladder following partial cystectomy.  Patient asymptomatic from a urinary standpoint.  Recommended following up with [facility] with no plans for further urology follow-up.    -2/4/2022 [facility] medical oncology telehealth follow-up visit: I reviewed his November images and reports with the patient as well as with Dr. Gm Moody and the note from Dr. Perdue from 1 month ago.  I do suspect this pelvic sidewall abnormality in November represents persistent disease but as I have stated in prior dictations, the data on palliative or overall survival benefit of systemic 5-FU based combination chemotherapy and/or Avastin does not show a clear-cut survival advantage to chemotherapy  "for asymptomatic disease.  He is having some back pain and had spine decompression a couple of weeks ago without much benefit.  This is an area that may potentially be radiated but I will check his CT chest (angiogram of chest) abdomen and pelvis and get his blood counts and chemistries and urinalysis and see him back for virtual visit in a few weeks.  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  I will also converse with Dr. Peoples at that junction whether he thinks he has anything to offer if this is progressing though I doubt it given that the last intervention was a urologic procedure and the last cystoscopy/TURBT in October had no malignancy and the liver lesions are currently being called liver cysts albeit I am skeptical as to whether those are truly benign but they are certainly not growing.    -3/4/2022 CT angiogram of chest/CT abdomen pelvis for evaluation of dizziness and surveillance of thoracic aortic aneurysm and appendiceal carcinoma.  Thoracic ascending aortic ectasia 4 x 4 cm stable.  Small nodularity left upper lobe stable from September.  Stable small hepatic hypodensities status post cholecystectomy.  Right external iliac soft tissue fullness now 3 x 3.9 cm previously 2 x 2.3 cm concerning for enlarging adenopathy with symmetric nonenlarged inguinal nodes stable.  CBC unremarkable.  CMP unremarkable save for potassium 5.4.  CEA 18.2.  1-3 red cells and white cells per milliliter of urine.    -3/16/2022 office visit with Dr. Portillo Peoples.  He reviewed the CT from 3/4/2022.  He notes that the laminectomy from 6 weeks prior has not helped his \"hip pain\".  Given the location of " this recurrence previously 2 x 2.3 cm now 3 x 3.9 cm in the external iliac area concerning for enlarging adenopathy with symmetric nonenlarged inguinal nodes stable, the mass appears unresectable with involvement of muscle and vasculature.    -3/24/2022 [facility] medical oncology follow-up virtual visit: Pain is still significant.  We will get him in hopefully in the next day to [facility] for palliative care.  Have spoken with Dr. Peoples and no surgery.  I have spoken with Dr. Moody who thinks palliative radiation while not likely to shrink tumor dramatically may help pain considerably and we will get him there.  We will get him to bring the discs to our [place] office and asked them to bring that back to [place] to get scanned in for our invasive radiologist to look at for us to get CT-guided biopsy of this node in the right lower quadrant and send that for Caris MI profile once the pathology is obtained to hopefully find high tumor mutational burden or other molecular targets that might give us some options.  Apart from that, as stated multiple times, this is not a highly chemotherapy sensitive tumor and I am not sure I would palliate him well but, when I see him back in early June with CAT scans prior to return and post radiation, if he is not getting relief and wants to try a 5-FU based regimen plus or minus Avastin I would be okay with that but he knows it does not alter survival 1 way or the other and we are simply trying to palliate this inexorable process.  No other surgical options.    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  Mismatch repair proficient,NTRK1/2/3 fusion not detected.  BRAF V600E negative.  PD-L1 Negative, 1+, 5%.  PTEN Positive, 1+, 100%. MLH1 positive/3+, 100%. MSH2 positive/3+, 100%. MSH6 positive/3+, 100%. PMS2 positive/3+,100%.    -5/6/2022 last radiation    - 5/25/2022 CT chest abdomen  pelvis with contrast at [facility] shows nothing remarkable on the chest.  Stable liver cysts.  Focal soft tissue right iliac region appears to be increasing in size now 7.2 cm previously 3.9 cm with some mass-effect on surrounding structures with several stable inguinal borderline nodes.    -6/6/2022 [facility] [place] medical oncology follow-up visit: Now 1 month out from radiation just starting to get a little bit of improvement in his abdominal discomfort.  Slight growth on CT but some of that may be postradiation change this close to radiation and, as I stated in my note in March, it is not clear that 5-FU based therapy plus or minus Avastin is going to palliate much given its relatively insensitivity to chemotherapy and it certainly does not improve survival.  To that end, we will plan on just repeating his CAT scans again in about 6 weeks and if the pain is worsening and/or this continues to grow then I will probably give him Avastin FOLFOX.  If everything is stable and pain is controlled we will go continue watchful waiting with imaging about every 3 months from that point forward.    -7/11/2022 [facility] [place] medical oncology follow-up: His 7/7/2022 CT chest abdomen pelvis showed stable right external iliac low-attenuation which had been previously growing and thickening of the distal descending and sigmoid colon with perhaps a small right iliac borderline 1 cm node.  Symptomatically improved post radiation.  For now we will hold on Avastin FOLFOX as the benefit of such is questionable with this histologic subtype and there are no actionable molecular targets.  Repeat CTs prior to return in 3 months.    -11/1/2022 [facility] [place] medical oncology follow-up: 10/19/2022 CT chest abdomen pelvis with contrast [facility] compared to 7/7/2022 shows no significant change or evidence of progression.  Clinically quiescent.  We will repeat CTs in 6 months, sooner as symptoms dictate.    -5/19/2023 CT chest abdomen pelvis  [facility] compared to 10/19/2022 showed no evidence of disease progression in the chest.  Nodular density adjacent to the descending colon 6 mm nonspecific.  Otherwise unchanged appearance of hepatic metastatic nodules, right iliac adenopathy, and right pelvic mass.    -5/23/2023 [facility] medical oncology follow-up: I reviewed his report of CT chest abdomen pelvis from [facility] 4 days ago showing no evidence of progression.  So area near the descending colon is nonspecific and would not change anything at this junction.  His October CT had not changed from July and the current one has not changed significantly since October.  We will continue to see palliative care for his right pelvic pain.  If leg swelling significantly worsens we could check Doppler and assuming no clot consider vascular intervention but as the thigh has not significantly changed over time nor is there significant tenderness in the calf I am doubtful that this is clot and is likely just due to the pelvic mass.  Vascular surgical intervention should swelling worsen is also an option but I would not want a stent placed through this area in the face malignancy particularly a mucinous variety that would make him prone towards clotting.  If the leg worsens I would get his Doppler and consider vascular intervention or anticoagulation as dictated but clinically stable so will not investigate further at this junction.  I will repeat his imaging again in 6 months.    -11/20/2023 CT chest, abdomen and pelvis shows stable appearances of the chest with no evidence of metastatic disease.  Abdomen and pelvis reveals small interval decrease in size of the previously demonstrated peritoneal nodule adjacent to the descending colon.  No new suspicious omental/peritoneal soft tissue nodules or masses.  Stable right iliac adenopathy as well as a soft tissue mass along the right external iliac vasculature along the right pelvic sidewall.  -11/29/2023 [facility]  Oncology clinic follow-up: Current CT chest, abdomen and pelvis shows stable findings, no evidence of progression.  He has no new worrisome symptoms however he does still have swelling in his right thigh and lower pelvic pain for which she sees palliative care.  He states that it seems slightly more pronounced over the last few weeks, he has a follow-up with palliative care and will discuss further with him.  We reviewed recommendations discussed at his last visit with Dr. Delcid that as long as the swelling did not worsen significantly then we would hold off on considering any vascular intervention.  We will continue to monitor, Jose Antonio understands to let us know if he has any changes otherwise we will plan on repeating CT chest, abdomen and pelvis prior to return in 6 months and I have ordered those today.  His blood pressure was running high on arrival today, I did recheck it manually and it was 150/100.  He reports that he took his blood pressure earlier today at home and it was 140s over 85 or so.  I asked him to recheck when he got home and monitor and if his diastolic continued to run over 90 to get in with Dr. Dial for evaluation.    - 5/20/2023 CT chest abdomen pelvis [facility] compared to November shows 10 mm nodule left lower lobe and right lower lobe.  Anterior lobe pleural-based nodule unchanged.  6.1 x 4 cm right pelvic sidewall stable.  12 mm right common iliac node stable.  No ascites.    -5/28/2024 [facility] oncology clinic follow-up: No signs or symptoms of recurrence.  3 times in the last 6 months he had a 6-hour period of crampy abdominal pain that subsequently subsided.  Potentially could have been having some obstructive symptomatology but none on imaging.  If this happens more frequently or it lasts he will let us know and also Dr. Peoples but would also go towards a more soft diet during those episodes and try FiberCon.  Otherwise imaging of the abdomen is stable but there is a couple of  nodules in the lung that appeared new albeit small.  This is a very low-grade process and systemic therapies are not very effective and he is asymptomatic so we will just repeat CT chest in 3 months.  If this grows then we will consider getting tissue as it would be a little odd for this low-grade process to show up in the lung.    -8/28/2024 [facility] chest with contrast compared to May 2024 shows left lower lobe nodule 16 mm compared to 10 mm with right lower lobe nodule 16 mm previously 10 mm and small subpleural right upper lobe nodule stable most likely benign intrapulmonary node and small stable nodule right lower lobe likely intrapulmonary.  Several small low-density liver lesions likely cysts.    -9/3/2024 [facility] oncology clinic follow-up: No signs or symptoms of recurrence 1 lung nodule in each lung base has grown.  Other nodules stable.  High likelihood for recurrence.  Will get CT abdomen pelvis and then get him to Dr. Santos for consideration of navigational bronchoscopy/Ion biopsy these lesions and might consider CyberKnife for which I will get him to Dr. Moody for opinion and coordination with Dr. Santos pending results of CT abdomen pelvis.  If on the other hand his CT abdomen pelvis shows additional sites of progression beyond the lung then we will likely go back to systemic therapy of which he has very little likelihood to respond but I would like to get tissue for molecular testing.     -9/24/2024 [facility] abdominal pelvic CT with contrast compared to May 2024 showed continued decrease in size of hepatic fat necrosis left lower quadrant and decrease in right common iliac and external iliac adenopathy and no new metastatic disease.    -9/25/2024 robotic bronchoscopy preparative CT chest    -9/26/2024 [facility] hematology oncology follow-up: Reviewed recent CT abdomen with no clear-cut malignancy and continued regression fat necrosis left lower quadrant with decreasing  adenopathy in the right common and external iliac nodes.  CT chest done yesterday has not been read but is a noncontrast robotic preparative CT to be used by Dr. Santos for navigational bronchoscopy on October 2.  Will get him back to me around October 10 and Dr. Moody in the interim for opinion as to CyberKnife particularly if paucimetastatic malignancy proven.    - 10/2/2024 bronchoscopy Dr. Santos Ion robot platform biopsy 2 cm nodule middle one third of the left lower lobe.  FNA left lower lobe, bronchial brush, transbronchial needle aspirate right lower lobe and right lower lobe brush all show adenocarcinoma consistent with prior pathology.  Left lower lobe lung biopsy and right lower lobe lung biopsy both show adenocarcinoma with extensive extracellular mucin CDX2 strongly positive.  Negative for CK7 and patchy CK20 positivity.  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 Moody plans to treat right lung metastasis [address] in 3 fractions and then pending tolerance will reevaluate shortly thereafter for treatment to the left lung.    -10/10/2024 [facility] hematology oncology follow-up: I reviewed with him the bronchoscopy and path reports and the note from Dr. Moody.  I will have him see my nurse practitioner back in 6 weeks to make sure he is having no new symptoms and at that point she will order a follow-up CT chest abdomen pelvis about 8 weeks out from the end of radiation.  I will get [facility] MI profile on the lung biopsy.    -10/27/2024 [facility] MI profile: KRAS pathogenic variant Exon 2/p.G12D therefore lack of benefit to cetuximab or panitumumab.  PD-L1 negative, 0%.  No actionable biomarkers identified.  Microsatellite stable, TMB  low.   Genes with pathogenic or likely pathogenic alterations: ANNA, CDKN2A, CREBBP, KRAS.Potential clinical trials available, see full report.    -11/8/2024 completed radiation to the left lung  -12/6/2024 completed radiation to the right lung    -12/12/2024 [facility] follow-up: completed radiation to both lungs, left lung 11/8/2024 and more recently completed radiation to the right lung 12/6/2024.  He had some fatigue but is starting to feel better.  I have ordered restaging CT scans about 8 weeks out from his completion of radiation which will put this late January.  He has a follow-up with radiation oncology on 1/10/2024.  We will see him back after his CT scans.    -1/29/2025 CT chest abdomen and pelvis shows stable right lower lobe nodule and slightly decreased left lower lobe nodule.  Stable iliac adenopathy in the pelvis.  No new sites of disease.    -2/4/2025 [facility] follow-up: Good response to radiation.  Feeling fit.  Will repeat imaging in mid May    - 5/20/2025 CT chest abdomen pelvis compared to January 2025 shows postradiation changes right upper lobe with slight decrease in pulmonary nodule with postradiation changes left upper and lower lobes with left lower lobe nodule no longer visible.  Slight increase in size right upper lobe pulmonary subpleural nodule.  Decrease in right external iliac node since January 2025 and no change in right common iliac node though some increased hypoattenuation adjacent right psoas muscle concerning for disease.  CBC and CMP unremarkable save for alkaline phosphatase 122 and glucose 112.    - 5/27/2025 [facility] follow-up: Radiated area as of lung responding well.  New subpleural and right psoas uptake subtle but somewhat worrisome.  No plans for systemic treatment in the absence of bulky or symptomatic progressive disease with consideration for radiation to additional spots of paucimetastatic disease.  With the right hip  pain, will get plain x-ray of the hip today and he sees Dr. Moody in 2 days.  If he proceeds something orthopedic he can make referral.  If nonorthopedic and related to the psoas uptake, will defer to him on the possibility of palliative radiation though he has had some radiation to the region previously.  Otherwise I will see him back in 3 months with CT scans and labs just prior to return     Mucinous adenocarcinoma of appendix   12/5/2017 Initial Diagnosis    Mucinous adenocarcinoma of appendix found at the time of appendectomy 12/2017 in the face of appendicitis.  Pathologic stage IIa with T3 extension into the base of the appendix through the muscularis propria but not into the serosal surface.  16 nodes negative.  Colonoscopy December 2017 negative postop     12/5/2017 Surgery    Surgery       Procedure:  Appendectomy and right hemicolectomy      Completeness of resection:  No evidence of residual tumor     Pathology revealed invasive moderately differentiated mucinous adenocarcinoma.  Right hemicolectomy: Benign colon and small intestine no evidence of carcinoma.  16 benign lymph nodes, 0/16, negative for dysplasia or malignancy.  Tumor size approximately 6 cm.  Grade 2, moderately differentiated.  Tumor invades through the muscularis profile into the base of appendix but does not extend to the serosal surface.  All margins uninvolved, no lymphovascular invasion identified.  Pathological stage pT3 pN0.         12/8/2017 Imaging    CT of the chest abdomen and pelvis negative.  Baseline CBC WBC 7100, hemoglobin 11.3, hematocrit 34.8%, platelet count 195,000.     3/20/2018 Procedure    Colonoscopy with Dr. Davidson was normal, recommendation for repeat surveillance colonoscopy in 3 years.     6/11/2018 Imaging    CT chest, abdomen and pelvis with no evidence metastatic disease.  CEA 1.1     9/24/2018 Imaging    CT abdomen pelvis showed no acute changes and nothing to suggest recurrence     12/28/2018 Imaging     CT chest, abdomen and pelvis negative. Normal CBC, CMP and CEA 0.7.     6/28/2019 Imaging    CT chest, abdomen and pelvis: No interval change from prior studies.  No recurrent or metastatic disease detected in the chest, abdomen or pelvis.  No acute process.  CEA 0.9     12/30/2019 Imaging    CT chest, abdomen and pelvis: No disease in the chest.  There was noted a subtle soft tissue density, one surrounding surgical clips in the right side of the pelvis and the other a soft tissue nodule intimately associated with the anterior wall of the bladder, which are considered suspicious for recurrent disease.  CEA 1.1.  CMP with normal creatinine 0.9, total bilirubin 1.9, AST 34, ALT 24, alkaline phosphatase 93.  CBC with WBC 6900, hemoglobin 15.9, platelet count 232,000.       1/21/2020 Procedure    Normal colonoscopy with Dr. Davidson.  He has made referral to Dr. Portillo Peoples at .     2/7/2020 Progression    12/30/2019 CT chest, abdomen and pelvis: No disease in the chest.  There are subtle soft tissue densities (1 surrounding surgical clips in the right side of the pelvis and the other a soft tissue nodule intimately associated with the anterior wall of the bladder) which are considered suspicious for recurrent disease.  CMP with normal creatinine 0.9, total bilirubin 1.9, AST 34, ALT 24, alkaline phosphatase 93.  CBC with WBC 6900, hemoglobin 15.9, platelet count 232,000.  CEA 1.1.  2/7/2020 diagnostic laparoscopy with peritoneal biopsies performed at the [facility] by Dr. Peoples showed no sign of diffuse peritoneal carcinomatosis or liver metastasis.  There was a firm nodule in the superior dome of the bladder, adherent to omentum, as well as right pelvic sidewall nodule adjacent to surgical clips.  Biopsy of bladder dome nodule showed adenocarcinoma with mucinous features.     2/25/2020 -  Chemotherapy    OP COLORECTAL FOLFIRI Irinotecan / Leucovorin / Fluorouracil     5/29/2020 Imaging    CT chest abdomen  pelvis shows slight improvement with decreased nodule anterolateral margin of urinary bladder with stable nodularity of the right lower quadrant adjacent to surgical clips and no progressive disease.  Slight hyperemia of the colonic mucosa     6/18/2020 Surgery    Surgery       -6/18/2020 Dr. Peoples performed exploratory laparotomy with excision of right lobe liver lesion and left lobe liver lesion, omentectomy, resection of pelvic peritoneal nodules, ileocolectomy with creation of ileal colostomy, hyperthermic intraperitoneal chemotherapy with mitomycin-C at 42 °C for deep tissue penetration for 90 minutes.  Dr. Perdue performed partial cystectomy with right ureteral stent placement     8/5/2020 Imaging     CT abdomen pelvis with contrast shows small soft tissue nodule anterior aspect of bladder stable.  New small amount of ascites as well as a new small left pleural effusion.  Creatinine 0.75 with hemoglobin 11.1 otherwise unremarkable CBC and CMP     2/15/2021 Imaging    CT abdomen pelvis with contrast compared to 8/5/2020 shows enlarging soft tissue mass 4.2 cm over the bladder fundus and there is a calcification along the base of the urinary bladder.  Small stable multiple hypoattenuating indeterminate liver lesions.  Similar degree of ascites.     2/23/2021 Imaging    -2/23/2021 CT chest shows no evidence of metastasis with ascending aorta 42 mm.     4/22/2021 Surgery    -4/22/2021 cystoscopy (after prior office cystoscopy showed large fungating mass) with bilateral ureteral catheters, exploratory laparotomy, lysis of adhesions, right pelvic sidewall excisional biopsy, open partial cystectomy Dr. Ike Perdue. Peritoneal fluid showed predominant inflammatory reactive mesothelial cells with no malignancy. 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 Carcinoma was less than 1 mm from the lateral resection margins.     6/7/2021  Imaging    CT chest, abdomen and pelvis: No evidence of metastatic disease within the chest abdomen or pelvis.  Interval resection of enhancing bladder wall mass a small amount of residual enhancing soft tissue, possibly granulation tissue.  Interval resolution of abdominal and pelvic ascites.  Stable dilation of the ascending aorta mid segment measuring up to 41 mm.     9/10/2021 Imaging    CT chest abdomen pelvis with contrast shows under distended urinary bladder with mild asymmetric wall thickening dome and left lateral wall with a small 9 mm polypoid lesion in the bladder.  There is stable 2.3 cm right external iliac and a few other subcentimeter scattered nodes in the root of the small bowel mesentery and retroperitoneum.  Postsurgical right hemicolectomy changes.  Stable low-attenuation lesions in the liver unchanged.     11/24/2021 Imaging    CT chest abdomen pelvis compared to 6/7/2021 outside imaging shows stable 4.1 cm ascending thoracic aorta.  No lung nodules.  Liver homogenous with bilateral cysts.  No adenopathy.  Thickened sigmoid colon and rectum suggestive of mild colitis or postradiation change.  No pelvic adenopathy.  Bony structures degenerative in the spine.  Some soft tissue anterior to the acetabulum on the right and extending along the medial aspect right pelvic sidewall 2.7 x 5.6 may represent intramuscular process versus adenopathy which could not be ruled out.  No prior study views available.     4/11/2022 - 5/6/2022 Radiation    Radiation OncologyTreatment Course:  Esteban Tse received 5000 cGy in 20 fractions to right pelvis/groin via External Beam Radiation - EBRT.     5/25/2022 Imaging    CT chest abdomen pelvis with contrast at [facility] shows nothing remarkable on the chest.  Stable liver cysts.  Focal soft tissue right iliac region appears to be increasing in size now 7.2 cm previously 3.9 cm with some mass-effect on surrounding structures with several stable inguinal  borderline nodes.     10/19/2022 Imaging    - 10/19/2022 CT chest abdomen pelvis with contrast [facility] compared to 7/7/2022 shows no significant change or evidence of progression.     Peritoneal carcinoma   5/14/2020 Initial Diagnosis    Peritoneal carcinoma (HCC)     5/25/2022 Imaging    CT chest abdomen pelvis with contrast at [facility] shows nothing remarkable on the chest.  Stable liver cysts.  Focal soft tissue right iliac region appears to be increasing in size now 7.2 cm previously 3.9 cm with some mass-effect on surrounding structures with several stable inguinal borderline nodes.     Malignant neoplasm metastatic to both lungs   9/12/2024 Initial Diagnosis    Malignant neoplasm metastatic to both lungs     11/6/2024 - 11/8/2024 Radiation    Radiation OncologyTreatment Course:  Esteban Tse received 5400 cGy in 3 fractions to left lung via Stereotactic Radiation Therapy - SRT.     12/3/2024 - 12/6/2024 Radiation    Radiation OncologyTreatment Course:  Esteban Tse received 5400 cGy in 3 fractions to right lung via Stereotactic Radiation Therapy - SRT.         HISTORY OF PRESENT ILLNESS:  The patient is a 69 y.o. male, here for follow up on management of mucinous adenocarcinoma of the appendix metastasis status post radiation end of last year to paucimetastatic sites.  New right hip pain over the last month    Past Medical History:   Diagnosis Date    Aneurysm 2022    Ascending aortic aneurysm     Benign hypertension     Bladder cancer     Colon cancer     History of radiation therapy 05/06/2022    right groin/hemipelvis    Hypercholesteremia 09/18/2023    Hypertension     Intermittent palpitations     Mucinous adenocarcinoma     Pounding heartbeat     PVC's (premature ventricular contractions)      Past Surgical History:   Procedure Laterality Date    APPENDECTOMY  2017    With Partial Colon     BLADDER SURGERY      with partial colon    BRONCHOSCOPY WITH ION ROBOTIC  "ASSIST N/A 10/02/2024    Procedure: BRONCHOSCOPY NAVIGATION WITH ENDOBRONCHIAL ULTRASOUND AND ION ROBOT;  Surgeon: Levi Santos DO;  Location: [facility] ENDOSCOPY;  Service: Robotics - Pulmonary;  Laterality: N/A;  ION CATH #3  CATH [id number]   [id number]  [id number]  CATH GUIDE ID [id number]    CHOLECYSTECTOMY      COLON SURGERY      COLONOSCOPY      CYBERKNIFE  11/08/2024    LLL lung met    CYBERKNIFE  12/06/2024    RLL lung met       Allergies   Allergen Reactions    Levofloxacin Swelling     Lips swellijng    Tetracycline Swelling     Lips swelling    Penicillins Other (See Comments)     childhood       Family History and Social History reviewed and changed as necessary    REVIEW OF SYSTEM:   New right hip pain not new for him.    PHYSICAL EXAM:  No jaundice icterus or pallor.  No respiratory distress.    Vitals:    05/27/25 0722   BP: 134/86   Pulse: 66   Resp: 18   Temp: 97.7 °F (36.5 °C)   SpO2: 98%   Weight: 74.8 kg (165 lb)   Height: 185.4 cm (73\")     Vitals:    05/27/25 0722   PainSc: 8    PainLoc: Hip  Comment: right hip          ECOG score: 0           Vitals reviewed.    ECOG: (0) Fully Active - Able to Carry On All Pre-disease Performance Without Restriction    Lab Results   Component Value Date    HGB 14.2 09/25/2024    HCT 42.2 09/25/2024    MCV 87.2 09/25/2024     09/25/2024    WBC 6.29 09/25/2024    NEUTROABS 4.15 09/25/2024    LYMPHSABS 1.47 09/25/2024    MONOSABS 0.55 09/25/2024    EOSABS 0.08 09/25/2024    BASOSABS 0.03 09/25/2024       Lab Results   Component Value Date    GLUCOSE 109 (H) 09/25/2024    BUN 11 09/25/2024    CREATININE 1.09 09/25/2024     09/25/2024    K 4.3 09/25/2024     09/25/2024    CO2 29.0 09/25/2024    CALCIUM 9.4 09/25/2024    PROTEINTOT 6.9 09/25/2024    ALBUMIN 4.5 09/25/2024    BILITOT 1.0 09/25/2024    ALKPHOS 103 09/25/2024    AST 25 09/25/2024    ALT 19 09/25/2024             ASSESSMENT & PLAN:  1.  Mucinous adenocarcinoma of appendix found at the time " of appendectomy 12/2017 in the face of appendicitis.  Pathologic stage IIa with T3 extension into the base of the appendix through the muscularis propria but not into the serosal surface.  16 nodes negative.  Colonoscopy December 2017 negative postop.Laparoscopic diagnostic peritoneal biopsy confirming disease in the bladder dome February 2020.  Began Folfiri.  June 2020 Dr. Peoples excised right lobe of liver and left lobe liver lesion, omentectomy and resection of pelvic peritoneal nodules, ileocolectomy with ileal colostomy and intraperitoneal HIPEC with mitomycin.  Dr. Lawler performed right ureteral stent placement and partial cystectomy.  With large fungating mass, 4/22/2021 had exploratory laparotomy with lysis of adhesions and right pelvic sidewall excisional biopsy with open partial cystectomy.  No malignancy and peritoneal fluid.  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  Margins less than 1 mm.  October 2021 cystoscopy without malignancy.  TURBT 10/11/2021 did not show malignancy.  March 2022 Dr. Peoples for 3.9 cm external iliac recurrent adenopathy 6 weeks out from laminectomy which did not help pain was determined to be an unresectable recurrence with muscle and vascular involvement.  Radiation with Dr. Moody ended 5/6/2022.  Disease stabilized and pain improved. Paucimetastatic disease progression to the lung biopsy-proven, treated with radiation to both lungs ending 12/6/2024 with good response on 1/29/2025 CT and no new sites of disease.    -12/30/2019 medical oncology office visit: The patient had been on surveillance since surgery December 2017.  CT scans had been negative up until 12/30/2019 CT chest abdomen and pelvis that showed subtle soft tissue density surrounding surgical clips in the right side of the pelvis along with soft tissue nodule at the anterior wall of the bladder concerning for recurrent disease.  Patient sent back to Dr. Davidson for  colonoscopy.    -2/7/2020 patient was referred to Dr. Peoples at the [facility], he underwent diagnostic laparoscopic and peritoneal biopsies that confirmed recurrent disease with biopsy of bladder dome nodule showing adenocarcinoma with mucinous features.  Port was placed at this time also.    -2/25/2020 began FOLFIRI    -4/21/2020 patient having increased fatigue, FOLFIRI dose reduced by 10%.    -5/19/2020 cycle 7 FOLFIRI    -6/18/2020 Dr. Peoples performed exploratory laparotomy with excision of right lobe liver lesion and left lobe liver lesion, omentectomy, resection of pelvic peritoneal nodules, ileocolectomy with creation of ileal colostomy, hyperthermic intraperitoneal chemotherapy with mitomycin-C at 42 °C for deep tissue penetration for 90 minutes.  Dr. Perdue performed partial cystectomy with right ureteral stent placement    -8/5/2020 CT abdomen pelvis with contrast shows small soft tissue nodule anterior aspect of bladder stable.  New small amount of ascites as well as a new small left pleural effusion.  Creatinine 0.75 with hemoglobin 11.1 otherwise unremarkable CBC and CMP.    -2/15/2021 CT abdomen pelvis with contrast compared to 8/5/2020 shows enlarging soft tissue mass 4.2 cm over the bladder fundus and there is a calcification along the base of the urinary bladder.  Small stable multiple hypoattenuating indeterminate liver lesions.  Similar degree of ascites.    -2/23/2021 CT chest shows no evidence of metastasis with ascending aorta 42 mm.    -3/8/2021 follow-up with Dr. Portillo Peoples we reviewed his CTs suggested team effort resection with Dr. Perdue    -3/16/2021 follow-up with Dr. Perdue.  He plans for open partial cystectomy, possible ureteral implants, cystoscopy and stent placement in concert with Dr. Peoples.    -4/22/2021 cystoscopy (after prior office cystoscopy showed large fungating mass) with bilateral ureteral catheters, exploratory laparotomy, lysis of adhesions, right pelvic sidewall  "excisional biopsy, open partial cystectomy Dr. Ike Perdue. Peritoneal fluid showed predominant inflammatory reactive mesothelial cells with no malignancy. 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 Carcinoma was less than 1 mm from the lateral resection margins.    -5/18/2021 [facility] medical oncology follow-up visit: I reviewed his hospital notes, operative notes, and pathology report as above and went over this with the patient.  The general consensus from retrospective as well as a few prospective data over the last couple of decades with this low-grade malignancy does not show any profound benefit from \"adjuvant\" chemotherapy in this current setting.  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  There is no standard follow-up but general guidelines suggest following up as typical colon cancer.  I will repeat his CT chest abdomen pelvis now to reestablish his baseline postoperative imaging and have him see my nurse practitioner back in a couple of weeks to make sure there is no nia evidence of measurable residual disease.  Assuming this just shows postoperative changes, I would simply repeat scans again in 3 months or sooner as symptoms dictate.  If he has no evidence of persistent or metastatic disease on current imaging, then when he sees my nurse practitioner back she will also review what they say about his ascending aorta which was 42 mm in February and sent him to Dr. Evangelist Anthony for an opinion as to whether any intervention is needed relative to the aorta.  Obviously if his cancer is out of control on the upcoming scans then the aorta is a moot point.    -6/10/2021 [facility] oncology clinic follow-up: CT scans show no evidence of disease recurrence in the chest, abdomen or pelvis.  Ascending aortic aneurysm stable at 41 mm.  Referral made to Dr. Anthony, cardiothoracic surgeon for management and evaluation of a sending aortic " aneurysm.  Plan to repeat CT scans in 3 months.    -9/14/2021 [facility] oncology follow-up visit: I reviewed images and reports of 9/10/2021 CT chest abdomen pelvis with contrast which shows under distended urinary bladder with mild asymmetric wall thickening dome and left lateral wall with a small 9 mm polypoid lesion in the bladder.  There is stable 2.3 cm right external iliac and a few other subcentimeter scattered nodes in the root of the small bowel mesentery and retroperitoneum.  Postsurgical right hemicolectomy changes.  Stable low-attenuation lesions in the liver unchanged.  I will have him collect his discs from [facility] to take Dr. Perdue to decide whether to proceed with cystoscopy or just continue watchful waiting.  There is no major changes and I suspect we are looking at postoperative changes and we shall see him for video visit in a few weeks to see what urology at  decides.    -10/11/2021 cystoscopy Dr. Perdue showed marked acute and chronic inflammation but no evidence of malignancy.  Muscularis propria present.    -10/14/2021 [facility] medical oncology virtual visit: I reviewed reports of pathology from cystoscopy 10/11/2021 and went over this with patient.  He is feeling fairly fit.  I will repeat his CT chest abdomen pelvis prior to return in 3 months and if in the interim, when he follows up with Dr. Perdue in the next couple of weeks post cystoscopy, plans are made for further cystoscopy before I see him back and any malignancy is found, I would ask the patient and Dr. Perdue to touch base as I would not know of such without that contact as the information comes into our chart without notification now that epic is being used by .  Assuming no further biopsies in the interim, I will simply repeat his scans in 3 months to follow what I suspected and is now confirmed to be inflammatory changes.  From the aneurysm standpoint, he has follow-up scanning March 2022 arranged by   Raj and he will follow him up after that.    -11/24/2021 CT chest abdomen pelvis compared to 6/7/2021 outside imaging shows stable 4.1 cm ascending thoracic aorta.  No lung nodules.  Liver homogenous with bilateral cysts.  No adenopathy.  Thickened sigmoid colon and rectum suggestive of mild colitis or postradiation change.  No pelvic adenopathy.  Bony structures degenerative in the spine.  Some soft tissue anterior to the acetabulum on the right and extending along the medial aspect right pelvic sidewall 2.7 x 5.6 may represent intramuscular process versus adenopathy which could not be ruled out.  No prior study views available.    -1/4/2022 follow-up Dr. Ike Perdue [facility].  Per his note, he had TURBT 10/11/2021.  He had been placed on antibiotics for UTI for preop preparation for back surgery planned in 2 weeks.  Denies any mucus in his urine.  Some microhematuria on urinalysis this day.  No gross hematuria or dysuria.  The October 2021 TURBT did not reveal anything malignant on pathology.  There may be irritation or inflammation secondary to sutures in the bladder following partial cystectomy.  Patient asymptomatic from a urinary standpoint.  Recommended following up with [facility] with no plans for further urology follow-up.    -2/4/2022 [facility] medical oncology telehealth follow-up visit: I reviewed his November images and reports with the patient as well as with Dr. Gm Moody and the note from Dr. Perdue from 1 month ago.  I do suspect this pelvic sidewall abnormality in November represents persistent disease but as I have stated in prior dictations, the data on palliative or overall survival benefit of systemic 5-FU based combination chemotherapy and/or Avastin does not show a clear-cut survival advantage to chemotherapy for asymptomatic disease.  He is having some back pain and had spine decompression a couple of weeks ago without much benefit.  This is an area that may potentially  "be radiated but I will check his CT chest (angiogram of chest) abdomen and pelvis and get his blood counts and chemistries and urinalysis and see him back for virtual visit in a few weeks.  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  I will also converse with Dr. Peoples at that junction whether he thinks he has anything to offer if this is progressing though I doubt it given that the last intervention was a urologic procedure and the last cystoscopy/TURBT in October had no malignancy and the liver lesions are currently being called liver cysts albeit I am skeptical as to whether those are truly benign but they are certainly not growing.    -3/4/2022 CT angiogram of chest/CT abdomen pelvis for evaluation of dizziness and surveillance of thoracic aortic aneurysm and appendiceal carcinoma.  Thoracic ascending aortic ectasia 4 x 4 cm stable.  Small nodularity left upper lobe stable from September.  Stable small hepatic hypodensities status post cholecystectomy.  Right external iliac soft tissue fullness now 3 x 3.9 cm previously 2 x 2.3 cm concerning for enlarging adenopathy with symmetric nonenlarged inguinal nodes stable.  CBC unremarkable.  CMP unremarkable save for potassium 5.4.  CEA 18.2.  1-3 red cells and white cells per milliliter of urine.    -3/16/2022 office visit with Dr. Portillo Peoples.  He reviewed the CT from 3/4/2022.  He notes that the laminectomy from 6 weeks prior has not helped his \"hip pain\".  Given the location of this recurrence previously 2 x 2.3 cm now 3 x 3.9 cm in the external iliac area concerning for enlarging adenopathy with symmetric nonenlarged inguinal nodes stable, " the mass appears unresectable with involvement of muscle and vasculature.    -3/24/2022 [facility] medical oncology follow-up virtual visit: Pain is still significant.  We will get him in hopefully in the next day to [facility] for palliative care.  Have spoken with Dr. Peoples and no surgery.  I have spoken with Dr. Moody who thinks palliative radiation while not likely to shrink tumor dramatically may help pain considerably and we will get him there.  We will get him to bring the discs to our [place] office and asked them to bring that back to [place] to get scanned in for our invasive radiologist to look at for us to get CT-guided biopsy of this node in the right lower quadrant and send that for [facility] MI profile once the pathology is obtained to hopefully find high tumor mutational burden or other molecular targets that might give us some options.  Apart from that, as stated multiple times, this is not a highly chemotherapy sensitive tumor and I am not sure I would palliate him well but, when I see him back in early June with CAT scans prior to return and post radiation, if he is not getting relief and wants to try a 5-FU based regimen plus or minus Avastin I would be okay with that but he knows it does not alter survival 1 way or the other and we are simply trying to palliate this inexorable process.  No other surgical options.    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  Mismatch repair proficient,NTRK1/2/3 fusion not detected.  BRAF V600E negative.  PD-L1 Negative, 1+, 5%.  PTEN Positive, 1+, 100%. MLH1 positive/3+, 100%. MSH2 positive/3+, 100%. MSH6 positive/3+, 100%. PMS2 positive/3+,100%.    -5/6/2022 last radiation    - 5/25/2022 CT chest abdomen pelvis with contrast at [place] [facility] shows nothing remarkable on the chest.  Stable liver cysts.  Focal soft tissue right iliac region appears to be increasing in  size now 7.2 cm previously 3.9 cm with some mass-effect on surrounding structures with several stable inguinal borderline nodes.    -6/6/2022 [facility] medical oncology follow-up visit: Now 1 month out from radiation just starting to get a little bit of improvement in his abdominal discomfort.  Slight growth on CT but some of that may be postradiation change this close to radiation and, as I stated in my note in March, it is not clear that 5-FU based therapy plus or minus Avastin is going to palliate much given its relatively insensitivity to chemotherapy and it certainly does not improve survival.  To that end, we will plan on just repeating his CAT scans again in about 6 weeks and if the pain is worsening and/or this continues to grow then I will probably give him Avastin FOLFOX.  If everything is stable and pain is controlled we will go continue watchful waiting with imaging about every 3 months from that point forward.    -7/11/2022 [facility] medical oncology follow-up: His 7/7/2022 CT chest abdomen pelvis showed stable right external iliac low-attenuation which had been previously growing and thickening of the distal descending and sigmoid colon with perhaps a small right iliac borderline 1 cm node.  Symptomatically improved post radiation.  For now we will hold on Avastin FOLFOX as the benefit of such is questionable with this histologic subtype and there are no actionable molecular targets.  Repeat CTs prior to return in 3 months.    -11/1/2022 [facility] medical oncology follow-up: 10/19/2022 CT chest abdomen pelvis with contrast [place] [facility] compared to 7/7/2022 shows no significant change or evidence of progression.  Clinically quiescent.  We will repeat CTs in 6 months, sooner as symptoms dictate.    -5/19/2023 CT chest abdomen pelvis [place] [facility] compared to 10/19/2022 showed no evidence of disease progression in the chest.  Nodular density adjacent to the descending colon 6 mm nonspecific.   Otherwise unchanged appearance of hepatic metastatic nodules, right iliac adenopathy, and right pelvic mass.    -5/23/2023 [facility] medical oncology follow-up: I reviewed his report of CT chest abdomen pelvis from [place] 4 days ago showing no evidence of progression.  So area near the descending colon is nonspecific and would not change anything at this junction.  His October CT had not changed from July and the current one has not changed significantly since October.  We will continue to see palliative care for his right pelvic pain.  If leg swelling significantly worsens we could check Doppler and assuming no clot consider vascular intervention but as the thigh has not significantly changed over time nor is there significant tenderness in the calf I am doubtful that this is clot and is likely just due to the pelvic mass.  Vascular surgical intervention should swelling worsen is also an option but I would not want a stent placed through this area in the face malignancy particularly a mucinous variety that would make him prone towards clotting.  If the leg worsens I would get his Doppler and consider vascular intervention or anticoagulation as dictated but clinically stable so will not investigate further at this junction.  I will repeat his imaging again in 6 months.    -11/20/2023 CT chest, abdomen and pelvis shows stable appearances of the chest with no evidence of metastatic disease.  Abdomen and pelvis reveals small interval decrease in size of the previously demonstrated peritoneal nodule adjacent to the descending colon.  No new suspicious omental/peritoneal soft tissue nodules or masses.  Stable right iliac adenopathy as well as a soft tissue mass along the right external iliac vasculature along the right pelvic sidewall.  -11/29/2023 [facility] Oncology clinic follow-up: Current CT chest, abdomen and pelvis shows stable findings, no evidence of progression.  He has no new worrisome symptoms however he does  still have swelling in his right thigh and lower pelvic pain for which she sees palliative care.  He states that it seems slightly more pronounced over the last few weeks, he has a follow-up with palliative care and will discuss further with him.  We reviewed recommendations discussed at his last visit with Dr. Delcid that as long as the swelling did not worsen significantly then we would hold off on considering any vascular intervention.  We will continue to monitor, Jose Antonio understands to let us know if he has any changes otherwise we will plan on repeating CT chest, abdomen and pelvis prior to return in 6 months and I have ordered those today.  His blood pressure was running high on arrival today, I did recheck it manually and it was 150/100.  He reports that he took his blood pressure earlier today at home and it was 140s over 85 or so.  I asked him to recheck when he got home and monitor and if his diastolic continued to run over 90 to get in with Dr. Dial for evaluation.    - 5/20/2023 CT chest abdomen pelvis [place] [facility] compared to November shows 10 mm nodule left lower lobe and right lower lobe.  Anterior lobe pleural-based nodule unchanged.  6.1 x 4 cm right pelvic sidewall stable.  12 mm right common iliac node stable.  No ascites.    -5/28/2024 [facility] oncology clinic follow-up: No signs or symptoms of recurrence.  3 times in the last 6 months he had a 6-hour period of crampy abdominal pain that subsequently subsided.  Potentially could have been having some obstructive symptomatology but none on imaging.  If this happens more frequently or it lasts he will let us know and also Dr. Peoples but would also go towards a more soft diet during those episodes and try FiberCon.  Otherwise imaging of the abdomen is stable but there is a couple of nodules in the lung that appeared new albeit small.  This is a very low-grade process and systemic therapies are not very effective and he is asymptomatic so we will  just repeat CT chest in 3 months.  If this grows then we will consider getting tissue as it would be a little odd for this low-grade process to show up in the lung.    -8/28/2024 [place] [facility] chest with contrast compared to May 2024 shows left lower lobe nodule 16 mm compared to 10 mm with right lower lobe nodule 16 mm previously 10 mm and small subpleural right upper lobe nodule stable most likely benign intrapulmonary node and small stable nodule right lower lobe likely intrapulmonary.  Several small low-density liver lesions likely cysts.    -9/3/2024 [facility] oncology clinic follow-up: No signs or symptoms of recurrence 1 lung nodule in each lung base has grown.  Other nodules stable.  High likelihood for recurrence.  Will get CT abdomen pelvis and then get him to Dr. Santos for consideration of navigational bronchoscopy/Ion biopsy these lesions and might consider CyberKnife for which I will get him to Dr. Moody for opinion and coordination with Dr. Santos pending results of CT abdomen pelvis.  If on the other hand his CT abdomen pelvis shows additional sites of progression beyond the lung then we will likely go back to systemic therapy of which he has very little likelihood to respond but I would like to get tissue for molecular testing.     -9/24/2024 [facility] abdominal pelvic CT with contrast compared to May 2024 showed continued decrease in size of hepatic fat necrosis left lower quadrant and decrease in right common iliac and external iliac adenopathy and no new metastatic disease.    -9/25/2024 robotic bronchoscopy preparative CT chest    -9/26/2024 [facility] hematology oncology follow-up: Reviewed recent CT abdomen with no clear-cut malignancy and continued regression fat necrosis left lower quadrant with decreasing adenopathy in the right common and external iliac nodes.  CT chest done yesterday has not been read but is a noncontrast robotic preparative CT to be used by Dr. Santos  for navigational bronchoscopy on October 2.  Will get him back to me around October 10 and Dr. Moody in the interim for opinion as to CyberKnife particularly if paucimetastatic malignancy proven.    - 10/2/2024 bronchoscopy Dr. Santos Ion robot platform biopsy 2 cm nodule middle one third of the left lower lobe.  FNA left lower lobe, bronchial brush, transbronchial needle aspirate right lower lobe and right lower lobe brush all show adenocarcinoma consistent with prior pathology.  Left lower lobe lung biopsy and right lower lobe lung biopsy both show adenocarcinoma with extensive extracellular mucin CDX2 strongly positive.  Negative for CK7 and patchy CK20 positivity.  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 Moody plans to treat right lung metastasis [address] in 3 fractions and then pending tolerance will reevaluate shortly thereafter for treatment to the left lung.    -10/10/2024 [facility] hematology oncology follow-up: I reviewed with him the bronchoscopy and path reports and the note from Dr. Moody.  I will have him see my nurse practitioner back in 6 weeks to make sure he is having no new symptoms and at that point she will order a follow-up CT chest abdomen pelvis about 8 weeks out from the end of radiation.  I will get [facility] MI profile on the lung biopsy.    -10/27/2024 [facility] MI profile: KRAS pathogenic variant Exon 2/p.G12D therefore lack of benefit to cetuximab or panitumumab.  PD-L1 negative, 0%.  No actionable biomarkers identified.  Microsatellite stable, TMB low.   Genes with pathogenic or likely pathogenic alterations: ANNA, CDKN2A, CREBBP, KRAS.Potential clinical trials available, see full report.    -11/8/2024 completed  radiation to the left lung  -12/6/2024 completed radiation to the right lung    -12/12/2024 [facility] oncology clinic follow-up: completed radiation to both lungs, left lung 11/8/2024 and more recently completed radiation to the right lung 12/6/2024.  He had some fatigue but is starting to feel better.  I have ordered restaging CT scans about 8 weeks out from his completion of radiation which will put this late January.  He has a follow-up with radiation oncology on 1/10/2024.  We will see him back after his CT scans.    -1/29/2025 CT chest abdomen and pelvis shows stable right lower lobe nodule and slightly decreased left lower lobe nodule.  Stable iliac adenopathy in the pelvis.  No new sites of disease.    -2/4/2025 [facility] oncology clinic follow-up: Good response to radiation.  Feeling fit.  Will repeat imaging in mid May    - 5/20/2025 CT chest abdomen pelvis compared to January 2025 shows postradiation changes right upper lobe with slight decrease in pulmonary nodule with postradiation changes left upper and lower lobes with left lower lobe nodule no longer visible.  Slight increase in size right upper lobe pulmonary subpleural nodule.  Decrease in right external iliac node since January 2025 and no change in right common iliac node though some increased hypoattenuation adjacent right psoas muscle concerning for disease.  CBC and CMP unremarkable save for alkaline phosphatase 122 and glucose 112.    - 5/27/2025 [facility] oncology clinic follow-up: Radiated area as of lung responding well.  New subpleural and right psoas uptake subtle but somewhat worrisome.  No plans for systemic treatment in the absence of bulky or symptomatic progressive disease with consideration for radiation to additional spots of paucimetastatic disease.  With the right hip pain, will get plain x-ray of the hip today and he sees Dr. Moody in 2 days.  If he proceeds something orthopedic he can make referral.  If nonorthopedic and related to  the psoas uptake, will defer to him on the possibility of palliative radiation though he has had some radiation to the region previously.  Otherwise I will see him back in 3 months with CT scans and labs just prior to return    Total time of care today inclusive of time spent today prior to patient's arrival reviewing interval data and images and reports thereof and during visit translating patient putting forth the plan as outlined and arranging follow-up studies took 50 minutes patient care time throughout the day today.  Cy Delcid MD    05/27/2025

## 2025-05-27 NOTE — LETTER
May 27, 2025     Saul Davidson MD  [address] [place] [address]    Patient: Esteban Tse   YOB: 1955   Date of Visit: 5/27/2025     Dear Saul Davidson MD:       Thank you for referring Esteban Tse to me for evaluation. Below are the relevant portions of my assessment and plan of care.    If you have questions, please do not hesitate to call me. I look forward to following Esteban along with you.         Sincerely,        Cy Delcid MD        CC: MD Ike Morgan MD Robert O Mitchell, MD Jonathan Michael Feddock, MD Raul Edmundo Heredia, MD Danae Stephens, DO Bhavin Campbell Lee G, MD  05/27/25 0741  Sign when Signing Visit  CHIEF COMPLAINT: Increased right hip pain over the last month    Problem List:  Oncology/Hematology History Overview Note   1.  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  Pathologic stage IIa with T3 extension into the base of the appendix through the muscularis propria but not into the serosal surface.  16 nodes negative.  Colonoscopy December 2017 negative postop.Laparoscopic diagnostic peritoneal biopsy confirming disease in the bladder dome February 2020.  Began Folfiri.  June 2020 Dr. Peoples excised right lobe of liver and left lobe liver lesion, omentectomy and resection of pelvic peritoneal nodules, ileocolectomy with ileal colostomy and intraperitoneal HIPEC with mitomycin.  Dr. Lawler performed right ureteral stent placement and partial cystectomy.  With large fungating mass, 4/22/2021 had exploratory laparotomy with lysis of adhesions and right pelvic sidewall excisional biopsy with open partial cystectomy.  No malignancy and peritoneal fluid.  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  Margins less than 1 mm.  October 2021 cystoscopy without malignancy.  TURBT 10/11/2021 did not  show malignancy.  March 2022 Dr. Peoples for 3.9 cm external iliac recurrent adenopathy 6 weeks out from laminectomy which did not help pain was determined to be an unresectable recurrence with muscle and vascular involvement.  Radiation with Dr. Moody ended 5/6/2022.  Disease stabilized and pain improved. Paucimetastatic disease progression to the lung biopsy-proven, treated with radiation to both lungs ending 12/6/2024 with good response on 1/29/2025 CT and no new sites of disease.    -12/30/2019 medical oncology office visit: The patient had been on surveillance since surgery December 2017.  CT scans had been negative up until 12/30/2019 CT chest abdomen and pelvis that showed subtle soft tissue density surrounding surgical clips in the right side of the pelvis along with soft tissue nodule at the anterior wall of the bladder concerning for recurrent disease.  Patient sent back to Dr. Davidson for colonoscopy.    -2/7/2020 patient was referred to Dr. Peoples at the [facility], he underwent diagnostic laparoscopic and peritoneal biopsies that confirmed recurrent disease with biopsy of bladder dome nodule showing adenocarcinoma with mucinous features.  Port was placed at this time also.    -2/25/2020 began FOLFIRI    -4/21/2020 patient having increased fatigue, FOLFIRI dose reduced by 10%.    -5/19/2020 cycle 7 FOLFIRI    -6/18/2020 Dr. Peoples performed exploratory laparotomy with excision of right lobe liver lesion and left lobe liver lesion, omentectomy, resection of pelvic peritoneal nodules, ileocolectomy with creation of ileal colostomy, hyperthermic intraperitoneal chemotherapy with mitomycin-C at 42 °C for deep tissue penetration for 90 minutes.  Dr. Perdue performed partial cystectomy with right ureteral stent placement    -8/5/2020 CT abdomen pelvis with contrast shows small soft tissue nodule anterior aspect of bladder stable.  New small amount of ascites as well as a new small left pleural effusion.   "Creatinine 0.75 with hemoglobin 11.1 otherwise unremarkable CBC and CMP.    -2/15/2021 CT abdomen pelvis with contrast compared to 8/5/2020 shows enlarging soft tissue mass 4.2 cm over the bladder fundus and there is a calcification along the base of the urinary bladder.  Small stable multiple hypoattenuating indeterminate liver lesions.  Similar degree of ascites.    -2/23/2021 CT chest shows no evidence of metastasis with ascending aorta 42 mm.    -3/8/2021 follow-up with Dr. Portillo Peoples we reviewed his CTs suggested team effort resection with Dr. Perdue    -3/16/2021 follow-up with Dr. Perdue.  He plans for open partial cystectomy, possible ureteral implants, cystoscopy and stent placement in concert with Dr. Peoples.    -4/22/2021 cystoscopy (after prior office cystoscopy showed large fungating mass) with bilateral ureteral catheters, exploratory laparotomy, lysis of adhesions, right pelvic sidewall excisional biopsy, open partial cystectomy Dr. Iek Perdue. Peritoneal fluid showed predominant inflammatory reactive mesothelial cells with no malignancy. 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 Carcinoma was less than 1 mm from the lateral resection margins.    -5/18/2021 [facility] medical oncology follow-up visit: I reviewed his hospital notes, operative notes, and pathology report as above and went over this with the patient.  The general consensus from retrospective as well as a few prospective data over the last couple of decades with this low-grade malignancy does not show any profound benefit from \"adjuvant\" chemotherapy in this current setting.  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  There is no standard follow-up but general guidelines suggest following up as typical colon cancer.  I will repeat his CT chest abdomen pelvis now to reestablish his baseline postoperative imaging and have him see my nurse practitioner back in a couple of " weeks to make sure there is no nia evidence of measurable residual disease.  Assuming this just shows postoperative changes, I would simply repeat scans again in 3 months or sooner as symptoms dictate.  If he has no evidence of persistent or metastatic disease on current imaging, then when he sees my nurse practitioner back she will also review what they say about his ascending aorta which was 42 mm in February and sent him to Dr. Evangelist Anthony for an opinion as to whether any intervention is needed relative to the aorta.  Obviously if his cancer is out of control on the upcoming scans then the aorta is a moot point.    -6/10/2021 [facility] oncology clinic follow-up: CT scans show no evidence of disease recurrence in the chest, abdomen or pelvis.  Ascending aortic aneurysm stable at 41 mm.  Referral made to Dr. Anthony, cardiothoracic surgeon for management and evaluation of a sending aortic aneurysm.  Plan to repeat CT scans in 3 months.    -9/14/2021 [facility] medical oncology follow-up visit: I reviewed images and reports of 9/10/2021 CT chest abdomen pelvis with contrast which shows under distended urinary bladder with mild asymmetric wall thickening dome and left lateral wall with a small 9 mm polypoid lesion in the bladder.  There is stable 2.3 cm right external iliac and a few other subcentimeter scattered nodes in the root of the small bowel mesentery and retroperitoneum.  Postsurgical right hemicolectomy changes.  Stable low-attenuation lesions in the liver unchanged.  I will have him collect his discs from [facility] to take Dr. Perdue to decide whether to proceed with cystoscopy or just continue watchful waiting.  There is no major changes and I suspect we are looking at postoperative changes and we shall see him for video visit in a few weeks to see what urology at  decides.    -10/11/2021 cystoscopy Dr. Perdue showed marked acute and chronic inflammation but no evidence of malignancy.  Muscularis  propria present.    -10/14/2021 [facility] medical oncology virtual visit: I reviewed reports of pathology from cystoscopy 10/11/2021 and went over this with patient.  He is feeling fairly fit.  I will repeat his CT chest abdomen pelvis prior to return in 3 months and if in the interim, when he follows up with Dr. Perdue in the next couple of weeks post cystoscopy, plans are made for further cystoscopy before I see him back and any malignancy is found, I would ask the patient and Dr. Perdue to touch base as I would not know of such without that contact as the information comes into our chart without notification now that epic is being used by [facility].  Assuming no further biopsies in the interim, I will simply repeat his scans in 3 months to follow what I suspected and is now confirmed to be inflammatory changes.  From the aneurysm standpoint, he has follow-up scanning March 2022 arranged by Dr. Anthony and he will follow him up after that.    -11/24/2021 CT chest abdomen pelvis compared to 6/7/2021 outside imaging shows stable 4.1 cm ascending thoracic aorta.  No lung nodules.  Liver homogenous with bilateral cysts.  No adenopathy.  Thickened sigmoid colon and rectum suggestive of mild colitis or postradiation change.  No pelvic adenopathy.  Bony structures degenerative in the spine.  Some soft tissue anterior to the acetabulum on the right and extending along the medial aspect right pelvic sidewall 2.7 x 5.6 may represent intramuscular process versus adenopathy which could not be ruled out.  No prior study views available.    -1/4/2022 follow-up Dr. Ike Perdue [facility].  Per his note, he had TURBT 10/11/2021.  He had been placed on antibiotics for UTI for preop preparation for back surgery planned in 2 weeks.  Denies any mucus in his urine.  Some microhematuria on urinalysis this day.  No gross hematuria or dysuria.  The October 2021 TURBT did not reveal anything malignant on pathology.  There may be irritation or  inflammation secondary to sutures in the bladder following partial cystectomy.  Patient asymptomatic from a urinary standpoint.  Recommended following up with [facility] with no plans for further urology follow-up.    -2/4/2022 [facility] medical oncology telehealth follow-up visit: I reviewed his November images and reports with the patient as well as with Dr. Gm Moody and the note from Dr. Perdue from 1 month ago.  I do suspect this pelvic sidewall abnormality in November represents persistent disease but as I have stated in prior dictations, the data on palliative or overall survival benefit of systemic 5-FU based combination chemotherapy and/or Avastin does not show a clear-cut survival advantage to chemotherapy for asymptomatic disease.  He is having some back pain and had spine decompression a couple of weeks ago without much benefit.  This is an area that may potentially be radiated but I will check his CT chest (angiogram of chest) abdomen and pelvis and get his blood counts and chemistries and urinalysis and see him back for virtual visit in a few weeks.  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  I will also converse with Dr. Peoples at that junction whether he thinks he has anything to offer if this is progressing though I doubt it given that the last intervention was a urologic procedure and the last cystoscopy/TURBT in October had no malignancy and the liver lesions are currently being called liver cysts albeit I am skeptical as to whether those are truly benign but they are certainly not growing.    -3/4/2022 CT angiogram of chest/CT abdomen pelvis for evaluation of  "dizziness and surveillance of thoracic aortic aneurysm and appendiceal carcinoma.  Thoracic ascending aortic ectasia 4 x 4 cm stable.  Small nodularity left upper lobe stable from September.  Stable small hepatic hypodensities status post cholecystectomy.  Right external iliac soft tissue fullness now 3 x 3.9 cm previously 2 x 2.3 cm concerning for enlarging adenopathy with symmetric nonenlarged inguinal nodes stable.  CBC unremarkable.  CMP unremarkable save for potassium 5.4.  CEA 18.2.  1-3 red cells and white cells per milliliter of urine.    -3/16/2022 office visit with Dr. Portillo Peoples.  He reviewed the CT from 3/4/2022.  He notes that the laminectomy from 6 weeks prior has not helped his \"hip pain\".  Given the location of this recurrence previously 2 x 2.3 cm now 3 x 3.9 cm in the external iliac area concerning for enlarging adenopathy with symmetric nonenlarged inguinal nodes stable, the mass appears unresectable with involvement of muscle and vasculature.    -3/24/2022 [facility] medical oncology follow-up virtual visit: Pain is still significant.  We will get him in hopefully in the next day to [facility] for palliative care.  Have spoken with Dr. Peoples and no surgery.  I have spoken with Dr. Moody who thinks palliative radiation while not likely to shrink tumor dramatically may help pain considerably and we will get him there.  We will get him to bring the discs to our [place] office and asked them to bring that back to [place] to get scanned in for our invasive radiologist to look at for us to get CT-guided biopsy of this node in the right lower quadrant and send that for Gab miguel once the pathology is obtained to hopefully find high tumor mutational burden or other molecular targets that might give us some options.  Apart from that, as stated multiple times, this is not a highly chemotherapy sensitive tumor and I am not sure I would palliate him well but, when I see him back in early June " with CAT scans prior to return and post radiation, if he is not getting relief and wants to try a 5-FU based regimen plus or minus Avastin I would be okay with that but he knows it does not alter survival 1 way or the other and we are simply trying to palliate this inexorable process.  No other surgical options.    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  Mismatch repair proficient,NTRK1/2/3 fusion not detected.  BRAF V600E negative.  PD-L1 Negative, 1+, 5%.  PTEN Positive, 1+, 100%. MLH1 positive/3+, 100%. MSH2 positive/3+, 100%. MSH6 positive/3+, 100%. PMS2 positive/3+,100%.    -5/6/2022 last radiation    - 5/25/2022 CT chest abdomen pelvis with contrast at [address] [facility] shows nothing remarkable on the chest.  Stable liver cysts.  Focal soft tissue right iliac region appears to be increasing in size now 7.2 cm previously 3.9 cm with some mass-effect on surrounding structures with several stable inguinal borderline nodes.    -6/6/2022 [facility] medical oncology follow-up visit: Now 1 month out from radiation just starting to get a little bit of improvement in his abdominal discomfort.  Slight growth on CT but some of that may be postradiation change this close to radiation and, as I stated in my note in March, it is not clear that 5-FU based therapy plus or minus Avastin is going to palliate much given its relatively insensitivity to chemotherapy and it certainly does not improve survival.  To that end, we will plan on just repeating his CAT scans again in about 6 weeks and if the pain is worsening and/or this continues to grow then I will probably give him Avastin FOLFOX.  If everything is stable and pain is controlled we will go continue watchful waiting with imaging about every 3 months from that point forward.    -7/11/2022 [facility] medical oncology follow-up: His 7/7/2022 CT chest abdomen pelvis showed stable right  external iliac low-attenuation which had been previously growing and thickening of the distal descending and sigmoid colon with perhaps a small right iliac borderline 1 cm node.  Symptomatically improved post radiation.  For now we will hold on Avastin FOLFOX as the benefit of such is questionable with this histologic subtype and there are no actionable molecular targets.  Repeat CTs prior to return in 3 months.    -11/1/2022 [facility] medical oncology follow-up: 10/19/2022 CT chest abdomen pelvis with contrast [place] [facility] compared to 7/7/2022 shows no significant change or evidence of progression.  Clinically quiescent.  We will repeat CTs in 6 months, sooner as symptoms dictate.    -5/19/2023 CT chest abdomen pelvis [place] [facility] compared to 10/19/2022 showed no evidence of disease progression in the chest.  Nodular density adjacent to the descending colon 6 mm nonspecific.  Otherwise unchanged appearance of hepatic metastatic nodules, right iliac adenopathy, and right pelvic mass.    -5/23/2023 [facility] oncology follow-up: I reviewed his report of CT chest abdomen pelvis from [place] 4 days ago showing no evidence of progression.  So area near the descending colon is nonspecific and would not change anything at this junction.  His October CT had not changed from July and the current one has not changed significantly since October.  We will continue to see palliative care for his right pelvic pain.  If leg swelling significantly worsens we could check Doppler and assuming no clot consider vascular intervention but as the thigh has not significantly changed over time nor is there significant tenderness in the calf I am doubtful that this is clot and is likely just due to the pelvic mass.  Vascular surgical intervention should swelling worsen is also an option but I would not want a stent placed through this area in the face malignancy particularly a mucinous variety that would make him prone  towards clotting.  If the leg worsens I would get his Doppler and consider vascular intervention or anticoagulation as dictated but clinically stable so will not investigate further at this junction.  I will repeat his imaging again in 6 months.    -11/20/2023 CT chest, abdomen and pelvis shows stable appearances of the chest with no evidence of metastatic disease.  Abdomen and pelvis reveals small interval decrease in size of the previously demonstrated peritoneal nodule adjacent to the descending colon.  No new suspicious omental/peritoneal soft tissue nodules or masses.  Stable right iliac adenopathy as well as a soft tissue mass along the right external iliac vasculature along the right pelvic sidewall.  -11/29/2023 [facility] follow-up: Current CT chest, abdomen and pelvis shows stable findings, no evidence of progression.  He has no new worrisome symptoms however he does still have swelling in his right thigh and lower pelvic pain for which she sees palliative care.  He states that it seems slightly more pronounced over the last few weeks, he has a follow-up with palliative care and will discuss further with him.  We reviewed recommendations discussed at his last visit with Dr. Delcid that as long as the swelling did not worsen significantly then we would hold off on considering any vascular intervention.  We will continue to monitor, Jose Antonio understands to let us know if he has any changes otherwise we will plan on repeating CT chest, abdomen and pelvis prior to return in 6 months and I have ordered those today.  His blood pressure was running high on arrival today, I did recheck it manually and it was 150/100.  He reports that he took his blood pressure earlier today at home and it was 140s over 85 or so.  I asked him to recheck when he got home and monitor and if his diastolic continued to run over 90 to get in with Dr. Dial for evaluation.    - 5/20/2023 CT chest abdomen pelvis [place]  [facility] compared to November shows 10 mm nodule left lower lobe and right lower lobe.  Anterior lobe pleural-based nodule unchanged.  6.1 x 4 cm right pelvic sidewall stable.  12 mm right common iliac node stable.  No ascites.    -5/28/2024 [facility] oncology clinic follow-up: No signs or symptoms of recurrence.  3 times in the last 6 months he had a 6-hour period of crampy abdominal pain that subsequently subsided.  Potentially could have been having some obstructive symptomatology but none on imaging.  If this happens more frequently or it lasts he will let us know and also Dr. Peoples but would also go towards a more soft diet during those episodes and try FiberCon.  Otherwise imaging of the abdomen is stable but there is a couple of nodules in the lung that appeared new albeit small.  This is a very low-grade process and systemic therapies are not very effective and he is asymptomatic so we will just repeat CT chest in 3 months.  If this grows then we will consider getting tissue as it would be a little odd for this low-grade process to show up in the lung.    -8/28/2024 [place] [facility] chest with contrast compared to May 2024 shows left lower lobe nodule 16 mm compared to 10 mm with right lower lobe nodule 16 mm previously 10 mm and small subpleural right upper lobe nodule stable most likely benign intrapulmonary node and small stable nodule right lower lobe likely intrapulmonary.  Several small low-density liver lesions likely cysts.    -9/3/2024 [facility] oncology clinic follow-up: No signs or symptoms of recurrence 1 lung nodule in each lung base has grown.  Other nodules stable.  High likelihood for recurrence.  Will get CT abdomen pelvis and then get him to Dr. Santos for consideration of navigational bronchoscopy/Ion biopsy these lesions and might consider CyberKnife for which I will get him to Dr. Moody for opinion and coordination with Dr. Santos pending results of CT abdomen pelvis.  If on the other  hand his CT abdomen pelvis shows additional sites of progression beyond the lung then we will likely go back to systemic therapy of which he has very little likelihood to respond but I would like to get tissue for molecular testing.     -9/24/2024 [facility] abdominal pelvic CT with contrast compared to May 2024 showed continued decrease in size of hepatic fat necrosis left lower quadrant and decrease in right common iliac and external iliac adenopathy and no new metastatic disease.    -9/25/2024 robotic bronchoscopy preparative CT chest    -9/26/2024 [facility] hematology oncology follow-up: Reviewed recent CT abdomen with no clear-cut malignancy and continued regression fat necrosis left lower quadrant with decreasing adenopathy in the right common and external iliac nodes.  CT chest done yesterday has not been read but is a noncontrast robotic preparative CT to be used by Dr. Santos for navigational bronchoscopy on October 2.  Will get him back to me around October 10 and Dr. Moody in the interim for opinion as to CyberKnife particularly if paucimetastatic malignancy proven.    - 10/2/2024 bronchoscopy Dr. Santos Ion robot platform biopsy 2 cm nodule middle one third of the left lower lobe.  FNA left lower lobe, bronchial brush, transbronchial needle aspirate right lower lobe and right lower lobe brush all show adenocarcinoma consistent with prior pathology.  Left lower lobe lung biopsy and right lower lobe lung biopsy both show adenocarcinoma with extensive extracellular mucin CDX2 strongly positive.  Negative for CK7 and patchy CK20 positivity.  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 Moody plans to treat right lung  metastasis [address] in 3 fractions and then pending tolerance will reevaluate shortly thereafter for treatment to the left lung.    -10/10/2024 [facility] [place] operated by [facility] hematology oncology follow-up: I reviewed with him the bronchoscopy and path reports and the note from Dr. Moody.  I will have him see my nurse practitioner back in 6 weeks to make sure he is having no new symptoms and at that point she will order a follow-up CT chest abdomen pelvis about 8 weeks out from the end of radiation.  I will get [facility]is MI profile on the lung biopsy.    -10/27/2024 [facility] profile: KRAS pathogenic variant Exon 2/p.G12D therefore lack of benefit to cetuximab or panitumumab.  PD-L1 negative, 0%.  No actionable biomarkers identified.  Microsatellite stable, TMB low.   Genes with pathogenic or likely pathogenic alterations: ANNA, CDKN2A, CREBBP, KRAS.Potential clinical trials available, see full report.    -11/8/2024 completed radiation to the left lung  -12/6/2024 completed radiation to the right lung    -12/12/2024 [facility], [place], operated by [facility] oncology clinic follow-up: completed radiation to both lungs, left lung 11/8/2024 and more recently completed radiation to the right lung 12/6/2024.  He had some fatigue but is starting to feel better.  I have ordered restaging CT scans about 8 weeks out from his completion of radiation which will put this late January.  He has a follow-up with radiation oncology on 1/10/2024.  We will see him back after his CT scans.    -1/29/2025 CT chest abdomen and pelvis shows stable right lower lobe nodule and slightly decreased left lower lobe nodule.  Stable iliac adenopathy in the pelvis.  No new sites of disease.    -2/4/2025 [facility], [place] by [facility] oncology clinic follow-up: Good response to radiation.  Feeling fit.  Will repeat imaging in mid May    - 5/20/2025 CT chest abdomen pelvis compared to January 2025 shows postradiation changes right upper lobe with slight decrease in pulmonary nodule with postradiation changes left upper and lower  lobes with left lower lobe nodule no longer visible.  Slight increase in size right upper lobe pulmonary subpleural nodule.  Decrease in right external iliac node since January 2025 and no change in right common iliac node though some increased hypoattenuation adjacent right psoas muscle concerning for disease.  CBC and CMP unremarkable save for alkaline phosphatase 122 and glucose 112.    - 5/27/2025 [facility], [place], operated by [facility] oncology clinic follow-up: Radiated area as of lung responding well.  New subpleural and right psoas uptake subtle but somewhat worrisome.  No plans for systemic treatment in the absence of bulky or symptomatic progressive disease with consideration for radiation to additional spots of paucimetastatic disease.  With the right hip pain, will get plain x-ray of the hip today and he sees Dr. Moody in 2 days.  If he proceeds something orthopedic he can make referral.  If nonorthopedic and related to the psoas uptake, will defer to him on the possibility of palliative radiation though he has had some radiation to the region previously.  Otherwise I will see him back in 3 months with CT scans and labs just prior to return     Mucinous adenocarcinoma of appendix   12/5/2017 Initial Diagnosis    Mucinous adenocarcinoma of appendix found at the time of appendectomy 12/2017 in the face of appendicitis.  Pathologic stage IIa with T3 extension into the base of the appendix through the muscularis propria but not into the serosal surface.  16 nodes negative.  Colonoscopy December 2017 negative postop     12/5/2017 Surgery    Surgery       Procedure:  Appendectomy and right hemicolectomy      Completeness of resection:  No evidence of residual tumor     Pathology revealed invasive moderately differentiated mucinous adenocarcinoma.  Right hemicolectomy: Benign colon and small intestine no evidence of carcinoma.  16 benign lymph nodes, 0/16, negative for dysplasia or malignancy.  Tumor size approximately 6 cm.  Grade 2,  moderately differentiated.  Tumor invades through the muscularis profile into the base of appendix but does not extend to the serosal surface.  All margins uninvolved, no lymphovascular invasion identified.  Pathological stage pT3 pN0.         12/8/2017 Imaging    CT of the chest abdomen and pelvis negative.  Baseline CBC WBC 7100, hemoglobin 11.3, hematocrit 34.8%, platelet count 195,000.     3/20/2018 Procedure    Colonoscopy with Dr. Davidson was normal, recommendation for repeat surveillance colonoscopy in 3 years.     6/11/2018 Imaging    CT chest, abdomen and pelvis with no evidence metastatic disease.  CEA 1.1     9/24/2018 Imaging    CT abdomen pelvis showed no acute changes and nothing to suggest recurrence     12/28/2018 Imaging    CT chest, abdomen and pelvis negative. Normal CBC, CMP and CEA 0.7.     6/28/2019 Imaging    CT chest, abdomen and pelvis: No interval change from prior studies.  No recurrent or metastatic disease detected in the chest, abdomen or pelvis.  No acute process.  CEA 0.9     12/30/2019 Imaging    CT chest, abdomen and pelvis: No disease in the chest.  There was noted a subtle soft tissue density, one surrounding surgical clips in the right side of the pelvis and the other a soft tissue nodule intimately associated with the anterior wall of the bladder, which are considered suspicious for recurrent disease.  CEA 1.1.  CMP with normal creatinine 0.9, total bilirubin 1.9, AST 34, ALT 24, alkaline phosphatase 93.  CBC with WBC 6900, hemoglobin 15.9, platelet count 232,000.       1/21/2020 Procedure    Normal colonoscopy with Dr. Davidson.  He has made referral to Dr. Portillo Peoples at .     2/7/2020 Progression    12/30/2019 CT chest, abdomen and pelvis: No disease in the chest.  There are subtle soft tissue densities (1 surrounding surgical clips in the right side of the pelvis and the other a soft tissue nodule intimately associated with the anterior wall of the bladder) which are  considered suspicious for recurrent disease.  CMP with normal creatinine 0.9, total bilirubin 1.9, AST 34, ALT 24, alkaline phosphatase 93.  CBC with WBC 6900, hemoglobin 15.9, platelet count 232,000.  CEA 1.1.  2/7/2020 diagnostic laparoscopy with peritoneal biopsies performed at the [facility] by Dr. Peoples showed no sign of diffuse peritoneal carcinomatosis or liver metastasis.  There was a firm nodule in the superior dome of the bladder, adherent to omentum, as well as right pelvic sidewall nodule adjacent to surgical clips.  Biopsy of bladder dome nodule showed adenocarcinoma with mucinous features.     2/25/2020 -  Chemotherapy    OP COLORECTAL FOLFIRI Irinotecan / Leucovorin / Fluorouracil     5/29/2020 Imaging    CT chest abdomen pelvis shows slight improvement with decreased nodule anterolateral margin of urinary bladder with stable nodularity of the right lower quadrant adjacent to surgical clips and no progressive disease.  Slight hyperemia of the colonic mucosa     6/18/2020 Surgery    Surgery       -6/18/2020 Dr. Peoples performed exploratory laparotomy with excision of right lobe liver lesion and left lobe liver lesion, omentectomy, resection of pelvic peritoneal nodules, ileocolectomy with creation of ileal colostomy, hyperthermic intraperitoneal chemotherapy with mitomycin-C at 42 °C for deep tissue penetration for 90 minutes.  Dr. Perdue performed partial cystectomy with right ureteral stent placement     8/5/2020 Imaging     CT abdomen pelvis with contrast shows small soft tissue nodule anterior aspect of bladder stable.  New small amount of ascites as well as a new small left pleural effusion.  Creatinine 0.75 with hemoglobin 11.1 otherwise unremarkable CBC and CMP     2/15/2021 Imaging    CT abdomen pelvis with contrast compared to 8/5/2020 shows enlarging soft tissue mass 4.2 cm over the bladder fundus and there is a calcification along the base of the urinary bladder.  Small stable multiple  hypoattenuating indeterminate liver lesions.  Similar degree of ascites.     2/23/2021 Imaging    -2/23/2021 CT chest shows no evidence of metastasis with ascending aorta 42 mm.     4/22/2021 Surgery    -4/22/2021 cystoscopy (after prior office cystoscopy showed large fungating mass) with bilateral ureteral catheters, exploratory laparotomy, lysis of adhesions, right pelvic sidewall excisional biopsy, open partial cystectomy Dr. Ike Perdue. Peritoneal fluid showed predominant inflammatory reactive mesothelial cells with no malignancy. 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 Carcinoma was less than 1 mm from the lateral resection margins.     6/7/2021 Imaging    CT chest, abdomen and pelvis: No evidence of metastatic disease within the chest abdomen or pelvis.  Interval resection of enhancing bladder wall mass a small amount of residual enhancing soft tissue, possibly granulation tissue.  Interval resolution of abdominal and pelvic ascites.  Stable dilation of the ascending aorta mid segment measuring up to 41 mm.     9/10/2021 Imaging    CT chest abdomen pelvis with contrast shows under distended urinary bladder with mild asymmetric wall thickening dome and left lateral wall with a small 9 mm polypoid lesion in the bladder.  There is stable 2.3 cm right external iliac and a few other subcentimeter scattered nodes in the root of the small bowel mesentery and retroperitoneum.  Postsurgical right hemicolectomy changes.  Stable low-attenuation lesions in the liver unchanged.     11/24/2021 Imaging    CT chest abdomen pelvis compared to 6/7/2021 outside imaging shows stable 4.1 cm ascending thoracic aorta.  No lung nodules.  Liver homogenous with bilateral cysts.  No adenopathy.  Thickened sigmoid colon and rectum suggestive of mild colitis or postradiation change.  No pelvic adenopathy.  Bony structures degenerative in the  spine.  Some soft tissue anterior to the acetabulum on the right and extending along the medial aspect right pelvic sidewall 2.7 x 5.6 may represent intramuscular process versus adenopathy which could not be ruled out.  No prior study views available.     4/11/2022 - 5/6/2022 Radiation    Radiation OncologyTreatment Course:  Esteban Tse received 5000 cGy in 20 fractions to right pelvis/groin via External Beam Radiation - EBRT.     5/25/2022 Imaging    CT chest abdomen pelvis with contrast at [facility] shows nothing remarkable on the chest.  Stable liver cysts.  Focal soft tissue right iliac region appears to be increasing in size now 7.2 cm previously 3.9 cm with some mass-effect on surrounding structures with several stable inguinal borderline nodes.     10/19/2022 Imaging    - 10/19/2022 CT chest abdomen pelvis with contrast [facility] compared to 7/7/2022 shows no significant change or evidence of progression.     Peritoneal carcinoma   5/14/2020 Initial Diagnosis    Peritoneal carcinoma (HCC)     5/25/2022 Imaging    CT chest abdomen pelvis with contrast at [facility] shows nothing remarkable on the chest.  Stable liver cysts.  Focal soft tissue right iliac region appears to be increasing in size now 7.2 cm previously 3.9 cm with some mass-effect on surrounding structures with several stable inguinal borderline nodes.     Malignant neoplasm metastatic to both lungs   9/12/2024 Initial Diagnosis    Malignant neoplasm metastatic to both lungs     11/6/2024 - 11/8/2024 Radiation    Radiation OncologyTreatment Course:  Esteban Tse received 5400 cGy in 3 fractions to left lung via Stereotactic Radiation Therapy - SRT.     12/3/2024 - 12/6/2024 Radiation    Radiation OncologyTreatment Course:  Esteban Tse received 5400 cGy in 3 fractions to right lung via Stereotactic Radiation Therapy - SRT.         HISTORY OF PRESENT ILLNESS:  The patient is a 69 y.o. male, here for  "follow up on management of mucinous adenocarcinoma of the appendix metastasis status post radiation end of last year to paucimetastatic sites.  New right hip pain over the last month    Past Medical History:   Diagnosis Date   • Aneurysm 2022   • Ascending aortic aneurysm    • Benign hypertension    • Bladder cancer    • Colon cancer    • History of radiation therapy 05/06/2022    right groin/hemipelvis   • Hypercholesteremia 09/18/2023   • Hypertension    • Intermittent palpitations    • Mucinous adenocarcinoma    • Pounding heartbeat    • PVC's (premature ventricular contractions)      Past Surgical History:   Procedure Laterality Date   • APPENDECTOMY  2017    With Partial Colon    • BLADDER SURGERY      with partial colon   • BRONCHOSCOPY WITH ION ROBOTIC ASSIST N/A 10/02/2024    Procedure: BRONCHOSCOPY NAVIGATION WITH ENDOBRONCHIAL ULTRASOUND AND ION ROBOT;  Surgeon: Levi Santos DO;  Location: [facility] ENDOSCOPY;  Service: Robotics - Pulmonary;  Laterality: N/A;  ION CATH #3  CATH [id number]   [id number]  [id number]  CATH GUIDE ID [id number]   • CHOLECYSTECTOMY     • COLON SURGERY     • COLONOSCOPY     • CYBERKNIFE  11/08/2024    LLL lung met   • CYBERKNIFE  12/06/2024    RLL lung met       Allergies   Allergen Reactions   • Levofloxacin Swelling     Lips swellijng   • Tetracycline Swelling     Lips swelling   • Penicillins Other (See Comments)     childhood       Family History and Social History reviewed and changed as necessary    REVIEW OF SYSTEM:   New right hip pain not new for him.    PHYSICAL EXAM:  No jaundice icterus or pallor.  No respiratory distress.    Vitals:    05/27/25 0722   BP: 134/86   Pulse: 66   Resp: 18   Temp: 97.7 °F (36.5 °C)   SpO2: 98%   Weight: 74.8 kg (165 lb)   Height: 185.4 cm (73\")     Vitals:    05/27/25 0722   PainSc: 8    PainLoc: Hip  Comment: right hip          ECOG score: 0           Vitals reviewed.    ECOG: (0) Fully Active - Able to Carry On All Pre-disease Performance " Without Restriction    Lab Results   Component Value Date    HGB 14.2 09/25/2024    HCT 42.2 09/25/2024    MCV 87.2 09/25/2024     09/25/2024    WBC 6.29 09/25/2024    NEUTROABS 4.15 09/25/2024    LYMPHSABS 1.47 09/25/2024    MONOSABS 0.55 09/25/2024    EOSABS 0.08 09/25/2024    BASOSABS 0.03 09/25/2024       Lab Results   Component Value Date    GLUCOSE 109 (H) 09/25/2024    BUN 11 09/25/2024    CREATININE 1.09 09/25/2024     09/25/2024    K 4.3 09/25/2024     09/25/2024    CO2 29.0 09/25/2024    CALCIUM 9.4 09/25/2024    PROTEINTOT 6.9 09/25/2024    ALBUMIN 4.5 09/25/2024    BILITOT 1.0 09/25/2024    ALKPHOS 103 09/25/2024    AST 25 09/25/2024    ALT 19 09/25/2024             ASSESSMENT & PLAN:  1.  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  Pathologic stage IIa with T3 extension into the base of the appendix through the muscularis propria but not into the serosal surface.  16 nodes negative.  Colonoscopy December 2017 negative postop.Laparoscopic diagnostic peritoneal biopsy confirming disease in the bladder dome February 2020.  Began Folfiri.  June 2020 Dr. Peoples excised right lobe of liver and left lobe liver lesion, omentectomy and resection of pelvic peritoneal nodules, ileocolectomy with ileal colostomy and intraperitoneal HIPEC with mitomycin.  Dr. Lawler performed right ureteral stent placement and partial cystectomy.  With large fungating mass, 4/22/2021 had exploratory laparotomy with lysis of adhesions and right pelvic sidewall excisional biopsy with open partial cystectomy.  No malignancy and peritoneal fluid.  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  Margins less than 1 mm.  October 2021 cystoscopy without malignancy.  TURBT 10/11/2021 did not show malignancy.  March 2022 Dr. Peoples for 3.9 cm external iliac recurrent adenopathy 6 weeks out from laminectomy which did not help  pain was determined to be an unresectable recurrence with muscle and vascular involvement.  Radiation with Dr. Moody ended 5/6/2022.  Disease stabilized and pain improved. Paucimetastatic disease progression to the lung biopsy-proven, treated with radiation to both lungs ending 12/6/2024 with good response on 1/29/2025 CT and no new sites of disease.    -12/30/2019 medical oncology office visit: The patient had been on surveillance since surgery December 2017.  CT scans had been negative up until 12/30/2019 CT chest abdomen and pelvis that showed subtle soft tissue density surrounding surgical clips in the right side of the pelvis along with soft tissue nodule at the anterior wall of the bladder concerning for recurrent disease.  Patient sent back to Dr. Davidson for colonoscopy.    -2/7/2020 patient was referred to Dr. Peoples at the [facility], he underwent diagnostic laparoscopic and peritoneal biopsies that confirmed recurrent disease with biopsy of bladder dome nodule showing adenocarcinoma with mucinous features.  Port was placed at this time also.    -2/25/2020 began FOLFIRI    -4/21/2020 patient having increased fatigue, FOLFIRI dose reduced by 10%.    -5/19/2020 cycle 7 FOLFIRI    -6/18/2020 Dr. Peoples performed exploratory laparotomy with excision of right lobe liver lesion and left lobe liver lesion, omentectomy, resection of pelvic peritoneal nodules, ileocolectomy with creation of ileal colostomy, hyperthermic intraperitoneal chemotherapy with mitomycin-C at 42 °C for deep tissue penetration for 90 minutes.  Dr. Perdue performed partial cystectomy with right ureteral stent placement    -8/5/2020 CT abdomen pelvis with contrast shows small soft tissue nodule anterior aspect of bladder stable.  New small amount of ascites as well as a new small left pleural effusion.  Creatinine 0.75 with hemoglobin 11.1 otherwise unremarkable CBC and CMP.    -2/15/2021 CT abdomen pelvis with contrast compared to  "8/5/2020 shows enlarging soft tissue mass 4.2 cm over the bladder fundus and there is a calcification along the base of the urinary bladder.  Small stable multiple hypoattenuating indeterminate liver lesions.  Similar degree of ascites.    -2/23/2021 CT chest shows no evidence of metastasis with ascending aorta 42 mm.    -3/8/2021 follow-up with Dr. Portillo Peoples we reviewed his CTs suggested team effort resection with Dr. Perdue    -3/16/2021 follow-up with Dr. Perdue.  He plans for open partial cystectomy, possible ureteral implants, cystoscopy and stent placement in concert with Dr. Peoples.    -4/22/2021 cystoscopy (after prior office cystoscopy showed large fungating mass) with bilateral ureteral catheters, exploratory laparotomy, lysis of adhesions, right pelvic sidewall excisional biopsy, open partial cystectomy Dr. Ike Perdue. Peritoneal fluid showed predominant inflammatory reactive mesothelial cells with no malignancy. 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 Carcinoma was less than 1 mm from the lateral resection margins.    -5/18/2021 [facility] medical oncology follow-up visit: I reviewed his hospital notes, operative notes, and pathology report as above and went over this with the patient.  The general consensus from retrospective as well as a few prospective data over the last couple of decades with this low-grade malignancy does not show any profound benefit from \"adjuvant\" chemotherapy in this current setting.  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  There is no standard follow-up but general guidelines suggest following up as typical colon cancer.  I will repeat his CT chest abdomen pelvis now to reestablish his baseline postoperative imaging and have him see my nurse practitioner back in a couple of weeks to make sure there is no nia evidence of measurable residual disease.  Assuming this just shows postoperative changes, I " would simply repeat scans again in 3 months or sooner as symptoms dictate.  If he has no evidence of persistent or metastatic disease on current imaging, then when he sees my nurse practitioner back she will also review what they say about his ascending aorta which was 42 mm in February and sent him to Dr. Evangelist Anthony for an opinion as to whether any intervention is needed relative to the aorta.  Obviously if his cancer is out of control on the upcoming scans then the aorta is a moot point.    -6/10/2021 [facility] oncology clinic follow-up: CT scans show no evidence of disease recurrence in the chest, abdomen or pelvis.  Ascending aortic aneurysm stable at 41 mm.  Referral made to Dr. Anthony, cardiothoracic surgeon for management and evaluation of a sending aortic aneurysm.  Plan to repeat CT scans in 3 months.    -9/14/2021 [facility] oncology follow-up visit: I reviewed images and reports of 9/10/2021 CT chest abdomen pelvis with contrast which shows under distended urinary bladder with mild asymmetric wall thickening dome and left lateral wall with a small 9 mm polypoid lesion in the bladder.  There is stable 2.3 cm right external iliac and a few other subcentimeter scattered nodes in the root of the small bowel mesentery and retroperitoneum.  Postsurgical right hemicolectomy changes.  Stable low-attenuation lesions in the liver unchanged.  I will have him collect his discs from [facility] to take Dr. Perdue to decide whether to proceed with cystoscopy or just continue watchful waiting.  There is no major changes and I suspect we are looking at postoperative changes and we shall see him for video visit in a few weeks to see what urology at  decides.    -10/11/2021 cystoscopy Dr. Perdue showed marked acute and chronic inflammation but no evidence of malignancy.  Muscularis propria present.    -10/14/2021 [facility] medical oncology virtual visit: I reviewed reports of pathology from cystoscopy  10/11/2021 and went over this with patient.  He is feeling fairly fit.  I will repeat his CT chest abdomen pelvis prior to return in 3 months and if in the interim, when he follows up with Dr. Perdue in the next couple of weeks post cystoscopy, plans are made for further cystoscopy before I see him back and any malignancy is found, I would ask the patient and Dr. Perdue to touch base as I would not know of such without that contact as the information comes into our chart without notification now that epic is being used by [facility].  Assuming no further biopsies in the interim, I will simply repeat his scans in 3 months to follow what I suspected and is now confirmed to be inflammatory changes.  From the aneurysm standpoint, he has follow-up scanning March 2022 arranged by Dr. Anthony and he will follow him up after that.    -11/24/2021 CT chest abdomen pelvis compared to 6/7/2021 outside imaging shows stable 4.1 cm ascending thoracic aorta.  No lung nodules.  Liver homogenous with bilateral cysts.  No adenopathy.  Thickened sigmoid colon and rectum suggestive of mild colitis or postradiation change.  No pelvic adenopathy.  Bony structures degenerative in the spine.  Some soft tissue anterior to the acetabulum on the right and extending along the medial aspect right pelvic sidewall 2.7 x 5.6 may represent intramuscular process versus adenopathy which could not be ruled out.  No prior study views available.    -1/4/2022 follow-up Dr. Ike Perdue [facility].  Per his note, he had TURBT 10/11/2021.  He had been placed on antibiotics for UTI for preop preparation for back surgery planned in 2 weeks.  Denies any mucus in his urine.  Some microhematuria on urinalysis this day.  No gross hematuria or dysuria.  The October 2021 TURBT did not reveal anything malignant on pathology.  There may be irritation or inflammation secondary to sutures in the bladder following partial cystectomy.  Patient asymptomatic from a urinary  standpoint.  Recommended following up with [facility] with no plans for further urology follow-up.    -2/4/2022 [facility] medical oncology telehealth follow-up visit: I reviewed his November images and reports with the patient as well as with Dr. Gm Moody and the note from Dr. Perdue from 1 month ago.  I do suspect this pelvic sidewall abnormality in November represents persistent disease but as I have stated in prior dictations, the data on palliative or overall survival benefit of systemic 5-FU based combination chemotherapy and/or Avastin does not show a clear-cut survival advantage to chemotherapy for asymptomatic disease.  He is having some back pain and had spine decompression a couple of weeks ago without much benefit.  This is an area that may potentially be radiated but I will check his CT chest (angiogram of chest) abdomen and pelvis and get his blood counts and chemistries and urinalysis and see him back for virtual visit in a few weeks.  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  I will also converse with Dr. Peoples at that junction whether he thinks he has anything to offer if this is progressing though I doubt it given that the last intervention was a urologic procedure and the last cystoscopy/TURBT in October had no malignancy and the liver lesions are currently being called liver cysts albeit I am skeptical as to whether those are truly benign but they are certainly not growing.    -3/4/2022 CT angiogram of chest/CT abdomen pelvis for evaluation of dizziness and surveillance of thoracic aortic aneurysm and appendiceal carcinoma.  Thoracic ascending aortic ectasia  "4 x 4 cm stable.  Small nodularity left upper lobe stable from September.  Stable small hepatic hypodensities status post cholecystectomy.  Right external iliac soft tissue fullness now 3 x 3.9 cm previously 2 x 2.3 cm concerning for enlarging adenopathy with symmetric nonenlarged inguinal nodes stable.  CBC unremarkable.  CMP unremarkable save for potassium 5.4.  CEA 18.2.  1-3 red cells and white cells per milliliter of urine.    -3/16/2022 office visit with Dr. Portillo Peoples.  He reviewed the CT from 3/4/2022.  He notes that the laminectomy from 6 weeks prior has not helped his \"hip pain\".  Given the location of this recurrence previously 2 x 2.3 cm now 3 x 3.9 cm in the external iliac area concerning for enlarging adenopathy with symmetric nonenlarged inguinal nodes stable, the mass appears unresectable with involvement of muscle and vasculature.    -3/24/2022 [facility] medical oncology follow-up virtual visit: Pain is still significant.  We will get him in hopefully in the next day to [facility] for palliative care.  Have spoken with Dr. Peoples and no surgery.  I have spoken with Dr. Moody who thinks palliative radiation while not likely to shrink tumor dramatically may help pain considerably and we will get him there.  We will get him to bring the discs to our [place] office and asked them to bring that back to [place] to get scanned in for our invasive radiologist to look at for us to get CT-guided biopsy of this node in the right lower quadrant and send that for Gab miguel once the pathology is obtained to hopefully find high tumor mutational burden or other molecular targets that might give us some options.  Apart from that, as stated multiple times, this is not a highly chemotherapy sensitive tumor and I am not sure I would palliate him well but, when I see him back in early June with CAT scans prior to return and post radiation, if he is not getting relief and wants to try a 5-FU based regimen " plus or minus Avastin I would be okay with that but he knows it does not alter survival 1 way or the other and we are simply trying to palliate this inexorable process.  No other surgical options.    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  Mismatch repair proficient,NTRK1/2/3 fusion not detected.  BRAF V600E negative.  PD-L1 Negative, 1+, 5%.  PTEN Positive, 1+, 100%. MLH1 positive/3+, 100%. MSH2 positive/3+, 100%. MSH6 positive/3+, 100%. PMS2 positive/3+,100%.    -5/6/2022 last radiation    - 5/25/2022 CT chest abdomen pelvis with contrast at [place] [facility] shows nothing remarkable on the chest.  Stable liver cysts.  Focal soft tissue right iliac region appears to be increasing in size now 7.2 cm previously 3.9 cm with some mass-effect on surrounding structures with several stable inguinal borderline nodes.    -6/6/2022 [facility] oncology follow-up visit: Now 1 month out from radiation just starting to get a little bit of improvement in his abdominal discomfort.  Slight growth on CT but some of that may be postradiation change this close to radiation and, as I stated in my note in March, it is not clear that 5-FU based therapy plus or minus Avastin is going to palliate much given its relatively insensitivity to chemotherapy and it certainly does not improve survival.  To that end, we will plan on just repeating his CAT scans again in about 6 weeks and if the pain is worsening and/or this continues to grow then I will probably give him Avastin FOLFOX.  If everything is stable and pain is controlled we will go continue watchful waiting with imaging about every 3 months from that point forward.    -7/11/2022 [facility] oncology follow-up: His 7/7/2022 CT chest abdomen pelvis showed stable right external iliac low-attenuation which had been previously growing and thickening of the distal descending and sigmoid  colon with perhaps a small right iliac borderline 1 cm node.  Symptomatically improved post radiation.  For now we will hold on Avastin FOLFOX as the benefit of such is questionable with this histologic subtype and there are no actionable molecular targets.  Repeat CTs prior to return in 3 months.    -11/1/2022 [facility] medical oncology follow-up: 10/19/2022 CT chest abdomen pelvis with contrast [place] [facility] compared to 7/7/2022 shows no significant change or evidence of progression.  Clinically quiescent.  We will repeat CTs in 6 months, sooner as symptoms dictate.    -5/19/2023 CT chest abdomen pelvis [place] [facility] compared to 10/19/2022 showed no evidence of disease progression in the chest.  Nodular density adjacent to the descending colon 6 mm nonspecific.  Otherwise unchanged appearance of hepatic metastatic nodules, right iliac adenopathy, and right pelvic mass.    -5/23/2023 [facility] medical oncology follow-up: I reviewed his report of CT chest abdomen pelvis from [place] 4 days ago showing no evidence of progression.  So area near the descending colon is nonspecific and would not change anything at this junction.  His October CT had not changed from July and the current one has not changed significantly since October.  We will continue to see palliative care for his right pelvic pain.  If leg swelling significantly worsens we could check Doppler and assuming no clot consider vascular intervention but as the thigh has not significantly changed over time nor is there significant tenderness in the calf I am doubtful that this is clot and is likely just due to the pelvic mass.  Vascular surgical intervention should swelling worsen is also an option but I would not want a stent placed through this area in the face malignancy particularly a mucinous variety that would make him prone towards clotting.  If the leg worsens I would get his Doppler and consider vascular intervention or anticoagulation as  dictated but clinically stable so will not investigate further at this junction.  I will repeat his imaging again in 6 months.    -11/20/2023 CT chest, abdomen and pelvis shows stable appearances of the chest with no evidence of metastatic disease.  Abdomen and pelvis reveals small interval decrease in size of the previously demonstrated peritoneal nodule adjacent to the descending colon.  No new suspicious omental/peritoneal soft tissue nodules or masses.  Stable right iliac adenopathy as well as a soft tissue mass along the right external iliac vasculature along the right pelvic sidewall.  -11/29/2023 [facility] On[facility] clinic follow-up: Current CT chest, abdomen and pelvis shows stable findings, no evidence of progression.  He has no new worrisome symptoms however he does still have swelling in his right thigh and lower pelvic pain for which she sees palliative care.  He states that it seems slightly more pronounced over the last few weeks, he has a follow-up with palliative care and will discuss further with him.  We reviewed recommendations discussed at his last visit with Dr. Delcid that as long as the swelling did not worsen significantly then we would hold off on considering any vascular intervention.  We will continue to monitor, Jose Antonio understands to let us know if he has any changes otherwise we will plan on repeating CT chest, abdomen and pelvis prior to return in 6 months and I have ordered those today.  His blood pressure was running high on arrival today, I did recheck it manually and it was 150/100.  He reports that he took his blood pressure earlier today at home and it was 140s over 85 or so.  I asked him to recheck when he got home and monitor and if his diastolic continued to run over 90 to get in with Dr. Dial for evaluation.    - 5/20/2023 CT chest abdomen pelvis [place] [facility] compared to November shows 10 mm nodule left lower lobe and right lower lobe.  Anterior lobe pleural-based nodule  unchanged.  6.1 x 4 cm right pelvic sidewall stable.  12 mm right common iliac node stable.  No ascites.    -5/28/2024 [facility] oncology clinic follow-up: No signs or symptoms of recurrence.  3 times in the last 6 months he had a 6-hour period of crampy abdominal pain that subsequently subsided.  Potentially could have been having some obstructive symptomatology but none on imaging.  If this happens more frequently or it lasts he will let us know and also Dr. Peoples but would also go towards a more soft diet during those episodes and try FiberCon.  Otherwise imaging of the abdomen is stable but there is a couple of nodules in the lung that appeared new albeit small.  This is a very low-grade process and systemic therapies are not very effective and he is asymptomatic so we will just repeat CT chest in 3 months.  If this grows then we will consider getting tissue as it would be a little odd for this low-grade process to show up in the lung.    -8/28/2024 [place] [facility] chest with contrast compared to May 2024 shows left lower lobe nodule 16 mm compared to 10 mm with right lower lobe nodule 16 mm previously 10 mm and small subpleural right upper lobe nodule stable most likely benign intrapulmonary node and small stable nodule right lower lobe likely intrapulmonary.  Several small low-density liver lesions likely cysts.    -9/3/2024 [facility] oncology clinic follow-up: No signs or symptoms of recurrence 1 lung nodule in each lung base has grown.  Other nodules stable.  High likelihood for recurrence.  Will get CT abdomen pelvis and then get him to Dr. Santos for consideration of navigational bronchoscopy/Ion biopsy these lesions and might consider CyberKnife for which I will get him to Dr. Moody for opinion and coordination with Dr. Santos pending results of CT abdomen pelvis.  If on the other hand his CT abdomen pelvis shows additional sites of progression beyond the lung then we will likely go back to systemic  therapy of which he has very little likelihood to respond but I would like to get tissue for molecular testing.     -9/24/2024 [facility] abdominal pelvic CT with contrast compared to May 2024 showed continued decrease in size of hepatic fat necrosis left lower quadrant and decrease in right common iliac and external iliac adenopathy and no new metastatic disease.    -9/25/2024 robotic bronchoscopy preparative CT chest    -9/26/2024 [facility] hematology oncology follow-up: Reviewed recent CT abdomen with no clear-cut malignancy and continued regression fat necrosis left lower quadrant with decreasing adenopathy in the right common and external iliac nodes.  CT chest done yesterday has not been read but is a noncontrast robotic preparative CT to be used by Dr. Santos for navigational bronchoscopy on October 2.  Will get him back to me around October 10 and Dr. Moody in the interim for opinion as to CyberKnife particularly if paucimetastatic malignancy proven.    - 10/2/2024 bronchoscopy Dr. Santos Ion robot platform biopsy 2 cm nodule middle one third of the left lower lobe.  FNA left lower lobe, bronchial brush, transbronchial needle aspirate right lower lobe and right lower lobe brush all show adenocarcinoma consistent with prior pathology.  Left lower lobe lung biopsy and right lower lobe lung biopsy both show adenocarcinoma with extensive extracellular mucin CDX2 strongly positive.  Negative for CK7 and patchy CK20 positivity.  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 Moody plans to treat right lung metastasis 54 [address] in 3 fractions and then pending tolerance will reevaluate shortly thereafter for treatment to the left  lung.    -10/10/2024 [facility] hematology oncology follow-up: I reviewed with him the bronchoscopy and path reports and the note from Dr. Moody.  I will have him see my nurse practitioner back in 6 weeks to make sure he is having no new symptoms and at that point she will order a follow-up CT chest abdomen pelvis about 8 weeks out from the end of radiation.  I will get [facility] profile on the lung biopsy.    -10/27/2024 [facility] profile: KRAS pathogenic variant Exon 2/p.G12D therefore lack of benefit to cetuximab or panitumumab.  PD-L1 negative, 0%.  No actionable biomarkers identified.  Microsatellite stable, TMB low.   Genes with pathogenic or likely pathogenic alterations: ANNA, CDKN2A, CREBBP, KRAS.Potential clinical trials available, see full report.    -11/8/2024 completed radiation to the left lung  -12/6/2024 completed radiation to the right lung    -12/12/2024 [facility] oncology clinic follow-up: completed radiation to both lungs, left lung 11/8/2024 and more recently completed radiation to the right lung 12/6/2024.  He had some fatigue but is starting to feel better.  I have ordered restaging CT scans about 8 weeks out from his completion of radiation which will put this late January.  He has a follow-up with radiation oncology on 1/10/2024.  We will see him back after his CT scans.    -1/29/2025 CT chest abdomen and pelvis shows stable right lower lobe nodule and slightly decreased left lower lobe nodule.  Stable iliac adenopathy in the pelvis.  No new sites of disease.    -2/4/2025 [facility] oncology clinic follow-up: Good response to radiation.  Feeling fit.  Will repeat imaging in mid May    - 5/20/2025 CT chest abdomen pelvis compared to January 2025 shows postradiation changes right upper lobe with slight decrease in pulmonary nodule with postradiation changes left upper and lower lobes with left lower lobe nodule no longer visible.  Slight increase in size right upper lobe pulmonary subpleural nodule.   Decrease in right external iliac node since January 2025 and no change in right common iliac node though some increased hypoattenuation adjacent right psoas muscle concerning for disease.  CBC and CMP unremarkable save for alkaline phosphatase 122 and glucose 112.    - 5/27/2025 [facility] clinic follow-up: Radiated area as of lung responding well.  New subpleural and right psoas uptake subtle but somewhat worrisome.  No plans for systemic treatment in the absence of bulky or symptomatic progressive disease with consideration for radiation to additional spots of paucimetastatic disease.  With the right hip pain, will get plain x-ray of the hip today and he sees Dr. Moody in 2 days.  If he proceeds something orthopedic he can make referral.  If nonorthopedic and related to the psoas uptake, will defer to him on the possibility of palliative radiation though he has had some radiation to the region previously.  Otherwise I will see him back in 3 months with CT scans and labs just prior to return    Total time of care today inclusive of time spent today prior to patient's arrival reviewing interval data and images and reports thereof and during visit translating patient putting forth the plan as outlined and arranging follow-up studies took 50 minutes patient care time throughout the day today.  Cy Delcid MD    05/27/2025

## 2025-05-29 ENCOUNTER — CLINICAL SUPPORT (OUTPATIENT)
Dept: RADIATION ONCOLOGY | Facility: HOSPITAL | Age: 70
End: 2025-05-29
Payer: MEDICARE

## 2025-05-29 ENCOUNTER — HOSPITAL ENCOUNTER (OUTPATIENT)
Dept: RADIATION ONCOLOGY | Facility: HOSPITAL | Age: 70
Setting detail: RADIATION/ONCOLOGY SERIES
Discharge: HOME | End: 2025-05-29
Payer: MEDICARE

## 2025-05-29 DIAGNOSIS — C78.02 MALIGNANT NEOPLASM METASTATIC TO BOTH LUNGS: Primary | ICD-10-CM

## 2025-05-29 DIAGNOSIS — C78.01 MALIGNANT NEOPLASM METASTATIC TO BOTH LUNGS: Primary | ICD-10-CM

## 2025-05-29 NOTE — PROGRESS NOTES
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 male presenting by phone for scheduled follow up visit regarding his recurrent and metastatic low-grade appendiceal carcinoma.  Prior treatment history includes surgical resection and chemotherapy, in addition to previous course of involved field radiation therapy targeting a recurrent right pelvic sidewall mass consisting of a conglomerate of external iliac and inguinal lymph nodes, completing 2022.  More recently, he was found on surveillance imaging to have interval development of 2 pulmonary nodules in the left lower lobe and right lower lobe of lung representing his only known sites of active disease.  Bronchoscopy and navigational biopsy identified both of these nodules to be metastatic adenocarcinoma with mucinous features, compatible with his appendiceal primary.  The patient underwent a course of CyberKnife SBRT to a dose of 54 Gy in 3 fractions delivered to the left lower lobe lung tumor, completing 2024.  Sequentially, he underwent a course of CyberKnife SBRT to a dose of 54 Gy in 3 fractions delivered to the contralateral right lower lobe lung tumor, completing 2024.  He tolerated treatment well and presents following repeat scans performed earlier this month at an outside facility.  From a symptom standpoint, he reports good energy level and is active and independent with ADLs.  He denies dyspnea, cough, hemoptysis, chest or rib pain, swallowing issues or weight loss.  He has had some increased right hip pain over the past several weeks but denies radicular symptoms.      MEDICATIONS: Medication  reconciliation for the patient was reviewed and confirmed in the electronic medical record.    Review of Systems   Musculoskeletal:  Positive for arthralgias (worsened right hip pain x several weeks).     KPS 90%          Physical Exam    VITAL SIGNS: There were no vitals filed for this visit.            The following portions of the patient's history were reviewed and updated as appropriate: allergies, current medications, past family history, past medical history, past social history, past surgical history and problem list.         Diagnoses and all orders for this visit:    1. Malignant neoplasm metastatic to both lungs (Primary)         IMPRESSION:  Mr. Tse is a 69 y.o. gentleman with a known history of recurrent and metastatic appendiceal carcinoma.  He was found recently to have 2 bilateral pulmonary nodules with pathological confirmation to be metastatic mucinous adenocarcinoma.  He is 6 months out from completing CyberKnife SBRT to the nodule in the left lung base and 5 months out from completing CyberKnife SBRT to the nodule in the right lung base.  He tolerated treatment well.  Repeat CT scans of the chest, abdomen pelvis as well as XR bilateral hips performed 1/29/2025 at [facility] [place] were reviewed alongside Dr. Moody.  Imaging demonstrates slight interval decrease in size of the right lower lobe lung lesion and essential resolution of the left lower lobe lung lesion consistent with treatment response, with evolution of surrounding airspace disease/postradiation changes associated with both treated areas of the lungs.  A subpleural right upper lobe pulmonary nodule appears slightly increased in size measuring 0.9 cm and previously measuring 0.5 cm.  Outside of the chest, there has been interval decrease in size of a right external iliac kit mass and stability in size of a right common iliac lymph node; however, some increased hypoattenuation within the adjacent right psoas muscle appears concerning for  disease involvement and corresponds with a site of progressive right hip pain.               RECOMMENDATIONS:  ***      No follow-ups on file.    KING Nuñez      I spent a total of *** minutes on today's visit, with more than *** minutes in direct face to face communication, and the remainder of the time spent in reviewing the relevant history, records, available imaging, and for documentation.

## 2025-06-02 ENCOUNTER — CLINICAL SUPPORT (OUTPATIENT)
Dept: RADIATION ONCOLOGY | Facility: HOSPITAL | Age: 70
End: 2025-06-02
Payer: MEDICARE

## 2025-06-02 ENCOUNTER — TELEMEDICINE (OUTPATIENT)
Dept: PALLIATIVE CARE | Facility: CLINIC | Age: 70
End: 2025-06-02
Payer: MEDICARE

## 2025-06-02 ENCOUNTER — HOSPITAL ENCOUNTER (OUTPATIENT)
Dept: RADIATION ONCOLOGY | Facility: HOSPITAL | Age: 70
Setting detail: RADIATION/ONCOLOGY SERIES
Discharge: HOME | End: 2025-06-02
Payer: MEDICARE

## 2025-06-02 ENCOUNTER — TELEPHONE (OUTPATIENT)
Dept: RADIATION ONCOLOGY | Facility: HOSPITAL | Age: 70
End: 2025-06-02
Payer: MEDICARE

## 2025-06-02 DIAGNOSIS — T40.2X5A THERAPEUTIC OPIOID INDUCED CONSTIPATION: ICD-10-CM

## 2025-06-02 DIAGNOSIS — R11.0 NAUSEA: ICD-10-CM

## 2025-06-02 DIAGNOSIS — C18.1 MUCINOUS ADENOCARCINOMA OF APPENDIX: ICD-10-CM

## 2025-06-02 DIAGNOSIS — C78.02 MALIGNANT NEOPLASM METASTATIC TO BOTH LUNGS: Primary | ICD-10-CM

## 2025-06-02 DIAGNOSIS — C18.1 MUCINOUS ADENOCARCINOMA OF APPENDIX: Primary | ICD-10-CM

## 2025-06-02 DIAGNOSIS — C78.01 MALIGNANT NEOPLASM METASTATIC TO BOTH LUNGS: Primary | ICD-10-CM

## 2025-06-02 DIAGNOSIS — G63 NEUROPATHY ASSOCIATED WITH MALIGNANT NEOPLASM: ICD-10-CM

## 2025-06-02 DIAGNOSIS — K59.03 THERAPEUTIC OPIOID INDUCED CONSTIPATION: ICD-10-CM

## 2025-06-02 DIAGNOSIS — C80.1 NEUROPATHY ASSOCIATED WITH MALIGNANT NEOPLASM: ICD-10-CM

## 2025-06-02 DIAGNOSIS — G89.3 CANCER RELATED PAIN: ICD-10-CM

## 2025-06-02 PROCEDURE — 99213 OFFICE O/P EST LOW 20 MIN: CPT | Performed by: PHYSICIAN ASSISTANT

## 2025-06-02 PROCEDURE — 1125F AMNT PAIN NOTED PAIN PRSNT: CPT | Performed by: PHYSICIAN ASSISTANT

## 2025-06-02 PROCEDURE — 1159F MED LIST DOCD IN RCRD: CPT | Performed by: PHYSICIAN ASSISTANT

## 2025-06-02 PROCEDURE — 1160F RVW MEDS BY RX/DR IN RCRD: CPT | Performed by: PHYSICIAN ASSISTANT

## 2025-06-02 RX ORDER — GABAPENTIN 800 MG/1
800 TABLET ORAL 3 TIMES DAILY
Qty: 90 TABLET | Refills: 0 | Status: SHIPPED | OUTPATIENT
Start: 2025-06-02

## 2025-06-02 NOTE — PROGRESS NOTES
Palliative Clinic Note      Name: Esteban Tse  Age: 69 y.o.  Sex: male  : 1955  MRN: [id number]  Date of Service: 2025   Medical Oncologist: Dr. Delcid  Mode of visit: video-conference  Location of patient: Home  [place] [facility]    Subjective:    Chief Complaint: Pain, nausea, constipation    History of Present Illness: Esteban Tse is a 69 y.o. male with past medical history significant for HTN, AAA, metastatic adenocarcinoma of the appendix who presents via video-conference today as a follow up for pain and symptom management.     Treatment summary: The patient was diagnosed with cancer of the appendix at the time of appendectomy in 2017. Patient under surveillance until imaging in 2019 demonstrated several lesions suspicious for recurrent disease. He underwent a diagnostic laparoscopy with peritoneal biopsies on 2020 by Dr. Peoples that proved recurrent adenocarcinoma. He underwent an exploratory laparotomy with excision of right and left lobe liver lesions, omentectomy, resection of pelvic peritoneal nodules, ileocolectomy with creation of ileal colostomy, hyperthermic intraperitoneal chemotherapy and a partial cystectomy with right ureteral stent placement in 2020. He underwent a cystoscopy with bilateral ureteral catheters, exploratory laparotomy, lysis of adhesions, right pelvic sidewall excisional biopsy, and an open partial cystectomy in 2021. TURBT performed in 10/2021. Imaging from 3/4/2022 showed enlarging external illiac adenopathy. There are no surgical options per Dr. Peoples. Underwent CT-guided biopsy of the right iliac mass consistent with mucinous adenocarcinoma. Patient completed palliative radiation in 2024. Scans from 2024 showed growth in the lungs. Patient underwent bronchoscopy in 10/2024 by Dr. Santos, biopsies were consistent with prior pathology. Patient completed radiation to the left lung on 2024 and the right lung  on 12/6/2024. Recent scans demonstrated   increased hypoattenuation within the adjacent right psoas muscle consistent with progressive disease. Plan for additional palliative radiation.      Pain: Patient established care with [facility] in [place], [place] > 1 year ago. Patient's right hip pain was thought to be referred from his spine. Patient underwent several epidurals and a lumbar laminectomy with no improvement in his pain. Patient complains of pain predominantly in his right groin / hip and lower extremity secondary to cancer.  Patient restarted Morphine ER 15 mg BID at last appointment however has only been taking is as needed, about once per day. He is prescribed oxycodone 10 mg q4h PRN for break through pain. He also started Cymbalta 30 mg daily and increase gabapentin to 800 mg TID. Patient unable to tolerate Fentanyl patches due to side effects. Insurance company stopped covering Xtampza.      Other symptoms: The patient continues to complain of nausea, specifically several hours after eating. He denies vomiting. Failed trials of several antiemetics and GERD medications. He has been off of Marinol due to production issue. This was helpful for his appetite and nausea. The patient reports worsening constipation since restarting morphine ER. He reports using Milk of Mag as needed, about every 4-5 days.  No concerns with sleep today.      Pyschosocial: The patient lives alone. He is retired from working for the state. He enjoys playing golf and spending time outside. No personal history of a mental illness. The patient admits to worsening anxiety/depression recently due to the decline of his mother's health. Patient's father passed away in 10/2022 and the patient is an only child.     Spiritual: Patient describes himself as curious rather than practicing.      Goals: Improve quality of life with symptom management.     The following portions of the patient's history were reviewed and updated  as appropriate: allergies, current medications, past family history, past medical history, past social history, past surgical history and problem list.    ORT-R: Low risk  Decisional capacity: Full  ECOG: (2) Ambulatory and capable of self care, unable to carry out work activity, up and about > 50% or waking hours     Objective:    Constitutional: Awake, alert, sitting up   Eyes: PERRLA, EOMS intact  HENT: NCAT, face symmetric  Neck: Supple, trachea midline  Respiratory: Nonlabored respirations  Musculoskeletal: Moves all extremities   Psychiatric: Appropriate affect, cooperative  Neurologic: Oriented x 3, Cranial Nerves grossly intact to confrontation, speech clear    Medication Counts: Collected over the phone. See bottom of note for details. No misuse or overuse evident.   I have reviewed the patient's [place] PDMP. YAIMA Req #[id number] .   UDS: Last 3/25/23. Reviewed. Appropriate.     Assessment & Plan:    1. Mucinous adenocarcinoma of appendix  - Recent scans demonstrated increased hypoattenuation within the adjacent right psoas muscle consistent with progressive disease. Plan for additional palliative radiation.     2. Cancer related pain  - Patient is appropriate for opioid therapy due to cancer related pain. Daily function and quality of life poorly managed with current regimen. Encouraged compliance with long-acting opioid therapy to allow the medication to reach steady state. Recommend escalation of bowel regimen.    3. Neuropathy associated with malignant neoplasm  - Continue gabapentin 800 mg TID. Continue Cymbalta 30 mg daily.     4. Therapeutic opioid induced constipation  - Increase milk of mag to every other day. Recommend patient schedule the drug.     5. Nausea  - Consider referral to GI. Continue iraj supplement and antiemetics PRN.     Code status: Full code  Advanced directives: Not on file  Health care surrogate: Shaylee Tse, mother     Return in about 1 month (around 7/2/2025) for Video  visit.    The patient has chosen to receive care through a telehealth visit.   Does the patient consent to using a video visit for their medical care today? Yes   The visit included audio and video interaction. No technical issues occurred during this visit.  I spent 30 minutes caring for Esteban Tse on this date of service. This time includes time spent by me in the following activities: preparing for the visit, reviewing tests, obtaining and/or reviewing a separately obtained history, performing a medically appropriate examination and/or evaluation, counseling and educating the patient/family/caregiver, ordering medications, tests, or procedures, documenting information in the medical record, independently interpreting results and communicating that information with the patient/family/caregiver, and care coordination.    Qian Fleming PA-C  06/02/2025    Medication Date Filled # Filled Count Used # Days  SOY   Morphine ER 15 5/2/25 60 28 32 33 1   Gabapentin 800 4/30/25 90 2 88 33 3   Dronabinol 5 4/10/25 60 0 60 53 1   Oxycodone 10 4/10/25 180 115 65 13 5

## 2025-06-02 NOTE — PROGRESS NOTES
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  Total time of discussion was 12 minutes.  The patient has given verbal consent.      BRIEF HISTORY:  Esteban Tse is a very pleasant 69 y.o. male presenting by phone for scheduled visit regarding his recurrent and metastatic low-grade appendiceal carcinoma.  Prior treatment history includes surgical resection and chemotherapy, in addition to previous course of involved field radiation therapy targeting a recurrent right pelvic sidewall mass consisting of a conglomerate of external iliac and inguinal lymph nodes, completing 2022.  Late last year, he was found on surveillance imaging to have interval development of 2 pulmonary nodules in the left lower lobe and right lower lobe of lung representing his only known sites of active disease.  Bronchoscopy and navigational biopsy identified both of these nodules to be metastatic adenocarcinoma with mucinous features, compatible with his appendiceal primary.  The patient underwent a course of CyberKnife SBRT to a dose of 54 Gy in 3 fractions delivered to the left lower lobe lung tumor, completing 2024.  Sequentially, he underwent a course of CyberKnife SBRT to a dose of 54 Gy in 3 fractions delivered to the contralateral right lower lobe lung tumor, completing 2024.  He tolerated treatment well and presents by phone following repeat scans performed 2025 at an outside facility.  From a symptom standpoint, he reports good energy level and is active and independent with ADLs.  He denies dyspnea, cough, hemoptysis, chest or rib pain, swallowing  issues or weight loss.  He has had some increased right hip pain over the past 1 month that is partially responsive to current pain medication regimen and is severe at times, particularly with certain positioning such as standing or ambulating for prolonged periods of time.  Pain is described as burning and occasionally radiates laterally into his right thigh.  He notes good range of motion currently.      Allergies   Allergen Reactions    Levofloxacin Swelling     Lips swellijng    Tetracycline Swelling     Lips swelling    Penicillins Other (See Comments)     childhood       Review of Systems   Musculoskeletal:  Positive for arthralgias.        Described as right hip, burning, radiating at times, partially responsive to pain medications and worsened with activity/certain positioning   All other systems reviewed and are negative.              Objective     KPS 90%      Physical Exam  Pulmonary:      Respirations even, unlabored. No audible wheezing or cough.  Neurological:      A+Ox4, conversant, answers questions appropriately.  Psychiatric:     Judgement, affect, and decision-making WNL.    Limited physical exam as visit was conducted remotely via telephone.         IMAGING:  [place] CT [place] of the chest, abdomen [place] [facility] [place]. Moody.      The following portions of the patient's history were reviewed and updated as appropriate: allergies, current medications, past family history, past medical history, past social history, past surgical history, and problem list.    Diagnoses and all orders for this visit:    Malignant neoplasm metastatic to both lungs    Mucinous adenocarcinoma of appendix      IMPRESSION:  Mr. Tse is a 69 y.o. gentleman with a known history of recurrent and metastatic appendiceal carcinoma.  He was recently found to have 2 bilateral pulmonary nodules with pathological confirmation to be metastatic mucinous adenocarcinoma.  He  is 7 months out from completing CyberKnife SBRT to the nodule in the left lung base and 6 months out from completing CyberKnife SBRT to the nodule in the right lung base.  He tolerated treatment well.  Repeat CT scans of the chest, abdomen pelvis performed 1/29/2025 at [facility] [place] were reviewed alongside Dr. Moody.  Imaging demonstrates slight interval decrease in size of the right lower lobe lung lesion and essential resolution of the left lower lobe lung lesion consistent with treatment response, with evolution of surrounding airspace disease/postradiation changes associated with both treated areas of the lungs.  A subpleural right upper lobe pulmonary nodule appears increased in size measuring 0.9 cm and previously measuring 0.5 cm, concerning for disease progression.  Outside of the chest, there has been interval decrease in size of a right external iliac kit mass and stability in size of a right common iliac lymph node; however, increased hypoattenuation within the adjacent right psoas muscle appears consistent with progressive disease and is located superiorly/outside of his prior radiation field.  He is having worsening neoplastic pain related to this mass that is only partially responsive to current medication regimen in addition to some limitations with ambulation/positioning.  We discussed a hypofractionated radiotherapy course to address progressive disease involving the right psoas likely consisting of 40 Gray in 15 fractions.  I have arranged CT simulation for treatment planning on 6/5/2025 at which time we will obtain informed consent, with goal to begin treatment within 1-2 weeks pending insurance approval.      RECOMMENDATIONS:    Return to clinic for CT simulation/treatment planning 6/3/2025.  Plan for additional radiotherapy to treat the soft tissue mass involving the right psoas muscle, likely 40 Hammonds in 15 fractions, located outside prior radiation field.  Sequentially following treatment we will  arrange for re-consultation in clinic to address the enlarging right subpleural nodule which would be amenable for treatment with SBRT.         KING Nuñez    I spent a total of 40 minutes on today's visit, with 12 minutes in virtual communication with the patient via telephone, and the remainder of the time spent in reviewing the relevant history, records, available imaging, and for documentation.

## 2025-06-05 ENCOUNTER — HOSPITAL ENCOUNTER (OUTPATIENT)
Dept: RADIATION ONCOLOGY | Facility: HOSPITAL | Age: 70
Discharge: HOME | End: 2025-06-05

## 2025-06-05 PROCEDURE — 77332 RADIATION TREATMENT AID(S): CPT | Performed by: RADIOLOGY

## 2025-06-05 PROCEDURE — 77290 THER RAD SIMULAJ FIELD CPLX: CPT | Performed by: RADIOLOGY

## 2025-06-05 PROCEDURE — 77470 SPECIAL RADIATION TREATMENT: CPT | Performed by: RADIOLOGY

## 2025-06-10 PROCEDURE — 77338 DESIGN MLC DEVICE FOR IMRT: CPT | Performed by: RADIOLOGY

## 2025-06-10 PROCEDURE — 77300 RADIATION THERAPY DOSE PLAN: CPT | Performed by: RADIOLOGY

## 2025-06-10 PROCEDURE — 77301 RADIOTHERAPY DOSE PLAN IMRT: CPT | Performed by: RADIOLOGY

## 2025-06-16 ENCOUNTER — HOSPITAL ENCOUNTER (OUTPATIENT)
Dept: RADIATION ONCOLOGY | Facility: HOSPITAL | Age: 70
Discharge: HOME | End: 2025-06-16
Payer: MEDICARE

## 2025-06-17 ENCOUNTER — HOSPITAL ENCOUNTER (OUTPATIENT)
Dept: RADIATION ONCOLOGY | Facility: HOSPITAL | Age: 70
Discharge: HOME | End: 2025-06-17

## 2025-06-17 PROCEDURE — 77386: CPT | Performed by: RADIOLOGY

## 2025-06-18 ENCOUNTER — HOSPITAL ENCOUNTER (OUTPATIENT)
Dept: RADIATION ONCOLOGY | Facility: HOSPITAL | Age: 70
Discharge: HOME | End: 2025-06-18

## 2025-06-18 PROCEDURE — 77386: CPT | Performed by: RADIOLOGY

## 2025-06-18 RX ORDER — ROSUVASTATIN CALCIUM 5 MG/1
5 TABLET, COATED ORAL DAILY
Qty: 90 TABLET | Refills: 3 | Status: SHIPPED | OUTPATIENT
Start: 2025-06-18

## 2025-06-18 RX ORDER — LISINOPRIL 20 MG/1
20 TABLET ORAL DAILY
Qty: 90 TABLET | Refills: 3 | Status: SHIPPED | OUTPATIENT
Start: 2025-06-18

## 2025-06-19 ENCOUNTER — HOSPITAL ENCOUNTER (OUTPATIENT)
Dept: RADIATION ONCOLOGY | Facility: HOSPITAL | Age: 70
Discharge: HOME | End: 2025-06-19

## 2025-06-19 ENCOUNTER — DOCUMENTATION (OUTPATIENT)
Dept: NUTRITION | Facility: HOSPITAL | Age: 70
End: 2025-06-19
Payer: MEDICARE

## 2025-06-19 VITALS — WEIGHT: 166.3 LBS | BODY MASS INDEX: 21.94 KG/M2

## 2025-06-19 PROCEDURE — 77386: CPT | Performed by: RADIOLOGY

## 2025-06-19 NOTE — PROGRESS NOTES
ONC Nutrition     Diagnosis:  Mucinous adenocarcinoma of appendix diagnosed 2017 / metastatic to bladder, peritoneal disease, bilateral liver lesions with multiple surgical resections / current issue     Previous Treatment:  Prior treatment history includes surgical resection and chemotherapy, in addition to previous course of involved field radiation therapy targeting a recurrent right pelvic sidewall mass consisting of a conglomerate of external iliac and inguinal lymph nodes, completing 5/2022.  Late last year, he was found on surveillance imaging to have interval development of 2 pulmonary nodules in the left lower lobe and right lower lobe of lung representing his only known sites of active disease.  Bronchoscopy and navigational biopsy identified both of these nodules to be metastatic adenocarcinoma with mucinous features, compatible with his appendiceal primary.  The patient underwent a course of CyberKnife SBRT to a dose of 54 Gy in 3 fractions delivered to the left lower lobe lung tumor, completing 11/8/2024.  Sequentially, he underwent a course of CyberKnife SBRT to a dose of 54 Gy in 3 fractions delivered to the contralateral right lower lobe lung tumor, completing 12/6/2024.     Current Treatment: Plan for additional radiotherapy to treat the soft tissue mass involving the right psoas muscle, likely 40 Hammonds in 15 fractions, located outside prior radiation field     Sequentially following treatment we will arrange for re-consultation in clinic to address the enlarging right subpleural nodule which would be amenable for treatment with SBRT      Weight 166.3 lbs / 5-8 lbs weight loss over the past 2 months from [facility]    Follow up with patient during RAD ONC status checks 6/19/25.  Patient states that he recently has lost a few lbs, but appetite and oral food intake has remained about the same.  He eats lightly for breakfast (toast), big meal is at lunch and small amount of intake at dinner.     Discussed the  importance of nutrition with diagnosis and treatment plan, focusing on adequate calorie, protein, nutrient and hydration. Encouraged focus on healthy food choices that would support immune system and nutritional status.   Discussed indication for higher protein intake and reviewed high protein foods with encouragement to include at each meal.     Discussed ONS and the benefit of including these types of products when oral intake is insufficient to meet nutritional needs; tips for flavor and nutrient enhancement discussed. Samples of Ensure Complete were provided, along with coupons.  Patient states that he has never really tried any of the ONS prior.

## 2025-06-20 ENCOUNTER — HOSPITAL ENCOUNTER (OUTPATIENT)
Dept: RADIATION ONCOLOGY | Facility: HOSPITAL | Age: 70
Discharge: HOME | End: 2025-06-20

## 2025-06-20 PROCEDURE — 77336 RADIATION PHYSICS CONSULT: CPT | Performed by: RADIOLOGY

## 2025-06-20 PROCEDURE — 77386: CPT | Performed by: RADIOLOGY

## 2025-06-23 ENCOUNTER — HOSPITAL ENCOUNTER (OUTPATIENT)
Dept: RADIATION ONCOLOGY | Facility: HOSPITAL | Age: 70
Discharge: HOME | End: 2025-06-23
Payer: MEDICARE

## 2025-06-23 PROCEDURE — 77386: CPT | Performed by: RADIOLOGY

## 2025-06-24 ENCOUNTER — HOSPITAL ENCOUNTER (OUTPATIENT)
Dept: RADIATION ONCOLOGY | Facility: HOSPITAL | Age: 70
Discharge: HOME | End: 2025-06-24

## 2025-06-24 PROCEDURE — 77386: CPT | Performed by: RADIOLOGY

## 2025-06-25 ENCOUNTER — HOSPITAL ENCOUNTER (OUTPATIENT)
Dept: RADIATION ONCOLOGY | Facility: HOSPITAL | Age: 70
Discharge: HOME | End: 2025-06-25

## 2025-06-25 PROCEDURE — 77386: CPT | Performed by: RADIOLOGY

## 2025-06-25 PROCEDURE — 77336 RADIATION PHYSICS CONSULT: CPT | Performed by: RADIOLOGY

## 2025-06-26 ENCOUNTER — HOSPITAL ENCOUNTER (OUTPATIENT)
Dept: RADIATION ONCOLOGY | Facility: HOSPITAL | Age: 70
Discharge: HOME | End: 2025-06-26

## 2025-06-26 VITALS — WEIGHT: 165.8 LBS | BODY MASS INDEX: 21.87 KG/M2

## 2025-06-26 DIAGNOSIS — G89.3 CANCER RELATED PAIN: ICD-10-CM

## 2025-06-26 PROCEDURE — 77386: CPT | Performed by: RADIOLOGY

## 2025-06-26 RX ORDER — OXYCODONE HYDROCHLORIDE 10 MG/1
10 TABLET ORAL EVERY 4 HOURS PRN
Qty: 180 TABLET | Refills: 0 | Status: SHIPPED | OUTPATIENT
Start: 2025-06-26 | End: 2025-07-26

## 2025-06-26 NOTE — TELEPHONE ENCOUNTER
YAIMA #: [id number]    Medication requested: oxyCODONE (ROXICODONE) 10 MG tablet     Last fill date: 5/20/25    Last appointment: 6/2/25    Next appointment: 7/2/25

## 2025-06-27 ENCOUNTER — HOSPITAL ENCOUNTER (OUTPATIENT)
Dept: RADIATION ONCOLOGY | Facility: HOSPITAL | Age: 70
Discharge: HOME | End: 2025-06-27

## 2025-06-27 PROCEDURE — 77386: CPT | Performed by: RADIOLOGY

## 2025-06-30 ENCOUNTER — HOSPITAL ENCOUNTER (OUTPATIENT)
Dept: RADIATION ONCOLOGY | Facility: HOSPITAL | Age: 70
Discharge: HOME | End: 2025-06-30
Payer: MEDICARE

## 2025-06-30 PROCEDURE — 77386: CPT | Performed by: RADIOLOGY

## 2025-07-01 ENCOUNTER — HOSPITAL ENCOUNTER (OUTPATIENT)
Dept: RADIATION ONCOLOGY | Facility: HOSPITAL | Age: 70
Discharge: HOME | End: 2025-07-01

## 2025-07-01 ENCOUNTER — HOSPITAL ENCOUNTER (OUTPATIENT)
Dept: RADIATION ONCOLOGY | Facility: HOSPITAL | Age: 70
Setting detail: RADIATION/ONCOLOGY SERIES
End: 2025-07-01
Payer: MEDICARE

## 2025-07-01 PROCEDURE — 77386: CPT | Performed by: RADIOLOGY

## 2025-07-01 NOTE — PROGRESS NOTES
Palliative Clinic Note      Name: Esteban Tse  Age: 70 y.o.  Sex: male  : 1955  MRN: [id number]  Date of Service: 2025   Medical Oncologist: Dr. Delcid  Mode of visit: video-conference  Location of patient: Home  Location of provider: home    Subjective:    Chief Complaint: Pain, nausea     History of Present Illness: Esteban Tse is a 70 y.o. male with past medical history significant for HTN, AAA, metastatic adenocarcinoma of the appendix who presents via video-conference today as a follow up for pain and symptom management.     Treatment summary: The patient was diagnosed with cancer of the appendix at the time of appendectomy in 2017. Patient under surveillance until imaging in 2019 demonstrated several lesions suspicious for recurrent disease. He underwent a diagnostic laparoscopy with peritoneal biopsies on 2020 by Dr. Peoples that proved recurrent adenocarcinoma. He underwent an exploratory laparotomy with excision of right and left lobe liver lesions, omentectomy, resection of pelvic peritoneal nodules, ileocolectomy with creation of ileal colostomy, hyperthermic intraperitoneal chemotherapy and a partial cystectomy with right ureteral stent placement in 2020. He underwent a cystoscopy with bilateral ureteral catheters, exploratory laparotomy, lysis of adhesions, right pelvic sidewall excisional biopsy, and an open partial cystectomy in 2021. TURBT performed in 10/2021. Imaging from 3/4/2022 showed enlarging external illiac adenopathy. There are no surgical options per Dr. Peoples. Underwent CT-guided biopsy of the right iliac mass consistent with mucinous adenocarcinoma. Patient completed palliative radiation in 2024. Scans from 2024 showed growth in the lungs. Patient underwent bronchoscopy in 10/2024 by Dr. Santos, biopsies were consistent with prior pathology. Patient completed radiation to the left lung on 2024 and the right lung on 2024.  Recent scans demonstrated   increased hypoattenuation within the adjacent right psoas muscle consistent with progressive disease. Patient undergoing additional palliative radiation to the right psoas muscle.     Pain: Patient established care with [facility] in [place], [place] > 1 year ago. Patient's right hip pain was thought to be referred from his spine. Patient underwent several epidurals and a lumbar laminectomy with no improvement in his pain. Patient complains of pain predominantly in his right groin / hip and lower extremity secondary to cancer.  Patient restarted Morphine ER 15 mg BID in 4/2025 however has only been taking is as needed, about once per day in an effort to avoid constipation. He is prescribed oxycodone 10 mg q4h PRN for break through pain. He is also taking Cymbalta 30 mg daily and gabapentin to 800 mg TID. Patient unable to tolerate Fentanyl patches due to side effects. Insurance company stopped covering Xtampza.      Other symptoms: The patient continues to complain of nausea, specifically several hours after eating. He denies vomiting. He endorses associated belching. Failed trials of several antiemetics and GERD medications. He has been off of Marinol due to production issue. This was helpful for his appetite and nausea. The patient manages constipation with Milk of Mag as needed, about every 4-5 days.  No concerns with sleep today.      Pyschosocial: The patient lives alone. He is retired from working for the state. He enjoys playing golf and spending time outside. No personal history of a mental illness. The patient admits to worsening anxiety/depression recently due to the decline of his mother's health. Patient's father passed away in 10/2022 and the patient is an only child.     Spiritual: Patient describes himself as curious rather than practicing.      Goals: Improve quality of life with symptom management.     The following portions of the patient's history were  reviewed and updated as appropriate: allergies, current medications, past family history, past medical history, past social history, past surgical history and problem list.    ORT-R: Low risk  Decisional capacity: Full  ECOG: (1) Restricted in physically strenuous activity, ambulatory and able to do work of light nature     Objective:    Constitutional: Awake, alert, sitting up   Eyes: PERRLA, EOMS intact  HENT: NCAT, face symmetric  Neck: Supple, trachea midline  Respiratory: Nonlabored respirations  Musculoskeletal: Moves all extremities   Psychiatric: Appropriate affect, cooperative  Neurologic: Oriented x 3, Cranial Nerves grossly intact to confrontation, speech clear    Medication Counts: Collected over the phone. See bottom of note for details. No misuse or overuse evident.   I have reviewed the patient's [place] PDMP. YAIMA Req #[id number].   UDS: Last 3/25/22. Reviewed. Appropriate.     Assessment & Plan:    1. Mucinous adenocarcinoma of appendix  - Undergoing palliative radiation.     2. Cancer related pain  - Patient is appropriate for opioid therapy due to cancer related pain. Daily function and quality of life improved with pain medication. Refills for MS Contin 15 mg BID and Oxycodone 10 mg q4h PRN were sent to the pharmacy. Side effects of the medication discussed at every visit. Patient was encouraged to continue bowel regimen of daily stool softeners, prn laxatives, and diet modifications.    3. Neuropathy associated with malignant neoplasm  - Refilled gabapentin (NEURONTIN) 800 MG tablet; Take 1 tablet by mouth 3 (Three) Times a Day.  Dispense: 90 tablet; Refill: 0    4. Nausea  - Recommend trial of Simethicone - Maalox OTC.     Code status: Full code  Advanced directives: Not on file  Health care surrogate: Shaylee Tse, mother    Return in about 3 months (around 10/2/2025) for Office Visit.    The patient has chosen to receive care through a telehealth visit.   Does the patient consent to using a video  visit for their medical care today? Yes   The visit included audio and video interaction. No technical issues occurred during this visit.  I spent 30 minutes caring for Esteban Tse on this date of service. This time includes time spent by me in the following activities: preparing for the visit, reviewing tests, obtaining and/or reviewing a separately obtained history, performing a medically appropriate examination and/or evaluation, counseling and educating the patient/family/caregiver, ordering medications, tests, or procedures, documenting information in the medical record, independently interpreting results and communicating that information with the patient/family/caregiver, and care coordination.    Qian Fleming PA-C  07/02/2025    Medication Date Filled # Filled Count Used # Days  SOY   Oxycodone 10 6/26/25 180 150 30 6 5   Gabapentin 800 6/2/25 90 3 87 30 3   Morphine ER 15  5/2/25 60 5 55 60 1

## 2025-07-02 ENCOUNTER — TELEMEDICINE (OUTPATIENT)
Dept: PALLIATIVE CARE | Facility: CLINIC | Age: 70
End: 2025-07-02
Payer: MEDICARE

## 2025-07-02 ENCOUNTER — HOSPITAL ENCOUNTER (OUTPATIENT)
Dept: RADIATION ONCOLOGY | Facility: HOSPITAL | Age: 70
Discharge: HOME | End: 2025-07-02

## 2025-07-02 VITALS
WEIGHT: 165 LBS | RESPIRATION RATE: 18 BRPM | SYSTOLIC BLOOD PRESSURE: 134 MMHG | DIASTOLIC BLOOD PRESSURE: 86 MMHG | OXYGEN SATURATION: 98 % | BODY MASS INDEX: 21.77 KG/M2 | HEART RATE: 66 BPM | TEMPERATURE: 97.7 F

## 2025-07-02 DIAGNOSIS — C18.1 MUCINOUS ADENOCARCINOMA OF APPENDIX: Primary | ICD-10-CM

## 2025-07-02 DIAGNOSIS — C80.1 NEUROPATHY ASSOCIATED WITH MALIGNANT NEOPLASM: ICD-10-CM

## 2025-07-02 DIAGNOSIS — R11.0 NAUSEA: ICD-10-CM

## 2025-07-02 DIAGNOSIS — G89.3 CANCER RELATED PAIN: ICD-10-CM

## 2025-07-02 DIAGNOSIS — G63 NEUROPATHY ASSOCIATED WITH MALIGNANT NEOPLASM: ICD-10-CM

## 2025-07-02 PROCEDURE — 77336 RADIATION PHYSICS CONSULT: CPT | Performed by: RADIOLOGY

## 2025-07-02 PROCEDURE — 77386: CPT | Performed by: RADIOLOGY

## 2025-07-02 RX ORDER — GABAPENTIN 800 MG/1
800 TABLET ORAL 3 TIMES DAILY
Qty: 90 TABLET | Refills: 0 | Status: SHIPPED | OUTPATIENT
Start: 2025-07-02

## 2025-07-02 NOTE — TELEPHONE ENCOUNTER
I have reviewed patient's YAIMA report prior to prescribing Schedule II, III, and IV medications. Request #  [id number] . Next refill for MS Contin 15 mg BID #60 and oxycodone 10 mg q4h PRN #180 were sent to the pharmacy. The patient is scheduled to follow-up in 3 months.

## 2025-07-03 ENCOUNTER — TELEPHONE (OUTPATIENT)
Dept: PALLIATIVE CARE | Facility: CLINIC | Age: 70
End: 2025-07-03

## 2025-07-03 ENCOUNTER — HOSPITAL ENCOUNTER (OUTPATIENT)
Dept: RADIATION ONCOLOGY | Facility: HOSPITAL | Age: 70
Discharge: HOME | End: 2025-07-03

## 2025-07-03 VITALS — BODY MASS INDEX: 21.64 KG/M2 | WEIGHT: 164 LBS

## 2025-07-03 PROCEDURE — 77386: CPT | Performed by: RADIOLOGY

## 2025-07-03 RX ORDER — MORPHINE SULFATE 15 MG/1
15 TABLET, FILM COATED, EXTENDED RELEASE ORAL 2 TIMES DAILY
Qty: 60 TABLET | Refills: 0 | Status: SHIPPED | OUTPATIENT
Start: 2025-07-03

## 2025-07-03 RX ORDER — OXYCODONE HYDROCHLORIDE 10 MG/1
10 TABLET ORAL EVERY 4 HOURS PRN
Qty: 180 TABLET | Refills: 0 | Status: SHIPPED | OUTPATIENT
Start: 2025-07-26 | End: 2025-08-25

## 2025-07-03 NOTE — TELEPHONE ENCOUNTER
"  Hub staff attempted to follow warm transfer process and was unsuccessful     Caller: Esteban Tse \"Vishal\"    Relationship to patient: Self    Best call back number: [phone]     Patient is needing: PT WAS RETURNING A CALL. PLEASE CALL BACK AND ADVISE WITH SCHEDULING         "

## 2025-07-07 ENCOUNTER — HOSPITAL ENCOUNTER (OUTPATIENT)
Dept: RADIATION ONCOLOGY | Facility: HOSPITAL | Age: 70
Discharge: HOME | End: 2025-07-07
Payer: MEDICARE

## 2025-07-07 PROCEDURE — 77386: CPT | Performed by: RADIOLOGY

## 2025-07-07 RX ORDER — METOPROLOL SUCCINATE 100 MG/1
100 TABLET, EXTENDED RELEASE ORAL DAILY
Qty: 90 TABLET | Refills: 1 | Status: SHIPPED | OUTPATIENT
Start: 2025-07-07

## 2025-07-08 ENCOUNTER — HOSPITAL ENCOUNTER (OUTPATIENT)
Dept: RADIATION ONCOLOGY | Facility: HOSPITAL | Age: 70
Discharge: HOME | End: 2025-07-08

## 2025-07-08 PROCEDURE — 77386: CPT | Performed by: RADIOLOGY

## 2025-07-09 ENCOUNTER — HOSPITAL ENCOUNTER (OUTPATIENT)
Dept: RADIATION ONCOLOGY | Facility: HOSPITAL | Age: 70
Discharge: HOME | End: 2025-07-09

## 2025-07-09 PROCEDURE — 77386: CPT | Performed by: RADIOLOGY

## 2025-07-10 ENCOUNTER — HOSPITAL ENCOUNTER (OUTPATIENT)
Dept: RADIATION ONCOLOGY | Facility: HOSPITAL | Age: 70
Discharge: HOME | End: 2025-07-10

## 2025-07-10 VITALS — WEIGHT: 165 LBS | BODY MASS INDEX: 21.77 KG/M2

## 2025-07-10 DIAGNOSIS — C18.1 MUCINOUS ADENOCARCINOMA OF APPENDIX: Primary | ICD-10-CM

## 2025-07-10 PROCEDURE — 77386: CPT | Performed by: RADIOLOGY

## 2025-07-10 PROCEDURE — 77336 RADIATION PHYSICS CONSULT: CPT | Performed by: RADIOLOGY

## 2025-07-11 ENCOUNTER — TELEPHONE (OUTPATIENT)
Dept: RADIATION ONCOLOGY | Facility: HOSPITAL | Age: 70
End: 2025-07-11
Payer: MEDICARE

## 2025-07-11 ENCOUNTER — HOSPITAL ENCOUNTER (OUTPATIENT)
Dept: RADIATION ONCOLOGY | Facility: HOSPITAL | Age: 70
Discharge: HOME | End: 2025-07-11

## 2025-07-11 PROCEDURE — 77386: CPT | Performed by: RADIOLOGY

## 2025-07-11 NOTE — TELEPHONE ENCOUNTER
Pt notified ct scan 8/14/25 @ 1:00-NPO 2 hours prior- [facility] [place] reva Lowe And Dr. Moody @ 2:30 same day -pat[facility]eda location

## 2025-07-31 DIAGNOSIS — G63 NEUROPATHY ASSOCIATED WITH MALIGNANT NEOPLASM: ICD-10-CM

## 2025-07-31 DIAGNOSIS — C80.1 NEUROPATHY ASSOCIATED WITH MALIGNANT NEOPLASM: ICD-10-CM

## 2025-07-31 DIAGNOSIS — G89.3 CANCER RELATED PAIN: ICD-10-CM

## 2025-07-31 RX ORDER — GABAPENTIN 800 MG/1
800 TABLET ORAL 3 TIMES DAILY
Qty: 90 TABLET | Refills: 0 | Status: SHIPPED | OUTPATIENT
Start: 2025-08-04 | End: 2025-09-03

## 2025-07-31 RX ORDER — OXYCODONE HYDROCHLORIDE 10 MG/1
10 TABLET ORAL EVERY 4 HOURS PRN
Qty: 180 TABLET | Refills: 0 | Status: SHIPPED | OUTPATIENT
Start: 2025-07-31 | End: 2025-08-30

## 2025-07-31 NOTE — TELEPHONE ENCOUNTER
YAIMA #: [id number]    Medication requested:    Last fill date: 7/5/25    Last appointment: 7/2/25    Next appointment: 10/6/25

## 2025-07-31 NOTE — TELEPHONE ENCOUNTER
YAIMA #: [id number]    Medication requested:     oxyCODONE (ROXICODONE) 10 MG tablet       Last fill date: 6/26/25    Last appointment: 7/2/25    Next appointment: 10/6/25

## 2025-08-13 ENCOUNTER — OFFICE VISIT (OUTPATIENT)
Dept: CARDIOLOGY | Facility: CLINIC | Age: 70
End: 2025-08-13
Payer: MEDICARE

## 2025-08-13 VITALS
WEIGHT: 167 LBS | HEIGHT: 73 IN | BODY MASS INDEX: 22.13 KG/M2 | SYSTOLIC BLOOD PRESSURE: 130 MMHG | RESPIRATION RATE: 18 BRPM | OXYGEN SATURATION: 99 % | HEART RATE: 70 BPM | DIASTOLIC BLOOD PRESSURE: 70 MMHG

## 2025-08-13 DIAGNOSIS — E78.00 HYPERCHOLESTEREMIA: ICD-10-CM

## 2025-08-13 DIAGNOSIS — I10 ESSENTIAL HYPERTENSION: Primary | ICD-10-CM

## 2025-08-13 DIAGNOSIS — I71.21 ANEURYSM OF ASCENDING AORTA WITHOUT RUPTURE: ICD-10-CM

## 2025-08-13 PROCEDURE — 93000 ELECTROCARDIOGRAM COMPLETE: CPT | Performed by: INTERNAL MEDICINE

## 2025-08-13 PROCEDURE — 3075F SYST BP GE 130 - 139MM HG: CPT | Performed by: INTERNAL MEDICINE

## 2025-08-13 PROCEDURE — 1160F RVW MEDS BY RX/DR IN RCRD: CPT | Performed by: INTERNAL MEDICINE

## 2025-08-13 PROCEDURE — 99214 OFFICE O/P EST MOD 30 MIN: CPT | Performed by: INTERNAL MEDICINE

## 2025-08-13 PROCEDURE — 1159F MED LIST DOCD IN RCRD: CPT | Performed by: INTERNAL MEDICINE

## 2025-08-13 PROCEDURE — 3078F DIAST BP <80 MM HG: CPT | Performed by: INTERNAL MEDICINE

## 2025-08-13 RX ORDER — METOPROLOL SUCCINATE 100 MG/1
100 TABLET, EXTENDED RELEASE ORAL DAILY
Qty: 90 TABLET | Refills: 3 | Status: SHIPPED | OUTPATIENT
Start: 2025-08-13

## 2025-08-13 RX ORDER — ROSUVASTATIN CALCIUM 5 MG/1
5 TABLET, COATED ORAL DAILY
Qty: 90 TABLET | Refills: 3 | Status: SHIPPED | OUTPATIENT
Start: 2025-08-13

## 2025-08-14 ENCOUNTER — OFFICE VISIT (OUTPATIENT)
Dept: RADIATION ONCOLOGY | Facility: HOSPITAL | Age: 70
End: 2025-08-14
Payer: MEDICARE

## 2025-08-14 ENCOUNTER — HOSPITAL ENCOUNTER (OUTPATIENT)
Dept: CT IMAGING | Facility: HOSPITAL | Age: 70
Discharge: HOME | End: 2025-08-14
Admitting: RADIOLOGY
Payer: MEDICARE

## 2025-08-14 VITALS
HEART RATE: 65 BPM | RESPIRATION RATE: 18 BRPM | WEIGHT: 168.3 LBS | OXYGEN SATURATION: 99 % | BODY MASS INDEX: 22.2 KG/M2 | TEMPERATURE: 96.4 F

## 2025-08-14 DIAGNOSIS — C18.1 MUCINOUS ADENOCARCINOMA OF APPENDIX: Primary | ICD-10-CM

## 2025-08-14 DIAGNOSIS — C18.1 MUCINOUS ADENOCARCINOMA OF APPENDIX: ICD-10-CM

## 2025-08-14 LAB
ALBUMIN SERPL-MCNC: 4.2 G/DL (ref 3.9–4.9)
ALP SERPL-CCNC: 105 IU/L (ref 44–121)
ALT SERPL-CCNC: 22 IU/L (ref 0–44)
AST SERPL-CCNC: 23 IU/L (ref 0–40)
BASOPHILS # BLD AUTO: 0 X10E3/UL (ref 0–0.2)
BASOPHILS NFR BLD AUTO: 0 %
BILIRUB SERPL-MCNC: 0.8 MG/DL (ref 0–1.2)
BUN SERPL-MCNC: 13 MG/DL (ref 8–27)
BUN/CREAT SERPL: 14 (ref 10–24)
CALCIUM SERPL-MCNC: 8.8 MG/DL (ref 8.6–10.2)
CHLORIDE SERPL-SCNC: 105 MMOL/L (ref 96–106)
CHOLEST SERPL-MCNC: 134 MG/DL (ref 100–199)
CK SERPL-CCNC: 50 U/L (ref 41–331)
CO2 SERPL-SCNC: 22 MMOL/L (ref 20–29)
CREAT SERPL-MCNC: 0.93 MG/DL (ref 0.76–1.27)
EGFRCR SERPLBLD CKD-EPI 2021: 89 ML/MIN/1.73
EOSINOPHIL # BLD AUTO: 0.1 X10E3/UL (ref 0–0.4)
EOSINOPHIL NFR BLD AUTO: 2 %
ERYTHROCYTE [DISTWIDTH] IN BLOOD BY AUTOMATED COUNT: 14.1 % (ref 11.6–15.4)
GLOBULIN SER CALC-MCNC: 2.3 G/DL (ref 1.5–4.5)
GLUCOSE SERPL-MCNC: 96 MG/DL (ref 70–99)
HCT VFR BLD AUTO: 40.6 % (ref 37.5–51)
HDLC SERPL-MCNC: 54 MG/DL
HGB BLD-MCNC: 13 G/DL (ref 13–17.7)
IMM GRANULOCYTES # BLD AUTO: 0 X10E3/UL (ref 0–0.1)
IMM GRANULOCYTES NFR BLD AUTO: 1 %
LDLC SERPL CALC-MCNC: 58 MG/DL (ref 0–99)
LPA SERPL-SCNC: 17.7 NMOL/L
LYMPHOCYTES # BLD AUTO: 0.7 X10E3/UL (ref 0.7–3.1)
LYMPHOCYTES NFR BLD AUTO: 14 %
MCH RBC QN AUTO: 30.4 PG (ref 26.6–33)
MCHC RBC AUTO-ENTMCNC: 32 G/DL (ref 31.5–35.7)
MCV RBC AUTO: 95 FL (ref 79–97)
MONOCYTES # BLD AUTO: 0.5 X10E3/UL (ref 0.1–0.9)
MONOCYTES NFR BLD AUTO: 10 %
NEUTROPHILS # BLD AUTO: 3.8 X10E3/UL (ref 1.4–7)
NEUTROPHILS NFR BLD AUTO: 73 %
PLATELET # BLD AUTO: 179 X10E3/UL (ref 150–450)
POTASSIUM SERPL-SCNC: 4.3 MMOL/L (ref 3.5–5.2)
PROT SERPL-MCNC: 6.5 G/DL (ref 6–8.5)
RBC # BLD AUTO: 4.27 X10E6/UL (ref 4.14–5.8)
SODIUM SERPL-SCNC: 141 MMOL/L (ref 134–144)
TRIGL SERPL-MCNC: 127 MG/DL (ref 0–149)
VLDLC SERPL CALC-MCNC: 22 MG/DL (ref 5–40)
WBC # BLD AUTO: 5.2 X10E3/UL (ref 3.4–10.8)

## 2025-08-14 PROCEDURE — 74177 CT ABD & PELVIS W/CONTRAST: CPT

## 2025-08-14 PROCEDURE — 25510000001 IOPAMIDOL 61 % SOLUTION: Performed by: RADIOLOGY

## 2025-08-14 PROCEDURE — G0463 HOSPITAL OUTPT CLINIC VISIT: HCPCS | Performed by: RADIOLOGY

## 2025-08-14 RX ORDER — IOPAMIDOL 612 MG/ML
100 INJECTION, SOLUTION INTRAVASCULAR
Status: COMPLETED | OUTPATIENT
Start: 2025-08-14 | End: 2025-08-14

## 2025-08-14 RX ADMIN — IOPAMIDOL 85 ML: 612 INJECTION, SOLUTION INTRAVENOUS at 13:24

## 2025-08-15 ENCOUNTER — HOSPITAL ENCOUNTER (OUTPATIENT)
Dept: RADIATION ONCOLOGY | Facility: HOSPITAL | Age: 70
Discharge: HOME | End: 2025-08-15

## 2025-08-15 LAB — APO B SERPL-MCNC: 67 MG/DL

## (undated) DEVICE — VISION PROBE: Brand: ION

## (undated) DEVICE — Device: Brand: BALLOON

## (undated) DEVICE — VISION PROBE ADAPTER AND SUCTION ADAPTER

## (undated) DEVICE — SWIVEL CONNECTOR

## (undated) DEVICE — SINGLE USE SUCTION VALVE MAJ-209: Brand: SINGLE USE SUCTION VALVE (STERILE)

## (undated) DEVICE — SOL IRR NACL 0.9PCT 1000ML

## (undated) DEVICE — BIOPSY NEEDLE, 21G: Brand: FLEXISION

## (undated) DEVICE — TRAP SPECI MUCUS 40CC LF STRL

## (undated) DEVICE — CATHETER: Brand: ION

## (undated) DEVICE — SENSR O2 OXIMAX FNGR A/ 18IN NONSTR

## (undated) DEVICE — Device: Brand: ION

## (undated) DEVICE — ST EXT MICROBORE FIX M LL 38IN

## (undated) DEVICE — BOWL UTIL STRL 32OZ

## (undated) DEVICE — FRCP BX RADJAW4 PULM WO NDL STD1.8X2 100

## (undated) DEVICE — BRUSH CYTO BRONCOSCOPE

## (undated) DEVICE — VLV SXN BRONCH DISP FOR FLEX ENDOSCOPE

## (undated) DEVICE — SYR SLPTP 20CC

## (undated) DEVICE — CATHETER GUIDE